# Patient Record
Sex: FEMALE | Race: WHITE | NOT HISPANIC OR LATINO | URBAN - METROPOLITAN AREA
[De-identification: names, ages, dates, MRNs, and addresses within clinical notes are randomized per-mention and may not be internally consistent; named-entity substitution may affect disease eponyms.]

---

## 2018-10-14 ENCOUNTER — INPATIENT (INPATIENT)
Facility: HOSPITAL | Age: 68
LOS: 2 days | Discharge: HOME | DRG: 198 | End: 2018-10-17
Attending: INTERNAL MEDICINE | Admitting: INTERNAL MEDICINE
Payer: COMMERCIAL

## 2018-10-14 VITALS
SYSTOLIC BLOOD PRESSURE: 120 MMHG | TEMPERATURE: 99 F | OXYGEN SATURATION: 100 % | HEART RATE: 62 BPM | DIASTOLIC BLOOD PRESSURE: 53 MMHG | RESPIRATION RATE: 17 BRPM

## 2018-10-14 DIAGNOSIS — R06.02 SHORTNESS OF BREATH: ICD-10-CM

## 2018-10-14 LAB
ALBUMIN SERPL ELPH-MCNC: 3.7 G/DL — SIGNIFICANT CHANGE UP (ref 3.5–5)
ALP SERPL-CCNC: 116 U/L — SIGNIFICANT CHANGE UP (ref 40–120)
ALT FLD-CCNC: 24 U/L DA — SIGNIFICANT CHANGE UP (ref 10–60)
ANION GAP SERPL CALC-SCNC: 5 MMOL/L — SIGNIFICANT CHANGE UP (ref 5–17)
AST SERPL-CCNC: 32 U/L — SIGNIFICANT CHANGE UP (ref 10–40)
BASE EXCESS BLDV CALC-SCNC: 0.9 MMOL/L — SIGNIFICANT CHANGE UP (ref -2–2)
BILIRUB SERPL-MCNC: 0.4 MG/DL — SIGNIFICANT CHANGE UP (ref 0.2–1.2)
BUN SERPL-MCNC: 14 MG/DL — SIGNIFICANT CHANGE UP (ref 7–18)
CALCIUM SERPL-MCNC: 9 MG/DL — SIGNIFICANT CHANGE UP (ref 8.4–10.5)
CHLORIDE SERPL-SCNC: 105 MMOL/L — SIGNIFICANT CHANGE UP (ref 96–108)
CO2 SERPL-SCNC: 28 MMOL/L — SIGNIFICANT CHANGE UP (ref 22–31)
CREAT SERPL-MCNC: 0.6 MG/DL — SIGNIFICANT CHANGE UP (ref 0.5–1.3)
GLUCOSE SERPL-MCNC: 96 MG/DL — SIGNIFICANT CHANGE UP (ref 70–99)
HCO3 BLDV-SCNC: 28 MMOL/L — SIGNIFICANT CHANGE UP (ref 21–29)
HCT VFR BLD CALC: 40.6 % — SIGNIFICANT CHANGE UP (ref 34.5–45)
HGB BLD-MCNC: 13.5 G/DL — SIGNIFICANT CHANGE UP (ref 11.5–15.5)
HOROWITZ INDEX BLDV+IHG-RTO: 21 — SIGNIFICANT CHANGE UP
MCHC RBC-ENTMCNC: 28.4 PG — SIGNIFICANT CHANGE UP (ref 27–34)
MCHC RBC-ENTMCNC: 33.2 GM/DL — SIGNIFICANT CHANGE UP (ref 32–36)
MCV RBC AUTO: 85.6 FL — SIGNIFICANT CHANGE UP (ref 80–100)
NT-PROBNP SERPL-SCNC: 152 PG/ML — HIGH (ref 0–125)
PCO2 BLDV: 54 MMHG — HIGH (ref 35–50)
PH BLDV: 7.32 — LOW (ref 7.35–7.45)
PLATELET # BLD AUTO: 311 K/UL — SIGNIFICANT CHANGE UP (ref 150–400)
PO2 BLDV: <44 MMHG — SIGNIFICANT CHANGE UP (ref 25–45)
POTASSIUM SERPL-MCNC: 4.4 MMOL/L — SIGNIFICANT CHANGE UP (ref 3.5–5.3)
POTASSIUM SERPL-SCNC: 4.4 MMOL/L — SIGNIFICANT CHANGE UP (ref 3.5–5.3)
PROT SERPL-MCNC: 9.1 G/DL — HIGH (ref 6–8.3)
RBC # BLD: 4.74 M/UL — SIGNIFICANT CHANGE UP (ref 3.8–5.2)
RBC # FLD: 12.4 % — SIGNIFICANT CHANGE UP (ref 10.3–14.5)
SAO2 % BLDV: 20 % — LOW (ref 67–88)
SODIUM SERPL-SCNC: 138 MMOL/L — SIGNIFICANT CHANGE UP (ref 135–145)
TROPONIN I SERPL-MCNC: <0.015 NG/ML — SIGNIFICANT CHANGE UP (ref 0–0.04)
WBC # BLD: 7.6 K/UL — SIGNIFICANT CHANGE UP (ref 3.8–10.5)
WBC # FLD AUTO: 7.6 K/UL — SIGNIFICANT CHANGE UP (ref 3.8–10.5)

## 2018-10-14 PROCEDURE — 99285 EMERGENCY DEPT VISIT HI MDM: CPT

## 2018-10-14 PROCEDURE — 71046 X-RAY EXAM CHEST 2 VIEWS: CPT | Mod: 26

## 2018-10-14 PROCEDURE — 99222 1ST HOSP IP/OBS MODERATE 55: CPT

## 2018-10-14 NOTE — ED PROVIDER NOTE - MEDICAL DECISION MAKING DETAILS
symptoms of heart failure, perhaps diastolic? ekg, xr chest, dimer. no s/s pe or acs right now. Symptomatic treatment.

## 2018-10-14 NOTE — ED ADULT NURSE NOTE - ED STAT RN HANDOFF DETAILS 2
received  pt.from jasvir russell  pt. is alert and  oriented x3.denies pain.  pending  disposition. not  in distress received  pt.from jasvir russell  pt. is alert and  oriented x3.denies pain. admitted to  tele for  SOB. on CM with NSR.not  in distress received  pt.from jasvir russell  pt. is alert and  oriented x3.denies pain. admitted to  tele for  SOB. on CM with NSR. endorsed  to jasvir florence. pt.  not  in distress

## 2018-10-14 NOTE — ED ADULT NURSE NOTE - OBJECTIVE STATEMENT
covering for primary RN MARCOS Monterroso - pt a&ox3, here with c/o SOB. pt states SOB x1 month with cough and yellow phlegm. states chest tightness. denies CP, fever, chills, n/v.

## 2018-10-14 NOTE — ED PROVIDER NOTE - PHYSICAL EXAMINATION
Gen: well appearing, of stated age, no acute distress; Head: NC, AT; ENT: MMM, no uvular deviation; Neck: supple with full ROM; Chest: basilar rales bl, no retractions, rate normal, appears to breath comfortable; Heart: RRR S1S2 No JVD no hepatojugular reflux, No peripheral edema b/l pulses 2+ in arms and legs; Abd: Soft non-tender, no rebound or guarding, no masses, no gonzalez sign, no mcburney tenderness, no CVAT; Back: No spinal deformity; Ext: Moving all 4 limbs without obvious impairment to ROM, no obvious weakness; Neuro: fluid speech, no focal deficits, oriented to person, place, situation; Psych: No anxiety, depression or pressured speech noted; Skin: no utricaria, no diffuse rash. -ncohen

## 2018-10-14 NOTE — ED PROVIDER NOTE - OBJECTIVE STATEMENT
68 female HLD presenting with wks to months of sob, can't walk up stairs, no cp, sob to lay flat now w 3 pillows, wakes up real short of breath and has to get up. no hx of sob prior. no n/v/d. no fever or chills. no uri symptoms.

## 2018-10-14 NOTE — ED ADULT NURSE NOTE - NSIMPLEMENTINTERV_GEN_ALL_ED
Implemented All Universal Safety Interventions:  Damon to call system. Call bell, personal items and telephone within reach. Instruct patient to call for assistance. Room bathroom lighting operational. Non-slip footwear when patient is off stretcher. Physically safe environment: no spills, clutter or unnecessary equipment. Stretcher in lowest position, wheels locked, appropriate side rails in place.

## 2018-10-15 DIAGNOSIS — E78.5 HYPERLIPIDEMIA, UNSPECIFIED: ICD-10-CM

## 2018-10-15 DIAGNOSIS — R06.02 SHORTNESS OF BREATH: ICD-10-CM

## 2018-10-15 DIAGNOSIS — K21.9 GASTRO-ESOPHAGEAL REFLUX DISEASE WITHOUT ESOPHAGITIS: ICD-10-CM

## 2018-10-15 DIAGNOSIS — Z29.9 ENCOUNTER FOR PROPHYLACTIC MEASURES, UNSPECIFIED: ICD-10-CM

## 2018-10-15 DIAGNOSIS — M32.9 SYSTEMIC LUPUS ERYTHEMATOSUS, UNSPECIFIED: ICD-10-CM

## 2018-10-15 DIAGNOSIS — Z90.710 ACQUIRED ABSENCE OF BOTH CERVIX AND UTERUS: Chronic | ICD-10-CM

## 2018-10-15 LAB
ANION GAP SERPL CALC-SCNC: 8 MMOL/L — SIGNIFICANT CHANGE UP (ref 5–17)
BUN SERPL-MCNC: 16 MG/DL — SIGNIFICANT CHANGE UP (ref 7–18)
CALCIUM SERPL-MCNC: 8.6 MG/DL — SIGNIFICANT CHANGE UP (ref 8.4–10.5)
CHLORIDE SERPL-SCNC: 106 MMOL/L — SIGNIFICANT CHANGE UP (ref 96–108)
CHOLEST SERPL-MCNC: 184 MG/DL — SIGNIFICANT CHANGE UP (ref 10–199)
CK MB BLD-MCNC: <1.8 % — SIGNIFICANT CHANGE UP (ref 0–3.5)
CK MB CFR SERPL CALC: <1 NG/ML — SIGNIFICANT CHANGE UP (ref 0–3.6)
CK SERPL-CCNC: 57 U/L — SIGNIFICANT CHANGE UP (ref 21–215)
CO2 SERPL-SCNC: 24 MMOL/L — SIGNIFICANT CHANGE UP (ref 22–31)
CREAT SERPL-MCNC: 0.64 MG/DL — SIGNIFICANT CHANGE UP (ref 0.5–1.3)
GLUCOSE SERPL-MCNC: 103 MG/DL — HIGH (ref 70–99)
HBA1C BLD-MCNC: 6.4 % — HIGH (ref 4–5.6)
HCT VFR BLD CALC: 39.6 % — SIGNIFICANT CHANGE UP (ref 34.5–45)
HDLC SERPL-MCNC: 38 MG/DL — LOW
HGB BLD-MCNC: 13 G/DL — SIGNIFICANT CHANGE UP (ref 11.5–15.5)
LIPID PNL WITH DIRECT LDL SERPL: 118 MG/DL — SIGNIFICANT CHANGE UP
MAGNESIUM SERPL-MCNC: 2.3 MG/DL — SIGNIFICANT CHANGE UP (ref 1.6–2.6)
MCHC RBC-ENTMCNC: 28 PG — SIGNIFICANT CHANGE UP (ref 27–34)
MCHC RBC-ENTMCNC: 33 GM/DL — SIGNIFICANT CHANGE UP (ref 32–36)
MCV RBC AUTO: 85 FL — SIGNIFICANT CHANGE UP (ref 80–100)
PHOSPHATE SERPL-MCNC: 3.3 MG/DL — SIGNIFICANT CHANGE UP (ref 2.5–4.5)
PLATELET # BLD AUTO: 279 K/UL — SIGNIFICANT CHANGE UP (ref 150–400)
POTASSIUM SERPL-MCNC: 3.7 MMOL/L — SIGNIFICANT CHANGE UP (ref 3.5–5.3)
POTASSIUM SERPL-SCNC: 3.7 MMOL/L — SIGNIFICANT CHANGE UP (ref 3.5–5.3)
RBC # BLD: 4.66 M/UL — SIGNIFICANT CHANGE UP (ref 3.8–5.2)
RBC # FLD: 12 % — SIGNIFICANT CHANGE UP (ref 10.3–14.5)
SODIUM SERPL-SCNC: 138 MMOL/L — SIGNIFICANT CHANGE UP (ref 135–145)
TOTAL CHOLESTEROL/HDL RATIO MEASUREMENT: 4.8 RATIO — SIGNIFICANT CHANGE UP (ref 3.3–7.1)
TRIGL SERPL-MCNC: 138 MG/DL — SIGNIFICANT CHANGE UP (ref 10–149)
TROPONIN I SERPL-MCNC: <0.015 NG/ML — SIGNIFICANT CHANGE UP (ref 0–0.04)
WBC # BLD: 7.2 K/UL — SIGNIFICANT CHANGE UP (ref 3.8–10.5)
WBC # FLD AUTO: 7.2 K/UL — SIGNIFICANT CHANGE UP (ref 3.8–10.5)

## 2018-10-15 PROCEDURE — 99223 1ST HOSP IP/OBS HIGH 75: CPT

## 2018-10-15 RX ORDER — GLUCAGON INJECTION, SOLUTION 0.5 MG/.1ML
1 INJECTION, SOLUTION SUBCUTANEOUS ONCE
Qty: 0 | Refills: 0 | Status: DISCONTINUED | OUTPATIENT
Start: 2018-10-15 | End: 2018-10-17

## 2018-10-15 RX ORDER — DEXTROSE 50 % IN WATER 50 %
12.5 SYRINGE (ML) INTRAVENOUS ONCE
Qty: 0 | Refills: 0 | Status: DISCONTINUED | OUTPATIENT
Start: 2018-10-15 | End: 2018-10-17

## 2018-10-15 RX ORDER — ENOXAPARIN SODIUM 100 MG/ML
40 INJECTION SUBCUTANEOUS DAILY
Qty: 0 | Refills: 0 | Status: DISCONTINUED | OUTPATIENT
Start: 2018-10-15 | End: 2018-10-17

## 2018-10-15 RX ORDER — DEXTROSE 50 % IN WATER 50 %
15 SYRINGE (ML) INTRAVENOUS ONCE
Qty: 0 | Refills: 0 | Status: DISCONTINUED | OUTPATIENT
Start: 2018-10-15 | End: 2018-10-17

## 2018-10-15 RX ORDER — PANTOPRAZOLE SODIUM 20 MG/1
40 TABLET, DELAYED RELEASE ORAL
Qty: 0 | Refills: 0 | Status: DISCONTINUED | OUTPATIENT
Start: 2018-10-15 | End: 2018-10-17

## 2018-10-15 RX ORDER — INSULIN LISPRO 100/ML
VIAL (ML) SUBCUTANEOUS
Qty: 0 | Refills: 0 | Status: DISCONTINUED | OUTPATIENT
Start: 2018-10-15 | End: 2018-10-17

## 2018-10-15 RX ORDER — DEXTROSE 50 % IN WATER 50 %
25 SYRINGE (ML) INTRAVENOUS ONCE
Qty: 0 | Refills: 0 | Status: DISCONTINUED | OUTPATIENT
Start: 2018-10-15 | End: 2018-10-17

## 2018-10-15 RX ORDER — SODIUM CHLORIDE 9 MG/ML
1000 INJECTION, SOLUTION INTRAVENOUS
Qty: 0 | Refills: 0 | Status: DISCONTINUED | OUTPATIENT
Start: 2018-10-15 | End: 2018-10-17

## 2018-10-15 RX ORDER — ATORVASTATIN CALCIUM 80 MG/1
10 TABLET, FILM COATED ORAL AT BEDTIME
Qty: 0 | Refills: 0 | Status: DISCONTINUED | OUTPATIENT
Start: 2018-10-15 | End: 2018-10-17

## 2018-10-15 RX ADMIN — PANTOPRAZOLE SODIUM 40 MILLIGRAM(S): 20 TABLET, DELAYED RELEASE ORAL at 06:37

## 2018-10-15 RX ADMIN — ATORVASTATIN CALCIUM 10 MILLIGRAM(S): 80 TABLET, FILM COATED ORAL at 21:29

## 2018-10-15 NOTE — H&P ADULT - PROBLEM SELECTOR PLAN 1
- p/w 2 months of worsening SOB on exertion, orthopnea, and PND  - patient reports that she has had SOB for 10-15 years but it recently got worse  - admitted to tele to r/o possible CHF exacerbation  - proBNP mildly elevated to 152  - mildly acidotic with slightly elevated pCO2 to 54  - EKG NSR  - trop x2 negative  - CXR wnl, unchanged from May 2016  - patient saturating 98% on room air  - primary team please obtain Echo records from [facility]  - primary team please obtain cardio consult - p/w 2 months of worsening SOB on exertion, orthopnea, and PND  - may be secondary to CHF or URI  - patient reports that she has had SOB for 10-15 years but it recently got worse  - admitted to tele to r/o possible CHF exacerbation  - proBNP mildly elevated to 152  - mildly acidotic with slightly elevated pCO2 to 54  - EKG NSR  - trop x2 negative  - CXR wnl, unchanged from May 2016  - patient saturating 98% on room air  - f/u RVP  - primary team please obtain Echo records from [facility]  - primary team please obtain cardio consult - p/w 2 months of worsening SOB on exertion, orthopnea, and PND  - likely secondary to new onset CHF   - patient reports that she has had SOB for 10-15 years but it recently got worse  - admitted to tele to r/o possible CHF exacerbation  - proBNP mildly elevated to 152  - mildly acidotic with slightly elevated pCO2 to 54  - EKG NSR  - trop x2 negative  - CXR wnl, unchanged from May 2016  - patient saturating 98% on room air  - no evidence of fluid overload - lungs cta b/l, no edema - no lasix needed for now  - f/u echo  - primary team please obtain Echo records from [facility], Dr. Goins, patient's previous PCP  - primary team please obtain cardio consult

## 2018-10-15 NOTE — H&P ADULT - NEGATIVE NEUROLOGICAL SYMPTOMS
no weakness/no headache/no loss of sensation/no vertigo/no loss of consciousness/no syncope/no tremors

## 2018-10-15 NOTE — H&P ADULT - PMH
Hyperlipidemia    Systemic lupus erythematosus GERD (gastroesophageal reflux disease)    Hyperlipidemia    Systemic lupus erythematosus

## 2018-10-15 NOTE — H&P ADULT - PROBLEM SELECTOR PLAN 3
- patient reports she was previously diagnosed with RA but then her doctor canceled that diagnosis and diagnosed her with SLE  - not taking any meds - patient reports she was previously diagnosed with RA but then her doctor canceled that diagnosis and diagnosed her with SLE  - refused medical therapy, not taking any meds

## 2018-10-15 NOTE — H&P ADULT - PROBLEM SELECTOR PLAN 5
IMPROVE VTE Individual Risk Assessment    RISK                                                          Points  [] Previous VTE                                           3  [] Thrombophilia                                        2  [] Lower limb paralysis                              2   [] Current Cancer                                       2   [x] Immobilization > 24 hrs                        1  [] ICU/CCU stay > 24 hours                       1  [x] Age > 60                                                   1    Lovenox 40mg qd

## 2018-10-15 NOTE — CONSULT NOTE ADULT - SUBJECTIVE AND OBJECTIVE BOX
CHIEF COMPLAINT: shortness of breath    HPI: 67 yo F with HLD and SLE who presented with progressively worsening dyspnea for the past 2 months.     PAST MEDICAL & SURGICAL HISTORY:  As above, also GERD (gastroesophageal reflux disease), H/O total hysterectomy    Allergies    No Known Allergies    MEDICATIONS  (STANDING):  atorvastatin 10 milliGRAM(s) Oral at bedtime  dextrose 5%. 1000 milliLiter(s) (50 mL/Hr) IV Continuous <Continuous>  dextrose 50% Injectable 12.5 Gram(s) IV Push once  dextrose 50% Injectable 25 Gram(s) IV Push once  dextrose 50% Injectable 25 Gram(s) IV Push once  enoxaparin Injectable 40 milliGRAM(s) SubCutaneous daily  insulin lispro (HumaLOG) corrective regimen sliding scale   SubCutaneous three times a day before meals  pantoprazole    Tablet 40 milliGRAM(s) Oral before breakfast    MEDICATIONS  (PRN):  dextrose 40% Gel 15 Gram(s) Oral once PRN Blood Glucose LESS THAN 70 milliGRAM(s)/deciliter  glucagon  Injectable 1 milliGRAM(s) IntraMuscular once PRN Glucose LESS THAN 70 milligrams/deciliter      FAMILY HISTORY:  Family history of diabetes mellitus (Mother)    No family history of premature coronary artery disease or sudden cardiac death    SOCIAL HISTORY:  Smoking-  Alcohol-  Illicit Drug use-    REVIEW OF SYSTEMS:  Constitutional: [ ] fever, [ ]weight loss,  [ ]fatigue  Eyes: [ ] visual changes  Respiratory: [ ]shortness of breath;  [ ] cough, [ ]wheezing, [ ]chills, [ ]hemoptysis  Cardiovascular: [ ] chest pain, [ ]palpitations, [ ]dizziness,  [ ]leg swelling [ ]syncope  Gastrointestinal: [ ] abdominal pain, [ ]nausea, [ ]vomiting,  [ ]diarrhea   Genitourinary: [ ] dysuria, [ ] hematuria  Neurologic: [ ] headaches [ ] tremors  [ ] weakness [ ] lightheadedness  Skin: [ ] itching, [ ]burning, [ ] rashes  Endocrine: [ ] heat or cold intolerance  Musculoskeletal: [ ] joint pain or swelling; [ ] muscle, back, or extremity pain  Psychiatric: [ ] depression, [ ]anxiety, [ ]mood swings, or [ ]difficulty sleeping  Hematologic: [ ] easy bruising, [ ] bleeding gums       [ x] All others negative	  [ ] Unable to obtain    Vital Signs Last 24 Hrs  T(C): 37.1 (15 Oct 2018 12:40), Max: 37.2 (14 Oct 2018 19:30)  T(F): 98.7 (15 Oct 2018 12:40), Max: 99 (14 Oct 2018 19:30)  HR: 70 (15 Oct 2018 12:40) (60 - 81)  BP: 101/54 (15 Oct 2018 12:40) (100/43 - 159/94)  BP(mean): --  RR: 18 (15 Oct 2018 12:40) (16 - 19)  SpO2: 99% (15 Oct 2018 12:40) (98% - 100%)  I&O's Summary      PHYSICAL EXAM:  General: No acute distress  HEENT: EOMI, PERRL  Neck: Supple, No JVD  Lungs: Clear to auscultation bilaterally; No rales or wheezing  Heart: Regular rate and rhythm; No murmurs, rubs, or gallops  Abdomen: Nontender, bowel sounds present  Extremities: No clubbing, cyanosis, or edema  Nervous system:  Alert & Oriented X3, no focal deficits  Psychiatric: Normal affect  Skin: No rashes or lesions      LABS:  10-15    138  |  106  |  16  ----------------------------<  103<H>  3.7   |  24  |  0.64    Ca    8.6      15 Oct 2018 10:45  Phos  3.3     10-15  Mg     2.3     10-15    TPro  9.1<H>  /  Alb  3.7  /  TBili  0.4  /  DBili  x   /  AST  32  /  ALT  24  /  AlkPhos  116  10-14    Creatinine Trend: 0.64<--, 0.60<--                        13.0   7.2   )-----------( 279      ( 15 Oct 2018 10:45 )             39.6     Lipid Panel: Cholesterol, Serum 184  Direct   HDL Cholesterol, Serum 38  Triglycerides, Serum 138    Cardiac Enzymes: CARDIAC MARKERS ( 15 Oct 2018 03:08 )  <0.015 ng/mL / x     / 57 U/L / x     / <1.0 ng/mL  CARDIAC MARKERS ( 14 Oct 2018 17:19 )  <0.015 ng/mL / x     / x     / x     / x        Serum Pro-Brain Natriuretic Peptide: 152 pg/mL (10-14-18 @ 17:19)    10-15 [id number] 6.4    RADIOLOGY: < from: Xray Chest 2 Views PA/Lat (10.14.18 @ 17:39) >  The lung fields and pleural surfaces are unremarkable.    < end of copied text >    ECG [my interpretation]:    TELEMETRY:    ECHO: Pending CHIEF COMPLAINT: shortness of breath    HPI: 69 yo F with HLD and SLE-arthritis who presented with progressively worsening dyspnea for the past 2 months. Patient states she has had dyspnea since she was a teenager, but had never received any work-up for this. Patient reports that the dyspnea is worsened with exertion, in particularly going up stairs (> 5 steps); she is able to walk on flat ground for several blocks. There is no associated chest pain, LH, or syncope; there are occasional palpitations associated with exertion. She also complains of orthopnea, though not PMD or LE edema. Currently feels fine when lying in bed. Reports she ahs never had a ny cardiac evaluation including a stress test. Patient is a non-smoker but reports a lot of exposure from 2nd-hand smoke.     PAST MEDICAL & SURGICAL HISTORY:  As above, also GERD (gastroesophageal reflux disease), H/O total hysterectomy    Allergies    No Known Allergies    MEDICATIONS  (STANDING):  atorvastatin 10 milliGRAM(s) Oral at bedtime  dextrose 5%. 1000 milliLiter(s) (50 mL/Hr) IV Continuous <Continuous>  dextrose 50% Injectable 12.5 Gram(s) IV Push once  dextrose 50% Injectable 25 Gram(s) IV Push once  dextrose 50% Injectable 25 Gram(s) IV Push once  enoxaparin Injectable 40 milliGRAM(s) SubCutaneous daily  insulin lispro (HumaLOG) corrective regimen sliding scale   SubCutaneous three times a day before meals  pantoprazole    Tablet 40 milliGRAM(s) Oral before breakfast    MEDICATIONS  (PRN):  dextrose 40% Gel 15 Gram(s) Oral once PRN Blood Glucose LESS THAN 70 milliGRAM(s)/deciliter  glucagon  Injectable 1 milliGRAM(s) IntraMuscular once PRN Glucose LESS THAN 70 milligrams/deciliter      FAMILY HISTORY:  Family history of diabetes mellitus (Mother)    Father: MI at age 67    SOCIAL HISTORY:  Smoking-never, but has exposure from 2nd-hand smoke  Alcohol-denies  Illicit Drug use-denies    REVIEW OF SYSTEMS:  Constitutional: [ ] fever, [ ]weight loss,  [ ]fatigue  Eyes: [ ] visual changes  Respiratory: [x ]shortness of breath;  [ ] cough, [ ]wheezing, [ ]chills, [ ]hemoptysis  Cardiovascular: [ ] chest pain, [ ]palpitations, [ ]dizziness,  [ ]leg swelling [ ]syncope  Gastrointestinal: [ ] abdominal pain, [ ]nausea, [ ]vomiting,  [ ]diarrhea   Genitourinary: [ ] dysuria, [ ] hematuria  Neurologic: [ ] headaches [ ] tremors  [ ] weakness [ ] lightheadedness  Skin: [ ] itching, [ ]burning, [ ] rashes  Endocrine: [ ] heat or cold intolerance  Musculoskeletal: [ ] joint pain or swelling; [ ] muscle, back, or extremity pain  Psychiatric: [ ] depression, [ ]anxiety, [ ]mood swings, or [ ]difficulty sleeping  Hematologic: [ ] easy bruising, [ ] bleeding gums       [ x] All others negative	  [ ] Unable to obtain    Vital Signs Last 24 Hrs  T(C): 37.1 (15 Oct 2018 12:40), Max: 37.2 (14 Oct 2018 19:30)  T(F): 98.7 (15 Oct 2018 12:40), Max: 99 (14 Oct 2018 19:30)  HR: 70 (15 Oct 2018 12:40) (60 - 81)  BP: 101/54 (15 Oct 2018 12:40) (100/43 - 159/94)  BP(mean): --  RR: 18 (15 Oct 2018 12:40) (16 - 19)  SpO2: 99% (15 Oct 2018 12:40) (98% - 100%)  I&O's Summary      PHYSICAL EXAM:  General: No acute distress  HEENT: EOMI, PERRL  Neck: Supple, No JVD  Lungs: Clear to auscultation bilaterally; No rales or wheezing  Heart: Regular rate and rhythm; No murmurs, rubs, or gallops  Abdomen: Nontender, bowel sounds present  Extremities: No clubbing, cyanosis, or edema  Nervous system:  Alert & Oriented X3, no focal deficits  Psychiatric: Normal affect  Skin: No rashes or lesions      LABS:  10-15    138  |  106  |  16  ----------------------------<  103<H>  3.7   |  24  |  0.64    Ca    8.6      15 Oct 2018 10:45  Phos  3.3     10-15  Mg     2.3     10-15    TPro  9.1<H>  /  Alb  3.7  /  TBili  0.4  /  DBili  x   /  AST  32  /  ALT  24  /  AlkPhos  116  10-14    Creatinine Trend: 0.64<--, 0.60<--                        13.0   7.2   )-----------( 279      ( 15 Oct 2018 10:45 )             39.6     Lipid Panel: Cholesterol, Serum 184  Direct   HDL Cholesterol, Serum 38  Triglycerides, Serum 138    Cardiac Enzymes: CARDIAC MARKERS ( 15 Oct 2018 03:08 )  <0.015 ng/mL / x     / 57 U/L / x     / <1.0 ng/mL  CARDIAC MARKERS ( 14 Oct 2018 17:19 )  <0.015 ng/mL / x     / x     / x     / x        Serum Pro-Brain Natriuretic Peptide: 152 pg/mL (10-14-18 @ 17:19)    10-15 [id number] 6.4    RADIOLOGY: < from: Xray Chest 2 Views PA/Lat (10.14.18 @ 17:39) >  The lung fields and pleural surfaces are unremarkable.    < end of copied text >    ECG [my interpretation]: 10/14/2018 @ 17:44: sinus bradycardia at 59 bpm, normal axis, normal intervals, normal EKG    TELEMETRY: Sinus rhythm, no events    ECHO: Pending

## 2018-10-15 NOTE — H&P ADULT - ATTENDING COMMENTS
Patient was seen with resident physician - agree with hx, assessment and plan with addition below  69 y/o lady with PMH of ?rheumatoid arthritis, recent diagnosis with lupus ( declined treatment) who presented with SOB, cough with whitish phlegm, progressive exercise intolerance, PND and orthopnea over the last 10 - 12 months but states it has gotten worse in the past few weeks. She was a patient of Dr Goins in the [facility] clinic but now visits with Dr Salmeron associated with [place]. She has never been told she has a heart condition. No prior evaluation per patient. She has a positive cardiac family history.  Vital Signs stable   Middle aged woman, sitting up in bed eating dinner, NAD, AAOX 3 speaking in full sentences  CTA B/L  RRR S1S2  Abdomen soft NTND BS+ ; no pitting edema  Both lower extremities - no edema    Labs/ radiology reviewed  Pro-BNP - 152  VBG noted  CXR clear  EKG - NSR with TWI in V1 and LAD    Impression  Consider new onset CHF   - Admit and initiate work up  - 2 D ECHO in AM  - Would require initial and follow up evaluation with cardiologist   - Further evaluation per finding on ECHO and cardiologist's recommendations.    DVT ppx Patient was seen with resident physician - agree with hx, assessment and plan with addition below  67 y/o lady with PMH of ?rheumatoid arthritis, recent diagnosis with lupus ( declined treatment) who presented with SOB, cough with whitish phlegm, progressive exercise intolerance, PND and orthopnea over the last 10 - 12 months but states it has gotten worse in the past few weeks. She was a patient of Dr Goins in the [facility] clinic but now visits with Dr Salmeron associated with [place]. She has never been told she has a heart condition. No prior evaluation per patient. She has a positive cardiac family history.  Vital Signs stable   Middle aged woman, sitting up in bed eating dinner, NAD, AAOX 3 speaking in full sentences  CTA B/L  RRR S1,S2,S3 gallop  Abdomen soft NTND BS+ ; no pitting edema  Both lower extremities - no edema    Labs/ radiology reviewed  Pro-BNP - 152  VBG noted  CXR clear  EKG - NSR with TWI in V1 and LAD    Impression  Consider new onset CHF   - Admit and initiate work up  - 2 D ECHO in AM  - Would require initial and follow up evaluation with cardiologist   - Further evaluation per finding on ECHO and cardiologist's recommendations.    DVT ppx

## 2018-10-15 NOTE — H&P ADULT - ASSESSMENT
This is a 69 y/o female, walks alone, lives with sons, with PMH of HLD and SLE(?) presents with SOB that has been worsening over the past 2 months. She reports that she has had SOB for about 10-15 years but in the past 2 months it has gotten significantly worse. Patient is being admitted to tele. This is a 69 y/o female, walks alone, lives with sons, with PMH of HLD and SLE(?) presents with SOB that has been worsening over the past 2 months. She reports that she has had SOB for about 10-15 years but in the past 2 months it has gotten significantly worse.    Patient is being admitted to tele for likely new onset CHF.

## 2018-10-15 NOTE — CONSULT NOTE ADULT - ASSESSMENT
69 yo F with SLE and HLD who presents with exertional dyspnea, as well as orthopnea.    1. Dyspnea: Less likely to be heart failure in the setting of pro-BNP of only 152, as well as completely clear CXR.   -Echocardiogram is pending  -Will discuss with patient regarding further evaluation including nuclear stress test to rule out ischemia, given patient's age as well as SLE as risk factors for CAD  -If above testing is non-contributory, consider pulmonary etiology    2. HLD: Patient's 10-year risk of atherosclerotic cardiovascular disease (ASCVD) is 4.8 % based on the pooled cohort equations.   -Patient is on pravastatin at home    ***Note that this is a preliminary note and any recommendations should NOT be carried out until this note is finalized. *** 67 yo F with SLE and HLD who presents with exertional dyspnea, as well as orthopnea.    1. Dyspnea: Less likely to be heart failure in the setting of pro-BNP of only 152, as well as completely clear CXR.   -Echocardiogram is pending   -Discussed with patient regarding further evaluation including nuclear stress test to rule out ischemia, given patient's age as well as SLE as risk factors for CAD; she is agreeable to proceed with pharmacologic nuclear stress test tomorrow morning. Please avoid all caffeine intake.   -If above testing is non-contributory, consider evaluation for pulmonary etiology (can see Dr. Powers at [phone])    2. HLD: Patient's 10-year risk of atherosclerotic cardiovascular disease (ASCVD) is 4.8 % based on the pooled cohort equations.   -Patient is on pravastatin at home

## 2018-10-16 LAB — HBA1C BLD-MCNC: 6.5 % — HIGH (ref 4–5.6)

## 2018-10-16 PROCEDURE — 78452 HT MUSCLE IMAGE SPECT MULT: CPT | Mod: 26

## 2018-10-16 PROCEDURE — 93018 CV STRESS TEST I&R ONLY: CPT

## 2018-10-16 PROCEDURE — 71250 CT THORAX DX C-: CPT | Mod: 26

## 2018-10-16 PROCEDURE — 99233 SBSQ HOSP IP/OBS HIGH 50: CPT | Mod: GC

## 2018-10-16 PROCEDURE — 99223 1ST HOSP IP/OBS HIGH 75: CPT

## 2018-10-16 PROCEDURE — 93016 CV STRESS TEST SUPVJ ONLY: CPT

## 2018-10-16 PROCEDURE — 99232 SBSQ HOSP IP/OBS MODERATE 35: CPT | Mod: 25

## 2018-10-16 RX ADMIN — ENOXAPARIN SODIUM 40 MILLIGRAM(S): 100 INJECTION SUBCUTANEOUS at 12:11

## 2018-10-16 RX ADMIN — ATORVASTATIN CALCIUM 10 MILLIGRAM(S): 80 TABLET, FILM COATED ORAL at 21:09

## 2018-10-16 RX ADMIN — PANTOPRAZOLE SODIUM 40 MILLIGRAM(S): 20 TABLET, DELAYED RELEASE ORAL at 05:43

## 2018-10-16 NOTE — CONSULT NOTE ADULT - SUBJECTIVE AND OBJECTIVE BOX
Source: Patient and Chart    Reason for Consultation: SOB    Chief Complaint: SOB x 2 months    HPI:  This is a 67 y/o female, walks alone, lives with sons, with PMH of HLD, GERD, and SLE presents with SOB that has been worsening over the past 2 months. She reports that she has had SOB for about 10-15 years but in the past 2 months it has gotten significantly worse. She is unable to walk more than 2 blocks or up the stairs without getting SOB. She sleeps with 3 pillows, but it still sometimes wakes her during the night and she has to get up and sleep in a chair. She says that she also feels like at times when she gets this SOB, her heart starts to beat too slowly. She says this SOB is associated with some generalized fatigue and malaise. She reports that when she was young, even in her teens, she would frequently pass out, and she saw doctors for this and they told her all her tests were normal. She says she hasn't passed out since she stopped menstruating. She says she believes she got an echo at [facility] several months ago and as far as she knows it was normal. She denies LOC, fevers, chills, chest pain or tightness, dizziness, numbness, tingling, weakness, abd pain, NVDC. She denies ever smoking cigarettes or drinking alcohol. She did not get the flu shot or her pneumococcus vaccine because she says she has never received these. (15 Oct 2018 00:26)    ? Raynauds. She was on prednisone previously but was stopped. She was originally dx with Rheumatoid arthritis and then diagnosed with lupus. She is not on any medications and follows up at the [facility]. Her breathing has been getting worse over the last year. She can only walk less than 1 block and less than 5 steps before she has to rest. +nonproductive cough. +GERD    MEDICATIONS  (STANDING):  atorvastatin 10 milliGRAM(s) Oral at bedtime  dextrose 5%. 1000 milliLiter(s) (50 mL/Hr) IV Continuous <Continuous>  dextrose 50% Injectable 12.5 Gram(s) IV Push once  dextrose 50% Injectable 25 Gram(s) IV Push once  dextrose 50% Injectable 25 Gram(s) IV Push once  enoxaparin Injectable 40 milliGRAM(s) SubCutaneous daily  insulin lispro (HumaLOG) corrective regimen sliding scale   SubCutaneous three times a day before meals  pantoprazole    Tablet 40 milliGRAM(s) Oral before breakfast    MEDICATIONS  (PRN):  dextrose 40% Gel 15 Gram(s) Oral once PRN Blood Glucose LESS THAN 70 milliGRAM(s)/deciliter  glucagon  Injectable 1 milliGRAM(s) IntraMuscular once PRN Glucose LESS THAN 70 milligrams/deciliter    Allergies    No Known Allergies    Intolerances        PAST MEDICAL & SURGICAL HISTORY:  GERD (gastroesophageal reflux disease)  Systemic lupus erythematosus  Hyperlipidemia  H/O total hysterectomy    FAMILY HISTORY:  Family history of diabetes mellitus (Mother)    SOCIAL HISTORY  Smoking History: Nonsmoker  Alcohol: No ETOH  Drugs: No Drugs  Occupation: No occupational exposure    T(C): 36.7 (10-16-18 @ 15:10), Max: 37.1 (10-15-18 @ 19:10)  HR: 59 (10-16-18 @ 15:10) (57 - 66)  BP: 102/51 (10-16-18 @ 15:10) (100/36 - 116/45)  RR: 18 (10-16-18 @ 15:10) (16 - 18)  SpO2: 100% (10-16-18 @ 15:10) (98% - 100%)    LABS:                        13.0   7.2   )-----------( 279      ( 15 Oct 2018 10:45 )             39.6     10-15    138  |  106  |  16  ----------------------------<  103<H>  3.7   |  24  |  0.64    Ca    8.6      15 Oct 2018 10:45  Phos  3.3     10-15  Mg     2.3     10-15    CARDIAC MARKERS ( 15 Oct 2018 03:08 )  <0.015 ng/mL / x     / 57 U/L / x     / <1.0 ng/mL    Serum Pro-Brain Natriuretic Peptide: 152 pg/mL (10-14-18 @ 17:19)    Microbiology    RADIOLOGY & ADDITIONAL STUDIES: (My Reading)  CXR- no infiltrates  CT chest- bibasilar atelectasis vs early ILD

## 2018-10-16 NOTE — PROGRESS NOTE ADULT - PROBLEM SELECTOR PLAN 1
Likely from rheumatological cause given history of sle  CT chest s/o interstitial lung disease  pulmonary Dr Powers consulted  -echo and stress test studies reported normal  Dr Sierra consulted, recommends pulmonary eval

## 2018-10-16 NOTE — PROGRESS NOTE ADULT - ASSESSMENT
69 yo F with SLE and HLD who presents with exertional dyspnea, as well as orthopnea.    1. Dyspnea: Less likely to be heart failure in the setting of pro-BNP of only 152, as well as completely clear CXR.   -Echocardiogram and stress test are both fairly unremarkable, with preserved EF, no significant valvular abnormalities and no ischemia noted.    -Consider evaluation for pulmonary etiology (can see Dr. Powers at [phone])    2. HLD: Patient's 10-year risk of atherosclerotic cardiovascular disease (ASCVD) is 4.8 % based on the pooled cohort equations.   -Patient is on pravastatin at home

## 2018-10-16 NOTE — PROGRESS NOTE ADULT - PROBLEM SELECTOR PLAN 3
- patient reports she was previously diagnosed with RA but then her doctor canceled that diagnosis and diagnosed her with SLE  - refused medical therapy, not taking any meds

## 2018-10-16 NOTE — PROGRESS NOTE ADULT - ASSESSMENT
This is a 69 y/o female, walks alone, lives with sons, with PMH of HLD and SLE(?) presents with SOB that has been worsening over the past 2 months. She reports that she has had SOB for about 10-15 years but in the past 2 months it has gotten significantly worse.    Patient is being admitted to tele for likely new onset CHF.

## 2018-10-16 NOTE — CONSULT NOTE ADULT - ASSESSMENT
1) Chronic Dyspnea  2) Abnormal CT chest, possible early ILD  3) ? Hx of Lupus  4) GERD    Clinically stable  No resting hypoxemia  Dyspnea possibly related to underlying ILD  Would ambulate to check for desaturation on Room air  Will order basic serology for connective tissue screen and complement levels for lupus flare  Stable for dc with close follow up with complete PFTs to evaluate for structural lung abnormalities with me after discharge  Would hold off on any empiric therapy until evaluation  She should have rheumatology follow up as an outpatient

## 2018-10-16 NOTE — PROGRESS NOTE ADULT - SUBJECTIVE AND OBJECTIVE BOX
PGY1 Note discussed with supervising resident and primary attending.    Patient is a 68y old  Female who presents with a chief complaint of sob x2 months (16 Oct 2018 11:59)      INTERVAL HPI/OVERNIGHT EVENTS: Patient had a stress test this AM tih normal study. Assessed bedside, has no acute complaints, reports improvement in dyspnea.     MEDICATIONS  (STANDING):  atorvastatin 10 milliGRAM(s) Oral at bedtime  dextrose 5%. 1000 milliLiter(s) (50 mL/Hr) IV Continuous <Continuous>  dextrose 50% Injectable 12.5 Gram(s) IV Push once  dextrose 50% Injectable 25 Gram(s) IV Push once  dextrose 50% Injectable 25 Gram(s) IV Push once  enoxaparin Injectable 40 milliGRAM(s) SubCutaneous daily  insulin lispro (HumaLOG) corrective regimen sliding scale   SubCutaneous three times a day before meals  pantoprazole    Tablet 40 milliGRAM(s) Oral before breakfast    MEDICATIONS  (PRN):  dextrose 40% Gel 15 Gram(s) Oral once PRN Blood Glucose LESS THAN 70 milliGRAM(s)/deciliter  glucagon  Injectable 1 milliGRAM(s) IntraMuscular once PRN Glucose LESS THAN 70 milligrams/deciliter      Allergies    No Known Allergies    Intolerances        REVIEW OF SYSTEMS:  CONSTITUTIONAL: No fever, weight loss, or fatigue  RESPIRATORY: Shortness of breath on walking; No cough, wheezing, chills or hemoptysis  CARDIOVASCULAR: No chest pain, palpitations, dizziness, or leg swelling  GASTROINTESTINAL: No abdominal or epigastric pain. No nausea, vomiting, or hematemesis; No diarrhea or constipation. No melena or hematochezia.  NEUROLOGICAL: No headaches, memory loss, loss of strength, numbness, or tremors  SKIN: No itching, burning, rashes, or lesions     Vital Signs Last 24 Hrs  T(C): 36.7 (16 Oct 2018 15:10), Max: 37.1 (15 Oct 2018 19:10)  T(F): 98 (16 Oct 2018 15:10), Max: 98.8 (15 Oct 2018 19:10)  HR: 59 (16 Oct 2018 15:10) (57 - 66)  BP: 102/51 (16 Oct 2018 15:10) (100/36 - 116/45)  BP(mean): --  RR: 18 (16 Oct 2018 15:10) (16 - 18)  SpO2: 100% (16 Oct 2018 15:10) (98% - 100%)    PHYSICAL EXAM:  GENERAL: NAD, well-groomed, well-developed  HEAD:  Atraumatic, Normocephalic  EYES: EOMI, PERRLA, conjunctiva and sclera clear  NECK: Supple, No JVD, Normal thyroid  CHEST/LUNG: Clear to percussion bilaterally; No rales, rhonchi, wheezing, or rubs  HEART: Regular rate and rhythm; No murmurs, rubs, or gallops  ABDOMEN: Soft, Nontender, Nondistended; Bowel sounds present  NERVOUS SYSTEM:  Alert & Oriented X3, Motor Strength 5/5 B/L   EXTREMITIES:   No clubbing, cyanosis, or edema      LABS:                        13.0   7.2   )-----------( 279      ( 15 Oct 2018 10:45 )             39.6     10-15    138  |  106  |  16  ----------------------------<  103<H>  3.7   |  24  |  0.64    Ca    8.6      15 Oct 2018 10:45  Phos  3.3     10-15  Mg     2.3     10-15    TPro  9.1<H>  /  Alb  3.7  /  TBili  0.4  /  DBili  x   /  AST  32  /  ALT  24  /  AlkPhos  116  10-14        CAPILLARY BLOOD GLUCOSE      POCT Blood Glucose.: 148 mg/dL (16 Oct 2018 12:00)  POCT Blood Glucose.: 116 mg/dL (16 Oct 2018 07:58)  POCT Blood Glucose.: 107 mg/dL (15 Oct 2018 20:38)  POCT Blood Glucose.: 105 mg/dL (15 Oct 2018 16:47)      RADIOLOGY & ADDITIONAL TESTS:    STRESS TEST IMPRESSIONS:    Symptom: Shortness of breath.  Heart Rhythm: Normal Sinus Rhythm with nonspecific T-wave  abnormality.  ECG Abnormalities: no changes.  Arrhythmia: None.    ------------------------------------------------------------------------    PROCEDURE:  9.8 mCi of Tc 99m Tetrofosmin were injected intravenously  at rest. Approximately 45 minutes later, tomographic  images were obtained in a 180 degree arc from right  anterior oblique to left anterior oblique with 64 stops.  At a separate time, 30.1 mCi of Tc 99m Tetrofosmin were  injected intravenously during stress protocol.  Approximately 45 minute(s) later, tomographic images were  obtained in a 180 degree arc from right anterior oblique  to left anterior oblique with 64 stops. The tomographic  slices were reconstructed in 3 orthogonal planes (short  axis, horizontal long axis and vertical long axis).  Interpretation was performed both by visual and  quantitative analysis.    ------------------------------------------------------------------------    NUCLEAR FINDINGS:  Review of raw data shows: The study is of good technical  quality.  There is a small, fixed, mild to moderate intensity defect  in the apical wall that thickens, consistent with  attenuation artifact.  Transient ischemic dilation (TID) ratio was normal at:  1.00  ------------------------------------------------------------------------      GATED ANALYSIS:  Post-stress resting myocardial perfusion gated SPECT  imaging was performed (LVEF > 70%;LVEDV = 52 ml.)  ------------------------------------------------------------------------      LV/RV OBSERVATION:  No segmental wall motion abnormality.  ------------------------------------------------------------------------    IMPRESSIONS:  * There is a small, fixed, mild to moderate intensity  defect in the apical wall that thickens, consistent with  attenuation artifact.  * Post-stress resting myocardial perfusion gated SPECT  imaging was performed (LVEF > 70%;LVEDV = 52 ml.)  * Negative study for reversible ischemia    ------------------------------------------------------------------------      ------------------------------------------------------------------------    Confirmed on  10/16/2018 - 11:31:48 at [place] by  Oliver Sierra MD      EXAM:  CT CHEST                            PROCEDURE DATE:  10/16/2018          INTERPRETATION:  CT CHEST WITHOUT CONTRAST    INDICATION: History of lupus with shortness of breath and dyspnea on   exertion for 5 days.    TECHNIQUE: Unenhanced helical images were obtained of the chest. Coronal   and sagittal images were reconstructed. Maximum intensity projection   images were generated.     COMPARISON: Radiograph 10/14/2018.    FINDINGS:     Tubes/Lines: None.    Lungs And Airways: There are bilateral, predominantly groundglass   juxtapleural opacities and reticular opacities. There are pulmonary   nodules. The largest nodules measure:   *  The 2 mm noncalcified nodule within the right upper lobe along the   right minor fissure and likely represents a pulmonary lymph node (series   2 image 55).  *  In the right lower lobe is a 4 mm noncalcified pulmonary nodule   (series 2 image 65).  *  In the right middle lobe is a noncalcified 4 mm nodule (series 2 image   68).  *  In the left upper lobe is a noncalcified 4 mm nodule (series 2 image   30).    No honeycombing. The remainder of the lungs are clear. The airways are   normal.    Pleura: No pneumothorax.  No pleural effusion.    Mediastinum: There are no enlarged chest lymph nodes. The visualized   portion of the thyroid gland is unremarkable. The esophagus is   unremarkable.       Heart and Vasculature: The heart is normal in size.  There is no   pericardial effusion. Left-sided aortic arch and left-sided descending   thoracic aorta. No aneurysm. Atheromatous disease of the aorta. The main   pulmonary artery is normal in caliber.    Upper Abdomen: Partially imaged hypodense left renal lesions likely   represent cysts. The remainder of the upper abdomen is unremarkable.    Bones And Soft Tissues: The bones are unremarkable.  The soft tissues are   unremarkable.      IMPRESSION:     1.  Bilateral pulmonary nodules, largest of which measures 4 mm are of   uncertain etiology.  2.  Bilateral juxtapleural groundglass opacities and reticular opacities.   Could suggest underlying interstitial lung disease. A 3 month follow-up   with prone imaging is recommended to evaluate for resolution/change.                PAVEL SCHMIDT M.D., ATTENDING RADIOLOGIST  This document has been electronically signed. Oct 16 2018  1:46PM

## 2018-10-16 NOTE — PROGRESS NOTE ADULT - SUBJECTIVE AND OBJECTIVE BOX
PRESENTING CC: shortness of breath    SUBJ:     In summary, this is a 67 yo F with HLD and SLE-arthritis who presented with progressively worsening dyspnea for the past 2 months.    Overnight, there were no acute events. Patient denies chest pain or palpitations.     ----------------------  Description of patient's presenting symptoms from my prior note: Patient reports that the dyspnea is worsened with exertion, in particularly going up stairs (> 5 steps); she is able to walk on flat ground for several blocks. There is no associated chest pain, LH, or syncope; there are occasional palpitations associated with exertion.    PMH: As above, also GERD (gastroesophageal reflux disease), H/O total hysterectomy    Allergies    No Known Allergies    MEDICATIONS  (STANDING):  atorvastatin 10 milliGRAM(s) Oral at bedtime  dextrose 5%. 1000 milliLiter(s) (50 mL/Hr) IV Continuous <Continuous>  dextrose 50% Injectable 12.5 Gram(s) IV Push once  dextrose 50% Injectable 25 Gram(s) IV Push once  dextrose 50% Injectable 25 Gram(s) IV Push once  enoxaparin Injectable 40 milliGRAM(s) SubCutaneous daily  insulin lispro (HumaLOG) corrective regimen sliding scale   SubCutaneous three times a day before meals  pantoprazole    Tablet 40 milliGRAM(s) Oral before breakfast    MEDICATIONS  (PRN):  dextrose 40% Gel 15 Gram(s) Oral once PRN Blood Glucose LESS THAN 70 milliGRAM(s)/deciliter  glucagon  Injectable 1 milliGRAM(s) IntraMuscular once PRN Glucose LESS THAN 70 milligrams/deciliter      FAMILY HISTORY:  Family history of diabetes mellitus (Mother)    Reviewed; no change from my prior note    SOCIAL HISTORY:  Reviewed, no change from my prior note    REVIEW OF SYSTEMS:  Constitutional: [ ] fever, [ ]weight loss,  [ ]fatigue  Eyes: [ ] visual changes  Respiratory: [ ]shortness of breath;  [ ] cough, [ ]wheezing, [ ]chills, [ ]hemoptysis  Cardiovascular: [ ] chest pain, [ ]palpitations, [ ]dizziness,  [ ]leg swelling [ ]syncope  Gastrointestinal: [ ] abdominal pain, [ ]nausea, [ ]vomiting,  [ ]diarrhea   Genitourinary: [ ] dysuria, [ ] hematuria  Neurologic: [ ] headaches [ ] tremors [ ] weakness [ ] lightheadedness  Skin: [ ] itching, [ ]burning, [ ] rashes  Endocrine: [ ] heat or cold intolerance  Musculoskeletal: [ ] joint pain or swelling; [ ] muscle, back, or extremity pain  Psychiatric: [ ] depression, [ ]anxiety, [ ]mood swings, or [ ]difficulty sleeping  Hematologic: [ ] easy bruising, [ ] bleeding gums    [x] All remaining systems negative except as per above.   [  ] Unable to obtain    Vital Signs Last 24 Hrs  T(C): 36.7 (16 Oct 2018 11:25), Max: 37.1 (15 Oct 2018 12:40)  T(F): 98 (16 Oct 2018 11:25), Max: 98.8 (15 Oct 2018 19:10)  HR: 60 (16 Oct 2018 11:25) (57 - 70)  BP: 112/50 (16 Oct 2018 11:25) (100/36 - 123/52)  BP(mean): --  RR: 18 (16 Oct 2018 11:25) (16 - 18)  SpO2: 99% (16 Oct 2018 11:25) (98% - 100%)  I&O's Summary      PHYSICAL EXAM:  General: No acute distress  HEENT: EOMI, PERRL  Neck: Supple, No JVD  Lungs: Clear to auscultation bilaterally; No rales or wheezing  Heart: Regular rate and rhythm; No murmurs, rubs, or gallops  Abdomen: Nontender, bowel sounds present  Extremities: No clubbing, cyanosis, or edema  Nervous system:  Alert & Oriented X3, no focal deficits  Psychiatric: Normal affect  Skin: No rashes or lesions    LABS:  10-15    138  |  106  |  16  ----------------------------<  103<H>  3.7   |  24  |  0.64    Ca    8.6      15 Oct 2018 10:45  Phos  3.3     10-15  Mg     2.3     10-15    TPro  9.1<H>  /  Alb  3.7  /  TBili  0.4  /  DBili  x   /  AST  32  /  ALT  24  /  AlkPhos  116  10-14    Creatinine Trend: 0.64<--, 0.60<--                        13.0   7.2   )-----------( 279      ( 15 Oct 2018 10:45 )             39.6       Lipid Panel:   Cardiac Enzymes: CARDIAC MARKERS ( 15 Oct 2018 03:08 )  <0.015 ng/mL / x     / 57 U/L / x     / <1.0 ng/mL  CARDIAC MARKERS ( 14 Oct 2018 17:19 )  <0.015 ng/mL / x     / x     / x     / x          Serum Pro-Brain Natriuretic Peptide: 152 pg/mL (10-14-18 @ 17:19)    ECHO: < from: Transthoracic Echocardiogram (10.15.18 @ 07:55) >  CONCLUSIONS:  1. Normal mitral valve. Mild mitral regurgitation.  2. Normal trileaflet aortic valve.  3. Aortic Root: 2.6 cm.  4. Mild left atrial enlargement.  5. Normal left ventricular internal dimensions and wall thicknesses.  6. Normal Left Ventricular Systolic Function,  (EF = 55 to 60%)  7. Grade I diastolic dysfunction (Impaired relaxation).  8. Normal right atrium.  9. Normal right ventricular size and function.  10. RA Pressure is 8 mm Hg.  11. RV systolic pressure is 33 mm Hg.  12. There is mild tricuspid regurgitation.  13. Normal pulmonic valve.  14. Normal pericardium with no pericardial effusion.    < end of copied text >      STRESS TEST:   < from: Nuclear Stress Test-Pharmacologic (10.16.18 @ 06:03) >  IMPRESSIONS:  * Thereis a small, fixed, mild to moderate intensity  defect in the apical wall that thickens, consistent with  attenuation artifact.  * Post-stress resting myocardial perfusion gated SPECT  imaging was performed (LVEF > 70%;LVEDV = 52 ml.)  * Negative study for reversible ischemia    < end of copied text >

## 2018-10-16 NOTE — PROGRESS NOTE ADULT - ATTENDING COMMENTS
Thank you for the courtesy of a consultation, please contact me for any additional questions
Patient seen/evaluated at bedside 10/16/18. I agree with the resident progress note/outlined plan of care. My independent findings and conclusions are documented.    Reports some improvement in MERZA. No cough/ fevers/chills. Has received conflicting information on her diagnosis: previously informed she has RA but another provider has told her she has SLE. No weight loss, sweats    AAOx3 NAD  S1S2 RRR  CTABl no accessory muscle use  soft, NT, ND, + BS  hand ulner deviation  no LE edema/cyanosis/clubbing  moves all extremities symmetrically    CT chest: 1.  Bilateral pulmonary nodules, largest of which measures 4 mm are of   uncertain etiology.  2.  Bilateral juxtapleural groundglass opacities and reticular opacities.   Could suggest underlying interstitial lung disease. A 3 month follow-up   with prone imaging is recommended to evaluate for resolution/change.    1.MERAZ  2. abnormal CT chest, possible ILD, pulm nodules  3. autoimmune disease, possible RA or SLE--> has been informed she has both  4. pre-diabetes mellitus  5. GERD    symptomatically improved. TTE and nuclear stress reviewed--> no acute process  appreciate pulmonary input  -CT chest ordered today with results--> pulmonary nodules/groundglass opacities  -check LISSETT/DsDNA/ C3/C4/ FELIZ ab (antiSM/ RNP ab) , scleroderma ab, RF, anti-ccp  -dietary and lifestyle counseling  -outpt pulm and rheumatology f/u  -dvt ppx  -likely discharge 10/17

## 2018-10-17 ENCOUNTER — TRANSCRIPTION ENCOUNTER (OUTPATIENT)
Age: 68
End: 2018-10-17

## 2018-10-17 VITALS — WEIGHT: 134.92 LBS

## 2018-10-17 LAB
ANTI-RIBONUCLEAR PROTEIN: 5.8 AI — HIGH
ANTI-RIBONUCLEAR PROTEIN: 6.2 AI — HIGH
C3 SERPL-MCNC: 130 MG/DL — SIGNIFICANT CHANGE UP (ref 81–157)
C3 SERPL-MCNC: 136 MG/DL — SIGNIFICANT CHANGE UP (ref 81–157)
C4 SERPL-MCNC: 19 MG/DL — SIGNIFICANT CHANGE UP (ref 13–39)
C4 SERPL-MCNC: 19 MG/DL — SIGNIFICANT CHANGE UP (ref 13–39)
DSDNA AB FLD-ACNC: <0.2 AI — SIGNIFICANT CHANGE UP
ENA SCL70 AB SER-ACNC: <0.2 AI — SIGNIFICANT CHANGE UP
ENA SM AB FLD QL: 0.7 AI — SIGNIFICANT CHANGE UP
ENA SS-A AB FLD IA-ACNC: <0.2 AI — SIGNIFICANT CHANGE UP
ERYTHROCYTE [SEDIMENTATION RATE] IN BLOOD: 72 MM/HR — HIGH (ref 0–20)
RHEUMATOID FACT SERPL-ACNC: <10 IU/ML — SIGNIFICANT CHANGE UP (ref 0–13)
RHEUMATOID FACT SERPL-ACNC: <10 IU/ML — SIGNIFICANT CHANGE UP (ref 0–13)

## 2018-10-17 PROCEDURE — 86160 COMPLEMENT ANTIGEN: CPT

## 2018-10-17 PROCEDURE — 83036 HEMOGLOBIN GLYCOSYLATED A1C: CPT

## 2018-10-17 PROCEDURE — 93005 ELECTROCARDIOGRAM TRACING: CPT

## 2018-10-17 PROCEDURE — 82553 CREATINE MB FRACTION: CPT

## 2018-10-17 PROCEDURE — 80048 BASIC METABOLIC PNL TOTAL CA: CPT

## 2018-10-17 PROCEDURE — 71046 X-RAY EXAM CHEST 2 VIEWS: CPT

## 2018-10-17 PROCEDURE — 82803 BLOOD GASES ANY COMBINATION: CPT

## 2018-10-17 PROCEDURE — 83516 IMMUNOASSAY NONANTIBODY: CPT

## 2018-10-17 PROCEDURE — 84100 ASSAY OF PHOSPHORUS: CPT

## 2018-10-17 PROCEDURE — 83735 ASSAY OF MAGNESIUM: CPT

## 2018-10-17 PROCEDURE — 86235 NUCLEAR ANTIGEN ANTIBODY: CPT

## 2018-10-17 PROCEDURE — 99232 SBSQ HOSP IP/OBS MODERATE 35: CPT

## 2018-10-17 PROCEDURE — 86200 CCP ANTIBODY: CPT

## 2018-10-17 PROCEDURE — 82962 GLUCOSE BLOOD TEST: CPT

## 2018-10-17 PROCEDURE — 86036 ANCA SCREEN EACH ANTIBODY: CPT

## 2018-10-17 PROCEDURE — 86225 DNA ANTIBODY NATIVE: CPT

## 2018-10-17 PROCEDURE — 93306 TTE W/DOPPLER COMPLETE: CPT

## 2018-10-17 PROCEDURE — 99239 HOSP IP/OBS DSCHRG MGMT >30: CPT

## 2018-10-17 PROCEDURE — 80061 LIPID PANEL: CPT

## 2018-10-17 PROCEDURE — 86431 RHEUMATOID FACTOR QUANT: CPT

## 2018-10-17 PROCEDURE — 84484 ASSAY OF TROPONIN QUANT: CPT

## 2018-10-17 PROCEDURE — 85027 COMPLETE CBC AUTOMATED: CPT

## 2018-10-17 PROCEDURE — 85652 RBC SED RATE AUTOMATED: CPT

## 2018-10-17 PROCEDURE — 71250 CT THORAX DX C-: CPT

## 2018-10-17 PROCEDURE — 82550 ASSAY OF CK (CPK): CPT

## 2018-10-17 PROCEDURE — 83520 IMMUNOASSAY QUANT NOS NONAB: CPT

## 2018-10-17 PROCEDURE — 78452 HT MUSCLE IMAGE SPECT MULT: CPT

## 2018-10-17 PROCEDURE — 93017 CV STRESS TEST TRACING ONLY: CPT

## 2018-10-17 PROCEDURE — A9502: CPT

## 2018-10-17 PROCEDURE — 80053 COMPREHEN METABOLIC PANEL: CPT

## 2018-10-17 PROCEDURE — 86038 ANTINUCLEAR ANTIBODIES: CPT

## 2018-10-17 PROCEDURE — 83880 ASSAY OF NATRIURETIC PEPTIDE: CPT

## 2018-10-17 PROCEDURE — 99285 EMERGENCY DEPT VISIT HI MDM: CPT | Mod: 25

## 2018-10-17 RX ORDER — PANTOPRAZOLE SODIUM 20 MG/1
1 TABLET, DELAYED RELEASE ORAL
Qty: 30 | Refills: 0
Start: 2018-10-17 | End: 2018-11-15

## 2018-10-17 RX ORDER — PYRIDOXINE HCL (VITAMIN B6) 100 MG
1 TABLET ORAL
Qty: 0 | Refills: 0 | COMMUNITY

## 2018-10-17 RX ADMIN — PANTOPRAZOLE SODIUM 40 MILLIGRAM(S): 20 TABLET, DELAYED RELEASE ORAL at 05:37

## 2018-10-17 RX ADMIN — ENOXAPARIN SODIUM 40 MILLIGRAM(S): 100 INJECTION SUBCUTANEOUS at 11:17

## 2018-10-17 NOTE — PROGRESS NOTE ADULT - ASSESSMENT
1) Chronic Dyspnea  2) Abnormal CT chest, possible early ILD  3) ? Hx of Lupus  4) GERD    Clinically stable for dc from a pulmonary standpoint  Connective tissue and vasculitis serology sent this am  She can follow up with me for results and PFTs for possible connective tissue related ILD  No acute indication for immediate treatment until evaluation can be done  Can d/c on PPI treatment given chronic GERD and possible ILD if not already on it at home

## 2018-10-17 NOTE — DISCHARGE NOTE ADULT - MEDICATION SUMMARY - MEDICATIONS TO TAKE
I will START or STAY ON the medications listed below when I get home from the hospital:    pravastatin 20 mg oral tablet  -- 1 tab(s) by mouth once a day  -- Indication: For Hyperlipidemia    Omega-3 350 mg oral capsule  -- 1 cap(s) by mouth once a day  -- Indication: For Hyperlipidemia    Vitamin B12 50 mcg oral tablet  -- 1 tab(s) by mouth once a day  -- Indication: For Supplement I will START or STAY ON the medications listed below when I get home from the hospital:    pravastatin 20 mg oral tablet  -- 1 tab(s) by mouth once a day  -- Indication: For Hyperlipidemia    Omega-3 350 mg oral capsule  -- 1 cap(s) by mouth once a day  -- Indication: For Hyperlipidemia    pantoprazole 40 mg oral delayed release tablet  -- 1 tab(s) by mouth once a day (before a meal)  -- Indication: For GERD (gastroesophageal reflux disease)    Vitamin B12 50 mcg oral tablet  -- 1 tab(s) by mouth once a day  -- Indication: For Supplement

## 2018-10-17 NOTE — DISCHARGE NOTE ADULT - PATIENT PORTAL LINK FT
You can access the [facility] Patient Portal, offered by [facility], by registering with the following website: [email or web address]

## 2018-10-17 NOTE — DISCHARGE NOTE ADULT - MEDICATION SUMMARY - MEDICATIONS TO STOP TAKING
I will STOP taking the medications listed below when I get home from the hospital:    Vitamin B6 25 mg oral tablet  -- 1 tab(s) by mouth once a day

## 2018-10-17 NOTE — PROGRESS NOTE ADULT - SUBJECTIVE AND OBJECTIVE BOX
Interval Events: No overnight events. Breathing is stable. Denies any chest pain or productive cough.    OBJECTIVE:    T(C): 36.6 (17 Oct 2018 08:00), Max: 37.1 (16 Oct 2018 19:16)  T(F): 97.9 (17 Oct 2018 08:00), Max: 98.8 (16 Oct 2018 19:16)  HR: 57 (17 Oct 2018 08:00) (57 - 69)  BP: 105/48 (17 Oct 2018 08:00) (102/51 - 112/65)  RR: 18 (17 Oct 2018 08:00) (16 - 18)  SpO2: 97% (17 Oct 2018 08:00) (96% - 100%)    CAPILLARY BLOOD GLUCOSE    POCT Blood Glucose.: 108 mg/dL (17 Oct 2018 08:35)    PHYSICAL EXAM:  General: Awake, alert, oriented X 3.   HEENT: Atraumatic, PERRL, Anicteric.   Lymph Nodes: No palpable lymphadenopathy  Respiratory: basilar fine crackles  Cardiovascular: S1 S2 normal. No murmurs, rubs or gallops.   Abdomen: Soft, non-tender, non-distended. No organomegaly.  Extremities: Warm to touch. Peripheral pulse palpable. No pedal edema. Bilateral hand joint deformities  Skin: No rashes or skin lesions  Neurological: No focal deficits.  Psychiatry: Appropriate mood and affect.    HOSPITAL MEDICATIONS:  MEDICATIONS  (STANDING):  atorvastatin 10 milliGRAM(s) Oral at bedtime  dextrose 5%. 1000 milliLiter(s) (50 mL/Hr) IV Continuous <Continuous>  dextrose 50% Injectable 12.5 Gram(s) IV Push once  dextrose 50% Injectable 25 Gram(s) IV Push once  dextrose 50% Injectable 25 Gram(s) IV Push once  enoxaparin Injectable 40 milliGRAM(s) SubCutaneous daily  insulin lispro (HumaLOG) corrective regimen sliding scale   SubCutaneous three times a day before meals  pantoprazole    Tablet 40 milliGRAM(s) Oral before breakfast    MEDICATIONS  (PRN):  dextrose 40% Gel 15 Gram(s) Oral once PRN Blood Glucose LESS THAN 70 milliGRAM(s)/deciliter  glucagon  Injectable 1 milliGRAM(s) IntraMuscular once PRN Glucose LESS THAN 70 milligrams/deciliter    LABS:                        13.0   7.2   )-----------( 279      ( 15 Oct 2018 10:45 )             39.6     10-15    138  |  106  |  16  ----------------------------<  103<H>  3.7   |  24  |  0.64    Ca    8.6      15 Oct 2018 10:45  Phos  3.3     10-15  Mg     2.3     10-15    MICROBIOLOGY:     RADIOLOGY:  [X] Reviewed and interpreted by me

## 2018-10-17 NOTE — DISCHARGE NOTE ADULT - CARE PLAN
Principal Discharge DX:	Interstitial lung disease  Secondary Diagnosis:	Systemic lupus erythematosus  Secondary Diagnosis:	Hyperlipidemia  Secondary Diagnosis:	GERD (gastroesophageal reflux disease)  Secondary Diagnosis:	Diabetes mellitus Principal Discharge DX:	Interstitial lung disease  Secondary Diagnosis:	Systemic lupus erythematosus  Secondary Diagnosis:	Hyperlipidemia  Secondary Diagnosis:	GERD (gastroesophageal reflux disease)  Goal:	Relief of heartburn  Assessment and plan of treatment:	You report chronic GERD. Continue with Protonix 40mg daily before breakfast. Follow up with your primary medical doctor to monitor your progress.  Secondary Diagnosis:	Prediabetes  Goal:	Hemoglobin A1C<6.4  Assessment and plan of treatment:	Your hemoglobin A1C, which reflects blood sugar levels over the past 3 months, was 6.5 on admission which Principal Discharge DX:	Interstitial lung disease  Goal:	Improved quality of life  Assessment and plan of treatment:	You were admitted for evaluation of shortness of breath. You were seen by cardiologist Dr Sierra, echocardiogram of your heart and stress test were done both of which were fairly normal and a cardiac cause was ruled out. A CT scan of chest was suggestive of interstitial lung disease for which Dr Powers, pulmonologist was consulted. He recommends follow up as outpatient for results of your connective tissue serology and lung function tests.  Secondary Diagnosis:	Hyperlipidemia  Goal:	Continue with current management  Assessment and plan of treatment:	Your cholesterol levels are stable. Continue with your statin.  Secondary Diagnosis:	GERD (gastroesophageal reflux disease)  Goal:	Relief of heartburn  Assessment and plan of treatment:	You report chronic GERD. Continue with Protonix 40mg daily before breakfast. Follow up with your primary medical doctor to monitor your progress.  Secondary Diagnosis:	Prediabetes  Goal:	Hemoglobin A1C<6.5  Assessment and plan of treatment:	Your hemoglobin A1C, which reflects blood sugar levels over the past 3 months, was 6.5 on admission. You are advised to start on a diet consistent in carbohydrates and exercise on a regular basis. Follow up with your primary care physician on a regular basis.  Secondary Diagnosis:	Connective tissue disease, undifferentiated  Goal:	Identify and manage the connective tissue disease  Assessment and plan of treatment:	You gave a history of rheumatoid arthritis which was later diagnosed as lupus. A set of tests for Connective Tissue serology were sent to identify the connective tissue disorder. Follow up with Dr Leigh, rheumatologist as outpatient.

## 2018-10-17 NOTE — DISCHARGE NOTE ADULT - CARE PROVIDERS DIRECT ADDRESSES
,DirectAddress_Unknown,[email or web address]

## 2018-10-17 NOTE — DISCHARGE NOTE ADULT - PLAN OF CARE
Relief of heartburn You report chronic GERD. Continue with Protonix 40mg daily before breakfast. Follow up with your primary medical doctor to monitor your progress. Hemoglobin A1C<6.4 Your hemoglobin A1C, which reflects blood sugar levels over the past 3 months, was 6.5 on admission which Improved quality of life You were admitted for evaluation of shortness of breath. You were seen by cardiologist Dr Sierra, echocardiogram of your heart and stress test were done both of which were fairly normal and a cardiac cause was ruled out. A CT scan of chest was suggestive of interstitial lung disease for which Dr Powers, pulmonologist was consulted. He recommends follow up as outpatient for results of your connective tissue serology and lung function tests. Continue with current management Your cholesterol levels are stable. Continue with your statin. Hemoglobin A1C<6.5 Your hemoglobin A1C, which reflects blood sugar levels over the past 3 months, was 6.5 on admission. You are advised to start on a diet consistent in carbohydrates and exercise on a regular basis. Follow up with your primary care physician on a regular basis. Identify and manage the connective tissue disease You gave a history of rheumatoid arthritis which was later diagnosed as lupus. A set of tests for Connective Tissue serology were sent to identify the connective tissue disorder. Follow up with Dr Leigh, rheumatologist as outpatient.

## 2018-10-17 NOTE — DIETITIAN INITIAL EVALUATION ADULT. - OTHER INFO
Nutrition consult requested for prediabetic education. Patient from home lives with son. visited pt. alert reports, appetite good, denies nausea/vomiting or diarrhea but complaints of SOB/weakness PTA, stated Nutrition consult requested for prediabetic education. Patient from home lives with son. visited pt. alert reports, appetite good, denies nausea/vomiting or diarrhea but complaints of SOB/weakness PTA, stated gained wt. but unsure of amounts & current wt. 168 Lbs, in house pt consuming >50% of meals & tolerating, pt. requesting pre-diabetic diet/nutrition education, give information & copies of My Plate Planner with list of carbohydrate food sources, receptive information given, answer all questions & concerns, skin intact. Discussed with MD/RN.

## 2018-10-17 NOTE — DISCHARGE NOTE ADULT - CARE PROVIDER_API CALL
Funmilayo Salmeron  Phone: [phone]  Fax: [phone]   )    -    Allen Powers), Critical Care Medicine; Internal Medicine; Pulmonary Disease  [address] [place] [address]  Phone: [phone]  Fax: [phone]    Cammie Leigh), Internal Medicine; Rheumatology  [address] [place] [address] [place] [address]  Phone: [phone]  Fax: [phone]

## 2018-10-17 NOTE — DISCHARGE NOTE ADULT - HOSPITAL COURSE
This is a 69 y/o female, walks alone, lives with sons, with PMH of HLD, GERD, and SLE presents with SOB that has been worsening over the past 2 months. She reports that she has had SOB for about 10-15 years but in the past 2 months it has gotten significantly worse. She is unable to walk more than 2 blocks or up the stairs without getting SOB. She sleeps with 3 pillows, but it still sometimes wakes her during the night and she has to get up and sleep in a chair. She says that she also feels like at times when she gets this SOB, her heart starts to beat too slowly. She says this SOB is associated with some generalized fatigue and malaise. She reports that when she was young, even in her teens, she would frequently pass out, and she saw doctors for this and they told her all her tests were normal. She says she hasn't passed out since she stopped menstruating. She says she believes she got an echo at [facility] several months ago and as far as she knows it was normal. She denies LOC, fevers, chills, chest pain or tightness, dizziness, numbness, tingling, weakness, abd pain, NVDC. She denies ever smoking cigarettes or drinking alcohol. She did not get the flu shot or her pneumococcus vaccine because she says she has never received these.     Inpatient: Pt was seen by cardio Dr Sierra. Echo showed  Normal Left Ventricular Systolic Function,  (EF = 55 to 60%) and grade I diastolic dysfunction (Impaired relaxation). Stress test was normal. CT chest s/o interstitial lung disease, Dr Powers consulted. CT serology sent. Patient saturating to 93% while ambulating. To f/u as outpatient with Dr Leigh and Dr Powers for reports and outpatient PFTs.ALso started on PPI for chronic GERD, will f/u with PCP.   Patient clinically stable for discharge as per attending.

## 2018-10-17 NOTE — DISCHARGE NOTE ADULT - PROVIDER TOKENS
FREE:[LAST:[Faviola],FIRST:[Funmilayo],PHONE:[phone]],FAX:[phone]   [phone]    -]],TOKEN:'[id number]:'[id number]'

## 2018-10-17 NOTE — DISCHARGE NOTE ADULT - SECONDARY DIAGNOSIS.
Systemic lupus erythematosus Hyperlipidemia GERD (gastroesophageal reflux disease) Diabetes mellitus Prediabetes Connective tissue disease, undifferentiated

## 2018-10-17 NOTE — DIETITIAN INITIAL EVALUATION ADULT. - NS AS NUTRI INTERV ED CONTENT
Priority modifications/Nutrition relationship to health/disease/Recommended modifications/Survival information

## 2018-10-17 NOTE — DIETITIAN INITIAL EVALUATION ADULT. - PROBLEM SELECTOR PLAN 1
- p/w 2 months of worsening SOB on exertion, orthopnea, and PND  - likely secondary to new onset CHF   - patient reports that she has had SOB for 10-15 years but it recently got worse  - admitted to tele to r/o possible CHF exacerbation  - proBNP mildly elevated to 152  - mildly acidotic with slightly elevated pCO2 to 54  - EKG NSR  - trop x2 negative  - CXR wnl, unchanged from May 2016  - patient saturating 98% on room air  - no evidence of fluid overload - lungs cta b/l, no edema - no lasix needed for now  - f/u echo  - primary team please obtain Echo records from [facility], Dr. Goins, patient's previous PCP  - primary team please obtain cardio consult

## 2018-10-17 NOTE — DIETITIAN INITIAL EVALUATION ADULT. - PERTINENT LABORATORY DATA
10-15 Na138 mmol/L Glu 103 mg/dL<H> K+ 3.7 mmol/L Cr  0.64 mg/dL BUN 16 mg/dL 10-15 Phos 3.3 mg/dL 10-14 Alb 3.7 g/dL 10-16 WdshvooxltT1S 6.5 %<H> 10-15 Chol 184 mg/dL  mg/dL HDL 38 mg/dL<L> Trig 138 mg/dL

## 2018-10-18 LAB — DSDNA AB SER-ACNC: 471 IU/ML — HIGH

## 2018-10-19 LAB
ANA PAT FLD IF-IMP: ABNORMAL
ANA PATTERN 2: ABNORMAL
ANA TITR SER: ABNORMAL
ANTI NUCLEAR FACTOR TITER 2: ABNORMAL
AUTO DIFF PNL BLD: NEGATIVE — SIGNIFICANT CHANGE UP
C-ANCA SER-ACNC: NEGATIVE — SIGNIFICANT CHANGE UP
P-ANCA SER-ACNC: ABNORMAL
P-ANCA SER-ACNC: POSITIVE

## 2018-10-22 LAB — RHEUMATOID FACTOR IDENTRA RESULT: SIGNIFICANT CHANGE UP

## 2019-02-11 NOTE — DISCHARGE NOTE ADULT - ABILITY TO HEAR (WITH HEARING AID OR HEARING APPLIANCE IF NORMALLY USED):
[FreeTextEntry1] : The patient is a 56-year-old female last seen in this office many years ago. She comes for evaluation of increased shortness of breath on exertion. She was initially seen 31 years ago when she was in her second trimester of pregnancy. She has congenital kyphoscoliosis with multiple surgeries as well as significant retrognathia. She was delivered by  with complications of a difficult airway but ultimately did well. She is severely restricted on her old pulmonary function studies but things were relatively stable. Recently she's been complaining of some increased shortness of breath. There's been no cough or wheeze. She thinks she's lost some height.\par \par Her  states that she is snoring and she has significantly interrupted sleep at night.
Adequate: hears normal conversation without difficulty

## 2020-01-01 ENCOUNTER — RESULT REVIEW (OUTPATIENT)
Age: 70
End: 2020-01-01

## 2020-01-01 ENCOUNTER — TRANSCRIPTION ENCOUNTER (OUTPATIENT)
Age: 70
End: 2020-01-01

## 2020-01-01 ENCOUNTER — LABORATORY RESULT (OUTPATIENT)
Age: 70
End: 2020-01-01

## 2020-01-01 ENCOUNTER — APPOINTMENT (OUTPATIENT)
Dept: HEMATOLOGY ONCOLOGY | Facility: CLINIC | Age: 70
End: 2020-01-01
Payer: MEDICARE

## 2020-01-01 ENCOUNTER — NON-APPOINTMENT (OUTPATIENT)
Age: 70
End: 2020-01-01

## 2020-01-01 ENCOUNTER — APPOINTMENT (OUTPATIENT)
Dept: INFUSION THERAPY | Facility: HOSPITAL | Age: 70
End: 2020-01-01

## 2020-01-01 ENCOUNTER — APPOINTMENT (OUTPATIENT)
Dept: HEMATOLOGY ONCOLOGY | Facility: CLINIC | Age: 70
End: 2020-01-01

## 2020-01-01 ENCOUNTER — OUTPATIENT (OUTPATIENT)
Dept: OUTPATIENT SERVICES | Facility: HOSPITAL | Age: 70
LOS: 1 days | Discharge: HOME | End: 2020-01-01

## 2020-01-01 ENCOUNTER — APPOINTMENT (OUTPATIENT)
Dept: CT IMAGING | Facility: IMAGING CENTER | Age: 70
End: 2020-01-01
Payer: MEDICARE

## 2020-01-01 ENCOUNTER — APPOINTMENT (OUTPATIENT)
Dept: INFUSION THERAPY | Facility: HOSPITAL | Age: 70
End: 2020-01-01
Payer: MEDICARE

## 2020-01-01 ENCOUNTER — INPATIENT (INPATIENT)
Facility: HOSPITAL | Age: 70
LOS: 5 days | Discharge: HOME HEALTH | End: 2020-10-14
Attending: HOSPITALIST | Admitting: HOSPITALIST
Payer: MEDICARE

## 2020-01-01 ENCOUNTER — OUTPATIENT (OUTPATIENT)
Dept: OUTPATIENT SERVICES | Facility: HOSPITAL | Age: 70
LOS: 1 days | End: 2020-01-01
Payer: COMMERCIAL

## 2020-01-01 ENCOUNTER — OUTPATIENT (OUTPATIENT)
Dept: OUTPATIENT SERVICES | Facility: HOSPITAL | Age: 70
LOS: 1 days | Discharge: HOME | End: 2020-01-01
Payer: MEDICARE

## 2020-01-01 ENCOUNTER — APPOINTMENT (OUTPATIENT)
Dept: CARDIOLOGY | Facility: CLINIC | Age: 70
End: 2020-01-01
Payer: MEDICARE

## 2020-01-01 ENCOUNTER — RX RENEWAL (OUTPATIENT)
Age: 70
End: 2020-01-01

## 2020-01-01 ENCOUNTER — INPATIENT (INPATIENT)
Facility: HOSPITAL | Age: 70
LOS: 1 days | Discharge: HOME | End: 2020-08-30
Attending: HOSPITALIST | Admitting: HOSPITALIST
Payer: MEDICARE

## 2020-01-01 ENCOUNTER — INPATIENT (INPATIENT)
Facility: HOSPITAL | Age: 70
LOS: 0 days | Discharge: HOME | End: 2020-12-02
Attending: INTERNAL MEDICINE | Admitting: INTERNAL MEDICINE
Payer: MEDICARE

## 2020-01-01 VITALS
WEIGHT: 130.93 LBS | RESPIRATION RATE: 17 BRPM | TEMPERATURE: 97.8 F | BODY MASS INDEX: 22.35 KG/M2 | HEIGHT: 64.17 IN | HEART RATE: 75 BPM | SYSTOLIC BLOOD PRESSURE: 98 MMHG | DIASTOLIC BLOOD PRESSURE: 62 MMHG | OXYGEN SATURATION: 99 %

## 2020-01-01 VITALS
RESPIRATION RATE: 17 BRPM | TEMPERATURE: 97 F | OXYGEN SATURATION: 98 % | HEART RATE: 64 BPM | SYSTOLIC BLOOD PRESSURE: 110 MMHG | DIASTOLIC BLOOD PRESSURE: 56 MMHG

## 2020-01-01 VITALS
OXYGEN SATURATION: 99 % | RESPIRATION RATE: 15 BRPM | TEMPERATURE: 97.3 F | DIASTOLIC BLOOD PRESSURE: 60 MMHG | WEIGHT: 131.17 LBS | HEART RATE: 75 BPM | HEIGHT: 64.17 IN | BODY MASS INDEX: 22.39 KG/M2 | SYSTOLIC BLOOD PRESSURE: 95 MMHG

## 2020-01-01 VITALS
OXYGEN SATURATION: 99 % | SYSTOLIC BLOOD PRESSURE: 114 MMHG | RESPIRATION RATE: 16 BRPM | WEIGHT: 128.53 LBS | HEIGHT: 64.17 IN | DIASTOLIC BLOOD PRESSURE: 65 MMHG | HEART RATE: 78 BPM | BODY MASS INDEX: 21.94 KG/M2 | TEMPERATURE: 97.2 F

## 2020-01-01 VITALS
OXYGEN SATURATION: 98 % | RESPIRATION RATE: 16 BRPM | TEMPERATURE: 98.1 F | HEART RATE: 83 BPM | BODY MASS INDEX: 22.39 KG/M2 | DIASTOLIC BLOOD PRESSURE: 68 MMHG | SYSTOLIC BLOOD PRESSURE: 119 MMHG | WEIGHT: 131.18 LBS

## 2020-01-01 VITALS
OXYGEN SATURATION: 99 % | WEIGHT: 130.07 LBS | BODY MASS INDEX: 22.33 KG/M2 | TEMPERATURE: 98 F | SYSTOLIC BLOOD PRESSURE: 112 MMHG | HEART RATE: 72 BPM | RESPIRATION RATE: 16 BRPM | DIASTOLIC BLOOD PRESSURE: 57 MMHG

## 2020-01-01 VITALS
TEMPERATURE: 97.3 F | SYSTOLIC BLOOD PRESSURE: 114 MMHG | RESPIRATION RATE: 16 BRPM | BODY MASS INDEX: 22.63 KG/M2 | WEIGHT: 130.95 LBS | OXYGEN SATURATION: 98 % | HEART RATE: 76 BPM | DIASTOLIC BLOOD PRESSURE: 76 MMHG | HEIGHT: 63.94 IN

## 2020-01-01 VITALS — WEIGHT: 159.84 LBS

## 2020-01-01 VITALS
SYSTOLIC BLOOD PRESSURE: 120 MMHG | DIASTOLIC BLOOD PRESSURE: 58 MMHG | TEMPERATURE: 98 F | HEART RATE: 65 BPM | RESPIRATION RATE: 18 BRPM | OXYGEN SATURATION: 100 %

## 2020-01-01 VITALS
SYSTOLIC BLOOD PRESSURE: 109 MMHG | BODY MASS INDEX: 22.55 KG/M2 | DIASTOLIC BLOOD PRESSURE: 71 MMHG | OXYGEN SATURATION: 98 % | HEIGHT: 63.94 IN | HEART RATE: 82 BPM | RESPIRATION RATE: 15 BRPM | TEMPERATURE: 98.4 F | WEIGHT: 130.49 LBS

## 2020-01-01 VITALS
DIASTOLIC BLOOD PRESSURE: 39 MMHG | TEMPERATURE: 100 F | HEIGHT: 64 IN | HEART RATE: 87 BPM | SYSTOLIC BLOOD PRESSURE: 82 MMHG | RESPIRATION RATE: 15 BRPM

## 2020-01-01 VITALS
HEART RATE: 54 BPM | TEMPERATURE: 96 F | RESPIRATION RATE: 17 BRPM | DIASTOLIC BLOOD PRESSURE: 56 MMHG | OXYGEN SATURATION: 97 % | SYSTOLIC BLOOD PRESSURE: 114 MMHG

## 2020-01-01 VITALS — HEIGHT: 63 IN

## 2020-01-01 VITALS
HEART RATE: 81 BPM | TEMPERATURE: 97.9 F | DIASTOLIC BLOOD PRESSURE: 63 MMHG | RESPIRATION RATE: 18 BRPM | SYSTOLIC BLOOD PRESSURE: 96 MMHG | OXYGEN SATURATION: 98 % | BODY MASS INDEX: 23.35 KG/M2 | WEIGHT: 136.02 LBS

## 2020-01-01 DIAGNOSIS — R11.2 NAUSEA WITH VOMITING, UNSPECIFIED: ICD-10-CM

## 2020-01-01 DIAGNOSIS — R63.4 ABNORMAL WEIGHT LOSS: ICD-10-CM

## 2020-01-01 DIAGNOSIS — Z90.710 ACQUIRED ABSENCE OF BOTH CERVIX AND UTERUS: Chronic | ICD-10-CM

## 2020-01-01 DIAGNOSIS — A41.9 SEPSIS, UNSPECIFIED ORGANISM: ICD-10-CM

## 2020-01-01 DIAGNOSIS — C25.9 MALIGNANT NEOPLASM OF PANCREAS, UNSPECIFIED: ICD-10-CM

## 2020-01-01 DIAGNOSIS — D69.6 THROMBOCYTOPENIA, UNSPECIFIED: ICD-10-CM

## 2020-01-01 DIAGNOSIS — Z51.89 ENCOUNTER FOR OTHER SPECIFIED AFTERCARE: ICD-10-CM

## 2020-01-01 DIAGNOSIS — Z02.9 ENCOUNTER FOR ADMINISTRATIVE EXAMINATIONS, UNSPECIFIED: ICD-10-CM

## 2020-01-01 DIAGNOSIS — Z51.11 ENCOUNTER FOR ANTINEOPLASTIC CHEMOTHERAPY: ICD-10-CM

## 2020-01-01 DIAGNOSIS — R74.01 ELEVATION OF LEVELS OF LIVER TRANSAMINASE LEVELS: ICD-10-CM

## 2020-01-01 DIAGNOSIS — Z00.8 ENCOUNTER FOR OTHER GENERAL EXAMINATION: ICD-10-CM

## 2020-01-01 DIAGNOSIS — R42 DIZZINESS AND GIDDINESS: ICD-10-CM

## 2020-01-01 DIAGNOSIS — T45.1X5A ADVERSE EFFECT OF ANTINEOPLASTIC AND IMMUNOSUPPRESSIVE DRUGS, INITIAL ENCOUNTER: ICD-10-CM

## 2020-01-01 DIAGNOSIS — I21.19 ST ELEVATION (STEMI) MYOCARDIAL INFARCTION INVOLVING OTHER CORONARY ARTERY OF INFERIOR WALL: ICD-10-CM

## 2020-01-01 DIAGNOSIS — I95.9 HYPOTENSION, UNSPECIFIED: ICD-10-CM

## 2020-01-01 DIAGNOSIS — I21.3 ST ELEVATION (STEMI) MYOCARDIAL INFARCTION OF UNSPECIFIED SITE: ICD-10-CM

## 2020-01-01 DIAGNOSIS — D72.829 ELEVATED WHITE BLOOD CELL COUNT, UNSPECIFIED: ICD-10-CM

## 2020-01-01 LAB
ALBUMIN SERPL ELPH-MCNC: 3.2 G/DL — LOW (ref 3.3–5)
ALBUMIN SERPL ELPH-MCNC: 3.4 G/DL — SIGNIFICANT CHANGE UP (ref 3.3–5)
ALBUMIN SERPL ELPH-MCNC: 3.5 G/DL — SIGNIFICANT CHANGE UP (ref 3.3–5)
ALBUMIN SERPL ELPH-MCNC: 3.6 G/DL — SIGNIFICANT CHANGE UP (ref 3.3–5)
ALBUMIN SERPL ELPH-MCNC: 3.6 G/DL — SIGNIFICANT CHANGE UP (ref 3.3–5)
ALBUMIN SERPL ELPH-MCNC: 3.7 G/DL — SIGNIFICANT CHANGE UP (ref 3.3–5)
ALBUMIN SERPL ELPH-MCNC: 3.8 G/DL — SIGNIFICANT CHANGE UP (ref 3.3–5)
ALBUMIN SERPL ELPH-MCNC: 4.4 G/DL
ALBUMIN SERPL ELPH-MCNC: 4.6 G/DL
ALP BLD-CCNC: 170 U/L
ALP BLD-CCNC: 171 U/L
ALP SERPL-CCNC: 107 U/L — SIGNIFICANT CHANGE UP (ref 40–120)
ALP SERPL-CCNC: 119 U/L — SIGNIFICANT CHANGE UP (ref 40–120)
ALP SERPL-CCNC: 127 U/L — HIGH (ref 40–120)
ALP SERPL-CCNC: 149 U/L — HIGH (ref 40–120)
ALP SERPL-CCNC: 149 U/L — HIGH (ref 40–120)
ALP SERPL-CCNC: 152 U/L — HIGH (ref 40–120)
ALP SERPL-CCNC: 159 U/L — HIGH (ref 40–120)
ALP SERPL-CCNC: 177 U/L — HIGH (ref 40–120)
ALP SERPL-CCNC: 202 U/L — HIGH (ref 40–120)
ALT FLD-CCNC: 100 U/L — HIGH (ref 4–33)
ALT FLD-CCNC: 27 U/L — SIGNIFICANT CHANGE UP (ref 4–33)
ALT FLD-CCNC: 28 U/L — SIGNIFICANT CHANGE UP (ref 4–33)
ALT FLD-CCNC: 30 U/L — SIGNIFICANT CHANGE UP (ref 4–33)
ALT FLD-CCNC: 36 U/L — HIGH (ref 4–33)
ALT FLD-CCNC: 59 U/L — HIGH (ref 4–33)
ALT FLD-CCNC: 66 U/L — HIGH (ref 4–33)
ALT FLD-CCNC: 80 U/L — HIGH (ref 4–33)
ALT FLD-CCNC: 80 U/L — HIGH (ref 4–33)
ALT SERPL-CCNC: 29 U/L
ALT SERPL-CCNC: 31 U/L
ANION GAP SERPL CALC-SCNC: 10 MMO/L — SIGNIFICANT CHANGE UP (ref 7–14)
ANION GAP SERPL CALC-SCNC: 10 MMO/L — SIGNIFICANT CHANGE UP (ref 7–14)
ANION GAP SERPL CALC-SCNC: 11 MMO/L — SIGNIFICANT CHANGE UP (ref 7–14)
ANION GAP SERPL CALC-SCNC: 11 MMO/L — SIGNIFICANT CHANGE UP (ref 7–14)
ANION GAP SERPL CALC-SCNC: 11 MMOL/L
ANION GAP SERPL CALC-SCNC: 12 MMO/L — SIGNIFICANT CHANGE UP (ref 7–14)
ANION GAP SERPL CALC-SCNC: 12 MMO/L — SIGNIFICANT CHANGE UP (ref 7–14)
ANION GAP SERPL CALC-SCNC: 12 MMOL/L
ANION GAP SERPL CALC-SCNC: 13 MMO/L — SIGNIFICANT CHANGE UP (ref 7–14)
ANION GAP SERPL CALC-SCNC: 13 MMO/L — SIGNIFICANT CHANGE UP (ref 7–14)
ANION GAP SERPL CALC-SCNC: 14 MMO/L — SIGNIFICANT CHANGE UP (ref 7–14)
ANION GAP SERPL CALC-SCNC: 7 MMO/L — SIGNIFICANT CHANGE UP (ref 7–14)
ANION GAP SERPL CALC-SCNC: 8 MMO/L — SIGNIFICANT CHANGE UP (ref 7–14)
ANION GAP SERPL CALC-SCNC: 9 MMO/L — SIGNIFICANT CHANGE UP (ref 7–14)
ANISOCYTOSIS BLD QL: SLIGHT — SIGNIFICANT CHANGE UP
ANISOCYTOSIS BLD QL: SLIGHT — SIGNIFICANT CHANGE UP
APPEARANCE UR: CLEAR — SIGNIFICANT CHANGE UP
APPEARANCE UR: SIGNIFICANT CHANGE UP
APTT BLD: 26.1 SEC — LOW (ref 27–36.3)
APTT BLD: 30 SEC — SIGNIFICANT CHANGE UP (ref 27–36.3)
AST SERPL-CCNC: 107 U/L — HIGH (ref 4–32)
AST SERPL-CCNC: 32 U/L
AST SERPL-CCNC: 32 U/L — SIGNIFICANT CHANGE UP (ref 4–32)
AST SERPL-CCNC: 36 U/L — HIGH (ref 4–32)
AST SERPL-CCNC: 38 U/L
AST SERPL-CCNC: 45 U/L — HIGH (ref 4–32)
AST SERPL-CCNC: 58 U/L — HIGH (ref 4–32)
AST SERPL-CCNC: 59 U/L — HIGH (ref 4–32)
AST SERPL-CCNC: 72 U/L — HIGH (ref 4–32)
AST SERPL-CCNC: 72 U/L — HIGH (ref 4–32)
AST SERPL-CCNC: 82 U/L — HIGH (ref 4–32)
B PERT DNA SPEC QL NAA+PROBE: SIGNIFICANT CHANGE UP
B PERT DNA SPEC QL NAA+PROBE: SIGNIFICANT CHANGE UP
BACTERIA # UR AUTO: NEGATIVE — SIGNIFICANT CHANGE UP
BASE EXCESS BLDV CALC-SCNC: -1.6 MMOL/L — SIGNIFICANT CHANGE UP
BASE EXCESS BLDV CALC-SCNC: 0.3 MMOL/L — SIGNIFICANT CHANGE UP
BASOPHILS # BLD AUTO: 0 K/UL — SIGNIFICANT CHANGE UP (ref 0–0.2)
BASOPHILS # BLD AUTO: 0.01 K/UL — SIGNIFICANT CHANGE UP (ref 0–0.2)
BASOPHILS # BLD AUTO: 0.02 K/UL — SIGNIFICANT CHANGE UP (ref 0–0.2)
BASOPHILS # BLD AUTO: 0.03 K/UL — SIGNIFICANT CHANGE UP (ref 0–0.2)
BASOPHILS # BLD AUTO: 0.04 K/UL — SIGNIFICANT CHANGE UP (ref 0–0.2)
BASOPHILS # BLD AUTO: 0.05 K/UL — SIGNIFICANT CHANGE UP (ref 0–0.2)
BASOPHILS # BLD AUTO: 0.08 K/UL — SIGNIFICANT CHANGE UP (ref 0–0.2)
BASOPHILS # BLD AUTO: 0.17 K/UL — SIGNIFICANT CHANGE UP (ref 0–0.2)
BASOPHILS NFR BLD AUTO: 0 % — SIGNIFICANT CHANGE UP (ref 0–2)
BASOPHILS NFR BLD AUTO: 0.1 % — SIGNIFICANT CHANGE UP (ref 0–2)
BASOPHILS NFR BLD AUTO: 0.2 % — SIGNIFICANT CHANGE UP (ref 0–2)
BASOPHILS NFR BLD AUTO: 0.3 % — SIGNIFICANT CHANGE UP (ref 0–2)
BASOPHILS NFR BLD AUTO: 0.4 % — SIGNIFICANT CHANGE UP (ref 0–2)
BASOPHILS NFR BLD AUTO: 0.4 % — SIGNIFICANT CHANGE UP (ref 0–2)
BASOPHILS NFR BLD AUTO: 0.5 % — SIGNIFICANT CHANGE UP (ref 0–2)
BASOPHILS NFR BLD AUTO: 0.9 % — SIGNIFICANT CHANGE UP (ref 0–2)
BASOPHILS NFR BLD AUTO: 1 % — SIGNIFICANT CHANGE UP (ref 0–2)
BASOPHILS NFR SPEC: 0 % — SIGNIFICANT CHANGE UP (ref 0–2)
BASOPHILS NFR SPEC: 0 % — SIGNIFICANT CHANGE UP (ref 0–2)
BILIRUB DIRECT SERPL-MCNC: < 0.2 MG/DL — SIGNIFICANT CHANGE UP (ref 0.1–0.2)
BILIRUB SERPL-MCNC: 0.2 MG/DL
BILIRUB SERPL-MCNC: 0.2 MG/DL
BILIRUB SERPL-MCNC: 0.2 MG/DL — SIGNIFICANT CHANGE UP (ref 0.2–1.2)
BILIRUB SERPL-MCNC: 0.3 MG/DL — SIGNIFICANT CHANGE UP (ref 0.2–1.2)
BILIRUB SERPL-MCNC: 0.3 MG/DL — SIGNIFICANT CHANGE UP (ref 0.2–1.2)
BILIRUB SERPL-MCNC: 0.4 MG/DL — SIGNIFICANT CHANGE UP (ref 0.2–1.2)
BILIRUB SERPL-MCNC: < 0.2 MG/DL — LOW (ref 0.2–1.2)
BILIRUB SERPL-MCNC: < 0.2 MG/DL — LOW (ref 0.2–1.2)
BILIRUB UR-MCNC: NEGATIVE — SIGNIFICANT CHANGE UP
BILIRUB UR-MCNC: NEGATIVE — SIGNIFICANT CHANGE UP
BLASTS # FLD: 0 % — SIGNIFICANT CHANGE UP (ref 0–0)
BLOOD GAS VENOUS - CREATININE: 0.43 MG/DL — LOW (ref 0.5–1.3)
BLOOD GAS VENOUS - CREATININE: 0.83 MG/DL — SIGNIFICANT CHANGE UP (ref 0.5–1.3)
BLOOD GAS VENOUS - FIO2: 21 — SIGNIFICANT CHANGE UP
BLOOD UR QL VISUAL: NEGATIVE — SIGNIFICANT CHANGE UP
BLOOD UR QL VISUAL: NEGATIVE — SIGNIFICANT CHANGE UP
BUN SERPL-MCNC: 10 MG/DL — SIGNIFICANT CHANGE UP (ref 7–23)
BUN SERPL-MCNC: 10 MG/DL — SIGNIFICANT CHANGE UP (ref 7–23)
BUN SERPL-MCNC: 11 MG/DL
BUN SERPL-MCNC: 11 MG/DL — SIGNIFICANT CHANGE UP (ref 7–23)
BUN SERPL-MCNC: 12 MG/DL — SIGNIFICANT CHANGE UP (ref 7–23)
BUN SERPL-MCNC: 16 MG/DL
BUN SERPL-MCNC: 16 MG/DL — SIGNIFICANT CHANGE UP (ref 7–23)
BUN SERPL-MCNC: 19 MG/DL — SIGNIFICANT CHANGE UP (ref 7–23)
BUN SERPL-MCNC: 25 MG/DL — HIGH (ref 7–23)
BUN SERPL-MCNC: 29 MG/DL — HIGH (ref 7–23)
BUN SERPL-MCNC: 6 MG/DL — LOW (ref 7–23)
BUN SERPL-MCNC: 8 MG/DL — SIGNIFICANT CHANGE UP (ref 7–23)
BUN SERPL-MCNC: 8 MG/DL — SIGNIFICANT CHANGE UP (ref 7–23)
BUN SERPL-MCNC: 9 MG/DL — SIGNIFICANT CHANGE UP (ref 7–23)
C PNEUM DNA SPEC QL NAA+PROBE: SIGNIFICANT CHANGE UP
C PNEUM DNA SPEC QL NAA+PROBE: SIGNIFICANT CHANGE UP
CALCIUM SERPL-MCNC: 7.9 MG/DL — LOW (ref 8.4–10.5)
CALCIUM SERPL-MCNC: 8.1 MG/DL — LOW (ref 8.4–10.5)
CALCIUM SERPL-MCNC: 8.6 MG/DL — SIGNIFICANT CHANGE UP (ref 8.4–10.5)
CALCIUM SERPL-MCNC: 8.7 MG/DL — SIGNIFICANT CHANGE UP (ref 8.4–10.5)
CALCIUM SERPL-MCNC: 8.8 MG/DL — SIGNIFICANT CHANGE UP (ref 8.4–10.5)
CALCIUM SERPL-MCNC: 8.8 MG/DL — SIGNIFICANT CHANGE UP (ref 8.4–10.5)
CALCIUM SERPL-MCNC: 8.9 MG/DL — SIGNIFICANT CHANGE UP (ref 8.4–10.5)
CALCIUM SERPL-MCNC: 9.1 MG/DL
CALCIUM SERPL-MCNC: 9.4 MG/DL — SIGNIFICANT CHANGE UP (ref 8.4–10.5)
CALCIUM SERPL-MCNC: 9.5 MG/DL — SIGNIFICANT CHANGE UP (ref 8.4–10.5)
CALCIUM SERPL-MCNC: 9.5 MG/DL — SIGNIFICANT CHANGE UP (ref 8.4–10.5)
CALCIUM SERPL-MCNC: 9.7 MG/DL
CANCER AG19-9 SERPL-ACNC: 53 U/ML
CHLORIDE BLDV-SCNC: 103 MMOL/L — SIGNIFICANT CHANGE UP (ref 96–108)
CHLORIDE BLDV-SCNC: 109 MMOL/L — HIGH (ref 96–108)
CHLORIDE SERPL-SCNC: 102 MMOL/L
CHLORIDE SERPL-SCNC: 102 MMOL/L — SIGNIFICANT CHANGE UP (ref 98–107)
CHLORIDE SERPL-SCNC: 103 MMOL/L — SIGNIFICANT CHANGE UP (ref 98–107)
CHLORIDE SERPL-SCNC: 105 MMOL/L — SIGNIFICANT CHANGE UP (ref 98–107)
CHLORIDE SERPL-SCNC: 105 MMOL/L — SIGNIFICANT CHANGE UP (ref 98–107)
CHLORIDE SERPL-SCNC: 106 MMOL/L — SIGNIFICANT CHANGE UP (ref 98–107)
CHLORIDE SERPL-SCNC: 95 MMOL/L — LOW (ref 98–107)
CHLORIDE SERPL-SCNC: 97 MMOL/L
CHOLEST SERPL-MCNC: 151 MG/DL — SIGNIFICANT CHANGE UP (ref 120–199)
CK MB BLD-MCNC: 10.4 — HIGH (ref 0–2.5)
CK MB BLD-MCNC: 11.6 — HIGH (ref 0–2.5)
CK MB BLD-MCNC: 20.47 NG/ML — HIGH (ref 1–4.7)
CK MB BLD-MCNC: 35.12 NG/ML — HIGH (ref 1–4.7)
CK MB BLD-MCNC: 50.1 NG/ML — HIGH (ref 1–4.7)
CK MB BLD-MCNC: SIGNIFICANT CHANGE UP (ref 0–2.5)
CK SERPL-CCNC: 196 U/L — HIGH (ref 25–170)
CK SERPL-CCNC: 302 U/L — HIGH (ref 25–170)
CK SERPL-CCNC: SIGNIFICANT CHANGE UP U/L (ref 25–170)
CO2 SERPL-SCNC: 20 MMOL/L — LOW (ref 22–31)
CO2 SERPL-SCNC: 21 MMOL/L — LOW (ref 22–31)
CO2 SERPL-SCNC: 22 MMOL/L — SIGNIFICANT CHANGE UP (ref 22–31)
CO2 SERPL-SCNC: 23 MMOL/L
CO2 SERPL-SCNC: 23 MMOL/L
CO2 SERPL-SCNC: 23 MMOL/L — SIGNIFICANT CHANGE UP (ref 22–31)
CO2 SERPL-SCNC: 24 MMOL/L — SIGNIFICANT CHANGE UP (ref 22–31)
CO2 SERPL-SCNC: 25 MMOL/L — SIGNIFICANT CHANGE UP (ref 22–31)
CO2 SERPL-SCNC: 25 MMOL/L — SIGNIFICANT CHANGE UP (ref 22–31)
COLOR SPEC: SIGNIFICANT CHANGE UP
COLOR SPEC: YELLOW — SIGNIFICANT CHANGE UP
CORTIS SERPL-MCNC: 8.5 UG/DL — SIGNIFICANT CHANGE UP (ref 2.7–18.4)
CREAT SERPL-MCNC: 0.32 MG/DL — LOW (ref 0.5–1.3)
CREAT SERPL-MCNC: 0.37 MG/DL — LOW (ref 0.5–1.3)
CREAT SERPL-MCNC: 0.38 MG/DL — LOW (ref 0.5–1.3)
CREAT SERPL-MCNC: 0.4 MG/DL — LOW (ref 0.5–1.3)
CREAT SERPL-MCNC: 0.41 MG/DL
CREAT SERPL-MCNC: 0.43 MG/DL — LOW (ref 0.5–1.3)
CREAT SERPL-MCNC: 0.44 MG/DL — LOW (ref 0.5–1.3)
CREAT SERPL-MCNC: 0.45 MG/DL — LOW (ref 0.5–1.3)
CREAT SERPL-MCNC: 0.46 MG/DL
CREAT SERPL-MCNC: 0.46 MG/DL — LOW (ref 0.5–1.3)
CREAT SERPL-MCNC: 0.51 MG/DL — SIGNIFICANT CHANGE UP (ref 0.5–1.3)
CREAT SERPL-MCNC: 0.85 MG/DL — SIGNIFICANT CHANGE UP (ref 0.5–1.3)
CULTURE RESULTS: SIGNIFICANT CHANGE UP
DIRECT LDL: 76 MG/DL — SIGNIFICANT CHANGE UP
EOSINOPHIL # BLD AUTO: 0 K/UL — SIGNIFICANT CHANGE UP (ref 0–0.5)
EOSINOPHIL # BLD AUTO: 0.01 K/UL — SIGNIFICANT CHANGE UP (ref 0–0.5)
EOSINOPHIL # BLD AUTO: 0.02 K/UL — SIGNIFICANT CHANGE UP (ref 0–0.5)
EOSINOPHIL # BLD AUTO: 0.03 K/UL — SIGNIFICANT CHANGE UP (ref 0–0.5)
EOSINOPHIL # BLD AUTO: 0.05 K/UL — SIGNIFICANT CHANGE UP (ref 0–0.5)
EOSINOPHIL # BLD AUTO: 0.05 K/UL — SIGNIFICANT CHANGE UP (ref 0–0.5)
EOSINOPHIL # BLD AUTO: 0.06 K/UL — SIGNIFICANT CHANGE UP (ref 0–0.5)
EOSINOPHIL # BLD AUTO: 0.1 K/UL — SIGNIFICANT CHANGE UP (ref 0–0.5)
EOSINOPHIL NFR BLD AUTO: 0 % — SIGNIFICANT CHANGE UP (ref 0–6)
EOSINOPHIL NFR BLD AUTO: 0.1 % — SIGNIFICANT CHANGE UP (ref 0–6)
EOSINOPHIL NFR BLD AUTO: 0.2 % — SIGNIFICANT CHANGE UP (ref 0–6)
EOSINOPHIL NFR BLD AUTO: 0.2 % — SIGNIFICANT CHANGE UP (ref 0–6)
EOSINOPHIL NFR BLD AUTO: 0.5 % — SIGNIFICANT CHANGE UP (ref 0–6)
EOSINOPHIL NFR BLD AUTO: 0.7 % — SIGNIFICANT CHANGE UP (ref 0–6)
EOSINOPHIL NFR BLD AUTO: 0.8 % — SIGNIFICANT CHANGE UP (ref 0–6)
EOSINOPHIL NFR BLD AUTO: 1 % — SIGNIFICANT CHANGE UP (ref 0–6)
EOSINOPHIL NFR BLD AUTO: 1.2 % — SIGNIFICANT CHANGE UP (ref 0–6)
EOSINOPHIL NFR BLD AUTO: 2 % — SIGNIFICANT CHANGE UP (ref 0–6)
EOSINOPHIL NFR FLD: 0 % — SIGNIFICANT CHANGE UP (ref 0–6)
EOSINOPHIL NFR FLD: 0.9 % — SIGNIFICANT CHANGE UP (ref 0–6)
EPI CELLS # UR: SIGNIFICANT CHANGE UP
FLUAV H1 2009 PAND RNA SPEC QL NAA+PROBE: SIGNIFICANT CHANGE UP
FLUAV H1 2009 PAND RNA SPEC QL NAA+PROBE: SIGNIFICANT CHANGE UP
FLUAV H1 RNA SPEC QL NAA+PROBE: SIGNIFICANT CHANGE UP
FLUAV H1 RNA SPEC QL NAA+PROBE: SIGNIFICANT CHANGE UP
FLUAV H3 RNA SPEC QL NAA+PROBE: SIGNIFICANT CHANGE UP
FLUAV H3 RNA SPEC QL NAA+PROBE: SIGNIFICANT CHANGE UP
FLUAV SUBTYP SPEC NAA+PROBE: SIGNIFICANT CHANGE UP
FLUAV SUBTYP SPEC NAA+PROBE: SIGNIFICANT CHANGE UP
FLUBV RNA SPEC QL NAA+PROBE: SIGNIFICANT CHANGE UP
FLUBV RNA SPEC QL NAA+PROBE: SIGNIFICANT CHANGE UP
GAS PNL BLDV: 126 MMOL/L — LOW (ref 136–146)
GAS PNL BLDV: 136 MMOL/L — SIGNIFICANT CHANGE UP (ref 136–146)
GIANT PLATELETS BLD QL SMEAR: PRESENT — SIGNIFICANT CHANGE UP
GIANT PLATELETS BLD QL SMEAR: PRESENT — SIGNIFICANT CHANGE UP
GLUCOSE BLDV-MCNC: 126 MG/DL — HIGH (ref 70–99)
GLUCOSE BLDV-MCNC: 136 MG/DL — HIGH (ref 70–99)
GLUCOSE SERPL-MCNC: 102 MG/DL — HIGH (ref 70–99)
GLUCOSE SERPL-MCNC: 103 MG/DL
GLUCOSE SERPL-MCNC: 114 MG/DL — HIGH (ref 70–99)
GLUCOSE SERPL-MCNC: 121 MG/DL — HIGH (ref 70–99)
GLUCOSE SERPL-MCNC: 138 MG/DL — HIGH (ref 70–99)
GLUCOSE SERPL-MCNC: 139 MG/DL — HIGH (ref 70–99)
GLUCOSE SERPL-MCNC: 142 MG/DL — HIGH (ref 70–99)
GLUCOSE SERPL-MCNC: 82 MG/DL — SIGNIFICANT CHANGE UP (ref 70–99)
GLUCOSE SERPL-MCNC: 83 MG/DL — SIGNIFICANT CHANGE UP (ref 70–99)
GLUCOSE SERPL-MCNC: 85 MG/DL
GLUCOSE SERPL-MCNC: 90 MG/DL — SIGNIFICANT CHANGE UP (ref 70–99)
GLUCOSE SERPL-MCNC: 98 MG/DL — SIGNIFICANT CHANGE UP (ref 70–99)
GLUCOSE UR-MCNC: NEGATIVE — SIGNIFICANT CHANGE UP
GLUCOSE UR-MCNC: NEGATIVE — SIGNIFICANT CHANGE UP
HADV DNA SPEC QL NAA+PROBE: SIGNIFICANT CHANGE UP
HADV DNA SPEC QL NAA+PROBE: SIGNIFICANT CHANGE UP
HAV IGM SER-ACNC: NONREACTIVE — SIGNIFICANT CHANGE UP
HBA1C BLD-MCNC: 6.1 % — HIGH (ref 4–5.6)
HBV CORE IGM SER-ACNC: NONREACTIVE — SIGNIFICANT CHANGE UP
HBV SURFACE AG SER-ACNC: NONREACTIVE — SIGNIFICANT CHANGE UP
HCO3 BLDV-SCNC: 23 MMOL/L — SIGNIFICANT CHANGE UP (ref 20–27)
HCO3 BLDV-SCNC: 23 MMOL/L — SIGNIFICANT CHANGE UP (ref 20–27)
HCOV PNL SPEC NAA+PROBE: SIGNIFICANT CHANGE UP
HCOV PNL SPEC NAA+PROBE: SIGNIFICANT CHANGE UP
HCT VFR BLD CALC: 30.5 % — LOW (ref 34.5–45)
HCT VFR BLD CALC: 30.8 % — LOW (ref 34.5–45)
HCT VFR BLD CALC: 31 % — LOW (ref 34.5–45)
HCT VFR BLD CALC: 31.1 % — LOW (ref 34.5–45)
HCT VFR BLD CALC: 31.7 % — LOW (ref 34.5–45)
HCT VFR BLD CALC: 32.4 % — LOW (ref 34.5–45)
HCT VFR BLD CALC: 32.6 % — LOW (ref 34.5–45)
HCT VFR BLD CALC: 32.6 % — LOW (ref 34.5–45)
HCT VFR BLD CALC: 32.8 % — LOW (ref 34.5–45)
HCT VFR BLD CALC: 33 % — LOW (ref 34.5–45)
HCT VFR BLD CALC: 33.5 % — LOW (ref 34.5–45)
HCT VFR BLD CALC: 33.6 % — LOW (ref 34.5–45)
HCT VFR BLD CALC: 33.7 % — LOW (ref 34.5–45)
HCT VFR BLD CALC: 33.9 % — LOW (ref 34.5–45)
HCT VFR BLD CALC: 34 % — LOW (ref 34.5–45)
HCT VFR BLD CALC: 34 % — LOW (ref 34.5–45)
HCT VFR BLD CALC: 34.2 % — LOW (ref 34.5–45)
HCT VFR BLD CALC: 34.3 % — LOW (ref 34.5–45)
HCT VFR BLD CALC: 34.4 % — LOW (ref 34.5–45)
HCT VFR BLD CALC: 34.4 % — LOW (ref 34.5–45)
HCT VFR BLD CALC: 34.6 % — SIGNIFICANT CHANGE UP (ref 34.5–45)
HCT VFR BLD CALC: 34.7 % — SIGNIFICANT CHANGE UP (ref 34.5–45)
HCT VFR BLD CALC: 35.8 % — SIGNIFICANT CHANGE UP (ref 34.5–45)
HCT VFR BLD CALC: 36 % — SIGNIFICANT CHANGE UP (ref 34.5–45)
HCT VFR BLDV CALC: 32.9 % — LOW (ref 34.5–45)
HCT VFR BLDV CALC: 34.6 % — SIGNIFICANT CHANGE UP (ref 34.5–45)
HCV AB S/CO SERPL IA: 0.12 S/CO — SIGNIFICANT CHANGE UP (ref 0–0.99)
HCV AB S/CO SERPL IA: 0.2 S/CO — SIGNIFICANT CHANGE UP (ref 0–0.99)
HCV AB SERPL-IMP: SIGNIFICANT CHANGE UP
HCV AB SERPL-IMP: SIGNIFICANT CHANGE UP
HDLC SERPL-MCNC: 28 MG/DL — LOW (ref 45–65)
HGB BLD-MCNC: 10 G/DL — LOW (ref 11.5–15.5)
HGB BLD-MCNC: 10 G/DL — LOW (ref 11.5–15.5)
HGB BLD-MCNC: 10.1 G/DL — LOW (ref 11.5–15.5)
HGB BLD-MCNC: 10.5 G/DL — LOW (ref 11.5–15.5)
HGB BLD-MCNC: 10.6 G/DL — LOW (ref 11.5–15.5)
HGB BLD-MCNC: 10.6 G/DL — LOW (ref 11.5–15.5)
HGB BLD-MCNC: 10.7 G/DL — LOW (ref 11.5–15.5)
HGB BLD-MCNC: 10.8 G/DL — LOW (ref 11.5–15.5)
HGB BLD-MCNC: 11 G/DL — LOW (ref 11.5–15.5)
HGB BLD-MCNC: 11.1 G/DL — LOW (ref 11.5–15.5)
HGB BLD-MCNC: 11.2 G/DL — LOW (ref 11.5–15.5)
HGB BLD-MCNC: 11.2 G/DL — LOW (ref 11.5–15.5)
HGB BLD-MCNC: 11.4 G/DL — LOW (ref 11.5–15.5)
HGB BLD-MCNC: 11.5 G/DL — SIGNIFICANT CHANGE UP (ref 11.5–15.5)
HGB BLD-MCNC: 11.6 G/DL — SIGNIFICANT CHANGE UP (ref 11.5–15.5)
HGB BLD-MCNC: 11.6 G/DL — SIGNIFICANT CHANGE UP (ref 11.5–15.5)
HGB BLD-MCNC: 11.7 G/DL — SIGNIFICANT CHANGE UP (ref 11.5–15.5)
HGB BLD-MCNC: 9.8 G/DL — LOW (ref 11.5–15.5)
HGB BLD-MCNC: 9.9 G/DL — LOW (ref 11.5–15.5)
HGB BLD-MCNC: 9.9 G/DL — LOW (ref 11.5–15.5)
HGB BLDV-MCNC: 10.7 G/DL — LOW (ref 11.5–15.5)
HGB BLDV-MCNC: 11.2 G/DL — LOW (ref 11.5–15.5)
HMPV RNA SPEC QL NAA+PROBE: SIGNIFICANT CHANGE UP
HMPV RNA SPEC QL NAA+PROBE: SIGNIFICANT CHANGE UP
HPIV1 RNA SPEC QL NAA+PROBE: SIGNIFICANT CHANGE UP
HPIV1 RNA SPEC QL NAA+PROBE: SIGNIFICANT CHANGE UP
HPIV2 RNA SPEC QL NAA+PROBE: SIGNIFICANT CHANGE UP
HPIV2 RNA SPEC QL NAA+PROBE: SIGNIFICANT CHANGE UP
HPIV3 RNA SPEC QL NAA+PROBE: SIGNIFICANT CHANGE UP
HPIV3 RNA SPEC QL NAA+PROBE: SIGNIFICANT CHANGE UP
HPIV4 RNA SPEC QL NAA+PROBE: SIGNIFICANT CHANGE UP
HPIV4 RNA SPEC QL NAA+PROBE: SIGNIFICANT CHANGE UP
HYALINE CASTS # UR AUTO: NEGATIVE — SIGNIFICANT CHANGE UP
IMM GRANULOCYTES NFR BLD AUTO: 0.3 % — SIGNIFICANT CHANGE UP (ref 0–1.5)
IMM GRANULOCYTES NFR BLD AUTO: 0.4 % — SIGNIFICANT CHANGE UP (ref 0–1.5)
IMM GRANULOCYTES NFR BLD AUTO: 0.5 % — SIGNIFICANT CHANGE UP (ref 0–1.5)
IMM GRANULOCYTES NFR BLD AUTO: 0.6 % — SIGNIFICANT CHANGE UP (ref 0–1.5)
IMM GRANULOCYTES NFR BLD AUTO: 0.7 % — SIGNIFICANT CHANGE UP (ref 0–1.5)
IMM GRANULOCYTES NFR BLD AUTO: 1.2 % — SIGNIFICANT CHANGE UP (ref 0–1.5)
IMM GRANULOCYTES NFR BLD AUTO: 1.3 % — SIGNIFICANT CHANGE UP (ref 0–1.5)
IMM GRANULOCYTES NFR BLD AUTO: 1.7 % — HIGH (ref 0–1.5)
IMM GRANULOCYTES NFR BLD AUTO: 2.1 % — HIGH (ref 0–1.5)
IMM GRANULOCYTES NFR BLD AUTO: 9.2 % — HIGH (ref 0–1.5)
INR BLD: 1.04 — SIGNIFICANT CHANGE UP (ref 0.88–1.16)
INR BLD: 1.08 — SIGNIFICANT CHANGE UP (ref 0.88–1.16)
INR PPP: 1.04 RATIO
KETONES UR-MCNC: NEGATIVE — SIGNIFICANT CHANGE UP
KETONES UR-MCNC: NEGATIVE — SIGNIFICANT CHANGE UP
LACTATE BLDV-MCNC: 1.1 MMOL/L — SIGNIFICANT CHANGE UP (ref 0.5–2)
LACTATE BLDV-MCNC: 1.6 MMOL/L — SIGNIFICANT CHANGE UP (ref 0.5–2)
LACTATE BLDV-MCNC: 1.9 MMOL/L — SIGNIFICANT CHANGE UP (ref 0.5–2)
LEUKOCYTE ESTERASE UR-ACNC: SIGNIFICANT CHANGE UP
LEUKOCYTE ESTERASE UR-ACNC: SIGNIFICANT CHANGE UP
LYMPHOCYTES # BLD AUTO: 0.36 K/UL — LOW (ref 1–3.3)
LYMPHOCYTES # BLD AUTO: 0.45 K/UL — LOW (ref 1–3.3)
LYMPHOCYTES # BLD AUTO: 0.67 K/UL — LOW (ref 1–3.3)
LYMPHOCYTES # BLD AUTO: 0.88 K/UL — LOW (ref 1–3.3)
LYMPHOCYTES # BLD AUTO: 1.12 K/UL — SIGNIFICANT CHANGE UP (ref 1–3.3)
LYMPHOCYTES # BLD AUTO: 1.44 K/UL — SIGNIFICANT CHANGE UP (ref 1–3.3)
LYMPHOCYTES # BLD AUTO: 1.56 K/UL — SIGNIFICANT CHANGE UP (ref 1–3.3)
LYMPHOCYTES # BLD AUTO: 1.74 K/UL — SIGNIFICANT CHANGE UP (ref 1–3.3)
LYMPHOCYTES # BLD AUTO: 1.76 K/UL — SIGNIFICANT CHANGE UP (ref 1–3.3)
LYMPHOCYTES # BLD AUTO: 1.88 K/UL — SIGNIFICANT CHANGE UP (ref 1–3.3)
LYMPHOCYTES # BLD AUTO: 1.97 K/UL — SIGNIFICANT CHANGE UP (ref 1–3.3)
LYMPHOCYTES # BLD AUTO: 1.99 K/UL — SIGNIFICANT CHANGE UP (ref 1–3.3)
LYMPHOCYTES # BLD AUTO: 12.7 % — LOW (ref 13–44)
LYMPHOCYTES # BLD AUTO: 14.7 % — SIGNIFICANT CHANGE UP (ref 13–44)
LYMPHOCYTES # BLD AUTO: 16.4 % — SIGNIFICANT CHANGE UP (ref 13–44)
LYMPHOCYTES # BLD AUTO: 18.6 % — SIGNIFICANT CHANGE UP (ref 13–44)
LYMPHOCYTES # BLD AUTO: 19.1 % — SIGNIFICANT CHANGE UP (ref 13–44)
LYMPHOCYTES # BLD AUTO: 2.09 K/UL — SIGNIFICANT CHANGE UP (ref 1–3.3)
LYMPHOCYTES # BLD AUTO: 2.14 K/UL — SIGNIFICANT CHANGE UP (ref 1–3.3)
LYMPHOCYTES # BLD AUTO: 2.16 K/UL — SIGNIFICANT CHANGE UP (ref 1–3.3)
LYMPHOCYTES # BLD AUTO: 2.22 K/UL — SIGNIFICANT CHANGE UP (ref 1–3.3)
LYMPHOCYTES # BLD AUTO: 2.24 K/UL — SIGNIFICANT CHANGE UP (ref 1–3.3)
LYMPHOCYTES # BLD AUTO: 2.34 K/UL — SIGNIFICANT CHANGE UP (ref 1–3.3)
LYMPHOCYTES # BLD AUTO: 2.38 K/UL — SIGNIFICANT CHANGE UP (ref 1–3.3)
LYMPHOCYTES # BLD AUTO: 23.1 % — SIGNIFICANT CHANGE UP (ref 13–44)
LYMPHOCYTES # BLD AUTO: 24.4 % — SIGNIFICANT CHANGE UP (ref 13–44)
LYMPHOCYTES # BLD AUTO: 25 % — SIGNIFICANT CHANGE UP (ref 13–44)
LYMPHOCYTES # BLD AUTO: 27.3 % — SIGNIFICANT CHANGE UP (ref 13–44)
LYMPHOCYTES # BLD AUTO: 27.8 % — SIGNIFICANT CHANGE UP (ref 13–44)
LYMPHOCYTES # BLD AUTO: 28.4 % — SIGNIFICANT CHANGE UP (ref 13–44)
LYMPHOCYTES # BLD AUTO: 3.18 K/UL — SIGNIFICANT CHANGE UP (ref 1–3.3)
LYMPHOCYTES # BLD AUTO: 3.33 K/UL — HIGH (ref 1–3.3)
LYMPHOCYTES # BLD AUTO: 3.41 K/UL — HIGH (ref 1–3.3)
LYMPHOCYTES # BLD AUTO: 30 % — SIGNIFICANT CHANGE UP (ref 13–44)
LYMPHOCYTES # BLD AUTO: 30 % — SIGNIFICANT CHANGE UP (ref 13–44)
LYMPHOCYTES # BLD AUTO: 40 % — SIGNIFICANT CHANGE UP (ref 13–44)
LYMPHOCYTES # BLD AUTO: 44 % — SIGNIFICANT CHANGE UP (ref 13–44)
LYMPHOCYTES # BLD AUTO: 46 % — HIGH (ref 13–44)
LYMPHOCYTES # BLD AUTO: 46 % — HIGH (ref 13–44)
LYMPHOCYTES # BLD AUTO: 49.1 % — HIGH (ref 13–44)
LYMPHOCYTES # BLD AUTO: 5.3 % — LOW (ref 13–44)
LYMPHOCYTES # BLD AUTO: 58.1 % — HIGH (ref 13–44)
LYMPHOCYTES # BLD AUTO: 6.4 % — LOW (ref 13–44)
LYMPHOCYTES # BLD AUTO: 6.6 % — LOW (ref 13–44)
LYMPHOCYTES NFR SPEC AUTO: 4.3 % — LOW (ref 13–44)
LYMPHOCYTES NFR SPEC AUTO: 7.3 % — LOW (ref 13–44)
MACROCYTES BLD QL: SLIGHT — SIGNIFICANT CHANGE UP
MACROCYTES BLD QL: SLIGHT — SIGNIFICANT CHANGE UP
MAGNESIUM SERPL-MCNC: 2 MG/DL — SIGNIFICANT CHANGE UP (ref 1.6–2.6)
MAGNESIUM SERPL-MCNC: 2.1 MG/DL — SIGNIFICANT CHANGE UP (ref 1.6–2.6)
MAGNESIUM SERPL-MCNC: 2.2 MG/DL — SIGNIFICANT CHANGE UP (ref 1.6–2.6)
MAGNESIUM SERPL-MCNC: 2.4 MG/DL — SIGNIFICANT CHANGE UP (ref 1.6–2.6)
MANUAL SMEAR VERIFICATION: SIGNIFICANT CHANGE UP
MANUAL SMEAR VERIFICATION: SIGNIFICANT CHANGE UP
MCHC RBC-ENTMCNC: 28.4 PG — SIGNIFICANT CHANGE UP (ref 27–34)
MCHC RBC-ENTMCNC: 28.4 PG — SIGNIFICANT CHANGE UP (ref 27–34)
MCHC RBC-ENTMCNC: 28.6 PG — SIGNIFICANT CHANGE UP (ref 27–34)
MCHC RBC-ENTMCNC: 28.7 PG — SIGNIFICANT CHANGE UP (ref 27–34)
MCHC RBC-ENTMCNC: 28.7 PG — SIGNIFICANT CHANGE UP (ref 27–34)
MCHC RBC-ENTMCNC: 28.8 PG — SIGNIFICANT CHANGE UP (ref 27–34)
MCHC RBC-ENTMCNC: 28.8 PG — SIGNIFICANT CHANGE UP (ref 27–34)
MCHC RBC-ENTMCNC: 28.9 PG — SIGNIFICANT CHANGE UP (ref 27–34)
MCHC RBC-ENTMCNC: 29 PG — SIGNIFICANT CHANGE UP (ref 27–34)
MCHC RBC-ENTMCNC: 29.1 PG — SIGNIFICANT CHANGE UP (ref 27–34)
MCHC RBC-ENTMCNC: 29.2 PG — SIGNIFICANT CHANGE UP (ref 27–34)
MCHC RBC-ENTMCNC: 29.3 PG — SIGNIFICANT CHANGE UP (ref 27–34)
MCHC RBC-ENTMCNC: 29.3 PG — SIGNIFICANT CHANGE UP (ref 27–34)
MCHC RBC-ENTMCNC: 29.4 PG — SIGNIFICANT CHANGE UP (ref 27–34)
MCHC RBC-ENTMCNC: 29.4 PG — SIGNIFICANT CHANGE UP (ref 27–34)
MCHC RBC-ENTMCNC: 29.6 PG — SIGNIFICANT CHANGE UP (ref 27–34)
MCHC RBC-ENTMCNC: 29.6 PG — SIGNIFICANT CHANGE UP (ref 27–34)
MCHC RBC-ENTMCNC: 29.7 PG — SIGNIFICANT CHANGE UP (ref 27–34)
MCHC RBC-ENTMCNC: 30.1 PG — SIGNIFICANT CHANGE UP (ref 27–34)
MCHC RBC-ENTMCNC: 30.3 PG — SIGNIFICANT CHANGE UP (ref 27–34)
MCHC RBC-ENTMCNC: 31 % — LOW (ref 32–36)
MCHC RBC-ENTMCNC: 31.2 % — LOW (ref 32–36)
MCHC RBC-ENTMCNC: 31.3 % — LOW (ref 32–36)
MCHC RBC-ENTMCNC: 31.6 G/DL — LOW (ref 32–36)
MCHC RBC-ENTMCNC: 31.8 % — LOW (ref 32–36)
MCHC RBC-ENTMCNC: 32.1 % — SIGNIFICANT CHANGE UP (ref 32–36)
MCHC RBC-ENTMCNC: 32.1 % — SIGNIFICANT CHANGE UP (ref 32–36)
MCHC RBC-ENTMCNC: 32.2 % — SIGNIFICANT CHANGE UP (ref 32–36)
MCHC RBC-ENTMCNC: 32.2 G/DL — SIGNIFICANT CHANGE UP (ref 32–36)
MCHC RBC-ENTMCNC: 32.3 % — SIGNIFICANT CHANGE UP (ref 32–36)
MCHC RBC-ENTMCNC: 32.4 G/DL — SIGNIFICANT CHANGE UP (ref 32–36)
MCHC RBC-ENTMCNC: 32.4 G/DL — SIGNIFICANT CHANGE UP (ref 32–36)
MCHC RBC-ENTMCNC: 32.5 GM/DL — SIGNIFICANT CHANGE UP (ref 32–36)
MCHC RBC-ENTMCNC: 32.6 G/DL — SIGNIFICANT CHANGE UP (ref 32–36)
MCHC RBC-ENTMCNC: 32.6 GM/DL — SIGNIFICANT CHANGE UP (ref 32–36)
MCHC RBC-ENTMCNC: 32.7 % — SIGNIFICANT CHANGE UP (ref 32–36)
MCHC RBC-ENTMCNC: 32.7 G/DL — SIGNIFICANT CHANGE UP (ref 32–36)
MCHC RBC-ENTMCNC: 32.9 % — SIGNIFICANT CHANGE UP (ref 32–36)
MCHC RBC-ENTMCNC: 33.1 G/DL — SIGNIFICANT CHANGE UP (ref 32–36)
MCHC RBC-ENTMCNC: 33.1 GM/DL — SIGNIFICANT CHANGE UP (ref 32–36)
MCHC RBC-ENTMCNC: 33.3 G/DL — SIGNIFICANT CHANGE UP (ref 32–36)
MCHC RBC-ENTMCNC: 33.8 G/DL — SIGNIFICANT CHANGE UP (ref 32–36)
MCV RBC AUTO: 87.4 FL — SIGNIFICANT CHANGE UP (ref 80–100)
MCV RBC AUTO: 87.8 FL — SIGNIFICANT CHANGE UP (ref 80–100)
MCV RBC AUTO: 88.1 FL — SIGNIFICANT CHANGE UP (ref 80–100)
MCV RBC AUTO: 88.3 FL — SIGNIFICANT CHANGE UP (ref 80–100)
MCV RBC AUTO: 89.1 FL — SIGNIFICANT CHANGE UP (ref 80–100)
MCV RBC AUTO: 89.1 FL — SIGNIFICANT CHANGE UP (ref 80–100)
MCV RBC AUTO: 89.3 FL — SIGNIFICANT CHANGE UP (ref 80–100)
MCV RBC AUTO: 89.4 FL — SIGNIFICANT CHANGE UP (ref 80–100)
MCV RBC AUTO: 89.5 FL — SIGNIFICANT CHANGE UP (ref 80–100)
MCV RBC AUTO: 89.6 FL — SIGNIFICANT CHANGE UP (ref 80–100)
MCV RBC AUTO: 89.7 FL — SIGNIFICANT CHANGE UP (ref 80–100)
MCV RBC AUTO: 89.8 FL — SIGNIFICANT CHANGE UP (ref 80–100)
MCV RBC AUTO: 89.9 FL — SIGNIFICANT CHANGE UP (ref 80–100)
MCV RBC AUTO: 90.1 FL — SIGNIFICANT CHANGE UP (ref 80–100)
MCV RBC AUTO: 90.3 FL — SIGNIFICANT CHANGE UP (ref 80–100)
MCV RBC AUTO: 90.9 FL — SIGNIFICANT CHANGE UP (ref 80–100)
MCV RBC AUTO: 91.4 FL — SIGNIFICANT CHANGE UP (ref 80–100)
MCV RBC AUTO: 91.5 FL — SIGNIFICANT CHANGE UP (ref 80–100)
MCV RBC AUTO: 91.6 FL — SIGNIFICANT CHANGE UP (ref 80–100)
MCV RBC AUTO: 92.4 FL — SIGNIFICANT CHANGE UP (ref 80–100)
MCV RBC AUTO: 92.6 FL — SIGNIFICANT CHANGE UP (ref 80–100)
MCV RBC AUTO: 93.9 FL — SIGNIFICANT CHANGE UP (ref 80–100)
METAMYELOCYTES # FLD: 0 % — SIGNIFICANT CHANGE UP (ref 0–1)
METAMYELOCYTES # FLD: 10.4 % — HIGH (ref 0–1)
MONOCYTES # BLD AUTO: 0.13 K/UL — SIGNIFICANT CHANGE UP (ref 0–0.9)
MONOCYTES # BLD AUTO: 0.14 K/UL — SIGNIFICANT CHANGE UP (ref 0–0.9)
MONOCYTES # BLD AUTO: 0.17 K/UL — SIGNIFICANT CHANGE UP (ref 0–0.9)
MONOCYTES # BLD AUTO: 0.2 K/UL — SIGNIFICANT CHANGE UP (ref 0–0.9)
MONOCYTES # BLD AUTO: 0.23 K/UL — SIGNIFICANT CHANGE UP (ref 0–0.9)
MONOCYTES # BLD AUTO: 0.25 K/UL — SIGNIFICANT CHANGE UP (ref 0–0.9)
MONOCYTES # BLD AUTO: 0.3 K/UL — SIGNIFICANT CHANGE UP (ref 0–0.9)
MONOCYTES # BLD AUTO: 0.33 K/UL — SIGNIFICANT CHANGE UP (ref 0–0.9)
MONOCYTES # BLD AUTO: 0.34 K/UL — SIGNIFICANT CHANGE UP (ref 0–0.9)
MONOCYTES # BLD AUTO: 0.41 K/UL — SIGNIFICANT CHANGE UP (ref 0–0.9)
MONOCYTES # BLD AUTO: 0.44 K/UL — SIGNIFICANT CHANGE UP (ref 0–0.9)
MONOCYTES # BLD AUTO: 0.56 K/UL — SIGNIFICANT CHANGE UP (ref 0–0.9)
MONOCYTES # BLD AUTO: 0.7 K/UL — SIGNIFICANT CHANGE UP (ref 0–0.9)
MONOCYTES # BLD AUTO: 0.81 K/UL — SIGNIFICANT CHANGE UP (ref 0–0.9)
MONOCYTES # BLD AUTO: 0.82 K/UL — SIGNIFICANT CHANGE UP (ref 0–0.9)
MONOCYTES # BLD AUTO: 0.86 K/UL — SIGNIFICANT CHANGE UP (ref 0–0.9)
MONOCYTES # BLD AUTO: 0.89 K/UL — SIGNIFICANT CHANGE UP (ref 0–0.9)
MONOCYTES # BLD AUTO: 0.91 K/UL — HIGH (ref 0–0.9)
MONOCYTES # BLD AUTO: 0.94 K/UL — HIGH (ref 0–0.9)
MONOCYTES # BLD AUTO: 1.1 K/UL — HIGH (ref 0–0.9)
MONOCYTES # BLD AUTO: 1.53 K/UL — HIGH (ref 0–0.9)
MONOCYTES # BLD AUTO: 2.68 K/UL — HIGH (ref 0–0.9)
MONOCYTES NFR BLD AUTO: 1.7 % — LOW (ref 2–14)
MONOCYTES NFR BLD AUTO: 10.9 % — SIGNIFICANT CHANGE UP (ref 2–14)
MONOCYTES NFR BLD AUTO: 12 % — SIGNIFICANT CHANGE UP (ref 2–14)
MONOCYTES NFR BLD AUTO: 15 % — HIGH (ref 2–14)
MONOCYTES NFR BLD AUTO: 18 % — HIGH (ref 2–14)
MONOCYTES NFR BLD AUTO: 2.1 % — SIGNIFICANT CHANGE UP (ref 2–14)
MONOCYTES NFR BLD AUTO: 3.4 % — SIGNIFICANT CHANGE UP (ref 2–14)
MONOCYTES NFR BLD AUTO: 4.2 % — SIGNIFICANT CHANGE UP (ref 2–14)
MONOCYTES NFR BLD AUTO: 4.3 % — SIGNIFICANT CHANGE UP (ref 2–14)
MONOCYTES NFR BLD AUTO: 4.4 % — SIGNIFICANT CHANGE UP (ref 2–14)
MONOCYTES NFR BLD AUTO: 4.9 % — SIGNIFICANT CHANGE UP (ref 2–14)
MONOCYTES NFR BLD AUTO: 5.8 % — SIGNIFICANT CHANGE UP (ref 2–14)
MONOCYTES NFR BLD AUTO: 5.9 % — SIGNIFICANT CHANGE UP (ref 2–14)
MONOCYTES NFR BLD AUTO: 5.9 % — SIGNIFICANT CHANGE UP (ref 2–14)
MONOCYTES NFR BLD AUTO: 7 % — SIGNIFICANT CHANGE UP (ref 2–14)
MONOCYTES NFR BLD AUTO: 7.6 % — SIGNIFICANT CHANGE UP (ref 2–14)
MONOCYTES NFR BLD AUTO: 7.9 % — SIGNIFICANT CHANGE UP (ref 2–14)
MONOCYTES NFR BLD AUTO: 9.4 % — SIGNIFICANT CHANGE UP (ref 2–14)
MONOCYTES NFR BLD: 3.6 % — SIGNIFICANT CHANGE UP (ref 2–9)
MONOCYTES NFR BLD: 7 % — SIGNIFICANT CHANGE UP (ref 2–9)
MYELOCYTES NFR BLD: 0.9 % — HIGH (ref 0–0)
MYELOCYTES NFR BLD: 0.9 % — HIGH (ref 0–0)
NEUTROPHIL AB SER-ACNC: 50.4 % — SIGNIFICANT CHANGE UP (ref 43–77)
NEUTROPHIL AB SER-ACNC: 73.7 % — SIGNIFICANT CHANGE UP (ref 43–77)
NEUTROPHILS # BLD AUTO: 1.29 K/UL — LOW (ref 1.8–7.4)
NEUTROPHILS # BLD AUTO: 1.72 K/UL — LOW (ref 1.8–7.4)
NEUTROPHILS # BLD AUTO: 1.95 K/UL — SIGNIFICANT CHANGE UP (ref 1.8–7.4)
NEUTROPHILS # BLD AUTO: 1.95 K/UL — SIGNIFICANT CHANGE UP (ref 1.8–7.4)
NEUTROPHILS # BLD AUTO: 12.19 K/UL — HIGH (ref 1.8–7.4)
NEUTROPHILS # BLD AUTO: 12.23 K/UL — HIGH (ref 1.8–7.4)
NEUTROPHILS # BLD AUTO: 2.04 K/UL — SIGNIFICANT CHANGE UP (ref 1.8–7.4)
NEUTROPHILS # BLD AUTO: 2.22 K/UL — SIGNIFICANT CHANGE UP (ref 1.8–7.4)
NEUTROPHILS # BLD AUTO: 2.4 K/UL — SIGNIFICANT CHANGE UP (ref 1.8–7.4)
NEUTROPHILS # BLD AUTO: 26.91 K/UL — HIGH (ref 1.8–7.4)
NEUTROPHILS # BLD AUTO: 3.14 K/UL — SIGNIFICANT CHANGE UP (ref 1.8–7.4)
NEUTROPHILS # BLD AUTO: 3.39 K/UL — SIGNIFICANT CHANGE UP (ref 1.8–7.4)
NEUTROPHILS # BLD AUTO: 3.6 K/UL — SIGNIFICANT CHANGE UP (ref 1.8–7.4)
NEUTROPHILS # BLD AUTO: 3.91 K/UL — SIGNIFICANT CHANGE UP (ref 1.8–7.4)
NEUTROPHILS # BLD AUTO: 4.48 K/UL — SIGNIFICANT CHANGE UP (ref 1.8–7.4)
NEUTROPHILS # BLD AUTO: 4.99 K/UL — SIGNIFICANT CHANGE UP (ref 1.8–7.4)
NEUTROPHILS # BLD AUTO: 5.16 K/UL — SIGNIFICANT CHANGE UP (ref 1.8–7.4)
NEUTROPHILS # BLD AUTO: 6.15 K/UL — SIGNIFICANT CHANGE UP (ref 1.8–7.4)
NEUTROPHILS # BLD AUTO: 6.19 K/UL — SIGNIFICANT CHANGE UP (ref 1.8–7.4)
NEUTROPHILS # BLD AUTO: 8.01 K/UL — HIGH (ref 1.8–7.4)
NEUTROPHILS # BLD AUTO: 8.28 K/UL — HIGH (ref 1.8–7.4)
NEUTROPHILS # BLD AUTO: 8.53 K/UL — HIGH (ref 1.8–7.4)
NEUTROPHILS NFR BLD AUTO: 34 % — LOW (ref 43–77)
NEUTROPHILS NFR BLD AUTO: 41 % — LOW (ref 43–77)
NEUTROPHILS NFR BLD AUTO: 42 % — LOW (ref 43–77)
NEUTROPHILS NFR BLD AUTO: 44.9 % — SIGNIFICANT CHANGE UP (ref 43–77)
NEUTROPHILS NFR BLD AUTO: 45.7 % — SIGNIFICANT CHANGE UP (ref 43–77)
NEUTROPHILS NFR BLD AUTO: 46 % — SIGNIFICANT CHANGE UP (ref 43–77)
NEUTROPHILS NFR BLD AUTO: 49 % — SIGNIFICANT CHANGE UP (ref 43–77)
NEUTROPHILS NFR BLD AUTO: 57 % — SIGNIFICANT CHANGE UP (ref 43–77)
NEUTROPHILS NFR BLD AUTO: 59.6 % — SIGNIFICANT CHANGE UP (ref 43–77)
NEUTROPHILS NFR BLD AUTO: 60.6 % — SIGNIFICANT CHANGE UP (ref 43–77)
NEUTROPHILS NFR BLD AUTO: 62 % — SIGNIFICANT CHANGE UP (ref 43–77)
NEUTROPHILS NFR BLD AUTO: 63.4 % — SIGNIFICANT CHANGE UP (ref 43–77)
NEUTROPHILS NFR BLD AUTO: 65.8 % — SIGNIFICANT CHANGE UP (ref 43–77)
NEUTROPHILS NFR BLD AUTO: 70.7 % — SIGNIFICANT CHANGE UP (ref 43–77)
NEUTROPHILS NFR BLD AUTO: 71.3 % — SIGNIFICANT CHANGE UP (ref 43–77)
NEUTROPHILS NFR BLD AUTO: 74.2 % — SIGNIFICANT CHANGE UP (ref 43–77)
NEUTROPHILS NFR BLD AUTO: 76.1 % — SIGNIFICANT CHANGE UP (ref 43–77)
NEUTROPHILS NFR BLD AUTO: 76.8 % — SIGNIFICANT CHANGE UP (ref 43–77)
NEUTROPHILS NFR BLD AUTO: 78.7 % — HIGH (ref 43–77)
NEUTROPHILS NFR BLD AUTO: 83 % — HIGH (ref 43–77)
NEUTROPHILS NFR BLD AUTO: 88.6 % — HIGH (ref 43–77)
NEUTROPHILS NFR BLD AUTO: 90.5 % — HIGH (ref 43–77)
NEUTS BAND # BLD: 1 % — SIGNIFICANT CHANGE UP (ref 0–8)
NEUTS BAND # BLD: 12.7 % — HIGH (ref 0–6)
NEUTS BAND # BLD: 27 % — HIGH (ref 0–6)
NEUTS BAND # BLD: 6 % — SIGNIFICANT CHANGE UP (ref 0–8)
NITRITE UR-MCNC: NEGATIVE — SIGNIFICANT CHANGE UP
NITRITE UR-MCNC: NEGATIVE — SIGNIFICANT CHANGE UP
NRBC # BLD: 0 /100 WBCS — SIGNIFICANT CHANGE UP (ref 0–0)
NRBC # BLD: 0 /100 — SIGNIFICANT CHANGE UP (ref 0–0)
NRBC # BLD: SIGNIFICANT CHANGE UP /100 WBCS (ref 0–0)
NRBC # FLD: 0 K/UL — SIGNIFICANT CHANGE UP (ref 0–0)
NRBC # FLD: 0.02 K/UL — SIGNIFICANT CHANGE UP (ref 0–0)
NRBC # FLD: 0.03 K/UL — SIGNIFICANT CHANGE UP (ref 0–0)
NRBC # FLD: 0.04 K/UL — SIGNIFICANT CHANGE UP (ref 0–0)
NT-PROBNP SERPL-SCNC: 124.3 PG/ML — SIGNIFICANT CHANGE UP
OTHER - HEMATOLOGY %: 0 — SIGNIFICANT CHANGE UP
OVALOCYTES BLD QL SMEAR: SLIGHT — SIGNIFICANT CHANGE UP
PCO2 BLDV: 40 MMHG — LOW (ref 41–51)
PCO2 BLDV: 48 MMHG — SIGNIFICANT CHANGE UP (ref 41–51)
PH BLDV: 7.34 PH — SIGNIFICANT CHANGE UP (ref 7.32–7.43)
PH BLDV: 7.37 PH — SIGNIFICANT CHANGE UP (ref 7.32–7.43)
PH UR: 6.5 — SIGNIFICANT CHANGE UP (ref 5–8)
PH UR: 6.5 — SIGNIFICANT CHANGE UP (ref 5–8)
PHOSPHATE SERPL-MCNC: 2.2 MG/DL — LOW (ref 2.5–4.5)
PHOSPHATE SERPL-MCNC: 2.9 MG/DL — SIGNIFICANT CHANGE UP (ref 2.5–4.5)
PHOSPHATE SERPL-MCNC: 3 MG/DL — SIGNIFICANT CHANGE UP (ref 2.5–4.5)
PHOSPHATE SERPL-MCNC: 3.1 MG/DL — SIGNIFICANT CHANGE UP (ref 2.5–4.5)
PHOSPHATE SERPL-MCNC: 3.2 MG/DL — SIGNIFICANT CHANGE UP (ref 2.5–4.5)
PHOSPHATE SERPL-MCNC: 3.7 MG/DL — SIGNIFICANT CHANGE UP (ref 2.5–4.5)
PHOSPHATE SERPL-MCNC: 3.8 MG/DL — SIGNIFICANT CHANGE UP (ref 2.5–4.5)
PHOSPHATE SERPL-MCNC: 3.9 MG/DL — SIGNIFICANT CHANGE UP (ref 2.5–4.5)
PHOSPHATE SERPL-MCNC: SIGNIFICANT CHANGE UP MG/DL (ref 2.5–4.5)
PLAT MORPH BLD: NORMAL — SIGNIFICANT CHANGE UP
PLATELET # BLD AUTO: 111 K/UL — LOW (ref 150–400)
PLATELET # BLD AUTO: 114 K/UL — LOW (ref 150–400)
PLATELET # BLD AUTO: 121 K/UL — LOW (ref 150–400)
PLATELET # BLD AUTO: 130 K/UL — LOW (ref 150–400)
PLATELET # BLD AUTO: 142 K/UL — LOW (ref 150–400)
PLATELET # BLD AUTO: 147 K/UL — LOW (ref 150–400)
PLATELET # BLD AUTO: 160 K/UL — SIGNIFICANT CHANGE UP (ref 150–400)
PLATELET # BLD AUTO: 162 K/UL — SIGNIFICANT CHANGE UP (ref 150–400)
PLATELET # BLD AUTO: 168 K/UL — SIGNIFICANT CHANGE UP (ref 150–400)
PLATELET # BLD AUTO: 169 K/UL — SIGNIFICANT CHANGE UP (ref 150–400)
PLATELET # BLD AUTO: 170 K/UL — SIGNIFICANT CHANGE UP (ref 150–400)
PLATELET # BLD AUTO: 173 K/UL — SIGNIFICANT CHANGE UP (ref 150–400)
PLATELET # BLD AUTO: 182 K/UL — SIGNIFICANT CHANGE UP (ref 150–400)
PLATELET # BLD AUTO: 185 K/UL — SIGNIFICANT CHANGE UP (ref 150–400)
PLATELET # BLD AUTO: 190 K/UL — SIGNIFICANT CHANGE UP (ref 150–400)
PLATELET # BLD AUTO: 191 K/UL — SIGNIFICANT CHANGE UP (ref 150–400)
PLATELET # BLD AUTO: 193 K/UL — SIGNIFICANT CHANGE UP (ref 150–400)
PLATELET # BLD AUTO: 194 K/UL — SIGNIFICANT CHANGE UP (ref 150–400)
PLATELET # BLD AUTO: 198 K/UL — SIGNIFICANT CHANGE UP (ref 150–400)
PLATELET # BLD AUTO: 199 K/UL — SIGNIFICANT CHANGE UP (ref 150–400)
PLATELET # BLD AUTO: 201 K/UL — SIGNIFICANT CHANGE UP (ref 150–400)
PLATELET # BLD AUTO: 240 K/UL — SIGNIFICANT CHANGE UP (ref 150–400)
PLATELET # BLD AUTO: 266 K/UL — SIGNIFICANT CHANGE UP (ref 150–400)
PLATELET # BLD AUTO: 354 K/UL — SIGNIFICANT CHANGE UP (ref 150–400)
PLATELET COUNT - ESTIMATE: NORMAL — SIGNIFICANT CHANGE UP
PLATELET COUNT - ESTIMATE: SIGNIFICANT CHANGE UP
PMV BLD: 10 FL — SIGNIFICANT CHANGE UP (ref 7–13)
PMV BLD: 10.2 FL — SIGNIFICANT CHANGE UP (ref 7–13)
PMV BLD: 10.4 FL — SIGNIFICANT CHANGE UP (ref 7–13)
PMV BLD: 10.4 FL — SIGNIFICANT CHANGE UP (ref 7–13)
PMV BLD: 10.5 FL — SIGNIFICANT CHANGE UP (ref 7–13)
PMV BLD: 10.6 FL — SIGNIFICANT CHANGE UP (ref 7–13)
PMV BLD: 10.6 FL — SIGNIFICANT CHANGE UP (ref 7–13)
PMV BLD: 10.7 FL — SIGNIFICANT CHANGE UP (ref 7–13)
PMV BLD: 11 FL — SIGNIFICANT CHANGE UP (ref 7–13)
PMV BLD: 11.1 FL — SIGNIFICANT CHANGE UP (ref 7–13)
PMV BLD: 11.6 FL — SIGNIFICANT CHANGE UP (ref 7–13)
PMV BLD: 12.7 FL — SIGNIFICANT CHANGE UP (ref 7–13)
PO2 BLDV: 21 MMHG — LOW (ref 35–40)
PO2 BLDV: 37 MMHG — SIGNIFICANT CHANGE UP (ref 35–40)
POIKILOCYTOSIS BLD QL AUTO: SLIGHT — SIGNIFICANT CHANGE UP
POLYCHROMASIA BLD QL SMEAR: SLIGHT — SIGNIFICANT CHANGE UP
POTASSIUM BLDV-SCNC: 3.2 MMOL/L — LOW (ref 3.4–4.5)
POTASSIUM BLDV-SCNC: 3.4 MMOL/L — SIGNIFICANT CHANGE UP (ref 3.4–4.5)
POTASSIUM SERPL-MCNC: 3.2 MMOL/L — LOW (ref 3.5–5.3)
POTASSIUM SERPL-MCNC: 3.3 MMOL/L — LOW (ref 3.5–5.3)
POTASSIUM SERPL-MCNC: 3.6 MMOL/L — SIGNIFICANT CHANGE UP (ref 3.5–5.3)
POTASSIUM SERPL-MCNC: 3.7 MMOL/L — SIGNIFICANT CHANGE UP (ref 3.5–5.3)
POTASSIUM SERPL-MCNC: 3.9 MMOL/L — SIGNIFICANT CHANGE UP (ref 3.5–5.3)
POTASSIUM SERPL-MCNC: 4 MMOL/L — SIGNIFICANT CHANGE UP (ref 3.5–5.3)
POTASSIUM SERPL-MCNC: 4.1 MMOL/L — SIGNIFICANT CHANGE UP (ref 3.5–5.3)
POTASSIUM SERPL-MCNC: 4.1 MMOL/L — SIGNIFICANT CHANGE UP (ref 3.5–5.3)
POTASSIUM SERPL-MCNC: 4.2 MMOL/L — SIGNIFICANT CHANGE UP (ref 3.5–5.3)
POTASSIUM SERPL-MCNC: SIGNIFICANT CHANGE UP MMOL/L (ref 3.5–5.3)
POTASSIUM SERPL-SCNC: 3.2 MMOL/L — LOW (ref 3.5–5.3)
POTASSIUM SERPL-SCNC: 3.3 MMOL/L — LOW (ref 3.5–5.3)
POTASSIUM SERPL-SCNC: 3.6 MMOL/L — SIGNIFICANT CHANGE UP (ref 3.5–5.3)
POTASSIUM SERPL-SCNC: 3.7 MMOL/L — SIGNIFICANT CHANGE UP (ref 3.5–5.3)
POTASSIUM SERPL-SCNC: 3.9 MMOL/L — SIGNIFICANT CHANGE UP (ref 3.5–5.3)
POTASSIUM SERPL-SCNC: 4 MMOL/L — SIGNIFICANT CHANGE UP (ref 3.5–5.3)
POTASSIUM SERPL-SCNC: 4.1 MMOL/L — SIGNIFICANT CHANGE UP (ref 3.5–5.3)
POTASSIUM SERPL-SCNC: 4.1 MMOL/L — SIGNIFICANT CHANGE UP (ref 3.5–5.3)
POTASSIUM SERPL-SCNC: 4.2 MMOL/L — SIGNIFICANT CHANGE UP (ref 3.5–5.3)
POTASSIUM SERPL-SCNC: 4.3 MMOL/L
POTASSIUM SERPL-SCNC: 5.1 MMOL/L
POTASSIUM SERPL-SCNC: SIGNIFICANT CHANGE UP MMOL/L (ref 3.5–5.3)
PROMYELOCYTES # FLD: 0 % — SIGNIFICANT CHANGE UP (ref 0–0)
PROT SERPL-MCNC: 7.1 G/DL — SIGNIFICANT CHANGE UP (ref 6–8.3)
PROT SERPL-MCNC: 7.2 G/DL — SIGNIFICANT CHANGE UP (ref 6–8.3)
PROT SERPL-MCNC: 7.3 G/DL — SIGNIFICANT CHANGE UP (ref 6–8.3)
PROT SERPL-MCNC: 7.7 G/DL — SIGNIFICANT CHANGE UP (ref 6–8.3)
PROT SERPL-MCNC: 7.8 G/DL — SIGNIFICANT CHANGE UP (ref 6–8.3)
PROT SERPL-MCNC: 7.9 G/DL — SIGNIFICANT CHANGE UP (ref 6–8.3)
PROT SERPL-MCNC: 7.9 G/DL — SIGNIFICANT CHANGE UP (ref 6–8.3)
PROT SERPL-MCNC: 8 G/DL — SIGNIFICANT CHANGE UP (ref 6–8.3)
PROT SERPL-MCNC: 8 G/DL — SIGNIFICANT CHANGE UP (ref 6–8.3)
PROT SERPL-MCNC: 8.6 G/DL
PROT SERPL-MCNC: 9 G/DL
PROT UR-MCNC: 20 — SIGNIFICANT CHANGE UP
PROT UR-MCNC: NEGATIVE — SIGNIFICANT CHANGE UP
PROTHROM AB SERPL-ACNC: 11.8 SEC — SIGNIFICANT CHANGE UP (ref 10.6–13.6)
PROTHROM AB SERPL-ACNC: 12.4 SEC — SIGNIFICANT CHANGE UP (ref 10.6–13.6)
PT BLD: 12.3 SEC
RAPID RVP RESULT: SIGNIFICANT CHANGE UP
RAPID RVP RESULT: SIGNIFICANT CHANGE UP
RBC # BLD: 3.4 M/UL — LOW (ref 3.8–5.2)
RBC # BLD: 3.43 M/UL — LOW (ref 3.8–5.2)
RBC # BLD: 3.43 M/UL — LOW (ref 3.8–5.2)
RBC # BLD: 3.47 M/UL — LOW (ref 3.8–5.2)
RBC # BLD: 3.49 M/UL — LOW (ref 3.8–5.2)
RBC # BLD: 3.52 M/UL — LOW (ref 3.8–5.2)
RBC # BLD: 3.61 M/UL — LOW (ref 3.8–5.2)
RBC # BLD: 3.65 M/UL — LOW (ref 3.8–5.2)
RBC # BLD: 3.66 M/UL — LOW (ref 3.8–5.2)
RBC # BLD: 3.66 M/UL — LOW (ref 3.8–5.2)
RBC # BLD: 3.67 M/UL — LOW (ref 3.8–5.2)
RBC # BLD: 3.69 M/UL — LOW (ref 3.8–5.2)
RBC # BLD: 3.71 M/UL — LOW (ref 3.8–5.2)
RBC # BLD: 3.72 M/UL — LOW (ref 3.8–5.2)
RBC # BLD: 3.74 M/UL — LOW (ref 3.8–5.2)
RBC # BLD: 3.76 M/UL — LOW (ref 3.8–5.2)
RBC # BLD: 3.8 M/UL — SIGNIFICANT CHANGE UP (ref 3.8–5.2)
RBC # BLD: 3.83 M/UL — SIGNIFICANT CHANGE UP (ref 3.8–5.2)
RBC # BLD: 3.84 M/UL — SIGNIFICANT CHANGE UP (ref 3.8–5.2)
RBC # BLD: 3.86 M/UL — SIGNIFICANT CHANGE UP (ref 3.8–5.2)
RBC # BLD: 3.92 M/UL — SIGNIFICANT CHANGE UP (ref 3.8–5.2)
RBC # BLD: 3.97 M/UL — SIGNIFICANT CHANGE UP (ref 3.8–5.2)
RBC # BLD: 3.98 M/UL — SIGNIFICANT CHANGE UP (ref 3.8–5.2)
RBC # BLD: 4.02 M/UL — SIGNIFICANT CHANGE UP (ref 3.8–5.2)
RBC # FLD: 15.3 % — HIGH (ref 10.3–14.5)
RBC # FLD: 15.4 % — HIGH (ref 10.3–14.5)
RBC # FLD: 15.8 % — HIGH (ref 10.3–14.5)
RBC # FLD: 15.8 % — HIGH (ref 10.3–14.5)
RBC # FLD: 15.9 % — HIGH (ref 10.3–14.5)
RBC # FLD: 16.1 % — HIGH (ref 10.3–14.5)
RBC # FLD: 16.1 % — HIGH (ref 10.3–14.5)
RBC # FLD: 16.2 % — HIGH (ref 10.3–14.5)
RBC # FLD: 16.3 % — HIGH (ref 10.3–14.5)
RBC # FLD: 16.4 % — HIGH (ref 10.3–14.5)
RBC # FLD: 16.5 % — HIGH (ref 10.3–14.5)
RBC # FLD: 16.6 % — HIGH (ref 10.3–14.5)
RBC # FLD: 16.8 % — HIGH (ref 10.3–14.5)
RBC # FLD: 16.8 % — HIGH (ref 10.3–14.5)
RBC # FLD: 16.9 % — HIGH (ref 10.3–14.5)
RBC # FLD: 17.1 % — HIGH (ref 10.3–14.5)
RBC # FLD: 17.2 % — HIGH (ref 10.3–14.5)
RBC # FLD: 17.3 % — HIGH (ref 10.3–14.5)
RBC # FLD: 19.8 % — HIGH (ref 10.3–14.5)
RBC BLD AUTO: NORMAL — SIGNIFICANT CHANGE UP
RBC BLD AUTO: SIGNIFICANT CHANGE UP
RBC CASTS # UR COMP ASSIST: SIGNIFICANT CHANGE UP (ref 0–?)
RBC CASTS # UR COMP ASSIST: SIGNIFICANT CHANGE UP (ref 0–?)
RSV RNA SPEC QL NAA+PROBE: SIGNIFICANT CHANGE UP
RSV RNA SPEC QL NAA+PROBE: SIGNIFICANT CHANGE UP
RV+EV RNA SPEC QL NAA+PROBE: SIGNIFICANT CHANGE UP
RV+EV RNA SPEC QL NAA+PROBE: SIGNIFICANT CHANGE UP
SAO2 % BLDV: 24.3 % — LOW (ref 60–85)
SAO2 % BLDV: 61.8 % — SIGNIFICANT CHANGE UP (ref 60–85)
SARS-COV-2 N GENE NPH QL NAA+PROBE: NOT DETECTED
SARS-COV-2 RNA SPEC QL NAA+PROBE: SIGNIFICANT CHANGE UP
SMUDGE CELLS # BLD: PRESENT — SIGNIFICANT CHANGE UP
SODIUM SERPL-SCNC: 129 MMOL/L — LOW (ref 135–145)
SODIUM SERPL-SCNC: 131 MMOL/L
SODIUM SERPL-SCNC: 134 MMOL/L — LOW (ref 135–145)
SODIUM SERPL-SCNC: 134 MMOL/L — LOW (ref 135–145)
SODIUM SERPL-SCNC: 135 MMOL/L — SIGNIFICANT CHANGE UP (ref 135–145)
SODIUM SERPL-SCNC: 136 MMOL/L — SIGNIFICANT CHANGE UP (ref 135–145)
SODIUM SERPL-SCNC: 138 MMOL/L
SODIUM SERPL-SCNC: 138 MMOL/L — SIGNIFICANT CHANGE UP (ref 135–145)
SODIUM SERPL-SCNC: 138 MMOL/L — SIGNIFICANT CHANGE UP (ref 135–145)
SODIUM SERPL-SCNC: 139 MMOL/L — SIGNIFICANT CHANGE UP (ref 135–145)
SP GR SPEC: 1.01 — SIGNIFICANT CHANGE UP (ref 1–1.04)
SP GR SPEC: 1.01 — SIGNIFICANT CHANGE UP (ref 1–1.04)
SPECIMEN SOURCE: SIGNIFICANT CHANGE UP
SQUAMOUS # UR AUTO: SIGNIFICANT CHANGE UP
T3 SERPL-MCNC: 88.9 NG/DL — SIGNIFICANT CHANGE UP (ref 80–200)
T4 AB SER-ACNC: 7.08 UG/DL — SIGNIFICANT CHANGE UP (ref 5.1–13)
TRANS CELLS #/AREA URNS HPF: SIGNIFICANT CHANGE UP
TRIGL SERPL-MCNC: 176 MG/DL — HIGH (ref 10–149)
TROPONIN T, HIGH SENSITIVITY: 32 NG/L — SIGNIFICANT CHANGE UP (ref ?–14)
TROPONIN T, HIGH SENSITIVITY: 404 NG/L — CRITICAL HIGH (ref ?–14)
TROPONIN T, HIGH SENSITIVITY: 573 NG/L — CRITICAL HIGH (ref ?–14)
TROPONIN T, HIGH SENSITIVITY: 660 NG/L — CRITICAL HIGH (ref ?–14)
TROPONIN T, HIGH SENSITIVITY: 7 NG/L — SIGNIFICANT CHANGE UP (ref ?–14)
TROPONIN T, HIGH SENSITIVITY: < 6 NG/L — SIGNIFICANT CHANGE UP (ref ?–14)
TROPONIN T, HIGH SENSITIVITY: < 6 NG/L — SIGNIFICANT CHANGE UP (ref ?–14)
TSH SERPL-MCNC: 1.76 UIU/ML — SIGNIFICANT CHANGE UP (ref 0.27–4.2)
UROBILINOGEN FLD QL: NORMAL — SIGNIFICANT CHANGE UP
UROBILINOGEN FLD QL: NORMAL — SIGNIFICANT CHANGE UP
VANCOMYCIN TROUGH SERPL-MCNC: 10.6 UG/ML — SIGNIFICANT CHANGE UP (ref 10–20)
VARIANT LYMPHS # BLD: 0.9 % — SIGNIFICANT CHANGE UP
WBC # BLD: 11.71 K/UL — HIGH (ref 3.8–10.5)
WBC # BLD: 11.94 K/UL — HIGH (ref 3.8–10.5)
WBC # BLD: 12.72 K/UL — HIGH (ref 3.8–10.5)
WBC # BLD: 14.7 K/UL — HIGH (ref 3.8–10.5)
WBC # BLD: 15.52 K/UL — HIGH (ref 3.8–10.5)
WBC # BLD: 2.8 K/UL — LOW (ref 3.8–10.5)
WBC # BLD: 28.64 K/UL — HIGH (ref 3.8–10.5)
WBC # BLD: 3.1 K/UL — LOW (ref 3.8–10.5)
WBC # BLD: 3.83 K/UL — SIGNIFICANT CHANGE UP (ref 3.8–10.5)
WBC # BLD: 35.37 K/UL — HIGH (ref 3.8–10.5)
WBC # BLD: 35.79 K/UL — HIGH (ref 3.8–10.5)
WBC # BLD: 4.09 K/UL — SIGNIFICANT CHANGE UP (ref 3.8–10.5)
WBC # BLD: 4.65 K/UL — SIGNIFICANT CHANGE UP (ref 3.8–10.5)
WBC # BLD: 4.79 K/UL — SIGNIFICANT CHANGE UP (ref 3.8–10.5)
WBC # BLD: 5.41 K/UL — SIGNIFICANT CHANGE UP (ref 3.8–10.5)
WBC # BLD: 5.73 K/UL — SIGNIFICANT CHANGE UP (ref 3.8–10.5)
WBC # BLD: 5.81 K/UL — SIGNIFICANT CHANGE UP (ref 3.8–10.5)
WBC # BLD: 6.44 K/UL — SIGNIFICANT CHANGE UP (ref 3.8–10.5)
WBC # BLD: 6.74 K/UL — SIGNIFICANT CHANGE UP (ref 3.8–10.5)
WBC # BLD: 6.8 K/UL — SIGNIFICANT CHANGE UP (ref 3.8–10.5)
WBC # BLD: 6.99 K/UL — SIGNIFICANT CHANGE UP (ref 3.8–10.5)
WBC # BLD: 7.42 K/UL — SIGNIFICANT CHANGE UP (ref 3.8–10.5)
WBC # BLD: 7.52 K/UL — SIGNIFICANT CHANGE UP (ref 3.8–10.5)
WBC # BLD: 8.15 K/UL — SIGNIFICANT CHANGE UP (ref 3.8–10.5)
WBC # FLD AUTO: 11.71 K/UL — HIGH (ref 3.8–10.5)
WBC # FLD AUTO: 11.94 K/UL — HIGH (ref 3.8–10.5)
WBC # FLD AUTO: 12.72 K/UL — HIGH (ref 3.8–10.5)
WBC # FLD AUTO: 14.7 K/UL — HIGH (ref 3.8–10.5)
WBC # FLD AUTO: 15.52 K/UL — HIGH (ref 3.8–10.5)
WBC # FLD AUTO: 2.8 K/UL — LOW (ref 3.8–10.5)
WBC # FLD AUTO: 28.64 K/UL — HIGH (ref 3.8–10.5)
WBC # FLD AUTO: 3.1 K/UL — LOW (ref 3.8–10.5)
WBC # FLD AUTO: 3.83 K/UL — SIGNIFICANT CHANGE UP (ref 3.8–10.5)
WBC # FLD AUTO: 35.37 K/UL — HIGH (ref 3.8–10.5)
WBC # FLD AUTO: 35.79 K/UL — HIGH (ref 3.8–10.5)
WBC # FLD AUTO: 4.09 K/UL — SIGNIFICANT CHANGE UP (ref 3.8–10.5)
WBC # FLD AUTO: 4.65 K/UL — SIGNIFICANT CHANGE UP (ref 3.8–10.5)
WBC # FLD AUTO: 4.79 K/UL — SIGNIFICANT CHANGE UP (ref 3.8–10.5)
WBC # FLD AUTO: 5.41 K/UL — SIGNIFICANT CHANGE UP (ref 3.8–10.5)
WBC # FLD AUTO: 5.73 K/UL — SIGNIFICANT CHANGE UP (ref 3.8–10.5)
WBC # FLD AUTO: 5.81 K/UL — SIGNIFICANT CHANGE UP (ref 3.8–10.5)
WBC # FLD AUTO: 6.44 K/UL — SIGNIFICANT CHANGE UP (ref 3.8–10.5)
WBC # FLD AUTO: 6.74 K/UL — SIGNIFICANT CHANGE UP (ref 3.8–10.5)
WBC # FLD AUTO: 6.8 K/UL — SIGNIFICANT CHANGE UP (ref 3.8–10.5)
WBC # FLD AUTO: 6.99 K/UL — SIGNIFICANT CHANGE UP (ref 3.8–10.5)
WBC # FLD AUTO: 7.42 K/UL — SIGNIFICANT CHANGE UP (ref 3.8–10.5)
WBC # FLD AUTO: 7.52 K/UL — SIGNIFICANT CHANGE UP (ref 3.8–10.5)
WBC # FLD AUTO: 8.15 K/UL — SIGNIFICANT CHANGE UP (ref 3.8–10.5)
WBC UR QL: HIGH (ref 0–?)
WBC UR QL: SIGNIFICANT CHANGE UP (ref 0–?)

## 2020-01-01 PROCEDURE — 99442: CPT

## 2020-01-01 PROCEDURE — 99239 HOSP IP/OBS DSCHRG MGMT >30: CPT

## 2020-01-01 PROCEDURE — 99072 ADDL SUPL MATRL&STAF TM PHE: CPT

## 2020-01-01 PROCEDURE — 99214 OFFICE O/P EST MOD 30 MIN: CPT

## 2020-01-01 PROCEDURE — 99223 1ST HOSP IP/OBS HIGH 75: CPT | Mod: GC

## 2020-01-01 PROCEDURE — 71260 CT THORAX DX C+: CPT | Mod: 26

## 2020-01-01 PROCEDURE — 99222 1ST HOSP IP/OBS MODERATE 55: CPT

## 2020-01-01 PROCEDURE — 99285 EMERGENCY DEPT VISIT HI MDM: CPT

## 2020-01-01 PROCEDURE — 74177 CT ABD & PELVIS W/CONTRAST: CPT

## 2020-01-01 PROCEDURE — 99232 SBSQ HOSP IP/OBS MODERATE 35: CPT

## 2020-01-01 PROCEDURE — 71045 X-RAY EXAM CHEST 1 VIEW: CPT | Mod: 26

## 2020-01-01 PROCEDURE — 99233 SBSQ HOSP IP/OBS HIGH 50: CPT

## 2020-01-01 PROCEDURE — 99223 1ST HOSP IP/OBS HIGH 75: CPT

## 2020-01-01 PROCEDURE — 71260 CT THORAX DX C+: CPT

## 2020-01-01 PROCEDURE — 99215 OFFICE O/P EST HI 40 MIN: CPT

## 2020-01-01 PROCEDURE — 93306 TTE W/DOPPLER COMPLETE: CPT | Mod: 26

## 2020-01-01 PROCEDURE — 74177 CT ABD & PELVIS W/CONTRAST: CPT | Mod: 26

## 2020-01-01 PROCEDURE — 93458 L HRT ARTERY/VENTRICLE ANGIO: CPT | Mod: 26,59

## 2020-01-01 PROCEDURE — 71250 CT THORAX DX C-: CPT | Mod: 26

## 2020-01-01 PROCEDURE — 99214 OFFICE O/P EST MOD 30 MIN: CPT | Mod: 95

## 2020-01-01 PROCEDURE — 99291 CRITICAL CARE FIRST HOUR: CPT

## 2020-01-01 PROCEDURE — G0008: CPT

## 2020-01-01 PROCEDURE — 93000 ELECTROCARDIOGRAM COMPLETE: CPT

## 2020-01-01 PROCEDURE — 93010 ELECTROCARDIOGRAM REPORT: CPT

## 2020-01-01 PROCEDURE — 92941 PRQ TRLML REVSC TOT OCCL AMI: CPT | Mod: RC

## 2020-01-01 PROCEDURE — 76705 ECHO EXAM OF ABDOMEN: CPT | Mod: 26

## 2020-01-01 RX ORDER — ONDANSETRON 8 MG/1
4 TABLET, FILM COATED ORAL EVERY 8 HOURS
Refills: 0 | Status: DISCONTINUED | OUTPATIENT
Start: 2020-01-01 | End: 2020-01-01

## 2020-01-01 RX ORDER — POLYETHYLENE GLYCOL 3350 17 G/17G
17 POWDER, FOR SOLUTION ORAL DAILY
Refills: 0 | Status: DISCONTINUED | OUTPATIENT
Start: 2020-01-01 | End: 2020-01-01

## 2020-01-01 RX ORDER — TICAGRELOR 90 MG/1
90 TABLET ORAL EVERY 12 HOURS
Refills: 0 | Status: DISCONTINUED | OUTPATIENT
Start: 2020-01-01 | End: 2020-01-01

## 2020-01-01 RX ORDER — CHLORHEXIDINE GLUCONATE 213 G/1000ML
1 SOLUTION TOPICAL DAILY
Refills: 0 | Status: DISCONTINUED | OUTPATIENT
Start: 2020-01-01 | End: 2020-01-01

## 2020-01-01 RX ORDER — SODIUM CHLORIDE 9 MG/ML
1000 INJECTION INTRAMUSCULAR; INTRAVENOUS; SUBCUTANEOUS ONCE
Refills: 0 | Status: COMPLETED | OUTPATIENT
Start: 2020-01-01 | End: 2020-01-01

## 2020-01-01 RX ORDER — METOPROLOL TARTRATE 50 MG
12.5 TABLET ORAL
Refills: 0 | Status: DISCONTINUED | OUTPATIENT
Start: 2020-01-01 | End: 2020-01-01

## 2020-01-01 RX ORDER — ASPIRIN/CALCIUM CARB/MAGNESIUM 324 MG
162 TABLET ORAL DAILY
Refills: 0 | Status: DISCONTINUED | OUTPATIENT
Start: 2020-01-01 | End: 2020-01-01

## 2020-01-01 RX ORDER — CEFEPIME 1 G/1
2000 INJECTION, POWDER, FOR SOLUTION INTRAMUSCULAR; INTRAVENOUS ONCE
Refills: 0 | Status: COMPLETED | OUTPATIENT
Start: 2020-01-01 | End: 2020-01-01

## 2020-01-01 RX ORDER — SODIUM CHLORIDE 9 MG/ML
500 INJECTION INTRAMUSCULAR; INTRAVENOUS; SUBCUTANEOUS ONCE
Refills: 0 | Status: COMPLETED | OUTPATIENT
Start: 2020-01-01 | End: 2020-01-01

## 2020-01-01 RX ORDER — VANCOMYCIN HCL 1 G
1000 VIAL (EA) INTRAVENOUS ONCE
Refills: 0 | Status: COMPLETED | OUTPATIENT
Start: 2020-01-01 | End: 2020-01-01

## 2020-01-01 RX ORDER — LIPASE/PROTEASE/AMYLASE 16-48-48K
1 CAPSULE,DELAYED RELEASE (ENTERIC COATED) ORAL
Refills: 0 | Status: DISCONTINUED | OUTPATIENT
Start: 2020-01-01 | End: 2020-01-01

## 2020-01-01 RX ORDER — ASPIRIN/CALCIUM CARB/MAGNESIUM 324 MG
1 TABLET ORAL
Qty: 0 | Refills: 0 | DISCHARGE
Start: 2020-01-01

## 2020-01-01 RX ORDER — METOPROLOL TARTRATE 50 MG
0.5 TABLET ORAL
Qty: 15 | Refills: 0
Start: 2020-01-01 | End: 2020-01-01

## 2020-01-01 RX ORDER — ACETAMINOPHEN 500 MG
2 TABLET ORAL
Qty: 0 | Refills: 0 | DISCHARGE
Start: 2020-01-01

## 2020-01-01 RX ORDER — VANCOMYCIN HCL 1 G
1000 VIAL (EA) INTRAVENOUS EVERY 12 HOURS
Refills: 0 | Status: DISCONTINUED | OUTPATIENT
Start: 2020-01-01 | End: 2020-01-01

## 2020-01-01 RX ORDER — CEFEPIME 1 G/1
2000 INJECTION, POWDER, FOR SOLUTION INTRAMUSCULAR; INTRAVENOUS EVERY 8 HOURS
Refills: 0 | Status: DISCONTINUED | OUTPATIENT
Start: 2020-01-01 | End: 2020-01-01

## 2020-01-01 RX ORDER — MIRTAZAPINE 15 MG/1
15 TABLET, FILM COATED ORAL
Qty: 30 | Refills: 0 | Status: DISCONTINUED | COMMUNITY
Start: 2020-01-01 | End: 2020-01-01

## 2020-01-01 RX ORDER — NOREPINEPHRINE BITARTRATE/D5W 8 MG/250ML
0.05 PLASTIC BAG, INJECTION (ML) INTRAVENOUS
Qty: 8 | Refills: 0 | Status: DISCONTINUED | OUTPATIENT
Start: 2020-01-01 | End: 2020-01-01

## 2020-01-01 RX ORDER — SENNA PLUS 8.6 MG/1
2 TABLET ORAL AT BEDTIME
Refills: 0 | Status: DISCONTINUED | OUTPATIENT
Start: 2020-01-01 | End: 2020-01-01

## 2020-01-01 RX ORDER — LOPERAMIDE HCL 2 MG
2 TABLET ORAL ONCE
Refills: 0 | Status: COMPLETED | OUTPATIENT
Start: 2020-01-01 | End: 2020-01-01

## 2020-01-01 RX ORDER — ALTEPLASE 100 MG
2 KIT INTRAVENOUS ONCE
Refills: 0 | Status: COMPLETED | OUTPATIENT
Start: 2020-01-01 | End: 2020-01-01

## 2020-01-01 RX ORDER — TICAGRELOR 90 MG/1
90 TABLET ORAL TWICE DAILY
Qty: 60 | Refills: 6 | Status: DISCONTINUED | COMMUNITY
Start: 2020-01-01 | End: 2020-01-01

## 2020-01-01 RX ORDER — POTASSIUM CHLORIDE 20 MEQ
10 PACKET (EA) ORAL
Refills: 0 | Status: COMPLETED | OUTPATIENT
Start: 2020-01-01 | End: 2020-01-01

## 2020-01-01 RX ORDER — CHLORHEXIDINE GLUCONATE 213 G/1000ML
1 SOLUTION TOPICAL
Refills: 0 | Status: DISCONTINUED | OUTPATIENT
Start: 2020-01-01 | End: 2020-01-01

## 2020-01-01 RX ORDER — MIDODRINE HYDROCHLORIDE 2.5 MG/1
20 TABLET ORAL EVERY 8 HOURS
Refills: 0 | Status: DISCONTINUED | OUTPATIENT
Start: 2020-01-01 | End: 2020-01-01

## 2020-01-01 RX ORDER — LIDOCAINE 4 G/100G
1 CREAM TOPICAL ONCE
Refills: 0 | Status: COMPLETED | OUTPATIENT
Start: 2020-01-01 | End: 2020-01-01

## 2020-01-01 RX ORDER — TICAGRELOR 90 MG/1
1 TABLET ORAL
Qty: 60 | Refills: 0
Start: 2020-01-01 | End: 2020-01-01

## 2020-01-01 RX ORDER — SODIUM CHLORIDE 9 MG/ML
1000 INJECTION, SOLUTION INTRAVENOUS
Refills: 0 | Status: DISCONTINUED | OUTPATIENT
Start: 2020-01-01 | End: 2020-01-01

## 2020-01-01 RX ORDER — VANCOMYCIN HCL 1 G
1250 VIAL (EA) INTRAVENOUS EVERY 12 HOURS
Refills: 0 | Status: DISCONTINUED | OUTPATIENT
Start: 2020-01-01 | End: 2020-01-01

## 2020-01-01 RX ORDER — ONDANSETRON 8 MG/1
4 TABLET, FILM COATED ORAL EVERY 6 HOURS
Refills: 0 | Status: DISCONTINUED | OUTPATIENT
Start: 2020-01-01 | End: 2020-01-01

## 2020-01-01 RX ORDER — ONDANSETRON 8 MG/1
8 TABLET, FILM COATED ORAL THREE TIMES A DAY
Refills: 0 | Status: DISCONTINUED | OUTPATIENT
Start: 2020-01-01 | End: 2020-01-01

## 2020-01-01 RX ORDER — ONDANSETRON 8 MG/1
4 TABLET, FILM COATED ORAL ONCE
Refills: 0 | Status: COMPLETED | OUTPATIENT
Start: 2020-01-01 | End: 2020-01-01

## 2020-01-01 RX ORDER — ASPIRIN/CALCIUM CARB/MAGNESIUM 324 MG
81 TABLET ORAL DAILY
Refills: 0 | Status: DISCONTINUED | OUTPATIENT
Start: 2020-01-01 | End: 2020-01-01

## 2020-01-01 RX ORDER — POTASSIUM CHLORIDE 20 MEQ
40 PACKET (EA) ORAL EVERY 4 HOURS
Refills: 0 | Status: COMPLETED | OUTPATIENT
Start: 2020-01-01 | End: 2020-01-01

## 2020-01-01 RX ORDER — ENOXAPARIN SODIUM 100 MG/ML
40 INJECTION SUBCUTANEOUS DAILY
Refills: 0 | Status: DISCONTINUED | OUTPATIENT
Start: 2020-01-01 | End: 2020-01-01

## 2020-01-01 RX ORDER — METOPROLOL SUCCINATE 25 MG/1
25 TABLET, EXTENDED RELEASE ORAL
Qty: 15 | Refills: 0 | Status: DISCONTINUED | COMMUNITY
Start: 2020-01-01 | End: 2020-01-01

## 2020-01-01 RX ORDER — SODIUM CHLORIDE 9 MG/ML
2000 INJECTION INTRAMUSCULAR; INTRAVENOUS; SUBCUTANEOUS ONCE
Refills: 0 | Status: COMPLETED | OUTPATIENT
Start: 2020-01-01 | End: 2020-01-01

## 2020-01-01 RX ORDER — ACETAMINOPHEN 500 MG
650 TABLET ORAL EVERY 6 HOURS
Refills: 0 | Status: DISCONTINUED | OUTPATIENT
Start: 2020-01-01 | End: 2020-01-01

## 2020-01-01 RX ORDER — HEPARIN SODIUM 5000 [USP'U]/ML
3500 INJECTION INTRAVENOUS; SUBCUTANEOUS ONCE
Refills: 0 | Status: DISCONTINUED | OUTPATIENT
Start: 2020-01-01 | End: 2020-01-01

## 2020-01-01 RX ORDER — MIDODRINE HYDROCHLORIDE 2.5 MG/1
10 TABLET ORAL
Refills: 0 | Status: DISCONTINUED | OUTPATIENT
Start: 2020-01-01 | End: 2020-01-01

## 2020-01-01 RX ORDER — SODIUM,POTASSIUM PHOSPHATES 278-250MG
1 POWDER IN PACKET (EA) ORAL ONCE
Refills: 0 | Status: COMPLETED | OUTPATIENT
Start: 2020-01-01 | End: 2020-01-01

## 2020-01-01 RX ORDER — ATORVASTATIN CALCIUM 40 MG/1
40 TABLET, FILM COATED ORAL DAILY
Qty: 30 | Refills: 11 | Status: DISCONTINUED | COMMUNITY
Start: 2020-01-01 | End: 2020-01-01

## 2020-01-01 RX ORDER — CLOPIDOGREL BISULFATE 75 MG/1
300 TABLET, FILM COATED ORAL ONCE
Refills: 0 | Status: COMPLETED | OUTPATIENT
Start: 2020-01-01 | End: 2020-01-01

## 2020-01-01 RX ORDER — HEPARIN SODIUM 5000 [USP'U]/ML
INJECTION INTRAVENOUS; SUBCUTANEOUS
Qty: 25000 | Refills: 0 | Status: DISCONTINUED | OUTPATIENT
Start: 2020-01-01 | End: 2020-01-01

## 2020-01-01 RX ORDER — SODIUM CHLORIDE 9 MG/ML
1500 INJECTION, SOLUTION INTRAVENOUS ONCE
Refills: 0 | Status: COMPLETED | OUTPATIENT
Start: 2020-01-01 | End: 2020-01-01

## 2020-01-01 RX ORDER — MIDODRINE HYDROCHLORIDE 2.5 MG/1
10 TABLET ORAL EVERY 8 HOURS
Refills: 0 | Status: DISCONTINUED | OUTPATIENT
Start: 2020-01-01 | End: 2020-01-01

## 2020-01-01 RX ORDER — HEPARIN SODIUM 5000 [USP'U]/ML
5000 INJECTION INTRAVENOUS; SUBCUTANEOUS EVERY 8 HOURS
Refills: 0 | Status: DISCONTINUED | OUTPATIENT
Start: 2020-01-01 | End: 2020-01-01

## 2020-01-01 RX ORDER — ACETAMINOPHEN 500 MG
1000 TABLET ORAL ONCE
Refills: 0 | Status: COMPLETED | OUTPATIENT
Start: 2020-01-01 | End: 2020-01-01

## 2020-01-01 RX ORDER — MIDODRINE HYDROCHLORIDE 2.5 MG/1
20 TABLET ORAL
Refills: 0 | Status: DISCONTINUED | OUTPATIENT
Start: 2020-01-01 | End: 2020-01-01

## 2020-01-01 RX ORDER — ATORVASTATIN CALCIUM 80 MG/1
1 TABLET, FILM COATED ORAL
Qty: 30 | Refills: 0
Start: 2020-01-01 | End: 2020-01-01

## 2020-01-01 RX ORDER — ATORVASTATIN CALCIUM 80 MG/1
40 TABLET, FILM COATED ORAL AT BEDTIME
Refills: 0 | Status: DISCONTINUED | OUTPATIENT
Start: 2020-01-01 | End: 2020-01-01

## 2020-01-01 RX ORDER — OMEGA-3 ACID ETHYL ESTERS 1 G
1 CAPSULE ORAL
Qty: 0 | Refills: 0 | DISCHARGE

## 2020-01-01 RX ORDER — HEPARIN SODIUM 5000 [USP'U]/ML
3500 INJECTION INTRAVENOUS; SUBCUTANEOUS EVERY 6 HOURS
Refills: 0 | Status: DISCONTINUED | OUTPATIENT
Start: 2020-01-01 | End: 2020-01-01

## 2020-01-01 RX ORDER — POTASSIUM CHLORIDE 20 MEQ
20 PACKET (EA) ORAL ONCE
Refills: 0 | Status: DISCONTINUED | OUTPATIENT
Start: 2020-01-01 | End: 2020-01-01

## 2020-01-01 RX ADMIN — MIDODRINE HYDROCHLORIDE 20 MILLIGRAM(S): 2.5 TABLET ORAL at 17:40

## 2020-01-01 RX ADMIN — CHLORHEXIDINE GLUCONATE 1 APPLICATION(S): 213 SOLUTION TOPICAL at 11:43

## 2020-01-01 RX ADMIN — Medication 12.5 MILLIGRAM(S): at 05:43

## 2020-01-01 RX ADMIN — SODIUM CHLORIDE 60 MILLILITER(S): 9 INJECTION, SOLUTION INTRAVENOUS at 17:29

## 2020-01-01 RX ADMIN — ENOXAPARIN SODIUM 40 MILLIGRAM(S): 100 INJECTION SUBCUTANEOUS at 11:06

## 2020-01-01 RX ADMIN — ONDANSETRON 4 MILLIGRAM(S): 8 TABLET, FILM COATED ORAL at 00:42

## 2020-01-01 RX ADMIN — Medication 1000 MILLIGRAM(S): at 17:00

## 2020-01-01 RX ADMIN — SODIUM CHLORIDE 500 MILLILITER(S): 9 INJECTION INTRAMUSCULAR; INTRAVENOUS; SUBCUTANEOUS at 23:48

## 2020-01-01 RX ADMIN — CEFEPIME 100 MILLIGRAM(S): 1 INJECTION, POWDER, FOR SOLUTION INTRAMUSCULAR; INTRAVENOUS at 18:50

## 2020-01-01 RX ADMIN — CEFEPIME 100 MILLIGRAM(S): 1 INJECTION, POWDER, FOR SOLUTION INTRAMUSCULAR; INTRAVENOUS at 02:06

## 2020-01-01 RX ADMIN — Medication 40 MILLIEQUIVALENT(S): at 10:47

## 2020-01-01 RX ADMIN — Medication 300 UNIT(S): at 14:25

## 2020-01-01 RX ADMIN — Medication 1 CAPSULE(S): at 08:24

## 2020-01-01 RX ADMIN — ONDANSETRON 4 MILLIGRAM(S): 8 TABLET, FILM COATED ORAL at 23:32

## 2020-01-01 RX ADMIN — ENOXAPARIN SODIUM 40 MILLIGRAM(S): 100 INJECTION SUBCUTANEOUS at 12:15

## 2020-01-01 RX ADMIN — ALTEPLASE 2 MILLIGRAM(S): KIT at 16:26

## 2020-01-01 RX ADMIN — ENOXAPARIN SODIUM 40 MILLIGRAM(S): 100 INJECTION SUBCUTANEOUS at 11:49

## 2020-01-01 RX ADMIN — Medication 1 CAPSULE(S): at 08:50

## 2020-01-01 RX ADMIN — CEFEPIME 100 MILLIGRAM(S): 1 INJECTION, POWDER, FOR SOLUTION INTRAMUSCULAR; INTRAVENOUS at 18:00

## 2020-01-01 RX ADMIN — MIDODRINE HYDROCHLORIDE 20 MILLIGRAM(S): 2.5 TABLET ORAL at 05:07

## 2020-01-01 RX ADMIN — Medication 1 CAPSULE(S): at 09:37

## 2020-01-01 RX ADMIN — MIDODRINE HYDROCHLORIDE 10 MILLIGRAM(S): 2.5 TABLET ORAL at 19:35

## 2020-01-01 RX ADMIN — Medication 1 CAPSULE(S): at 12:36

## 2020-01-01 RX ADMIN — Medication 1 PACKET(S): at 06:04

## 2020-01-01 RX ADMIN — Medication 12000 CAPSULE(S): at 10:54

## 2020-01-01 RX ADMIN — ONDANSETRON 4 MILLIGRAM(S): 8 TABLET, FILM COATED ORAL at 05:07

## 2020-01-01 RX ADMIN — Medication 1 CAPSULE(S): at 08:54

## 2020-01-01 RX ADMIN — ONDANSETRON 4 MILLIGRAM(S): 8 TABLET, FILM COATED ORAL at 23:12

## 2020-01-01 RX ADMIN — Medication 2 MILLIGRAM(S): at 13:34

## 2020-01-01 RX ADMIN — CLOPIDOGREL BISULFATE 300 MILLIGRAM(S): 75 TABLET, FILM COATED ORAL at 05:30

## 2020-01-01 RX ADMIN — Medication 1 CAPSULE(S): at 17:40

## 2020-01-01 RX ADMIN — ONDANSETRON 4 MILLIGRAM(S): 8 TABLET, FILM COATED ORAL at 17:02

## 2020-01-01 RX ADMIN — CEFEPIME 100 MILLIGRAM(S): 1 INJECTION, POWDER, FOR SOLUTION INTRAMUSCULAR; INTRAVENOUS at 03:29

## 2020-01-01 RX ADMIN — MIDODRINE HYDROCHLORIDE 20 MILLIGRAM(S): 2.5 TABLET ORAL at 21:45

## 2020-01-01 RX ADMIN — CHLORHEXIDINE GLUCONATE 1 APPLICATION(S): 213 SOLUTION TOPICAL at 06:20

## 2020-01-01 RX ADMIN — TICAGRELOR 90 MILLIGRAM(S): 90 TABLET ORAL at 05:43

## 2020-01-01 RX ADMIN — Medication 1 CAPSULE(S): at 17:02

## 2020-01-01 RX ADMIN — Medication 1 CAPSULE(S): at 12:14

## 2020-01-01 RX ADMIN — ATORVASTATIN CALCIUM 40 MILLIGRAM(S): 80 TABLET, FILM COATED ORAL at 22:01

## 2020-01-01 RX ADMIN — CHLORHEXIDINE GLUCONATE 1 APPLICATION(S): 213 SOLUTION TOPICAL at 12:14

## 2020-01-01 RX ADMIN — Medication 1 CAPSULE(S): at 12:03

## 2020-01-01 RX ADMIN — Medication 12.5 MILLIGRAM(S): at 17:01

## 2020-01-01 RX ADMIN — CEFEPIME 100 MILLIGRAM(S): 1 INJECTION, POWDER, FOR SOLUTION INTRAMUSCULAR; INTRAVENOUS at 17:41

## 2020-01-01 RX ADMIN — Medication 1 CAPSULE(S): at 11:43

## 2020-01-01 RX ADMIN — CEFEPIME 100 MILLIGRAM(S): 1 INJECTION, POWDER, FOR SOLUTION INTRAMUSCULAR; INTRAVENOUS at 09:46

## 2020-01-01 RX ADMIN — ONDANSETRON 4 MILLIGRAM(S): 8 TABLET, FILM COATED ORAL at 23:20

## 2020-01-01 RX ADMIN — ENOXAPARIN SODIUM 40 MILLIGRAM(S): 100 INJECTION SUBCUTANEOUS at 11:42

## 2020-01-01 RX ADMIN — CEFEPIME 100 MILLIGRAM(S): 1 INJECTION, POWDER, FOR SOLUTION INTRAMUSCULAR; INTRAVENOUS at 17:45

## 2020-01-01 RX ADMIN — Medication 100 MILLIEQUIVALENT(S): at 12:19

## 2020-01-01 RX ADMIN — MIDODRINE HYDROCHLORIDE 20 MILLIGRAM(S): 2.5 TABLET ORAL at 05:46

## 2020-01-01 RX ADMIN — SENNA PLUS 2 TABLET(S): 8.6 TABLET ORAL at 22:01

## 2020-01-01 RX ADMIN — Medication 1 CAPSULE(S): at 17:45

## 2020-01-01 RX ADMIN — CEFEPIME 100 MILLIGRAM(S): 1 INJECTION, POWDER, FOR SOLUTION INTRAMUSCULAR; INTRAVENOUS at 10:04

## 2020-01-01 RX ADMIN — ONDANSETRON 4 MILLIGRAM(S): 8 TABLET, FILM COATED ORAL at 17:41

## 2020-01-01 RX ADMIN — MIDODRINE HYDROCHLORIDE 20 MILLIGRAM(S): 2.5 TABLET ORAL at 05:42

## 2020-01-01 RX ADMIN — CHLORHEXIDINE GLUCONATE 1 APPLICATION(S): 213 SOLUTION TOPICAL at 13:57

## 2020-01-01 RX ADMIN — CHLORHEXIDINE GLUCONATE 1 APPLICATION(S): 213 SOLUTION TOPICAL at 09:00

## 2020-01-01 RX ADMIN — Medication 5.53 MICROGRAM(S)/KG/MIN: at 18:40

## 2020-01-01 RX ADMIN — Medication 166.67 MILLIGRAM(S): at 18:33

## 2020-01-01 RX ADMIN — CHLORHEXIDINE GLUCONATE 1 APPLICATION(S): 213 SOLUTION TOPICAL at 13:03

## 2020-01-01 RX ADMIN — Medication 100 MILLIEQUIVALENT(S): at 11:43

## 2020-01-01 RX ADMIN — SODIUM CHLORIDE 1500 MILLILITER(S): 9 INJECTION, SOLUTION INTRAVENOUS at 18:59

## 2020-01-01 RX ADMIN — CEFEPIME 2000 MILLIGRAM(S): 1 INJECTION, POWDER, FOR SOLUTION INTRAMUSCULAR; INTRAVENOUS at 19:24

## 2020-01-01 RX ADMIN — ONDANSETRON 4 MILLIGRAM(S): 8 TABLET, FILM COATED ORAL at 06:31

## 2020-01-01 RX ADMIN — MIDODRINE HYDROCHLORIDE 20 MILLIGRAM(S): 2.5 TABLET ORAL at 20:15

## 2020-01-01 RX ADMIN — MIDODRINE HYDROCHLORIDE 20 MILLIGRAM(S): 2.5 TABLET ORAL at 15:25

## 2020-01-01 RX ADMIN — Medication 400 MILLIGRAM(S): at 16:40

## 2020-01-01 RX ADMIN — MIDODRINE HYDROCHLORIDE 20 MILLIGRAM(S): 2.5 TABLET ORAL at 17:02

## 2020-01-01 RX ADMIN — Medication 63.75 MILLIMOLE(S): at 10:05

## 2020-01-01 RX ADMIN — Medication 166.67 MILLIGRAM(S): at 05:07

## 2020-01-01 RX ADMIN — ENOXAPARIN SODIUM 40 MILLIGRAM(S): 100 INJECTION SUBCUTANEOUS at 11:57

## 2020-01-01 RX ADMIN — Medication 1 CAPSULE(S): at 17:31

## 2020-01-01 RX ADMIN — ENOXAPARIN SODIUM 40 MILLIGRAM(S): 100 INJECTION SUBCUTANEOUS at 12:36

## 2020-01-01 RX ADMIN — CLOPIDOGREL BISULFATE 300 MILLIGRAM(S): 75 TABLET, FILM COATED ORAL at 04:23

## 2020-01-01 RX ADMIN — Medication 81 MILLIGRAM(S): at 11:56

## 2020-01-01 RX ADMIN — CHLORHEXIDINE GLUCONATE 1 APPLICATION(S): 213 SOLUTION TOPICAL at 11:07

## 2020-01-01 RX ADMIN — SODIUM CHLORIDE 2000 MILLILITER(S): 9 INJECTION INTRAMUSCULAR; INTRAVENOUS; SUBCUTANEOUS at 18:07

## 2020-01-01 RX ADMIN — LIDOCAINE 1 PATCH: 4 CREAM TOPICAL at 11:54

## 2020-01-01 RX ADMIN — CEFEPIME 100 MILLIGRAM(S): 1 INJECTION, POWDER, FOR SOLUTION INTRAMUSCULAR; INTRAVENOUS at 10:05

## 2020-01-01 RX ADMIN — HEPARIN SODIUM 700 UNIT(S)/HR: 5000 INJECTION INTRAVENOUS; SUBCUTANEOUS at 05:22

## 2020-01-01 RX ADMIN — SODIUM CHLORIDE 1000 MILLILITER(S): 9 INJECTION INTRAMUSCULAR; INTRAVENOUS; SUBCUTANEOUS at 04:15

## 2020-01-01 RX ADMIN — Medication 100 MILLIEQUIVALENT(S): at 11:07

## 2020-01-01 RX ADMIN — ONDANSETRON 4 MILLIGRAM(S): 8 TABLET, FILM COATED ORAL at 16:40

## 2020-01-01 RX ADMIN — Medication 250 MILLIGRAM(S): at 19:25

## 2020-01-01 RX ADMIN — Medication 166.67 MILLIGRAM(S): at 07:06

## 2020-01-01 RX ADMIN — Medication 40 MILLIEQUIVALENT(S): at 05:53

## 2020-01-01 RX ADMIN — HEPARIN SODIUM 5000 UNIT(S): 5000 INJECTION INTRAVENOUS; SUBCUTANEOUS at 13:57

## 2020-01-01 RX ADMIN — CEFEPIME 100 MILLIGRAM(S): 1 INJECTION, POWDER, FOR SOLUTION INTRAMUSCULAR; INTRAVENOUS at 17:50

## 2020-01-01 RX ADMIN — Medication 1 CAPSULE(S): at 09:08

## 2020-01-01 RX ADMIN — CHLORHEXIDINE GLUCONATE 1 APPLICATION(S): 213 SOLUTION TOPICAL at 11:49

## 2020-01-01 RX ADMIN — Medication 5.78 MICROGRAM(S)/KG/MIN: at 00:50

## 2020-01-01 RX ADMIN — HEPARIN SODIUM 5000 UNIT(S): 5000 INJECTION INTRAVENOUS; SUBCUTANEOUS at 22:01

## 2020-01-01 RX ADMIN — Medication 650 MILLIGRAM(S): at 00:42

## 2020-01-01 RX ADMIN — CEFEPIME 100 MILLIGRAM(S): 1 INJECTION, POWDER, FOR SOLUTION INTRAMUSCULAR; INTRAVENOUS at 21:55

## 2020-01-01 RX ADMIN — CEFEPIME 100 MILLIGRAM(S): 1 INJECTION, POWDER, FOR SOLUTION INTRAMUSCULAR; INTRAVENOUS at 04:29

## 2020-01-01 RX ADMIN — ONDANSETRON 4 MILLIGRAM(S): 8 TABLET, FILM COATED ORAL at 09:37

## 2020-01-01 RX ADMIN — Medication 1 CAPSULE(S): at 09:12

## 2020-01-01 RX ADMIN — Medication 1 CAPSULE(S): at 15:25

## 2020-01-01 RX ADMIN — MIDODRINE HYDROCHLORIDE 20 MILLIGRAM(S): 2.5 TABLET ORAL at 12:36

## 2020-01-01 RX ADMIN — CEFEPIME 100 MILLIGRAM(S): 1 INJECTION, POWDER, FOR SOLUTION INTRAMUSCULAR; INTRAVENOUS at 02:01

## 2020-01-01 RX ADMIN — Medication 250 MILLIGRAM(S): at 05:42

## 2020-01-01 RX ADMIN — Medication 162 MILLIGRAM(S): at 04:09

## 2020-01-01 RX ADMIN — Medication 250 MILLIGRAM(S): at 22:08

## 2020-01-01 RX ADMIN — ONDANSETRON 4 MILLIGRAM(S): 8 TABLET, FILM COATED ORAL at 11:06

## 2020-01-01 RX ADMIN — HEPARIN SODIUM 5000 UNIT(S): 5000 INJECTION INTRAVENOUS; SUBCUTANEOUS at 05:42

## 2020-01-01 RX ADMIN — Medication 650 MILLIGRAM(S): at 01:15

## 2020-01-01 RX ADMIN — CHLORHEXIDINE GLUCONATE 1 APPLICATION(S): 213 SOLUTION TOPICAL at 18:33

## 2020-01-01 RX ADMIN — MIDODRINE HYDROCHLORIDE 20 MILLIGRAM(S): 2.5 TABLET ORAL at 06:31

## 2020-01-01 RX ADMIN — Medication 1 CAPSULE(S): at 09:07

## 2020-01-01 RX ADMIN — Medication 250 MILLIGRAM(S): at 18:26

## 2020-01-01 RX ADMIN — TICAGRELOR 90 MILLIGRAM(S): 90 TABLET ORAL at 17:02

## 2020-01-01 RX ADMIN — MIDODRINE HYDROCHLORIDE 20 MILLIGRAM(S): 2.5 TABLET ORAL at 23:32

## 2020-01-01 RX ADMIN — CEFEPIME 100 MILLIGRAM(S): 1 INJECTION, POWDER, FOR SOLUTION INTRAMUSCULAR; INTRAVENOUS at 10:47

## 2020-01-01 RX ADMIN — ONDANSETRON 4 MILLIGRAM(S): 8 TABLET, FILM COATED ORAL at 08:54

## 2020-01-01 RX ADMIN — CHLORHEXIDINE GLUCONATE 1 APPLICATION(S): 213 SOLUTION TOPICAL at 12:03

## 2020-01-01 RX ADMIN — ENOXAPARIN SODIUM 40 MILLIGRAM(S): 100 INJECTION SUBCUTANEOUS at 13:03

## 2020-01-01 RX ADMIN — SODIUM CHLORIDE 1000 MILLILITER(S): 9 INJECTION INTRAMUSCULAR; INTRAVENOUS; SUBCUTANEOUS at 01:50

## 2020-01-01 RX ADMIN — ONDANSETRON 4 MILLIGRAM(S): 8 TABLET, FILM COATED ORAL at 11:43

## 2020-02-25 ENCOUNTER — EMERGENCY (EMERGENCY)
Facility: HOSPITAL | Age: 70
LOS: 1 days | Discharge: HOME | End: 2020-02-25
Attending: EMERGENCY MEDICINE
Payer: COMMERCIAL

## 2020-02-25 VITALS
OXYGEN SATURATION: 99 % | SYSTOLIC BLOOD PRESSURE: 118 MMHG | RESPIRATION RATE: 16 BRPM | HEART RATE: 73 BPM | HEIGHT: 64 IN | DIASTOLIC BLOOD PRESSURE: 66 MMHG | TEMPERATURE: 98 F | WEIGHT: 149.91 LBS

## 2020-02-25 DIAGNOSIS — Z90.710 ACQUIRED ABSENCE OF BOTH CERVIX AND UTERUS: Chronic | ICD-10-CM

## 2020-02-25 PROBLEM — E78.5 HYPERLIPIDEMIA, UNSPECIFIED: Chronic | Status: ACTIVE | Noted: 2018-10-15

## 2020-02-25 PROBLEM — K21.9 GASTRO-ESOPHAGEAL REFLUX DISEASE WITHOUT ESOPHAGITIS: Chronic | Status: ACTIVE | Noted: 2018-10-15

## 2020-02-25 LAB
ALBUMIN SERPL ELPH-MCNC: 3.3 G/DL — LOW (ref 3.5–5)
ALP SERPL-CCNC: 106 U/L — SIGNIFICANT CHANGE UP (ref 40–120)
ALT FLD-CCNC: 23 U/L DA — SIGNIFICANT CHANGE UP (ref 10–60)
ANION GAP SERPL CALC-SCNC: 6 MMOL/L — SIGNIFICANT CHANGE UP (ref 5–17)
AST SERPL-CCNC: 20 U/L — SIGNIFICANT CHANGE UP (ref 10–40)
BASOPHILS # BLD AUTO: 0.03 K/UL — SIGNIFICANT CHANGE UP (ref 0–0.2)
BASOPHILS NFR BLD AUTO: 0.5 % — SIGNIFICANT CHANGE UP (ref 0–2)
BILIRUB SERPL-MCNC: 0.4 MG/DL — SIGNIFICANT CHANGE UP (ref 0.2–1.2)
BUN SERPL-MCNC: 14 MG/DL — SIGNIFICANT CHANGE UP (ref 7–18)
CALCIUM SERPL-MCNC: 8.6 MG/DL — SIGNIFICANT CHANGE UP (ref 8.4–10.5)
CHLORIDE SERPL-SCNC: 103 MMOL/L — SIGNIFICANT CHANGE UP (ref 96–108)
CO2 SERPL-SCNC: 27 MMOL/L — SIGNIFICANT CHANGE UP (ref 22–31)
CREAT SERPL-MCNC: 0.59 MG/DL — SIGNIFICANT CHANGE UP (ref 0.5–1.3)
EOSINOPHIL # BLD AUTO: 0.02 K/UL — SIGNIFICANT CHANGE UP (ref 0–0.5)
EOSINOPHIL NFR BLD AUTO: 0.3 % — SIGNIFICANT CHANGE UP (ref 0–6)
GLUCOSE SERPL-MCNC: 91 MG/DL — SIGNIFICANT CHANGE UP (ref 70–99)
HCT VFR BLD CALC: 37.1 % — SIGNIFICANT CHANGE UP (ref 34.5–45)
HGB BLD-MCNC: 12.3 G/DL — SIGNIFICANT CHANGE UP (ref 11.5–15.5)
IMM GRANULOCYTES NFR BLD AUTO: 0.3 % — SIGNIFICANT CHANGE UP (ref 0–1.5)
LIDOCAIN IGE QN: 94 U/L — SIGNIFICANT CHANGE UP (ref 73–393)
LYMPHOCYTES # BLD AUTO: 1.78 K/UL — SIGNIFICANT CHANGE UP (ref 1–3.3)
LYMPHOCYTES # BLD AUTO: 28.8 % — SIGNIFICANT CHANGE UP (ref 13–44)
MCHC RBC-ENTMCNC: 28 PG — SIGNIFICANT CHANGE UP (ref 27–34)
MCHC RBC-ENTMCNC: 33.2 GM/DL — SIGNIFICANT CHANGE UP (ref 32–36)
MCV RBC AUTO: 84.5 FL — SIGNIFICANT CHANGE UP (ref 80–100)
MONOCYTES # BLD AUTO: 0.51 K/UL — SIGNIFICANT CHANGE UP (ref 0–0.9)
MONOCYTES NFR BLD AUTO: 8.3 % — SIGNIFICANT CHANGE UP (ref 2–14)
NEUTROPHILS # BLD AUTO: 3.82 K/UL — SIGNIFICANT CHANGE UP (ref 1.8–7.4)
NEUTROPHILS NFR BLD AUTO: 61.8 % — SIGNIFICANT CHANGE UP (ref 43–77)
NRBC # BLD: 0 /100 WBCS — SIGNIFICANT CHANGE UP (ref 0–0)
PLATELET # BLD AUTO: 261 K/UL — SIGNIFICANT CHANGE UP (ref 150–400)
POTASSIUM SERPL-MCNC: 3.8 MMOL/L — SIGNIFICANT CHANGE UP (ref 3.5–5.3)
POTASSIUM SERPL-SCNC: 3.8 MMOL/L — SIGNIFICANT CHANGE UP (ref 3.5–5.3)
PROT SERPL-MCNC: 8.5 G/DL — HIGH (ref 6–8.3)
RBC # BLD: 4.39 M/UL — SIGNIFICANT CHANGE UP (ref 3.8–5.2)
RBC # FLD: 13.2 % — SIGNIFICANT CHANGE UP (ref 10.3–14.5)
SODIUM SERPL-SCNC: 136 MMOL/L — SIGNIFICANT CHANGE UP (ref 135–145)
WBC # BLD: 6.18 K/UL — SIGNIFICANT CHANGE UP (ref 3.8–10.5)
WBC # FLD AUTO: 6.18 K/UL — SIGNIFICANT CHANGE UP (ref 3.8–10.5)

## 2020-02-25 PROCEDURE — 99283 EMERGENCY DEPT VISIT LOW MDM: CPT

## 2020-02-25 PROCEDURE — 96374 THER/PROPH/DIAG INJ IV PUSH: CPT

## 2020-02-25 PROCEDURE — 36415 COLL VENOUS BLD VENIPUNCTURE: CPT

## 2020-02-25 PROCEDURE — 85027 COMPLETE CBC AUTOMATED: CPT

## 2020-02-25 PROCEDURE — 83690 ASSAY OF LIPASE: CPT

## 2020-02-25 PROCEDURE — 80053 COMPREHEN METABOLIC PANEL: CPT

## 2020-02-25 PROCEDURE — 96375 TX/PRO/DX INJ NEW DRUG ADDON: CPT

## 2020-02-25 PROCEDURE — 99284 EMERGENCY DEPT VISIT MOD MDM: CPT | Mod: 25

## 2020-02-25 RX ORDER — SODIUM CHLORIDE 9 MG/ML
1000 INJECTION INTRAMUSCULAR; INTRAVENOUS; SUBCUTANEOUS ONCE
Refills: 0 | Status: COMPLETED | OUTPATIENT
Start: 2020-02-25 | End: 2020-02-25

## 2020-02-25 RX ORDER — ONDANSETRON 8 MG/1
4 TABLET, FILM COATED ORAL ONCE
Refills: 0 | Status: COMPLETED | OUTPATIENT
Start: 2020-02-25 | End: 2020-02-25

## 2020-02-25 RX ORDER — FAMOTIDINE 10 MG/ML
1 INJECTION INTRAVENOUS
Qty: 30 | Refills: 0
Start: 2020-02-25 | End: 2020-03-25

## 2020-02-25 RX ORDER — LIDOCAINE 4 G/100G
10 CREAM TOPICAL ONCE
Refills: 0 | Status: COMPLETED | OUTPATIENT
Start: 2020-02-25 | End: 2020-02-25

## 2020-02-25 RX ORDER — FAMOTIDINE 10 MG/ML
20 INJECTION INTRAVENOUS ONCE
Refills: 0 | Status: COMPLETED | OUTPATIENT
Start: 2020-02-25 | End: 2020-02-25

## 2020-02-25 RX ADMIN — Medication 30 MILLILITER(S): at 16:50

## 2020-02-25 RX ADMIN — SODIUM CHLORIDE 1000 MILLILITER(S): 9 INJECTION INTRAMUSCULAR; INTRAVENOUS; SUBCUTANEOUS at 16:43

## 2020-02-25 RX ADMIN — LIDOCAINE 10 MILLILITER(S): 4 CREAM TOPICAL at 16:49

## 2020-02-25 RX ADMIN — FAMOTIDINE 20 MILLIGRAM(S): 10 INJECTION INTRAVENOUS at 16:43

## 2020-02-25 RX ADMIN — ONDANSETRON 4 MILLIGRAM(S): 8 TABLET, FILM COATED ORAL at 16:43

## 2020-02-25 NOTE — ED PROVIDER NOTE - OBJECTIVE STATEMENT
71 yo female with PMHx of pre-diabetes, HLD, presents with 2 months of post prandial epigastric discomfort, nausea, and diarrhea. Patient states that for the past 2 months after every meal she has discomfort in her epigastric region. Pt also has multiple episodes of diarrhea daily with occasional episodes of vomiting. Pt saw her PCP for these complaints several weeks ago and she is scheduled for endoscopy and colonoscopy. Colonoscopy performed with no acute findings per patient. Endoscopy is pending as out patient. Patient otherwise denies any headaches, dizziness, chest pain, shortness of breath. Patient endorses recent weight loss which she attributes to dietary changes.

## 2020-02-25 NOTE — ED PROVIDER NOTE - CLINICAL SUMMARY MEDICAL DECISION MAKING FREE TEXT BOX
69 yo female with 2 months of post prandial epigastric pain, diarrhea that are likely secondary to GERD vs IBS. Outpatient workup in progress. Given regular diarrhea, will send lab work to look for electrolyte disturbances. Will give Zofran and Pepcid for symptomatic relief.

## 2020-02-25 NOTE — ED ADULT NURSE NOTE - OBJECTIVE STATEMENT
Pt. c/o abdominal cramps with nausea that started about 2 months ago but got worse in the past month.

## 2020-02-25 NOTE — ED PROVIDER NOTE - PATIENT PORTAL LINK FT
You can access the FollowMyHealth Patient Portal offered by [facility] by registering at the following website: [email or web address]. By joining [employer]’s FollowMyHealth portal, you will also be able to view your health information using other applications (apps) compatible with our system.

## 2020-02-25 NOTE — ED PROVIDER NOTE - NS ED SCRIBE STATEMENT
"Requested Prescriptions   Pending Prescriptions Disp Refills     metoprolol succinate ER (TOPROL-XL) 50 MG 24 hr tablet [Pharmacy Med Name: METOPROLOL SUCCINATE ER 50MG TB24] 30 tablet 6     Sig: TAKE ONE TABLET BY MOUTH EVERY DAY  Last Written Prescription Date:  1/16/19  Last Fill Quantity: 30,  # refills: 6   Last office visit: 7/26/2018 with prescribing provider:  melissa   Future Office Visit:           Beta-Blockers Protocol Failed - 9/25/2019  7:14 PM        Failed - Blood pressure under 140/90 in past 12 months     BP Readings from Last 3 Encounters:   08/16/18 110/72   08/10/18 108/74   07/26/18 134/80                 Failed - Recent (12 mo) or future (30 days) visit within the authorizing provider's specialty     Patient had office visit in the last 12 months or has a visit in the next 30 days with authorizing provider or within the authorizing provider's specialty.  See \"Patient Info\" tab in inbasket, or \"Choose Columns\" in Meds & Orders section of the refill encounter.              Passed - Patient is age 6 or older        Passed - Medication is active on med list          " Attending

## 2020-05-28 PROBLEM — Z00.00 ENCOUNTER FOR PREVENTIVE HEALTH EXAMINATION: Status: ACTIVE | Noted: 2020-05-28

## 2020-05-30 ENCOUNTER — APPOINTMENT (OUTPATIENT)
Dept: DISASTER EMERGENCY | Facility: CLINIC | Age: 70
End: 2020-05-30

## 2020-05-30 LAB — SARS-COV-2 N GENE NPH QL NAA+PROBE: NOT DETECTED

## 2020-06-01 ENCOUNTER — OUTPATIENT (OUTPATIENT)
Dept: OUTPATIENT SERVICES | Facility: HOSPITAL | Age: 70
LOS: 1 days | End: 2020-06-01
Payer: COMMERCIAL

## 2020-06-01 ENCOUNTER — RESULT REVIEW (OUTPATIENT)
Age: 70
End: 2020-06-01

## 2020-06-01 ENCOUNTER — APPOINTMENT (OUTPATIENT)
Dept: GASTROENTEROLOGY | Facility: HOSPITAL | Age: 70
End: 2020-06-01

## 2020-06-01 DIAGNOSIS — Z90.710 ACQUIRED ABSENCE OF BOTH CERVIX AND UTERUS: Chronic | ICD-10-CM

## 2020-06-01 DIAGNOSIS — K86.89 OTHER SPECIFIED DISEASES OF PANCREAS: ICD-10-CM

## 2020-06-01 PROCEDURE — 88312 SPECIAL STAINS GROUP 1: CPT

## 2020-06-01 PROCEDURE — 88312 SPECIAL STAINS GROUP 1: CPT | Mod: 26

## 2020-06-01 PROCEDURE — 43242 EGD US FINE NEEDLE BX/ASPIR: CPT

## 2020-06-01 PROCEDURE — 88305 TISSUE EXAM BY PATHOLOGIST: CPT

## 2020-06-01 PROCEDURE — 43239 EGD BIOPSY SINGLE/MULTIPLE: CPT | Mod: GC,59

## 2020-06-01 PROCEDURE — 43239 EGD BIOPSY SINGLE/MULTIPLE: CPT | Mod: XS

## 2020-06-01 PROCEDURE — 43242 EGD US FINE NEEDLE BX/ASPIR: CPT | Mod: GC,59

## 2020-06-01 PROCEDURE — 88305 TISSUE EXAM BY PATHOLOGIST: CPT | Mod: 26

## 2020-06-01 PROCEDURE — 88307 TISSUE EXAM BY PATHOLOGIST: CPT

## 2020-06-01 PROCEDURE — 88307 TISSUE EXAM BY PATHOLOGIST: CPT | Mod: 26

## 2020-06-02 LAB — SURGICAL PATHOLOGY STUDY: SIGNIFICANT CHANGE UP

## 2020-06-03 ENCOUNTER — RX RENEWAL (OUTPATIENT)
Age: 70
End: 2020-06-03

## 2020-06-06 ENCOUNTER — LABORATORY RESULT (OUTPATIENT)
Age: 70
End: 2020-06-06

## 2020-06-08 LAB
AMYLASE/CREAT SERPL: 20 U/L
CANCER AG125 SERPL-ACNC: 9 U/ML

## 2020-06-09 ENCOUNTER — OUTPATIENT (OUTPATIENT)
Dept: OUTPATIENT SERVICES | Facility: HOSPITAL | Age: 70
LOS: 1 days | End: 2020-06-09
Payer: COMMERCIAL

## 2020-06-09 ENCOUNTER — RESULT REVIEW (OUTPATIENT)
Age: 70
End: 2020-06-09

## 2020-06-09 ENCOUNTER — TRANSCRIPTION ENCOUNTER (OUTPATIENT)
Age: 70
End: 2020-06-09

## 2020-06-09 ENCOUNTER — APPOINTMENT (OUTPATIENT)
Dept: CT IMAGING | Facility: IMAGING CENTER | Age: 70
End: 2020-06-09

## 2020-06-09 ENCOUNTER — OUTPATIENT (OUTPATIENT)
Dept: OUTPATIENT SERVICES | Facility: HOSPITAL | Age: 70
LOS: 1 days | Discharge: HOME | End: 2020-06-09
Payer: MEDICARE

## 2020-06-09 ENCOUNTER — APPOINTMENT (OUTPATIENT)
Dept: MULTI SPECIALTY CLINIC | Facility: CLINIC | Age: 70
End: 2020-06-09
Payer: MEDICARE

## 2020-06-09 ENCOUNTER — APPOINTMENT (OUTPATIENT)
Dept: MULTI SPECIALTY CLINIC | Facility: CLINIC | Age: 70
End: 2020-06-09

## 2020-06-09 ENCOUNTER — APPOINTMENT (OUTPATIENT)
Dept: CT IMAGING | Facility: HOSPITAL | Age: 70
End: 2020-06-09
Payer: MEDICARE

## 2020-06-09 VITALS
SYSTOLIC BLOOD PRESSURE: 111 MMHG | BODY MASS INDEX: 23.8 KG/M2 | HEIGHT: 64.57 IN | WEIGHT: 141.1 LBS | RESPIRATION RATE: 14 BRPM | HEART RATE: 62 BPM | OXYGEN SATURATION: 99 % | TEMPERATURE: 98 F | DIASTOLIC BLOOD PRESSURE: 71 MMHG

## 2020-06-09 DIAGNOSIS — Z83.3 FAMILY HISTORY OF DIABETES MELLITUS: ICD-10-CM

## 2020-06-09 DIAGNOSIS — Z90.710 ACQUIRED ABSENCE OF BOTH CERVIX AND UTERUS: Chronic | ICD-10-CM

## 2020-06-09 DIAGNOSIS — Z86.018 PERSONAL HISTORY OF OTHER BENIGN NEOPLASM: ICD-10-CM

## 2020-06-09 DIAGNOSIS — C25.9 MALIGNANT NEOPLASM OF PANCREAS, UNSPECIFIED: ICD-10-CM

## 2020-06-09 DIAGNOSIS — Z82.49 FAMILY HISTORY OF ISCHEMIC HEART DISEASE AND OTHER DISEASES OF THE CIRCULATORY SYSTEM: ICD-10-CM

## 2020-06-09 LAB
BASOPHILS # BLD AUTO: 0.02 K/UL — SIGNIFICANT CHANGE UP (ref 0–0.2)
BASOPHILS NFR BLD AUTO: 0.3 % — SIGNIFICANT CHANGE UP (ref 0–2)
EOSINOPHIL # BLD AUTO: 0.01 K/UL — SIGNIFICANT CHANGE UP (ref 0–0.5)
EOSINOPHIL NFR BLD AUTO: 0.2 % — SIGNIFICANT CHANGE UP (ref 0–6)
HCT VFR BLD CALC: 37.8 % — SIGNIFICANT CHANGE UP (ref 34.5–45)
HGB BLD-MCNC: 12.8 G/DL — SIGNIFICANT CHANGE UP (ref 11.5–15.5)
IMM GRANULOCYTES NFR BLD AUTO: 0.3 % — SIGNIFICANT CHANGE UP (ref 0–1.5)
LYMPHOCYTES # BLD AUTO: 1.54 K/UL — SIGNIFICANT CHANGE UP (ref 1–3.3)
LYMPHOCYTES # BLD AUTO: 25.5 % — SIGNIFICANT CHANGE UP (ref 13–44)
MCHC RBC-ENTMCNC: 28.3 PG — SIGNIFICANT CHANGE UP (ref 27–34)
MCHC RBC-ENTMCNC: 33.9 GM/DL — SIGNIFICANT CHANGE UP (ref 32–36)
MCV RBC AUTO: 83.4 FL — SIGNIFICANT CHANGE UP (ref 80–100)
MONOCYTES # BLD AUTO: 0.4 K/UL — SIGNIFICANT CHANGE UP (ref 0–0.9)
MONOCYTES NFR BLD AUTO: 6.6 % — SIGNIFICANT CHANGE UP (ref 2–14)
NEUTROPHILS # BLD AUTO: 4.05 K/UL — SIGNIFICANT CHANGE UP (ref 1.8–7.4)
NEUTROPHILS NFR BLD AUTO: 67.1 % — SIGNIFICANT CHANGE UP (ref 43–77)
NRBC # BLD: 0 /100 WBCS — SIGNIFICANT CHANGE UP (ref 0–0)
PLATELET # BLD AUTO: 297 K/UL — SIGNIFICANT CHANGE UP (ref 150–400)
RBC # BLD: 4.53 M/UL — SIGNIFICANT CHANGE UP (ref 3.8–5.2)
RBC # FLD: 13.8 % — SIGNIFICANT CHANGE UP (ref 10.3–14.5)
WBC # BLD: 6.04 K/UL — SIGNIFICANT CHANGE UP (ref 3.8–10.5)
WBC # FLD AUTO: 6.04 K/UL — SIGNIFICANT CHANGE UP (ref 3.8–10.5)

## 2020-06-09 PROCEDURE — 74174 CTA ABD&PLVS W/CONTRAST: CPT | Mod: 26

## 2020-06-09 PROCEDURE — 71260 CT THORAX DX C+: CPT

## 2020-06-09 PROCEDURE — 99205 OFFICE O/P NEW HI 60 MIN: CPT

## 2020-06-09 PROCEDURE — 71260 CT THORAX DX C+: CPT | Mod: 26

## 2020-06-09 PROCEDURE — 74174 CTA ABD&PLVS W/CONTRAST: CPT

## 2020-06-09 RX ORDER — PROCHLORPERAZINE MALEATE 10 MG/1
10 TABLET ORAL
Qty: 180 | Refills: 0 | Status: ACTIVE | COMMUNITY
Start: 2020-06-09 | End: 1900-01-01

## 2020-06-09 NOTE — REVIEW OF SYSTEMS
[Negative] : Heme/Lymph [FreeTextEntry2] : no fatigue or fevers. +weight loss 20 lbs in 6 months  [FreeTextEntry3] : denies scleral icterus  [FreeTextEntry8] : as per HPI  [de-identified] : has some anxiety since diagnosis of pancreatic cancer.  denies depression or psych history  [de-identified] : hx of pre-diabetes, manages with diet

## 2020-06-09 NOTE — HISTORY OF PRESENT ILLNESS
[Was the patient reviewed at [facility]?] : The patient reviewed at [facility] [Before surgical resection] : before surgical resection [1] : 1 [Pancreatic ductal adenocarcinoma] : Pancreatic ductal adenocarcinoma [de-identified] : Ms. Griselda Corona is a 70 year old female who presents for initial consultation in our Pancreas Multidisciplinary Clinic for a new diagnosis of pancreas adenocarcinoma. \par Patient states she had gastrointestinal symptoms dating back to Nov 2019, including discomfort, bloating, diarrhea followed by small amounts of blood per rectum. She eventually went to GI doctor Dr Florian Pace.  She had a Colonoscopy in Feb 20 that showed small ulcer. She went the ER at [facility] 3/10/20 for abdominal pain.  CT of her abdomen was performed and showed 3.2 x 3.5 cm hypoenhancing mass in the pancreatic head/ body concerning for malignancy. Mass results in severe compression of SMV. Retroperitoneal lymphadenopathy concerning for metastatic disease with multiple nodules in lung base concerning for metastatic disease.\par She was referred  to Dr Bernabe for EUS, which was performed June 1st 2020, significant for an ill-defined, heterogeneous, hypoechoic lesion seen in the head of pancreas. The lesion abutted but did not invade the SMV. The main pancreatic duct was dilated and the parenchyma was heterogeneous within the pancreatic body/tail. Fine needle biopsy was performed.\par Pathology:\par Gastric mucosa positive for H pylori \par Pancreas mass: Invasive adenocarcinoma.\par \par Patient is taking medication regimen for the H.Pylori infection, otherwise not taking medications except for multivitamin supplement. She reports sharp, intermittent abdominal pain that sometimes radiates to her back. She rates the pain 8/10 on pain scale. It is worse after eating. She takes tylenol PRN which helps. She states her appetite is "okay" but she is careful with food choices due to the pain. She has unintentional weight lost 20 lbs over the past several months.  [TextBox_5] : 6/9/20 [TextBox_38] : 155 [TextBox_40] : 6/9/20

## 2020-06-09 NOTE — ASSESSMENT
[FreeTextEntry1] : 70 year old female who presents for new diagnosis of pancreas adenocarcinoma. Evaluated in our pancreas multidisciplinary clinic.   CT chest, abdomen, pelvis performed this morning.

## 2020-06-09 NOTE — REASON FOR VISIT
[Initial MDC Visit] : an initial MDC visit for [Pancreatic Cancer] : pancreatic cancer [Referred By: ___] : Referred By: [unfilled]

## 2020-06-10 NOTE — PHYSICAL EXAM
[Restricted in physically strenuous activity but ambulatory and able to carry out work of a light or sedentary nature] : Status 1- Restricted in physically strenuous activity but ambulatory and able to carry out work of a light or sedentary nature, e.g., light house work, office work [Ulcers] : no ulcers [Mucositis] : no mucositis [Thrush] : no thrush [Vesicles] : no vesicles [de-identified] : Dentures present  [Normal] : grossly intact

## 2020-06-10 NOTE — REASON FOR VISIT
[Pancreatic Cancer] : pancreatic cancer [Referred By: ___] : Referred By: [unfilled] [Initial MDC Visit] : an initial MDC visit for [FreeTextEntry2] : Pancreatic adenocarcinoma

## 2020-06-10 NOTE — REVIEW OF SYSTEMS
[Fever] : no fever [Chills] : no chills [Night Sweats] : no night sweats [Fatigue] : fatigue [Recent Change In Weight] : ~T recent weight change [Abdominal Pain] : abdominal pain [Vomiting] : no vomiting [Constipation] : no constipation [Negative] : Allergic/Immunologic [FreeTextEntry7] : Abdominal discomfort and pain and bloating  [FreeTextEntry2] : no fatigue or fevers. +weight loss 20 lbs in 6 months  [FreeTextEntry3] : denies scleral icterus  [FreeTextEntry8] : as per HPI  [de-identified] : has some anxiety since diagnosis of pancreatic cancer.  denies depression or psych history  [de-identified] : hx of pre-diabetes, manages with diet

## 2020-06-10 NOTE — ASSESSMENT
[FreeTextEntry1] : 1-Therapeutic: \par -----------------\par Pancreatic cancer with mets to ? lungs as shown in CT from outside facility\par Needs both somatic and germ line mutations tested\par FOLFIRINOX vs [place]- Abraxane based on PS and confirmation of stage\par \par 2-Diagnostic: \par ---------------\par Labs: Baseline Labs including coags, CEA, , lipase, amylase\par Scans: CT scans reviewed \par Will  get PET scan \par \par 3- Pharmacogenetics testing:\par ----------------------------------\par - Plan to check for  UGT1A1, DPYD and pharmacogenetics in anticipation for treatment with 5-FU and \par   Onivyde \par - Plan to order MSI for future PDL-1 therapy \par - Somatic and germline mutations per NCCN guidelines\par \par 4- Procedures:\par -----------------\par - Plan for Port-A-Cath placement \par - Dorothy Cath: Will need dorothy cath double lumen power port for chemo\par \par 5-Supportive: \par -----------------\par Pain: \par H Pylori: being treated with Abx for H pylori .\par Pancreatic insufficiency: Will start CREON - [address] units (2 pills with each snack and 4 pills with each meal) for pancreatic replacement\par High risk of diabetes : Close management of glycemic index, endocrine consult if needed\par DVT (PE): High risk, close monitoring\par Antiemetic: Zofran PRN and Compazine PRN\par GI prophylaxis: May consider pepcid or PPI as long as no other drug interactions\par Psychological: Remeron 15mg PO at bed time if needed , \par Anorexia: Remeron, nutrition consult\par Emla cream: for port numbing before use\par Prevention of mouth sores: Biotene mouthwash\par Skin reactions: Udderly smooth cream and Vitamin B 6 100 mg BID for prevention of HFS\par Diarrhea: The usual dose of Imodium is 4 milligrams (mg) (2 capsules) after the first loose bowel movement, and 2 mg (1 capsule) after each loose bowel movement after the first dose has been taken. No more than 16 mg (8 capsules) should be taken in any twenty-four-hour period. \par \par 6- RTC:  \par --------\par In a week \par After port placement and after PET scan done.

## 2020-06-10 NOTE — HISTORY OF PRESENT ILLNESS
[Was the patient reviewed at [facility]?] : The patient reviewed at [facility] [Before surgical resection] : before surgical resection [1] : 1 [Pancreatic ductal adenocarcinoma] : Pancreatic ductal adenocarcinoma [Disease: _____________________] : Disease: [unfilled] [M: ___] : M[unfilled] [AJCC Stage: ____] : AJCC Stage: [unfilled] [de-identified] : adenocarcinoma [de-identified] : 06/10/20- Ms. Desouza is seen in [facility]. She states he has abdominal pain 7-8/10 and gets relief with Tylenol. She has had 2 lbs weight loss- and appetite was decreased . [de-identified] : Ms. Griselda Corona is a 70 year old female who presents for initial consultation in our Pancreas Multidisciplinary Clinic for a new diagnosis of pancreas adenocarcinoma. \par \par Patient states she had gastrointestinal symptoms dating back to Nov 2019, including discomfort, bloating, diarrhea followed by small amounts of blood per rectum. She eventually went to GI doctor Dr Florian Pace.  She had a Colonoscopy in Feb 20 that showed small ulcer. She went the ER at [facility] 3/10/20 for abdominal pain.  CT of her abdomen was performed and showed 3.2 x 3.5 cm hypoenhancing mass in the pancreatic head/ body concerning for malignancy. Mass results in severe compression of SMV. Retroperitoneal lymphadenopathy concerning for metastatic disease with multiple nodules in lung base concerning for metastatic disease.\par \par She was referred  to Dr Bernabe for EUS, which was performed June 1st 2020, significant for an ill-defined, heterogeneous, hypoechoic lesion seen in the head of pancreas. The lesion abutted but did not invade the SMV. The main pancreatic duct was dilated and the parenchyma was heterogeneous within the pancreatic body/tail. Fine needle biopsy was performed.\par \par Pathology: Gastric mucosa positive for H pylori. Pancreas mass: Invasive adenocarcinoma.\par \par Patient is taking medication regimen for the H.Pylori infection, otherwise not taking medications except for multivitamin supplement. \par \par 1- She reports sharp, intermittent abdominal pain that sometimes radiates to her back. She rates the pain 8/10 on pain scale. It is worse after eating. She takes tylenol PRN which helps. \par 2- She states her appetite is "okay" but she is careful with food choices due to the pain. \par 3- She has unintentional weight lost 20 lbs over the past several months.  [TextBox_38] : 155 [TextBox_5] : 6/9/20 [TextBox_40] : 6/9/20

## 2020-06-10 NOTE — RESULTS/DATA
[FreeTextEntry1] : CT scans reviewed\par LAPC\par Distant LNodes\par Lung nodules\par c/w stage IV pan ca

## 2020-06-12 ENCOUNTER — APPOINTMENT (OUTPATIENT)
Dept: DISASTER EMERGENCY | Facility: CLINIC | Age: 70
End: 2020-06-12

## 2020-06-12 DIAGNOSIS — Z01.818 ENCOUNTER FOR OTHER PREPROCEDURAL EXAMINATION: ICD-10-CM

## 2020-06-13 LAB — SARS-COV-2 N GENE NPH QL NAA+PROBE: NOT DETECTED

## 2020-06-15 ENCOUNTER — RESULT REVIEW (OUTPATIENT)
Age: 70
End: 2020-06-15

## 2020-06-15 ENCOUNTER — OUTPATIENT (OUTPATIENT)
Dept: OUTPATIENT SERVICES | Facility: HOSPITAL | Age: 70
LOS: 1 days | End: 2020-06-15
Payer: COMMERCIAL

## 2020-06-15 DIAGNOSIS — Z90.710 ACQUIRED ABSENCE OF BOTH CERVIX AND UTERUS: Chronic | ICD-10-CM

## 2020-06-15 DIAGNOSIS — C25.9 MALIGNANT NEOPLASM OF PANCREAS, UNSPECIFIED: ICD-10-CM

## 2020-06-15 PROCEDURE — 77001 FLUOROGUIDE FOR VEIN DEVICE: CPT

## 2020-06-15 PROCEDURE — 77001 FLUOROGUIDE FOR VEIN DEVICE: CPT | Mod: 26

## 2020-06-15 PROCEDURE — C1894: CPT

## 2020-06-15 PROCEDURE — 76937 US GUIDE VASCULAR ACCESS: CPT

## 2020-06-15 PROCEDURE — 36561 INSERT TUNNELED CV CATH: CPT

## 2020-06-15 PROCEDURE — C1788: CPT

## 2020-06-15 PROCEDURE — 76937 US GUIDE VASCULAR ACCESS: CPT | Mod: 26

## 2020-06-15 PROCEDURE — C1769: CPT

## 2020-06-16 ENCOUNTER — OUTPATIENT (OUTPATIENT)
Dept: OUTPATIENT SERVICES | Facility: HOSPITAL | Age: 70
LOS: 1 days | End: 2020-06-16
Payer: COMMERCIAL

## 2020-06-16 ENCOUNTER — APPOINTMENT (OUTPATIENT)
Dept: NUCLEAR MEDICINE | Facility: IMAGING CENTER | Age: 70
End: 2020-06-16
Payer: MEDICARE

## 2020-06-16 DIAGNOSIS — Z90.710 ACQUIRED ABSENCE OF BOTH CERVIX AND UTERUS: Chronic | ICD-10-CM

## 2020-06-16 DIAGNOSIS — C25.9 MALIGNANT NEOPLASM OF PANCREAS, UNSPECIFIED: ICD-10-CM

## 2020-06-16 LAB
ALBUMIN SERPL ELPH-MCNC: 4.5 G/DL
ALP BLD-CCNC: 101 U/L
ALT SERPL-CCNC: 13 U/L
ANION GAP SERPL CALC-SCNC: 17 MMOL/L
AST SERPL-CCNC: 18 U/L
BILIRUB SERPL-MCNC: 0.4 MG/DL
BUN SERPL-MCNC: 10 MG/DL
CALCIUM SERPL-MCNC: 9.9 MG/DL
CANCER AG19-9 SERPL-ACNC: 150 U/ML
CHLORIDE SERPL-SCNC: 98 MMOL/L
CO2 SERPL-SCNC: 22 MMOL/L
CREAT SERPL-MCNC: 0.48 MG/DL
GLUCOSE SERPL-MCNC: 132 MG/DL
HAV IGM SER QL: NONREACTIVE
HBV CORE IGM SER QL: NONREACTIVE
HBV SURFACE AG SER QL: NONREACTIVE
HCV AB SER QL: NONREACTIVE
HCV S/CO RATIO: 0.14 S/CO
INR PPP: 1.04 RATIO
POTASSIUM SERPL-SCNC: 5 MMOL/L
PROT SERPL-MCNC: 8.1 G/DL
PT BLD: 12 SEC
SODIUM SERPL-SCNC: 137 MMOL/L

## 2020-06-16 PROCEDURE — 78815 PET IMAGE W/CT SKULL-THIGH: CPT

## 2020-06-16 PROCEDURE — A9552: CPT

## 2020-06-16 PROCEDURE — 78815 PET IMAGE W/CT SKULL-THIGH: CPT | Mod: 26,PI

## 2020-06-17 ENCOUNTER — LABORATORY RESULT (OUTPATIENT)
Age: 70
End: 2020-06-17

## 2020-06-17 ENCOUNTER — RESULT REVIEW (OUTPATIENT)
Age: 70
End: 2020-06-17

## 2020-06-17 ENCOUNTER — APPOINTMENT (OUTPATIENT)
Dept: INFUSION THERAPY | Facility: HOSPITAL | Age: 70
End: 2020-06-17

## 2020-06-17 ENCOUNTER — APPOINTMENT (OUTPATIENT)
Dept: HEMATOLOGY ONCOLOGY | Facility: CLINIC | Age: 70
End: 2020-06-17

## 2020-06-17 DIAGNOSIS — C25.9 MALIGNANT NEOPLASM OF PANCREAS, UNSPECIFIED: ICD-10-CM

## 2020-06-17 DIAGNOSIS — Z45.2 ENCOUNTER FOR ADJUSTMENT AND MANAGEMENT OF VASCULAR ACCESS DEVICE: ICD-10-CM

## 2020-06-17 LAB
BASOPHILS # BLD AUTO: 0.03 K/UL — SIGNIFICANT CHANGE UP (ref 0–0.2)
BASOPHILS NFR BLD AUTO: 0.4 % — SIGNIFICANT CHANGE UP (ref 0–2)
EOSINOPHIL # BLD AUTO: 0.01 K/UL — SIGNIFICANT CHANGE UP (ref 0–0.5)
EOSINOPHIL NFR BLD AUTO: 0.1 % — SIGNIFICANT CHANGE UP (ref 0–6)
HCT VFR BLD CALC: 36.2 % — SIGNIFICANT CHANGE UP (ref 34.5–45)
HGB BLD-MCNC: 12.6 G/DL — SIGNIFICANT CHANGE UP (ref 11.5–15.5)
IMM GRANULOCYTES NFR BLD AUTO: 0.6 % — SIGNIFICANT CHANGE UP (ref 0–1.5)
LYMPHOCYTES # BLD AUTO: 1.86 K/UL — SIGNIFICANT CHANGE UP (ref 1–3.3)
LYMPHOCYTES # BLD AUTO: 23 % — SIGNIFICANT CHANGE UP (ref 13–44)
MCHC RBC-ENTMCNC: 28.4 PG — SIGNIFICANT CHANGE UP (ref 27–34)
MCHC RBC-ENTMCNC: 34.8 GM/DL — SIGNIFICANT CHANGE UP (ref 32–36)
MCV RBC AUTO: 81.5 FL — SIGNIFICANT CHANGE UP (ref 80–100)
MONOCYTES # BLD AUTO: 0.68 K/UL — SIGNIFICANT CHANGE UP (ref 0–0.9)
MONOCYTES NFR BLD AUTO: 8.4 % — SIGNIFICANT CHANGE UP (ref 2–14)
NEUTROPHILS # BLD AUTO: 5.45 K/UL — SIGNIFICANT CHANGE UP (ref 1.8–7.4)
NEUTROPHILS NFR BLD AUTO: 67.5 % — SIGNIFICANT CHANGE UP (ref 43–77)
NRBC # BLD: 0 /100 WBCS — SIGNIFICANT CHANGE UP (ref 0–0)
PLATELET # BLD AUTO: 235 K/UL — SIGNIFICANT CHANGE UP (ref 150–400)
RBC # BLD: 4.44 M/UL — SIGNIFICANT CHANGE UP (ref 3.8–5.2)
RBC # FLD: 13.6 % — SIGNIFICANT CHANGE UP (ref 10.3–14.5)
WBC # BLD: 8.08 K/UL — SIGNIFICANT CHANGE UP (ref 3.8–10.5)
WBC # FLD AUTO: 8.08 K/UL — SIGNIFICANT CHANGE UP (ref 3.8–10.5)

## 2020-06-17 PROCEDURE — 93010 ELECTROCARDIOGRAM REPORT: CPT

## 2020-06-17 RX ORDER — PREGABALIN 225 MG/1
1 CAPSULE ORAL
Qty: 0 | Refills: 0 | DISCHARGE

## 2020-06-17 NOTE — REVIEW OF SYSTEMS
[Fatigue] : fatigue [Recent Change In Weight] : ~T recent weight change [Abdominal Pain] : abdominal pain [Negative] : Integumentary [Fever] : no fever [Chills] : no chills [Night Sweats] : no night sweats [Vomiting] : no vomiting [Constipation] : no constipation [FreeTextEntry7] : Abdominal discomfort and pain and bloating  [FreeTextEntry2] : no fatigue or fevers. +weight loss 20 lbs in 6 months  [FreeTextEntry3] : denies scleral icterus  [FreeTextEntry8] : as per HPI  [de-identified] : has some anxiety since diagnosis of pancreatic cancer.  denies depression or psych history  [de-identified] : hx of pre-diabetes, manages with diet

## 2020-06-17 NOTE — PHYSICAL EXAM
[Restricted in physically strenuous activity but ambulatory and able to carry out work of a light or sedentary nature] : Status 1- Restricted in physically strenuous activity but ambulatory and able to carry out work of a light or sedentary nature, e.g., light house work, office work [Normal] : full range of motion and no deformities appreciated [Ulcers] : no ulcers [Mucositis] : no mucositis [Thrush] : no thrush [Vesicles] : no vesicles [de-identified] : Dentures present

## 2020-06-17 NOTE — ASSESSMENT
[FreeTextEntry1] : 1-Therapeutic: \par -----------------\par Pancreatic cancer with mets to ? lungs as shown in CT from outside facility\par Needs both somatic and germ line mutations tested\par FOLFIRINOX vs [place]- Abraxane based on PS and confirmation of stage\par \par 2-Diagnostic: \par ---------------\par Labs: Baseline Labs including coags, CEA, , lipase, amylase\par Scans: CT scans reviewed \par Reviwed PET results\par \par 3- Pharmacogenetics testing:\par ----------------------------------\par - Plan to check for  UGT1A1, DPYD and pharmacogenetics in anticipation for treatment with 5-FU and \par   Onivyde \par - Plan to order MSI for future PDL-1 therapy \par - Somatic and germline mutations per NCCN guidelines\par \par 4- Procedures:\par -----------------\par - S/P  Port-A-Cath placement on 06/15/20\par \par \par 5-Supportive: \par -----------------\par Pain: \par H Pylori: being treated with Abx for H pylori .\par Pancreatic insufficiency: Will start CREON - [address] units (2 pills with each snack and 4 pills with each meal) for pancreatic replacement\par High risk of diabetes : Close management of glycemic index, endocrine consult if needed\par DVT (PE): High risk, close monitoring\par Antiemetic: Zofran PRN and Compazine PRN\par GI prophylaxis: May consider pepcid or PPI as long as no other drug interactions\par Psychological: Remeron 15mg PO at bed time if needed , \par Anorexia: Remeron, nutrition consult\par Emla cream: for port numbing before use\par Prevention of mouth sores: Biotene mouthwash\par Skin reactions: Udderly smooth cream and Vitamin B 6 100 mg BID for prevention of HFS\par Diarrhea: The usual dose of Imodium is 4 milligrams (mg) (2 capsules) after the first loose bowel movement, and 2 mg (1 capsule) after each loose bowel movement after the first dose has been taken. No more than 16 mg (8 capsules) should be taken in any twenty-four-hour period. \par \par 6- RTC:  \par --------\par In 2 weeks \par

## 2020-06-17 NOTE — REASON FOR VISIT
[Follow-Up Visit] : a follow-up [Initial MDC Visit] : an initial MDC visit for [Pancreatic Cancer] : pancreatic cancer [Referred By: ___] : Referred By: [unfilled] [FreeTextEntry2] : Pancreatic adenocarcinoma

## 2020-06-17 NOTE — HISTORY OF PRESENT ILLNESS
[M: ___] : M[unfilled] [Disease: _____________________] : Disease: [unfilled] [AJCC Stage: ____] : AJCC Stage: [unfilled] [Was the patient [place] at [facility]] : The patient reviewed at [facility] [1] : 1 [Before surgical resection] : before surgical resection [Pancreatic ductal adenocarcinoma] : Pancreatic ductal adenocarcinoma [de-identified] : adenocarcinoma [de-identified] : 06/17/20- Ms. Desouza is seen for follow up. She had the port placed earlier last week and is scheduled for her chemotherapy today . We discussed the chemo side effects and what to expect from chemotherapy- SE- Drug sheet was provided Risks/ benefits and alternatives of chemotherapy were discussed and included but not limited to fatigue,low blood counts, nausea/vomiting, skin reactions, hair thinning, possible blood transfusion requirement,increased risk of infection,neuropathy. Patient agreed to above and informed consent was signed.\par PET Scan- done- on 06/16/20-\par FDG-avid mass in pancreatic body and neck corresponds to known malignancy. Hypermetabolic left periaortic lymphadenopathy is compatible with \par metastatic disease.  Multiple bilateral pulmonary nodules, not significantly changed as compared to CT dated 6/9/2020, compatible with metastatic disease. \par  [de-identified] : Ms. Griselda Corona is a 70 year old female who presents for initial consultation in our [facility] for a new diagnosis of pancreas adenocarcinoma. \par \par Patient states she had gastrointestinal symptoms dating back to Nov 2019, including discomfort, bloating, diarrhea followed by small amounts of blood per rectum. She eventually went to GI doctor Dr Florian Pace.  She had a Colonoscopy in Feb 20 that showed small ulcer. She went the ER at [facility] 3/10/20 for abdominal pain.  CT of her abdomen was performed and showed 3.2 x 3.5 cm hypoenhancing mass in the pancreatic head/ body concerning for malignancy. Mass results in severe compression of SMV. Retroperitoneal lymphadenopathy concerning for metastatic disease with multiple nodules in lung base concerning for metastatic disease.\par \par She was referred  to Dr Bernabe for EUS, which was performed June 1st 2020, significant for an ill-defined, heterogeneous, hypoechoic lesion seen in the head of pancreas. The lesion abutted but did not invade the SMV. The main pancreatic duct was dilated and the parenchyma was heterogeneous within the pancreatic body/tail. Fine needle biopsy was performed.\par \par Pathology: Gastric mucosa positive for H pylori. Pancreas mass: Invasive adenocarcinoma.\par \par Patient is taking medication regimen for the H.Pylori infection, otherwise not taking medications except for multivitamin supplement. \par \par 1- She reports sharp, intermittent abdominal pain that sometimes radiates to her back. She rates the pain 8/10 on pain scale. It is worse after eating. She takes tylenol PRN which helps. \par 2- She states her appetite is "okay" but she is careful with food choices due to the pain. \par 3- She has unintentional weight lost 20 lbs over the past several months.  [TextBox_5] : 6/9/20 [TextBox_38] : 155 [TextBox_40] : 6/9/20

## 2020-06-18 DIAGNOSIS — Z51.11 ENCOUNTER FOR ANTINEOPLASTIC CHEMOTHERAPY: ICD-10-CM

## 2020-06-18 DIAGNOSIS — R11.2 NAUSEA WITH VOMITING, UNSPECIFIED: ICD-10-CM

## 2020-06-19 ENCOUNTER — APPOINTMENT (OUTPATIENT)
Dept: HEMATOLOGY ONCOLOGY | Facility: CLINIC | Age: 70
End: 2020-06-19

## 2020-06-19 ENCOUNTER — LABORATORY RESULT (OUTPATIENT)
Age: 70
End: 2020-06-19

## 2020-06-19 ENCOUNTER — APPOINTMENT (OUTPATIENT)
Dept: INFUSION THERAPY | Facility: HOSPITAL | Age: 70
End: 2020-06-19

## 2020-06-20 RX ORDER — OXYCODONE 5 MG/1
5 TABLET ORAL
Qty: 28 | Refills: 0 | Status: ACTIVE | COMMUNITY
Start: 2020-06-20 | End: 1900-01-01

## 2020-06-22 DIAGNOSIS — Z51.89 ENCOUNTER FOR OTHER SPECIFIED AFTERCARE: ICD-10-CM

## 2020-06-25 ENCOUNTER — APPOINTMENT (OUTPATIENT)
Dept: HEMATOLOGY ONCOLOGY | Facility: CLINIC | Age: 70
End: 2020-06-25
Payer: MEDICARE

## 2020-06-25 VITALS
RESPIRATION RATE: 15 BRPM | WEIGHT: 134.48 LBS | HEART RATE: 91 BPM | BODY MASS INDEX: 22.68 KG/M2 | SYSTOLIC BLOOD PRESSURE: 117 MMHG | OXYGEN SATURATION: 100 % | DIASTOLIC BLOOD PRESSURE: 66 MMHG | HEIGHT: 64.57 IN | TEMPERATURE: 99.8 F

## 2020-06-25 DIAGNOSIS — Z51.5 ENCOUNTER FOR PALLIATIVE CARE: ICD-10-CM

## 2020-06-25 PROCEDURE — 99214 OFFICE O/P EST MOD 30 MIN: CPT

## 2020-06-25 RX ORDER — LANSOPRAZOLE, AMOXICILLIN, CLARITHROMYCIN 30-500-500
KIT ORAL
Qty: 1 | Refills: 0 | Status: DISCONTINUED | COMMUNITY
Start: 2020-06-03 | End: 2020-06-25

## 2020-06-25 RX ORDER — CLARITHROMYCIN 500 MG/1
500 TABLET, FILM COATED ORAL TWICE DAILY
Qty: 28 | Refills: 0 | Status: DISCONTINUED | COMMUNITY
Start: 2020-06-03 | End: 2020-06-25

## 2020-06-25 RX ORDER — AMOXICILLIN 500 MG/1
500 TABLET, FILM COATED ORAL TWICE DAILY
Qty: 56 | Refills: 0 | Status: DISCONTINUED | COMMUNITY
Start: 2020-06-03 | End: 2020-06-25

## 2020-06-30 ENCOUNTER — LABORATORY RESULT (OUTPATIENT)
Age: 70
End: 2020-06-30

## 2020-06-30 ENCOUNTER — APPOINTMENT (OUTPATIENT)
Dept: INFUSION THERAPY | Facility: HOSPITAL | Age: 70
End: 2020-06-30
Payer: MEDICARE

## 2020-06-30 ENCOUNTER — RESULT REVIEW (OUTPATIENT)
Age: 70
End: 2020-06-30

## 2020-06-30 ENCOUNTER — APPOINTMENT (OUTPATIENT)
Dept: HEMATOLOGY ONCOLOGY | Facility: CLINIC | Age: 70
End: 2020-06-30
Payer: MEDICARE

## 2020-06-30 DIAGNOSIS — B96.81 GASTRITIS, UNSPECIFIED, W/OUT BLEEDING: ICD-10-CM

## 2020-06-30 DIAGNOSIS — K29.70 GASTRITIS, UNSPECIFIED, W/OUT BLEEDING: ICD-10-CM

## 2020-06-30 LAB
BASOPHILS # BLD AUTO: 0 K/UL — SIGNIFICANT CHANGE UP (ref 0–0.2)
BASOPHILS NFR BLD AUTO: 0 % — SIGNIFICANT CHANGE UP (ref 0–2)
EOSINOPHIL # BLD AUTO: 0.14 K/UL — SIGNIFICANT CHANGE UP (ref 0–0.5)
EOSINOPHIL NFR BLD AUTO: 1 % — SIGNIFICANT CHANGE UP (ref 0–6)
HCT VFR BLD CALC: 35.9 % — SIGNIFICANT CHANGE UP (ref 34.5–45)
HGB BLD-MCNC: 12.3 G/DL — SIGNIFICANT CHANGE UP (ref 11.5–15.5)
LYMPHOCYTES # BLD AUTO: 1.73 K/UL — SIGNIFICANT CHANGE UP (ref 1–3.3)
LYMPHOCYTES # BLD AUTO: 12 % — LOW (ref 13–44)
MCHC RBC-ENTMCNC: 28.4 PG — SIGNIFICANT CHANGE UP (ref 27–34)
MCHC RBC-ENTMCNC: 34.3 GM/DL — SIGNIFICANT CHANGE UP (ref 32–36)
MCV RBC AUTO: 82.9 FL — SIGNIFICANT CHANGE UP (ref 80–100)
MONOCYTES # BLD AUTO: 0.86 K/UL — SIGNIFICANT CHANGE UP (ref 0–0.9)
MONOCYTES NFR BLD AUTO: 6 % — SIGNIFICANT CHANGE UP (ref 2–14)
NEUTROPHILS # BLD AUTO: 11.66 K/UL — HIGH (ref 1.8–7.4)
NEUTROPHILS NFR BLD AUTO: 74 % — SIGNIFICANT CHANGE UP (ref 43–77)
NEUTS BAND # BLD: 7 % — SIGNIFICANT CHANGE UP (ref 0–8)
NRBC # BLD: 0 /100 — SIGNIFICANT CHANGE UP (ref 0–0)
NRBC # BLD: SIGNIFICANT CHANGE UP /100 WBCS (ref 0–0)
PLAT MORPH BLD: NORMAL — SIGNIFICANT CHANGE UP
PLATELET # BLD AUTO: 147 K/UL — LOW (ref 150–400)
RBC # BLD: 4.33 M/UL — SIGNIFICANT CHANGE UP (ref 3.8–5.2)
RBC # FLD: 13.6 % — SIGNIFICANT CHANGE UP (ref 10.3–14.5)
RBC BLD AUTO: SIGNIFICANT CHANGE UP
WBC # BLD: 14.4 K/UL — HIGH (ref 3.8–10.5)
WBC # FLD AUTO: 14.4 K/UL — HIGH (ref 3.8–10.5)

## 2020-06-30 PROCEDURE — 99214 OFFICE O/P EST MOD 30 MIN: CPT

## 2020-06-30 PROCEDURE — 99215 OFFICE O/P EST HI 40 MIN: CPT

## 2020-06-30 NOTE — HISTORY OF PRESENT ILLNESS
[de-identified] : adenocarcinoma [Therapy: ___] : Therapy: [unfilled] [Cycle: ___] : Cycle: [unfilled] [Day: ___] : Day: [unfilled] [de-identified] : 06/30/20- Ms Desouza is seen in the treatment room\par \par  Here for Cycle 2\par 1- Developed G3 fatigue following C1 , and had to remain in bed for at least 4 days; son was cooking, she was able to go to bathroom and shower by herself\par 2- Also had decreased appetite and G1 nausea but no vomiting\par 3- Declines neuropathy\par 4- Denies diarrhea\par 5- No fever\par 6- No sxs suggestive of COVID-19\par 7- lost weight upto 10 lbs\par 8- also I am aware of her reaction to IRIN during infusion [3] : 3, Severe [FreeTextEntry3] : Fatigue [FreeTextEntry4] : 6/17/20 [FreeTextEntry5] : IRIN [FreeTextEntry2] : x 4 daYS [Date: ____________] : Patient's last distress assessment performed on [unfilled]. [4 - Distress Level] : Distress Level: 4 [TextBox_5] : 6/9/20 [de-identified] : Ms. Griselda Corona is a 70 year old female who presents for initial consultation in our [facility] for a new diagnosis of pancreas adenocarcinoma. \par \par Patient states she had gastrointestinal symptoms dating back to Nov 2019, including discomfort, bloating, diarrhea followed by small amounts of blood per rectum. She eventually went to GI doctor Dr Florian Pace.  She had a Colonoscopy in Feb 20 that showed small ulcer. She went the ER at [facility] 3/10/20 for abdominal pain.  CT of her abdomen was performed and showed 3.2 x 3.5 cm hypoenhancing mass in the pancreatic head/ body concerning for malignancy. Mass results in severe compression of SMV. Retroperitoneal lymphadenopathy concerning for metastatic disease with multiple nodules in lung base concerning for metastatic disease.\par \par She was referred  to Dr Bernabe for EUS, which was performed June 1st 2020, significant for an ill-defined, heterogeneous, hypoechoic lesion seen in the head of pancreas. The lesion abutted but did not invade the SMV. The main pancreatic duct was dilated and the parenchyma was heterogeneous within the pancreatic body/tail. Fine needle biopsy was performed.\par \par Pathology: Gastric mucosa positive for H pylori. Pancreas mass: Invasive adenocarcinoma.\par \par Patient is taking medication regimen for the H.Pylori infection, otherwise not taking medications except for multivitamin supplement. \par \par 1- She reports sharp, intermittent abdominal pain that sometimes radiates to her back. She rates the pain 8/10 on pain scale. It is worse after eating. She takes tylenol PRN which helps. \par 2- She states her appetite is "okay" but she is careful with food choices due to the pain. \par 3- She has unintentional weight lost 20 lbs over the past several months.  [TextBox_38] : 155 [TextBox_40] : 6/9/20

## 2020-06-30 NOTE — REVIEW OF SYSTEMS
[Fever] : no fever [Chills] : no chills [Night Sweats] : no night sweats [Vomiting] : no vomiting [Constipation] : no constipation [FreeTextEntry7] : Abdominal discomfort and pain and bloating  [FreeTextEntry2] : no fatigue or fevers. +weight loss 20 lbs in 6 months  [FreeTextEntry3] : denies scleral icterus  [de-identified] : has some anxiety since diagnosis of pancreatic cancer.  denies depression or psych history  [FreeTextEntry8] : as per HPI  [de-identified] : hx of pre-diabetes, manages with diet

## 2020-06-30 NOTE — ASSESSMENT
[FreeTextEntry1] : 1-Therapeutic: \par -----------------\par Pancreatic cancer with mets to ? lungs as shown in CT from outside facility\par Needs both somatic and germ line mutations tested\par Started patient on FOLFORINOX regimen.\par HERE FOR C2 AND DUE TO HER AES, WE ARE REEDUCING irin TO 140MG/M2 AND 5-FU TO 2000MG/M2\par PRE-MEDS BEFORE IRIN\par IF SHE REACTS AGAIN TO IRIN, THEN I MAY ALTERNATE FOLFOX WITH FOLFIRI IN NEXT CYCLES TO ASCERTAIN THE REACTION TO THE ATTRIBUTABLE AGENT\par \par \par 2-Diagnostic: \par ---------------\par Labs: Baseline Labs including coags, CEA, , lipase, amylase AND THEN LABS Q 2 WEEKS AHD MARKER Q 4 Q WEEKS\par Scans: CT scans/ PET  reviewed , LATER Q 8 WEEKS UNLESS INDICATED EARLIER DUE TO RISING MARKER OR CHANGE IN CLINICAL STATUS\par \par \par 3- Pharmacogenetics testing:\par ----------------------------------\par - fOLLOW UGT1A1, DPYD \par - Plan to order MSI for future PDL-1 therapy \par - Somatic and germline mutations per NCCN guidelines REQUESTED\par \par 4- Procedures:\par -----------------\par - S/P  Port-A-Cath placement on 06/15/20\par - PORT CARE\par - EMLA CREAM PRESCRIBED\par \par 5-Supportive: \par -----------------\par H Pylori: being treated with Abx for H pylori .\par Pancreatic insufficiency: CREON - [address] units (2 pills with each snack and 4 pills with each meal) for pancreatic replacement\par High risk of diabetes : Close management of glycemic index, endocrine consult if needed\par DVT (PE): High risk, close monitoring\par Antiemetic: Zofran PRN and Compazine PRN\par GI prophylaxis: May consider pepcid or PPI as long as no other drug interactions\par Psychological: Remeron 15mg PO at bed time if needed , \par Anorexia: Remeron, nutrition consult\par Emla cream: for port numbing before use\par Prevention of mouth sores: Biotene mouthwash\par Skin reactions: Udderly smooth cream and Vitamin B 6 100 mg BID for prevention of HFS\par Diarrhea: The usual dose of Imodium is 4 milligrams (mg) (2 capsules) after the first loose bowel movement, and 2 mg (1 capsule) after each loose bowel movement after the first dose has been taken. No more than 16 mg (8 capsules) should be taken in any twenty-four-hour period. \par \par 6- RTC:  \par --------\par In 2 weeks \par

## 2020-06-30 NOTE — PHYSICAL EXAM
[Ulcers] : no ulcers [Mucositis] : no mucositis [Thrush] : no thrush [Vesicles] : no vesicles [de-identified] : Dentures present

## 2020-07-02 ENCOUNTER — APPOINTMENT (OUTPATIENT)
Dept: INFUSION THERAPY | Facility: HOSPITAL | Age: 70
End: 2020-07-02

## 2020-07-06 LAB
DPYD GENOTYPE: NORMAL
DPYD PHENOTYPE: NORMAL
DPYD SPECIMEN: NORMAL
FULL GENE SEQUENCE RESULT: NORMAL
INTERPRETATION PGDFRB: NORMAL
Lab: NEGATIVE
REF LAB TEST METHOD: NORMAL
REVIEWED BY: NORMAL
TA REPEAT RESULT: NORMAL
TEST PERFORMANCE INFO SPEC: NORMAL

## 2020-07-08 ENCOUNTER — APPOINTMENT (OUTPATIENT)
Dept: HEMATOLOGY ONCOLOGY | Facility: CLINIC | Age: 70
End: 2020-07-08
Payer: MEDICARE

## 2020-07-08 PROBLEM — Z51.5 ENCOUNTER FOR PALLIATIVE CARE: Status: ACTIVE | Noted: 2020-07-08

## 2020-07-08 PROCEDURE — 99443: CPT

## 2020-07-08 NOTE — DATA REVIEWED
[FreeTextEntry1] : CT C/A/P (6/2020): \par HEAD/NECK: Physiologic FDG activity in visualized brain, head, and neck. \par CHEST: A Mediport is present in the right anterior chest wall with tip of catheter in superior vena cava. There is minimally FDG-avid subcutaneous stranding in the right chest wall adjacent to the Mediport, compatible with inflammation related to recent procedure. No enlarged or FDG-avid lymph node. \par \par LUNGS: Multiple subcentimeter bilateral pulmonary nodules, not significantly changed as compared to CT dated 6/9/2020, the largest nodules demonstrate mild FDG activity, measuring up to 1 cm in the lingula with SUV 2.6 (image 96). \par \par PLEURA/PERICARDIUM: No abnormal FDG activity. No effusion. \par \par HEPATOBILIARY/PANCREAS: Physiologic FDG activity in liver (Liver SUV mean is 2.5). \par \par The 4.2 x 3.1 cm mass seen in the pancreatic body and neck on contrast-enhanced CT, demonstrates heterogeneous FDG activity and is difficult to delineate in the absence of intravenous contrast (SUV 3.3; \par image 127). \par \par SPLEEN: Physiologic FDG activity. Normal in size. \par \par ADRENAL GLANDS: No abnormal FDG activity. No nodule. \par \par KIDNEYS/URINARY BLADDER: Physiologic excreted FDG activity. Left renal cysts. \par \par REPRODUCTIVE ORGANS: No abnormal FDG activity. \par \par ABDOMINOPELVIC NODES: A difficult to delineate hypermetabolic focus in the left periaortic region at the level of the renal artery corresponds to the 2.8 x 1.7 cm lymph node seen on contrast-enhanced CT (SUV 4.8; image 132). Small periaortic and peripancreatic lymph nodes, best seen on recent contrast-enhanced CT, are below the limit of resolution of PET. \par \par BOWEL/PERITONEUM/MESENTERY: No abnormal FDG activity. \par \par BONES/SOFT TISSUES: No abnormal FDG activity. \par \par IMPRESSION: Abnormal FDG-PET/CT scan. \par 1. FDG-avid mass in pancreatic body and neck corresponds to known malignancy. \par 2. Hypermetabolic left periaortic lymphadenopathy is compatible with metastatic disease. \par 3. Multiple bilateral pulmonary nodules, not significantly changed as compared to CT dated 6/9/2020, compatible with metastatic disease.

## 2020-07-08 NOTE — PHYSICAL EXAM
[General Appearance - Alert] : alert [General Appearance - In No Acute Distress] : in no acute distress [Sclera] : the sclera and conjunctiva were normal [Normal Oral Mucosa] : normal oral mucosa [Neck Appearance] : the appearance of the neck was normal [No Focal Deficits] : no focal deficits [Oriented To Time, Place, And Person] : oriented to person, place, and time [Affect] : the affect was normal [Auscultation Breath Sounds / Voice Sounds] : lungs were clear to auscultation bilaterally [Heart Rate And Rhythm] : heart rate was normal and rhythm regular [] : no respiratory distress [Heart Sounds] : normal S1 and S2 [Edema] : there was no peripheral edema [Bowel Sounds] : normal bowel sounds [Abdomen Tenderness] : non-tender [Abdomen Soft] : soft [Skin Color & Pigmentation] : normal skin color and pigmentation [FreeTextEntry1] : thin

## 2020-07-08 NOTE — HISTORY OF PRESENT ILLNESS
[FreeTextEntry1] : 70yoF with newly diagnosed stage IV pancreatic adenocarcinoma presents for initial palliative care visit, referred by Oncology. \par She has no prior PMH. \par \par Patient states she had gastrointestinal symptoms dating back to 11/2019, including discomfort, bloating, diarrhea followed by small amounts of blood per rectum. She eventually went to GI doctor Dr Florian Pace, had a colonoscopy 2/20/20 that showed small ulcer. She went the ER at [facility] 3/10/20 for abdominal pain. CT of her abdomen was performed and showed 3.2 x 3.5 cm hypoenhancing mass in the pancreatic head/ body concerning for malignancy. Mass results in severe compression of SMV. Retroperitoneal lymphadenopathy concerning for metastatic disease with multiple nodules in lung base concerning for metastatic disease.  Of note she was treated for H pylori during the time of this workup. \par She was referred to Dr Bernabe for EUS, which was performed June 1st 2020, significant for an ill-defined, heterogeneous, hypoechoic lesion seen in the head of pancreas. \par \par She received her first cycle of FOLFIRINOX last week, 6/17.  She has been feeling weak. \par \par Patient states that today she developed pain in her flanks, and mid-back.  \par She has been having lower abdominal pain, crampy, sharp, that comes and goes. She recently received Rx for Oxycodone 5mg which she has not yet taken.  \par \par ROS:\par +has been doing well with eating nutritious diet, is forcing herself to maintain good caloric intake despite a low appetite. Does not tolerate any spices or seasonings lately. \par +30lb unintentional weight loss over the last 6 months \par +occasional nausea - has ondansetron and prochlorperazine for PRN use, has not had to use either. \par Denies trouble sleeping, SOB, \par \par Patient is , she has four sons, three of whom live with her. \par She is retired, she used to work for [employer].  She is not currently driving due to her weakness. \par She is Yarsanism, goes to [facility] regularly. \par \par PMD: Dr. Dennis\par \par [employer] Ref# [id number]

## 2020-07-08 NOTE — ASSESSMENT
[______] : HCP: [unfilled] [FreeTextEntry1] : 70yoF with:\par \par 1.Pancreatic adenocarcinoma - on FOLFIRINOX\par \par 2. Neoplasm-related pain - Recommend she begin by using PRN Tylenol 500mg. Should reserve use of Oxycodone 5mg for more severe pain. \par \par 3. Weight loss 2/2 malignancy - Patient eating well at present, focusing on maintaining steady caloric intake. \par \par 4. Encounter for Palliative Care - Emotional support provided.\par Health care proxy form completed today. \par \par Follow up in 1 month, call sooner with questions or issues.

## 2020-07-09 NOTE — REVIEW OF SYSTEMS
[Recent Change In Weight] : ~T recent weight change [Abdominal Pain] : abdominal pain [Fatigue] : fatigue [Vomiting] : vomiting [Diarrhea] : diarrhea [Negative] : Neurological [Fever] : no fever [Night Sweats] : no night sweats [Chills] : no chills [Constipation] : no constipation [FreeTextEntry7] : Abdominal discomfort and pain and bloating  [FreeTextEntry2] : no fatigue or fevers. +weight loss 20 lbs in 6 months  [FreeTextEntry3] : denies scleral icterus  [de-identified] : has some anxiety since diagnosis of pancreatic cancer.  denies depression or psych history  [FreeTextEntry8] : as per HPI  [de-identified] : hx of pre-diabetes, manages with diet

## 2020-07-09 NOTE — HISTORY OF PRESENT ILLNESS
[Medical Office: ([facility])___] : at the medical office located in  [Verbal consent obtained from patient] : the patient, [unfilled] [Home] : at home, [unfilled] , at the time of the visit. [Disease: _____________________] : Disease: [unfilled] [M: ___] : M[unfilled] [AJCC Stage: ____] : AJCC Stage: [unfilled] [Therapy: ___] : Therapy: [unfilled] [Cycle: ___] : Cycle: [unfilled] [Day: ___] : Day: [unfilled] [3] : 3, Severe [Date: ____________] : Patient's last distress assessment performed on [unfilled]. [4 - Distress Level] : Distress Level: 4 [de-identified] : adenocarcinoma [de-identified] : 7/8/20- Recieved a call from patient stating she had episodes of vomiting yesterday. She went out yesterday in the summer sun  and had some food from outside and she has been throwing up. She states she lost weight >30 lbs- She states the Creon has been causing a lot of abdominal pain - She has been experiencin some abdominal pain but is afraid to use the Oxycodone or the medical marijuana. \par She lives with her 2 sons and her sister in law has been helping her intermittently - However she needs a lot of reinstatement of instructions- Explained to her that she needs to take the Pain meds when needed and the pain is not from Creon. \par She is requesting for a Home COVID swab - will arrange for the same. \par  [FreeTextEntry5] : IRIN [FreeTextEntry3] : Fatigue [FreeTextEntry4] : 6/17/20 [FreeTextEntry2] : x 4 daYS [de-identified] : Ms. Griselda Corona is a 70 year old female who presents for initial consultation in our [facility] for a new diagnosis of pancreas adenocarcinoma. \par \par Patient states she had gastrointestinal symptoms dating back to Nov 2019, including discomfort, bloating, diarrhea followed by small amounts of blood per rectum. She eventually went to GI doctor Dr Florian Pace.  She had a Colonoscopy in Feb 20 that showed small ulcer. She went the ER at [facility] 3/10/20 for abdominal pain.  CT of her abdomen was performed and showed 3.2 x 3.5 cm hypoenhancing mass in the pancreatic head/ body concerning for malignancy. Mass results in severe compression of SMV. Retroperitoneal lymphadenopathy concerning for metastatic disease with multiple nodules in lung base concerning for metastatic disease.\par \par She was referred  to Dr Bernabe for EUS, which was performed June 1st 2020, significant for an ill-defined, heterogeneous, hypoechoic lesion seen in the head of pancreas. The lesion abutted but did not invade the SMV. The main pancreatic duct was dilated and the parenchyma was heterogeneous within the pancreatic body/tail. Fine needle biopsy was performed.\par \par Pathology: Gastric mucosa positive for H pylori. Pancreas mass: Invasive adenocarcinoma.\par \par Patient is taking medication regimen for the H.Pylori infection, otherwise not taking medications except for multivitamin supplement. \par \par 1- She reports sharp, intermittent abdominal pain that sometimes radiates to her back. She rates the pain 8/10 on pain scale. It is worse after eating. She takes tylenol PRN which helps. \par 2- She states her appetite is "okay" but she is careful with food choices due to the pain. \par 3- She has unintentional weight lost 20 lbs over the past several months.

## 2020-07-09 NOTE — ASSESSMENT
[FreeTextEntry1] : 1-Therapeutic: \par -----------------\par Pancreatic cancer with mets to ? lungs as shown in CT from outside facility\par Started patient on FOLFORINOX regimen.\par Since she developed AE we will reduce the dose of IRINOTECAN to  140MG/M2 AND 5-FU TO 2000MG/M2\par PRE-MEDS BEFORE IRIN\par IF SHE REACTS AGAIN TO IRIN, THEN I MAY ALTERNATE FOLFOX WITH FOLFIRI IN NEXT CYCLES TO ASCERTAIN THE REACTION TO THE ATTRIBUTABLE AGENT\par \par \par 2-Diagnostic: \par ---------------\par Labs: Baseline Labs including coags, CEA, , lipase, amylase AND THEN LABS Q 2 WEEKS AHD MARKER Q 4 Q WEEKS\par Scans: CT scans/ PET  reviewed , LATER Q 8 WEEKS UNLESS INDICATED EARLIER DUE TO RISING MARKER OR CHANGE IN CLINICAL STATUS\par \par \par 3- Pharmacogenetics testing:\par ----------------------------------\par - fOLLOW UGT1A1, DPYD \par - Plan to order MSI for future PDL-1 therapy \par - Somatic and germline mutations per NCCN guidelines REQUESTED\par \par 4- Procedures:\par -----------------\par - S/P  Port-A-Cath placement on 06/15/20\par - PORT CARE\par - EMLA CREAM PRESCRIBED\par \par 5-Supportive: \par -----------------\par Nausea / Vomiting related to outside food- Likely Food Indigestion. \par Pancreatic insufficiency: CREON - [id number] units (2 pills with each snack and 4 pills with each meal) for pancreatic replacement\par High risk of diabetes : Close management of glycemic index, endocrine consult if needed\par DVT (PE): High risk, close monitoring\par Antiemetic: Zofran PRN and Compazine PRN\par GI prophylaxis: May consider pepcid or PPI as long as no other drug interactions\par Psychological: Remeron 15mg PO at bed time if needed , \par Emla cream: for port numbing before use\par Prevention of mouth sores: Biotene mouthwash\par Skin reactions: Udderly smooth cream and Vitamin B 6 100 mg BID for prevention of HFS\par Diarrhea: The usual dose of Imodium is 4 milligrams (mg) (2 capsules) after the first loose bowel movement, and 2 mg (1 capsule) after each loose bowel movement after the first dose has been taken. No more than 16 mg (8 capsules) should be taken in any twenty-four-hour period. \par \par 6- RTC:  \par --------\par for chmeo on 7/15/20.\par Will have COVID swab at home draw.\par

## 2020-07-13 ENCOUNTER — OUTPATIENT (OUTPATIENT)
Dept: OUTPATIENT SERVICES | Facility: HOSPITAL | Age: 70
LOS: 1 days | Discharge: HOME | End: 2020-07-13

## 2020-07-13 DIAGNOSIS — C25.9 MALIGNANT NEOPLASM OF PANCREAS, UNSPECIFIED: ICD-10-CM

## 2020-07-13 DIAGNOSIS — Z90.710 ACQUIRED ABSENCE OF BOTH CERVIX AND UTERUS: Chronic | ICD-10-CM

## 2020-07-14 ENCOUNTER — APPOINTMENT (OUTPATIENT)
Dept: INFUSION THERAPY | Facility: HOSPITAL | Age: 70
End: 2020-07-14

## 2020-07-15 ENCOUNTER — LABORATORY RESULT (OUTPATIENT)
Age: 70
End: 2020-07-15

## 2020-07-15 ENCOUNTER — RESULT REVIEW (OUTPATIENT)
Age: 70
End: 2020-07-15

## 2020-07-15 ENCOUNTER — APPOINTMENT (OUTPATIENT)
Dept: INFUSION THERAPY | Facility: HOSPITAL | Age: 70
End: 2020-07-15

## 2020-07-15 LAB
BASOPHILS # BLD AUTO: 0 K/UL — SIGNIFICANT CHANGE UP (ref 0–0.2)
BASOPHILS NFR BLD AUTO: 0 % — SIGNIFICANT CHANGE UP (ref 0–2)
EOSINOPHIL # BLD AUTO: 0 K/UL — SIGNIFICANT CHANGE UP (ref 0–0.5)
EOSINOPHIL NFR BLD AUTO: 0 % — SIGNIFICANT CHANGE UP (ref 0–6)
HCT VFR BLD CALC: 34.8 % — SIGNIFICANT CHANGE UP (ref 34.5–45)
HGB BLD-MCNC: 11.8 G/DL — SIGNIFICANT CHANGE UP (ref 11.5–15.5)
LYMPHOCYTES # BLD AUTO: 1.41 K/UL — SIGNIFICANT CHANGE UP (ref 1–3.3)
LYMPHOCYTES # BLD AUTO: 44 % — SIGNIFICANT CHANGE UP (ref 13–44)
MCHC RBC-ENTMCNC: 28.6 PG — SIGNIFICANT CHANGE UP (ref 27–34)
MCHC RBC-ENTMCNC: 33.9 GM/DL — SIGNIFICANT CHANGE UP (ref 32–36)
MCV RBC AUTO: 84.5 FL — SIGNIFICANT CHANGE UP (ref 80–100)
MONOCYTES # BLD AUTO: 0.35 K/UL — SIGNIFICANT CHANGE UP (ref 0–0.9)
MONOCYTES NFR BLD AUTO: 11 % — SIGNIFICANT CHANGE UP (ref 2–14)
MYELOCYTES NFR BLD: 1 % — HIGH (ref 0–0)
NEUTROPHILS # BLD AUTO: 1.41 K/UL — LOW (ref 1.8–7.4)
NEUTROPHILS NFR BLD AUTO: 44 % — SIGNIFICANT CHANGE UP (ref 43–77)
NRBC # BLD: 0 /100 — SIGNIFICANT CHANGE UP (ref 0–0)
NRBC # BLD: SIGNIFICANT CHANGE UP /100 WBCS (ref 0–0)
PLAT MORPH BLD: NORMAL — SIGNIFICANT CHANGE UP
PLATELET # BLD AUTO: 192 K/UL — SIGNIFICANT CHANGE UP (ref 150–400)
RBC # BLD: 4.12 M/UL — SIGNIFICANT CHANGE UP (ref 3.8–5.2)
RBC # FLD: 14.6 % — HIGH (ref 10.3–14.5)
RBC BLD AUTO: SIGNIFICANT CHANGE UP
SARS-COV-2 N GENE NPH QL NAA+PROBE: NOT DETECTED
WBC # BLD: 3.21 K/UL — LOW (ref 3.8–10.5)
WBC # FLD AUTO: 3.21 K/UL — LOW (ref 3.8–10.5)

## 2020-07-16 DIAGNOSIS — R11.2 NAUSEA WITH VOMITING, UNSPECIFIED: ICD-10-CM

## 2020-07-16 DIAGNOSIS — Z51.11 ENCOUNTER FOR ANTINEOPLASTIC CHEMOTHERAPY: ICD-10-CM

## 2020-07-17 ENCOUNTER — APPOINTMENT (OUTPATIENT)
Dept: INFUSION THERAPY | Facility: HOSPITAL | Age: 70
End: 2020-07-17

## 2020-07-17 ENCOUNTER — APPOINTMENT (OUTPATIENT)
Dept: HEMATOLOGY ONCOLOGY | Facility: CLINIC | Age: 70
End: 2020-07-17

## 2020-07-17 VITALS
HEART RATE: 61 BPM | TEMPERATURE: 97.7 F | OXYGEN SATURATION: 98 % | HEIGHT: 64 IN | DIASTOLIC BLOOD PRESSURE: 71 MMHG | WEIGHT: 136.66 LBS | BODY MASS INDEX: 23.33 KG/M2 | SYSTOLIC BLOOD PRESSURE: 110 MMHG | RESPIRATION RATE: 16 BRPM

## 2020-07-17 RX ORDER — PRAVASTATIN SODIUM 20 MG/1
20 TABLET ORAL
Refills: 0 | Status: DISCONTINUED | COMMUNITY
Start: 2020-06-09 | End: 2020-07-17

## 2020-07-17 RX ORDER — LANSOPRAZOLE 15 MG/1
15 CAPSULE, DELAYED RELEASE ORAL
Qty: 90 | Refills: 0 | Status: DISCONTINUED | COMMUNITY
Start: 2020-06-03 | End: 2020-07-17

## 2020-07-17 NOTE — HISTORY OF PRESENT ILLNESS
[de-identified] : adenocarcinoma [FreeTextEntry3] : Fatigue [de-identified] : 7/17/20- Pt was scheduled for C#3 with FOLFOX instead of FOLFORINOX to identify the culprit for the reaction. But her ANC was 1.4 so held Oxaliplatin and gave only 5 FU and LCV. She tolerated this chemo better did not have any reaction like previously. She denies N/V/D. She has been experiencing spasms in her fingers after the previous chemotherapy with FOLFORINOX- Her tumor markers CEA was rising and is concerning from 0.7- 3.6- 7.9.\par \par Patient states she was diagnosed with LUPUS by her PCP in March Unclear if she is on any treatment for the same. Will get the medical records from her PCP. [FreeTextEntry4] : 6/17/20 [FreeTextEntry5] : IRIN [FreeTextEntry2] : x 4 daYS [de-identified] : Ms. Griselda Corona is a 70 year old female who presents for initial consultation in our Pancreas [facility] for a new diagnosis of pancreas adenocarcinoma. \par \par Patient states she had gastrointestinal symptoms dating back to Nov 2019, including discomfort, bloating, diarrhea followed by small amounts of blood per rectum. She eventually went to GI doctor Dr Florian Pace.  She had a Colonoscopy in Feb 20 that showed small ulcer. She went the ER at [facility] 3/10/20 for abdominal pain.  CT of her abdomen was performed and showed 3.2 x 3.5 cm hypoenhancing mass in the pancreatic head/ body concerning for malignancy. Mass results in severe compression of SMV. Retroperitoneal lymphadenopathy concerning for metastatic disease with multiple nodules in lung base concerning for metastatic disease.\par \par She was referred  to Dr Bernabe for EUS, which was performed June 1st 2020, significant for an ill-defined, heterogeneous, hypoechoic lesion seen in the head of pancreas. The lesion abutted but did not invade the SMV. The main pancreatic duct was dilated and the parenchyma was heterogeneous within the pancreatic body/tail. Fine needle biopsy was performed.\par \par Pathology: Gastric mucosa positive for H pylori. Pancreas mass: Invasive adenocarcinoma.\par \par Patient is taking medication regimen for the H.Pylori infection, otherwise not taking medications except for multivitamin supplement. \par \par 1- She reports sharp, intermittent abdominal pain that sometimes radiates to her back. She rates the pain 8/10 on pain scale. It is worse after eating. She takes tylenol PRN which helps. \par 2- She states her appetite is "okay" but she is careful with food choices due to the pain. \par 3- She has unintentional weight lost 20 lbs over the past several months.

## 2020-07-17 NOTE — REVIEW OF SYSTEMS
[Fever] : no fever [Chills] : no chills [Night Sweats] : no night sweats [Constipation] : no constipation [FreeTextEntry2] : no fatigue or fevers. +weight loss 20 lbs in 6 months  [FreeTextEntry7] : Abdominal discomfort and pain and bloating  [FreeTextEntry3] : denies scleral icterus  [de-identified] : has some anxiety since diagnosis of pancreatic cancer.  denies depression or psych history  [FreeTextEntry8] : as per HPI  [de-identified] : hx of pre-diabetes, manages with diet

## 2020-07-17 NOTE — PHYSICAL EXAM
[Restricted in physically strenuous activity but ambulatory and able to carry out work of a light or sedentary nature] : Status 1- Restricted in physically strenuous activity but ambulatory and able to carry out work of a light or sedentary nature, e.g., light house work, office work [Thin] : thin [de-identified] : ocassional abdominal pain - does not use Oxycodone. [Normal] : affect appropriate

## 2020-07-20 DIAGNOSIS — Z51.89 ENCOUNTER FOR OTHER SPECIFIED AFTERCARE: ICD-10-CM

## 2020-07-29 NOTE — PHYSICAL EXAM
[Restricted in physically strenuous activity but ambulatory and able to carry out work of a light or sedentary nature] : Status 1- Restricted in physically strenuous activity but ambulatory and able to carry out work of a light or sedentary nature, e.g., light house work, office work [Thin] : thin [Normal] : affect appropriate [de-identified] : ocassional abdominal pain - does not use Oxycodone.

## 2020-07-29 NOTE — ASSESSMENT
[FreeTextEntry1] : 1-Therapeutic: \par -----------------\par Pancreatic cancer with mets to ? lungs as shown in CT from outside facility\par Started patient on FOLFORINOX regimen.\par Since she developed AE we will reduce the dose of IRINOTECAN to  140MG/M2 AND 5-FU TO 2000MG/M2\par we plan to give her FOLFOX alternating with FOLFIRI to identify the culprit . \par Today she tolerated C 4 with FOLFOX well- no reaction.\par \par \par 2-Diagnostic: \par ---------------\par Labs: Baseline Labs including coags, CEA, , lipase, amylase AND THEN LABS Q 2 WEEKS AHD MARKER Q 4 Q WEEKS\par Scans: CT scans/ PET  reviewed \par Will order restaging scans after this cycle of chemo \par \par 3- Pharmacogenetics testing:\par ----------------------------------\par - fOLLOW UGT1A1, DPYD \par - Plan to order MSI for future PDL-1 therapy \par - Somatic and germline mutations per NCCN guidelines REQUESTED\par \par 4- Procedures:\par -----------------\par - S/P  Port-A-Cath placement on 06/15/20\par - PORT CARE\par - EMLA CREAM PRESCRIBED\par \par 5-Supportive: \par -----------------\par Pancreatic insufficiency: CREON - [address] units (2 pills with each snack and 4 pills with each meal) for pancreatic replacement\par High risk of diabetes : Close management of glycemic index, endocrine consult if needed\par DVT (PE): High risk, close monitoring\par Antiemetic: Zofran PRN and Compazine PRN\par GI prophylaxis: May consider pepcid or PPI as long as no other drug interactions\par Psychological: Remeron 15mg PO at bed time if needed , \par Emla cream: for port numbing before use\par Prevention of mouth sores: Biotene mouthwash\par Skin reactions: Udderly smooth cream and Vitamin B 6 100 mg BID for prevention of HFS\par Diarrhea: The usual dose of Imodium is 4 milligrams (mg) (2 capsules) after the first loose bowel movement, and 2 mg (1 capsule) after each loose bowel movement after the first dose has been taken. No more than 16 mg (8 capsules) should be taken in any twenty-four-hour period. \par \par 6- RTC:  \par --------\par In 2 weeks. \par May consider changing chemo given the rise in CEA levels.\par \par

## 2020-07-29 NOTE — HISTORY OF PRESENT ILLNESS
[Disease: _____________________] : Disease: [unfilled] [M: ___] : M[unfilled] [AJCC Stage: ____] : AJCC Stage: [unfilled] [Cycle: ___] : Cycle: [unfilled] [Therapy: ___] : Therapy: [unfilled] [Day: ___] : Day: [unfilled] [3] : 3, Severe [Date: ____________] : Patient's last distress assessment performed on [unfilled]. [4 - Distress Level] : Distress Level: 4 [de-identified] : adenocarcinoma [de-identified] : 7/29/20- Pt is scheduled for C#4 with FOLFOX instead of FOLFORINOX to identify the culprit for the reaction. She tolerated the last chemotherapy well. She has been eating better and denies N/V. Today she tolerated the chemo with FOLFOX well - No reaction. next cycle we plan to give FOLFIRI [FreeTextEntry3] : Fatigue [FreeTextEntry4] : 6/17/20 [FreeTextEntry5] : IRIN [FreeTextEntry2] : x 4 daYS [de-identified] : Ms. Griselda Corona is a 70 year old female who presents for initial consultation in our [facility] for a new diagnosis of pancreas adenocarcinoma. \par \par Patient states she had gastrointestinal symptoms dating back to Nov 2019, including discomfort, bloating, diarrhea followed by small amounts of blood per rectum. She eventually went to GI doctor Dr Florian Pace.  She had a Colonoscopy in Feb 20 that showed small ulcer. She went the ER at [facility] 3/10/20 for abdominal pain.  CT of her abdomen was performed and showed 3.2 x 3.5 cm hypoenhancing mass in the pancreatic head/ body concerning for malignancy. Mass results in severe compression of SMV. Retroperitoneal lymphadenopathy concerning for metastatic disease with multiple nodules in lung base concerning for metastatic disease.\par \par She was referred  to Dr Bernabe for EUS, which was performed June 1st 2020, significant for an ill-defined, heterogeneous, hypoechoic lesion seen in the head of pancreas. The lesion abutted but did not invade the SMV. The main pancreatic duct was dilated and the parenchyma was heterogeneous within the pancreatic body/tail. Fine needle biopsy was performed.\par \par Pathology: Gastric mucosa positive for H pylori. Pancreas mass: Invasive adenocarcinoma.\par \par Patient is taking medication regimen for the H.Pylori infection, otherwise not taking medications except for multivitamin supplement. \par \par 1- She reports sharp, intermittent abdominal pain that sometimes radiates to her back. She rates the pain 8/10 on pain scale. It is worse after eating. She takes tylenol PRN which helps. \par 2- She states her appetite is "okay" but she is careful with food choices due to the pain. \par 3- She has unintentional weight lost 20 lbs over the past several months.

## 2020-08-11 PROBLEM — R63.4 UNINTENTIONAL WEIGHT LOSS: Status: ACTIVE | Noted: 2020-07-08

## 2020-08-12 NOTE — RESULTS/DATA
[FreeTextEntry1] : CT scans reviewed\par Distant LNodes\par Lung nodules\par c/w stage IV pan ca\par \par Repeat CT 8/9/20 showed stable disease\par

## 2020-08-12 NOTE — REVIEW OF SYSTEMS
[Abdominal Pain] : abdominal pain [Negative] : Genitourinary [FreeTextEntry2] : no fatigue or fevers. +weight loss 20 lbs in 6 months  [FreeTextEntry3] : denies scleral icterus  [de-identified] : has some anxiety since diagnosis of pancreatic cancer.  denies depression or psych history  [FreeTextEntry8] : as per HPI  [de-identified] : hx of pre-diabetes, manages with diet

## 2020-08-12 NOTE — END OF VISIT
[FreeTextEntry3] : I personally discussed this patient with ACP at the time of the visit. I agree with the assessment and plan as written, unless noted below. \par I was present with the ACP during the key portions of the history and exam. I agree with the findings and plan as documented in the ACP's note, unless noted below. \par \par CT scan showed stable disease\par Tumor markers more or less stable\par \par Fabian Paul MD\par

## 2020-08-12 NOTE — ASSESSMENT
[FreeTextEntry1] : 1-Therapeutic: \par -----------------\par Pancreatic cancer with mets to ? lungs as shown in CT from outside facility\par Started patient on FOLFORINOX regimen.\par Since she developed AE we will reduce the dose of IRINOTECAN to  140MG/M2 AND 5-FU TO 2000MG/M2\par we plan to give her FOLFOX alternating with FOLFIRI to identify the culprit . \par \par \par 2-Diagnostic: \par ---------------\par Labs: Baseline Labs including coags, CEA, , lipase, amylase AND THEN LABS Q 2 WEEKS AHD MARKER Q 4 Q WEEKS\par Scans: CT scans/ PET  reviewed \par Will order restaging scans after this cycle of chemo \par \par 3- Pharmacogenetics testing:\par ----------------------------------\par - fOLLOW UGT1A1, DPYD \par - Plan to order MSI for future PDL-1 therapy \par - Somatic and germline mutations per NCCN guidelines REQUESTED\par \par 4- Procedures:\par -----------------\par - S/P  Port-A-Cath placement on 06/15/20\par - PORT CARE\par - EMLA CREAM PRESCRIBED\par \par 5-Supportive: \par -----------------\par Pancreatic insufficiency: CREON - [address] units (2 pills with each snack and 4 pills with each meal) for pancreatic replacement\par High risk of diabetes : Close management of glycemic index, endocrine consult if needed\par DVT (PE): High risk, close monitoring\par Antiemetic: Zofran PRN and Compazine PRN\par GI prophylaxis: May consider pepcid or PPI as long as no other drug interactions\par Psychological: Remeron 15mg PO at bed time if needed , \par Emla cream: for port numbing before use\par Prevention of mouth sores: Biotene mouthwash\par Skin reactions: Udderly smooth cream and Vitamin B 6 100 mg BID for prevention of HFS\par Diarrhea: The usual dose of Imodium is 4 milligrams (mg) (2 capsules) after the first loose bowel movement, and 2 mg (1 capsule) after each loose bowel movement after the first dose has been taken. No more than 16 mg (8 capsules) should be taken in any twenty-four-hour period. \par \par 6- RTC:  \par --------\par In 2 weeks. \par \par

## 2020-08-12 NOTE — HISTORY OF PRESENT ILLNESS
[Disease: _____________________] : Disease: [unfilled] [M: ___] : M[unfilled] [AJCC Stage: ____] : AJCC Stage: [unfilled] [Therapy: ___] : Therapy: [unfilled] [Cycle: ___] : Cycle: [unfilled] [Day: ___] : Day: [unfilled] [3] : 3, Severe [4 - Distress Level] : Distress Level: 4 [Date: ____________] : Patient's last distress assessment performed on [unfilled]. [Home] : at home, [unfilled] , at the time of the visit. [Medical Office: ([facility])___] : at the medical office located in  [Verbal consent obtained from patient] : the patient, [unfilled] [de-identified] : Diagnosis: Pancreatic adenocarcinoma \par \par Genetic Testing: Will send liquid foundation testing \par \par Other Current Medical Problems: Arthritis/ HLD \par \par Treatment History:\par \par Cycle # 1 06/17/20- FOLFORINOX- 5 FU 2400 mg/ kg /  mg/ m 2/ Irinotecan 150 mg/ m 2 / Oxaliplatin 85 mg/ m2\par Cycle # 2 06/30/20- FOLFORINOX- 5 FU 2400 mg/ kg /  mg/ m 2/ Irinotecan 150 mg/ m 2/ Oxaliplatin 85 mg/ m2\par Cycle # 3- 07/15/20- Skipped Irinotecan / Oxaliplatin-  (ANC 1.4) - 5 FU 2400 mg/ kg /  mg/ m 2\par Cycle #4 - 07/29/20- FOLFOX-  5 FU 2000 mg/ kg /  mg/ m 2/ Oxaliplatin 85 mg/ m2-----------------Tolerated well without Irinotecan \par \par Restaging Scans---------------Stable disease \par Cycle # 5 - 08/12/20- FOLFOX- 5 FU 2000 mg/ kg /  mg/ m 2/ Oxaliplatin 85 mg/ m2--------\par \par Oncological History :\par Ms. Griselda Corona is a 70 year old female who presents for initial consultation in our [facility] for a new diagnosis of pancreas adenocarcinoma. \par Patient states she had gastrointestinal symptoms dating back to Nov 2019, including discomfort, bloating, diarrhea followed by small amounts of blood per rectum. She eventually went to GI doctor Dr Florian Pace.  She had a Colonoscopy in Feb 20 that showed small ulcer. She went the ER at [facility] 3/10/20 for abdominal pain.  CT of her abdomen was performed and showed 3.2 x 3.5 cm hypoenhancing mass in the pancreatic head/ body concerning for malignancy. Mass results in severe compression of SMV. Retroperitoneal lymphadenopathy concerning for metastatic disease with multiple nodules in lung base concerning for metastatic disease.\par \par She was referred  to Dr Bernabe for EUS, which was performed June 1st 2020, significant for an ill-defined, heterogeneous, hypoechoic lesion seen in the head of pancreas. The lesion abutted but did not invade the SMV. The main pancreatic duct was dilated and the parenchyma was heterogeneous within the pancreatic body/tail. Fine needle biopsy was performed.\par \par Pathology: Gastric mucosa positive for H pylori. Pancreas mass: Invasive adenocarcinoma.\par \par Patient is taking medication regimen for the H.Pylori infection, otherwise not taking medications except for multivitamin supplement. \par \par PMD:\par \par FHX: No hx of Cancer \par \par Socail HX: Lives with her children, , Non smoker, no alcohol use\par \par PSX: No surgeries in past \par \par \par Disease:\par Pathology:\par \par AJCC Stage : Stage 4 \par \par Tumor Markers: CA 19.9 was 155 \par \par 6/10/20- MsClaudia Desouza is seen in [facility] clinic. She states he has abdominal pain 7-8/10 and gets relief with Tylenol. She has had 2 lbs weight loss- and appetite was decreased .\par Ct abd/ pelvis/ chest- Large pancreatic mass suspicious for adenocarcinoma, with marked arterial \par and venous involvement. Numerous bilateral pulmonary nodules new since 2018 and suspicious for metastatic disease. Retroperitoneal and peripancreatic lymphadenopathy. \par Omental nodule raising the possibility of peritoneal carcinomatosis. \par \par 06/17/20- Ms. Deo is seen for follow up. She had the port placed earlier last week and is scheduled for her chemotherapy today . We discussed the chemo side effects and what to expect from chemotherapy- SE- Drug sheet was provided Risks/ benefits and alternatives of chemotherapy were discussed and included but not limited to fatigue,low blood counts, nausea/vomiting, skin reactions, hair thinning, possible blood transfusion requirement,increased risk of infection,neuropathy. Patient agreed to above and informed consent was signed.PET Scan- done- on 06/16/20-FDG-avid mass in pancreatic body and neck corresponds to known malignancy. Hypermetabolic left periaortic lymphadenopathy is compatible with metastatic disease.  Multiple bilateral pulmonary nodules, not significantly changed as compared to CT dated 6/9/2020, compatible with metastatic disease. \par \par 6/30/20 - Here for Cycle 2 \par 1- Developed G3 fatigue following C1 , and had to remain in bed for at least 4 days; son was cooking, she was able to go to bathroom and shower by herself\par 2- Also had decreased appetite and G1 nausea but no vomiting\par 3- Declines neuropathy\par 4- Denies diarrhea\par 5- No fever\par 6- No sxs suggestive of COVID-19\par 7- lost weight upto 10 lbs\par 8- also I am aware of her reaction to IRIN during infusion\par \par \par 7/8/20- Recieved a call from patient stating she had episodes of vomiting yesterday. She went out yesterday in the summer sun  and had some food from outside and she has been throwing up. She states she lost weight >30 lbs- She states the Creon has been causing a lot of abdominal pain - She has been experiencing some abdominal pain but is afraid to use the Oxycodone or the medical marijuana. \par She lives with her 2 sons and her sister in law has been helping her intermittently - However she needs a lot of reinstatement of instructions- Explained to her that she needs to take the Pain meds when needed and the pain is not from Creon. \par She is requesting for a Home COVID swab - will arrange for the same. \par \par 7/17/20- Pt was scheduled for C#3 with FOLFOX instead of FOLFORINOX to identify the culprit for the reaction. But her ANC was 1.4 so held Oxaliplatin and gave only 5 FU and LCV. She tolerated this chemo better did not have any reaction like previously. She denies N/V/D. She has been experiencing spasms in her fingers after the previous chemotherapy with FOLFORINOX- Her tumor markers CEA was rising and is concerning from 0.7- 3.6- 7.9.Patient states she was diagnosed with LUPUS by her PCP in March Unclear if she is on any treatment for the same. Will get the medical records from her PCP.\par \par 7/29/20- Pt is scheduled for C#4 with FOLFOX instead of FOLFORINOX to identify the culprit for the reaction. She tolerated the last chemotherapy well. She has been eating better and denies N/V. Today she tolerated the chemo with FOLFOX well - No reaction. next cycle we plan to give FOLFIRI\par \par  [de-identified] : adenocarcinoma [de-identified] : 8/10//20- Pt is seen for TEB visit after she had the restaging scans done- She had CT scan done that showed- Metastatic pancreatic adenocarcinoma. Stable disease. She tolerated the chemo well without the irinotecan. we answered all her questions and concerns. Her son was not in the telephone conference. Dr Paul left a message with the son on his cell phone. Plan is to continue with the chemo \par \par \par \par  [FreeTextEntry3] : Fatigue [FreeTextEntry4] : 6/17/20 [FreeTextEntry5] : IRIN [FreeTextEntry2] : x 4 daYS

## 2020-08-26 NOTE — HISTORY OF PRESENT ILLNESS
[Disease: _____________________] : Disease: [unfilled] [M: ___] : M[unfilled] [AJCC Stage: ____] : AJCC Stage: [unfilled] [Therapy: ___] : Therapy: [unfilled] [Cycle: ___] : Cycle: [unfilled] [Day: ___] : Day: [unfilled] [3] : 3, Severe [Date: ____________] : Patient's last distress assessment performed on [unfilled]. [4 - Distress Level] : Distress Level: 4 [de-identified] : adenocarcinoma [de-identified] : Diagnosis: Pancreatic adenocarcinoma \par \par Genetic Testing: Will send liquid foundation testing \par \par Other Current Medical Problems: Arthritis/ HLD \par \par Treatment History:\par \par Cycle # 1 06/17/20- FOLFORINOX- 5 FU 2400 mg/ kg /  mg/ m 2/ Irinotecan 150 mg/ m 2 / Oxaliplatin 85 mg/ m2\par Cycle # 2 06/30/20- FOLFORINOX- 5 FU 2400 mg/ kg /  mg/ m 2/ Irinotecan 140 mg/ m 2/ Oxaliplatin 85 mg/ m2\par Cycle # 3- 07/15/20- Skipped Irinotecan / Oxaliplatin-  (ANC 1.4) - 5 FU 2400 mg/ kg /  mg/ m 2\par Cycle #4 - 07/29/20- FOLFOX-  5 FU 2000 mg/ kg /  mg/ m 2/ Oxaliplatin 85 mg/ m2-----------------Tolerated well without Irinotecan \par \par Restaging Scans---------------Stable disease \par Cycle # 5 - 08/12/20- FOLFOX- 5 FU 2000 mg/ kg /  mg/ m 2/ Oxaliplatin 85 mg/ m2--------\par Cycle # 6- 08/26/20-FOLFIRI-  5 FU 2000 mg/ kg /  mg/ m 2/ Irinotecan 150 mg- Held oxaliplatin \par \par Oncological History :\par Ms. Griselda Corona is a 70 year old female who presents for initial consultation in our [facility] for a new diagnosis of pancreas adenocarcinoma. \par Patient states she had gastrointestinal symptoms dating back to Nov 2019, including discomfort, bloating, diarrhea followed by small amounts of blood per rectum. She eventually went to GI doctor Dr Florian Pace.  She had a Colonoscopy in Feb 20 that showed small ulcer. She went the ER at [facility] 3/10/20 for abdominal pain.  CT of her abdomen was performed and showed 3.2 x 3.5 cm hypoenhancing mass in the pancreatic head/ body concerning for malignancy. Mass results in severe compression of SMV. Retroperitoneal lymphadenopathy concerning for metastatic disease with multiple nodules in lung base concerning for metastatic disease.\par \par She was referred  to Dr Bernabe for EUS, which was performed June 1st 2020, significant for an ill-defined, heterogeneous, hypoechoic lesion seen in the head of pancreas. The lesion abutted but did not invade the SMV. The main pancreatic duct was dilated and the parenchyma was heterogeneous within the pancreatic body/tail. Fine needle biopsy was performed.\par \par Pathology: Gastric mucosa positive for H pylori. Pancreas mass: Invasive adenocarcinoma.\par \par Patient is taking medication regimen for the H.Pylori infection, otherwise not taking medications except for multivitamin supplement. \par \par PMD:\par \par FHX: No hx of Cancer \par \par Socail HX: Lives with her children, , Non smoker, no alcohol use\par \par PSX: No surgeries in past \par \par \par Disease:\par Pathology:\par \par AJCC Stage : Stage 4 \par \par Tumor Markers: CA 19.9 was 155 \par \par 6/10/20- Ms. Deo is seen in [facility] clinic. She states he has abdominal pain 7-8/10 and gets relief with Tylenol. She has had 2 lbs weight loss- and appetite was decreased .\par Ct abd/ pelvis/ chest- Large pancreatic mass suspicious for adenocarcinoma, with marked arterial \par and venous involvement. Numerous bilateral pulmonary nodules new since 2018 and suspicious for metastatic disease. Retroperitoneal and peripancreatic lymphadenopathy. \par Omental nodule raising the possibility of peritoneal carcinomatosis. \par \par 06/17/20- Ms. Deo is seen for follow up. She had the port placed earlier last week and is scheduled for her chemotherapy today . We discussed the chemo side effects and what to expect from chemotherapy- SE- Drug sheet was provided Risks/ benefits and alternatives of chemotherapy were discussed and included but not limited to fatigue,low blood counts, nausea/vomiting, skin reactions, hair thinning, possible blood transfusion requirement,increased risk of infection,neuropathy. Patient agreed to above and informed consent was signed.PET Scan- done- on 06/16/20-FDG-avid mass in pancreatic body and neck corresponds to known malignancy. Hypermetabolic left periaortic lymphadenopathy is compatible with metastatic disease.  Multiple bilateral pulmonary nodules, not significantly changed as compared to CT dated 6/9/2020, compatible with metastatic disease. \par \par 6/30/20 - Here for Cycle 2 \par 1- Developed G3 fatigue following C1 , and had to remain in bed for at least 4 days; son was cooking, she was able to go to bathroom and shower by herself\par 2- Also had decreased appetite and G1 nausea but no vomiting\par 3- Declines neuropathy\par 4- Denies diarrhea\par 5- No fever\par 6- No sxs suggestive of COVID-19\par 7- lost weight upto 10 lbs\par 8- also I am aware of her reaction to IRIN during infusion\par \par \par 7/8/20- Recieved a call from patient stating she had episodes of vomiting yesterday. She went out yesterday in the summer sun  and had some food from outside and she has been throwing up. She states she lost weight >30 lbs- She states the Creon has been causing a lot of abdominal pain - She has been experiencing some abdominal pain but is afraid to use the Oxycodone or the medical marijuana. \par She lives with her 2 sons and her sister in law has been helping her intermittently - However she needs a lot of reinstatement of instructions- Explained to her that she needs to take the Pain meds when needed and the pain is not from Creon. \par She is requesting for a Home COVID swab - will arrange for the same. \par \par 7/17/20- Pt was scheduled for C#3 with FOLFOX instead of FOLFORINOX to identify the culprit for the reaction. But her ANC was 1.4 so held Oxaliplatin and gave only 5 FU and LCV. She tolerated this chemo better did not have any reaction like previously. She denies N/V/D. She has been experiencing spasms in her fingers after the previous chemotherapy with FOLFORINOX- Her tumor markers CEA was rising and is concerning from 0.7- 3.6- 7.9.Patient states she was diagnosed with LUPUS by her PCP in March Unclear if she is on any treatment for the same. Will get the medical records from her PCP.\par \par 7/29/20- Pt is scheduled for C#4 with FOLFOX instead of FOLFORINOX to identify the culprit for the reaction. She tolerated the last chemotherapy well. She has been eating better and denies N/V. Today she tolerated the chemo with FOLFOX well - No reaction. next cycle we plan to give FOLFIRI\par \par 8/10//20- Pt is seen for TEB visit after she had the restaging scans done- She had CT scan done that showed- Metastatic pancreatic adenocarcinoma. Stable disease. She tolerated the chemo well without the irinotecan. we answered all her questions and concerns. Her son was not in the telephone conference. Dr Paul left a message with the son on his cell phone. Plan is to continue with the chemo \par \par  [de-identified] : 8/26/20- Ms. Desouza is seen for follow up- She tolerated the last chemo well- denies N/V/D. She denies any neuropathy - Denies pain - does not use mediacl marijuana- \par Explained to her that she will receive FOLFIRI this cycle and to inform if she develops any reaction to the same. \par \par \par  [FreeTextEntry4] : 6/17/20 [FreeTextEntry3] : Fatigue [FreeTextEntry5] : IRIN [FreeTextEntry2] : x 4 daYS

## 2020-08-26 NOTE — PHYSICAL EXAM
[Restricted in physically strenuous activity but ambulatory and able to carry out work of a light or sedentary nature] : Status 1- Restricted in physically strenuous activity but ambulatory and able to carry out work of a light or sedentary nature, e.g., light house work, office work [Normal] : grossly intact [de-identified] : hair thinning

## 2020-08-26 NOTE — REVIEW OF SYSTEMS
[Abdominal Pain] : abdominal pain [Negative] : Heme/Lymph [FreeTextEntry2] : no fatigue or fevers. +weight loss 20 lbs in 6 months  [FreeTextEntry3] : denies scleral icterus  [FreeTextEntry8] : as per HPI  [de-identified] : hx of pre-diabetes, manages with diet  [de-identified] : has some anxiety since diagnosis of pancreatic cancer.  denies depression or psych history

## 2020-08-26 NOTE — ASSESSMENT
[FreeTextEntry1] : 1-Therapeutic: \par -----------------\par Pancreatic cancer with mets to ? lungs as shown in CT from outside facility\par Started patient on FOLFORINOX regimen.\par Since she developed AE we will reduce the dose of IRINOTECAN to  140MG/M2 AND 5-FU TO 2000MG/M2\par we plan to give her FOLFOX alternating with FOLFIRI to identify the culprit . \par Irinotecan will be 150 mg/m2 since she is only on FOLFOX- \par \par \par 2-Diagnostic: \par ---------------\par Labs: Baseline Labs including coags, CEA, , lipase, amylase AND THEN LABS Q 2 WEEKS AHD MARKER Q 4 Q WEEKS\par Scans: CT scans/ PET  reviewed \par Restaging scans after 4 cycles of chemotherapy \par \par 3- Pharmacogenetics testing:\par ----------------------------------\par - fOLLOW UGT1A1, DPYD \par - Plan to order MSI for future PDL-1 therapy \par - Somatic and germline mutations per NCCN guidelines REQUESTED\par \par 4- Procedures:\par -----------------\par - S/P  Port-A-Cath placement on 06/15/20\par - PORT CARE\par - EMLA CREAM PRESCRIBED\par \par 5-Supportive: \par -----------------\par Pancreatic insufficiency: CREON - [address] units (2 pills with each snack and 4 pills with each meal) for pancreatic replacement\par High risk of diabetes : Close management of glycemic index, endocrine consult if needed\par DVT (PE): High risk, close monitoring\par Antiemetic: Zofran PRN and Compazine PRN\par GI prophylaxis: May consider pepcid or PPI as long as no other drug interactions\par Psychological: Remeron 15mg PO at bed time if needed , \par Emla cream: for port numbing before use\par Prevention of mouth sores: Biotene mouthwash\par Skin reactions: Udderly smooth cream and Vitamin B 6 100 mg BID for prevention of HFS\par Diarrhea: The usual dose of Imodium is 4 milligrams (mg) (2 capsules) after the first loose bowel movement, and 2 mg (1 capsule) after each loose bowel movement after the first dose has been taken. No more than 16 mg (8 capsules) should be taken in any twenty-four-hour period. \par \par 6- RTC:  \par --------\par In 2 weeks. \par

## 2020-08-28 NOTE — ED ADULT TRIAGE NOTE - TEMPERATURE IN CELSIUS (DEGREES C)
Postpartum Note- PPD#1    Allergies    penicillin (Unknown)    Intolerances        Blood Type  Type + Screen (09.15.18 @ 07:02)    ABO Interpretation: O    Rh Interpretation: Positive    Antibody Screen: Negative      Rubella immune  RPR Negative      S:Patient is a  40y   G [id number]   P      PPD#1         S/P       Patient w/o complaints, pain is controlled.    Pt is OOB, tolerating PO, passing flatus. Lochia WNL.     O:  Vital Signs Last 24 Hrs  T(C): 36.7 (16 Sep 2018 05:00), Max: 36.9 (15 Sep 2018 09:30)  T(F): 98.1 (16 Sep 2018 05:00), Max: 98.4 (15 Sep 2018 09:30)  HR: 51 (16 Sep 2018 05:00) (42 - 71)  BP: 126/84 (16 Sep 2018 05:00) (104/71 - 153/69)  BP(mean): --  RR: 18 (16 Sep 2018 05:00) (16 - 18)  SpO2: 99% (15 Sep 2018 17:02) (98% - 99%)     Gen: NAD  Abdomen: Soft, nontender, non-distended, fundus firm.  Lochia WNL  Ext: Neg edema, Neg calf tenderness    LABS:    Hemoglobin: 12.7 g/dL (09-15-18 @ 06:57)      Hematocrit: 35.5 % (09-15-18 @ 06:57) 35.7

## 2020-08-28 NOTE — ED PROVIDER NOTE - PROGRESS NOTE DETAILS
Dr. Fiore: I have personally seen and examined this patient at the bedside. I have fully participated in the care of this patient. I have reviewed all pertinent clinical information, including history, physical exam, plan and the Resident's note and agree except as noted. HPI above as by me. PE above as by me. DDX PLAN

## 2020-08-28 NOTE — H&P ADULT - PROBLEM SELECTOR PLAN 3
Transitions of Care Status:  1.  Name of PCP: Dr Goodwin (oncology)  2.  PCP Contacted on Admission: [ ] Y    [x] N    3.  PCP contacted at Discharge: [ ] Y    [ ] N    [ ] N/A  4.  Post-Discharge Appointment Date and Location:  5.  Summary of Handoff given to PCP:

## 2020-08-28 NOTE — ED ADULT NURSE NOTE - OBJECTIVE STATEMENT
pt in rm 19. alert,oriented x3, denies c/o pain. reports since receiving chemo on wed. c/o  feeling balance off,lips swelling,tingling to hands,nausea,weakness. denies fever. arrives  with shyann gold accessed from [facility] for low bp. skin warm,dry. labs sent. awaiting furthur orders

## 2020-08-28 NOTE — ED PROVIDER NOTE - CLINICAL SUMMARY MEDICAL DECISION MAKING FREE TEXT BOX
71 y/o F with PMH  Pancreatic adenocarcinoma, HLD, arthritis  p/w hypotension and bradycardic s/p home chemo infusion(Leucovorin/dosi/oxaliplatin). given 1L NS and 10mg Reglan PTA. pt c/o dizziness and room spinning accompanied by nausea. pt states she feels weak. Denies prior similar symptoms from her chemo. pt blood pressure 89/45 in ED , eval for sepsis vs possible side effects from chemo, cbc, cmp, trop, vbg, lactate, IVF, ua, urien culture, blood culture.

## 2020-08-28 NOTE — H&P ADULT - NSHPREVIEWOFSYSTEMS_GEN_ALL_CORE
Review of Systems:   CONSTITUTIONAL: No fever or chills  EYES: No eye pain, visual disturbances, or discharge  ENMT:  tongue/lip numbness  NECK: No pain or stiffness  RESPIRATORY: No cough, No shortness of breath  CARDIOVASCULAR: No chest pain, palpitations, dizziness, or leg swelling  GASTROINTESTINAL: No abdominal pain, + nausea, no vomiting  GENITOURINARY: No dysuria, or hematuria  NEUROLOGICAL: lightheadedness, unsteadiness  SKIN: No rashes, or lesions   LYMPH NODES: No enlarged glands  ENDOCRINE: No heat or cold intolerance  MUSCULOSKELETAL: No joint pain or swelling  PSYCHIATRIC: No depression or anxiety  HEME/LYMPH: No easy bruising, or bleeding  ALLERGY AND IMMUNOLOGIC: No hives or eczema

## 2020-08-28 NOTE — H&P ADULT - HISTORY OF PRESENT ILLNESS
71 y/o F with pancreatic cancer on chemotherapy p/w lightheadedness and unsteadiness.  Pt reports she had dosi-fuser pump placed 2 days ago, and states she received a slightly higher dose of chemotherapy than she normally receives.  Since then, she has felt unsteady, lightheaded, and nauseated.  She reports her tongue and lips felt numb.  She report constipation.  No loss of appetite.  No fevers or chills.  No cough.  She returned to [facility] for removal of chemo pump, and was noted to have low BP.  She was sent to the ED for further management.      In the ED, pt was given 1.5L LR, vancomycin and cefepime.

## 2020-08-28 NOTE — ED PROVIDER NOTE - PHYSICAL EXAMINATION
GENERAL: Awake, alert, NAD  HEENT: NC/AT, moist mucous membranes, PERRL, EOMI. No nystagmus.   LUNGS: CTAB, no wheezes or crackles   CARDIAC: RRR, no m/r/g  ABDOMEN: Soft, normal BS, non tender, non distended, no rebound, no guarding  BACK: No midline spinal tenderness, no CVA tenderness  EXT: No edema, no calf tenderness, 2+ DP pulses bilaterally, no deformities.  NEURO: A&Ox3. Moving all extremities. 5/5 strength UE and LE bilaterally, sensation intact. CN II-XII grossly intact.   SKIN: Warm and dry. No rash.  PSYCH: Normal affect. GENERAL: Awake, alert, NAD  HEENT: NC/AT, moist mucous membranes, PERRL, EOMI. No nystagmus.   LUNGS: CTAB, no wheezes or crackles   CARDIAC: RRR, no m/r/g  CHEST: Chemoport R chest, no signs of infection.   ABDOMEN: Soft, normal BS, non tender, non distended, no rebound, no guarding  BACK: No midline spinal tenderness, no CVA tenderness  EXT: No edema, no calf tenderness, 2+ DP pulses bilaterally, no deformities.  NEURO: A&Ox3. Moving all extremities. 5/5 strength UE and LE bilaterally, sensation intact. CN II-XII grossly intact.   SKIN: Warm and dry. No rash.  PSYCH: Normal affect.

## 2020-08-28 NOTE — ED ADULT NURSE NOTE - CHIEF COMPLAINT QUOTE
pt sent from [facility] for hypotension and bradycardic s/p home chemo infusion. given 1L NS and 10mg Reglan PTA. pt c/o dizziness. R CW port accessed.

## 2020-08-28 NOTE — ED ADULT NURSE NOTE - NSIMPLEMENTINTERV_GEN_ALL_ED
Implemented All Universal Safety Interventions:  [place] to call system. Call bell, personal items and telephone within reach. Instruct patient to call for assistance. Room bathroom lighting operational. Non-slip footwear when patient is off stretcher. Physically safe environment: no spills, clutter or unnecessary equipment. Stretcher in lowest position, wheels locked, appropriate side rails in place. Specific interventions were implemented:

## 2020-08-28 NOTE — H&P ADULT - ASSESSMENT
69 y/o F with pancreatic cancer on chemotherapy p/w multiple complaints including lightheadedness and unsteadiness likely as adverse effect of chemotherapy.

## 2020-08-28 NOTE — ED PROVIDER NOTE - OBJECTIVE STATEMENT
70 year old female PMH pancreatic cancer following at [facility] presenting with dizziness x2 days. States her last chemotherapy session was two days ago and that she may have received a higher dose than she normally does. Since then she has had a persistent sensation of the room spinning, as well as some weakness and nausea. Denies syncope, vomiting, CP, SOB, LE edema.

## 2020-08-28 NOTE — ED PROVIDER NOTE - ATTENDING CONTRIBUTION TO CARE
69 y/o F with PMH  Pancreatic adenocarcinoma, HLD, arthritis  p/w hypotension and bradycardic s/p home chemo infusion(Leucovorin/dosi/oxaliplatin). given 1L NS and 10mg Reglan PTA. pt c/o dizziness and room spinning accompanied by nausea. pt states she feels weak. Denies prior similar symptoms from her chemo. She states she feels lightheaded worse when she stands up . She denies chest pain, syncope. she was given aspirin prior to arrival . She denies fever, chill. She reports chronic constipation .  denies fever, chills, chest pain, SOB, + chronic abdominal pain, diarrhea, dysuria, syncope, bleeding, new rash, weakness, numbness, blurred vision    ROS  otherwise negative as per HPI  Gen: Awake, Alert, WD, WN, NAD  Head:  NC/AT  Eyes:  PERRL, EOMI, Conjunctiva pink, lids normal, no scleral icterus  ENT: OP clear, yellowish tongue, , moist mucus membranes  Neck: supple, nontender, no meningismus, no JVD, trachea midline  Cardiac/CV:  S1 S2,bradycardic no M/G/R  Respiratory/Pulm:  CTAB, good air movement, normal resp effort, no wheezes/stridor/retractions/rales/rhonchi  Gastrointestinal/Abdomen:  Soft, nontender, nondistended, decreased BS, no rebound/guarding  Back:  no CVAT  Ext:  warm, well perfused, moving all extremities spontaneously, no peripheral edema, distal pulses intact  Skin: intact, no rash  Neuro:  AAOx3, sensation intact, motor 5/5 x 4 extremities,  speech clear  MDM as above

## 2020-08-28 NOTE — H&P ADULT - NSICDXPASTMEDICALHX_GEN_ALL_CORE_FT
PAST MEDICAL HISTORY:  GERD (gastroesophageal reflux disease)     Hyperlipidemia     Systemic lupus erythematosus

## 2020-08-28 NOTE — ED ADULT TRIAGE NOTE - CHIEF COMPLAINT QUOTE
pt sent from [facility] for hypotension and bradycardic s/p home chemo infusion. given 1L NS and 10mg Reglan PTA. pt c/o dizziness. R CW port accessed. Graft Donor Site Bandage (Optional-Leave Blank If You Don't Want In Note): Steri-strips and a pressure bandage were applied to the donor site.

## 2020-08-28 NOTE — ED PROVIDER NOTE - NS ED ROS FT
CONST: no fevers, no chills, + weakness  EYES: no pain, no vision changes  ENT: no sore throat, no ear pain, no change in hearing  CV: no chest pain, no leg swelling  RESP: no shortness of breath, no cough  ABD: no abdominal pain, + nausea, no vomiting, no diarrhea  : no dysuria, no flank pain, no hematuria  MSK: no back pain, no extremity pain  NEURO: no headache, + vertigo  HEME: no easy bleeding  SKIN:  no rash

## 2020-08-28 NOTE — H&P ADULT - NSHPLABSRESULTS_GEN_ALL_CORE
08-28    138  |  103  |  25<H>  ----------------------------<  83  4.2   |  25  |  0.37<L>    Ca    8.6      28 Aug 2020 19:00    TPro  7.3  /  Alb  3.7  /  TBili  < 0.2<L>  /  DBili  x   /  AST  59<H>  /  ALT  59<H>  /  AlkPhos  127<H>  08-28                            10.8   14.70 )-----------( 114      ( 28 Aug 2020 19:00 )             34.0             PT/INR - ( 28 Aug 2020 19:00 )   PT: 11.8 SEC;   INR: 1.04          PTT - ( 28 Aug 2020 19:00 )  PTT:26.1 SEC

## 2020-08-29 NOTE — PROGRESS NOTE ADULT - PROBLEM SELECTOR PLAN 1
Symptoms now resolved, likely 2/2 receiving higher dose of chemotherapy as patient states she had similar symptoms in the past  - F/u blood cultures- if negative at 24h can be discharged home tomorrow  - UA negative  - Appreciate oncology recs

## 2020-08-29 NOTE — PROGRESS NOTE ADULT - SUBJECTIVE AND OBJECTIVE BOX
Hillary Segundo  [facility]  Pager #[phone]    Patient is a 70y old  Female who presents with a chief complaint of dizziness (29 Aug 2020 06:52)      SUBJECTIVE / OVERNIGHT EVENTS: Patient seen and examined at bedside. Patient feeling well, no longer dizzy. States she had a similar reaction in the past when she got a higher dose of her chemo. Denies urinary symptoms, fevers, chills, cough, abdominal pain.    ADDITIONAL REVIEW OF SYSTEMS:    MEDICATIONS  (STANDING):  chlorhexidine 4% Liquid 1 Application(s) Topical daily  enoxaparin Injectable 40 milliGRAM(s) SubCutaneous daily  pancrelipase  (CREON 12,000 Lipase Units) 1 Capsule(s) Oral with breakfast    MEDICATIONS  (PRN):  ondansetron   Disintegrating Tablet 8 milliGRAM(s) Oral three times a day PRN Nausea and/or Vomiting      CAPILLARY BLOOD GLUCOSE        I&O's Summary      PHYSICAL EXAM:    Vital Signs Last 24 Hrs  T(C): 36.8 (29 Aug 2020 11:52), Max: 36.8 (29 Aug 2020 11:52)  T(F): 98.2 (29 Aug 2020 11:52), Max: 98.2 (29 Aug 2020 11:52)  HR: 69 (29 Aug 2020 11:52) (48 - 69)  BP: 98/48 (29 Aug 2020 11:52) (78/51 - 130/44)  BP(mean): --  RR: 17 (29 Aug 2020 11:52) (13 - 18)  SpO2: 100% (29 Aug 2020 11:52) (97% - 100%)    CONSTITUTIONAL: NAD, well-developed, well-groomed  EYES: Conjunctiva and sclera clear  ENMT: Moist oral mucosa  RESPIRATORY: Normal respiratory effort; lungs are clear to auscultation bilaterally  CARDIOVASCULAR: Regular rate and rhythm, normal S1 and S2, no murmur/rub/gallop; No lower extremity edema  ABDOMEN: Nontender to palpation, normoactive bowel sounds  MUSCULOSKELETAL: No clubbing or cyanosis of digits  PSYCH: A+O to person, place, and time; affect appropriate  NEUROLOGY: CN 2-12 are intact and symmetric; no gross sensory deficits   SKIN: No rashes; no palpable lesions    LABS:                        11.4   6.74  )-----------( 121      ( 29 Aug 2020 11:03 )             34.6     08-29    138  |  106  |  16  ----------------------------<  114<H>  3.9   |  23  |  0.43<L>    Ca    8.7      29 Aug 2020 11:03  Phos  3.9       Mg     2.1         TPro  7.3  /  Alb  3.7  /  TBili  < 0.2<L>  /  DBili  x   /  AST  59<H>  /  ALT  59<H>  /  AlkPhos  127<H>      PT/INR - ( 28 Aug 2020 19:00 )   PT: 11.8 SEC;   INR: 1.04          PTT - ( 28 Aug 2020 19:00 )  PTT:26.1 SEC      Urinalysis Basic - ( 29 Aug 2020 10:30 )    Color: YELLOW / Appearance: HAZY / S.011 / pH: 6.5  Gluc: NEGATIVE / Ketone: NEGATIVE  / Bili: NEGATIVE / Urobili: NORMAL   Blood: NEGATIVE / Protein: NEGATIVE / Nitrite: NEGATIVE   Leuk Esterase: SMALL / RBC: NONE / WBC 30-40   Sq Epi: x / Non Sq Epi: FEW / Bacteria: x      RADIOLOGY & ADDITIONAL TESTS: No new imaging  Results Reviewed: Yes  Imaging Personally Reviewed:  Electrocardiogram Personally Reviewed:    COORDINATION OF CARE:  Care Discussed with Consultants/Other Providers [Y/N]:  Prior or Outpatient Records Reviewed [Y/N]:

## 2020-08-29 NOTE — DISCHARGE NOTE PROVIDER - NSFOLLOWUPCLINICS_GEN_ALL_ED_FT
[facility]  Hematology/Oncology  [address] [id number]  Phone: [phone]  Fax:   Follow Up Time:

## 2020-08-29 NOTE — DISCHARGE NOTE PROVIDER - HOSPITAL COURSE
69 y/o F with pancreatic cancer on chemotherapy p/w lightheadedness and unsteadiness.  Pt reports she had dosi-fuser pump placed 2 days ago, and states she received a slightly higher dose of chemotherapy than she normally receives.  Since then, she has felt unsteady, lightheaded, and nauseated.  She reports her tongue and lips felt numb.  She report constipation.  No loss of appetite.  No fevers or chills.  No cough.  She returned to [facility] for removal of chemo pump, and was noted to have low BP.  She was sent to the ED for further management. In the ED, pt was given 1.5L LR, vancomycin and cefepime.         Patient was admitted for adverse reaction during chemotherapy. Symptoms had resolved. Adverse reaction most likely secondary to receiving higher dose of chemotherapy as patient states she has had similar symptoms in the past. Blood cultures were drawn 8/29 which showed ______. UA was negative. Oncology was consulted and stated most likely reaction caused by dehydration and not infectious process. Patient does not need chemo inpt. Patient is stable and ready for discharge.         On ___ this case was reviewed with  ____, the patient is medically stable and optimized for discharge. All medications were reviewed and prescriptions were sent to mutually agreed upon pharmacy. 71 y/o F with pancreatic cancer on chemotherapy p/w lightheadedness and unsteadiness.  Pt reports she had dosi-fuser pump placed 2 days ago, and states she received a slightly higher dose of chemotherapy than she normally receives.  Since then, she has felt unsteady, lightheaded, and nauseated.  She reports her tongue and lips felt numb.  She report constipation.  No loss of appetite.  No fevers or chills.  No cough.  She returned to [facility] for removal of chemo pump, and was noted to have low BP.  She was sent to the ED for further management. In the ED, pt was given 1.5L LR, vancomycin and cefepime.         Patient was admitted for adverse reaction during chemotherapy. Symptoms had resolved. Adverse reaction most likely secondary to receiving higher dose of chemotherapy as patient states she has had similar symptoms in the past. Blood cultures were drawn 8/29 which has been NGTD. UA was negative. Oncology was consulted and stated most likely reaction caused by dehydration and not infectious process. Patient does not need chemo inpt. Patient is stable and ready for discharge.         On ___ this case was reviewed with  ____, the patient is medically stable and optimized for discharge. All medications were reviewed and prescriptions were sent to mutually agreed upon pharmacy. 71 y/o F with pancreatic cancer on chemotherapy p/w lightheadedness and unsteadiness.  Pt reports she had dosi-fuser pump placed 2 days ago, and states she received a slightly higher dose of chemotherapy than she normally receives.  Since then, she has felt unsteady, lightheaded, and nauseated.  She reports her tongue and lips felt numb.  She report constipation.  No loss of appetite.  No fevers or chills.  No cough.  She returned to [facility] for removal of chemo pump, and was noted to have low BP.  She was sent to the ED for further management. In the ED, pt was given 1.5L LR, vancomycin and cefepime.         Patient was admitted for adverse reaction during chemotherapy. Symptoms had resolved. Adverse reaction most likely secondary to receiving higher dose of chemotherapy as patient states she has had similar symptoms in the past. Blood cultures were drawn 8/29 which has been NGTD. UA was negative. Ucx NGTD. Oncology was consulted and stated most likely reaction caused by dehydration and not infectious process. Patient does not need chemo inpt. Patient is stable and ready for discharge.         On 8/30/2020 this case was reviewed with Dr. Segundo, the patient is medically stable and optimized for discharge. All medications were reviewed and prescriptions were sent to mutually agreed upon pharmacy.

## 2020-08-29 NOTE — DISCHARGE NOTE PROVIDER - CARE PROVIDER_API CALL
TAMY BUCKNER  [id number]  [address]ELOS, Phys,    Phone: [phone]  Fax: [phone]  Follow Up Time:     Antonieta Scruggs  NP [place] [facility]  [address] [place] [address]  Phone: [phone]  Fax: [phone]  Follow Up Time:

## 2020-08-29 NOTE — DISCHARGE NOTE PROVIDER - NSDCMRMEDTOKEN_GEN_ALL_CORE_FT
Compazine 10 mg oral tablet: 1 tab(s) orally 3 times a day, As Needed  Creon 12,000 units oral delayed release capsule: 1 cap(s) orally 3 times a day  Zofran ODT 8 mg oral tablet, disintegratin tab(s) orally 3 times a day, As Needed

## 2020-08-29 NOTE — CONSULT NOTE ADULT - SUBJECTIVE AND OBJECTIVE BOX
REASON FOR CONSULTATION: Metastatic pancreatic ca     HPI: 70F with metastatic pancreatic cancer currently on FOLFIRINOX (received cycle 6 on 8/26, held oxaliplatin) who presented from [facility] with bradycardia and hypotension. She was at [facility] yesterday for dosi-fuser disconnect when pt complained of dizziness and room spinning sensation. In ER, BP was 89/45. Pt was given 1 L NS and 10mg Reglan PTA. She was also given empiric antibiotics. Pt reports her tongue and lips felt numb. She reports constipation.  No loss of appetite.  No fevers or chills.  No cough.    REVIEW OF SYSTEMS:    CONSTITUTIONAL: +dizziness, improving, no fevers or chills   EYES/ENT: No visual changes;  No vertigo or throat pain   NECK: No pain or stiffness  RESPIRATORY: No cough, wheezing, hemoptysis; No shortness of breath  CARDIOVASCULAR: No chest pain or palpitations  GASTROINTESTINAL: +Constipation. No abdominal or epigastric pain. No nausea, vomiting, or hematemesis; No diarrhea. No melena or hematochezia.  GENITOURINARY: No dysuria, frequency or hematuria  NEUROLOGICAL: No numbness or weakness  SKIN: No itching, burning, rashes, or lesions   All other review of systems is negative unless indicated above.    Allergies    Allergy Status Unknown    Intolerances        MEDICATIONS  (STANDING):  enoxaparin Injectable 40 milliGRAM(s) SubCutaneous daily  pancrelipase  (CREON 12,000 Lipase Units) 1 Capsule(s) Oral with breakfast    MEDICATIONS  (PRN):  ondansetron   Disintegrating Tablet 8 milliGRAM(s) Oral three times a day PRN Nausea and/or Vomiting      Vital Signs Last 24 Hrs  T(C): 36.6 (29 Aug 2020 04:27), Max: 36.6 (28 Aug 2020 19:01)  T(F): 97.8 (29 Aug 2020 04:27), Max: 97.9 (28 Aug 2020 23:41)  HR: 55 (29 Aug 2020 04:27) (48 - 60)  BP: 101/55 (29 Aug 2020 04:27) (78/51 - 130/44)  BP(mean): --  RR: 16 (29 Aug 2020 04:27) (13 - 18)  SpO2: 100% (29 Aug 2020 04:27) (97% - 100%)    PHYSICAL EXAM:        LABS:                        10.8   14.70 )-----------( 114      ( 28 Aug 2020 19:00 )             34.0     08-28    138  |  103  |  25<H>  ----------------------------<  83  4.2   |  25  |  0.37<L>    Ca    8.6      28 Aug 2020 19:00    TPro  7.3  /  Alb  3.7  /  TBili  < 0.2<L>  /  DBili  x   /  AST  59<H>  /  ALT  59<H>  /  AlkPhos  127<H>  08-28    PT/INR - ( 28 Aug 2020 19:00 )   PT: 11.8 SEC;   INR: 1.04          PTT - ( 28 Aug 2020 19:00 )  PTT:26.1 SEC          RADIOLOGY & ADDITIONAL STUDIES:    PATHOLOGY: REASON FOR CONSULTATION: Metastatic pancreatic ca     HPI: 70F with metastatic pancreatic cancer currently on FOLFIRINOX (received cycle 6 on 8/26, held oxaliplatin) who presented from [facility] with bradycardia and hypotension. She was at [facility] yesterday for dosi-fuser disconnect when pt complained of dizziness and room spinning sensation. In ER, BP was 89/45. Pt was given 1 L NS and 10mg Reglan PTA. She was also given empiric antibiotics. Pt reports her tongue and lips felt numb. She reports constipation.  No loss of appetite.  No fevers or chills.  No cough.    REVIEW OF SYSTEMS:    CONSTITUTIONAL: +dizziness, improving, no fevers or chills   EYES/ENT: No visual changes;  No vertigo or throat pain   NECK: No pain or stiffness  RESPIRATORY: No cough, wheezing, hemoptysis; No shortness of breath  CARDIOVASCULAR: No chest pain or palpitations  GASTROINTESTINAL: +Constipation. No abdominal or epigastric pain. No nausea, vomiting, or hematemesis; No diarrhea. No melena or hematochezia.  GENITOURINARY: No dysuria, frequency or hematuria  NEUROLOGICAL: No numbness or weakness  SKIN: No itching, burning, rashes, or lesions   All other review of systems is negative unless indicated above.    PAST MEDICAL HISTORY:  GERD (gastroesophageal reflux disease)     Hyperlipidemia     Systemic lupus erythematosus.     PAST SURGICAL HISTORY:  H/O total hysterectomy.     FAMILY HISTORY:  Family history of diabetes mellitus.     Social History:  Social History (marital status, living situation, occupation, tobacco use, alcohol and drug use, and sexual history): nonsmoker	      Allergies    Allergy Status Unknown    Intolerances        MEDICATIONS  (STANDING):  enoxaparin Injectable 40 milliGRAM(s) SubCutaneous daily  pancrelipase  (CREON 12,000 Lipase Units) 1 Capsule(s) Oral with breakfast    MEDICATIONS  (PRN):  ondansetron   Disintegrating Tablet 8 milliGRAM(s) Oral three times a day PRN Nausea and/or Vomiting      Vital Signs Last 24 Hrs  T(C): 36.6 (29 Aug 2020 04:27), Max: 36.6 (28 Aug 2020 19:01)  T(F): 97.8 (29 Aug 2020 04:27), Max: 97.9 (28 Aug 2020 23:41)  HR: 55 (29 Aug 2020 04:27) (48 - 60)  BP: 101/55 (29 Aug 2020 04:27) (78/51 - 130/44)  BP(mean): --  RR: 16 (29 Aug 2020 04:27) (13 - 18)  SpO2: 100% (29 Aug 2020 04:27) (97% - 100%)    PHYSICAL EXAM:        LABS:                        10.8   14.70 )-----------( 114      ( 28 Aug 2020 19:00 )             34.0     08-28    138  |  103  |  25<H>  ----------------------------<  83  4.2   |  25  |  0.37<L>    Ca    8.6      28 Aug 2020 19:00    TPro  7.3  /  Alb  3.7  /  TBili  < 0.2<L>  /  DBili  x   /  AST  59<H>  /  ALT  59<H>  /  AlkPhos  127<H>  08-28    PT/INR - ( 28 Aug 2020 19:00 )   PT: 11.8 SEC;   INR: 1.04          PTT - ( 28 Aug 2020 19:00 )  PTT:26.1 SEC          RADIOLOGY & ADDITIONAL STUDIES:    PATHOLOGY: REASON FOR CONSULTATION: Metastatic pancreatic ca     HPI: 70F with metastatic pancreatic cancer currently on FOLFIRINOX (received cycle 6 on 8/26, held oxaliplatin) who presented from [facility] with bradycardia and hypotension. She was at [facility] yesterday for dosi-fuser disconnect when pt complained of dizziness and room spinning sensation. In ER, BP was 89/45. Pt was given 1 L NS and 10mg Reglan PTA. She was also given empiric antibiotics. Pt reports her tongue and lips felt numb. She reports constipation.  No loss of appetite.  No fevers or chills.  No cough.    REVIEW OF SYSTEMS:    CONSTITUTIONAL: +dizziness, improving, no fevers or chills   EYES/ENT: No visual changes;  No vertigo or throat pain   NECK: No pain or stiffness  RESPIRATORY: No cough, wheezing, hemoptysis; No shortness of breath  CARDIOVASCULAR: No chest pain or palpitations  GASTROINTESTINAL: +Constipation. No abdominal or epigastric pain. No nausea, vomiting, or hematemesis; No diarrhea. No melena or hematochezia.  GENITOURINARY: No dysuria, frequency or hematuria  NEUROLOGICAL: No numbness or weakness  SKIN: No itching, burning, rashes, or lesions   All other review of systems is negative unless indicated above.    PAST MEDICAL HISTORY:  GERD (gastroesophageal reflux disease)     Hyperlipidemia     Systemic lupus erythematosus.     PAST SURGICAL HISTORY:  H/O total hysterectomy.     FAMILY HISTORY:  Family history of diabetes mellitus.     Social History:  Social History (marital status, living situation, occupation, tobacco use, alcohol and drug use, and sexual history): nonsmoker	      Allergies    Allergy Status Unknown    Intolerances        MEDICATIONS  (STANDING):  enoxaparin Injectable 40 milliGRAM(s) SubCutaneous daily  pancrelipase  (CREON 12,000 Lipase Units) 1 Capsule(s) Oral with breakfast    MEDICATIONS  (PRN):  ondansetron   Disintegrating Tablet 8 milliGRAM(s) Oral three times a day PRN Nausea and/or Vomiting      Vital Signs Last 24 Hrs  T(C): 36.6 (29 Aug 2020 04:27), Max: 36.6 (28 Aug 2020 19:01)  T(F): 97.8 (29 Aug 2020 04:27), Max: 97.9 (28 Aug 2020 23:41)  HR: 55 (29 Aug 2020 04:27) (48 - 60)  BP: 101/55 (29 Aug 2020 04:27) (78/51 - 130/44)  BP(mean): --  RR: 16 (29 Aug 2020 04:27) (13 - 18)  SpO2: 100% (29 Aug 2020 04:27) (97% - 100%)    PHYSICAL EXAM:    GENERAL: NAD, well-groomed, well-developed  HEAD:  Atraumatic, Normocephalic  EYES: EOMI, PERRLA, conjunctiva and sclera clear  ENMT: No tonsillar erythema, exudates, or enlargement; Moist mucous membranes, Good dentition, No lesions  NECK: Supple, No JVD, Normal thyroid  CHEST/LUNG: +R mediport in place. Clear to percussion bilaterally; No rales, rhonchi, wheezing, or rubs  HEART: Regular rate and rhythm; No murmurs, rubs, or gallops  ABDOMEN: Soft, Nontender, Nondistended; Bowel sounds present  LYMPH: No lymphadenopathy noted  SKIN: No rashes or lesions  NERVOUS SYSTEM:  Alert & Oriented X3      LABS:                        10.8   14.70 )-----------( 114      ( 28 Aug 2020 19:00 )             34.0     08-28    138  |  103  |  25<H>  ----------------------------<  83  4.2   |  25  |  0.37<L>    Ca    8.6      28 Aug 2020 19:00    TPro  7.3  /  Alb  3.7  /  TBili  < 0.2<L>  /  DBili  x   /  AST  59<H>  /  ALT  59<H>  /  AlkPhos  127<H>  08-28    PT/INR - ( 28 Aug 2020 19:00 )   PT: 11.8 SEC;   INR: 1.04          PTT - ( 28 Aug 2020 19:00 )  PTT:26.1 SEC          RADIOLOGY & ADDITIONAL STUDIES:    PATHOLOGY:

## 2020-08-29 NOTE — CONSULT NOTE ADULT - ATTENDING COMMENTS
70F with metastatic pancreatic cancer currently on FOLFIRINOX (received cycle 6 on 8/26, held oxaliplatin) who presented from [facility] with bradycardia and hypotension. Given IVF and empiric antibiotics while sepsis ruled out.  Afebrile and feeling better so likely due to dehydration. Low suspicion for infectious etiology.  Agree with plan for discharge. Will follow up at [facility] with primary oncologist, Dr. Paul.

## 2020-08-29 NOTE — PROGRESS NOTE ADULT - ASSESSMENT
71 y/o F with pancreatic cancer on chemotherapy p/w multiple complaints including lightheadedness and unsteadiness likely as adverse effect of chemotherapy.

## 2020-08-29 NOTE — DISCHARGE NOTE PROVIDER - NSDCFUSCHEDAPPT_GEN_ALL_CORE_FT
LIZ, GRISELDA ; 08/31/2020 ; [facility]  LIZ, GRISELDA ; 08/31/2020 ; [facility]  LIZ, GRISELDA ; 09/09/2020 ; [facility]  LIZ, GRISELDA ; 09/09/2020 ; [facility]  LIZ, GRISELDA ; 09/11/2020 ; [facility]  LIZ, GRISELDA ; 09/23/2020 ; [facility]  LIZ, GRISELDA ; 09/25/2020 ; [facility]

## 2020-08-29 NOTE — DISCHARGE NOTE PROVIDER - NSDCFUADDAPPT_GEN_ALL_CORE_FT
Please follow up with your primary care physician after discharge for continued monitoring and management.

## 2020-08-29 NOTE — ED ADULT NURSE REASSESSMENT NOTE - NS ED NURSE REASSESS COMMENT FT1
Received report from RAYSHAWN Tena. Pt is A&ox4, resting comfortably. Breathing even and unlabored. 500mL bolus given. MD at bedside. Will revital

## 2020-08-29 NOTE — CONSULT NOTE ADULT - ASSESSMENT
70F with metastatic pancreatic cancer currently on FOLFIRINOX (received cycle 6 on 8/26, held oxaliplatin) who presented from [facility] with bradycardia and hypotension.     #Bradycardia, hypotension- possible chemo side effect   Pt denies any prior similar symptoms from chemotherapy  R/o sepsis - given empiric antibiotics in the ER, pancultured  Hypotension now improved, sinus shane in 50s  Symptoms are improving     #Metastatic pancreatic cancer  Pt was diagnosed June 2020 and started treatment with Folfirinox June 17, 2020. At the time of diagnosis she had RP lymphadenopathy and multiple lung nodules concerning for metastatic disease.  Currently on cycle 6 of FOLFIRINOX regimen (given 8/26 without Oxaliplatin)  Staging scans in August showed stable disease  No plans for any inpatient chemotherapy   Continue outpt followup with Dr. Humberto Paz MD  Hematology Oncology Fellow, PGY-5  [facility] Pager: [phone]/ [facility] Pager: [phone] 70F with metastatic pancreatic cancer currently on FOLFIRINOX (received cycle 6 on 8/26, held oxaliplatin) who presented from [facility] with bradycardia and hypotension.     #Bradycardia, hypotension- possible chemo side effect   Pt denies any prior similar symptoms from chemotherapy  R/o sepsis - given empiric antibiotics in the ER, pancultured  Hypotension now improved, sinus shane in 50s  Symptoms are improving     #Metastatic pancreatic cancer  Pt was diagnosed June 2020 and started treatment with Folfirinox June 17, 2020. At the time of diagnosis she had RP lymphadenopathy and multiple lung nodules concerning for metastatic disease.  Currently on cycle 6 of FOLFIRINOX regimen (given 8/26 without Oxaliplatin)  Most recent scans in August showed stable disease  No plans for any inpatient chemotherapy   Continue outpt followup with Dr. Humberto Paz MD  Hematology Oncology Fellow, PGY-5  [facility] Pager: [phone]/ [facility] Pager: [phone] 70F with metastatic pancreatic cancer currently on FOLFIRINOX (received cycle 6 on 8/26, held oxaliplatin) who presented from [facility] with bradycardia and hypotension.     #Bradycardia, hypotension- possible chemo side effect from increased dose of irinotecan  R/o sepsis - given empiric antibiotics in the ER, pancultured  Hypotension now improved, sinus shane in 50s  Symptoms are improving- no objection from onc for discharge when medically ready     #Metastatic pancreatic cancer  Pt was diagnosed June 2020 and started treatment with Folfirinox June 17, 2020. At the time of diagnosis she had RP lymphadenopathy and multiple lung nodules concerning for metastatic disease.  Currently on cycle 6 of FOLFIRINOX regimen (given 8/26 without Oxaliplatin)  Most recent scans in August showed stable disease  No plans for any inpatient chemotherapy   Continue outpt followup with Dr. Humberto Paz MD  Hematology Oncology Fellow, PGY-5  [facility] Pager: [phone]/ [facility] Pager: [phone]

## 2020-08-30 NOTE — DISCHARGE NOTE NURSING/CASE MANAGEMENT/SOCIAL WORK - PATIENT PORTAL LINK FT
You can access the FollowMyHealth Patient Portal offered by [facility] by registering at the following website: [email or web address]. By joining [employer]’s FollowMyHealth portal, you will also be able to view your health information using other applications (apps) compatible with our system.

## 2020-08-30 NOTE — PROGRESS NOTE ADULT - SUBJECTIVE AND OBJECTIVE BOX
Hillary Lee  [facility] Medicine  Pager #[phone]    Patient is a 70y old  Female who presents with a chief complaint of dizziness (29 Aug 2020 16:13)      SUBJECTIVE / OVERNIGHT EVENTS: Patient seen and examined at bedside. Patient feeling well, wants to go home.     ADDITIONAL REVIEW OF SYSTEMS:    MEDICATIONS  (STANDING):  chlorhexidine 4% Liquid 1 Application(s) Topical daily  chlorhexidine 4% Liquid 1 Application(s) Topical daily  enoxaparin Injectable 40 milliGRAM(s) SubCutaneous daily  pancrelipase  (CREON 12,000 Lipase Units) 1 Capsule(s) Oral with breakfast    MEDICATIONS  (PRN):  ondansetron   Disintegrating Tablet 8 milliGRAM(s) Oral three times a day PRN Nausea and/or Vomiting      CAPILLARY BLOOD GLUCOSE        I&O's Summary    30 Aug 2020 07:01  -  30 Aug 2020 13:12  --------------------------------------------------------  IN: 200 mL / OUT: 0 mL / NET: 200 mL        PHYSICAL EXAM:    Vital Signs Last 24 Hrs  T(C): 36.3 (30 Aug 2020 12:06), Max: 37.2 (29 Aug 2020 21:56)  T(F): 97.4 (30 Aug 2020 12:06), Max: 98.9 (29 Aug 2020 21:56)  HR: 64 (30 Aug 2020 12:06) (57 - 66)  BP: 110/56 (30 Aug 2020 12:06) (91/52 - 110/56)  BP(mean): --  RR: 17 (30 Aug 2020 12:06) (16 - 18)  SpO2: 98% (30 Aug 2020 12:06) (98% - 100%)    CONSTITUTIONAL: NAD, well-developed, well-groomed  EYES: Conjunctiva and sclera clear  ENMT: Moist oral mucosa  RESPIRATORY: Normal respiratory effort; lungs are clear to auscultation bilaterally  CARDIOVASCULAR: Regular rate and rhythm, normal S1 and S2, no murmur/rub/gallop; No lower extremity edema  ABDOMEN: Nontender to palpation, normoactive bowel sounds  MUSCULOSKELETAL: No clubbing or cyanosis of digits  PSYCH: A+O to person, place, and time; affect appropriate  NEUROLOGY: CN 2-12 are intact and symmetric; no gross sensory deficits   SKIN: No rashes; no palpable lesions    LABS:                        11.4   6.74  )-----------( 121      ( 29 Aug 2020 11:03 )             34.6     08-    138  |  106  |  16  ----------------------------<  114<H>  3.9   |  23  |  0.43<L>    Ca    8.7      29 Aug 2020 11:03  Phos  3.9     08  Mg     2.1         TPro  7.3  /  Alb  3.7  /  TBili  < 0.2<L>  /  DBili  x   /  AST  59<H>  /  ALT  59<H>  /  AlkPhos  127<H>  0828    PT/INR - ( 28 Aug 2020 19:00 )   PT: 11.8 SEC;   INR: 1.04          PTT - ( 28 Aug 2020 19:00 )  PTT:26.1 SEC      Urinalysis Basic - ( 29 Aug 2020 10:30 )    Color: YELLOW / Appearance: HAZY / S.011 / pH: 6.5  Gluc: NEGATIVE / Ketone: NEGATIVE  / Bili: NEGATIVE / Urobili: NORMAL   Blood: NEGATIVE / Protein: NEGATIVE / Nitrite: NEGATIVE   Leuk Esterase: SMALL / RBC: NONE / WBC 30-40   Sq Epi: x / Non Sq Epi: FEW / Bacteria: x        Culture - Urine (collected 29 Aug 2020 10:30)  Source: .Urine Clean Catch (Midstream)  Final Report (30 Aug 2020 11:37):    <10,000 CFU/mL Normal Urogenital Berta    Culture - Blood (collected 28 Aug 2020 23:15)  Source: .Blood Blood-Peripheral  Preliminary Report (30 Aug 2020 01:02):    No growth to date.    Culture - Blood (collected 28 Aug 2020 23:15)  Source: .Blood Blood-Peripheral  Preliminary Report (30 Aug 2020 01:02):    No growth to date.        RADIOLOGY & ADDITIONAL TESTS: No new imaging  Results Reviewed: Yes  Imaging Personally Reviewed:  Electrocardiogram Personally Reviewed:    COORDINATION OF CARE:  Care Discussed with Consultants/Other Providers [Y/N]:  Prior or Outpatient Records Reviewed [Y/N]:

## 2020-08-30 NOTE — PROGRESS NOTE ADULT - PROBLEM SELECTOR PLAN 1
Symptoms now resolved, likely 2/2 receiving higher dose of chemotherapy as patient states she had similar symptoms in the past  - Blood cultures negative at 24h  - UA negative  - Appreciate oncology recs

## 2020-09-03 NOTE — HISTORY OF PRESENT ILLNESS
[Home] : at home, [unfilled] , at the time of the visit. [Medical Office: ([facility])___] : at the medical office located in  [Verbal consent obtained from patient] : the patient, [unfilled] [Disease: _____________________] : Disease: [unfilled] [M: ___] : M[unfilled] [AJCC Stage: ____] : AJCC Stage: [unfilled] [Therapy: ___] : Therapy: [unfilled] [Day: ___] : Day: [unfilled] [Cycle: ___] : Cycle: [unfilled] [3] : 3, Severe [Date: ____________] : Patient's last distress assessment performed on [unfilled]. [4 - Distress Level] : Distress Level: 4 [de-identified] : Diagnosis: Pancreatic adenocarcinoma \par \par Genetic Testing: Will send liquid foundation testing \par \par Other Current Medical Problems: Arthritis/ HLD \par \par Treatment History:\par \par Cycle # 1 06/17/20- FOLFORINOX- 5 FU 2400 mg/ kg /  mg/ m 2/ Irinotecan 150 mg/ m 2 / Oxaliplatin 85 mg/ m2\par Cycle # 2 06/30/20- FOLFORINOX- 5 FU 2400 mg/ kg /  mg/ m 2/ Irinotecan 140 mg/ m 2/ Oxaliplatin 85 mg/ m2\par Cycle # 3- 07/15/20- Skipped Irinotecan / Oxaliplatin-  (ANC 1.4) - 5 FU 2400 mg/ kg /  mg/ m 2\par Cycle #4 - 07/29/20- FOLFOX-  5 FU 2000 mg/ kg /  mg/ m 2/ Oxaliplatin 85 mg/ m2-----------------Tolerated well without Irinotecan \par \par Restaging Scans---------------Stable disease \par Cycle # 5 - 08/12/20- FOLFOX- 5 FU 2000 mg/ kg /  mg/ m 2/ Oxaliplatin 85 mg/ m2--------\par Cycle # 6- 08/26/20-FOLFIRI-  5 FU 2000 mg/ kg /  mg/ m 2/ Irinotecan 150 mg- Held oxaliplatin \par \par Hospitalized at [facility] for adverse reaction from Irinotecan- 8/28- 8/30-------------Will hold IRINOTECAN in future- \par \par Oncological History :\par Ms. Griselda Corona is a 70 year old female who presents for initial consultation in our [facility] for a new diagnosis of pancreas adenocarcinoma. \par Patient states she had gastrointestinal symptoms dating back to Nov 2019, including discomfort, bloating, diarrhea followed by small amounts of blood per rectum. She eventually went to GI doctor Dr Florian Pace.  She had a Colonoscopy in Feb 20 that showed small ulcer. She went the ER at [facility] 3/10/20 for abdominal pain.  CT of her abdomen was performed and showed 3.2 x 3.5 cm hypoenhancing mass in the pancreatic head/ body concerning for malignancy. Mass results in severe compression of SMV. Retroperitoneal lymphadenopathy concerning for metastatic disease with multiple nodules in lung base concerning for metastatic disease.\par \par She was referred  to Dr Bernabe for EUS, which was performed June 1st 2020, significant for an ill-defined, heterogeneous, hypoechoic lesion seen in the head of pancreas. The lesion abutted but did not invade the SMV. The main pancreatic duct was dilated and the parenchyma was heterogeneous within the pancreatic body/tail. Fine needle biopsy was performed.\par \par Pathology: Gastric mucosa positive for H pylori. Pancreas mass: Invasive adenocarcinoma.\par \par Patient is taking medication regimen for the H.Pylori infection, otherwise not taking medications except for multivitamin supplement. \par \par PMD:\par \par FHX: No hx of Cancer \par \par Socail HX: Lives with her children, , Non smoker, no alcohol use\par \par PSX: No surgeries in past \par \par \par Disease:\par Pathology:\par \par AJCC Stage : Stage 4 \par \par Tumor Markers: CA 19.9 was 155 \par \par 6/10/20- MsClaudia Desouza is seen in [facility] clinic. She states he has abdominal pain 7-8/10 and gets relief with Tylenol. She has had 2 lbs weight loss- and appetite was decreased .\par Ct abd/ pelvis/ chest- Large pancreatic mass suspicious for adenocarcinoma, with marked arterial \par and venous involvement. Numerous bilateral pulmonary nodules new since 2018 and suspicious for metastatic disease. Retroperitoneal and peripancreatic lymphadenopathy. \par Omental nodule raising the possibility of peritoneal carcinomatosis. \par \par 06/17/20- Ms. Deo is seen for follow up. She had the port placed earlier last week and is scheduled for her chemotherapy today . We discussed the chemo side effects and what to expect from chemotherapy- SE- Drug sheet was provided Risks/ benefits and alternatives of chemotherapy were discussed and included but not limited to fatigue,low blood counts, nausea/vomiting, skin reactions, hair thinning, possible blood transfusion requirement,increased risk of infection,neuropathy. Patient agreed to above and informed consent was signed.PET Scan- done- on 06/16/20-FDG-avid mass in pancreatic body and neck corresponds to known malignancy. Hypermetabolic left periaortic lymphadenopathy is compatible with metastatic disease.  Multiple bilateral pulmonary nodules, not significantly changed as compared to CT dated 6/9/2020, compatible with metastatic disease. \par \par 6/30/20 - Here for Cycle 2 \par 1- Developed G3 fatigue following C1 , and had to remain in bed for at least 4 days; son was cooking, she was able to go to bathroom and shower by herself\par 2- Also had decreased appetite and G1 nausea but no vomiting\par 3- Declines neuropathy\par 4- Denies diarrhea\par 5- No fever\par 6- No sxs suggestive of COVID-19\par 7- lost weight upto 10 lbs\par 8- also I am aware of her reaction to IRIN during infusion\par \par \par 7/8/20- Recieved a call from patient stating she had episodes of vomiting yesterday. She went out yesterday in the summer sun  and had some food from outside and she has been throwing up. She states she lost weight >30 lbs- She states the Creon has been causing a lot of abdominal pain - She has been experiencing some abdominal pain but is afraid to use the Oxycodone or the medical marijuana. \par She lives with her 2 sons and her sister in law has been helping her intermittently - However she needs a lot of reinstatement of instructions- Explained to her that she needs to take the Pain meds when needed and the pain is not from Creon. \par She is requesting for a Home COVID swab - will arrange for the same. \par \par 7/17/20- Pt was scheduled for C#3 with FOLFOX instead of FOLFORINOX to identify the culprit for the reaction. But her ANC was 1.4 so held Oxaliplatin and gave only 5 FU and LCV. She tolerated this chemo better did not have any reaction like previously. She denies N/V/D. She has been experiencing spasms in her fingers after the previous chemotherapy with FOLFORINOX- Her tumor markers CEA was rising and is concerning from 0.7- 3.6- 7.9.Patient states she was diagnosed with LUPUS by her PCP in March Unclear if she is on any treatment for the same. Will get the medical records from her PCP.\par \par 7/29/20- Pt is scheduled for C#4 with FOLFOX instead of FOLFORINOX to identify the culprit for the reaction. She tolerated the last chemotherapy well. She has been eating better and denies N/V. Today she tolerated the chemo with FOLFOX well - No reaction. next cycle we plan to give FOLFIRI\par \par 8/10//20- Pt is seen for TEB visit after she had the restaging scans done- She had CT scan done that showed- Metastatic pancreatic adenocarcinoma. Stable disease. She tolerated the chemo well without the irinotecan. we answered all her questions and concerns. Her son was not in the telephone conference. Dr Paul left a message with the son on his cell phone. Plan is to continue with the chemo \par \par 8/26/20- Ms. Desouza is seen for follow up- She tolerated the last chemo well- denies N/V/D. She denies any neuropathy - Denies pain - does not use mediacl marijuana- \par Explained to her that she will receive FOLFIRI this cycle and to inform if she develops any reaction to the same. \par \par \par  [de-identified] : adenocarcinoma [FreeTextEntry3] : Hypotension, bradycardia  [de-identified] : 09/3/20- Ms Desouza has a telephone visit with me. She was hospitalized at [facility] after her last chemo- when she came for the disconnect- She was hospitalized at [facility] from 8/28/20 to 8/30/20- When she came for the disconnect her BP was low and she was bradycardiac and after a fluid challenge she still did not recover- She was sent to hospital for further work up where Blood cx/ Ua/ Urine cx was negative and it was deemed that she was dehydrated and no infectious process.\par \par Spoke with the patient who states she had a rough time with this chemotherapy and she is feeling better now- however she has been having episodes of diarrhea and nausea. She has been taking the anti nausea meds and that has helped her. She feels that she dis better with FOLFOX - Discussed with Dr Paul and plan is to hold off on nay future doses of Irinotecan.\par \par  [FreeTextEntry5] : IRIN [FreeTextEntry4] : 6/17/20 [FreeTextEntry2] : x 4 daYS

## 2020-09-03 NOTE — ASSESSMENT
[FreeTextEntry1] : 1-Therapeutic: \par -----------------\par Pancreatic cancer with mets to ? lungs as shown in CT from outside facility\par Started patient on FOLFORINOX regimen.\par Since she developed AE  with the Irinotecan and was hospitalized post Irinotecan- We will hold off on any more doses of Irinotecan - Plan to treat her with FOLFOX alone and omit Irinotecan.\par \par \par 2-Diagnostic: \par ---------------\par Labs: Baseline Labs including coags, CEA, , lipase, amylase AND THEN LABS Q 2 WEEKS AHD MARKER Q 4 Q WEEKS\par Scans: CT scans/ PET  reviewed \par Restaging scans after 4 cycles of chemotherapy \par \par 3- Pharmacogenetics testing:\par ----------------------------------\par - fOLLOW UGT1A1, DPYD \par - Plan to order MSI for future PDL-1 therapy \par - Somatic and germline mutations per NCCN guidelines REQUESTED\par \par 4- Procedures:\par -----------------\par - S/P  Port-A-Cath placement on 06/15/20\par - PORT CARE\par - EMLA CREAM PRESCRIBED\par \par 5-Supportive: \par -----------------\par Pancreatic insufficiency: CREON - [address] units (2 pills with each snack and 4 pills with each meal) for pancreatic replacement\par High risk of diabetes : Close management of glycemic index, endocrine consult if needed\par DVT (PE): High risk, close monitoring\par Antiemetic: Zofran PRN and Compazine PRN\par GI prophylaxis: May consider pepcid or PPI as long as no other drug interactions\par Psychological: Remeron 15mg PO at bed time if needed , \par Emla cream: for port numbing before use\par Prevention of mouth sores: Biotene mouthwash\par Skin reactions: Udderly smooth cream and Vitamin B 6 100 mg BID for prevention of HFS\par Diarrhea: The usual dose of Imodium is 4 milligrams (mg) (2 capsules) after the first loose bowel movement, and 2 mg (1 capsule) after each loose bowel movement after the first dose has been taken. No more than 16 mg (8 capsules) should be taken in any twenty-four-hour period. \par \par 6- RTC:  \par --------\par In  1 weeks. \par

## 2020-09-09 NOTE — HISTORY OF PRESENT ILLNESS
[Disease: _____________________] : Disease: [unfilled] [M: ___] : M[unfilled] [AJCC Stage: ____] : AJCC Stage: [unfilled] [Therapy: ___] : Therapy: [unfilled] [Cycle: ___] : Cycle: [unfilled] [Day: ___] : Day: [unfilled] [3] : 3, Severe [Date: ____________] : Patient's last distress assessment performed on [unfilled]. [4 - Distress Level] : Distress Level: 4 [de-identified] : Diagnosis: Pancreatic adenocarcinoma \par \par Genetic Testing: Will send liquid foundation testing \par \par Other Current Medical Problems: Arthritis/ HLD \par \par Treatment History:\par \par Cycle # 1 06/17/20- FOLFORINOX- 5 FU 2400 mg/ kg /  mg/ m 2/ Irinotecan 150 mg/ m 2 / Oxaliplatin 85 mg/ m2\par Cycle # 2 06/30/20- FOLFORINOX- 5 FU 2400 mg/ kg /  mg/ m 2/ Irinotecan 140 mg/ m 2/ Oxaliplatin 85 mg/ m2\par Cycle # 3- 07/15/20- Skipped Irinotecan / Oxaliplatin-  (ANC 1.4) - 5 FU 2400 mg/ kg /  mg/ m 2\par Cycle #4 - 07/29/20- FOLFOX-  5 FU 2000 mg/ kg /  mg/ m 2/ Oxaliplatin 85 mg/ m2-----------------Tolerated well without Irinotecan \par \par Restaging Scans---------------Stable disease \par Cycle # 5 - 08/12/20- FOLFOX- 5 FU 2000 mg/ kg /  mg/ m 2/ Oxaliplatin 85 mg/ m2--------\par Cycle # 6- 08/26/20-FOLFIRI-  5 FU 2000 mg/ kg /  mg/ m 2/ Irinotecan 150 mg- Held oxaliplatin \par \par Hospitalized at [facility] for adverse reaction from Irinotecan- 8/28- 8/30-------------Will hold IRINOTECAN in future- \par \par Cycle # 7 09/09/20 - 5 FU 2000 mg/  mg/ m2 -----------Oxali was held since ANC was 1.29 \par \par Oncological History :\par Ms. Griselda Corona is a 70 year old female who presents for initial consultation in our Pancreas [facility] for a new diagnosis of pancreas adenocarcinoma. \par Patient states she had gastrointestinal symptoms dating back to Nov 2019, including discomfort, bloating, diarrhea followed by small amounts of blood per rectum. She eventually went to GI doctor Dr Florian Pace.  She had a Colonoscopy in Feb 20 that showed small ulcer. She went the ER at [facility] 3/10/20 for abdominal pain.  CT of her abdomen was performed and showed 3.2 x 3.5 cm hypoenhancing mass in the pancreatic head/ body concerning for malignancy. Mass results in severe compression of SMV. Retroperitoneal lymphadenopathy concerning for metastatic disease with multiple nodules in lung base concerning for metastatic disease.\par \par She was referred  to Dr Bernabe for EUS, which was performed June 1st 2020, significant for an ill-defined, heterogeneous, hypoechoic lesion seen in the head of pancreas. The lesion abutted but did not invade the SMV. The main pancreatic duct was dilated and the parenchyma was heterogeneous within the pancreatic body/tail. Fine needle biopsy was performed.\par \par Pathology: Gastric mucosa positive for H pylori. Pancreas mass: Invasive adenocarcinoma.\par \par Patient is taking medication regimen for the H.Pylori infection, otherwise not taking medications except for multivitamin supplement. \par \par PMD:\par \par FHX: No hx of Cancer \par \par Socail HX: Lives with her children, , Non smoker, no alcohol use\par \par PSX: No surgeries in past \par \par \par Disease:\par Pathology:\par \par AJCC Stage : Stage 4 \par \par Tumor Markers: CA 19.9 was 155 \par \par 6/10/20- MsClaudia Desouza is seen in [facility] clinic. She states he has abdominal pain 7-8/10 and gets relief with Tylenol. She has had 2 lbs weight loss- and appetite was decreased .\par Ct abd/ pelvis/ chest- Large pancreatic mass suspicious for adenocarcinoma, with marked arterial \par and venous involvement. Numerous bilateral pulmonary nodules new since 2018 and suspicious for metastatic disease. Retroperitoneal and peripancreatic lymphadenopathy. \par Omental nodule raising the possibility of peritoneal carcinomatosis. \par \par 06/17/20- Ms. Desouza is seen for follow up. She had the port placed earlier last week and is scheduled for her chemotherapy today . We discussed the chemo side effects and what to expect from chemotherapy- SE- Drug sheet was provided Risks/ benefits and alternatives of chemotherapy were discussed and included but not limited to fatigue,low blood counts, nausea/vomiting, skin reactions, hair thinning, possible blood transfusion requirement,increased risk of infection,neuropathy. Patient agreed to above and informed consent was signed.PET Scan- done- on 06/16/20-FDG-avid mass in pancreatic body and neck corresponds to known malignancy. Hypermetabolic left periaortic lymphadenopathy is compatible with metastatic disease.  Multiple bilateral pulmonary nodules, not significantly changed as compared to CT dated 6/9/2020, compatible with metastatic disease. \par \par 6/30/20 - Here for Cycle 2 \par 1- Developed G3 fatigue following C1 , and had to remain in bed for at least 4 days; son was cooking, she was able to go to bathroom and shower by herself\par 2- Also had decreased appetite and G1 nausea but no vomiting\par 3- Declines neuropathy\par 4- Denies diarrhea\par 5- No fever\par 6- No sxs suggestive of COVID-19\par 7- lost weight upto 10 lbs\par 8- also I am aware of her reaction to IRIN during infusion\par \par \par 7/8/20- Recieved a call from patient stating she had episodes of vomiting yesterday. She went out yesterday in the summer sun  and had some food from outside and she has been throwing up. She states she lost weight >30 lbs- She states the Creon has been causing a lot of abdominal pain - She has been experiencing some abdominal pain but is afraid to use the Oxycodone or the medical marijuana. \par She lives with her 2 sons and her sister in law has been helping her intermittently - However she needs a lot of reinstatement of instructions- Explained to her that she needs to take the Pain meds when needed and the pain is not from Creon. \par She is requesting for a Home COVID swab - will arrange for the same. \par \par 7/17/20- Pt was scheduled for C#3 with FOLFOX instead of FOLFORINOX to identify the culprit for the reaction. But her ANC was 1.4 so held Oxaliplatin and gave only 5 FU and LCV. She tolerated this chemo better did not have any reaction like previously. She denies N/V/D. She has been experiencing spasms in her fingers after the previous chemotherapy with FOLFORINOX- Her tumor markers CEA was rising and is concerning from 0.7- 3.6- 7.9.Patient states she was diagnosed with LUPUS by her PCP in March Unclear if she is on any treatment for the same. Will get the medical records from her PCP.\par \par 7/29/20- Pt is scheduled for C#4 with FOLFOX instead of FOLFORINOX to identify the culprit for the reaction. She tolerated the last chemotherapy well. She has been eating better and denies N/V. Today she tolerated the chemo with FOLFOX well - No reaction. next cycle we plan to give FOLFIRI\par \par 8/10//20- Pt is seen for TEB visit after she had the restaging scans done- She had CT scan done that showed- Metastatic pancreatic adenocarcinoma. Stable disease. She tolerated the chemo well without the irinotecan. we answered all her questions and concerns. Her son was not in the telephone conference. Dr Paul left a message with the son on his cell phone. Plan is to continue with the chemo \par \par 8/26/20- Ms. Deo is seen for follow up- She tolerated the last chemo well- denies N/V/D. She denies any neuropathy - Denies pain - does not use mediacl marijuana- \par Explained to her that she will receive FOLFIRI this cycle and to inform if she develops any reaction to the same. \par \par 09/3/20- Ms Deo has a telephone visit with me. She was hospitalized at [facility] after her last chemo- when she came for the disconnect- She was hospitalized at [facility] from 8/28/20 to 8/30/20- When she came for the disconnect her BP was low and she was bradycardiac and after a fluid challenge she still did not recover- She was sent to hospital for further work up where Blood cx/ Ua/ Urine cx was negative and it was deemed that she was dehydrated and no infectious process.\par \par Spoke with the patient who states she had a rough time with this chemotherapy and she is feeling better now- however she has been having episodes of diarrhea and nausea. She has been taking the anti nausea meds and that has helped her. She feels that she dis better with FOLFOX - Discussed with Dr Paul and plan is to hold off on nay future doses of Irinotecan.\par  [de-identified] : adenocarcinoma [FreeTextEntry4] : 6/17/20 [FreeTextEntry3] : Hypotension, bradycardia  [de-identified] : 09/9/20- Ms Desouza is seen for C #7 - Her ANC was 1.23- Discussed with Dr Paul and plan to hold off on Oxaliplatin - Pt will get 5 FU and LCV - She will receive onpro on disconnect day. I explained to her hat she will not get any mores doses of Irinotecan and to keep a close eye on any symptoms she develop post chemotherapy. She denies pain/ N/V/D. \par  [FreeTextEntry5] : IRIN [FreeTextEntry2] : x 4 daYS

## 2020-09-09 NOTE — ASSESSMENT
[FreeTextEntry1] : 1-Therapeutic: \par -----------------\par Pancreatic cancer with mets to ? lungs as shown in CT from outside facility\par Started patient on FOLFORINOX regimen.\par Since she developed AE  with the Irinotecan and was hospitalized post Irinotecan- We will hold off on any more doses of Irinotecan - Plan to treat her with FOLFOX alone and omit Irinotecan.\par Today her ANC was 1.23 so will hold the Oxaliplatin for her chemo schedule today- \par \par 2-Diagnostic: \par ---------------\par Labs:Monthly CEA, , \par Scans: CT scans/ PET  reviewed \par Restaging scans after 4 cycles of chemotherapy \par \par 3- Pharmacogenetics testing:\par ----------------------------------\par - fOLLOW UGT1A1, DPYD \par - Plan to order MSI for future PDL-1 therapy \par - Somatic and germline mutations per NCCN guidelines REQUESTED\par \par 4- Procedures:\par -----------------\par - S/P  Port-A-Cath placement on 06/15/20\par - PORT CARE\par - EMLA CREAM PRESCRIBED\par \par 5-Supportive: \par -----------------\par Pancreatic insufficiency: CREON - [address] units (2 pills with each snack and 4 pills with each meal) for pancreatic replacement\par High risk of diabetes : Close management of glycemic index, endocrine consult if needed\par DVT (PE): High risk, close monitoring\par Antiemetic: Zofran PRN and Compazine PRN\par GI prophylaxis: May consider pepcid or PPI as long as no other drug interactions\par Psychological: Remeron 15mg PO at bed time if needed , \par Emla cream: for port numbing before use\par Prevention of mouth sores: Biotene mouthwash\par Skin reactions: Udderly smooth cream and Vitamin B 6 100 mg BID for prevention of HFS\par Diarrhea: The usual dose of Imodium is 4 milligrams (mg) (2 capsules) after the first loose bowel movement, and 2 mg (1 capsule) after each loose bowel movement after the first dose has been taken. No more than 16 mg (8 capsules) should be taken in any twenty-four-hour period. \par \par 6- RTC:  \par --------\par In  2 weeks. \par

## 2020-09-23 NOTE — ASSESSMENT
[FreeTextEntry1] : 1-Therapeutic: \par -----------------\par Pancreatic cancer with mets to ? lungs as shown in CT from outside facility\par Started patient on FOLFORINOX regimen.\par Since she developed AE  with the Irinotecan and was hospitalized post Irinotecan- We will hold off on any more doses of Irinotecan - Plan to treat her with FOLFOX alone and omit Irinotecan.\par \par \par 2-Diagnostic: \par ---------------\par Labs:Monthly CEA, , \par Restaging scans ordered - to be done prioe to 10/6/20 \par \par 3- Pharmacogenetics testing:\par ----------------------------------\par - fOLLOW UGT1A1, DPYD \par - Plan to order MSI for future PDL-1 therapy \par - Somatic and germline mutations per NCCN guidelines REQUESTED\par \par 4- Procedures:\par -----------------\par - S/P  Port-A-Cath placement on 06/15/20\par - PORT CARE\par - EMLA CREAM PRESCRIBED\par \par 5-Supportive: \par -----------------\par Pancreatic insufficiency: CREON - [address] units (2 pills with each snack and 4 pills with each meal) for pancreatic replacement\par DVT (PE): High risk, close monitoring\par Antiemetic: Zofran PRN and Compazine PRN\par Psychological: Remeron 15mg PO at bed time if needed , \par Emla cream: for port numbing before use\par Prevention of mouth sores: Biotene mouthwash\par Skin reactions: Udderly smooth cream and Vitamin B 6 100 mg BID for prevention of HFS\par Diarrhea: The usual dose of Imodium is 4 milligrams (mg) (2 capsules) after the first loose bowel movement, and 2 mg (1 capsule) after each loose bowel movement after the first dose has been taken. No more than 16 mg (8 capsules) should be taken in any twenty-four-hour period. \par \par 6- RTC:  \par --------\par In  2 weeks. \par

## 2020-09-23 NOTE — HISTORY OF PRESENT ILLNESS
[Disease: _____________________] : Disease: [unfilled] [M: ___] : M[unfilled] [AJCC Stage: ____] : AJCC Stage: [unfilled] [Therapy: ___] : Therapy: [unfilled] [Cycle: ___] : Cycle: [unfilled] [Day: ___] : Day: [unfilled] [3] : 3, Severe [Date: ____________] : Patient's last distress assessment performed on [unfilled]. [4 - Distress Level] : Distress Level: 4 [de-identified] : Diagnosis: Pancreatic adenocarcinoma \par \par Genetic Testing: Will send liquid foundation testing \par \par Other Current Medical Problems: Arthritis/ HLD \par \par Treatment History:\par \par Cycle # 1 06/17/20- FOLFORINOX- 5 FU 2400 mg/ kg /  mg/ m 2/ Irinotecan 150 mg/ m 2 / Oxaliplatin 85 mg/ m2\par Cycle # 2 06/30/20- FOLFORINOX- 5 FU 2400 mg/ kg /  mg/ m 2/ Irinotecan 140 mg/ m 2/ Oxaliplatin 85 mg/ m2\par Cycle # 3- 07/15/20- Skipped Irinotecan / Oxaliplatin-  (ANC 1.4) - 5 FU 2400 mg/ kg /  mg/ m 2\par Cycle #4 - 07/29/20- FOLFOX-  5 FU 2000 mg/ kg /  mg/ m 2/ Oxaliplatin 85 mg/ m2-----------------Tolerated well without Irinotecan \par \par Restaging Scans---------------Stable disease \par Cycle # 5 - 08/12/20- FOLFOX- 5 FU 2000 mg/ kg /  mg/ m 2/ Oxaliplatin 85 mg/ m2--------\par Cycle # 6- 08/26/20-FOLFIRI-  5 FU 2000 mg/ kg /  mg/ m 2/ Irinotecan 150 mg- Held oxaliplatin \par \par Hospitalized at [facility] for adverse reaction from Irinotecan- 8/28- 8/30-------------Will hold IRINOTECAN in future- \par \par Cycle # 7 09/09/20 - 5 FU 2000 mg/  mg/ m2 -----------Oxali was held since ANC was 1.29 \par Cycle # 8 -09/23/20- 5 FU 2000 mg/  mg/ m2 / Oxaliplatin 85 mg/ m2 \par \par Oncological History :\par Ms. Griselda Corona is a 70 year old female who presents for initial consultation in our [facility] for a new diagnosis of pancreas adenocarcinoma. \par Patient states she had gastrointestinal symptoms dating back to Nov 2019, including discomfort, bloating, diarrhea followed by small amounts of blood per rectum. She eventually went to GI doctor Dr Florian Pace.  She had a Colonoscopy in Feb 20 that showed small ulcer. She went the ER at [facility] 3/10/20 for abdominal pain.  CT of her abdomen was performed and showed 3.2 x 3.5 cm hypoenhancing mass in the pancreatic head/ body concerning for malignancy. Mass results in severe compression of SMV. Retroperitoneal lymphadenopathy concerning for metastatic disease with multiple nodules in lung base concerning for metastatic disease.\par \par She was referred  to Dr Bernabe for EUS, which was performed June 1st 2020, significant for an ill-defined, heterogeneous, hypoechoic lesion seen in the head of pancreas. The lesion abutted but did not invade the SMV. The main pancreatic duct was dilated and the parenchyma was heterogeneous within the pancreatic body/tail. Fine needle biopsy was performed.\par \par Pathology: Gastric mucosa positive for H pylori. Pancreas mass: Invasive adenocarcinoma.\par \par Patient is taking medication regimen for the H.Pylori infection, otherwise not taking medications except for multivitamin supplement. \par \par PMD:\par \par FHX: No hx of Cancer \par \par Socail HX: Lives with her children, , Non smoker, no alcohol use\par \par PSX: No surgeries in past \par \par \par Disease:\par Pathology:\par \par AJCC Stage : Stage 4 \par \par Tumor Markers: CA 19.9 was 155 \par \par 6/10/20- MsClaudia Desouza is seen in [facility] clinic. She states he has abdominal pain 7-8/10 and gets relief with Tylenol. She has had 2 lbs weight loss- and appetite was decreased .\par Ct abd/ pelvis/ chest- Large pancreatic mass suspicious for adenocarcinoma, with marked arterial \par and venous involvement. Numerous bilateral pulmonary nodules new since 2018 and suspicious for metastatic disease. Retroperitoneal and peripancreatic lymphadenopathy. \par Omental nodule raising the possibility of peritoneal carcinomatosis. \par \par 06/17/20- MsClaudia Desouza is seen for follow up. She had the port placed earlier last week and is scheduled for her chemotherapy today . We discussed the chemo side effects and what to expect from chemotherapy- SE- Drug sheet was provided Risks/ benefits and alternatives of chemotherapy were discussed and included but not limited to fatigue,low blood counts, nausea/vomiting, skin reactions, hair thinning, possible blood transfusion requirement,increased risk of infection,neuropathy. Patient agreed to above and informed consent was signed.PET Scan- done- on 06/16/20-FDG-avid mass in pancreatic body and neck corresponds to known malignancy. Hypermetabolic left periaortic lymphadenopathy is compatible with metastatic disease.  Multiple bilateral pulmonary nodules, not significantly changed as compared to CT dated 6/9/2020, compatible with metastatic disease. \par \par 6/30/20 - Here for Cycle 2 \par 1- Developed G3 fatigue following C1 , and had to remain in bed for at least 4 days; son was cooking, she was able to go to bathroom and shower by herself\par 2- Also had decreased appetite and G1 nausea but no vomiting\par 3- Declines neuropathy\par 4- Denies diarrhea\par 5- No fever\par 6- No sxs suggestive of COVID-19\par 7- lost weight upto 10 lbs\par 8- also I am aware of her reaction to IRIN during infusion\par \par \par 7/8/20- Recieved a call from patient stating she had episodes of vomiting yesterday. She went out yesterday in the summer sun  and had some food from outside and she has been throwing up. She states she lost weight >30 lbs- She states the Creon has been causing a lot of abdominal pain - She has been experiencing some abdominal pain but is afraid to use the Oxycodone or the medical marijuana. \par She lives with her 2 sons and her sister in law has been helping her intermittently - However she needs a lot of reinstatement of instructions- Explained to her that she needs to take the Pain meds when needed and the pain is not from Creon. \par She is requesting for a Home COVID swab - will arrange for the same. \par \par 7/17/20- Pt was scheduled for C#3 with FOLFOX instead of FOLFORINOX to identify the culprit for the reaction. But her ANC was 1.4 so held Oxaliplatin and gave only 5 FU and LCV. She tolerated this chemo better did not have any reaction like previously. She denies N/V/D. She has been experiencing spasms in her fingers after the previous chemotherapy with FOLFORINOX- Her tumor markers CEA was rising and is concerning from 0.7- 3.6- 7.9.Patient states she was diagnosed with LUPUS by her PCP in March Unclear if she is on any treatment for the same. Will get the medical records from her PCP.\par \par 7/29/20- Pt is scheduled for C#4 with FOLFOX instead of FOLFORINOX to identify the culprit for the reaction. She tolerated the last chemotherapy well. She has been eating better and denies N/V. Today she tolerated the chemo with FOLFOX well - No reaction. next cycle we plan to give FOLFIRI\par \par 8/10//20- Pt is seen for TEB visit after she had the restaging scans done- She had CT scan done that showed- Metastatic pancreatic adenocarcinoma. Stable disease. She tolerated the chemo well without the irinotecan. we answered all her questions and concerns. Her son was not in the telephone conference. Dr Paul left a message with the son on his cell phone. Plan is to continue with the chemo \par \par 8/26/20- Ms. Deo is seen for follow up- She tolerated the last chemo well- denies N/V/D. She denies any neuropathy - Denies pain - does not use mediacl marijuana- \par Explained to her that she will receive FOLFIRI this cycle and to inform if she develops any reaction to the same. \par \par 09/3/20- Ms Deo has a telephone visit with me. She was hospitalized at [facility] after her last chemo- when she came for the disconnect- She was hospitalized at [facility] from 8/28/20 to 8/30/20- When she came for the disconnect her BP was low and she was bradycardiac and after a fluid challenge she still did not recover- She was sent to hospital for further work up where Blood cx/ Ua/ Urine cx was negative and it was deemed that she was dehydrated and no infectious process.\par \par Spoke with the patient who states she had a rough time with this chemotherapy and she is feeling better now- however she has been having episodes of diarrhea and nausea. She has been taking the anti nausea meds and that has helped her. She feels that she dis better with FOLFOX - Discussed with Dr Paul and plan is to hold off on nay future doses of Irinotecan.\par \par 09/9/20- Ms Desouza is seen for C #7 - Her ANC was 1.23- Discussed with Dr Paul and plan to hold off on Oxaliplatin - Pt will get 5 FU and LCV - She will receive onpro on disconnect day. I explained to her hat she will not get any mores doses of Irinotecan and to keep a close eye on any symptoms she develop post chemotherapy. She denies pain/ N/V/D. \par  [de-identified] : adenocarcinoma [de-identified] : 09/23/20- Ms. Desouza is seen in office for follow up on C# 8. She denies n/v/dizziness/ neuropathy . Her appetite is  good, she has good energy level and is tolerating chemo well. She will get her restaging scans done after this round of chemo. Advised she can bring her son in for the next office appointment with Dr Paul to discuss the results of the scan. \par  [FreeTextEntry3] : Hypotension, bradycardia  [FreeTextEntry4] : 6/17/20 [FreeTextEntry5] : IRIN [FreeTextEntry2] : x 4 daYS

## 2020-10-07 NOTE — END OF VISIT
[FreeTextEntry3] : I personally discussed this patient with ACP at the time of the visit. I agree with the assessment and plan as written, unless noted below. \par I was present with the ACP during the key portions of the history and exam. I agree with the findings and plan as documented in the ACP's note, unless noted below. \par \par Fabian Paul MD\par

## 2020-10-07 NOTE — ASSESSMENT
[FreeTextEntry1] : 1-Therapeutic: \par -----------------\par Pancreatic cancer with mets to ? lungs as shown in CT from outside facility\par Started patient on FOLFORINOX regimen.\par Since she developed AE  with the Irinotecan and was hospitalized post Irinotecan- We will hold off on any more doses of Irinotecan - Plan to treat her with FOLFOX alone and omit Irinotecan.\par Pt is s/p 8 cycles of FOLFOX------------Since she has stable disease plan to continue with this chemo regimen for now. Can entertain RT if needed after completion of 12 cycles of Chemo.\par \par \par 2-Diagnostic: \par ---------------\par Labs:Monthly CEA, , \par Restaging scans ordered - to be done prioe to 10/6/20 \par Ca 19.9- 142- 94- \par \par 3- Pharmacogenetics testing:\par ----------------------------------\par - fOLLOW UGT1A1, DPYD \par - Plan to order MSI for future PDL-1 therapy \par - Somatic and germline mutations per NCCN guidelines REQUESTED\par \par 4- Procedures:\par -----------------\par - S/P  Port-A-Cath placement on 06/15/20\par - PORT CARE\par - EMLA CREAM PRESCRIBED\par \par 5-Supportive: \par -----------------\par Pancreatic insufficiency: CREON - [address] units (2 pills with each snack and 4 pills with each meal) for pancreatic replacement\par DVT (PE): High risk, close monitoring\par Antiemetic: Zofran PRN and Compazine PRN\par Psychological: Remeron 15mg PO at bed time if needed , \par Emla cream: for port numbing before use\par Prevention of mouth sores: Biotene mouthwash\par Skin reactions: Udderly smooth cream and Vitamin B 6 100 mg BID for prevention of HFS\par Diarrhea: The usual dose of Imodium is 4 milligrams (mg) (2 capsules) after the first loose bowel movement, and 2 mg (1 capsule) after each loose bowel movement after the first dose has been taken. No more than 16 mg (8 capsules) should be taken in any twenty-four-hour period. \par \par 6- RTC:  \par --------\par In  2 weeks. \par

## 2020-10-07 NOTE — PHYSICAL EXAM
[Restricted in physically strenuous activity but ambulatory and able to carry out work of a light or sedentary nature] : Status 1- Restricted in physically strenuous activity but ambulatory and able to carry out work of a light or sedentary nature, e.g., light house work, office work [Normal] : grossly intact [de-identified] : Ocaasional cramps, constipation relieved.

## 2020-10-07 NOTE — HISTORY OF PRESENT ILLNESS
[Disease: _____________________] : Disease: [unfilled] [M: ___] : M[unfilled] [AJCC Stage: ____] : AJCC Stage: [unfilled] [Therapy: ___] : Therapy: [unfilled] [Cycle: ___] : Cycle: [unfilled] [Day: ___] : Day: [unfilled] [3] : 3, Severe [Date: ____________] : Patient's last distress assessment performed on [unfilled]. [4 - Distress Level] : Distress Level: 4 [de-identified] : Diagnosis: Pancreatic adenocarcinoma \par \par Genetic Testing: Will send liquid foundation testing \par \par Other Current Medical Problems: Arthritis/ HLD \par \par Treatment History:\par \par Cycle # 1 06/17/20- FOLFORINOX- 5 FU 2400 mg/ kg /  mg/ m 2/ Irinotecan 150 mg/ m 2 / Oxaliplatin 85 mg/ m2\par Cycle # 2 06/30/20- FOLFORINOX- 5 FU 2400 mg/ kg /  mg/ m 2/ Irinotecan 140 mg/ m 2/ Oxaliplatin 85 mg/ m2\par Cycle # 3- 07/15/20- Skipped Irinotecan / Oxaliplatin-  (ANC 1.4) - 5 FU 2400 mg/ kg /  mg/ m 2\par Cycle #4 - 07/29/20- FOLFOX-  5 FU 2000 mg/ kg /  mg/ m 2/ Oxaliplatin 85 mg/ m2-----------------Tolerated well without Irinotecan \par \par Restaging Scans---------------Stable disease \par Cycle # 5 - 08/12/20- FOLFOX- 5 FU 2000 mg/ kg /  mg/ m 2/ Oxaliplatin 85 mg/ m2--------\par Cycle # 6- 08/26/20-FOLFIRI-  5 FU 2000 mg/ kg /  mg/ m 2/ Irinotecan 150 mg- Held oxaliplatin \par \par Hospitalized at [facility] for adverse reaction from Irinotecan- 8/28- 8/30-------------Will hold IRINOTECAN in future- \par \par Cycle # 7 09/09/20 - 5 FU 2000 mg/  mg/ m2 -----------Oxali was held since ANC was 1.29 \par Cycle # 8 -09/23/20- 5 FU 2000 mg/  mg/ m2 / Oxaliplatin 85 mg/ m2 \par \par Restaging Scans- Stable disease \par \par Cycle #9 -10/6/20-  5 FU 2000 mg/  mg/ m2 / Oxaliplatin 85 mg/ m2 \par \par \par Oncological History :\par Ms. Griselda Corona is a 70 year old female who presents for initial consultation in our [facility] for a new diagnosis of pancreas adenocarcinoma. \par Patient states she had gastrointestinal symptoms dating back to Nov 2019, including discomfort, bloating, diarrhea followed by small amounts of blood per rectum. She eventually went to GI doctor Dr Florian Pace.  She had a Colonoscopy in Feb 20 that showed small ulcer. She went the ER at [facility] 3/10/20 for abdominal pain.  CT of her abdomen was performed and showed 3.2 x 3.5 cm hypoenhancing mass in the pancreatic head/ body concerning for malignancy. Mass results in severe compression of SMV. Retroperitoneal lymphadenopathy concerning for metastatic disease with multiple nodules in lung base concerning for metastatic disease.\par \par She was referred  to Dr Bernabe for EUS, which was performed June 1st 2020, significant for an ill-defined, heterogeneous, hypoechoic lesion seen in the head of pancreas. The lesion abutted but did not invade the SMV. The main pancreatic duct was dilated and the parenchyma was heterogeneous within the pancreatic body/tail. Fine needle biopsy was performed.\par \par Pathology: Gastric mucosa positive for H pylori. Pancreas mass: Invasive adenocarcinoma.\par \par Patient is taking medication regimen for the H.Pylori infection, otherwise not taking medications except for multivitamin supplement. \par \par PMD:\par \par FHX: No hx of Cancer \par \par Socail HX: Lives with her children, , Non smoker, no alcohol use\par \par PSX: No surgeries in past \par \par \par Disease:\par Pathology:\par \par AJCC Stage : Stage 4 \par \par Tumor Markers: CA 19.9 was 155 \par \par 6/10/20- Ms. Desouza is seen in [facility] clinic. She states he has abdominal pain 7-8/10 and gets relief with Tylenol. She has had 2 lbs weight loss- and appetite was decreased .\par Ct abd/ pelvis/ chest- Large pancreatic mass suspicious for adenocarcinoma, with marked arterial \par and venous involvement. Numerous bilateral pulmonary nodules new since 2018 and suspicious for metastatic disease. Retroperitoneal and peripancreatic lymphadenopathy. \par Omental nodule raising the possibility of peritoneal carcinomatosis. \par \par 06/17/20- Ms. Desouza is seen for follow up. She had the port placed earlier last week and is scheduled for her chemotherapy today . We discussed the chemo side effects and what to expect from chemotherapy- SE- Drug sheet was provided Risks/ benefits and alternatives of chemotherapy were discussed and included but not limited to fatigue,low blood counts, nausea/vomiting, skin reactions, hair thinning, possible blood transfusion requirement,increased risk of infection,neuropathy. Patient agreed to above and informed consent was signed.PET Scan- done- on 06/16/20-FDG-avid mass in pancreatic body and neck corresponds to known malignancy. Hypermetabolic left periaortic lymphadenopathy is compatible with metastatic disease.  Multiple bilateral pulmonary nodules, not significantly changed as compared to CT dated 6/9/2020, compatible with metastatic disease. \par \par 6/30/20 - Here for Cycle 2 \par 1- Developed G3 fatigue following C1 , and had to remain in bed for at least 4 days; son was cooking, she was able to go to bathroom and shower by herself\par 2- Also had decreased appetite and G1 nausea but no vomiting\par 3- Declines neuropathy\par 4- Denies diarrhea\par 5- No fever\par 6- No sxs suggestive of COVID-19\par 7- lost weight upto 10 lbs\par 8- also I am aware of her reaction to IRIN during infusion\par \par \par 7/8/20- Recieved a call from patient stating she had episodes of vomiting yesterday. She went out yesterday in the summer sun  and had some food from outside and she has been throwing up. She states she lost weight >30 lbs- She states the Creon has been causing a lot of abdominal pain - She has been experiencing some abdominal pain but is afraid to use the Oxycodone or the medical marijuana. \par She lives with her 2 sons and her sister in law has been helping her intermittently - However she needs a lot of reinstatement of instructions- Explained to her that she needs to take the Pain meds when needed and the pain is not from Creon. \par She is requesting for a Home COVID swab - will arrange for the same. \par \par 7/17/20- Pt was scheduled for C#3 with FOLFOX instead of FOLFORINOX to identify the culprit for the reaction. But her ANC was 1.4 so held Oxaliplatin and gave only 5 FU and LCV. She tolerated this chemo better did not have any reaction like previously. She denies N/V/D. She has been experiencing spasms in her fingers after the previous chemotherapy with FOLFORINOX- Her tumor markers CEA was rising and is concerning from 0.7- 3.6- 7.9.Patient states she was diagnosed with LUPUS by her PCP in March Unclear if she is on any treatment for the same. Will get the medical records from her PCP.\par \par 7/29/20- Pt is scheduled for C#4 with FOLFOX instead of FOLFORINOX to identify the culprit for the reaction. She tolerated the last chemotherapy well. She has been eating better and denies N/V. Today she tolerated the chemo with FOLFOX well - No reaction. next cycle we plan to give FOLFIRI\par \par 8/10//20- Pt is seen for TEB visit after she had the restaging scans done- She had CT scan done that showed- Metastatic pancreatic adenocarcinoma. Stable disease. She tolerated the chemo well without the irinotecan. we answered all her questions and concerns. Her son was not in the telephone conference. Dr Paul left a message with the son on his cell phone. Plan is to continue with the chemo \par \par 8/26/20- Ms. Deo is seen for follow up- She tolerated the last chemo well- denies N/V/D. She denies any neuropathy - Denies pain - does not use mediacl marijuana- \par Explained to her that she will receive FOLFIRI this cycle and to inform if she develops any reaction to the same. \par \par 09/3/20- Ms Deo has a telephone visit with me. She was hospitalized at [facility] after her last chemo- when she came for the disconnect- She was hospitalized at [facility] from 8/28/20 to 8/30/20- When she came for the disconnect her BP was low and she was bradycardiac and after a fluid challenge she still did not recover- She was sent to hospital for further work up where Blood cx/ Ua/ Urine cx was negative and it was deemed that she was dehydrated and no infectious process.\par \par Spoke with the patient who states she had a rough time with this chemotherapy and she is feeling better now- however she has been having episodes of diarrhea and nausea. She has been taking the anti nausea meds and that has helped her. She feels that she dis better with FOLFOX - Discussed with Dr Paul and plan is to hold off on nay future doses of Irinotecan.\par \par 09/9/20- Ms Desouza is seen for C #7 - Her ANC was 1.23- Discussed with Dr Paul and plan to hold off on Oxaliplatin - Pt will get 5 FU and LCV - She will receive onpro on disconnect day. I explained to her hat she will not get any mores doses of Irinotecan and to keep a close eye on any symptoms she develop post chemotherapy. She denies pain/ N/V/D. \par \par 09/23/20- Ms. Desouza is seen in office for follow up on C# 8. She denies n/v/dizziness/ neuropathy . Her appetite is  good, she has good energy level and is tolerating chemo well. She will get her restaging scans done after this round of chemo. Advised she can bring her son in for the next office appointment with Dr Paul to discuss the results of the scan. \par  [de-identified] : adenocarcinoma [de-identified] : 10/6/20-Ms. Desouza is seen in office for follow up after her restaging scans- SHe had restaging scans on 10/1/20 that showed metastatic pancreatic cancer with no interval change since 08/9/20 in pancreatic mass, lung nodules, and adenopathy / peritoneal nodules.  She has been tolerating this chemo regimen  well. today she presents to office with her son - Davina . We discussed that we can entertain the idea of RT aftre she completes 12 cycles of Chemo.She complainst that she has been having episodes where she feels her head is usually hot and she feels sweaty. 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  [FreeTextEntry3] : Hypotension, bradycardia  [FreeTextEntry4] : 6/17/20 [FreeTextEntry5] : IRIN [FreeTextEntry2] : x 4 daYS

## 2020-10-08 NOTE — H&P ADULT - NSHPLABSRESULTS_GEN_ALL_CORE
10-08    129<L>  |  95<L>  |  29<H>  ----------------------------<  138<H>  3.7   |  21<L>  |  0.85    Ca    8.1<L>      08 Oct 2020 17:30    TPro  7.8  /  Alb  3.7  /  TBili  0.4  /  DBili  x   /  AST  45<H>  /  ALT  28  /  AlkPhos  119  10-08                              10.6   6.80  )-----------( 162      ( 08 Oct 2020 17:30 )             32.8    UA Pending    CXR:  Diffuse hazy opacities.Official read pending

## 2020-10-08 NOTE — H&P ADULT - HISTORY OF PRESENT ILLNESS
71 yo Fwith pmhx of Pancreatic CA currently receiving her 9th chemo treatment that was started yesterday through chest port (receiving it constantly infusing at home over last 2 days). States since yesterday she has been feeling weak and had a temperature of 104F last night. Took Tylenol 1 hr before arriving to ED today. Denies nausea, vomiting, diarrhea, chest pain, shortness of breath, palpitations, cough, sick contacts or COVID contacts, rash. Endorses some urinary frequency but denies dysuria.      In ED triage VS 82/39, HR 87, T 100.1, satting 98% on RA. REceived 2L NS, vancomycin, cefepime, and started on levophed for persistent HoTN. She initially came in w/ chemo infusing to the port but was stopped around 1900   71 yo Fwith pmhx of Pancreatic CA currently receiving her 9th chemo treatment that was started yesterday through chest port (receiving it constantly infusing at home over last 2 days). States since yesterday she has been feeling weak and had a temperature of 104F last night. Took Tylenol 1 hr before arriving to ED today. Denies nausea, vomiting, diarrhea, chest pain, shortness of breath, palpitations, cough, sick contacts or COVID contacts, rash. Endorses some urinary frequency but denies dysuria.      In ED triage VS 82/39, HR 87, T 100.1, satting 98% on RA. REceived 2L NS, vancomycin, cefepime, and started on levophed for persistent HoTN. She initially came in w/ chemo infusing to the port but was stopped around 1900. Takes pancrelipase and zofran as her only home meds.  .

## 2020-10-08 NOTE — ED PROVIDER NOTE - ATTENDING CONTRIBUTION TO CARE
agree with resident note    "69 yo Fwith pmhx of Pancreatic CA currently receiving her 9th chemo treatment that was started yesterday. States since yesterday she has been feeling weak and had a temperature of 104F yesterday. Took Tylenol 1 hr before arriving to ED today. Denies nausea, vomiting, diarrhea, chest pain, shortness of breath, palpitations, cough, leg swelling, dysuria."    PE:  hypotensive; fatigued; no resp distress; A+O X3; port in chest receiving chemo; CTAB/L; abd soft/NT/ND ext: no edema    Imp: septic shock; possible adverse reaction to chemo; so chemo stopped; IVF, IV abx, cultures, MICU consult

## 2020-10-08 NOTE — ED ADULT NURSE NOTE - CHIEF COMPLAINT QUOTE
Pt presents to ED for fever, weakness, dizziness, lightheadedness since yesterday. Pt states she received chemo yesterday at the [facility]. Pt has her right sided chest port accessed at this time and as per son " the port continues to be connected to chemo medication which Dr. Quevedo will remove tomorrow." Pt appears pale at this time. Pt hypotensive in triage. Pmhx of pancreatic CA.  Ronnie (Son) contact information [phone]

## 2020-10-08 NOTE — ED ADULT TRIAGE NOTE - CHIEF COMPLAINT QUOTE
Pt presents to ED for fever, weakness, dizziness, lightheadedness since yesterday. Pt states she received chemo yesterday at the [facility]. Pt has her right sided chest port accessed at this time and as per son " the port continues to be connected to chemo medication which Dr. Quevedo will remove tomorrow." Pt appears pale at this time. Pt hypotensive in triage. Pmhx of pancreatic CA. Pt presents to ED for fever, weakness, dizziness, lightheadedness since yesterday. Pt states she received chemo yesterday at the [facility]. Pt has her right sided chest port accessed at this time and as per son " the port continues to be connected to chemo medication which Dr. Quevedo will remove tomorrow." Pt appears pale at this time. Pt hypotensive in triage. Pmhx of pancreatic CA.  Ronnie (Son) contact information [phone]

## 2020-10-08 NOTE — ED ADULT NURSE NOTE - NSFALLRSKHARMRISK_ED_ALL_ED
No strenuous activity (bending/twisting), heavy lifting, driving or returning to work until cleared by MD.  Change dressing daily with gauze/tape till post-op day #5, then leave incision open to air.  You may shower post-op day#5, keep incision clean and dry.   Try to have regular bowel movements, take stool softener or laxative if necessary.  May take pepcid or prilosec for upset stomach.  May apply ice to affected areas to decrease swelling.  Call to schedule an appt with Dr. Benton for follow up, if you have staples or sutures they will be removed in office.  Contact your doctor if you experience: fever greater than 101.5, chills, chest pain, difficulty breathing, redness or excessive drainage around the incision, other concerns.  Follow up with your primary care provider.
yes

## 2020-10-08 NOTE — H&P ADULT - NSHPPHYSICALEXAM_GEN_ALL_CORE
Const:  Alert and interactive, no acute distress  HEENT: Normocephalic, atraumatic; Whitish membranes over tongue  CV: Heart regular, normal S1/2, no murmurs; Extremities WWPx4. Chest port in place  Pulm: Diffuse crackles  GI: Abdomen non-distended; soft, nontender  Neuro: Alert; Normal tone; coordination appropriate for age

## 2020-10-08 NOTE — ED PROVIDER NOTE - CLINICAL SUMMARY MEDICAL DECISION MAKING FREE TEXT BOX
severely reduced LV function
Hx of pancreatic cancer on chemo presents with weakness and subjective fever. Pt is hypotensive on arrival. Afebrile. IVF, septic workup, antibiotics, EKG, CXR. Will continue to monitor.

## 2020-10-08 NOTE — ED ADULT NURSE NOTE - OBJECTIVE STATEMENT
pt brought to rm 13, A&Ox3, skin w/d/i, presents w/ RCW port accessed by Elin 2 days ago, actively receiving chemo through port on presentation for pancreatic CA, c/o weakness, dizziness and fever x 2 days, denies sick contacts, no resp distress noted, denies CP, SOB, states "Im just tired and weak," found to be hypotensive in triage, b/l #18g IVs placed, fluid infusing, septics complete, MD Regalado @ bedside for eval, chemo stopped by MD Regalado, pt placed on CM for monitoring, VS as noted, awaiting lab results and further orders, will continue to monitor.  pt brought to rm 13, A&Ox3, skin w/d/i, presents w/ RCW port accessed by Elin 2 days ago, actively receiving chemo through port on presentation for pancreatic CA, c/o weakness, dizziness and fever (tmax 104 @ home, took 500mg Tylenol PTA) x 2 days, has received same chemo in past, states became hypotensive during previous infusions however does not know how low, denies sick contacts, no resp distress noted, denies CP, SOB, states "Im just tired and weak," found to be hypotensive in triage, b/l #18g IVs placed, fluid infusing, septics complete, MD Regalado @ bedside for eval, chemo stopped by MD Regalado, pt placed on CM for monitoring, VS as noted, awaiting lab results and further orders, will continue to monitor.

## 2020-10-08 NOTE — ED PROVIDER NOTE - CRITICAL CARE INDICATION, MLM
Pt to ER c/o continued urinary symptoms after being seen twice at  since 9/17. Was given cefdinir, then had repeat urine and was started on doxycycline yesterday.  Pt reports he continues to have burning with urination.  Also reports that on Sunday he began to have left testicle pain and swelling.  Pain 6/10.     patient was critically ill... Patient was critically ill with a high probability of imminent or life threatening deterioration.

## 2020-10-08 NOTE — ED ADULT NURSE NOTE - NSIMPLEMENTINTERV_GEN_ALL_ED
Implemented All Fall with Harm Risk Interventions:  [place] to call system. Call bell, personal items and telephone within reach. Instruct patient to call for assistance. Room bathroom lighting operational. Non-slip footwear when patient is off stretcher. Physically safe environment: no spills, clutter or unnecessary equipment. Stretcher in lowest position, wheels locked, appropriate side rails in place. Provide visual cue, wrist band, yellow gown, etc. Monitor gait and stability. Monitor for mental status changes and reorient to person, place, and time. Review medications for side effects contributing to fall risk. Reinforce activity limits and safety measures with patient and family. Provide visual clues: red socks.

## 2020-10-08 NOTE — H&P ADULT - ASSESSMENT
Neuro:  -No acute issues, mentating appropriately despite hypotension    Pulm:  -Diffuse opacities      CV:   -Hyoptension 2/2 septic shock unclear source  -Start Midodrine 20 TID  -Wean Levophed as tolerated    GI:  -Regular diet    Endo:  -No acute issues    DVT ppx:  - 70F hx pancreatic CA on chemo presenting due to 1 day of fever, dizziness, found to be hypotensive in the ER with Temp to 100.1 in setting of recent tylenol use. With diffuse crackles on exam. Suspect septic shock given pt is on chemo, low grade temp, and diffuse opacities on the CXR; less likely CHF given fever and fatigue although chemo-induced cardiotoxicity is a consideration.    Neuro:  -No acute issues, mentating appropriately despite hypotension    Pulm:  -Diffuse opacities; likely infectious atypical PNA vs viral PNA such as COVID  -Supplemental O2/hi flow as needed      CV:   -Hypotension 2/2 septic shock unclear source though likely PNA based on CXR.  -Start Midodrine 20 TID  -Wean Levophed as tolerated    GI:  -Regular diet    ID:  -F/u Bcx  -F/u UA and UCx  -Continue Vancomycin  -Continue cefepime  -F/u COVID PCR    Endo:  -No acute issues    DVT ppx:  -Cr wnl, lovenox daily 70F hx pancreatic CA on chemo presenting due to 1 day of fever, dizziness, found to be hypotensive in the ER with Temp to 100.1 in setting of recent tylenol use. With diffuse crackles on exam. Suspect septic shock given pt is on chemo, low grade temp, and diffuse opacities on the CXR; less likely CHF given fever and fatigue although chemo-induced cardiotoxicity is a consideration.    Neuro:  -No acute issues, mentating appropriately despite hypotension    Pulm:  -Diffuse opacities; likely infectious atypical PNA vs viral PNA such as COVID  -Supplemental O2/hi flow as needed      CV:   -Hypotension 2/2 septic shock unclear source though likely PNA based on CXR.  -Start Midodrine 20 TID  -Wean Levophed as tolerated    GI:  -Regular diet  -Continue pancrelipase    ID:  -F/u Bcx  -F/u UA and UCx  -Continue Vancomycin  -Continue cefepime  -F/u COVID PCR    Heme:  -F/u heme/onc recs in the AM    Endo:  -No acute issues    DVT ppx:  -Cr wnl, lovenox daily 70F hx pancreatic CA on chemo presenting due to 1 day of fever, dizziness, found to be hypotensive in the ER with Temp to 100.1 in setting of recent tylenol use. With diffuse crackles on exam. Suspect septic shock given pt is on chemo, low grade temp, and diffuse opacities on the CXR; less likely CHF given fever and fatigue although chemo-induced cardiotoxicity is a consideration.    Neuro:  -No acute issues, mentating appropriately despite hypotension    Pulm:  -Diffuse opacities; likely infectious atypical PNA vs viral PNA such as COVID  -Supplemental O2/hi flow as needed      CV:   -Hypotension 2/2 septic shock unclear source though likely PNA based on CXR.  -Start Midodrine 20 TID  -Wean Levophed as tolerated    GI:  -Regular diet  -Continue pancrelipase    ID:  -F/u Bcx  -F/u UA and UCx  -Continue Vancomycin  -Continue cefepime  -F/u COVID PCR    Renal:  -Hyponatremia; likely from atypical PNA given CXR findings  -Follow up Serum osm and Urine Na and Cr  -Monitor electrolytes replete as needed      Heme:  -F/u heme/onc recs in the AM    Endo:  -No acute issues    DVT ppx:  -Cr wnl, lovenox daily

## 2020-10-08 NOTE — H&P ADULT - ATTENDING COMMENTS
Fever and hypotension in the setting of metastatic panc CA on chemo  pancx, Abx, r/o covid  ct chest possible pNA  IVF, levophed, midodrine  heme/onc in am

## 2020-10-08 NOTE — ED PROVIDER NOTE - PHYSICAL EXAMINATION
Gen: appears chronically fatigued, NAD  CV: RRR, +S1/S2  Resp: CTAB, symmetric breath sounds  GI: abdomen soft non-distended, NTTP  Back: no CVA tenderness  Extremities - symmetric pulses, no edema, 5/5 strength, sensation intact  Chest: has port in place in RT anterior chest - receving infusion.   Neuro: A&Ox3, following commands, speech clear, moving all four extremities spontaneously

## 2020-10-08 NOTE — ED PROVIDER NOTE - OBJECTIVE STATEMENT
71 yo Fwith pmhx of Pancreatic CA currently receiving her 9th chemo treatment that was started yesterday. States since yesterday she has been feeling weak and had a temperature of 104F yesterday. Took Tylenol 1 hr before arriving to ED today. Denies nausea, vomiting, diarrhea, chest pain, shortness of breath, palpitations, cough, leg swelling, dysuria.

## 2020-10-08 NOTE — ED PROVIDER NOTE - PROGRESS NOTE DETAILS
Fabricio: spoke with heme onc fellow; given recent admission for likely adverse reaction to chemo and her BP not improving after 2L IVF will stop chemo (port clamped).  Will be started on levophed for presumed septic shock (fever, severe hypotension with MAPs 40-50); broad spectrum abx started Resident Daniel: discussed case with MICU team - agrees to come see the patient. Margarito Church MD:  Sign out received by covering team, patient reassessed at bedside, MAP improved to 59.

## 2020-10-09 NOTE — PROGRESS NOTE ADULT - SUBJECTIVE AND OBJECTIVE BOX
Contact Information:  Pedrito John II, MD, MPH  PGY-2, Internal Medicine  Pager: [phone] ([facility]) /// [phone] ([facility])    CORONA, GRISELDA, MRN-[id number]    Patient is a 70y old  Female who presents with a chief complaint of     INTERVAL/OVERNIGHT EVENTS:    SUBJECTIVE:    CONSTITUTIONAL: No weakness. No fatigue. No fever.  HEAD: No head trauma.   EYES: No vision changes.  ENT: No hearing changes or tinnitus. No ear pain. No changes in smell. No nasal congestion or discharge. No sore throat. No voice hoarseness.   NECK: No neck pain or stiffness. No lumps.  RESPIRATORY: No cough. No SOB. No wheezing. No hemoptysis.   CARDIOVASCULAR: No chest pain. No palpitations.   GASTROINTESTINAL: No dysphagia. No ABD pain. No distension. No constipation. No diarrhea. No pain with defecation. No hematemesis. No hematochezia or melena.  BACK: No back pain.  GENITOURINARY: No dysuria. No frequency or urgency. No hesitancy. No incontinence. No urinary retention. No suprapubic pain. No hematuria.  EXTREMITY: No swelling.  MUSCULOSKELETAL: No joint pain or swelling. No fractures. No stiffness.    SKIN: No rashes. No itching. No skin, hair, or nail changes.  NEUROLOGICAL: No weakness or paralysis. No lightheadedness or dizziness. No HA. No numbness or tingling.   PSYCHIATRIC: No depression.       OBJECTIVE:  ICU Vital Signs Last 24 Hrs  T(C): 37.2 (09 Oct 2020 04:00), Max: 38.9 (09 Oct 2020 00:08)  T(F): 98.9 (09 Oct 2020 04:00), Max: 102 (09 Oct 2020 00:08)  HR: 53 (09 Oct 2020 07:00) (53 - 89)  BP: 116/48 (09 Oct 2020 07:00) (82/29 - 141/58)  BP(mean): 65 (09 Oct 2020 07:00) (53 - 83)  ABP: --  ABP(mean): --  RR: 18 (09 Oct 2020 07:00) (15 - 27)  SpO2: 100% (09 Oct 2020 07:00) (95% - 100%)    Daily Height in cm: 160.02 (09 Oct 2020 00:08)    Daily Weight in k.6 (09 Oct 2020 00:15)  I&O's Summary    08 Oct 2020 07:01  -  09 Oct 2020 07:00  --------------------------------------------------------  IN: 683.1 mL / OUT: 800 mL / NET: -116.9 mL      Adult Advanced Hemodynamics Last 24 Hrs  CVP(mm Hg): --  CVP(cm H2O): --  CO: --  CI: --  PA: --  PA(mean): --  PCWP: --  SVR: --  SVRI: --  PVR: --  PVRI: --      MEDICATIONS  (STANDING):  cefepime   IVPB 2000 milliGRAM(s) IV Intermittent every 8 hours  chlorhexidine 4% Liquid 1 Application(s) Topical <User Schedule>  enoxaparin Injectable 40 milliGRAM(s) SubCutaneous daily  midodrine 20 milliGRAM(s) Oral every 8 hours  norepinephrine Infusion 0.05 MICROgram(s)/kG/Min (5.78 mL/Hr) IV Continuous <Continuous>  pancrelipase  (CREON 12,000 Lipase Units) 79390 Capsule(s) Oral three times a day with meals  potassium chloride    Tablet ER 40 milliEquivalent(s) Oral every 4 hours  vancomycin  IVPB 1000 milliGRAM(s) IV Intermittent every 12 hours    MEDICATIONS  (PRN):  acetaminophen   Tablet .. 650 milliGRAM(s) Oral every 6 hours PRN Temp greater or equal to 38C (100.4F)    Allergies    No Known Allergies    Intolerances        CONSTITUTIONAL: No acute distress. Awake and alert.  HEAD: No evidence of trauma. Structures WNL.  EYES: +PERRL. +EOMI. No scleral icterus. No conjunctival injection.  ENT: Moist oral mucosa. No erythema. No pharyngeal exudates.   NECK: Supple. Appropriate ROM. No stiffness. No masses or lymphadenopathy.  RESPIRATORY: CTAB. No wheezes, rales, or rhonchi. No accessory muscle use. No apparent respiratory distress.  CARDIOVASCULAR: +S1/S2. No audible S3/S4. Regular rate and rhythm. No murmurs, rubs, or gallops. 2+ radial pulses x b/l UE; 2+ DP pulses x b/l LE.   GASTROINTESTINAL: Soft, nontender, nondistended. +BS. No rebound or guarding.   BACK: No spinal or paraspinal tenderness. No CVA tenderness.  EXTREMITY: No LE swelling or edema. EXTs warm to touch.  MUSCULOSKELETAL: Spontaneous movement in all extremities.  DERMATOLOGICAL: No abnormal rashes or lesions.  NEUROLOGICAL: CN 2-12 grossly intact. No focal deficits. Sensation intact x 4EXT. A&Ox3 (oriented to person, place, and time).  PSYCHIATRIC: Appropriate affect.                            10.0   6.99  )-----------( 170      ( 09 Oct 2020 03:00 )             31.1       10-09    134<L>  |  103  |  19  ----------------------------<  139<H>  3.2<L>   |  20<L>  |  0.51    Ca    7.9<L>      09 Oct 2020 03:00  Phos  3.0     10-09  Mg     2.2     10-09    TPro  7.8  /  Alb  3.7  /  TBili  0.4  /  DBili  x   /  AST  45<H>  /  ALT  28  /  AlkPhos  119  10-08    CAPILLARY BLOOD GLUCOSE      POCT Blood Glucose.: 139 mg/dL (08 Oct 2020 16:54)    LIVER FUNCTIONS - ( 08 Oct 2020 17:30 )  Alb: 3.7 g/dL / Pro: 7.8 g/dL / ALK PHOS: 119 u/L / ALT: 28 u/L / AST: 45 u/L / GGT: x               Urinalysis Basic - ( 08 Oct 2020 20:26 )    Color: LIGHT YELLOW / Appearance: CLEAR / S.012 / pH: 6.5  Gluc: NEGATIVE / Ketone: NEGATIVE  / Bili: NEGATIVE / Urobili: NORMAL   Blood: NEGATIVE / Protein: 20 / Nitrite: NEGATIVE   Leuk Esterase: MODERATE / RBC: 0-2 / WBC 11-25   Sq Epi: FEW / Non Sq Epi: x / Bacteria: NEGATIVE            RADIOLOGY AND ADDITIONAL TESTS:    CONSULTANT NOTES REVIEWED:    CARE DISCUSSED WITH THE FOLLOWING CONSULTANTS/PROVIDERS: Contact Information:  Pedrito John II, MD, MPH  PGY-2, Internal Medicine  Pager: [phone] ([facility]) /// [phone] ([facility])    CORONA, GRISELDA, MRN-[id number]    Patient is a 70y old  Female who presents with a chief complaint of     INTERVAL/OVERNIGHT EVENTS: Patient admitted overnight for hypotension.     SUBJECTIVE: Patient evaluated at bedside, complaining of abdominal pain 7/10 in severity and concomitant nausea. She denies fever, lightheadedness, dizziness, SOB, CP, vomiting.    CONSTITUTIONAL: No weakness. No fatigue. No fever.  HEAD: No head trauma.   EYES: No vision changes.  ENT: No hearing changes or tinnitus. No ear pain. No changes in smell. No nasal congestion or discharge. No sore throat. No voice hoarseness.   NECK: No neck pain or stiffness. No lumps.  RESPIRATORY: No cough. No SOB. No wheezing. No hemoptysis.   CARDIOVASCULAR: No chest pain. No palpitations.   GASTROINTESTINAL: +ABD pain. +nausea.  BACK: No back pain.  GENITOURINARY: No dysuria. No frequency or urgency. No hesitancy. No incontinence. No urinary retention. No suprapubic pain. No hematuria.  EXTREMITY: No swelling.  MUSCULOSKELETAL: No joint pain or swelling. No fractures. No stiffness.    SKIN: No rashes. No itching. No skin, hair, or nail changes.  NEUROLOGICAL: No weakness or paralysis. No lightheadedness or dizziness. No HA. No numbness or tingling.   PSYCHIATRIC: No depression.       OBJECTIVE:  ICU Vital Signs Last 24 Hrs  T(C): 37.2 (09 Oct 2020 04:00), Max: 38.9 (09 Oct 2020 00:08)  T(F): 98.9 (09 Oct 2020 04:00), Max: 102 (09 Oct 2020 00:08)  HR: 53 (09 Oct 2020 07:00) (53 - 89)  BP: 116/48 (09 Oct 2020 07:00) (82/29 - 141/58)  BP(mean): 65 (09 Oct 2020 07:00) (53 - 83)  ABP: --  ABP(mean): --  RR: 18 (09 Oct 2020 07:00) (15 - 27)  SpO2: 100% (09 Oct 2020 07:00) (95% - 100%)    Daily Height in cm: 160.02 (09 Oct 2020 00:08)    Daily Weight in k.6 (09 Oct 2020 00:15)  I&O's Summary    08 Oct 2020 07:01  -  09 Oct 2020 07:00  --------------------------------------------------------  IN: 683.1 mL / OUT: 800 mL / NET: -116.9 mL      Adult Advanced Hemodynamics Last 24 Hrs  CVP(mm Hg): --  CVP(cm H2O): --  CO: --  CI: --  PA: --  PA(mean): --  PCWP: --  SVR: --  SVRI: --  PVR: --  PVRI: --      MEDICATIONS  (STANDING):  cefepime   IVPB 2000 milliGRAM(s) IV Intermittent every 8 hours  chlorhexidine 4% Liquid 1 Application(s) Topical <User Schedule>  enoxaparin Injectable 40 milliGRAM(s) SubCutaneous daily  midodrine 20 milliGRAM(s) Oral every 8 hours  norepinephrine Infusion 0.05 MICROgram(s)/kG/Min (5.78 mL/Hr) IV Continuous <Continuous>  pancrelipase  (CREON 12,000 Lipase Units) 49694 Capsule(s) Oral three times a day with meals  potassium chloride    Tablet ER 40 milliEquivalent(s) Oral every 4 hours  vancomycin  IVPB 1000 milliGRAM(s) IV Intermittent every 12 hours    MEDICATIONS  (PRN):  acetaminophen   Tablet .. 650 milliGRAM(s) Oral every 6 hours PRN Temp greater or equal to 38C (100.4F)    Allergies    No Known Allergies    Intolerances        CONSTITUTIONAL: No acute distress. Awake and alert.  EYES: No scleral icterus. No conjunctival injection.  ENT: No erythema. No pharyngeal exudates.   RESPIRATORY: CTAB. No wheezes, rales, or rhonchi. No accessory muscle use. No apparent respiratory distress.  CARDIOVASCULAR: +S1/S2. No audible S3/S4. Regular rate and rhythm. No murmurs, rubs, or gallops.    GASTROINTESTINAL: Soft, nondistended, tender to palpation in the LUQ. +BS. No rebound or guarding.   BACK: No spinal or paraspinal tenderness. No CVA tenderness.  EXTREMITY: No LE swelling or edema. EXTs warm to touch.  MUSCULOSKELETAL: Spontaneous movement in all extremities.  NEUROLOGICAL: No focal deficits. A&Ox3 (oriented to person, place, and time).                            10.0   6.99  )-----------( 170      ( 09 Oct 2020 03:00 )             31.1       10-    134<L>  |  103  |  19  ----------------------------<  139<H>  3.2<L>   |  20<L>  |  0.51    Ca    7.9<L>      09 Oct 2020 03:00  Phos  3.0     10  Mg     2.2     10-09    TPro  7.8  /  Alb  3.7  /  TBili  0.4  /  DBili  x   /  AST  45<H>  /  ALT  28  /  AlkPhos  119  10-08    CAPILLARY BLOOD GLUCOSE      POCT Blood Glucose.: 139 mg/dL (08 Oct 2020 16:54)    LIVER FUNCTIONS - ( 08 Oct 2020 17:30 )  Alb: 3.7 g/dL / Pro: 7.8 g/dL / ALK PHOS: 119 u/L / ALT: 28 u/L / AST: 45 u/L / GGT: x               Urinalysis Basic - ( 08 Oct 2020 20:26 )    Color: LIGHT YELLOW / Appearance: CLEAR / S.012 / pH: 6.5  Gluc: NEGATIVE / Ketone: NEGATIVE  / Bili: NEGATIVE / Urobili: NORMAL   Blood: NEGATIVE / Protein: 20 / Nitrite: NEGATIVE   Leuk Esterase: MODERATE / RBC: 0-2 / WBC 11-25   Sq Epi: FEW / Non Sq Epi: x / Bacteria: NEGATIVE            RADIOLOGY AND ADDITIONAL TESTS:    CONSULTANT NOTES REVIEWED:    CARE DISCUSSED WITH THE FOLLOWING CONSULTANTS/PROVIDERS:

## 2020-10-09 NOTE — CONSULT NOTE ADULT - SUBJECTIVE AND OBJECTIVE BOX
Patient is a 70y old  Female who presents with a chief complaint of     HPI:  70f with metastatic pancreatic CA, s/p FOLFIRINOX x2 complicated by reaction to irinotecan, switched to FOLFOX, Cycle 9 was started on 10/8/2020, p/w fever and weakness. Since yesterday she has been feeling weak and had a temperature of 104F last night. Took Tylenol 1 hr before arriving to ED today. Denies nausea, vomiting, diarrhea, chest pain, shortness of breath, palpitations, cough, sick contacts or COVID contacts, rash. Endorses some urinary frequency but denies dysuria.    In ED triage VS 82/39, HR 87, T 100.1, satting 98% on RA. REceived 2L NS, vancomycin, cefepime, and started on levophed for persistent HoTN. She initially came in w/ chemo infusing to the port but was stopped around 1900. Takes pancrelipase and zofran as her only home meds.  (08 Oct 2020 19:50)         ROS: as above     PAST MEDICAL & SURGICAL HISTORY:  GERD (gastroesophageal reflux disease)    Systemic lupus erythematosus    Hyperlipidemia    H/O total hysterectomy        SOCIAL HISTORY:    FAMILY HISTORY:  Family history of diabetes mellitus        MEDICATIONS  (STANDING):  cefepime   IVPB 2000 milliGRAM(s) IV Intermittent every 8 hours  chlorhexidine 4% Liquid 1 Application(s) Topical <User Schedule>  enoxaparin Injectable 40 milliGRAM(s) SubCutaneous daily  midodrine 20 milliGRAM(s) Oral every 8 hours  norepinephrine Infusion 0.05 MICROgram(s)/kG/Min (5.78 mL/Hr) IV Continuous <Continuous>  pancrelipase  (CREON 12,000 Lipase Units) 1 Capsule(s) Oral three times a day with meals  vancomycin  IVPB 1000 milliGRAM(s) IV Intermittent every 12 hours    MEDICATIONS  (PRN):  acetaminophen   Tablet .. 650 milliGRAM(s) Oral every 6 hours PRN Temp greater or equal to 38C (100.4F)      Allergies    No Known Allergies    Intolerances        Vital Signs Last 24 Hrs  T(C): 36.1 (09 Oct 2020 12:00), Max: 38.9 (09 Oct 2020 00:08)  T(F): 97 (09 Oct 2020 12:00), Max: 102 (09 Oct 2020 00:08)  HR: 68 (09 Oct 2020 13:00) (52 - 89)  BP: 95/47 (09 Oct 2020 13:00) (70/52 - 141/58)  BP(mean): 54 (09 Oct 2020 13:00) (49 - 83)  RR: 15 (09 Oct 2020 13:00) (14 - 27)  SpO2: 99% (09 Oct 2020 13:00) (95% - 100%)    LABS:                          10.0   6.99  )-----------( 170      ( 09 Oct 2020 03:00 )             31.1         Mean Cell Volume : 89.1 fL  Mean Cell Hemoglobin : 28.7 pg  Mean Cell Hemoglobin Concentration : 32.2 %  Auto Neutrophil # : 6.19 K/uL  Auto Lymphocyte # : 0.45 K/uL  Auto Monocyte # : 0.30 K/uL  Auto Eosinophil # : 0.00 K/uL  Auto Basophil # : 0.01 K/uL  Auto Neutrophil % : 88.6 %  Auto Lymphocyte % : 6.4 %  Auto Monocyte % : 4.3 %  Auto Eosinophil % : 0.0 %  Auto Basophil % : 0.1 %      Serial CBC's  10-09 @ 03:00  Hct-31.1 / Hgb-10.0 / Plat-170 / RBC-3.49 / WBC-6.99  Serial CBC's  10-08 @ 17:30  Hct-32.8 / Hgb-10.6 / Plat-162 / RBC-3.61 / WBC-6.80  Serial CBC's  10-06 @ 11:30  Hct-33.6 / Hgb-11.2 / Plat-173 / RBC-3.72 / WBC-5.81      10-09    134<L>  |  103  |  19  ----------------------------<  139<H>  3.2<L>   |  20<L>  |  0.51    Ca    7.9<L>      09 Oct 2020 03:00  Phos  3.0     10-09  Mg     2.2     10-09    TPro  7.8  /  Alb  3.7  /  TBili  0.4  /  DBili  x   /  AST  45<H>  /  ALT  28  /  AlkPhos  119  10-08                      RADIOLOGY & ADDITIONAL STUDIES:

## 2020-10-09 NOTE — PROGRESS NOTE ADULT - ATTENDING COMMENTS
Agree with above. Seen and examined with team on rounds. Pancreatic CA with hypotension. Likely sepsis, on abx. supportive care.

## 2020-10-09 NOTE — PROGRESS NOTE ADULT - ASSESSMENT
70F hx pancreatic CA on chemo presenting due to 1 day of fever, dizziness, found to be hypotensive in the ER with Temp to 100.1 in setting of recent tylenol use. With diffuse crackles on exam. Suspect septic shock given pt is on chemo, low grade temp, and diffuse opacities on the CXR; less likely CHF given fever and fatigue although chemo-induced cardiotoxicity is a consideration.    Neuro:  -No acute issues, mentating appropriately despite hypotension    Pulm:  -Diffuse opacities; likely infectious atypical PNA vs viral PNA such as COVID  -Supplemental O2/hi flow as needed      CV:   -Hypotension 2/2 septic shock unclear source though likely PNA based on CXR.  -Start Midodrine 20 TID  -Wean Levophed as tolerated    GI:  -Regular diet  -Continue pancrelipase    ID:  -F/u Bcx  -F/u UA and UCx  -Continue Vancomycin  -Continue cefepime  -F/u COVID PCR    Renal:  -Hyponatremia; likely from atypical PNA given CXR findings  -Follow up Serum osm and Urine Na and Cr  -Monitor electrolytes replete as needed      Heme:  -F/u heme/onc recs in the AM    Endo:  -No acute issues    DVT ppx:  -Cr wnl, lovenox daily 70F hx pancreatic CA on chemo presenting due to 1 day of fever, dizziness, found to be hypotensive in the ER with Temp to 100.1 in setting of recent tylenol use. With diffuse crackles on exam. Suspect septic shock given pt is on chemo, low grade temp, and diffuse opacities on the CXR; less likely CHF given fever and fatigue although chemo-induced cardiotoxicity is a consideration.    Neuro:  -No acute issues, mentating appropriately despite hypotension    Pulm:  - CXR 10/8 demonstrating bilateral reticulonodular opacities in mid and lower lung fields likely correlating with known pulmonary metastases; CT 10/8 demonstrating known metastases with septal thickening suggestive of underlying edema, no overt evidence of consolidation  - Currently saturating well on RA, will implement supplemental oxygen as needed    CV:   - Hypotension 2/2 septic shock unclear source; possibly pulmonary source based on CXR and CT findings  - Start Midodrine 20 TID; currently off of midodrine  - Cortisol levels 8.5  - Continue to monitor blood pressure    GI:  - Regular diet  - Continue pancrelipase    ID:  **?Septic shock  - Chest port possible source vs pulmonary source  - RVP negative  - UA with moderate leukocyte esterase and 11-25 WBCs  - F/u Bcx and Ucx  - Continue Vancomycin (10/8) and cefepime (10/8)    Renal:  - Na 134  - Encourage oral intake   - Monitor electrolytes replete as needed      Oncology:  **Pancreatic cancer  - Metastatic to lungs  - s/p FOLFIRINOX x2 complicated by reaction to irinotecan, switched to FOLFOX, Cycle 9 was started on 10/8/2020  - Per Oncology, no inpatient therapy; to follow up outpatient    Endo:  -No acute issues    DVT ppx:  - Lovenox 70F hx pancreatic CA on chemo presenting due to 1 day of fever, dizziness, found to be hypotensive with low fever after acetaminophen use, concerning for septic shock given pt is on chemo. Source unclear, possibly pulmonary vs cardiogenic.    Neuro:  - No acute issues, mentating appropriately despite hypotension    Pulm:  - CXR 10/8 demonstrating bilateral reticulonodular opacities in mid and lower lung fields likely correlating with known pulmonary metastases; CT 10/8 demonstrating known metastases with septal thickening suggestive of underlying edema, no overt evidence of consolidation  - Currently saturating well on RA, will implement supplemental oxygen as needed    CV:   - Hypotension 2/2 infectious (sepsis, though unclear source, possibly chest port vs pulmonary source based on CXR and CT findings) vs cardiogenic (cardiotoxicity due to chemotherapy)   - C/w midodrine 20 TID; currently off of levothyroxine. Titrate to SBP > 90  - Cortisol level 8.5  - Continue to monitor blood pressure    GI:  - Regular diet  - Continue pancrelipase    ID:  **?Septic shock  - Chest port possible source vs pulmonary source  - RVP negative (COVID negative)  - UA with moderate leukocyte esterase and 11-25 WBCs  - F/u Bcx and Ucx  - Continue Vancomycin (10/8) and cefepime (10/8)    Renal:  - On admission, Na 129 (possible hypovolemic hyponatremia)  - Na 134  - Encourage oral intake   - Monitor electrolytes replete as needed    Heme:  **Anemia  - Baseline 10-12  - Hgb 10  - Continue to monitor CBC    Oncology:  **Pancreatic cancer  - Metastatic to lungs  - s/p FOLFIRINOX x2 complicated by reaction to irinotecan, switched to FOLFOX, Cycle 9 was started on 10/8/2020  - Per Oncology, no inpatient therapy; to follow up outpatient  - Zofran PRN for nausea  - IV Tylenol PRN for pain    Endo:  - No acute issues    DVT ppx:  - Lovenox 70F hx pancreatic CA on chemo presenting due to 1 day of fever, dizziness, found to be hypotensive with low fever after acetaminophen use, concerning for septic shock given pt is on chemo. Source unclear, possibly pulmonary vs cardiogenic.    Neuro:  - No acute issues, mentating appropriately despite hypotension    Pulm:  - CXR 10/8 demonstrating bilateral reticulonodular opacities in mid and lower lung fields likely correlating with known pulmonary metastases; CT 10/8 demonstrating known metastases with septal thickening suggestive of underlying edema, no overt evidence of consolidation  - Currently saturating well on RA, will implement supplemental oxygen as needed    CV:   - Hypotension 2/2 infectious (sepsis, though unclear source, possibly chest port vs pulmonary source based on CXR and CT findings) vs cardiogenic (cardiotoxicity due to chemotherapy)   - C/w midodrine 20 TID; currently off of levothyroxine. Titrate to SBP > 90  - Cortisol level 8.5  - Continue to monitor blood pressure    GI:  - Regular diet  - Continue pancrelipase    ID:  **?Septic shock  - Chest port possible source vs pulmonary source  - RVP negative (COVID negative)  - UA with moderate leukocyte esterase and 11-25 WBCs  - F/u Bcx and Ucx  - Continue Vancomycin (10/8) and cefepime (10/8) for empiric coverage    Renal:  - On admission, Na 129 (possible hypovolemic hyponatremia)  - Na 134  - Encourage oral intake   - Monitor electrolytes replete as needed    Heme:  **Anemia  - Baseline 10-12  - Hgb 10  - Continue to monitor CBC    Oncology:  **Pancreatic cancer  - Metastatic to lungs  - s/p FOLFIRINOX x2 complicated by reaction to irinotecan, switched to FOLFOX, Cycle 9 was started on 10/8/2020  - Per Oncology, no inpatient therapy; to follow up outpatient  - Zofran PRN for nausea  - IV Tylenol PRN for pain    Endo:  - No acute issues    DVT ppx:  - Lovenox

## 2020-10-09 NOTE — CHART NOTE - NSCHARTNOTEFT_GEN_A_CORE
Patient is enrolled in a study: "Early identification of sepsis-associated acute kidney injury using ultrasonography measurements and renin and angiotensin levels in children and adults"    IRB:   PI: Nicolás Pantoja MD    Consent obtained from patient     Please contact Missy Haddad MD with any questions    [email or web address]

## 2020-10-09 NOTE — CONSULT NOTE ADULT - ASSESSMENT
70f with metastatic pancreatic CA, s/p FOLFIRINOX x2 complicated by reaction to irinotecan, switched to FOLFOX, Cycle 9 was started on 10/8/2020, p/w fever and weakness, found to be hypotensive and febrile, concerning for septic shock.   Has responded well to broad spectrum antibiotics. Required pressors, now off. Feeling much better.     -Appreciate MICU care  -Follow infectious work up  -Chemotherapy on hold pending resolution of infection, no inpatient oncologic interventions. Care as per MICU team.  -Supportive care, pain control, Nutrition, PT, DVT ppx  -Outpatient oncology f/u    Will follow. Please do not hesitate to call back with questions.     Lilia Goodwin MD  Medical Oncology Attending  C: [phone] 70f with metastatic pancreatic CA, s/p FOLFIRINOX x2 complicated by reaction to irinotecan, switched to FOLFOX, Cycle 9 was started on 10/8/2020, p/w fever and weakness, found to be hypotensive and febrile, concerning for septic shock.   Imaging reviewed, staging work up 10/1/2020 with stable disease.  Pt has responded well to broad spectrum antibiotics. Required pressors, now off. Feeling much better.     -Appreciate MICU care  -Follow infectious work up  -Chemotherapy on hold pending resolution of infection, no inpatient oncologic interventions. Care as per MICU team.  -Supportive care, pain control, Nutrition, PT, DVT ppx  -Outpatient oncology f/u    Will follow. Please do not hesitate to call back with questions.     Lilia Goodwin MD  Medical Oncology Attending  C: [phone]

## 2020-10-10 NOTE — PROGRESS NOTE ADULT - SUBJECTIVE AND OBJECTIVE BOX
Patient is a 70y old  Female who presents with a chief complaint of     SUBJECTIVE / OVERNIGHT EVENTS:  Patient c/o poor appetite. Patient has no new complaints. Denies cp, SOB, abdominal pain, N/V/D     MEDICATIONS  (STANDING):  cefepime   IVPB 2000 milliGRAM(s) IV Intermittent every 8 hours  enoxaparin Injectable 40 milliGRAM(s) SubCutaneous daily  midodrine 20 milliGRAM(s) Oral every 8 hours  ondansetron Injectable 4 milliGRAM(s) IV Push every 6 hours  pancrelipase  (CREON 12,000 Lipase Units) 1 Capsule(s) Oral three times a day with meals  vancomycin  IVPB 1250 milliGRAM(s) IV Intermittent every 12 hours    MEDICATIONS  (PRN):  acetaminophen   Tablet .. 650 milliGRAM(s) Oral every 6 hours PRN Temp greater or equal to 38C (100.4F)      Vital Signs Last 24 Hrs  T(C): 36.7 (10 Oct 2020 12:33), Max: 37.6 (09 Oct 2020 16:00)  T(F): 98.1 (10 Oct 2020 12:33), Max: 99.7 (09 Oct 2020 16:00)  HR: 69 (10 Oct 2020 12:33) (44 - 72)  BP: 100/58 (10 Oct 2020 12:33) (94/72 - 130/55)  BP(mean): 65 (10 Oct 2020 05:00) (51 - 91)  RR: 18 (10 Oct 2020 12:33) (14 - 25)  SpO2: 100% (10 Oct 2020 12:33) (98% - 100%)  CAPILLARY BLOOD GLUCOSE        I&O's Summary    09 Oct 2020 07:01  -  10 Oct 2020 07:00  --------------------------------------------------------  IN: 923.5 mL / OUT: 1300 mL / NET: -376.5 mL        PHYSICAL EXAM:  GENERAL: NAD, well-developed  HEAD:  Atraumatic, Normocephalic  EYES: EOMI, PERRLA, conjunctiva and sclera clear  NECK: Supple, No JVD  CHEST/LUNG: Clear to auscultation bilaterally; No wheeze  HEART: Regular rate and rhythm; No murmurs, rubs, or gallops  ABDOMEN: Soft, Nontender, Nondistended; Bowel sounds present  EXTREMITIES:  2+ Peripheral Pulses, No clubbing, cyanosis, or edema  PSYCH: AAOx3  NEUROLOGY: non-focal  SKIN: No rashes or lesions    LABS:                        9.9    4.09  )-----------( 147      ( 10 Oct 2020 03:30 )             31.7     10-10    135  |  105  |  10  ----------------------------<  98  4.1   |  20<L>  |  0.44<L>    Ca    8.8      10 Oct 2020 03:30  Phos  2.2     10-10  Mg     2.1     10-10    TPro  7.8  /  Alb  3.7  /  TBili  0.4  /  DBili  x   /  AST  45<H>  /  ALT  28  /  AlkPhos  119  10-08          Urinalysis Basic - ( 08 Oct 2020 20:26 )    Color: LIGHT YELLOW / Appearance: CLEAR / S.012 / pH: 6.5  Gluc: NEGATIVE / Ketone: NEGATIVE  / Bili: NEGATIVE / Urobili: NORMAL   Blood: NEGATIVE / Protein: 20 / Nitrite: NEGATIVE   Leuk Esterase: MODERATE / RBC: 0-2 / WBC 11-25   Sq Epi: FEW / Non Sq Epi: x / Bacteria: NEGATIVE        RADIOLOGY & ADDITIONAL TESTS:    Imaging Personally Reviewed:    Consultant(s) Notes Reviewed:      Care Discussed with Consultants/Other Providers:

## 2020-10-10 NOTE — CONSULT NOTE ADULT - ASSESSMENT
70 year old female with pancreatic cancer on immunosuppressive chemoetherapy presents with fever and hypotension.    1) Hypotension with fever  UA without significant pyuria  Urine culture without signficant growth  Blood culture without growth  RVP (included COVID) was negative  Imaging without nidus of infection    2) Pancreatic cancer  on immunosupprive chemotherapy     70 year old female with pancreatic cancer on immunosuppressive chemoetherapy presents with fever and hypotension.    1) Hypotension with fever  UA without significant pyuria  Urine culture without signficant growth  Blood culture without growth  RVP (included COVID) was negative  Imaging without nidus of infection    Difficult to rule out superimposed bacterial pna -     Can stop abx on 10/12    2) Pancreatic cancer  on immunosupprive chemotherapy     70 year old female with pancreatic cancer on immunosuppressive chemoetherapy presents with fever and hypotension.    1) Hypotension with fever  UA without significant pyuria  Urine culture without signficant growth  Blood culture without growth  RVP (included COVID) was negative  Imaging without nidus of infection    Difficult to rule out superimposed bacterial pna -     Can stop abx on 10/12 if no focus on CT a/p (ordered)    2) Pancreatic cancer  on immunosupprive chemotherapy

## 2020-10-10 NOTE — CHART NOTE - NSCHARTNOTEFT_GEN_A_CORE
MICU Transfer Note    Transfer from: MICU  Transfer to:  ( x ) Medicine [id number] [id number]   (  ) Telemetry    (  ) RCU    (  ) Palliative    (  ) Stroke Unit    (  ) _______________  Accepting physician:      HPI:  69 yo F with pmhx of Pancreatic CA currently receiving her 9th chemo treatment that was started yesterday through chest port (receiving it constantly infusing at home over last 2 days). States since yesterday she has been feeling weak and had a temperature of 104F last night. Took Tylenol 1 hr before arriving to ED today. Denies nausea, vomiting, diarrhea, chest pain, shortness of breath, palpitations, cough, sick contacts or COVID contacts, rash. Endorses some urinary frequency but denies dysuria.  In ED triage VS 82/39, HR 87, T 100.1, satting 98% on RA. REceived 2L NS, vancomycin, cefepime, and started on levophed for persistent HoTN. She initially came in w/ chemo infusing to the port but was stopped around 1900. Takes pancrelipase and zofran as her only home meds.    MICU COURSE:  Patient was admitted to the MICU for hypotension likely 2/2 septic shock requiring pressors, she was started on levophed, vancomycin, and cefepime. CXR show b/l opacities in mid-lower lung fields consistent with known pulmonary metastases. CT chest showed septal thickening suggestive of underlying edema as well as known metastases. Unclear source of infection, possibly chest port vs pulmonary source. Shock may also be cardiogenic due to chemo cardiotoxicity. Patient was started on midodrine 20 TID. Levophed turned off at 10AM on 10/9. BCx and UCx NGTD. BCx from chest port pending. Hemeonc following.          ASSESSMENT & PLAN:   70F hx pancreatic CA on chemo presenting due to 1 day of fever, dizziness, found to be hypotensive with low fever after acetaminophen use, concerning for septic shock given pt is on chemo. Source unclear, possibly pulmonary vs cardiogenic.    Neuro:  - No acute issues, mentating appropriately despite hypotension    Pulm:  - CXR 10/8 demonstrating bilateral reticulonodular opacities in mid and lower lung fields likely correlating with known pulmonary metastases; CT 10/8 demonstrating known metastases with septal thickening suggestive of underlying edema, no overt evidence of consolidation  - Currently saturating well on RA, will implement supplemental oxygen as needed    CV:   - Hypotension 2/2 infectious (sepsis, though unclear source, possibly chest port vs pulmonary source based on CXR and CT findings) vs cardiogenic (cardiotoxicity due to chemotherapy)   - C/w midodrine 20 TID; currently off of levothyroxine. Titrate to SBP > 90  - Cortisol level 8.5  - Continue to monitor blood pressure    GI:  - Regular diet  - Continue pancrelipase    ID:  **?Septic shock  - Chest port possible source vs pulmonary source  - RVP negative (COVID negative)  - UA with moderate leukocyte esterase and 11-25 WBCs  - F/u Bcx and Ucx  - Continue Vancomycin (10/8) and cefepime (10/8) for empiric coverage    Renal:  - On admission, Na 129 (possible hypovolemic hyponatremia)  - Na 134  - Encourage oral intake   - Monitor electrolytes replete as needed    Heme:  **Anemia  - Baseline 10-12  - Hgb 10  - Continue to monitor CBC    Oncology:  **Pancreatic cancer  - Metastatic to lungs  - s/p FOLFIRINOX x2 complicated by reaction to irinotecan, switched to FOLFOX, Cycle 9 was started on 10/8/2020  - Per Oncology, no inpatient therapy; to follow up outpatient  - Zofran PRN for nausea  - IV Tylenol PRN for pain    Endo:  - No acute issues    DVT ppx:  - Lovenox          For Follow-Up:  [ ] f/u chest port BCx  [ ] f/u hemeonc  [ ] monitor BP        Vital Signs Last 24 Hrs  T(C): 36.2 (10 Oct 2020 04:00), Max: 37.6 (09 Oct 2020 16:00)  T(F): 97.2 (10 Oct 2020 04:00), Max: 99.7 (09 Oct 2020 16:00)  HR: 60 (10 Oct 2020 04:00) (44 - 73)  BP: 94/72 (10 Oct 2020 04:00) (70/52 - 130/55)  BP(mean): 77 (10 Oct 2020 04:00) (49 - 91)  RR: 18 (10 Oct 2020 04:00) (14 - 25)  SpO2: 100% (10 Oct 2020 04:00) (98% - 100%)  I&O's Summary    08 Oct 2020 07:01  -  09 Oct 2020 07:00  --------------------------------------------------------  IN: 683.1 mL / OUT: 800 mL / NET: -116.9 mL    09 Oct 2020 07:01  -  10 Oct 2020 04:51  --------------------------------------------------------  IN: 923.5 mL / OUT: 1300 mL / NET: -376.5 mL          MEDICATIONS  (STANDING):  cefepime   IVPB 2000 milliGRAM(s) IV Intermittent every 8 hours  chlorhexidine 4% Liquid 1 Application(s) Topical <User Schedule>  enoxaparin Injectable 40 milliGRAM(s) SubCutaneous daily  midodrine 20 milliGRAM(s) Oral every 8 hours  ondansetron Injectable 4 milliGRAM(s) IV Push every 6 hours  pancrelipase  (CREON 12,000 Lipase Units) 1 Capsule(s) Oral three times a day with meals  potassium phosphate / sodium phosphate Powder (PHOS-NaK) 1 Packet(s) Oral once  vancomycin  IVPB 1250 milliGRAM(s) IV Intermittent every 12 hours    MEDICATIONS  (PRN):  acetaminophen   Tablet .. 650 milliGRAM(s) Oral every 6 hours PRN Temp greater or equal to 38C (100.4F)        LABS                                            9.9                   Neurophils% (auto):   76.8   (10-10 @ 03:30):    4.09 )-----------(147          Lymphocytes% (auto):  16.4                                          31.7                   Eosinphils% (auto):   1.2      Manual%: Neutrophils x    ; Lymphocytes x    ; Eosinophils x    ; Bands%: x    ; Blasts x                                    135    |  105    |  10                  Calcium: 8.8   / iCa: x      (10-10 @ 03:30)    ----------------------------<  98        Magnesium: 2.1                              4.1     |  20     |  0.44             Phosphorous: 2.2

## 2020-10-10 NOTE — CONSULT NOTE ADULT - SUBJECTIVE AND OBJECTIVE BOX
HPI:  69 yo F with pmhx of Pancreatic CA currently receiving chemotherapy treatment that via chest port who presented tot he ED on 10/8 with fever and one day of weakness.    States that for one day prior to presentation,  she has been feeling weak. Her T max at home was 104. Denies nausea, vomiting, diarrhea, chest pain, shortness of breath, palpitations, cough, sick contacts or COVID contacts, rash.     Endorses some urinary frequency but denies dysuria.    In Ed, she was hypotensive and febrile.     She was treated empirically with vancomycin/ cefepime/         PAST MEDICAL & SURGICAL HISTORY:  GERD (gastroesophageal reflux disease)    Systemic lupus erythematosus    Hyperlipidemia    H/O total hysterectomy    Pancreatic cancer        Allergies    No Known Allergies    Intolerances        ANTIMICROBIALS:  cefepime   IVPB 2000 every 8 hours  vancomycin  IVPB 1250 every 12 hours      OTHER MEDS:  acetaminophen   Tablet .. 650 milliGRAM(s) Oral every 6 hours PRN  chlorhexidine 4% Liquid 1 Application(s) Topical daily  enoxaparin Injectable 40 milliGRAM(s) SubCutaneous daily  midodrine 20 milliGRAM(s) Oral every 8 hours  ondansetron Injectable 4 milliGRAM(s) IV Push every 6 hours  pancrelipase  (CREON 12,000 Lipase Units) 1 Capsule(s) Oral three times a day with meals      SOCIAL HISTORY:  no travel  no tobacco  No pets at home      FAMILY HISTORY:  Family history of diabetes mellitus        REVIEW OF SYSTEMS  [  ] ROS unobtainable because:    [x ] All other systems negative except as noted below:	    Constitutional:  [ x] fever [ ] chills  [ ] weight loss  [x ] weakness  Skin:  [ ] rash [ ] phlebitis	  Eyes: [ ] icterus [ ] pain  [ ] discharge	  ENMT: [ ] sore throat  [ ] thrush [ ] ulcers [ ] exudates  Respiratory: [ ] dyspnea [ ] hemoptysis [ ] cough [ ] sputum	  Cardiovascular:  [ ] chest pain [ ] palpitations [ ] edema	  Gastrointestinal:  [ ] nausea [ ] vomiting [ ] diarrhea [ ] constipation [ ] pain	  Genitourinary:  [ ] dysuria [ ] frequency [ ] hematuria [ ] discharge [ ] flank pain  [ ] incontinence  Musculoskeletal:  [ ] myalgias [ ] arthralgias [ ] arthritis  [ ] back pain  Neurological:  [ ] headache [ ] seizures  [ ] confusion/altered mental status  Psychiatric:  [ ] anxiety [ ] depression	  Hematology/Lymphatics:  [ ] lymphadenopathy  Endocrine:  [ ] adrenal [ ] thyroid  Allergic/Immunologic:	 [ ] transplant [ ] seasonal    PHYSICAL EXAM:  General: [ ] non-toxic  HEAD/EYES: [ ] PERRL [ ] white sclera [ ] icterus  ENT:  [ ] normal [ ] supple [ ] thrush [ ] pharyngeal exudate  Cardiovascular:   [ ] murmur [ ] normal [ ] PPM/AICD  Respiratory:  [ ] clear to ausculation bilaterally  GI:  [ ] soft, non-tender, normal bowel sounds  :  [ ] willams [ ] no CVA tenderness   Musculoskeletal:  [ ] no synovitis  Neurologic:  [ ] non-focal exam   Skin:  [ ] no rash  Lymph: [ ] no lymphadenopathy  Psychiatric:  [ ] appropriate affect [ ] alert & oriented  Lines:  [ ] no phlebitis [ ] central line          Drug Dosing Weight  Height (cm): 160 (09 Oct 2020 00:08)  Weight (kg): 61.6 (09 Oct 2020 00:08)  BMI (kg/m2): 24.1 (09 Oct 2020 00:08)  BSA (m2): 1.64 (09 Oct 2020 00:08)    Vital Signs Last 24 Hrs  T(F): 98.3 (10-10-20 @ 13:28), Max: 102 (10-09-20 @ 00:08)    Vital Signs Last 24 Hrs  HR: 60 (10-10-20 @ 13:28) (44 - 69)  BP: 106/59 (10-10-20 @ 13:28) (94/72 - 130/55)  RR: 18 (10-10-20 @ 12:33)  SpO2: 100% (10-10-20 @ 13:28) (98% - 100%)  Wt(kg): --                          9.9    4.09  )-----------( 147      ( 10 Oct 2020 03:30 )             31.7       10-10    135  |  105  |  10  ----------------------------<  98  4.1   |  20<L>  |  0.44<L>    Ca    8.8      10 Oct 2020 03:30  Phos  2.2     10-10  Mg     2.1     10-10    TPro  7.8  /  Alb  3.7  /  TBili  0.4  /  DBili  x   /  AST  45<H>  /  ALT  28  /  AlkPhos  119  10-08      Urinalysis Basic - ( 08 Oct 2020 20:26 )    Color: LIGHT YELLOW / Appearance: CLEAR / S.012 / pH: 6.5  Gluc: NEGATIVE / Ketone: NEGATIVE  / Bili: NEGATIVE / Urobili: NORMAL   Blood: NEGATIVE / Protein: 20 / Nitrite: NEGATIVE   Leuk Esterase: MODERATE / RBC: 0-2 / WBC 11-25   Sq Epi: FEW / Non Sq Epi: x / Bacteria: NEGATIVE        MICROBIOLOGY:  Culture - Blood (10.09.20 @ 12:15)    Specimen Source: .Blood Port Device    Culture Results:   No growth to date.        RADIOLOGY:  < from: CT Chest No Cont (10.08.20 @ 22:19) >  EXAM:  CT CHEST        PROCEDURE DATE:  Oct  8 2020         INTERPRETATION:  CLINICAL INFORMATION: History of pancreatic cancer with recent chemotherapy, with fever and weakness, rule out pneumonia.    COMPARISON: CT chest 10/1/2020    PROCEDURE:  CT of the Chest was performed without intravenous contrast.  Sagittal and coronal reformats were performed.    FINDINGS:    LUNGS AND AIRWAYS: Patent central airways.  Redemonstration of multiple bilateral pulmonary nodules, grossly unchanged. Bilateral dependent linear atelectasis. The interlobular septa are thickened, a new finding since 10/1/2020.    PLEURA: No pleural effusion. No pneumothorax.  MEDIASTINUM AND LARRY: The chest lymph nodes involving the prevascular, paratracheal, right hilar and lower paratracheal lymph nodes stations are unchanged.  VESSELS: Within normal limits.  HEART: Heart size is normal. No pericardial effusion.  CHEST WALL AND LOWER NECK: Within normal limits.  VISUALIZED UPPER ABDOMEN: Multiple left-sided renal cysts, largest measuring up to 2.5 cm. The perihepatic gastrohepatic lymph nodes are unchanged. The ductal dilatation of the pancreas is better shown on 10/1/2020. The pancreatic mass is better shown on 10/1/2020.  BONES: Within normal limits.    IMPRESSION:  1.  Redemonstration of diffuse metastatic disease in the lungs.  2.  Septal thickening can suggest underlying edema.    < end of copied text >       HPI:  69 yo F with pmhx of Pancreatic CA currently receiving chemotherapy treatment that via chest port who presented tot he ED on 10/8 with fever and one day of weakness.    States that for one day prior to presentation,  she has been feeling weak. Her T max at home was 104. Denies nausea, vomiting, diarrhea, chest pain, shortness of breath, palpitations, cough, sick contacts or COVID contacts, rash.     Endorses some urinary frequency but denies dysuria.    In Ed, she was hypotensive and febrile.     She was treated empirically with vancomycin/ cefepime/     After eating, has soft BM.   Deneis apin  No cough  No dysuria at this time.      PAST MEDICAL & SURGICAL HISTORY:  GERD (gastroesophageal reflux disease)    Systemic lupus erythematosus    Hyperlipidemia    H/O total hysterectomy    Pancreatic cancer        Allergies    No Known Allergies    Intolerances        ANTIMICROBIALS:  cefepime   IVPB 2000 every 8 hours  vancomycin  IVPB 1250 every 12 hours      OTHER MEDS:  acetaminophen   Tablet .. 650 milliGRAM(s) Oral every 6 hours PRN  chlorhexidine 4% Liquid 1 Application(s) Topical daily  enoxaparin Injectable 40 milliGRAM(s) SubCutaneous daily  midodrine 20 milliGRAM(s) Oral every 8 hours  ondansetron Injectable 4 milliGRAM(s) IV Push every 6 hours  pancrelipase  (CREON 12,000 Lipase Units) 1 Capsule(s) Oral three times a day with meals      SOCIAL HISTORY:  no travel  no tobacco  No pets at home  from DR  Lives with sons      FAMILY HISTORY:  Family history of diabetes mellitus        REVIEW OF SYSTEMS  [  ] ROS unobtainable because:    [x ] All other systems negative except as noted below:	    Constitutional:  [ x] fever [ ] chills  [ ] weight loss  [x ] weakness  Skin:  [ ] rash [ ] phlebitis	  Eyes: [ ] icterus [ ] pain  [ ] discharge	  ENMT: [ ] sore throat  [ ] thrush [ ] ulcers [ ] exudates  Respiratory: [ ] dyspnea [ ] hemoptysis [ ] cough [ ] sputum	  Cardiovascular:  [ ] chest pain [ ] palpitations [ ] edema	  Gastrointestinal:  [ ] nausea [ ] vomiting [ ] diarrhea [ ] constipation [ ] pain	  Genitourinary:  [ ] dysuria [ ] frequency [ ] hematuria [ ] discharge [ ] flank pain  [ ] incontinence  Musculoskeletal:  [ ] myalgias [ ] arthralgias [ ] arthritis  [ ] back pain  Neurological:  [ ] headache [ ] seizures  [ ] confusion/altered mental status  Psychiatric:  [ ] anxiety [ ] depression	  Hematology/Lymphatics:  [ ] lymphadenopathy  Endocrine:  [ ] adrenal [ ] thyroid  Allergic/Immunologic:	 [ ] transplant [ ] seasonal    PHYSICAL EXAM:  General: [ ] non-toxic  HEAD/EYES: [ ] PERRL [ ] white sclera [ ] icterus  ENT:  [ ] normal [ ] supple [ ] thrush [ ] pharyngeal exudate  Cardiovascular:   [ ] murmur [ ] normal [ ] PPM/AICD  Respiratory:  [ ] clear to ausculation bilaterally  GI:  [ ] soft, non-tender, normal bowel sounds  :  [ ] willams [ ] no CVA tenderness   Musculoskeletal:  [ ] no synovitis  Neurologic:  [ ] non-focal exam   Skin:  [ ] no rash  Lymph: [ ] no lymphadenopathy  Psychiatric:  [ ] appropriate affect [ ] alert & oriented  Lines:  [ ] no phlebitis [ ] central line          Drug Dosing Weight  Height (cm): 160 (09 Oct 2020 00:08)  Weight (kg): 61.6 (09 Oct 2020 00:08)  BMI (kg/m2): 24.1 (09 Oct 2020 00:08)  BSA (m2): 1.64 (09 Oct 2020 00:08)    Vital Signs Last 24 Hrs  T(F): 98.3 (10-10-20 @ 13:28), Max: 102 (10-09-20 @ 00:08)    Vital Signs Last 24 Hrs  HR: 60 (10-10-20 @ 13:28) (44 - 69)  BP: 106/59 (10-10-20 @ 13:28) (94/72 - 130/55)  RR: 18 (10-10-20 @ 12:33)  SpO2: 100% (10-10-20 @ 13:28) (98% - 100%)  Wt(kg): --                          9.9    4.09  )-----------( 147      ( 10 Oct 2020 03:30 )             31.7       10-10    135  |  105  |  10  ----------------------------<  98  4.1   |  20<L>  |  0.44<L>    Ca    8.8      10 Oct 2020 03:30  Phos  2.2     10-10  Mg     2.1     10-10    TPro  7.8  /  Alb  3.7  /  TBili  0.4  /  DBili  x   /  AST  45<H>  /  ALT  28  /  AlkPhos  119  10-08      Urinalysis Basic - ( 08 Oct 2020 20:26 )    Color: LIGHT YELLOW / Appearance: CLEAR / S.012 / pH: 6.5  Gluc: NEGATIVE / Ketone: NEGATIVE  / Bili: NEGATIVE / Urobili: NORMAL   Blood: NEGATIVE / Protein: 20 / Nitrite: NEGATIVE   Leuk Esterase: MODERATE / RBC: 0-2 / WBC 11-25   Sq Epi: FEW / Non Sq Epi: x / Bacteria: NEGATIVE        MICROBIOLOGY:  Culture - Blood (10.09.20 @ 12:15)    Specimen Source: .Blood Port Device    Culture Results:   No growth to date.        RADIOLOGY:  < from: CT Chest No Cont (10.08.20 @ 22:19) >  EXAM:  CT CHEST        PROCEDURE DATE:  Oct  8 2020         INTERPRETATION:  CLINICAL INFORMATION: History of pancreatic cancer with recent chemotherapy, with fever and weakness, rule out pneumonia.    COMPARISON: CT chest 10/1/2020    PROCEDURE:  CT of the Chest was performed without intravenous contrast.  Sagittal and coronal reformats were performed.    FINDINGS:    LUNGS AND AIRWAYS: Patent central airways.  Redemonstration of multiple bilateral pulmonary nodules, grossly unchanged. Bilateral dependent linear atelectasis. The interlobular septa are thickened, a new finding since 10/1/2020.    PLEURA: No pleural effusion. No pneumothorax.  MEDIASTINUM AND LARRY: The chest lymph nodes involving the prevascular, paratracheal, right hilar and lower paratracheal lymph nodes stations are unchanged.  VESSELS: Within normal limits.  HEART: Heart size is normal. No pericardial effusion.  CHEST WALL AND LOWER NECK: Within normal limits.  VISUALIZED UPPER ABDOMEN: Multiple left-sided renal cysts, largest measuring up to 2.5 cm. The perihepatic gastrohepatic lymph nodes are unchanged. The ductal dilatation of the pancreas is better shown on 10/1/2020. The pancreatic mass is better shown on 10/1/2020.  BONES: Within normal limits.    IMPRESSION:  1.  Redemonstration of diffuse metastatic disease in the lungs.  2.  Septal thickening can suggest underlying edema.    < end of copied text >

## 2020-10-10 NOTE — PROGRESS NOTE ADULT - ASSESSMENT
70 y.o. Female w/ hx pancreatic CA on chemo p/w  1 day of fever, dizziness, found to be hypotensive with fever and admitted to MICU for septic shock of unclear source requiring pressors.

## 2020-10-10 NOTE — PROGRESS NOTE ADULT - PROBLEM SELECTOR PLAN 1
Blood Cx/Urine cx NTD  c/w cefepime /Vanco  will wean Midodrine 20mg q8hr tomorrow  No obvious source of infection; Ct chest showed redemonstration of metastatic disease to lungs. Will check CT A/P to r/o occult abscess  RVP negative; COVID negative

## 2020-10-11 NOTE — PROGRESS NOTE ADULT - SUBJECTIVE AND OBJECTIVE BOX
Follow Up:      Inverval History/ROS:Patient is a 70y old  Female who presents with a chief complaint of septic shock (11 Oct 2020 10:47)    Feels well.  No cough  Abd pain controlled  Allergies    No Known Allergies    Intolerances        ANTIMICROBIALS:  cefepime   IVPB 2000 every 8 hours  vancomycin  IVPB 1250 every 12 hours      OTHER MEDS:  acetaminophen   Tablet .. 650 milliGRAM(s) Oral every 6 hours PRN  chlorhexidine 4% Liquid 1 Application(s) Topical daily  enoxaparin Injectable 40 milliGRAM(s) SubCutaneous daily  midodrine 20 milliGRAM(s) Oral <User Schedule>  ondansetron Injectable 4 milliGRAM(s) IV Push every 6 hours  pancrelipase  (CREON 12,000 Lipase Units) 1 Capsule(s) Oral three times a day with meals      Vital Signs Last 24 Hrs  T(C): 36.7 (11 Oct 2020 12:35), Max: 36.9 (10 Oct 2020 21:44)  T(F): 98 (11 Oct 2020 12:35), Max: 98.5 (10 Oct 2020 21:44)  HR: 63 (11 Oct 2020 12:35) (57 - 75)  BP: 125/68 (11 Oct 2020 12:35) (97/48 - 125/68)  BP(mean): --  RR: 18 (11 Oct 2020 12:35) (18 - 18)  SpO2: 100% (11 Oct 2020 12:35) (100% - 100%)    PHYSICAL EXAM:  General: [x ] non-toxic  HEAD/EYES: [ ] PERRL [ x] white sclera [ ] icterus  ENT:  [ ] normal [x ] supple [ ] thrush [ ] pharyngeal exudate  Cardiovascular:   [ ] murmur [x ] normal [ ] PPM/AICD  Respiratory:  x[ ] clear to ausculation bilaterally  GI:  [x ] soft, non-tender, normal bowel sounds  :  [ ] willams [x ] no CVA tenderness   Musculoskeletal:  [ ] no synovitis  Neurologic:  [ ] non-focal exam   Skin:  x[ ] no rash  Lymph: [x ] no lymphadenopathy  Psychiatric:  [ ] appropriate affect [ ] alert & oriented  Lines:  [x ] no phlebitis [ ] central line                                9.9    3.10  )-----------( 142      ( 11 Oct 2020 06:07 )             30.8       10-11    136  |  103  |  6<L>  ----------------------------<  121<H>  3.3<L>   |  21<L>  |  0.38<L>    Ca    8.8      11 Oct 2020 05:45  Phos  2.9     10-11  Mg     2.0     10-11    TPro  7.1  /  Alb  3.2<L>  /  TBili  0.2  /  DBili  x   /  AST  32  /  ALT  30  /  AlkPhos  107  10-11          MICROBIOLOGY:Culture Results:   No growth to date. (10-09-20 @ 12:15)  Culture Results:   No growth to date. (10-09-20 @ 12:15)  Culture Results:   No growth to date. (10-08-20 @ 20:35)  Culture Results:   No growth to date. (10-08-20 @ 20:35)  Culture Results:   <10,000 CFU/mL Normal Urogenital Berta (10-08-20 @ 20:26)      RADIOLOGY:    < from: CT Abdomen and Pelvis w/ IV Cont (10.10.20 @ 17:31) >  IMPRESSION:    Metastatic pancreatic cancer involving the retroperitoneal lymph nodes and visualized bilateral lungs. Stable disease compared to 10/1/2020.    Small volume ascites, new since 10/1/2020.    < end of copied text >

## 2020-10-11 NOTE — PROGRESS NOTE ADULT - PROBLEM SELECTOR PLAN 1
Blood Cx/Urine cx NTD  c/w cefepime /Vanco  will wean Midodrine 20mg to q12hr today.  No obvious source of infection; Ct chest showed redemonstration of metastatic disease to lungs.   CT A/P negative for occult abscess; only showed known pancreas ca.  RVP negative; COVID negative

## 2020-10-11 NOTE — PROGRESS NOTE ADULT - ASSESSMENT
70 year old female with pancreatic cancer on immunosuppressive chemoetherapy presents with fever and hypotension.    1) Hypotension with fever  UA without significant pyuria  Urine culture without signficant growth  Blood culture without growth  RVP (included COVID) was negative  Imaging without nidus of infection    Difficult to rule out superimposed bacterial pna -   Continue cefepime. Stop vancomycin    Can stop Cefepime on 10/12 if no focus on CT a/p (ordered)    2) Pancreatic cancer  on immunosupprive chemotherapy

## 2020-10-11 NOTE — PROGRESS NOTE ADULT - SUBJECTIVE AND OBJECTIVE BOX
Patient is a 70y old  Female who presents with a chief complaint of fever (10 Oct 2020 16:11)      SUBJECTIVE / OVERNIGHT EVENTS:  Patient has no new complaints. Denies cp, SOB, abdominal pain, N/V/D     MEDICATIONS  (STANDING):  cefepime   IVPB 2000 milliGRAM(s) IV Intermittent every 8 hours  chlorhexidine 4% Liquid 1 Application(s) Topical daily  enoxaparin Injectable 40 milliGRAM(s) SubCutaneous daily  midodrine 20 milliGRAM(s) Oral every 8 hours  ondansetron Injectable 4 milliGRAM(s) IV Push every 6 hours  pancrelipase  (CREON 12,000 Lipase Units) 1 Capsule(s) Oral three times a day with meals  potassium chloride  10 mEq/100 mL IVPB 10 milliEquivalent(s) IV Intermittent every 1 hour  vancomycin  IVPB 1250 milliGRAM(s) IV Intermittent every 12 hours    MEDICATIONS  (PRN):  acetaminophen   Tablet .. 650 milliGRAM(s) Oral every 6 hours PRN Temp greater or equal to 38C (100.4F)      Vital Signs Last 24 Hrs  T(C): 36.6 (11 Oct 2020 09:06), Max: 36.9 (10 Oct 2020 21:44)  T(F): 97.9 (11 Oct 2020 09:06), Max: 98.5 (10 Oct 2020 21:44)  HR: 75 (11 Oct 2020 09:06) (57 - 75)  BP: 118/49 (11 Oct 2020 09:06) (97/48 - 120/54)  BP(mean): --  RR: 18 (11 Oct 2020 09:06) (18 - 18)  SpO2: 100% (11 Oct 2020 09:06) (100% - 100%)  CAPILLARY BLOOD GLUCOSE        I&O's Summary      PHYSICAL EXAM:   GENERAL: NAD, well-developed  HEAD:  Atraumatic, Normocephalic  EYES: EOMI, PERRLA, conjunctiva and sclera clear  NECK: Supple, No JVD  CHEST/LUNG: Clear to auscultation bilaterally; No wheeze  HEART: Regular rate and rhythm; No murmurs, rubs, or gallops  ABDOMEN: Soft, Nontender, Nondistended; Bowel sounds present  EXTREMITIES:  2+ Peripheral Pulses, No clubbing, cyanosis, or edema  PSYCH: AAOx3  NEUROLOGY: non-focal  SKIN: No rashes or lesions    LABS:                        9.9    3.10  )-----------( 142      ( 11 Oct 2020 06:07 )             30.8     10-11    136  |  103  |  6<L>  ----------------------------<  121<H>  3.3<L>   |  21<L>  |  0.38<L>    Ca    8.8      11 Oct 2020 05:45  Phos  2.9     10-11  Mg     2.0     10-11    TPro  7.1  /  Alb  3.2<L>  /  TBili  0.2  /  DBili  x   /  AST  32  /  ALT  30  /  AlkPhos  107  10-11              RADIOLOGY & ADDITIONAL TESTS:    Imaging Personally Reviewed: < from: CT Abdomen and Pelvis w/ IV Cont (10.10.20 @ 17:31) >  IMPRESSION:    Metastatic pancreatic cancer involving the retroperitoneal lymph nodes and visualized bilateral lungs. Stable disease compared to 10/1/2020.    Small volume ascites, new since 10/1/2020.      < end of copied text >      Consultant(s) Notes Reviewed:      Care Discussed with Consultants/Other Providers:   Patient is a 70y old  Female who presents with a chief complaint of fever (10 Oct 2020 16:11)      SUBJECTIVE / OVERNIGHT EVENTS:  Patient c/o dizziness when she stands. Denies cp, SOB, abdominal pain, N/V/D     MEDICATIONS  (STANDING):  cefepime   IVPB 2000 milliGRAM(s) IV Intermittent every 8 hours  chlorhexidine 4% Liquid 1 Application(s) Topical daily  enoxaparin Injectable 40 milliGRAM(s) SubCutaneous daily  midodrine 20 milliGRAM(s) Oral every 8 hours  ondansetron Injectable 4 milliGRAM(s) IV Push every 6 hours  pancrelipase  (CREON 12,000 Lipase Units) 1 Capsule(s) Oral three times a day with meals  potassium chloride  10 mEq/100 mL IVPB 10 milliEquivalent(s) IV Intermittent every 1 hour  vancomycin  IVPB 1250 milliGRAM(s) IV Intermittent every 12 hours    MEDICATIONS  (PRN):  acetaminophen   Tablet .. 650 milliGRAM(s) Oral every 6 hours PRN Temp greater or equal to 38C (100.4F)      Vital Signs Last 24 Hrs  T(C): 36.6 (11 Oct 2020 09:06), Max: 36.9 (10 Oct 2020 21:44)  T(F): 97.9 (11 Oct 2020 09:06), Max: 98.5 (10 Oct 2020 21:44)  HR: 75 (11 Oct 2020 09:06) (57 - 75)  BP: 118/49 (11 Oct 2020 09:06) (97/48 - 120/54)  BP(mean): --  RR: 18 (11 Oct 2020 09:06) (18 - 18)  SpO2: 100% (11 Oct 2020 09:06) (100% - 100%)  CAPILLARY BLOOD GLUCOSE        I&O's Summary      PHYSICAL EXAM:   GENERAL: NAD, well-developed  HEAD:  Atraumatic, Normocephalic  EYES: EOMI, PERRLA, conjunctiva and sclera clear  NECK: Supple, No JVD  CHEST/LUNG: Clear to auscultation bilaterally; No wheeze  HEART: Regular rate and rhythm; No murmurs, rubs, or gallops  ABDOMEN: Soft, Nontender, Nondistended; Bowel sounds present  EXTREMITIES:  2+ Peripheral Pulses, No clubbing, cyanosis, or edema  PSYCH: AAOx3  NEUROLOGY: non-focal  SKIN: No rashes or lesions    LABS:                        9.9    3.10  )-----------( 142      ( 11 Oct 2020 06:07 )             30.8     10-11    136  |  103  |  6<L>  ----------------------------<  121<H>  3.3<L>   |  21<L>  |  0.38<L>    Ca    8.8      11 Oct 2020 05:45  Phos  2.9     10-11  Mg     2.0     10-11    TPro  7.1  /  Alb  3.2<L>  /  TBili  0.2  /  DBili  x   /  AST  32  /  ALT  30  /  AlkPhos  107  10-11              RADIOLOGY & ADDITIONAL TESTS:    Imaging Personally Reviewed: < from: CT Abdomen and Pelvis w/ IV Cont (10.10.20 @ 17:31) >  IMPRESSION:    Metastatic pancreatic cancer involving the retroperitoneal lymph nodes and visualized bilateral lungs. Stable disease compared to 10/1/2020.    Small volume ascites, new since 10/1/2020.      < end of copied text >      Consultant(s) Notes Reviewed:      Care Discussed with Consultants/Other Providers:

## 2020-10-12 NOTE — PROGRESS NOTE ADULT - ASSESSMENT
70f with metastatic pancreatic CA, s/p FOLFIRINOX x2 complicated by reaction to irinotecan, switched to FOLFOX, Cycle 9 was started on 10/8/2020, p/w fever and weakness, found to be hypotensive and febrile, concerning for septic shock.   Imaging reviewed, staging work up 10/1/2020 with stable disease.  Pt has responded well to broad spectrum antibiotics. Required pressors, now off. Feeling much better.     -S/p MICU admission  for hypotension likely 2/2 septic shock requiring pressors, she was started on levophed, vancomycin, and cefepime.  Shock may also be cardiogenic due to chemo cardiotoxicity. Cultures with NGTD. Patient was eventually titrated down to  midodrine (being tapered down as well)-  now transferred to general Medicine floor  -No obvious source of infection; Ct chest showed redemonstration of metastatic disease to lungs.   CT A/P negative for occult abscess; only showed known pancreas ca.  RVP negative; COVID negative.  -Recommend antiemetics PRN for nausea  -Chemotherapy on hold pending resolution of acute status, no inpatient oncologic interventions.  -rest of care as per primary team  -Patient to followup with Dr. Paul ( [facility]) upon discharge  -C/w Supportive care, pain control, Nutrition, PT, DVT ppx  -Oncology will continue to follow with you      Case d/w Dr. Christa RAMIRES  Oncology Physician Assistant  [facility]  Pager [phone]    If after 5pm or weekends please page On-call Oncology Fellow     70f with metastatic pancreatic CA, s/p FOLFIRINOX x2 complicated by reaction to irinotecan, switched to FOLFOX, Cycle 9 was started on 10/8/2020, p/w fever and weakness, found to be hypotensive and febrile, concerning for septic shock.   Imaging reviewed, staging work up 10/1/2020 with stable disease.  Pt has responded well to broad spectrum antibiotics. Required pressors, now off. Feeling much better.     -S/p MICU admission  for hypotension likely 2/2 septic shock requiring pressors, she was started on levophed, vancomycin, and cefepime.  Shock may also be cardiogenic due to chemo cardiotoxicity. Cultures with NGTD. Patient was eventually titrated down to  midodrine (being tapered down as well)-  now transferred to general Medicine floor  -No obvious source of infection; Ct chest showed redemonstration of metastatic disease to lungs.   CT A/P negative for occult abscess; only showed known pancreas ca.  RVP negative; COVID negative.  -On empiric abx as per ID, end dtae today 10/12  -Recommend antiemetics PRN for nausea  -Chemotherapy on hold pending resolution of acute status, no inpatient oncologic interventions.  -rest of care as per primary team  -Patient to followup with Dr. Paul ( [facility]) upon discharge  -C/w Supportive care, pain control, Nutrition, PT, DVT ppx  -Oncology will continue to follow with you      Case d/w Dr. Christa RAMIRES  Oncology Physician Assistant  [facility]  Pager [phone]    If after 5pm or weekends please page On-call Oncology Fellow

## 2020-10-12 NOTE — DISCHARGE NOTE PROVIDER - CARE PROVIDER_API CALL
Fabian Paul)  Medical Oncology  [address] [place] [address]  Phone: [phone]  Fax: [phone]  Follow Up Time:

## 2020-10-12 NOTE — PROGRESS NOTE ADULT - PROBLEM SELECTOR PLAN 1
Blood Cx/Urine cx NTD  Completed cefepime /Vanco  Id consult greatly appreciated  No obvious source of infection; Ct chest showed redemonstration of metastatic disease to lungs.   CT A/P negative for occult abscess; only showed known pancreas ca.  RVP negative; COVID negative Blood Cx/Urine cx NTD  Completed cefepime /Vanco  No source found for sepsis as cultures- negative   No obvious source of infection; Ct chest showed redemonstration of metastatic disease to lungs.   CT A/P negative for occult abscess; only showed known pancreas ca.  RVP negative; COVID negative

## 2020-10-12 NOTE — PROGRESS NOTE ADULT - ASSESSMENT
70 y.o. Female w/ hx pancreatic CA on chemo p/w  1 day of fever, dizziness, found to be hypotensive with fever and admitted to MICU for septic shock of unclear source requiring pressors. 70 y.o. Female w/ hx pancreatic CA on chemo p/w  1 day of fever, dizziness, found to be hypotensive with fever and admitted to MICU for septic shock of unclear source requiring pressors.    completed 5 days of antibiotics  patient still on midodrine  lft elevated today

## 2020-10-12 NOTE — DISCHARGE NOTE PROVIDER - NSDCFUADDAPPT_GEN_ALL_CORE_FT
If you are in need of a general medicine physician and post-discharge medical follow-up for further care/recommendations you may contact the [facility] for an appointment [phone]

## 2020-10-12 NOTE — PROGRESS NOTE ADULT - ASSESSMENT
70 year old female with pancreatic cancer on immunosuppressive chemoetherapy presents with fever and hypotension.    1) Hypotension with fever  UA without significant pyuria  Urine culture without significant growth  Blood culture without growth  RVP (included COVID) was negative  Imaging without nidus of infection    Difficult to rule out superimposed bacterial pna -   CT a/p without focus  Can stop Cefepime on 10/12     2) Pancreatic cancer  on immunosuppressive chemotherapy    Call ID service with additional questions

## 2020-10-12 NOTE — DISCHARGE NOTE PROVIDER - NSDCFUSCHEDAPPT_GEN_ALL_CORE_FT
LIZ, GRISELDA ; 10/21/2020 ; [facility]  LIZ, GRISELDA ; 10/23/2020 ; [facility]  LIZ, GRISELDA ; 10/23/2020 ; [facility]  LIZ, GRISELDA ; 11/04/2020 ; [facility]  LIZ, GRISELDA ; 11/06/2020 ; [facility]  LIZ, GRISELDA ; 11/18/2020 ; [facility]  LIZ, GRISELDA ; 11/20/2020 ; [facility]

## 2020-10-12 NOTE — PROGRESS NOTE ADULT - SUBJECTIVE AND OBJECTIVE BOX
Patient is a 70y old  Female who presents with a chief complaint of fever (12 Oct 2020 13:49)      SUBJECTIVE / OVERNIGHT EVENTS:  resting in bed- seen with NP  noted on/off diarrhea x 1 year.  no more fevers- will complete antibiotucs today    MEDICATIONS  (STANDING):  cefepime   IVPB 2000 milliGRAM(s) IV Intermittent every 8 hours  chlorhexidine 4% Liquid 1 Application(s) Topical daily  enoxaparin Injectable 40 milliGRAM(s) SubCutaneous daily  midodrine 20 milliGRAM(s) Oral <User Schedule>  ondansetron Injectable 4 milliGRAM(s) IV Push every 6 hours  pancrelipase  (CREON 12,000 Lipase Units) 1 Capsule(s) Oral three times a day with meals    MEDICATIONS  (PRN):  acetaminophen   Tablet .. 650 milliGRAM(s) Oral every 6 hours PRN Temp greater or equal to 38C (100.4F)      Vital Signs Last 24 Hrs  T(C): 36.8 (12 Oct 2020 10:04), Max: 36.8 (11 Oct 2020 22:45)  T(F): 98.2 (12 Oct 2020 10:04), Max: 98.2 (11 Oct 2020 22:45)  HR: 55 (12 Oct 2020 10:04) (51 - 61)  BP: 102/45 (12 Oct 2020 10:04) (102/45 - 111/50)  BP(mean): --  RR: 16 (12 Oct 2020 10:04) (16 - 18)  SpO2: 97% (12 Oct 2020 10:04) (97% - 100%)  PHYSICAL EXAM:  GENERAL: NAD, well-developed  HEAD:  Atraumatic, Normocephalic  EYES: EOMI, PERRLA, conjunctiva and sclera clear  NECK: Supple, No JVD  CHEST/LUNG: Clear to auscultation bilaterally; No wheeze  HEART: Regular rate and rhythm; No murmurs, rubs, or gallops  ABDOMEN: Soft, Nontender, Nondistended; Bowel sounds present  EXTREMITIES:  2+ Peripheral Pulses, No clubbing, cyanosis, or edema  PSYCH: AAOx3  NEUROLOGY: non-focal      LABS:                        10.5   3.83  )-----------( 169      ( 12 Oct 2020 06:26 )             33.5     10-12    136  |  103  |  8   ----------------------------<  90  4.0   |  21<L>  |  0.46<L>    Ca    9.4      12 Oct 2020 06:26  Phos  3.2     10-12  Mg     2.1     10-12    TPro  7.7  /  Alb  3.4  /  TBili  0.3  /  DBili  x   /  AST  82<H>  /  ALT  66<H>  /  AlkPhos  152<H>  10-12

## 2020-10-12 NOTE — PROGRESS NOTE ADULT - SUBJECTIVE AND OBJECTIVE BOX
Follow Up:      Inverval History/ROS:Patient is a 70y old  Female who presents with a chief complaint of fever (12 Oct 2020 12:59)      Allergies    No Known Allergies    Intolerances        ANTIMICROBIALS:  cefepime   IVPB 2000 every 8 hours      OTHER MEDS:  acetaminophen   Tablet .. 650 milliGRAM(s) Oral every 6 hours PRN  chlorhexidine 4% Liquid 1 Application(s) Topical daily  enoxaparin Injectable 40 milliGRAM(s) SubCutaneous daily  midodrine 20 milliGRAM(s) Oral <User Schedule>  ondansetron Injectable 4 milliGRAM(s) IV Push every 6 hours  pancrelipase  (CREON 12,000 Lipase Units) 1 Capsule(s) Oral three times a day with meals      Vital Signs Last 24 Hrs  T(C): 36.8 (12 Oct 2020 10:04), Max: 36.8 (11 Oct 2020 22:45)  T(F): 98.2 (12 Oct 2020 10:04), Max: 98.2 (11 Oct 2020 22:45)  HR: 55 (12 Oct 2020 10:04) (51 - 61)  BP: 102/45 (12 Oct 2020 10:04) (102/45 - 111/50)  BP(mean): --  RR: 16 (12 Oct 2020 10:04) (16 - 18)  SpO2: 97% (12 Oct 2020 10:04) (97% - 100%)    PHYSICAL EXAM:  General: [x ] non-toxic  HEAD/EYES: [ ] PERRL x[ ] white sclera [ ] icterus  ENT:  [ ] normal [ x] supple [ ] thrush [ ] pharyngeal exudate  Cardiovascular:   [ ] murmur [ x] normal [ ] PPM/AICD  Respiratory:  [ x] clear to ausculation bilaterally  GI:  [x ] soft, non-tender, normal bowel sounds  :  [ ] willams [ ] no CVA tenderness   Musculoskeletal:  [ ] no synovitis  Neurologic:  [ ] non-focal exam   Skin:  [ x] no rash  Lymph: [ ] no lymphadenopathy  Psychiatric:  [ ] appropriate affect [ x] alert & oriented  Lines:  [ x] no phlebitis [ ] central line                                10.5   3.83  )-----------( 169      ( 12 Oct 2020 06:26 )             33.5       10-12    136  |  103  |  8   ----------------------------<  90  4.0   |  21<L>  |  0.46<L>    Ca    9.4      12 Oct 2020 06:26  Phos  3.2     10-12  Mg     2.1     10-12    TPro  7.7  /  Alb  3.4  /  TBili  0.3  /  DBili  x   /  AST  82<H>  /  ALT  66<H>  /  AlkPhos  152<H>  10-12          MICROBIOLOGY:Culture Results:   No growth to date. (10-09-20 @ 12:15)  Culture Results:   No growth to date. (10-09-20 @ 12:15)  Culture Results:   No growth to date. (10-08-20 @ 20:35)  Culture Results:   No growth to date. (10-08-20 @ 20:35)  Culture Results:   <10,000 CFU/mL Normal Urogenital Berta (10-08-20 @ 20:26)      RADIOLOGY:

## 2020-10-12 NOTE — PROGRESS NOTE ADULT - SUBJECTIVE AND OBJECTIVE BOX
INTERVAL HPI/OVERNIGHT EVENTS:  Patient seen at bedside.  Patient with improved symptoms  Afebrile overnight and denies chills  Does however complain of nausea and indigestions  Denies vomiting or abdominal pain      VITAL SIGNS:  T(F): 98.2 (10-12-20 @ 10:04)  HR: 55 (10-12-20 @ 10:04)  BP: 102/45 (10-12-20 @ 10:04)  RR: 16 (10-12-20 @ 10:04)  SpO2: 97% (10-12-20 @ 10:04)  Wt(kg): --    PHYSICAL EXAM:    In accordance with current standard limiting patient contact, deferred physical exam  2/2 COVID pandemic  Please refer to physical exam of primary team.    MEDICATIONS  (STANDING):  cefepime   IVPB 2000 milliGRAM(s) IV Intermittent every 8 hours  chlorhexidine 4% Liquid 1 Application(s) Topical daily  enoxaparin Injectable 40 milliGRAM(s) SubCutaneous daily  midodrine 20 milliGRAM(s) Oral <User Schedule>  ondansetron Injectable 4 milliGRAM(s) IV Push every 6 hours  pancrelipase  (CREON 12,000 Lipase Units) 1 Capsule(s) Oral three times a day with meals    MEDICATIONS  (PRN):  acetaminophen   Tablet .. 650 milliGRAM(s) Oral every 6 hours PRN Temp greater or equal to 38C (100.4F)      Allergies    No Known Allergies    Intolerances        LABS:                        10.5   3.83  )-----------( 169      ( 12 Oct 2020 06:26 )             33.5     10-12    136  |  103  |  8   ----------------------------<  90  4.0   |  21<L>  |  0.46<L>    Ca    9.4      12 Oct 2020 06:26  Phos  3.2     10-12  Mg     2.1     10-12    TPro  7.7  /  Alb  3.4  /  TBili  0.3  /  DBili  x   /  AST  82<H>  /  ALT  66<H>  /  AlkPhos  152<H>  10-12          RADIOLOGY & ADDITIONAL TESTS:  Studies reviewed.

## 2020-10-12 NOTE — DISCHARGE NOTE PROVIDER - NSDCCPCAREPLAN_GEN_ALL_CORE_FT
PRINCIPAL DISCHARGE DIAGNOSIS  Diagnosis: Sepsis  Assessment and Plan of Treatment: Resolved . treated with Iv antibiotics. Was treated in MICU and transitioned to medical floof and completed antibiotics.       PRINCIPAL DISCHARGE DIAGNOSIS  Diagnosis: Sepsis  Assessment and Plan of Treatment: Now Resolved. Treated with IV antibiotics and pressor support during MICU stay and transitioned to medical floor and completed antibiotics.  Monitor for any further signs and symptoms of further infection including but not limited to fevers, rigors, chills and or difficulty breathing. If you develop any of these symptoms report to the E,ergency department.         SECONDARY DISCHARGE DIAGNOSES  Diagnosis: Hypotension  Assessment and Plan of Treatment: Low blood pressure now resolved. You are stable off of midodrine.    Diagnosis: Malignant neoplasm of pancreas, unspecified location of malignancy  Assessment and Plan of Treatment: Continue your medications as directed and please follow-up as an outpatient with Dr. Paul for further care and recommendations in 1 to 2 weeks.    Diagnosis: Transaminitis  Assessment and Plan of Treatment: Your Oncologist Dr Paul will follow up your liver enzymes w/ blood work in 1 week.

## 2020-10-12 NOTE — DISCHARGE NOTE PROVIDER - NSDCMRMEDTOKEN_GEN_ALL_CORE_FT
Creon 12,000 units oral delayed release capsule: 1 cap(s) orally 3 times a day   acetaminophen 325 mg oral tablet: 2 tab(s) orally every 6 hours, As needed, Temp greater or equal to 38C (100.4F)  Creon 12,000 units oral delayed release capsule: 1 cap(s) orally 3 times a day

## 2020-10-12 NOTE — DISCHARGE NOTE PROVIDER - HOSPITAL COURSE
69 yo Fwith pmhx of Pancreatic CA currently receiving her 9th chemo treatment that was started yesterday through chest port (receiving it constantly infusing at home over last 2 days). States since yesterday she has been feeling weak and had a temperature of 104F last night. Took Tylenol 1 hr before arriving to ED today. Denies nausea, vomiting, diarrhea, chest pain, shortness of breath, palpitations, cough, sick contacts or COVID contacts, rash. Endorses some urinary frequency but denies dysuria.      In ED triage VS 82/39, HR 87, T 100.1, satting 98% on RA. REceived 2L NS, vancomycin, cefepime, and started on levophed for persistent HoTN. She initially came in w/ chemo infusing to the port but was stopped around 1900. Takes pancrelipase and zofran as her only home meds.  .  Hospital Course:  Patient was admitted to MICU and treated with antibiotics of Cefepime and Vancomycin  She was consulted on by Infectious dz for  Hypotension with fever with UA without significant pyuria,  Urine culture without signficant growth, Blood culture without growth, RVP (included COVID) was negative  Imaging without nidus of infection.    Difficult to rule out superimposed bacterial pna - cefepime was stopped on 10/12/20.  She had mild transaminitis and Liver Sonogram was ordered-               71 yo Fwith pmhx of Pancreatic CA currently receiving her 9th chemo treatment that was started yesterday through chest port (receiving it constantly infusing at home over last 2 days). States since yesterday she has been feeling weak and had a temperature of 104F last night. Took Tylenol 1 hr before arriving to ED today. Denies nausea, vomiting, diarrhea, chest pain, shortness of breath, palpitations, cough, sick contacts or COVID contacts, rash. Endorses some urinary frequency but denies dysuria.      In ED triage VS 82/39, HR 87, T 100.1, satting 98% on RA. REceived 2L NS, vancomycin, cefepime, and started on levophed for persistent HoTN. She initially came in w/ chemo infusing to the port but was stopped around 1900. Takes pancrelipase and zofran as her only home meds.  .  Hospital Course:  Patient was admitted to MICU and treated with antibiotics of Cefepime and Vancomycin  She was consulted on by Infectious dz for  Hypotension with fever with UA without significant pyuria,  Urine culture without signficant growth, Blood culture without growth, RVP (included COVID) was negative  Imaging without nidus of infection.    Difficult to rule out superimposed bacterial pna - cefepime was stopped on 10/12/20.  She had mild transaminitis and Liver Sonogram was ordered-       Problem/Plan - 2:  ·  Problem: Malignant neoplasm of pancreas, unspecified location of malignancy.  Plan: oncology following  s/p FOLFIRINOX x2 complicated by reaction to irinotecan, switched to FOLFOX, Cycle 9 was started on 10/8/2020  sepsis may be chemo related if infection W/U negative.  c/w pancrelipase.      Problem/Plan - 3:  ·  Problem: Transaminitis.  Plan: elevated lfts- still elevated- monitor again in AM  IVF   She had mild transaminitis and Liver Sonogram was ordered- no liver abn  Transaminitis resolving- out patient f/u with Dr Paul.      Problem/Plan - 4:  ·  Problem: Hypotension.  Plan: Resolved   patient now off midrodine  / 58  Standing Bp 115/55, hr 75  Laying 122/58, p74  non orthostatic - patient now off midodrine - ready for home.             69 yo Fwith pmhx of Pancreatic CA currently receiving her 9th chemo treatment that was started yesterday through chest port (receiving it constantly infusing at home over last 2 days). Pt presented after feeling weak and had a temperature of 104F. Took Tylenol 1 hr before arriving to ED. Denied nausea, vomiting, diarrhea, chest pain, shortness of breath, palpitations, cough, sick contacts or COVID contacts, rash. Endorsed some urinary frequency but denied dysuria.    In ED triage VS 82/39, HR 87, T 100.1, satting 98% on RA. Received 2L NS, vancomycin, cefepime, and started on levophed for persistent HoTN. She initially came in w/ chemo infusing to the port. Takes pancrelipase and zofran as her only home meds.    Hospital Course:  Septic shock  Patient was admitted to MICU and treated with antibiotics of Cefepime and Vancomycin and started on pressors  She was consulted on by Infectious dz for Hypotension with fever with UA without significant pyuria,  Urine culture without signficant growth, Blood culture without growth, RVP (included COVID) was negative  Imaging without etiology of infection.  CT A/P to r/o occult abscess ---stable metastatic pancreatic CA No abscess   BP now stable off midodrine     Malignant neoplasm of pancreas, unspecified location of malignancy    oncology followed  s/p FOLFIRINOX x2 complicated by reaction to irinotecan, switched to FOLFOX, Cycle 9 was started on 10/8/2020  sepsis may be chemo related   c/w pancrelipase    Transaminitis  Mild  Liver Sonogram was ordered which was unremarkable   s/p IVF  Transaminitis resolving- plan for out patient f/u with Dr Paul.   Acute hepatitis panel: Neg    Hypotension  Resolved   patient now off midrodine  / 58 on day of discharge  Standing /55, hr 75  Laying 122/58, hr 74  non orthostatic - patient now off midodrine - ready for discharge home.    Dispo- On 10/14 this case was reviewed with Dr. Martinez, the patient is medically stable and optimized for discharge. All medications were reviewed and prescriptions were sent to mutually agreed upon pharmacy.

## 2020-10-13 NOTE — PROGRESS NOTE ADULT - ATTENDING COMMENTS
AST/ALT rise 3x upper limit of normal. Alk phos slightly elevated.  LFT rise likely hepatotoxic medication induced, could be 2/2 chemo (oxaliplatin, received FOLFOX 10/8) vs abx vs other. Remains on midodrine, no episodes of hypotension.   US liver normal  Hep serology negative  Avoid hepatotoxic medications and monitor LFTs.

## 2020-10-13 NOTE — PROGRESS NOTE ADULT - ASSESSMENT
70f with metastatic pancreatic CA, s/p FOLFIRINOX x2 complicated by reaction to irinotecan, switched to FOLFOX, Cycle 9 was started on 10/8/2020, p/w fever and weakness, found to be hypotensive and febrile, concerning for septic shock.   Imaging reviewed, staging work up 10/1/2020 with stable disease.  Pt has responded well to broad spectrum antibiotics. Required pressors, now off. Feeling much better.     -S/p MICU admission  for hypotension likely 2/2 septic shock requiring pressors, she was started on levophed, vancomycin, and cefepime.  Shock may also be cardiogenic due to chemo cardiotoxicity. Cultures with NGTD. Patient was eventually titrated down to  midodrine (being tapered down as well)-  now transferred to general Medicine floor  -No obvious source of infection; Ct chest showed redemonstration of metastatic disease to lungs.   CT A/P negative for occult abscess; only showed known pancreas ca.  RVP negative; COVID negative.  -On s/p empiric abx, completed 10/12  -Recommend antiemetics PRN for nausea, now on zofran  + transaminitis, most rcent CTAP with normal liver and RUQ US, liver WNL and   No cholelithiasis or sonographic evidence of acute cholecystitis.  -Will followup hepatic serology  -Transaminitis possibly likely drug induced (abx vs recent chemo)  -Chemotherapy on hold pending resolution of acute status, no inpatient oncologic interventions.  -rest of care as per primary team  -Patient to followup with Dr. Paul ( [facility]) upon discharge  -C/w Supportive care, pain control, Nutrition, PT, DVT ppx  -Oncology will continue to follow with you      Case d/w Dr. Christa RAMIRES  Oncology Physician Assistant  [facility]  Pager [phone]    If after 5pm or weekends please page On-call Oncology Fellow     70f with metastatic pancreatic CA, s/p FOLFIRINOX x2 complicated by reaction to irinotecan, switched to FOLFOX, Cycle 9 was started on 10/8/2020, p/w fever and weakness, found to be hypotensive and febrile, concerning for septic shock.   Imaging reviewed, staging work up 10/1/2020 with stable disease.  Pt has responded well to broad spectrum antibiotics. Required pressors, now off. Feeling much better.     -S/p MICU admission  for hypotension likely 2/2 septic shock requiring pressors, she was started on levophed, vancomycin, and cefepime.  Shock may also be cardiogenic due to chemo cardiotoxicity. Cultures with NGTD. Patient was eventually titrated down to  midodrine (being tapered down as well)-  now transferred to general Medicine floor. Please taper off midodrine before discharge  -No obvious source of infection; Ct chest showed redemonstration of metastatic disease to lungs.   CT A/P negative for occult abscess; only showed known pancreas ca.  RVP negative; COVID negative.  -On s/p empiric abx, completed 10/12  -Recommend antiemetics PRN for nausea, now on zofran  + transaminitis, most rcent CTAP with normal liver and RUQ US, liver WNL and   No cholelithiasis or sonographic evidence of acute cholecystitis.  -Will followup hepatic serology  -Transaminitis possibly likely drug induced (abx vs recent chemo)  -Chemotherapy on hold pending resolution of acute status, no inpatient oncologic interventions.  -rest of care as per primary team  -Patient to followup with Dr. Paul ( [facility]) upon discharge  -C/w Supportive care, pain control, Nutrition, PT, DVT ppx  -Oncology will continue to follow with you      Case d/w Dr. Christa RAMIRES  Oncology Physician Assistant  [facility]  Pager [phone]    If after 5pm or weekends please page On-call Oncology Fellow

## 2020-10-13 NOTE — PROGRESS NOTE ADULT - PROBLEM SELECTOR PLAN 4
decrease midodrine 10 mg q8  wean off slowly  Need to wean off midodrine before d/c to home   decrease midodrine 10 mg q12  wean off slowly  Need to wean off midodrine before d/c to home

## 2020-10-13 NOTE — PROGRESS NOTE ADULT - ASSESSMENT
70 y.o. Female w/ hx pancreatic CA on chemo p/w  1 day of fever, dizziness, found to be hypotensive with fever and admitted to MICU for septic shock of unclear source requiring pressors.    completed 5 days of antibiotics  patient still on midodrine  lft elevated - increased  Weaning off Midodrine 70 y.o. Female w/ hx pancreatic CA on chemo p/w  1 day of fever, dizziness, found to be hypotensive with fever and admitted to MICU for septic shock of unclear source requiring pressors.    completed 5 days of antibiotics  patient still on midodrine  lft elevated - increased, but acute viral hepatitis profile A/B/C - negative  Weaning off Midodrine

## 2020-10-13 NOTE — PROGRESS NOTE ADULT - PROBLEM SELECTOR PLAN 1
Blood Cx/Urine cx NTD  Completed cefepime /Vanco  No source found for sepsis as cultures- negative   No obvious source of infection; Ct chest showed redemonstration of metastatic disease to lungs.   CT A/P negative for occult abscess; only showed known pancreas ca.  RVP negative; COVID negative  Decrease midodrine from 20 mg q12 to 10 mg q8- will observe and continue to wean.

## 2020-10-13 NOTE — PROGRESS NOTE ADULT - SUBJECTIVE AND OBJECTIVE BOX
Patient is a 70y old  Female who presents with a chief complaint of fever and weakness (12 Oct 2020 15:17)      SUBJECTIVE / OVERNIGHT EVENTS:  Resting in bed  now off antibiotics    MEDICATIONS  (STANDING):  chlorhexidine 4% Liquid 1 Application(s) Topical daily  enoxaparin Injectable 40 milliGRAM(s) SubCutaneous daily  midodrine. 10 milliGRAM(s) Oral every 8 hours  ondansetron Injectable 4 milliGRAM(s) IV Push every 8 hours  pancrelipase  (CREON 12,000 Lipase Units) 1 Capsule(s) Oral three times a day with meals    MEDICATIONS  (PRN):  acetaminophen   Tablet .. 650 milliGRAM(s) Oral every 6 hours PRN Temp greater or equal to 38C (100.4F)    Vital Signs Last 24 Hrs  T(C): 37.2 (13 Oct 2020 10:00), Max: 37.2 (13 Oct 2020 10:00)  T(F): 98.9 (13 Oct 2020 10:00), Max: 98.9 (13 Oct 2020 10:00)  HR: 68 (13 Oct 2020 10:00) (53 - 68)  BP: 100/52 (13 Oct 2020 10:00) (100/52 - 129/51)  BP(mean): --  RR: 18 (13 Oct 2020 10:00) (17 - 19)  SpO2: 100% (13 Oct 2020 10:00) (98% - 100%)    PHYSICAL EXAM:  GENERAL: NAD, well-developed  HEAD:  Atraumatic, Normocephalic  EYES: EOMI, PERRLA, conjunctiva and sclera clear  NECK: Supple, No JVD  CHEST/LUNG: Clear to auscultation bilaterally; No wheeze  HEART: Regular rate and rhythm; No murmurs, rubs, or gallops  ABDOMEN: Soft, Nontender, Nondistended; Bowel sounds present  EXTREMITIES:  2+ Peripheral Pulses, No clubbing, cyanosis, or edema  PSYCH: AAOx3  NEUROLOGY: non-focal  SKIN: No rashes or lesions    LABS:                        10.6   5.73  )-----------( 168      ( 13 Oct 2020 06:00 )             32.4     10-13    136  |  103  |  8   ----------------------------<  82  3.6   |  22  |  0.40<L>    Ca    9.5      13 Oct 2020 06:00  Phos  3.1     10-13  Mg     2.1     10-13    TPro  7.9  /  Alb  3.7  /  TBili  0.3  /  DBili  x   /  AST  107<H>  /  ALT  100<H>  /  AlkPhos  159<H>  10-13          < from: US Abdomen Limited (10.13.20 @ 09:50) >  IMPRESSION:  No cholelithiasis or sonographicevidence of acute cholecystitis.        Care Discussed with Consultants/Other Providers:  Oncology- planning for out patient f/u at [facility] Dr Paul  Need to wean off midodrine first

## 2020-10-14 NOTE — PROGRESS NOTE ADULT - ATTENDING COMMENTS
stop midodrine and observe BP off midodrine  lft stable stop midodrine and observe BP off midodrine  Standing Bp 115/55, hr 75  Laying 122/58, p74  non orthostatic - patient now off midodrine - ready for home    40 min to coordinate d/c

## 2020-10-14 NOTE — PROGRESS NOTE ADULT - PROBLEM SELECTOR PLAN 1
Blood Cx/Urine cx NTD  Completed cefepime /Vanco  No source found for sepsis as cultures- negative   No obvious source of infection; Ct chest showed readministration of metastatic disease to lungs.   CT A/P negative for occult abscess; only showed known pancreas ca.  RVP negative; COVID negative  Patient now off Midodrine

## 2020-10-14 NOTE — PROGRESS NOTE ADULT - PROBLEM SELECTOR PROBLEM 2
Malignant neoplasm of pancreas, unspecified location of malignancy

## 2020-10-14 NOTE — PROGRESS NOTE ADULT - PROBLEM SELECTOR PLAN 4
Resolved   patient now off midrodine  / 58 Resolved   patient now off midrodine  / 58  Standing Bp 115/55, hr 75  Laying 122/58, p74  non orthostatic - patient now off midodrine - ready for home

## 2020-10-14 NOTE — PROGRESS NOTE ADULT - PROBLEM SELECTOR PLAN 3
elevated lfts  will get RUQ sono  will tend of Lft
elevated lfts- still elevated- monitor again in AM  IVF    RUQ sono- no liver abn  will trend of Lft
elevated lfts- still elevated- monitor again in AM  IVF    RUQ sono- no liver abn  Transaminitis resolving- out patient f/u with Dr Paul

## 2020-10-14 NOTE — PROGRESS NOTE ADULT - PROBLEM SELECTOR PLAN 2
oncology following  s/p FOLFIRINOX x2 complicated by reaction to irinotecan, switched to FOLFOX, Cycle 9 was started on 10/8/2020  sepsis may be chemo related if infection W/U negative.  c/w pancrelipase

## 2020-10-14 NOTE — PROGRESS NOTE ADULT - SUBJECTIVE AND OBJECTIVE BOX
Writer was asked to contact Patient to inquire about Patients Primary Care Provider , Writer was told by Patient That Primary Care Provider is      Jaspal Thompson M.D  [address] [place] [id number] [phone]   Patient is a 70y old  Female who presents with a chief complaint of fever and weakness (13 Oct 2020 13:14)      SUBJECTIVE / OVERNIGHT EVENTS:  sitting at the side of bed.  off all antibiotics.      MEDICATIONS  (STANDING):  chlorhexidine 4% Liquid 1 Application(s) Topical daily  enoxaparin Injectable 40 milliGRAM(s) SubCutaneous daily  ondansetron Injectable 4 milliGRAM(s) IV Push every 8 hours  pancrelipase  (CREON 12,000 Lipase Units) 1 Capsule(s) Oral three times a day with meals  sodium chloride 0.45%. 1000 milliLiter(s) (60 mL/Hr) IV Continuous <Continuous>    MEDICATIONS  (PRN):  acetaminophen   Tablet .. 650 milliGRAM(s) Oral every 6 hours PRN Temp greater or equal to 38C (100.4F)    Vital Signs Last 24 Hrs  T(C): 36.2 (14 Oct 2020 06:10), Max: 36.8 (13 Oct 2020 17:58)  T(F): 97.2 (14 Oct 2020 06:10), Max: 98.3 (13 Oct 2020 17:58)  HR: 65 (14 Oct 2020 06:10) (55 - 67)  BP: 122/58 (14 Oct 2020 06:10) (109/41 - 122/70)  BP(mean): --  RR: 17 (14 Oct 2020 06:10) (17 - 18)  SpO2: 100% (14 Oct 2020 06:10) (99% - 100%)    PHYSICAL EXAM:  GENERAL: NAD, well-developed  HEAD:  Atraumatic, Normocephalic  EYES: EOMI, PERRLA, conjunctiva and sclera clear  NECK: Supple, No JVD  r chest port  CHEST/LUNG: Clear to auscultation bilaterally; No wheeze  HEART: Regular rate and rhythm; No murmurs, rubs, or gallops  ABDOMEN: Soft, Nontender, Nondistended; Bowel sounds present  EXTREMITIES:  2+ Peripheral Pulses, No clubbing, cyanosis, or edema  PSYCH: AAOx3  NEUROLOGY: non-focal  SKIN: No rashes or lesions    LABS:                        10.0   7.42  )-----------( 194      ( 14 Oct 2020 06:21 )             31.0     10-14    139  |  105  |  11  ----------------------------<  90  3.7   |  21<L>  |  0.38<L>    Ca    9.5      14 Oct 2020 06:21  Phos  3.7     10-14  Mg     2.2     10-14    TPro  8.0  /  Alb  3.6  /  TBili  0.2  /  DBili  < 0.2  /  AST  72<H>  /  ALT  80<H>  /  AlkPhos  149<H>  10-14        RADIOLOGY & ADDITIONAL TESTS:  < from: US Abdomen Limited (10.13.20 @ 09:50) >  Liver: Within normal limits.  Bile ducts: Normal caliber. Common bile duct measures 6 mm.  Gallbladder: Within normal limits.  Pancreas: Poorly visualized.  Ascites: None.  IVC: Visualized portions are within normal limits.  IMPRESSION:  No cholelithiasis or sonographicevidence of acute cholecystitis.        Care Discussed with Consultants/Other Providers:

## 2020-10-14 NOTE — PROGRESS NOTE ADULT - ASSESSMENT
70 y.o. Female w/ hx pancreatic CA on chemo p/w  1 day of fever, dizziness, found to be hypotensive with fever and admitted to MICU for septic shock of unclear source requiring pressors.    completed 5 days of antibiotics  patient still on midodrine  lft elevated - increased, but acute viral hepatitis profile A/B/C - negative, downtrending LFT  Weaned off Midodrine  Patient ready for home with out patient f/u with Dr Paul

## 2020-10-14 NOTE — DISCHARGE NOTE NURSING/CASE MANAGEMENT/SOCIAL WORK - PATIENT PORTAL LINK FT
You can access the FollowMyHealth Patient Portal offered by [facility] by registering at the following website: [email or web address]. By joining [facility]’s FollowMyHealth portal, you will also be able to view your health information using other applications (apps) compatible with our system.

## 2020-10-27 NOTE — PHYSICAL EXAM
[Restricted in physically strenuous activity but ambulatory and able to carry out work of a light or sedentary nature] : Status 1- Restricted in physically strenuous activity but ambulatory and able to carry out work of a light or sedentary nature, e.g., light house work, office work [Normal] : grossly intact [de-identified] : Ocaasional cramps, constipation relieved.

## 2020-10-27 NOTE — ASSESSMENT
[FreeTextEntry1] : 1-Therapeutic: \par -----------------\par Pancreatic cancer with mets to ? lungs.\par Started patient on FOLFORINOX regimen.She received alternate cycles of FOLFOX with FOLFIRI- \par Since she developed AE  with the Irinotecan and was hospitalized post Irinotecan- \par She was treated with 4 more cycles of FOLFOX- \par She was hospitalized after her last chemo for sepsis-She has been having a hard time tolerating the FOLFOX regimen. We plan to switch chemo to Galax/ Abraxane with Onpro, if her LFTs have stabilized. \par \par 2-Diagnostic: \par ---------------\par Labs:Monthly CEA, , \par Will repeat labs today- IF LFT normalize- plan to strat with Galax/ Abraxane. \par Ca 19.9- 142- 94- \par \par 3- Pharmacogenetics testing:\par ----------------------------------\par - Plan to order MSI for future PDL-1 therapy \par - Somatic and germline mutations per NCCN guidelines REQUESTED\par \par 4- Procedures:\par -----------------\par - S/P  Port-A-Cath placement on 06/15/20\par - PORT CARE\par - EMLA CREAM PRESCRIBED\par \par 5-Supportive: \par -----------------\par Pancreatic insufficiency: CREON - [address] units (2 pills with each snack and 4 pills with each meal) for pancreatic replacement\par DVT (PE): High risk, close monitoring\par Antiemetic: Zofran PRN and Compazine PRN\par Emla cream: for port numbing before use\par Prevention of mouth sores: Biotene mouthwash\par Skin reactions: Udderly smooth cream and Vitamin B 6 100 mg BID for prevention of HFS\par Diarrhea: The usual dose of Imodium is 4 milligrams (mg) (2 capsules) after the first loose bowel movement, and 2 mg (1 capsule) after each loose bowel movement after the first dose has been taken. No more than 16 mg (8 capsules) should be taken in any twenty-four-hour period. \par \par 6- RTC:  \par --------\par In  a week to start chemo with gem/ Abraxane. \par

## 2020-10-27 NOTE — END OF VISIT
[FreeTextEntry3] : \par I personally discussed this patient with ACP at the time of the visit. I agree with the assessment and plan as written, unless noted below. \par I was present with the ACP during the key portions of the history and exam. I agree with the findings and plan as documented in the ACP's note, unless noted below. \par \par Fabian Paul MD\par

## 2020-10-27 NOTE — HISTORY OF PRESENT ILLNESS
[Disease: _____________________] : Disease: [unfilled] [M: ___] : M[unfilled] [AJCC Stage: ____] : AJCC Stage: [unfilled] [Therapy: ___] : Therapy: [unfilled] [Cycle: ___] : Cycle: [unfilled] [Day: ___] : Day: [unfilled] [3] : 3, Severe [Date: ____________] : Patient's last distress assessment performed on [unfilled]. [4 - Distress Level] : Distress Level: 4 [de-identified] : Diagnosis: Pancreatic adenocarcinoma \par \par Genetic Testing: Will send liquid foundation testing \par \par Other Current Medical Problems: Arthritis/ HLD \par \par Treatment History:\par \par Cycle # 1 06/17/20- FOLFORINOX- 5 FU 2400 mg/ kg /  mg/ m 2/ Irinotecan 150 mg/ m 2 / Oxaliplatin 85 mg/ m2\par Cycle # 2 06/30/20- FOLFORINOX- 5 FU 2400 mg/ kg /  mg/ m 2/ Irinotecan 140 mg/ m 2/ Oxaliplatin 85 mg/ m2\par Cycle # 3- 07/15/20- Skipped Irinotecan / Oxaliplatin-  (ANC 1.4) - 5 FU 2400 mg/ kg /  mg/ m 2\par Cycle #4 - 07/29/20- FOLFOX-  5 FU 2000 mg/ kg /  mg/ m 2/ Oxaliplatin 85 mg/ m2-----------------Tolerated well without Irinotecan \par \par Restaging Scans---------------Stable disease \par Cycle # 5 - 08/12/20- FOLFOX- 5 FU 2000 mg/ kg /  mg/ m 2/ Oxaliplatin 85 mg/ m2--------\par Cycle # 6- 08/26/20-FOLFIRI-  5 FU 2000 mg/ kg /  mg/ m 2/ Irinotecan 150 mg- Held oxaliplatin \par \par Hospitalized at [facility] for adverse reaction from Irinotecan- 8/28- 8/30-------------Will hold IRINOTECAN in future- \par \par Cycle # 7 09/09/20 - 5 FU 2000 mg/  mg/ m2 -----------Oxali was held since ANC was 1.29 \par Cycle # 8 -09/23/20- 5 FU 2000 mg/  mg/ m2 / Oxaliplatin 85 mg/ m2 \par \par Restaging Scans- Stable disease \par \par Cycle #9 -10/6/20-  5 FU 2000 mg/  mg/ m2 / Oxaliplatin 85 mg/ m2 \par \par Hospitalized for sepsis after chemotherapy \par \par \par Oncological History :\par Ms. Griselda Desouza is a 70 year old female who presents for initial consultation in our [facility] for a new diagnosis of pancreas adenocarcinoma. \par Patient states she had gastrointestinal symptoms dating back to Nov 2019, including discomfort, bloating, diarrhea followed by small amounts of blood per rectum. She eventually went to GI doctor Dr Florian Pace.  She had a Colonoscopy in Feb 20 that showed small ulcer. She went the ER at [facility] 3/10/20 for abdominal pain.  CT of her abdomen was performed and showed 3.2 x 3.5 cm hypoenhancing mass in the pancreatic head/ body concerning for malignancy. Mass results in severe compression of SMV. Retroperitoneal lymphadenopathy concerning for metastatic disease with multiple nodules in lung base concerning for metastatic disease.\par \par She was referred  to Dr Bernabe for EUS, which was performed June 1st 2020, significant for an ill-defined, heterogeneous, hypoechoic lesion seen in the head of pancreas. The lesion abutted but did not invade the SMV. The main pancreatic duct was dilated and the parenchyma was heterogeneous within the pancreatic body/tail. Fine needle biopsy was performed.\par \par Pathology: Gastric mucosa positive for H pylori. Pancreas mass: Invasive adenocarcinoma.\par \par Patient is taking medication regimen for the H.Pylori infection, otherwise not taking medications except for multivitamin supplement. \par \par PMD:\par \par FHX: No hx of Cancer \par \par Socail HX: Lives with her children, , Non smoker, no alcohol use\par \par PSX: No surgeries in past \par \par \par Disease:\par Pathology:\par \par AJCC Stage : Stage 4 \par \par Tumor Markers: CA 19.9 was 155 \par \par 6/10/20- Ms. Desouza is seen in [facility] clinic. She states he has abdominal pain 7-8/10 and gets relief with Tylenol. She has had 2 lbs weight loss- and appetite was decreased .\par Ct abd/ pelvis/ chest- Large pancreatic mass suspicious for adenocarcinoma, with marked arterial \par and venous involvement. Numerous bilateral pulmonary nodules new since 2018 and suspicious for metastatic disease. Retroperitoneal and peripancreatic lymphadenopathy. \par Omental nodule raising the possibility of peritoneal carcinomatosis. \par \par 06/17/20- Ms. Desouza is seen for follow up. She had the port placed earlier last week and is scheduled for her chemotherapy today . We discussed the chemo side effects and what to expect from chemotherapy- SE- Drug sheet was provided Risks/ benefits and alternatives of chemotherapy were discussed and included but not limited to fatigue,low blood counts, nausea/vomiting, skin reactions, hair thinning, possible blood transfusion requirement,increased risk of infection,neuropathy. Patient agreed to above and informed consent was signed.PET Scan- done- on 06/16/20-FDG-avid mass in pancreatic body and neck corresponds to known malignancy. Hypermetabolic left periaortic lymphadenopathy is compatible with metastatic disease.  Multiple bilateral pulmonary nodules, not significantly changed as compared to CT dated 6/9/2020, compatible with metastatic disease. \par \par 6/30/20 - Here for Cycle 2 \par 1- Developed G3 fatigue following C1 , and had to remain in bed for at least 4 days; son was cooking, she was able to go to bathroom and shower by herself\par 2- Also had decreased appetite and G1 nausea but no vomiting\par 3- Declines neuropathy\par 4- Denies diarrhea\par 5- No fever\par 6- No sxs suggestive of COVID-19\par 7- lost weight upto 10 lbs\par 8- also I am aware of her reaction to IRIN during infusion\par \par \par 7/8/20- Recieved a call from patient stating she had episodes of vomiting yesterday. She went out yesterday in the summer sun  and had some food from outside and she has been throwing up. She states she lost weight >30 lbs- She states the Creon has been causing a lot of abdominal pain - She has been experiencing some abdominal pain but is afraid to use the Oxycodone or the medical marijuana. \par She lives with her 2 sons and her sister in law has been helping her intermittently - However she needs a lot of reinstatement of instructions- Explained to her that she needs to take the Pain meds when needed and the pain is not from Creon. \par She is requesting for a Home COVID swab - will arrange for the same. \par \par 7/17/20- Pt was scheduled for C#3 with FOLFOX instead of FOLFORINOX to identify the culprit for the reaction. But her ANC was 1.4 so held Oxaliplatin and gave only 5 FU and LCV. She tolerated this chemo better did not have any reaction like previously. She denies N/V/D. She has been experiencing spasms in her fingers after the previous chemotherapy with FOLFORINOX- Her tumor markers CEA was rising and is concerning from 0.7- 3.6- 7.9.Patient states she was diagnosed with LUPUS by her PCP in March Unclear if she is on any treatment for the same. Will get the medical records from her PCP.\par \par 7/29/20- Pt is scheduled for C#4 with FOLFOX instead of FOLFORINOX to identify the culprit for the reaction. She tolerated the last chemotherapy well. She has been eating better and denies N/V. Today she tolerated the chemo with FOLFOX well - No reaction. next cycle we plan to give FOLFIRI\par \par 8/10//20- Pt is seen for TEB visit after she had the restaging scans done- She had CT scan done that showed- Metastatic pancreatic adenocarcinoma. Stable disease. She tolerated the chemo well without the irinotecan. we answered all her questions and concerns. Her son was not in the telephone conference. Dr Paul left a message with the son on his cell phone. Plan is to continue with the chemo \par \par 8/26/20- MsClaudia Desouza is seen for follow up- She tolerated the last chemo well- denies N/V/D. She denies any neuropathy - Denies pain - does not use mediacl marijuana- \par Explained to her that she will receive FOLFIRI this cycle and to inform if she develops any reaction to the same. \par \par 09/3/20- Ms Deo has a telephone visit with me. She was hospitalized at [facility] after her last chemo- when she came for the disconnect- She was hospitalized at [facility] from 8/28/20 to 8/30/20- When she came for the disconnect her BP was low and she was bradycardiac and after a fluid challenge she still did not recover- She was sent to hospital for further work up where Blood cx/ Ua/ Urine cx was negative and it was deemed that she was dehydrated and no infectious process.\par \par Spoke with the patient who states she had a rough time with this chemotherapy and she is feeling better now- however she has been having episodes of diarrhea and nausea. She has been taking the anti nausea meds and that has helped her. She feels that she dis better with FOLFOX - Discussed with Dr Paul and plan is to hold off on nay future doses of Irinotecan.\par \par 09/9/20- Ms Deo is seen for C #7 - Her ANC was 1.23- Discussed with Dr Paul and plan to hold off on Oxaliplatin - Pt will get 5 FU and LCV - She will receive onpro on disconnect day. I explained to her hat she will not get any mores doses of Irinotecan and to keep a close eye on any symptoms she develop post chemotherapy. She denies pain/ N/V/D. \par \par 09/23/20- Ms. Deo is seen in office for follow up on C# 8. She denies n/v/dizziness/ neuropathy . Her appetite is  good, she has good energy level and is tolerating chemo well. She will get her restaging scans done after this round of chemo. Advised she can bring her son in for the next office appointment with Dr Paul to discuss the results of the scan. \par \par 10/6/20-Ms. Deo is seen in office for follow up after her restaging scans- SHe had restaging scans on 10/1/20 that showed metastatic pancreatic cancer with no interval change since 08/9/20 in pancreatic mass, lung nodules, and adenopathy / peritoneal nodules.  She has been tolerating this chemo regimen  well. today she presents to office with her son - Davina . We discussed that we can entertain the idea of RT aftre she completes 12 cycles of Chemo.She complainst that she has been having episodes where she feels her head is usually hot and she feels sweaty. 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  [de-identified] : adenocarcinoma [de-identified] : 10/22/20-Patient was admitted at [facility] for sepsis and needed vasopressors for pressure support. She was in the ICU for a short time - She received IV abx for Likely bacterial pneumonia. While she was in the hospital her LFTS were still elevated- Unclear etiology for elevated LFTs- Likely chemo related vs Antibiotics- Pt is feeling better after she is discharged home. plan  is to omit FOLFOX in future and plan for [place] Abraxane with Onpro. Will recheck labs to confirm her LFTs are downtrending. Plan to start her on gem- Abraxane if LFTS normalize. \par Her scans in the hospital showed- Metastatic pancreatic cancer involving the retroperitoneal lymph nodes and visualized bilateral lungs. Stable disease compared to 10/1/2020. \par Small volume ascites, new since 10/1/2020. \par \par Drug sheet was provided Risks/ benefits and alternatives of chemotherapy were discussed and included but not limited to fatigue,low blood counts, nausea/vomiting, skin reactions, hair thinning, possible blood transfusion requirement,increased risk of infection,neuropathy. Patient agreed to above and informed consent was signed.\par  [FreeTextEntry3] : Hypotension, bradycardia  [FreeTextEntry4] : 6/17/20 [FreeTextEntry5] : IRIN [FreeTextEntry2] : x 4 daYS

## 2020-10-28 NOTE — PHYSICAL EXAM
[Restricted in physically strenuous activity but ambulatory and able to carry out work of a light or sedentary nature] : Status 1- Restricted in physically strenuous activity but ambulatory and able to carry out work of a light or sedentary nature, e.g., light house work, office work [Normal] : grossly intact [de-identified] : Ocaasional cramps, constipation relieved.

## 2020-10-28 NOTE — HISTORY OF PRESENT ILLNESS
[Disease: _____________________] : Disease: [unfilled] [M: ___] : M[unfilled] [AJCC Stage: ____] : AJCC Stage: [unfilled] [Therapy: ___] : Therapy: [unfilled] [Cycle: ___] : Cycle: [unfilled] [Day: ___] : Day: [unfilled] [3] : 3, Severe [Date: ____________] : Patient's last distress assessment performed on [unfilled]. [4 - Distress Level] : Distress Level: 4 [de-identified] : Diagnosis: Pancreatic adenocarcinoma \par \par Genetic Testing: Foundation Liquid- LIBAN , TMB 4 Mut/Mb \par \par Other Current Medical Problems: Arthritis/ HLD \par \par Treatment History:\par \par Cycle # 1 06/17/20- FOLFORINOX- 5 FU 2400 mg/ kg /  mg/ m 2/ Irinotecan 150 mg/ m 2 / Oxaliplatin 85 mg/ m2\par Cycle # 2 06/30/20- FOLFORINOX- 5 FU 2400 mg/ kg /  mg/ m 2/ Irinotecan 140 mg/ m 2/ Oxaliplatin 85 mg/ m2\par Cycle # 3- 07/15/20- Skipped Irinotecan / Oxaliplatin-  (ANC 1.4) - 5 FU 2400 mg/ kg /  mg/ m 2\par Cycle #4 - 07/29/20- FOLFOX-  5 FU 2000 mg/ kg /  mg/ m 2/ Oxaliplatin 85 mg/ m2-----------------Tolerated well without Irinotecan \par \par Restaging Scans---------------Stable disease \par Cycle # 5 - 08/12/20- FOLFOX- 5 FU 2000 mg/ kg /  mg/ m 2/ Oxaliplatin 85 mg/ m2--------\par Cycle # 6- 08/26/20-FOLFIRI-  5 FU 2000 mg/ kg /  mg/ m 2/ Irinotecan 150 mg- Held oxaliplatin \par \par Hospitalized at [facility] for adverse reaction from Irinotecan- 8/28- 8/30-------------Will hold IRINOTECAN in future- \par \par Cycle # 7 09/09/20 - 5 FU 2000 mg/  mg/ m2 -----------Oxali was held since ANC was 1.29 \par Cycle # 8 -09/23/20- 5 FU 2000 mg/  mg/ m2 / Oxaliplatin 85 mg/ m2 \par \par Restaging Scans- Stable disease \par \par Cycle #9 -10/6/20-  5 FU 2000 mg/  mg/ m2 / Oxaliplatin 85 mg/ m2 \par \par Hospitalized for sepsis after chemotherapy \par \par Cycle #1 10/27/20 Gemcitabine 1000 mg/m2 + Abraxane 100mg/m2\par \par \par Oncological History :\par Ms. Griselda Corona is a 70 year old female who presents for initial consultation in our [facility] for a new diagnosis of pancreas adenocarcinoma. \par Patient states she had gastrointestinal symptoms dating back to Nov 2019, including discomfort, bloating, diarrhea followed by small amounts of blood per rectum. She eventually went to GI doctor Dr Florian Pace.  She had a Colonoscopy in Feb 20 that showed small ulcer. She went the ER at [facility] 3/10/20 for abdominal pain.  CT of her abdomen was performed and showed 3.2 x 3.5 cm hypoenhancing mass in the pancreatic head/ body concerning for malignancy. Mass results in severe compression of SMV. Retroperitoneal lymphadenopathy concerning for metastatic disease with multiple nodules in lung base concerning for metastatic disease.\par \par She was referred  to Dr Bernabe for EUS, which was performed June 1st 2020, significant for an ill-defined, heterogeneous, hypoechoic lesion seen in the head of pancreas. The lesion abutted but did not invade the SMV. The main pancreatic duct was dilated and the parenchyma was heterogeneous within the pancreatic body/tail. Fine needle biopsy was performed.\par \par Pathology: Gastric mucosa positive for H pylori. Pancreas mass: Invasive adenocarcinoma.\par \par Patient is taking medication regimen for the H.Pylori infection, otherwise not taking medications except for multivitamin supplement. \par \par PMD:\par \par FHX: No hx of Cancer \par \par Socail HX: Lives with her children, , Non smoker, no alcohol use\par \par PSX: No surgeries in past \par \par \par Disease:\par Pathology:\par \par AJCC Stage : Stage 4 \par \par Tumor Markers: CA 19.9 was 155 \par \par 6/10/20- Ms. Desouza is seen in [facility] clinic. She states he has abdominal pain 7-8/10 and gets relief with Tylenol. She has had 2 lbs weight loss- and appetite was decreased .\par Ct abd/ pelvis/ chest- Large pancreatic mass suspicious for adenocarcinoma, with marked arterial \par and venous involvement. Numerous bilateral pulmonary nodules new since 2018 and suspicious for metastatic disease. Retroperitoneal and peripancreatic lymphadenopathy. \par Omental nodule raising the possibility of peritoneal carcinomatosis. \par \par 06/17/20- Ms. Desouza is seen for follow up. She had the port placed earlier last week and is scheduled for her chemotherapy today . We discussed the chemo side effects and what to expect from chemotherapy- SE- Drug sheet was provided Risks/ benefits and alternatives of chemotherapy were discussed and included but not limited to fatigue,low blood counts, nausea/vomiting, skin reactions, hair thinning, possible blood transfusion requirement,increased risk of infection,neuropathy. Patient agreed to above and informed consent was signed.PET Scan- done- on 06/16/20-FDG-avid mass in pancreatic body and neck corresponds to known malignancy. Hypermetabolic left periaortic lymphadenopathy is compatible with metastatic disease.  Multiple bilateral pulmonary nodules, not significantly changed as compared to CT dated 6/9/2020, compatible with metastatic disease. \par \par 6/30/20 - Here for Cycle 2 \par 1- Developed G3 fatigue following C1 , and had to remain in bed for at least 4 days; son was cooking, she was able to go to bathroom and shower by herself\par 2- Also had decreased appetite and G1 nausea but no vomiting\par 3- Declines neuropathy\par 4- Denies diarrhea\par 5- No fever\par 6- No sxs suggestive of COVID-19\par 7- lost weight upto 10 lbs\par 8- also I am aware of her reaction to IRIN during infusion\par \par \par 7/8/20- Recieved a call from patient stating she had episodes of vomiting yesterday. She went out yesterday in the summer sun  and had some food from outside and she has been throwing up. She states she lost weight >30 lbs- She states the Creon has been causing a lot of abdominal pain - She has been experiencing some abdominal pain but is afraid to use the Oxycodone or the medical marijuana. \par She lives with her 2 sons and her sister in law has been helping her intermittently - However she needs a lot of reinstatement of instructions- Explained to her that she needs to take the Pain meds when needed and the pain is not from Creon. \par She is requesting for a Home COVID swab - will arrange for the same. \par \par 7/17/20- Pt was scheduled for C#3 with FOLFOX instead of FOLFORINOX to identify the culprit for the reaction. But her ANC was 1.4 so held Oxaliplatin and gave only 5 FU and LCV. She tolerated this chemo better did not have any reaction like previously. She denies N/V/D. She has been experiencing spasms in her fingers after the previous chemotherapy with FOLFORINOX- Her tumor markers CEA was rising and is concerning from 0.7- 3.6- 7.9.Patient states she was diagnosed with LUPUS by her PCP in March Unclear if she is on any treatment for the same. Will get the medical records from her PCP.\par \par 7/29/20- Pt is scheduled for C#4 with FOLFOX instead of FOLFORINOX to identify the culprit for the reaction. She tolerated the last chemotherapy well. She has been eating better and denies N/V. Today she tolerated the chemo with FOLFOX well - No reaction. next cycle we plan to give FOLFIRI\par \par 8/10//20- Pt is seen for TEB visit after she had the restaging scans done- She had CT scan done that showed- Metastatic pancreatic adenocarcinoma. Stable disease. She tolerated the chemo well without the irinotecan. we answered all her questions and concerns. Her son was not in the telephone conference. Dr Paul left a message with the son on his cell phone. Plan is to continue with the chemo \par \par 8/26/20- Ms. Deo is seen for follow up- She tolerated the last chemo well- denies N/V/D. She denies any neuropathy - Denies pain - does not use mediacl marijuana- \par Explained to her that she will receive FOLFIRI this cycle and to inform if she develops any reaction to the same. \par \par 09/3/20- Ms Deo has a telephone visit with me. She was hospitalized at [facility] after her last chemo- when she came for the disconnect- She was hospitalized at [facility] from 8/28/20 to 8/30/20- When she came for the disconnect her BP was low and she was bradycardiac and after a fluid challenge she still did not recover- She was sent to hospital for further work up where Blood cx/ Ua/ Urine cx was negative and it was deemed that she was dehydrated and no infectious process.\par \par Spoke with the patient who states she had a rough time with this chemotherapy and she is feeling better now- however she has been having episodes of diarrhea and nausea. She has been taking the anti nausea meds and that has helped her. She feels that she dis better with FOLFOX - Discussed with Dr Paul and plan is to hold off on nay future doses of Irinotecan.\par \par 09/9/20- Ms Desouza is seen for C #7 - Her ANC was 1.23- Discussed with Dr Paul and plan to hold off on Oxaliplatin - Pt will get 5 FU and LCV - She will receive onpro on disconnect day. I explained to her hat she will not get any mores doses of Irinotecan and to keep a close eye on any symptoms she develop post chemotherapy. She denies pain/ N/V/D. \par \par 09/23/20- Ms. Desouza is seen in office for follow up on C# 8. She denies n/v/dizziness/ neuropathy . Her appetite is  good, she has good energy level and is tolerating chemo well. She will get her restaging scans done after this round of chemo. Advised she can bring her son in for the next office appointment with Dr Paul to discuss the results of the scan. \par \par 10/6/20-Ms. Desouza is seen in office for follow up after her restaging scans- SHe had restaging scans on 10/1/20 that showed metastatic pancreatic cancer with no interval change since 08/9/20 in pancreatic mass, lung nodules, and adenopathy / peritoneal nodules.  She has been tolerating this chemo regimen  well. today she presents to office with her son - Davina . We discussed that we can entertain the idea of RT aftre she completes 12 cycles of Chemo.She complainst that she has been having episodes where she feels her head is usually hot and she feels sweaty. 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 \par \par 10/22/20-Patient was admitted at [facility] for sepsis and needed vasopressors for pressure support. She was in the ICU for a short time - She received IV abx for Likely bacterial pneumonia. While she was in the hospital her LFTS were still elevated- Unclear etiology for elevated LFTs- Likely chemo related vs Antibiotics- Pt is feeling better after she is discharged home. plan  is to omit FOLFOX in future and plan for [place] Abraxane with Onpro. Will recheck labs to confirm her LFTs are downtrending. Plan to start her on gem- Abraxane if LFTS normalize. \par Her scans in the hospital showed- Metastatic pancreatic cancer involving the retroperitoneal lymph nodes and visualized bilateral lungs. Stable disease compared to 10/1/2020. \par Small volume ascites, new since 10/1/2020. \par \par Drug sheet was provided Risks/ benefits and alternatives of chemotherapy were discussed and included but not limited to fatigue,low blood counts, nausea/vomiting, skin reactions, hair thinning, possible blood transfusion requirement,increased risk of infection,neuropathy. Patient agreed to above and informed consent was signed. [de-identified] : adenocarcinoma [de-identified] : 10/27/2020: Pt is here to start a new chemo regimen with [place]/Abraxane with onpro. Irinotecan was discontinued in August due to severe fatigue, hypotension resistant to fluid challenge and bradycardia. Later patient developed septic shock post FOLFOX chemo and is hence discontinued. LFTs are normal with AST 38, ALT 31, ALP elevated 170, normal T. Rah 0.2mg/dl. Pt c/o mild fatigue and does not have any other complains. Denies any F/C, N/V,D/P, change in appetite or weight loss. \par \par Discussed the chemo regimen with [place]/ Abraxane- answered all questions and concerns.\par  [FreeTextEntry3] : Hypotension, bradycardia  [FreeTextEntry4] : 6/17/20 [FreeTextEntry5] : IRIN [FreeTextEntry2] : x 4 daYS

## 2020-10-28 NOTE — ASSESSMENT
[FreeTextEntry1] : 70 year old F with H/O metastatic disease diagnosed in June 2020 and is s/p 9 cycles of chemotherapy which was initiated with FOLFIRINOX and ended with FOLFOX with discontinuation of irinotecan due to severe side effects requiring hospitalization. Now FOLFOX is discontinued due to the development of septic shock and transaminitis. The current plan is to initiate [facility]/Abraxane+onpro. \par \par 1-Therapeutic: \par -----------------\par Pancreatic cancer with mets to lungs.\par Started patient on FOLFORINOX regimen.She received alternate cycles of FOLFOX with FOLFIRI- \par Since she developed AE  with the Irinotecan and was hospitalized post Irinotecan- \par She was treated with 4 cycles of FOLFOX- \par She was hospitalized after her last chemo for sepsis-She has been having a hard time tolerating the FOLFOX regimen. We plan to switch chemo to [facility]/ Abraxane with Onpro.\par Today is Cycle #1.\par \par \par 2-Diagnostic: \par ---------------\par Labs:Monthly CEA, , \par Will repeat labs today- IF LFT normalize- plan to strat with [facility]/ Abraxane. \par Ca 19.9- 142- 93---->75\par CEA: 1.9--->2-->1.7-->1.9\par Restaging scan in 8 weeks (after 4 cycles)\par \par 3- Pharmacogenetics testing:\par ----------------------------------\par - Plan to order MSI for future PDL-1 therapy \par - Somatic and germline mutations per NCCN guidelines REQUESTED\par \par 4- Procedures:\par -----------------\par - S/P  Port-A-Cath placement on 06/15/20\par - PORT CARE\par - EMLA CREAM PRESCRIBED\par \par 5-Supportive: \par -----------------\par Pancreatic insufficiency: CREON - [address] units (2 pills with each snack and 4 pills with each meal) for pancreatic replacement\par DVT (PE): High risk, close monitoring\par Antiemetic: Zofran PRN and Compazine PRN\par Emla cream: for port numbing before use\par Prevention of mouth sores: Biotene mouthwash\par Skin reactions: Udderly smooth cream and Vitamin B 6 100 mg BID for prevention of HFS\par Diarrhea: The usual dose of Imodium is 4 milligrams (mg) (2 capsules) after the first loose bowel movement, and 2 mg (1 capsule) after each loose bowel movement after the first dose has been taken. No more than 16 mg (8 capsules) should be taken in any twenty-four-hour period. \par \par 6- RTC:  \par --------\par  2 weeks on Monday for chemo cycle #2. \par

## 2020-10-28 NOTE — END OF VISIT
[] : Fellow [FreeTextEntry3] : C1D1 AG\par Abraxane 100mg/m2\par Gemcitabine 1000mg/m2 with onpro kit q 2 weeks\par adpated from \par Attenuated regimen of biweekly gemcitabine/nab-paclitaxel in patients aged > 65 years with advanced pancreatic cancer (APC).\par Cat Guerra, Steve Goodwin, Sangita Pandey, Taylor Forde, Antonieta Mena, Neda Pagan, and Fabian Paul\par Journal of Clinical Oncology 2020 38:4_suppl, 721-729\par \par Background: Treatment with gemcitabine/nab-paclitaxel confers a survival benefit over gemcitabine monotherapy in APC. However, such treatment can be associated with significant toxicities especially in older patients and carries practical disadvantages related to a weekly schedule along with financial cost. We retrospectively analyzed patients > 65 years of age with APC who received a modified biweekly regimen of gemcitabine/nab-paclitaxel to evaluate efficacy and toxicity. Methods: Patients aged > 65 years with chemo-naive APC and ECOG PS < 2 were studied. Patients were treated with a modified regimen of gemcitabine 1000 mg/m2 and nab-paclitaxel 125 mg/m2 every 2 weeks on days 1 and 15 of a 28-day cycle. Patients were evaluated for progression-free survival (PFS) and overall survival (OS) with analyses performed using Bah-Yancy method. Adverse events were recorded on the day of chemotherapy.  was measured every cycle and restaging scans were performed every two cycles. Results: Seventy-three patients (median age: 73; range: 66 - 93) were treated with biweekly gemcitabine/nab-paclitaxel as first-line treatment. The median OS and PFS were 9.1 months and 4.8 months respectively. 66% of patients received growth factor support based on ASCO guidelines and no patients developed neutropenic fever. The incidence of grade > 3 toxicity for neutropenia, anemia, thrombocytopenia, and neurotoxicity were 2%, 7%, 3%, and 5% respectively. Dose reductions of gemcitabine/nab-paclitaxel were required in 10% and 4% patients respectively. Conclusions: In patients > 65 years of age with APC, a modified regimen of biweekly gemcitabine/nab-paclitaxel was found to be effective when compared with historical control from the MPACT study. This regimen allowed for less dose reductions, reduced healthcare costs from additional appointments, travel-related cost, as well as favorable side effect profile while maintaining efficacy. Though retrospective in nature, this study underlines the need for further investigation, particularly in elderly patients with APC and poor performance status. Better tolerability may allow for combination with a third agent, such as a targeted or immunotherapy.\par \par

## 2020-11-09 NOTE — HISTORY OF PRESENT ILLNESS
[Disease: _____________________] : Disease: [unfilled] [M: ___] : M[unfilled] [AJCC Stage: ____] : AJCC Stage: [unfilled] [Therapy: ___] : Therapy: [unfilled] [Cycle: ___] : Cycle: [unfilled] [Day: ___] : Day: [unfilled] [3] : 3, Severe [Date: ____________] : Patient's last distress assessment performed on [unfilled]. [4 - Distress Level] : Distress Level: 4 [de-identified] : Diagnosis: Pancreatic adenocarcinoma \par \par Genetic Testing: Foundation Liquid- LIBAN , TMB 4 Mut/Mb \par \par Other Current Medical Problems: Arthritis/ HLD \par \par Treatment History:\par \par Cycle # 1 06/17/20- FOLFORINOX- 5 FU 2400 mg/ kg /  mg/ m 2/ Irinotecan 150 mg/ m 2 / Oxaliplatin 85 mg/ m2\par Cycle # 2 06/30/20- FOLFORINOX- 5 FU 2400 mg/ kg /  mg/ m 2/ Irinotecan 140 mg/ m 2/ Oxaliplatin 85 mg/ m2\par Cycle # 3- 07/15/20- Skipped Irinotecan / Oxaliplatin-  (ANC 1.4) - 5 FU 2400 mg/ kg /  mg/ m 2\par Cycle #4 - 07/29/20- FOLFOX-  5 FU 2000 mg/ kg /  mg/ m 2/ Oxaliplatin 85 mg/ m2-----------------Tolerated well without Irinotecan \par \par Restaging Scans---------------Stable disease \par Cycle # 5 - 08/12/20- FOLFOX- 5 FU 2000 mg/ kg /  mg/ m 2/ Oxaliplatin 85 mg/ m2--------\par Cycle # 6- 08/26/20-FOLFIRI-  5 FU 2000 mg/ kg /  mg/ m 2/ Irinotecan 150 mg- Held oxaliplatin \par \par Hospitalized at [facility] for adverse reaction from Irinotecan- 8/28- 8/30-------------Will hold IRINOTECAN in future- \par \par Cycle # 7 09/09/20 - 5 FU 2000 mg/  mg/ m2 -----------Oxali was held since ANC was 1.29 \par Cycle # 8 -09/23/20- 5 FU 2000 mg/  mg/ m2 / Oxaliplatin 85 mg/ m2 \par \par Restaging Scans- Stable disease \par \par Cycle #9 -10/6/20-  5 FU 2000 mg/  mg/ m2 / Oxaliplatin 85 mg/ m2 \par \par Hospitalized for sepsis after chemotherapy \par \par Cycle #1 10/27/20 Gemcitabine 1000 mg/m2 + Abraxane 100 mg/m2\par Cycle # 2 11/9/20- Gemcitabine 1000 mg/m2 + Abraxane 100 mg/m2\par \par \par Oncological History :\par Ms. Griselda Corona is a 70 year old female who presents for initial consultation in our [facility] for a new diagnosis of pancreas adenocarcinoma. \par Patient states she had gastrointestinal symptoms dating back to Nov 2019, including discomfort, bloating, diarrhea followed by small amounts of blood per rectum. She eventually went to GI doctor Dr Florian Pace.  She had a Colonoscopy in Feb 20 that showed small ulcer. She went the ER at [facility] 3/10/20 for abdominal pain.  CT of her abdomen was performed and showed 3.2 x 3.5 cm hypoenhancing mass in the pancreatic head/ body concerning for malignancy. Mass results in severe compression of SMV. Retroperitoneal lymphadenopathy concerning for metastatic disease with multiple nodules in lung base concerning for metastatic disease.\par \par She was referred  to Dr Bernabe for EUS, which was performed June 1st 2020, significant for an ill-defined, heterogeneous, hypoechoic lesion seen in the head of pancreas. The lesion abutted but did not invade the SMV. The main pancreatic duct was dilated and the parenchyma was heterogeneous within the pancreatic body/tail. Fine needle biopsy was performed.\par \par Pathology: Gastric mucosa positive for H pylori. Pancreas mass: Invasive adenocarcinoma.\par \par Patient is taking medication regimen for the H.Pylori infection, otherwise not taking medications except for multivitamin supplement. \par \par PMD:\par \par FHX: No hx of Cancer \par \par Socail HX: Lives with her children, , Non smoker, no alcohol use\par \par PSX: No surgeries in past \par \par \par Disease:\par Pathology:\par \par AJCC Stage : Stage 4 \par \par Tumor Markers: CA 19.9 was 155 \par \par 6/10/20- Ms. Desouza is seen in [facility] clinic. She states he has abdominal pain 7-8/10 and gets relief with Tylenol. She has had 2 lbs weight loss- and appetite was decreased .\par Ct abd/ pelvis/ chest- Large pancreatic mass suspicious for adenocarcinoma, with marked arterial \par and venous involvement. Numerous bilateral pulmonary nodules new since 2018 and suspicious for metastatic disease. Retroperitoneal and peripancreatic lymphadenopathy. \par Omental nodule raising the possibility of peritoneal carcinomatosis. \par \par 06/17/20- Ms. Desouza is seen for follow up. She had the port placed earlier last week and is scheduled for her chemotherapy today . We discussed the chemo side effects and what to expect from chemotherapy- SE- Drug sheet was provided Risks/ benefits and alternatives of chemotherapy were discussed and included but not limited to fatigue,low blood counts, nausea/vomiting, skin reactions, hair thinning, possible blood transfusion requirement,increased risk of infection,neuropathy. Patient agreed to above and informed consent was signed.PET Scan- done- on 06/16/20-FDG-avid mass in pancreatic body and neck corresponds to known malignancy. Hypermetabolic left periaortic lymphadenopathy is compatible with metastatic disease.  Multiple bilateral pulmonary nodules, not significantly changed as compared to CT dated 6/9/2020, compatible with metastatic disease. \par \par 6/30/20 - Here for Cycle 2 \par 1- Developed G3 fatigue following C1 , and had to remain in bed for at least 4 days; son was cooking, she was able to go to bathroom and shower by herself\par 2- Also had decreased appetite and G1 nausea but no vomiting\par 3- Declines neuropathy\par 4- Denies diarrhea\par 5- No fever\par 6- No sxs suggestive of COVID-19\par 7- lost weight upto 10 lbs\par 8- also I am aware of her reaction to IRIN during infusion\par \par \par 7/8/20- Recieved a call from patient stating she had episodes of vomiting yesterday. She went out yesterday in the summer sun  and had some food from outside and she has been throwing up. She states she lost weight >30 lbs- She states the Creon has been causing a lot of abdominal pain - She has been experiencing some abdominal pain but is afraid to use the Oxycodone or the medical marijuana. \par She lives with her 2 sons and her sister in law has been helping her intermittently - However she needs a lot of reinstatement of instructions- Explained to her that she needs to take the Pain meds when needed and the pain is not from Creon. \par She is requesting for a Home COVID swab - will arrange for the same. \par \par 7/17/20- Pt was scheduled for C#3 with FOLFOX instead of FOLFORINOX to identify the culprit for the reaction. But her ANC was 1.4 so held Oxaliplatin and gave only 5 FU and LCV. She tolerated this chemo better did not have any reaction like previously. She denies N/V/D. She has been experiencing spasms in her fingers after the previous chemotherapy with FOLFORINOX- Her tumor markers CEA was rising and is concerning from 0.7- 3.6- 7.9.Patient states she was diagnosed with LUPUS by her PCP in March Unclear if she is on any treatment for the same. Will get the medical records from her PCP.\par \par 7/29/20- Pt is scheduled for C#4 with FOLFOX instead of FOLFORINOX to identify the culprit for the reaction. She tolerated the last chemotherapy well. She has been eating better and denies N/V. Today she tolerated the chemo with FOLFOX well - No reaction. next cycle we plan to give FOLFIRI\par \par 8/10//20- Pt is seen for TEB visit after she had the restaging scans done- She had CT scan done that showed- Metastatic pancreatic adenocarcinoma. Stable disease. She tolerated the chemo well without the irinotecan. we answered all her questions and concerns. Her son was not in the telephone conference. Dr Paul left a message with the son on his cell phone. Plan is to continue with the chemo \par \par 8/26/20- Ms. Deo is seen for follow up- She tolerated the last chemo well- denies N/V/D. She denies any neuropathy - Denies pain - does not use mediacl marijuana- \par Explained to her that she will receive FOLFIRI this cycle and to inform if she develops any reaction to the same. \par \par 09/3/20- Ms Deo has a telephone visit with me. She was hospitalized at [facility] after her last chemo- when she came for the disconnect- She was hospitalized at [facility] from 8/28/20 to 8/30/20- When she came for the disconnect her BP was low and she was bradycardiac and after a fluid challenge she still did not recover- She was sent to hospital for further work up where Blood cx/ Ua/ Urine cx was negative and it was deemed that she was dehydrated and no infectious process.\par \par Spoke with the patient who states she had a rough time with this chemotherapy and she is feeling better now- however she has been having episodes of diarrhea and nausea. She has been taking the anti nausea meds and that has helped her. She feels that she dis better with FOLFOX - Discussed with Dr Paul and plan is to hold off on nay future doses of Irinotecan.\par \par 09/9/20- Ms Desouza is seen for C #7 - Her ANC was 1.23- Discussed with Dr Paul and plan to hold off on Oxaliplatin - Pt will get 5 FU and LCV - She will receive onpro on disconnect day. I explained to her hat she will not get any mores doses of Irinotecan and to keep a close eye on any symptoms she develop post chemotherapy. She denies pain/ N/V/D. \par \par 09/23/20- Ms. Desouza is seen in office for follow up on C# 8. She denies n/v/dizziness/ neuropathy . Her appetite is  good, she has good energy level and is tolerating chemo well. She will get her restaging scans done after this round of chemo. Advised she can bring her son in for the next office appointment with Dr Paul to discuss the results of the scan. \par \par 10/6/20-Ms. Desouza is seen in office for follow up after her restaging scans- SHe had restaging scans on 10/1/20 that showed metastatic pancreatic cancer with no interval change since 08/9/20 in pancreatic mass, lung nodules, and adenopathy / peritoneal nodules.  She has been tolerating this chemo regimen  well. today she presents to office with her son - Davina . We discussed that we can entertain the idea of RT aftre she completes 12 cycles of Chemo.She complainst that she has been having episodes where she feels her head is usually hot and she feels sweaty. 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 \par \par 10/22/20-Patient was admitted at [facility] for sepsis and needed vasopressors for pressure support. She was in the ICU for a short time - She received IV abx for Likely bacterial pneumonia. While she was in the hospital her LFTS were still elevated- Unclear etiology for elevated LFTs- Likely chemo related vs Antibiotics- Pt is feeling better after she is discharged home. plan  is to omit FOLFOX in future and plan for [place] Abraxane with Onpro. Will recheck labs to confirm her LFTs are downtrending. Plan to start her on gem- Abraxane if LFTS normalize. \par Her scans in the hospital showed- Metastatic pancreatic cancer involving the retroperitoneal lymph nodes and visualized bilateral lungs. Stable disease compared to 10/1/2020. \par Small volume ascites, new since 10/1/2020. \par \par Drug sheet was provided Risks/ benefits and alternatives of chemotherapy were discussed and included but not limited to fatigue,low blood counts, nausea/vomiting, skin reactions, hair thinning, possible blood transfusion requirement,increased risk of infection,neuropathy. Patient agreed to above and informed consent was signed.\par \par 10/27/2020: Pt is here to start a new chemo regimen with [place]/Abraxane with onpro. Irinotecan was discontinued in August due to severe fatigue, hypotension resistant to fluid challenge and bradycardia. Later patient developed septic shock post FOLFOX chemo and is hence discontinued. LFTs are normal with AST 38, ALT 31, ALP elevated 170, normal T. Rah 0.2mg/dl. Pt c/o mild fatigue and does not have any other complains. Denies any F/C, N/V,D/P, change in appetite or weight loss. \par Discussed the chemo regimen with [place]/ Abraxane- answered all questions and concerns.\par  [de-identified] : adenocarcinoma [de-identified] : 10/27/20; Ms Desouza is seen as follow up for C# 2. She complains of fatigue which is more prominent this cycle- She denies fevers. She does complain of nausea after chemo . She had some Constipation that was resolved with Miralax. Her appetite is diminished  and sleep is disturbed- Unable to sleep through the night. Will order Remeron 15 mg Q HS for appetite and sleep.\par  [FreeTextEntry3] : Hypotension, bradycardia  [FreeTextEntry4] : 6/17/20 [FreeTextEntry5] : IRIN [FreeTextEntry2] : x 4 daYS

## 2020-11-09 NOTE — ASSESSMENT
[FreeTextEntry1] : 70 year old F with H/O metastatic disease diagnosed in June 2020 and is s/p 9 cycles of chemotherapy which was initiated with FOLFIRINOX and ended with FOLFOX with discontinuation of irinotecan due to severe side effects requiring hospitalization. Now FOLFOX is discontinued due to the development of septic shock and transaminitis. The current plan is to initiate [place]/Abraxane+onpro. \par \par 1-Therapeutic: \par -----------------\par Pancreatic cancer with mets to lungs.\par Started patient on FOLFORINOX regimen.She received alternate cycles of FOLFOX with FOLFIRI- \par Since she developed AE  with the Irinotecan and was hospitalized post Irinotecan- \par She was treated with 4 cycles of FOLFOX- \par She was hospitalized after her last chemo for sepsis-She has been having a hard time tolerating the FOLFOX regimen. Switched  chemo to [place]/ Abraxane with Onpro.\par Today is Cycle # 2 .\par \par \par 2-Diagnostic: \par ---------------\par Labs:Monthly CEA, , \par Will repeat labs today- IF LFT normalize- plan to strat with [place]/ Abraxane. \par Ca 19.9- 142- 02---->75->47->42\par CEA: 1.9--->2-->1.7-->1.9->1.4->1.1\par Restaging scan in 8 weeks (after 4 cycles)\par \par 3- Pharmacogenetics testing:\par ----------------------------------\par - Plan to order MSI for future PDL-1 therapy \par - Somatic and germline mutations per NCCN guidelines REQUESTED\par \par 4- Procedures:\par -----------------\par - S/P  Port-A-Cath placement on 06/15/20\par - PORT CARE\par - EMLA CREAM PRESCRIBED\par \par 5-Supportive: \par -----------------\par Pancreatic insufficiency: CREON - [address] units (2 pills with each snack and 4 pills with each meal) for pancreatic replacement\par Insomnia/ Loss of appetite- Started pt on Remeron 15 mg PO Q HS.\par DVT (PE): High risk, close monitoring\par Antiemetic: Zofran PRN and Compazine PRN\par Emla cream: for port numbing before use\par Prevention of mouth sores: Biotene mouthwash\par Skin reactions: Udderly smooth cream and Vitamin B 6 100 mg BID for prevention of HFS\par Diarrhea: The usual dose of Imodium is 4 milligrams (mg) (2 capsules) after the first loose bowel movement, and 2 mg (1 capsule) after each loose bowel movement after the first dose has been taken. No more than 16 mg (8 capsules) should be taken in any twenty-four-hour period. \par \par 6- RTC:  \par --------\par  2 weeks on Monday for chemo cycle #3\par

## 2020-11-09 NOTE — END OF VISIT
[] : Fellow [FreeTextEntry3] : \par Attenuated regimen of biweekly gemcitabine/nab-paclitaxel in patients aged > 65 years with advanced pancreatic cancer (APC).\par Cat Guerra, Steve Goodwin, Sangita Pandey, Taylor Forde, Antonieta Mena Linda Moriarty, and Fabian Paul\par Journal of Clinical Oncology 2020 38:4_suppl, 659-706\par \par Background: Treatment with gemcitabine/nab-paclitaxel confers a survival benefit over gemcitabine monotherapy in APC. However, such treatment can be associated with significant toxicities especially in older patients and carries practical disadvantages related to a weekly schedule along with financial cost. We retrospectively analyzed patients > 65 years of age with APC who received a modified biweekly regimen of gemcitabine/nab-paclitaxel to evaluate efficacy and toxicity. Methods: Patients aged > 65 years with chemo-naive APC and ECOG PS < 2 were studied. Patients were treated with a modified regimen of gemcitabine 1000 mg/m2 and nab-paclitaxel 125 mg/m2 every 2 weeks on days 1 and 15 of a 28-day cycle. Patients were evaluated for progression-free survival (PFS) and overall survival (OS) with analyses performed using Bah-Yancy method. Adverse events were recorded on the day of chemotherapy.  was measured every cycle and restaging scans were performed every two cycles. Results: Seventy-three patients (median age: 73; range: 66 - 93) were treated with biweekly gemcitabine/nab-paclitaxel as first-line treatment. The median OS and PFS were 9.1 months and 4.8 months respectively. 66% of patients received growth factor support based on ASCO guidelines and no patients developed neutropenic fever. The incidence of grade > 3 toxicity for neutropenia, anemia, thrombocytopenia, and neurotoxicity were 2%, 7%, 3%, and 5% respectively. Dose reductions of gemcitabine/nab-paclitaxel were required in 10% and 4% patients respectively. Conclusions: In patients > 65 years of age with APC, a modified regimen of biweekly gemcitabine/nab-paclitaxel was found to be effective when compared with historical control from the MPACT study. This regimen allowed for less dose reductions, reduced healthcare costs from additional appointments, travel-related cost, as well as favorable side effect profile while maintaining efficacy. Though retrospective in nature, this study underlines the need for further investigation, particularly in elderly patients with APC and poor performance status. Better tolerability may allow for combination with a third agent, such as a targeted or immunotherapy.\par \par

## 2020-11-09 NOTE — PHYSICAL EXAM
[Restricted in physically strenuous activity but ambulatory and able to carry out work of a light or sedentary nature] : Status 1- Restricted in physically strenuous activity but ambulatory and able to carry out work of a light or sedentary nature, e.g., light house work, office work [Normal] : grossly intact [de-identified] : Ocaasional cramps, constipation relieved.

## 2020-11-23 NOTE — HISTORY OF PRESENT ILLNESS
[Disease: _____________________] : Disease: [unfilled] [M: ___] : M[unfilled] [AJCC Stage: ____] : AJCC Stage: [unfilled] [3] : 3, Severe [Date: ____________] : Patient's last distress assessment performed on [unfilled]. [4 - Distress Level] : Distress Level: 4 [4] : 4, Life-threatening [de-identified] : Diagnosis: Pancreatic adenocarcinoma \par \par Genetic Testing: Foundation Liquid- LIBAN , TMB 4 Mut/Mb \par \par Other Current Medical Problems: Arthritis/ HLD \par \par Treatment History:\par \par Cycle # 1 06/17/20- FOLFORINOX- 5 FU 2400 mg/ kg /  mg/ m 2/ Irinotecan 150 mg/ m 2 / Oxaliplatin 85 mg/ m2\par Cycle # 2 06/30/20- FOLFORINOX- 5 FU 2400 mg/ kg /  mg/ m 2/ Irinotecan 140 mg/ m 2/ Oxaliplatin 85 mg/ m2\par Cycle # 3- 07/15/20- Skipped Irinotecan / Oxaliplatin-  (ANC 1.4) - 5 FU 2400 mg/ kg /  mg/ m 2\par Cycle #4 - 07/29/20- FOLFOX-  5 FU 2000 mg/ kg /  mg/ m 2/ Oxaliplatin 85 mg/ m2-----------------Tolerated well without Irinotecan \par \par Restaging Scans---------------Stable disease \par Cycle # 5 - 08/12/20- FOLFOX- 5 FU 2000 mg/ kg /  mg/ m 2/ Oxaliplatin 85 mg/ m2--------\par Cycle # 6- 08/26/20-FOLFIRI-  5 FU 2000 mg/ kg /  mg/ m 2/ Irinotecan 150 mg- Held oxaliplatin \par \par Hospitalized at [facility] for adverse reaction from Irinotecan- 8/28- 8/30-------------Will hold IRINOTECAN in future- \par \par Cycle # 7 09/09/20 - 5 FU 2000 mg/  mg/ m2 -----------Oxali was held since ANC was 1.29 \par Cycle # 8 -09/23/20- 5 FU 2000 mg/  mg/ m2 / Oxaliplatin 85 mg/ m2 \par \par Restaging Scans- Stable disease \par \par Cycle #9 -10/6/20-  5 FU 2000 mg/  mg/ m2 / Oxaliplatin 85 mg/ m2 \par \par Hospitalized for sepsis after chemotherapy \par \par Cycle #1 10/27/20 Gemcitabine 1000 mg/m2 + Abraxane 100 mg/m2\par Cycle # 2 11/9/20- Gemcitabine 1000 mg/m2 + Abraxane 100 mg/m2\par Cycle # 3 11/23/20-  Gemcitabine 1000 mg/m2 + Abraxane 100 mg/m2\par \par Oncological History :\par Ms. Griselda Corona is a 70 year old female who presents for initial consultation in our [facility] for a new diagnosis of pancreas adenocarcinoma. \par Patient states she had gastrointestinal symptoms dating back to Nov 2019, including discomfort, bloating, diarrhea followed by small amounts of blood per rectum. She eventually went to GI doctor Dr Florian Pace.  She had a Colonoscopy in Feb 20 that showed small ulcer. [facility] for abdominal pain.  CT of her abdomen was performed and showed 3.2 x 3.5 cm hypoenhancing mass in the pancreatic head/ body concerning for malignancy. Mass results in severe compression of SMV. Retroperitoneal lymphadenopathy concerning for metastatic disease with multiple nodules in lung base concerning for metastatic disease.\par \par She was referred  to Dr Bernabe for EUS, which was performed June 1st 2020, significant for an ill-defined, heterogeneous, hypoechoic lesion seen in the head of pancreas. The lesion abutted but did not invade the SMV. The main pancreatic duct was dilated and the parenchyma was heterogeneous within the pancreatic body/tail. Fine needle biopsy was performed.\par \par Pathology: Gastric mucosa positive for H pylori. Pancreas mass: Invasive adenocarcinoma.\par \par Patient is taking medication regimen for the H.Pylori infection, otherwise not taking medications except for multivitamin supplement. \par \par \par FHX: No hx of Cancer \par \par Socail HX: Lives with her children, , Non smoker, no alcohol use\par \par PSX: No surgeries in past \par \par \par Disease: Pancreatic adenocarcinoma \par Pathology:\par \par AJCC Stage : Stage 4 \par \par Tumor Markers: CA 19.9 was 155 \par \par 6/10/20- Ms. Desouza is seen in [facility] clinic. She states he has abdominal pain 7-8/10 and gets relief with Tylenol. She has had 2 lbs weight loss- and appetite was decreased .\par Ct abd/ pelvis/ chest- Large pancreatic mass suspicious for adenocarcinoma, with marked arterial \par and venous involvement. Numerous bilateral pulmonary nodules new since 2018 and suspicious for metastatic disease. Retroperitoneal and peripancreatic lymphadenopathy. \par Omental nodule raising the possibility of peritoneal carcinomatosis. \par \par 06/17/20- Ms. Desouza is seen for follow up. She had the port placed earlier last week and is scheduled for her chemotherapy today . We discussed the chemo side effects and what to expect from chemotherapy- SE- Drug sheet was provided Risks/ benefits and alternatives of chemotherapy were discussed and included but not limited to fatigue,low blood counts, nausea/vomiting, skin reactions, hair thinning, possible blood transfusion requirement,increased risk of infection,neuropathy. Patient agreed to above and informed consent was signed.PET Scan- done- on 06/16/20-FDG-avid mass in pancreatic body and neck corresponds to known malignancy. Hypermetabolic left periaortic lymphadenopathy is compatible with metastatic disease.  Multiple bilateral pulmonary nodules, not significantly changed as compared to CT dated 6/9/2020, compatible with metastatic disease. \par \par 6/30/20 - Here for Cycle 2 \par 1- Developed G3 fatigue following C1 , and had to remain in bed for at least 4 days; son was cooking, she was able to go to bathroom and shower by herself\par 2- Also had decreased appetite and G1 nausea but no vomiting\par 3- Declines neuropathy\par 4- Denies diarrhea\par 5- No fever\par 6- No sxs suggestive of COVID-19\par 7- lost weight upto 10 lbs\par 8- also I am aware of her reaction to IRIN during infusion\par \par \par 7/8/20- Recieved a call from patient stating she had episodes of vomiting yesterday. She went out yesterday in the summer sun  and had some food from outside and she has been throwing up. She states she lost weight >30 lbs- She states the Creon has been causing a lot of abdominal pain - She has been experiencing some abdominal pain but is afraid to use the Oxycodone or the medical marijuana. \par She lives with her 2 sons and her sister in law has been helping her intermittently - However she needs a lot of reinstatement of instructions- Explained to her that she needs to take the Pain meds when needed and the pain is not from Creon. \par She is requesting for a Home COVID swab - will arrange for the same. \par \par 7/17/20- Pt was scheduled for C#3 with FOLFOX instead of FOLFORINOX to identify the culprit for the reaction. But her ANC was 1.4 so held Oxaliplatin and gave only 5 FU and LCV. She tolerated this chemo better did not have any reaction like previously. She denies N/V/D. She has been experiencing spasms in her fingers after the previous chemotherapy with FOLFORINOX- Her tumor markers CEA was rising and is concerning from 0.7- 3.6- 7.9.Patient states she was diagnosed with LUPUS by her PCP in March Unclear if she is on any treatment for the same. Will get the medical records from her PCP.\par \par 7/29/20- Pt is scheduled for C#4 with FOLFOX instead of FOLFORINOX to identify the culprit for the reaction. She tolerated the last chemotherapy well. She has been eating better and denies N/V. Today she tolerated the chemo with FOLFOX well - No reaction. next cycle we plan to give FOLFIRI\par \par 8/10//20- Pt is seen for TEB visit after she had the restaging scans done- She had CT scan done that showed- Metastatic pancreatic adenocarcinoma. Stable disease. She tolerated the chemo well without the irinotecan. we answered all her questions and concerns. Her son was not in the telephone conference. Dr Paul left a message with the son on his cell phone. Plan is to continue with the chemo \par \par 8/26/20- Ms. Desouza is seen for follow up- She tolerated the last chemo well- denies N/V/D. She denies any neuropathy - Denies pain - does not use mediacl marijuana- \par Explained to her that she will receive FOLFIRI this cycle and to inform if she develops any reaction to the same. \par \par 09/3/20- Ms Desouza has a telephone visit with me. She was hospitalized at [facility] after her last chemo- when she came for the disconnect- She was hospitalized at [facility] from 8/28/20 to 8/30/20- When she came for the disconnect her BP was low and she was bradycardiac and after a fluid challenge she still did not recover- She was sent to hospital for further work up where Blood cx/ Ua/ Urine cx was negative and it was deemed that she was dehydrated and no infectious process.\par \par Spoke with the patient who states she had a rough time with this chemotherapy and she is feeling better now- however she has been having episodes of diarrhea and nausea. She has been taking the anti nausea meds and that has helped her. She feels that she dis better with FOLFOX - Discussed with Dr Paul and plan is to hold off on nay future doses of Irinotecan.\par \par 09/9/20- Ms Deo is seen for C #7 - Her ANC was 1.23- Discussed with Dr Paul and plan to hold off on Oxaliplatin - Pt will get 5 FU and LCV - She will receive onpro on disconnect day. I explained to her hat she will not get any mores doses of Irinotecan and to keep a close eye on any symptoms she develop post chemotherapy. She denies pain/ N/V/D. \par \par 09/23/20- Ms. Desouza is seen in office for follow up on C# 8. She denies n/v/dizziness/ neuropathy . Her appetite is  good, she has good energy level and is tolerating chemo well. She will get her restaging scans done after this round of chemo. Advised she can bring her son in for the next office appointment with Dr Paul to discuss the results of the scan. \par \par 10/6/20-Ms. Desouza is seen in office for follow up after her restaging scans- SHe had restaging scans on 10/1/20 that showed metastatic pancreatic cancer with no interval change since 08/9/20 in pancreatic mass, lung nodules, and adenopathy / peritoneal nodules.  She has been tolerating this chemo regimen  well. today she presents to office with her son - Davina . We discussed that we can entertain the idea of RT aftre she completes 12 cycles of Chemo.She complainst that she has been having episodes where she feels her head is usually hot and she feels sweaty. 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 \par \par 10/22/20-Patient was admitted at [facility] for sepsis and needed vasopressors for pressure support. She was in the ICU for a short time - She received IV abx for Likely bacterial pneumonia. While she was in the hospital her LFTS were still elevated- Unclear etiology for elevated LFTs- Likely chemo related vs Antibiotics- Pt is feeling better after she is discharged home. plan  is to omit FOLFOX in future and plan for [place] Abraxane with Onpro. Will recheck labs to confirm her LFTs are downtrending. Plan to start her on gem- Abraxane if LFTS normalize. \par Her scans in the hospital showed- Metastatic pancreatic cancer involving the retroperitoneal lymph nodes and visualized bilateral lungs. Stable disease compared to 10/1/2020. \par Small volume ascites, new since 10/1/2020. \par \par Drug sheet was provided Risks/ benefits and alternatives of chemotherapy were discussed and included but not limited to fatigue,low blood counts, nausea/vomiting, skin reactions, hair thinning, possible blood transfusion requirement,increased risk of infection,neuropathy. Patient agreed to above and informed consent was signed.\par \par 10/27/2020: Pt is here to start a new chemo regimen with [place]/Abraxane with onpro. Irinotecan was discontinued in August due to severe fatigue, hypotension resistant to fluid challenge and bradycardia. Later patient developed septic shock post FOLFOX chemo and is hence discontinued. LFTs are normal with AST 38, ALT 31, ALP elevated 170, normal T. Rah 0.2mg/dl. Pt c/o mild fatigue and does not have any other complains. Denies any F/C, N/V,D/P, change in appetite or weight loss. \par Discussed the chemo regimen with [place]/ Abraxane- answered all questions and concerns.\par \par 11/9//20; Ms Desouza is seen as follow up for C# 2. She complains of fatigue which is more prominent this cycle- She denies fevers. She does complain of nausea after chemo . She had some Constipation that was resolved with Miralax. Her appetite is diminished  and sleep is disturbed- Unable to sleep through the night. Will order Remeron 15 mg Q HS for appetite and sleep.\par  [de-identified] : adenocarcinoma [de-identified] : 11/23/20; Ms Desouza is seen as follow up for C#3. She feels well. She feels this chemo is more tolerable. She has fatigue that lasts for 1-2 days after chemo and the week she is off chemo - she feels well. She does experience some nausea  and constipation occasionally that is relived by anti nausea meds and MiraLAX. She denies  neuropathy.\par Denies pain or discomfort. Filled out the paperwork for FMLA- for son Davina. she notices darkening of her toes -on the right foot - The first second and fourth digit noted.\par \par  [FreeTextEntry3] : Hypotension, bradycardia  [FreeTextEntry4] : 6/17/20 [FreeTextEntry5] : IRIN [FreeTextEntry2] : x 4 daYS [FreeTextEntry8] : Sepsis, transaminitis  [de-identified] : OXaliplatin

## 2020-11-23 NOTE — ASSESSMENT
[FreeTextEntry1] : 70 year old F with H/O metastatic disease diagnosed in June 2020 and is s/p 9 cycles of chemotherapy which was initiated with FOLFIRINOX and ended with FOLFOX with discontinuation of irinotecan due to severe side effects requiring hospitalization. Now FOLFOX is discontinued due to the development of septic shock and transaminitis. The current plan is to initiate [place]/Abraxane+onpro. \par \par 1-Therapeutic: \par -----------------\par Pancreatic cancer with mets to lungs.\par Started patient on FOLFORINOX regimen.She received alternate cycles of FOLFOX with FOLFIRI- \par Since she developed AE  with the Irinotecan and was hospitalized post Irinotecan- \par She was treated with 4 cycles of FOLFOX- She was hospitalized after her last chemo for sepsis-\par Switched  chemo to [place]/ Abraxane with Onpro.\par Today is Cycle # 3.\par \par \par 2-Diagnostic: \par ---------------\par Labs:Monthly CEA, , \par Will repeat labs today- IF LFT normalize- plan to strat with [place]/ Abraxane. \par Ca 19.9- 142- 94---->75->47->42\par CEA: 1.9--->2-->1.7-->1.9->1.4->1.1\par Restaging scan in 8 weeks (after 4 cycles)\par \par 3- Pharmacogenetics testing:\par ----------------------------------\par - Plan to order MSI for future PDL-1 therapy \par - Somatic and germline mutations per NCCN guidelines REQUESTED\par \par 4- Procedures:\par -----------------\par - S/P  Port-A-Cath placement on 06/15/20\par - PORT CARE\par - EMLA CREAM PRESCRIBED\par \par 5-Supportive: \par -----------------\par Pancreatic insufficiency: CREON - [address] units (2 pills with each snack and 4 pills with each meal) for pancreatic replacement\par Insomnia/ Loss of appetite- Started pt on Remeron 15 mg PO Q HS.\par DVT (PE): High risk, close monitoring\par Antiemetic: Zofran PRN and Compazine PRN\par Emla cream: for port numbing before use\par Prevention of mouth sores: Biotene mouthwash\par Skin reactions: Udderly smooth cream and Vitamin B 6 100 mg BID for prevention of HFS\par Diarrhea: The usual dose of Imodium is 4 milligrams (mg) (2 capsules) after the first loose bowel movement, and 2 mg (1 capsule) after each loose bowel movement after the first dose has been taken. No more than 16 mg (8 capsules) should be taken in any twenty-four-hour period. \par \par 6- RTC:  \par --------\par  2 weeks for chemo cycle #4\par

## 2020-11-23 NOTE — PHYSICAL EXAM
[Restricted in physically strenuous activity but ambulatory and able to carry out work of a light or sedentary nature] : Status 1- Restricted in physically strenuous activity but ambulatory and able to carry out work of a light or sedentary nature, e.g., light house work, office work [Normal] : grossly intact [de-identified] : Ocaasional cramps, constipation relieved.  [de-identified] : darkening of nail beds on the right foot- !st / 2nd and 4th toe noted.

## 2020-12-01 NOTE — DISCHARGE NOTE PROVIDER - NSDCFUADDAPPT_GEN_ALL_CORE_FT
Please make sure to follow up within 2 weeks with Dr. Mariscal, cardiologist, to ensure you are doing well post-stent placement and for continued management of your heart health.     Please make sure to follow up with your oncologist. They were made aware of your hospitalization.

## 2020-12-01 NOTE — DISCHARGE NOTE PROVIDER - CARE PROVIDER_API CALL
Quynh Mariscal  CARDIOVASCULAR DISEASE  [facility], [address] [address] [place] [address] [id number] [place] [id number]  Phone: [phone]  Fax: [phone]  Follow Up Time: 2 weeks   Quynh Mariscal  CARDIOVASCULAR DISEASE  [facility], [id number] [place] [address] [id number] [place] [id number]  Phone: [phone]  Fax: [phone]  Follow Up Time: 2 weeks    Fabian Paul)  Medical Oncology  [address] [place] [address]  Phone: [phone]  Fax: [phone]  Established Patient  Follow Up Time: Routine

## 2020-12-01 NOTE — DISCHARGE NOTE PROVIDER - HOSPITAL COURSE
70F with stage IV pancreatic CA on chemo (gemcitabine and abraxine, last session 11/23) and SLE presents with chest pain, SOB, N/V with EKG changes concerning for IWSTEMI. taken for urgent LHC and now s/p BMS to 99% RCA. Cath lab course c/b N/V and reloaded with Brilinta also with brief hypotension requiring IVF and nathalie synephrine.   CCU course stable, no further chest pain, ECHO done, trending cardiac enzymes. + leukocytosis but no fever per oncologist this is expected on her current chemo meds. 70F with stage IV pancreatic CA on chemo (gemcitabine and abraxine, last session 11/23) and SLE presents with chest pain, SOB, N/V with EKG changes concerning for IWSTEMI. Taken for urgent LHC and now s/p BMS to 99% RCA. Cath lab course c/b N/V and reloaded with Brilinta also with brief hypotension requiring IVF and nathalie synephrine.   CCU course stable, no further chest pain, ECHO done, trending cardiac enzymes. + leukocytosis but no fever per oncologist this is expected on her current chemo meds. 70F with stage IV pancreatic CA on chemo (gemcitabine and abraxine, last session 11/23) and SLE presents with chest pain, SOB, N/V with EKG changes concerning for IWSTEMI. Taken for urgent LHC and now s/p BMS to 99% RCA. Cath lab course c/b N/V and reloaded with Brilinta also with brief hypotension requiring IVF and nathalie synephrine. Resolved with stable course in CCU, no further chest pain with cardiac enzymes trending down. ECHO done, showing normal LV and RV size and systolic function. Of note, pt + leukocytosis but no fever per oncologist this is expected on her current chemo meds.     PT EVALUATION 70F with stage IV pancreatic CA on chemo (gemcitabine and abraxine, last session 11/23) and SLE presents with chest pain, SOB, N/V with EKG changes concerning for IWSTEMI. Taken for urgent LHC and now s/p BMS to 99% RCA. Cath lab course c/b N/V and reloaded with Brilinta also with brief hypotension requiring IVF and nathalie synephrine. Resolved with stable course in CCU, no further chest pain with cardiac enzymes trending down. ECHO done, showing normal LV and RV size and systolic function. Of note, pt + leukocytosis but no fever per oncologist this is expected on her current chemo meds. Pt evaluated by PT, recommendations are 2-3 days/week outpatient physical therapy.

## 2020-12-01 NOTE — PROGRESS NOTE ADULT - PROBLEM SELECTOR PLAN 2
Continues on chemo with last session on 11/23 and next per patient scheduled for 12/7. Will contact oncology for followup.

## 2020-12-01 NOTE — ED ADULT TRIAGE NOTE - CHIEF COMPLAINT QUOTE
pt arrives EMS as a notification for a STEMI pt having chest pain with diaphoresis since this afternoon. hx pancreatic cancer. pt brought directly to [facility].

## 2020-12-01 NOTE — ED ADULT NURSE NOTE - OBJECTIVE STATEMENT
received pt to trauma C aox3 as notification for STEMI, MD JACOBY Jimenez and [place] RN bedside. Pt called EMS because was feeling L side chest weakness initial EKG by EMS showed STEMI. Pt was hypotensive at scene received approx 200 ml normal saline, currently normotensive. Pt states weakness to L side slightly better, denies chest pain, SOB. Arrives with 20 G PIV L hand, 18 G PIV R wrist placed by float labs collected. NSR VSS cardiac monitor, rpt EKG shows STEMI pt medicated as per orders [place] RN remains bedside awaiting further orders received pt to trauma C aox3 as notification for STEMI, MD JACOBY Jimenez and [place] RN bedside. Pt called EMS because was feeling L side chest weakness and discomfort since yesterday initial EKG by EMS showed STEMI. Pt hx pacemaker, stage 4 pancreatic CA, has been having heavy nose bleeds lately. Pt was hypotensive at scene received approx 200 ml normal saline, currently normotensive. Pt states weakness to L side slightly better, denies chest pain, SOB. Arrives with 20 G PIV L hand, 18 G PIV R wrist placed by float labs collected. NSR VSS cardiac monitor, rpt EKG shows STEMI pt medicated as per orders [place] RN remains bedside awaiting further orders received pt to trauma C aox3 as notification for STEMI, MD JACOBY Jimenez and [place] RN bedside. Pt called EMS because was feeling L side chest weakness and discomfort since yesterday initial EKG by EMS showed STEMI. Pt hx pacemaker, stage 4 pancreatic CA last chemo treatment 1 week ago R chest wall port. pt states has been having heavy nose bleeds lately. Pt was hypotensive at scene received approx 200 ml normal saline, currently normotensive. Pt states weakness to L side slightly better, denies chest pain, SOB. Arrives with 20 G PIV L hand, 18 G PIV R wrist placed by float labs collected. NSR VSS cardiac monitor, rpt EKG shows STEMI pt medicated as per orders [place] RN remains bedside awaiting further orders. Pt

## 2020-12-01 NOTE — PROGRESS NOTE ADULT - SUBJECTIVE AND OBJECTIVE BOX
Subjective/Objective: Patient received from cath lab-- + IWSTEMI now s/p BMS to 99% RCA. Denies chest pain or SOB. + nausea in cath lab after plavix, reloaded with Brilinta. Also with brief hypotension treated with IVF and Sharath-Synephrine in lab.    MEDICATIONS  (STANDING):  atorvastatin 40 milliGRAM(s) Oral at bedtime  chlorhexidine 4% Liquid 1 Application(s) Topical <User Schedule>  ticagrelor 90 milliGRAM(s) Oral every 12 hours    MEDICATIONS  (PRN):      Vital Signs Last 24 Hrs  T(C): 36.6 (01 Dec 2020 06:45), Max: 36.6 (01 Dec 2020 06:45)  T(F): 97.9 (01 Dec 2020 06:45), Max: 97.9 (01 Dec 2020 06:45)  HR: 73 (01 Dec 2020 08:00) (73 - 87)  BP: 101/55 (01 Dec 2020 08:00) (100/60 - 111/61)  BP(mean): 70 (01 Dec 2020 08:00) (70 - 79)  RR: 20 (01 Dec 2020 08:00) (18 - 30)  SpO2: 93% (01 Dec 2020 08:00) (92% - 99%)  I&O's Detail        PHYSICAL EXAM  GEN: NAD, skin W & D  RESP: CTA ant  CV: nl S1S2  GI: soft, NT/ND  EXT:no C/C/E  NEURO: A & O X 3      EKG/ TELEM: NSR    LABS:                          11.1   35.37 )-----------( 160      ( 01 Dec 2020 04:10 )             34.4     PT/INR - ( 01 Dec 2020 04:10 )   PT: 12.4 SEC;   INR: 1.08          PTT - ( 01 Dec 2020 04:10 )  PTT:30.0 SEC  01 Dec 2020 04:10    136    |  102    |  10     ----------------------------<  142<H>  4.1     |  20<L>  |  0.38<L>    Ca    8.9        01 Dec 2020 04:10    TPro  7.9    /  Alb  3.8    /  TBili  0.2    /  DBili  x      /  AST  36<H>  /  ALT  27     /  AlkPhos  202<H>  01 Dec 2020 04:10        Troponin T, High Sensitivity: 32 ng/L (12-01-20 @ 04:10)       Subjective/Objective: Patient received from cath lab-- + IWSTEMI now s/p BMS to 99% RCA. Denies chest pain or SOB. + nausea in cath lab after plavix, reloaded with Brilinta. Also with brief hypotension treated with IVF and Sharath-Synephrine in lab.    MEDICATIONS  (STANDING):  atorvastatin 40 milliGRAM(s) Oral at bedtime  chlorhexidine 4% Liquid 1 Application(s) Topical <User Schedule>  ticagrelor 90 milliGRAM(s) Oral every 12 hours    MEDICATIONS  (PRN):      Vital Signs Last 24 Hrs  T(C): 36.6 (01 Dec 2020 06:45), Max: 36.6 (01 Dec 2020 06:45)  T(F): 97.9 (01 Dec 2020 06:45), Max: 97.9 (01 Dec 2020 06:45)  HR: 73 (01 Dec 2020 08:00) (73 - 87)  BP: 101/55 (01 Dec 2020 08:00) (100/60 - 111/61)  BP(mean): 70 (01 Dec 2020 08:00) (70 - 79)  RR: 20 (01 Dec 2020 08:00) (18 - 30)  SpO2: 93% (01 Dec 2020 08:00) (92% - 99%)  I&O's Detail        PHYSICAL EXAM  GEN: NAD, skin W & D  RESP: CTA ant  CV: nl S1S2  GI: soft, NT/ND  EXT:no C/C/E. RFA closure site clean and dry, no evidence of hematoma or bruit.  NEURO: A & O X 3      EKG/ TELEM: NSR    LABS:                          11.1   35.37 )-----------( 160      ( 01 Dec 2020 04:10 )             34.4     PT/INR - ( 01 Dec 2020 04:10 )   PT: 12.4 SEC;   INR: 1.08          PTT - ( 01 Dec 2020 04:10 )  PTT:30.0 SEC  01 Dec 2020 04:10    136    |  102    |  10     ----------------------------<  142<H>  4.1     |  20<L>  |  0.38<L>    Ca    8.9        01 Dec 2020 04:10    TPro  7.9    /  Alb  3.8    /  TBili  0.2    /  DBili  x      /  AST  36<H>  /  ALT  27     /  AlkPhos  202<H>  01 Dec 2020 04:10        Troponin T, High Sensitivity: 32 ng/L (12-01-20 @ 04:10)

## 2020-12-01 NOTE — DISCHARGE NOTE PROVIDER - NSRESEARCHGRANT_OVERRIDEREC_GEN_A_CORE
Active cancer (i.e. undergoing acute, in-hospital cancer treatment) IMPROVE-DD Application Not Available/Active cancer (i.e. undergoing acute, in-hospital cancer treatment)

## 2020-12-01 NOTE — ED PROVIDER NOTE - ATTENDING CONTRIBUTION TO CARE
71 yo with stage IV pancreatic CA on chemo presenting with a constant non radiating L sided chest weakness and pain that started yesterday afternoon.  She wasn't able to sleep so called EMS.  There was some diaphoresis and an episode of NBNB emesis.  En route got IVF only.      Gen: Well appearing in NAD  Head: NC/AT  Neck: trachea midline  Resp:  No distress CTAB  CV: RRR  Ext: no deformities  Neuro:  A&O appears non focal  Skin:  Warm and dry as visualized  Psych:  Normal affect and mood     71 yo with CP and EKG findings concerning for a STEMI.  Appears to be inferior wall.  R sided EKG obtained and no ST elevation in V3-V5.  22 activated for cath attending at 0407.  Cath attending Dr Gutierrez would like cards NP to see patient first to get more idea about pancreatic cancer Dx prior to activating lab.  Pt was loaded with ASA, heparin and plavix.  CxR with normal mediastinum.  After NP eval cath lab activated.  She remained HD stable in the ED.

## 2020-12-01 NOTE — ED PROVIDER NOTE - CLINICAL SUMMARY MEDICAL DECISION MAKING FREE TEXT BOX
70yF h/o Pancreatic CA (on chemo) presents with concern for STEMI. EMS ekg, inconclusive. Will repeat EKG, give aspirin, check labs, trop, coags, blood gas, CXR, cards consult and reassess

## 2020-12-01 NOTE — DISCHARGE NOTE PROVIDER - CARE PROVIDERS DIRECT ADDRESSES
[email or web address]

## 2020-12-01 NOTE — ED PROVIDER NOTE - NS ED ROS FT
GENERAL: No fever or chills, EYES: no change in vision, HEENT: no trouble swallowing or speaking, CARDIAC: +chest pain, PULMONARY: no cough or SOB, GI: no abdominal pain, +nausea, +vomiting, no diarrhea or constipation, : No changes in urination, SKIN: no rashes, NEURO: no headache,  MSK: No joint pain ~Tony North M.D. Resident

## 2020-12-01 NOTE — DISCHARGE NOTE PROVIDER - PROVIDER TOKENS
PROVIDER:[TOKEN:[[id number]],FOLLOWUP:[2 weeks]] PROVIDER:[TOKEN:[[id number]],FOLLOWUP:[2 weeks]],PROVIDER:[TOKEN:[[id number]],FOLLOWUP:[Routine],ESTABLISHEDPATIENT:[T]]

## 2020-12-01 NOTE — CONSULT NOTE ADULT - SUBJECTIVE AND OBJECTIVE BOX
Patient is a 70y old  Female who presents with a chief complaint of chest pain (01 Dec 2020 12:19)      HPI:  71 yo female with stage IV pancreatic cancer (on chemotherapy last infusion 11/23/20) and PPM presents with chest pain, pt states the CP started yesterday, becoming severe around 2 am associated with diaphoresis and NBNB vomiting x1 prompting her to come to the ED. Pt describes the pain as a tightness, currently states she is having CP 6/10. +CP/+SOB/ denies fever n/v/d/recent illness. Of note patient had a hospitalization in October for sepsis 2/2 chemotherapy (was on Folfax Cycle #9 at the time of that admission) now on Gemcitabine 1000 mg/m2 + Abraxane 100 mg/m2 and last infusion 11/23/20. As per heme-onc notes patient has stable disease. Pt also confirms that she has stable disease and GOC discussed, wishes to have everything done.   ED Course: ECG with PAULA in inferior leads with reciprocal changes, cath lab activated. S/p Plavix 600mg ASA 162mg (01 Dec 2020 04:47)       Oncologic History:      ROS: as above     PAST MEDICAL & SURGICAL HISTORY:  Pancreatic adenocarcinoma    GERD (gastroesophageal reflux disease)    Systemic lupus erythematosus    Hyperlipidemia    H/O total hysterectomy        SOCIAL HISTORY:    FAMILY HISTORY:  Family history of diabetes mellitus        MEDICATIONS  (STANDING):  atorvastatin 40 milliGRAM(s) Oral at bedtime  chlorhexidine 4% Liquid 1 Application(s) Topical <User Schedule>  heparin   Injectable 5000 Unit(s) SubCutaneous every 8 hours  metoprolol tartrate 12.5 milliGRAM(s) Oral two times a day  senna 2 Tablet(s) Oral at bedtime  ticagrelor 90 milliGRAM(s) Oral every 12 hours    MEDICATIONS  (PRN):  polyethylene glycol 3350 17 Gram(s) Oral daily PRN Constipation      Allergies    No Known Allergies    Intolerances        Vital Signs Last 24 Hrs  T(C): 36.8 (01 Dec 2020 15:34), Max: 36.9 (01 Dec 2020 09:31)  T(F): 98.3 (01 Dec 2020 15:34), Max: 98.4 (01 Dec 2020 09:31)  HR: 69 (01 Dec 2020 15:00) (62 - 87)  BP: 110/60 (01 Dec 2020 15:00) (85/63 - 115/60)  BP(mean): 69 (01 Dec 2020 14:00) (69 - 79)  RR: 20 (01 Dec 2020 15:00) (16 - 31)  SpO2: 100% (01 Dec 2020 14:00) (92% - 100%)    PHYSICAL EXAM  General: adult in NAD  HEENT: clear oropharynx, anicteric sclera, pink conjunctiva  Neck: supple  CV: normal S1/S2 with no murmur rubs or gallops  Lungs: positive air movement b/l ant lungs, clear to auscultation, no wheezes, no rales  Abdomen: soft non-tender non-distended, no hepatosplenomegaly  Ext: no clubbing cyanosis or edema  Skin: no rashes and no petechiae  Neuro: alert and oriented X 3, none focal    LABS:                          10.1   35.79 )-----------( 201      ( 01 Dec 2020 11:44 )             32.6         Mean Cell Volume : 93.9 fL  Mean Cell Hemoglobin : 29.1 pg  Mean Cell Hemoglobin Concentration : 31.0 %  Auto Neutrophil # : x  Auto Lymphocyte # : x  Auto Monocyte # : x  Auto Eosinophil # : x  Auto Basophil # : x  Auto Neutrophil % : x  Auto Lymphocyte % : x  Auto Monocyte % : x  Auto Eosinophil % : x  Auto Basophil % : x      Serial CBC's  12-01 @ 11:44  Hct-32.6 / Hgb-10.1 / Plat-201 / RBC-3.47 / WBC-35.79  Serial CBC's  12-01 @ 04:10  Hct-34.4 / Hgb-11.1 / Plat-160 / RBC-3.86 / WBC-35.37      12-01    134<L>  |  103  |  9   ----------------------------<  90  Test not performed SPECIMEN GROSSLY HEMOLYZED   |  24  |  0.32<L>    Ca    8.7      01 Dec 2020 11:44  Phos  Test not performed SPECIMEN GROSSLY HEMOLYZED     12-01  Mg     2.4     12-01    TPro  7.9  /  Alb  3.8  /  TBili  0.2  /  DBili  x   /  AST  36<H>  /  ALT  27  /  AlkPhos  202<H>  12-01      PT/INR - ( 01 Dec 2020 04:10 )   PT: 12.4 SEC;   INR: 1.08          PTT - ( 01 Dec 2020 04:10 )  PTT:30.0 SEC                RADIOLOGY & ADDITIONAL STUDIES:     Patient is a 70y old  Female who presents with a chief complaint of chest pain (01 Dec 2020 12:19)      HPI:  71 yo female with stage IV pancreatic cancer (on chemotherapy last infusion 11/23/20) and PPM presents with chest pain, pt states the CP started yesterday, becoming severe around 2 am associated with diaphoresis and NBNB vomiting x1 prompting her to come to the ED. Pt describes the pain as a tightness, currently states she is having CP 6/10. +CP/+SOB/ denies fever n/v/d/recent illness. Of note patient had a hospitalization in October for sepsis 2/2 chemotherapy (was on Folfax Cycle #9 at the time of that admission) now on Gemcitabine 1000 mg/m2 + Abraxane 100 mg/m2 and last infusion 11/23/20. As per heme-onc notes patient has stable disease. Pt also confirms that she has stable disease and GOC discussed, wishes to have everything done.   ED Course: ECG with PAULA in inferior leads with reciprocal changes, cath lab activated. S/p Plavix 600mg ASA 162mg (01 Dec 2020 04:47)     ROS: as above     PAST MEDICAL & SURGICAL HISTORY:  Pancreatic adenocarcinoma    GERD (gastroesophageal reflux disease)    Systemic lupus erythematosus    Hyperlipidemia    H/O total hysterectomy        SOCIAL HISTORY:    FAMILY HISTORY:  Family history of diabetes mellitus        MEDICATIONS  (STANDING):  atorvastatin 40 milliGRAM(s) Oral at bedtime  chlorhexidine 4% Liquid 1 Application(s) Topical <User Schedule>  heparin   Injectable 5000 Unit(s) SubCutaneous every 8 hours  metoprolol tartrate 12.5 milliGRAM(s) Oral two times a day  senna 2 Tablet(s) Oral at bedtime  ticagrelor 90 milliGRAM(s) Oral every 12 hours    MEDICATIONS  (PRN):  polyethylene glycol 3350 17 Gram(s) Oral daily PRN Constipation      Allergies    No Known Allergies    Intolerances        Vital Signs Last 24 Hrs  T(C): 36.8 (01 Dec 2020 15:34), Max: 36.9 (01 Dec 2020 09:31)  T(F): 98.3 (01 Dec 2020 15:34), Max: 98.4 (01 Dec 2020 09:31)  HR: 69 (01 Dec 2020 15:00) (62 - 87)  BP: 110/60 (01 Dec 2020 15:00) (85/63 - 115/60)  BP(mean): 69 (01 Dec 2020 14:00) (69 - 79)  RR: 20 (01 Dec 2020 15:00) (16 - 31)  SpO2: 100% (01 Dec 2020 14:00) (92% - 100%)      LABS:                          10.1   35.79 )-----------( 201      ( 01 Dec 2020 11:44 )             32.6         Mean Cell Volume : 93.9 fL  Mean Cell Hemoglobin : 29.1 pg  Mean Cell Hemoglobin Concentration : 31.0 %  Auto Neutrophil # : x  Auto Lymphocyte # : x  Auto Monocyte # : x  Auto Eosinophil # : x  Auto Basophil # : x  Auto Neutrophil % : x  Auto Lymphocyte % : x  Auto Monocyte % : x  Auto Eosinophil % : x  Auto Basophil % : x      Serial CBC's  12-01 @ 11:44  Hct-32.6 / Hgb-10.1 / Plat-201 / RBC-3.47 / WBC-35.79  Serial CBC's  12-01 @ 04:10  Hct-34.4 / Hgb-11.1 / Plat-160 / RBC-3.86 / WBC-35.37      12-01    134<L>  |  103  |  9   ----------------------------<  90  Test not performed SPECIMEN GROSSLY HEMOLYZED   |  24  |  0.32<L>    Ca    8.7      01 Dec 2020 11:44  Phos  Test not performed SPECIMEN GROSSLY HEMOLYZED     12-01  Mg     2.4     12-01    TPro  7.9  /  Alb  3.8  /  TBili  0.2  /  DBili  x   /  AST  36<H>  /  ALT  27  /  AlkPhos  202<H>  12-01      PT/INR - ( 01 Dec 2020 04:10 )   PT: 12.4 SEC;   INR: 1.08          PTT - ( 01 Dec 2020 04:10 )  PTT:30.0 SEC                RADIOLOGY & ADDITIONAL STUDIES:

## 2020-12-01 NOTE — DISCHARGE NOTE PROVIDER - NSDCMRMEDTOKEN_GEN_ALL_CORE_FT
Creon 12,000 units oral delayed release capsule: 1 cap(s) orally 3 times a day  MiraLax oral powder for reconstitution:    aspirin 81 mg oral delayed release tablet: 1 tab(s) orally once a day  atorvastatin 40 mg oral tablet: 1 tab(s) orally once a day (at bedtime)  Creon 12,000 units oral delayed release capsule: 1 cap(s) orally 3 times a day  Metoprolol Succinate ER 25 mg oral tablet, extended release: 0.5 tab(s) orally once a day   MiraLax oral powder for reconstitution:   ticagrelor 90 mg oral tablet: 1 tab(s) orally every 12 hours   aspirin 81 mg oral delayed release tablet: 1 tab(s) orally once a day  atorvastatin 40 mg oral tablet: 1 tab(s) orally once a day (at bedtime)  Creon 12,000 units oral delayed release capsule: 1 cap(s) orally 3 times a day  Metoprolol Succinate ER 25 mg oral tablet, extended release: 0.5 tab(s) orally once a day   MiraLax oral powder for reconstitution:   physical therapy: OUTPATIENT PHYSICAL THERAPY   3-5x/week   for gait, balance training; strength optimization  in setting of chemotherapy, rheumatic joints  ticagrelor 90 mg oral tablet: 1 tab(s) orally every 12 hours

## 2020-12-01 NOTE — PROGRESS NOTE ADULT - PROBLEM SELECTOR PLAN 3
WBC 35 but afebrile. RVP/COVID pending. Will monitor CBC. WBC 35 but afebrile. RVP/COVID pending. Will monitor CBC. Addendum: discussed with oncologist, elevated WBC expected post chemo. WBC 35 but afebrile. RVP/COVID pending. Will monitor CBC. Addendum: discussed with oncologist, elevated WBC expected post chemo (on gemcitabine and Abraxine). Last treatment 11/23.

## 2020-12-01 NOTE — ED PROVIDER NOTE - PHYSICAL EXAMINATION
Gen: AAOx3, non-toxic  Head: NCAT  HEENT: EOMI, oral mucosa moist, normal conjunctiva  Lung: CTAB, no respiratory distress, speaking in full sentences  CV: RRR, no murmurs, rubs or gallops  Abd: soft, NTND, no guarding, no CVA tenderness  MSK: no visible deformities  Neuro: No focal sensory or motor deficits  Skin: Warm, well perfused, no rash  Psych: normal affect.   ~Tony North M.D. Resident

## 2020-12-01 NOTE — DISCHARGE NOTE PROVIDER - NSDCFUADDINST_GEN_ALL_CORE_FT
Physical Therapy outpatient, 2-3x/week  - a script was given to you at the hospital, you can choose a physical therapist that is approved by your insurance

## 2020-12-01 NOTE — H&P ADULT - NSICDXPASTMEDICALHX_GEN_ALL_CORE_FT
PAST MEDICAL HISTORY:  GERD (gastroesophageal reflux disease)     Hyperlipidemia     Systemic lupus erythematosus      PAST MEDICAL HISTORY:  GERD (gastroesophageal reflux disease)     Hyperlipidemia     Pancreatic adenocarcinoma     Systemic lupus erythematosus

## 2020-12-01 NOTE — DISCHARGE NOTE PROVIDER - NSDCACTIVITY_GEN_ALL_CORE
Walking - Indoors allowed/Bathing allowed/Walking - Outdoors allowed/Stairs allowed/No heavy lifting/straining/Sex allowed/Showering allowed

## 2020-12-01 NOTE — H&P ADULT - NSHPPHYSICALEXAM_GEN_ALL_CORE
PHYSICAL EXAM:  GENERAL: NAD  HEAD:  Atraumatic, Normocephalic.  EYES: EOMI, PERRLA, conjunctiva and sclera clear  ENT: Moist mucous membranes  NECK: Supple, No JVD  CHEST/LUNG: Clear to auscultation bilaterally; No rales, rhonchi, wheezing, or rubs. Unlabored respirations.  HEART: Regular rate and rhythm; No murmurs, rubs, or gallops.  ABDOMEN: Bowel sounds present; Soft, Nontender, Nondistended.   EXTREMITIES:  2+ Peripheral Pulses, brisk capillary refill. No clubbing, cyanosis, or edema.  NERVOUS SYSTEM:  Alert & Oriented X3, speech clear. No deficits.   MSK: FROM all 4 extremities, full and equal strength.  SKIN: No rashes or lesions.

## 2020-12-01 NOTE — PROGRESS NOTE ADULT - PROBLEM SELECTOR PLAN 1
s/p BMS to 99% RCA. Continue DAPT with ASA and Brilinta (reloaded in cath lab due. Followup to N/V from Plavix), lipitor. Followup ECHO, continue to trend cardiac enzymes until trending down. Eventually add betablocker and ACE/ARB as BP tolerates. s/p BMS to 99% RCA. Continue DAPT with ASA and Brilinta (reloaded in cath lab due to N/V from Plavix), lipitor (40 mg QHS due to mildly elevated LFTS). Followup ECHO, continue to trend cardiac enzymes until trending down. Eventually add betablocker and ACE/ARB as BP tolerates.

## 2020-12-01 NOTE — PROGRESS NOTE ADULT - ASSESSMENT
70F with stage IV pancreatic CA on chemo and PPM for bradycardia presents with chest pain and EKG indicative of IWSTEMI, now s/p BMS to 99% RCA lesion. 70F with stage IV pancreatic CA on chemo (gemcitabine and abraxine, last 11/23) and PPM for bradycardia presents with chest pain and EKG indicative of IWSTEMI, now s/p BMS to 99% RCA lesion. 70F with stage IV pancreatic CA on chemo (gemcitabine and abraxine, last 11/23) and SLE presents with chest pain and EKG indicative of IWSTEMI, now s/p BMS to 99% RCA lesion. 70F with stage IV pancreatic CA on chemo (gemcitabine and abraxine, last 11/23) and SLE presents with chest pain and EKG indicative of IWSTEMI, now s/p BMS to 99% RCA lesion.       FELLOW NOTE:    IWSTEMI - 99% RCA, now s/p BMS today. On ASA, brilinta, lipitor - initially was on plavix, but transitioned to brilinta. Did receive brilinta load in the cath lab, will continue 90 BID. Patient also was hypotensive in the cath lab and given neosyn. Echo pending. Will add ACEi/BB once patient can tolerate, maybe not today as patient's BP borderline.   Pancreatic cancer - on gemcitabine and abraxine as outpatient. Leukocytosis could be from chemo after discussing with oncology. Will continue to monitor    Meredith Mahan MD  Cardiology Fellow, PGY-4  [phone]  For all other Cardiology service contact information, go to [email or web address] and use "[employer]" to login.

## 2020-12-01 NOTE — DISCHARGE NOTE PROVIDER - NSDCHC_MEDRECSTATUS_GEN_ALL_CORE
Admission Reconciliation is Not Complete  Discharge Reconciliation is Not Complete Admission Reconciliation is Completed  Discharge Reconciliation is Completed normal performance

## 2020-12-01 NOTE — DISCHARGE NOTE PROVIDER - NSDCCPCAREPLAN_GEN_ALL_CORE_FT
PRINCIPAL DISCHARGE DIAGNOSIS  Diagnosis: ST elevation myocardial infarction (STEMI), unspecified artery  Assessment and Plan of Treatment: You came to the hospital with chest pressure with radiating pain, nausea, vomiting. You were found to have a heart attack, more specifically a STEMI (ST elevation myocardial infarction), meaning your heart attack produces ST elevations, an abnormality of electrical heart activity seen on ECG (electrocardiogram). You were taken to the cath lab emergently and were found to have 99% occlusion of your proximal RCA, a vessel supplying blood to your heart. A bare metal stent (BMS) was placed in the vessel and will remain there to keep the vessel open. You were given medications at the hospital and will continue those medications at home to prevent blood clotting and decrease your risk of future events. If you have any further chest pain, shortness of breath, or similiar symptoms that you experienced prior to coming to the hospital, please make your cardiologist aware or come to the ED, pending severity of your symptoms.

## 2020-12-01 NOTE — DISCHARGE NOTE PROVIDER - NSDCFUSCHEDAPPT_GEN_ALL_CORE_FT
CORONA, GRISELDA ; 12/07/2020 ; [facility]  CORONA, GRISELDA ; 12/07/2020 ; [facility]  CORONA, GRISELDA ; 12/15/2020 ; N[facility]  CORONA, GRISELDA ; 12/21/2020 ; [facility]  CORONA, GRISELDA ; 12/21/2020 ; [facility]

## 2020-12-01 NOTE — ED PROVIDER NOTE - NS ED MD DISPO DIVISION
I will STOP taking the medications listed below when I get home from the hospital:    Prenatal Multivitamins oral tablet
BRANDON

## 2020-12-01 NOTE — ED PROVIDER NOTE - OBJECTIVE STATEMENT
70yF h/o Pancreatic CA (on chemo) presents with concern for STEMI. Per EMS: Patient report constant non radiating left sided chest weakness/pain starting yesterday afternoon with nausea, vomiting and diaphoresis. Patient did not receive anything en route. No fever, sob, back pain, urinary or bowel irregularities. 70yF h/o Pancreatic CA (on chemo), Pacemaker presents with concern for STEMI. Per EMS: Patient report constant non radiating left sided chest weakness/pain starting yesterday afternoon with nausea, vomiting and diaphoresis. Patient did not receive anything en route. No fever, sob, back pain, urinary or bowel irregularities.

## 2020-12-01 NOTE — CONSULT NOTE ADULT - ASSESSMENT
69 yo female with stage IV pancreatic cancer (on gemcitabine and nab paclitaxel, last receivied 11/23/20) and PPM presents with chest pain, found to have an STEMI, s/p BMS to RCA.    -Hold chemo for now  -Will need careful monitoring of plt count if she was to resume chemo, reports severe nosebleeds s/p last cycle of chemo  -Will communicate this with outpatient oncology team  -Supportive care, pain control, Nutrition, PT, DVT ppx  -Outpatient oncology f/u    Will follow. Please do not hesitate to call back with questions.     Lilia Goodwin MD  Medical Oncology Attending  C: [phone]

## 2020-12-01 NOTE — H&P ADULT - ASSESSMENT
69 yo female with stage IV pancreatic cancer (on chemotherapy last infusion 11/23/20) and PPM presents with chest pain and PAULA in inferior leads with reciprocal changes concerning for STEMI, taken to cath lab emergently from ED.    #STEMI  Patient with elevated CE and PAULA inferior leads with active chest pain 6/10  Loaded with  mg now, Plavix 600 mg PO in ED and taken to cath lab emergently  Admission labs include serial cardiac enzymes, risk factor profile to include TSH, HGBA1c, Lipid profile, CMP, Mag, Phos, CBC, pBNP. Trend cardiac enzymes  Aspirin 81 PO daily, Clopidogrel 75 PO daily, Lipitor 80 mg PO daily  Low fat DASH diet  ECHO: 2D ECHO to evaluate LV function and wall motion abnormalities    #Stage IV pancreatic CA     71 yo female with stage IV pancreatic cancer (on chemotherapy last infusion 11/23/20) and PPM presents with chest pain and PAULA in inferior leads with reciprocal changes concerning for STEMI, taken to cath lab emergently from ED.    #STEMI  Patient with elevated CE and PAULA inferior leads with active chest pain 6/10  Loaded with  mg now, Plavix 600 mg PO in ED and taken to cath lab emergently  Admission labs include serial cardiac enzymes, risk factor profile to include TSH, HGBA1c, Lipid profile, CMP, Mag, Phos, CBC, pBNP. Trend cardiac enzymes  Aspirin 81 PO daily, Clopidogrel 75 PO daily, Lipitor 80 mg PO daily  Low fat DASH diet  ECHO: 2D ECHO to evaluate LV function and wall motion abnormalities    #Stage IV pancreatic adenocarcinoma  -hospitalization in October 2020 for sepsis 2/2 chemotherapy (was on Folfax Cycle #9 at the time of that admission) now switched to Gemcitabine 1000 mg/m2 + Abraxane 100 mg/m2 and last infusion 11/23/20.  -f/u hem-onc recs in the morning

## 2020-12-01 NOTE — ED PROVIDER NOTE - CRITICAL CARE PROVIDED
direct patient care (not related to procedure)/interpretation of diagnostic studies/additional history taking/consultation with other physicians/conducted a detailed discussion of DNR status/documentation

## 2020-12-01 NOTE — H&P ADULT - NSHPLABSRESULTS_GEN_ALL_CORE
LABORATORY VALUES	 	    CBC Full  -  ( 01 Dec 2020 04:10 )  WBC Count : 35.37 K/uL  Hemoglobin : 11.1 g/dL  Hematocrit : 34.4 %  Platelet Count - Automated : 160 K/uL  Mean Cell Volume : 89.1 fL  Mean Cell Hemoglobin : 28.8 pg  Mean Cell Hemoglobin Concentration : 32.3 %  Auto Neutrophil # : 26.91 K/uL  Auto Lymphocyte # : 2.34 K/uL  Auto Monocyte # : 2.68 K/uL  Auto Eosinophil # : 0.01 K/uL  Auto Basophil # : 0.17 K/uL  Auto Neutrophil % : 76.1 %  Auto Lymphocyte % : 6.6 %  Auto Monocyte % : 7.6 %  Auto Eosinophil % : 0.0 %  Auto Basophil % : 0.5 %      12-01    136  |  102  |  10  ----------------------------<  142<H>  4.1   |  20<L>  |  0.38<L>    Ca    8.9      01 Dec 2020 04:10    TPro  7.9  /  Alb  3.8  /  TBili  0.2  /  DBili  x   /  AST  36<H>  /  ALT  27  /  AlkPhos  202<H>  12-01    LIVER FUNCTIONS - ( 01 Dec 2020 04:10 )  Alb: 3.8 g/dL / Pro: 7.9 g/dL / ALK PHOS: 202 u/L / ALT: 27 u/L / AST: 36 u/L / GGT: x             Prothrombin Time, Plasma: 12.4 SEC (12-01-20 @ 04:10)  INR: 1.08 (12-01-20 @ 04:10)  Activated Partial Thromboplastin Time: 30.0 SEC (12-01-20 @ 04:10)    proBNP:   Blood Gas Venous - Lactate: 1.6 mmol/L (12-01 @ 04:10)

## 2020-12-02 PROBLEM — C25.9 MALIGNANT NEOPLASM OF PANCREAS, UNSPECIFIED: Chronic | Status: ACTIVE | Noted: 2020-01-01

## 2020-12-02 NOTE — PROGRESS NOTE ADULT - ATTENDING COMMENTS
70 year old woman with pacreatic CA admitted with IWMI and SP PCI to RCA  She is doing well without complaints  Chest pain, resolved  #Neuro- Awake and alert, continue to monitor  #PULM- RVP/SARS2 negative, continue to monitor  #CV- IWMI with PCI-->RCA  TTE with preseved LV function  Continue DAPT and statin  #Renal- no active issues  #Heme onc- onc team aware of admission, will followup if any workup needed as inpatient
70 year old woman with pacreatic CA admitted with IWMI and SP PCI to RCA  She is doing well without complaints  Chest pain, resolved  #Neuro- Awake and alert, continue to monitor  #PULM- RVP/SARS2 negative, continue to monitor  #CV- IWMI with PCI-->RCA  Await TTE  Continue DAPT and statin  Eventual beta blocker  #Renal- no active issues  #Heme onc- onc team aware of admission, will followup if any workup needed as inpatient

## 2020-12-02 NOTE — DIETITIAN INITIAL EVALUATION ADULT. - EDUCATION DIETARY MODIFICATIONS
02/12/18 1600   Occupational Therapy   Type of Intervention structured groups   Response Participates   Hours 3       Active, attentive in all groups today (physical activity, OT clinic / relaxation groups);  Pt and peer had a verbal encounter at beginning of session due to peer with poor boundaries getting into Karolyn's space and taking a coloring sheet that Karolyn wanted to work on.  Karolyn advocated for herself sufficiently, but had some difficulty letting the matter go after it had been resolved and peer was sitting on the other side of the room.    Karolyn kept telling the peer to quit calling her fat and to quit talking about her.   Writer did not hear peer say those things about patient and another female peer (sitting across from the person Karolyn claimed was saying talking badly about her) called writer to the side to talk privately. This female peer said that Karolyn must have been hearing things because no such comments were said.     verbalization/(1) partially meets; needs review/practice

## 2020-12-02 NOTE — PHYSICAL THERAPY INITIAL EVALUATION ADULT - PATIENT PROFILE REVIEW, REHAB EVAL
ACTIVITY: Increase as Tolerated; RAYSHAWN Landa prior to PT evaluation-->Pt OK for PT consult/OOB activity./yes ACTIVITY: Increase as Tolerated; spoke with RAYSHAWN Landa prior to PT evaluation-->Pt OK for PT consult/OOB activity./yes

## 2020-12-02 NOTE — PHYSICAL THERAPY INITIAL EVALUATION ADULT - MANUAL MUSCLE TESTING RESULTS, REHAB EVAL
grossly assessed due to/cardiac precautions; bilateral shoulders, elbows/wrist at least 3+/5, right hand 3/5, left hand 3-/5, Bilateral LE 3/5

## 2020-12-02 NOTE — DIETITIAN INITIAL EVALUATION ADULT. - OTHER INFO
Patient reports good PO intake this admission; states that she ate >75% of her breakfast tray. Patient denies GI distress - last BM 12/1 per chart. Patient states no food allergies and no difficulty chewing/swallowing. Patient reports that she take Creon with meals at home and has not been receiving enzymes with meals this admission.     Patient reports a usual body weight of 160 lbs around March, prior to diagnosis of cancer. Patient states that her usual body weight now is ~130lbs. Per chart, weight Hx as follows: 129lbs (12/2), 131lbs (11/9), 136lbs (10/9), 136lbs (7/29), 141lbs (6/11), 149lbs (2/25).     Provided patient with verbal and written heart healthy and consistent carb diet education. Discussed using lower sugar fruits such as berries and low fat greek yogurt in smoothies for better glycemic control, and choosing lean meats, low fat dairy, whole grains and low sodium foods for heart health. Encouraged patient to maintain good PO intake of energy and protein as well.

## 2020-12-02 NOTE — DIETITIAN INITIAL EVALUATION ADULT. - SIGNS/SYMPTOMS
as evidence by patient stated lack of awareness of fruit as a carbohydrate source as evidence by loss of 18% body weight x 11 months.

## 2020-12-02 NOTE — PHYSICAL THERAPY INITIAL EVALUATION ADULT - PERTINENT HX OF CURRENT PROBLEM, REHAB EVAL
69 yo female with stage IV pancreatic cancer (on chemotherapy last infusion 11/23/20) and PPM presents with chest pain and PAULA in inferior leads with reciprocal changes concerning for STEMI, taken to cath lab emergently from ED

## 2020-12-02 NOTE — DIETITIAN INITIAL EVALUATION ADULT. - ORAL INTAKE PTA/DIET HISTORY
Patient reports good PO intake PTA; states that she eats about 2-3 meals per day. Patient reports making smoothies with papaya/banana/strawberries, oatmeal, "fiber" and yogurt. Patient states that she has been told to maintain good PO intake and eat adequate protein.

## 2020-12-02 NOTE — DISCHARGE NOTE NURSING/CASE MANAGEMENT/SOCIAL WORK - PATIENT PORTAL LINK FT
You can access the FollowMyHealth Patient Portal offered by [facility] by registering at the following website: [email or web address]. By joining [facility]’s FollowMyHealth portal, you will also be able to view your health information using other applications (apps) compatible with our system.

## 2020-12-02 NOTE — DIETITIAN INITIAL EVALUATION ADULT. - ADD RECOMMEND
1) Consider ordering Creon enzymes as medically appropriate 2) Monitor PO intake, labs, weights, hydration, skin integrity BM 3) Encourage PO intake as needed 4) Reinforce diet education as necessary

## 2020-12-02 NOTE — PROGRESS NOTE ADULT - ASSESSMENT
69 yo female with stage IV pancreatic cancer (on chemotherapy last infusion 11/23/20) and PPM presents with chest pain and PAULA in inferior leads with reciprocal changes concerning for STEMI, taken to cath lab emergently from ED.    #STEMI  inferior myocardial infarction s/p BMS to 99% RCA  - p/w elevated CE, PAULA inferior leads with active chest pain, heart block  - s/p  mg load, Plavix 600 mg PO in ED  - emergent cath, s/p BMS to 99% RCA w Brilinta load   - post-cath c/b hypotension requiring neosyn, now resolved  - Echo: normal LV and RV size, systolic function   - Chol 151 LDL 76 HDL 28<L> Trig 176<H>   Plan:  - Aspirin 81 PO daily, Brilinta 90 BID, Atorvastatin 40 PO daily  - started on metoprolol tartrate 12.5 BID, tolerating    #LEUKOCYTOSIS  in setting of Stage IV pancreatic adenocarcinoma and STEMI s/p cath  - on chemotherapy for pancreatic cancer, stable disease and pt remains active with full code  - hospitalization in October 2020 for sepsis 2/2 chemotherapy (was on Folfax Cycle #9 at the time of that admission)   - now on Gemcitabine 1000 mg/m2 + Abraxane 100 mg/m2; last infusion 11/23/20  - heme/onc consulted and aware of patient admission   Plan:   - leukocytosis felt 2/2 chemo (normal reaction, timeframe) +/- component reactive in s/o STEMI  - f/u outpatient with heme/onc providers     #DISCHARGE PREPARATION   - clear from CCU perspective for discharge   - [place] [id number] case management made aware  [ ] f/u PT recommendations   [ ] confirm Brilinta insurance coverage, medication reconciliation for discharge

## 2020-12-02 NOTE — PHYSICAL THERAPY INITIAL EVALUATION ADULT - ADDITIONAL COMMENTS
Pt reports that she lives in a private house with her 2 sons with ~3-4 steps to enter; (+)bilateral handrails; bedroom/bathroom on the first floor. Prior to hospital admission pt was ambulating independently using no assistive device. Pt denies any recent falls.    Pt left comfortable in chair, NAD, all lines intact, all precautions maintained, with call bell in reach, and RN aware of PT evaluation.

## 2020-12-02 NOTE — PHYSICAL THERAPY INITIAL EVALUATION ADULT - ACTIVE RANGE OF MOTION EXAMINATION, REHAB EVAL
bilateral upper extremity Active ROM was WFL (within functional limits)/except decreased DIP/PIP flexion of left hand 2/2 RA/bilateral  lower extremity Active ROM was WFL (within functional limits)

## 2020-12-02 NOTE — PROGRESS NOTE ADULT - SUBJECTIVE AND OBJECTIVE BOX
PROGRESS NOTE: CCU RESIDENT  Contact Information: Grace Moura DO PGY1, CCU [id number]    Patient Name: GRISELDA CORONA  LOS: 12-01-20 (1d)    Patient is a 70y old  Female who presents with a chief complaint of chest pain (01 Dec 2020 17:16)    SUBJECTIVE: Pt seen at bedside. No active complaints post-cath. Denies cp, sob, lightheadedness. Resolution of     MEDICATIONS  (STANDING):  aspirin enteric coated 81 milliGRAM(s) Oral daily  atorvastatin 40 milliGRAM(s) Oral at bedtime  chlorhexidine 4% Liquid 1 Application(s) Topical <User Schedule>  heparin   Injectable 5000 Unit(s) SubCutaneous every 8 hours  lidocaine   Patch 1 Patch Transdermal once  metoprolol tartrate 12.5 milliGRAM(s) Oral two times a day  senna 2 Tablet(s) Oral at bedtime  ticagrelor 90 milliGRAM(s) Oral every 12 hours    MEDICATIONS  (PRN):  polyethylene glycol 3350 17 Gram(s) Oral daily PRN Constipation    Allergies  No Known Allergies      PHYSICAL EXAM:    Vital Signs Last 24 Hrs  T(C): 36.6 (02 Dec 2020 08:00), Max: 37.2 (01 Dec 2020 19:45)  T(F): 97.9 (02 Dec 2020 08:00), Max: 99 (01 Dec 2020 19:45)  HR: 66 (02 Dec 2020 08:00) (58 - 80)  BP: 126/57 (02 Dec 2020 08:00) (85/63 - 126/57)  BP(mean): 75 (02 Dec 2020 08:00) (59 - 92)  RR: 20 (02 Dec 2020 08:00) (11 - 31)  SpO2: 100% (02 Dec 2020 08:00) (94% - 100%)    GENERAL: No acute distress, well-developed               urine cath:                oxygen:                interventions at bedside:  CHEST/LUNG: CTAB; No wheezes, rales, or rhonchi  HEART: Regular rate and rhythm; No murmurs, rubs, or gallops  ABDOMEN: Soft, non-tender, non-distended; normal bowel sounds, no organomegaly  EXTREMITIES:  2+ peripheral pulses b/l, No clubbing, cyanosis, or edema  NEUROLOGY: A&O x 3, no focal deficits  PSYCHIATRIC: appropriate demenor, responses, interaction during exam  SKIN: No rashes or lesions      12-01-20 @ 07:01  -  12-02-20 @ 07:00  --------------------------------------------------------  IN: 490 mL / OUT: 350 mL / NET: 140 mL      PATIENT LABORATORY DATA:                        9.8    28.64 )-----------( 130      ( 02 Dec 2020 05:36 )             30.5     12-02    136  |  103  |  12  ----------------------------<  102<H>  3.7   |  25  |  0.45<L>    Ca    8.7      02 Dec 2020 05:36  Phos  3.8     12-02  Mg     2.2     12-02    TPro  7.2  /  Alb  3.5  /  TBili  < 0.2<L>  /  DBili  x   /  AST  58<H>  /  ALT  36<H>  /  AlkPhos  177<H>  12-02    PT/INR - ( 01 Dec 2020 04:10 )   PT: 12.4 SEC;   INR: 1.08     PTT - ( 01 Dec 2020 04:10 )  PTT:30.0 SEC    CARDIAC MARKERS ( 02 Dec 2020 05:36 )  x     / x     / 196 u/L / 20.47 ng/mL / x      CARDIAC MARKERS ( 01 Dec 2020 20:55 )  x     / x     / 302 u/L / 35.12 ng/mL / x      CARDIAC MARKERS ( 01 Dec 2020 11:44 )  x     / x     / Test not performed SPECIMEN GROSSLY HEMOLYZED  CK = 560 U/L u/L / 50.10 ng/mL / x          12-01 Chol 151 LDL 76 HDL 28<L> Trig 176<H>      Microbiology:         RADIOLOGY & ADDITIONAL TESTS:  EKG:     PENDING DIAGNOSTIC WORK UP:  Diet, DASH/TLC:   Sodium & Cholesterol Restricted  Consistent Carbohydrate No Snacks (CSTCHO) (12-01-20 @ 13:22)           PROGRESS NOTE: CCU RESIDENT  Contact Information: Grace Moura DO PGY1, CCU [id number]    Patient Name: GRISELDA CORONA  LOS: 12-01-20 (1d)    Patient is a 70y old  Female who presents with a chief complaint of chest pain (01 Dec 2020 17:16)    SUBJECTIVE: Pt seen at bedside. No active complaints post-cath. Denies cp, sob, lightheadedness. Resolution of MI symptoms.     MEDICATIONS  (STANDING):  aspirin enteric coated 81 milliGRAM(s) Oral daily  atorvastatin 40 milliGRAM(s) Oral at bedtime  chlorhexidine 4% Liquid 1 Application(s) Topical <User Schedule>  heparin   Injectable 5000 Unit(s) SubCutaneous every 8 hours  lidocaine   Patch 1 Patch Transdermal once  metoprolol tartrate 12.5 milliGRAM(s) Oral two times a day  senna 2 Tablet(s) Oral at bedtime  ticagrelor 90 milliGRAM(s) Oral every 12 hours    MEDICATIONS  (PRN):  polyethylene glycol 3350 17 Gram(s) Oral daily PRN Constipation    Allergies  No Known Allergies      PHYSICAL EXAM:    Vital Signs Last 24 Hrs  T(C): 36.6 (02 Dec 2020 08:00), Max: 37.2 (01 Dec 2020 19:45)  T(F): 97.9 (02 Dec 2020 08:00), Max: 99 (01 Dec 2020 19:45)  HR: 66 (02 Dec 2020 08:00) (58 - 80)  BP: 126/57 (02 Dec 2020 08:00) (85/63 - 126/57)  BP(mean): 75 (02 Dec 2020 08:00) (59 - 92)  RR: 20 (02 Dec 2020 08:00) (11 - 31)  SpO2: 100% (02 Dec 2020 08:00) (94% - 100%)    GENERAL: No acute distress, well-developed               urine cath: none               oxygen: room air  CHEST/LUNG: CTAB; No wheezes, rales, or rhonchi  HEART: Regular rate and rhythm; No murmurs, rubs, or gallops  ABDOMEN: Soft, non-tender, non-distended; normal bowel sounds, no organomegaly  EXTREMITIES:  2+ peripheral pulses b/l, No clubbing, cyanosis, or edema  NEUROLOGY: A&O x 3, no focal deficits  PSYCHIATRIC: appropriate demenor, responses, interaction during exam  SKIN: No rashes or lesions      12-01-20 @ 07:01  -  12-02-20 @ 07:00  --------------------------------------------------------  IN: 490 mL / OUT: 350 mL / NET: 140 mL      PATIENT LABORATORY DATA:                        9.8    28.64 )-----------( 130      ( 02 Dec 2020 05:36 )             30.5     12-02    136  |  103  |  12  ----------------------------<  102<H>  3.7   |  25  |  0.45<L>    Ca    8.7      02 Dec 2020 05:36  Phos  3.8     12-02  Mg     2.2     12-02    TPro  7.2  /  Alb  3.5  /  TBili  < 0.2<L>  /  DBili  x   /  AST  58<H>  /  ALT  36<H>  /  AlkPhos  177<H>  12-02    PT/INR - ( 01 Dec 2020 04:10 )   PT: 12.4 SEC;   INR: 1.08     PTT - ( 01 Dec 2020 04:10 )  PTT:30.0 SEC    CARDIAC MARKERS ( 02 Dec 2020 05:36 )  x     / x     / 196 u/L / 20.47 ng/mL / x      CARDIAC MARKERS ( 01 Dec 2020 20:55 )  x     / x     / 302 u/L / 35.12 ng/mL / x      CARDIAC MARKERS ( 01 Dec 2020 11:44 )  x     / x     / Test not performed SPECIMEN GROSSLY HEMOLYZED  CK = 560 U/L u/L / 50.10 ng/mL / x          12-01 Chol 151 LDL 76 HDL 28<L> Trig 176<H>      Microbiology:         RADIOLOGY & ADDITIONAL TESTS:  EKG:     PENDING DIAGNOSTIC WORK UP:  Diet, DASH/TLC:   Sodium & Cholesterol Restricted  Consistent Carbohydrate No Snacks (CSTCHO) (12-01-20 @ 13:22)           PROGRESS NOTE: CCU RESIDENT  Contact Information: Grace Moura DO PGY1, CCU [id number]    Patient Name: GRISELDA CORONA  LOS: 12-01-20 (1d)    Patient is a 70y old  Female who presents with a chief complaint of chest pain (01 Dec 2020 17:16)    SUBJECTIVE: Pt seen at bedside. No active complaints post-cath. Denies cp, sob, lightheadedness. Resolution of MI symptoms.     MEDICATIONS  (STANDING):  aspirin enteric coated 81 milliGRAM(s) Oral daily  atorvastatin 40 milliGRAM(s) Oral at bedtime  heparin   Injectable 5000 Unit(s) SubCutaneous every 8 hours  lidocaine   Patch 1 Patch Transdermal once  metoprolol tartrate 12.5 milliGRAM(s) Oral two times a day  senna 2 Tablet(s) Oral at bedtime  ticagrelor 90 milliGRAM(s) Oral every 12 hours    MEDICATIONS  (PRN):  polyethylene glycol 3350 17 Gram(s) Oral daily PRN Constipation    Allergies  No Known Allergies      PHYSICAL EXAM:    Vital Signs Last 24 Hrs  T(C): 36.6 (02 Dec 2020 08:00), Max: 37.2 (01 Dec 2020 19:45)  T(F): 97.9 (02 Dec 2020 08:00), Max: 99 (01 Dec 2020 19:45)  HR: 66 (02 Dec 2020 08:00) (58 - 80)  BP: 126/57 (02 Dec 2020 08:00) (85/63 - 126/57)  BP(mean): 75 (02 Dec 2020 08:00) (59 - 92)  RR: 20 (02 Dec 2020 08:00) (11 - 31)  SpO2: 100% (02 Dec 2020 08:00) (94% - 100%)    GENERAL: No acute distress, well-developed               urine cath: none               oxygen: room air  HEENT: stye on R eye   CHEST/LUNG: CTAB; No wheezes, rales, or rhonchi  HEART: Regular rate and rhythm; No murmurs, rubs, or gallops  ABDOMEN: Soft, non-tender, non-distended; normal bowel sounds  EXTREMITIES:  2+ peripheral pulses b/l, No clubbing, cyanosis, or edema  NEUROLOGY: A&O x 3, no focal deficits  PSYCHIATRIC: appropriate demenor, responses, interaction during exam  SKIN: No rashes or lesions      12-01-20 @ 07:01  -  12-02-20 @ 07:00  --------------------------------------------------------  IN: 490 mL / OUT: 350 mL / NET: 140 mL      PATIENT LABORATORY DATA:                        9.8    28.64 )-----------( 130      ( 02 Dec 2020 05:36 )             30.5     12-02    136  |  103  |  12  ----------------------------<  102<H>  3.7   |  25  |  0.45<L>    Ca    8.7      02 Dec 2020 05:36  Phos  3.8     12-02  Mg     2.2     12-02    TPro  7.2  /  Alb  3.5  /  TBili  < 0.2<L>  /  DBili  x   /  AST  58<H>  /  ALT  36<H>  /  AlkPhos  177<H>  12-02    PT/INR - ( 01 Dec 2020 04:10 )   PT: 12.4 SEC;   INR: 1.08     PTT - ( 01 Dec 2020 04:10 )  PTT:30.0 SEC    CARDIAC MARKERS ( 02 Dec 2020 05:36 )  x     / x     / 196 u/L / 20.47 ng/mL / x      CARDIAC MARKERS ( 01 Dec 2020 20:55 )  x     / x     / 302 u/L / 35.12 ng/mL / x      CARDIAC MARKERS ( 01 Dec 2020 11:44 )  x     / x     / Test not performed SPECIMEN GROSSLY HEMOLYZED  CK = 560 U/L u/L / 50.10 ng/mL / x          12-01 Chol 151 LDL 76 HDL 28<L> Trig 176<H>      < from: Transthoracic Echocardiogram (12.01.20 @ 08:06) >  DIMENSIONS:  Dimensions:     Normal Values:  LA:     3.7 cm    2.0 - 4.0 cm  Ao:     2.5 cm    2.0 - 3.8 cm  SEPTUM: 0.8 cm   0.6 - 1.2 cm  PWT:    1.0 cm    0.6 - 1.1 cm  LVIDd:  3.9 cm    3.0 - 5.6 cm  LVIDs:  2.8 cm    1.8 - 4.0 cm  Derived Variables:  LVMI: 65 g/m2  RWT: 0.51  Fractional short: 28 %  Ejection Fraction (Teicholtz): 55 %  ------------------------------------------------------------------------  OBSERVATIONS:  Mitral Valve: Mitral annular calcification, otherwise  normal mitral valve.  Aortic Root: Normal aortic root.  Aortic Valve: Normal trileaflet aortic valve.  Left Atrium: Normal left atrium.  LA volume index = 26  cc/m2.  Left Ventricle: Normal left ventricular systolic function.  No segmental wall motion abnormalities. Normal left  ventricular internal dimensions and wall thicknesses.  Right Heart: Normal right atrium. Normal right ventricular  size and function. Normal tricuspid valve. Mild tricuspid  regurgitation. Normal pulmonic valve.  Pericardium/PleuraNormal pericardium with no pericardial  effusion.  ------------------------------------------------------------------------  CONCLUSIONS:  1. Mitral annular calcification, otherwise normal mitral  valve.  2. Normal trileaflet aortic valve.  3. Normal left ventricular internal dimensions and wall  thicknesses.  4. Normal left ventricular systolic function. No segmental  wall motion abnormalities.  5. Normal right ventricular size and function.

## 2020-12-08 NOTE — ASSESSMENT
[FreeTextEntry1] : 70 year old F with H/O metastatic disease diagnosed in June 2020 and is s/p 9 cycles of chemotherapy which was initiated with FOLFIRINOX and ended with FOLFOX with discontinuation of irinotecan due to severe side effects requiring hospitalization. Now FOLFOX is discontinued due to the development of septic shock and transaminitis. The current plan is to initiate Cerritos/Abraxane+onpro. \par \par 1-Therapeutic: \par -----------------\par Pancreatic cancer with mets to lungs.\par Started patient on FOLFORINOX regimen.She received alternate cycles of FOLFOX with FOLFIRI- \par Since she developed AE  with the Irinotecan and was hospitalized post Irinotecan- \par She was treated with 4 cycles of FOLFOX- She was hospitalized after her last chemo for sepsis-\par S/P 3 cycles of  Cerritos/ Abraxane with Onpro.\par Sustained MI on 11/24 - Had a stent placed RCA.\par \par \par \par 2-Diagnostic: \par ---------------\par Labs:Monthly CEA, , \par Ca 19.9- 142- 64---->75->47->42\par CEA: 1.9--->2-->1.7-->1.9->1.4->1.1\par Restaging scan ordered \par \par 3- Pharmacogenetics testing:\par ----------------------------------\par - Plan to order MSI for future PDL-1 therapy \par - Somatic and germline mutations per NCCN guidelines REQUESTED\par \par 4- Procedures:\par -----------------\par - S/P  Port-A-Cath placement on 06/15/20\par - PORT CARE\par - EMLA CREAM PRESCRIBED\par \par 5-Supportive: \par -----------------\par MI - S/P Stent- Will need cardiology eval to ascertain cause of MI - She is currently on Brillanta/ Metoprolol/ Atorvastatin. \par Abdominal distention- Concern for Ascites- Will do Scans earlier \par Pancreatic insufficiency: CREON - [id number] units (2 pills with each snack and 4 pills with each meal) for pancreatic replacement\par Insomnia/ Loss of appetite- Started pt on Remeron 15 mg PO Q HS.\par DVT (PE): High risk, close monitoring\par Antiemetic: Zofran PRN and Compazine PRN\par Emla cream: for port numbing before use\par Prevention of mouth sores: Biotene mouthwash\par Skin reactions: Udderly smooth cream and Vitamin B 6 100 mg BID for prevention of HFS\par Diarrhea: The usual dose of Imodium is 4 milligrams (mg) (2 capsules) after the first loose bowel movement, and 2 mg (1 capsule) after each loose bowel movement after the first dose has been taken. No more than 16 mg (8 capsules) should be taken in any twenty-four-hour period. \par \par \par 6- RTC:  \par --------\par Next week for TEB with Dr Paul\par F/U with Cardiology \par F/u after completion of scans. \par

## 2020-12-08 NOTE — HISTORY OF PRESENT ILLNESS
[Disease: _____________________] : Disease: [unfilled] [M: ___] : M[unfilled] [AJCC Stage: ____] : AJCC Stage: [unfilled] [3] : 3, Severe [4] : 4, Life-threatening [Date: ____________] : Patient's last distress assessment performed on [unfilled]. [4 - Distress Level] : Distress Level: 4 [de-identified] : Diagnosis: Pancreatic adenocarcinoma \par \par Genetic Testing: Foundation Liquid- LIBAN , TMB 4 Mut/Mb \par \par Other Current Medical Problems: Arthritis/ HLD \par \par Treatment History:\par \par Cycle # 1 06/17/20- FOLFORINOX- 5 FU 2400 mg/ kg /  mg/ m 2/ Irinotecan 150 mg/ m 2 / Oxaliplatin 85 mg/ m2\par Cycle # 2 06/30/20- FOLFORINOX- 5 FU 2400 mg/ kg /  mg/ m 2/ Irinotecan 140 mg/ m 2/ Oxaliplatin 85 mg/ m2\par Cycle # 3- 07/15/20- Skipped Irinotecan / Oxaliplatin-  (ANC 1.4) - 5 FU 2400 mg/ kg /  mg/ m 2\par Cycle #4 - 07/29/20- FOLFOX-  5 FU 2000 mg/ kg /  mg/ m 2/ Oxaliplatin 85 mg/ m2-----------------Tolerated well without Irinotecan \par \par Restaging Scans---------------Stable disease \par Cycle # 5 - 08/12/20- FOLFOX- 5 FU 2000 mg/ kg /  mg/ m 2/ Oxaliplatin 85 mg/ m2--------\par Cycle # 6- 08/26/20-FOLFIRI-  5 FU 2000 mg/ kg /  mg/ m 2/ Irinotecan 150 mg- Held oxaliplatin \par \par Hospitalized at [facility] for adverse reaction from Irinotecan- 8/28- 8/30-------------Will hold IRINOTECAN in future- \par \par Cycle # 7 09/09/20 - 5 FU 2000 mg/  mg/ m2 -----------Oxali was held since ANC was 1.29 \par Cycle # 8 -09/23/20- 5 FU 2000 mg/  mg/ m2 / Oxaliplatin 85 mg/ m2 \par \par Restaging Scans- Stable disease \par \par Cycle #9 -10/6/20-  5 FU 2000 mg/  mg/ m2 / Oxaliplatin 85 mg/ m2 \par \par Hospitalized for sepsis after chemotherapy \par \par Cycle #1 10/27/20 Gemcitabine 1000 mg/m2 + Abraxane 100 mg/m2\par Cycle # 2 11/9/20- Gemcitabine 1000 mg/m2 + Abraxane 100 mg/m2\par Cycle # 3 11/23/20-  Gemcitabine 1000 mg/m2 + Abraxane 100 mg/m2\par \par Hospitalized for Acute MI - Had a cardiac stent - 11/23/20\par \par \par Oncological History :\par Ms. Griselda Corona is a 70 year old female who presents for initial consultation in our [facility] for a new diagnosis of pancreas adenocarcinoma. \par Patient states she had gastrointestinal symptoms dating back to Nov 2019, including discomfort, bloating, diarrhea followed by small amounts of blood per rectum. She eventually went to GI doctor Dr Florian Pace.  She had a Colonoscopy in Feb 20 that showed small ulcer. She went the ER at [facility] 3/10/20 for abdominal pain.  CT of her abdomen was performed and showed 3.2 x 3.5 cm hypoenhancing mass in the pancreatic head/ body concerning for malignancy. Mass results in severe compression of SMV. Retroperitoneal lymphadenopathy concerning for metastatic disease with multiple nodules in lung base concerning for metastatic disease.\par \par She was referred  to Dr Bernabe for EUS, which was performed June 1st 2020, significant for an ill-defined, heterogeneous, hypoechoic lesion seen in the head of pancreas. The lesion abutted but did not invade the SMV. The main pancreatic duct was dilated and the parenchyma was heterogeneous within the pancreatic body/tail. Fine needle biopsy was performed.\par \par Pathology: Gastric mucosa positive for H pylori. Pancreas mass: Invasive adenocarcinoma.\par \par Patient is taking medication regimen for the H.Pylori infection, otherwise not taking medications except for multivitamin supplement. \par \par \par FHX: No hx of Cancer \par \par Socail HX: Lives with her children, , Non smoker, no alcohol use\par \par PSX: No surgeries in past \par \par \par Disease: Pancreatic adenocarcinoma \par Pathology:\par \par AJCC Stage : Stage 4 \par \par Tumor Markers: CA 19.9 was 155 \par \par 6/10/20- Ms. Desouza is seen in [facility] clinic. She states he has abdominal pain 7-8/10 and gets relief with Tylenol. She has had 2 lbs weight loss- and appetite was decreased .\par Ct abd/ pelvis/ chest- Large pancreatic mass suspicious for adenocarcinoma, with marked arterial \par and venous involvement. Numerous bilateral pulmonary nodules new since 2018 and suspicious for metastatic disease. Retroperitoneal and peripancreatic lymphadenopathy. \par Omental nodule raising the possibility of peritoneal carcinomatosis. \par \par 06/17/20- Ms. Desouza is seen for follow up. She had the port placed earlier last week and is scheduled for her chemotherapy today . We discussed the chemo side effects and what to expect from chemotherapy- SE- Drug sheet was provided Risks/ benefits and alternatives of chemotherapy were discussed and included but not limited to fatigue,low blood counts, nausea/vomiting, skin reactions, hair thinning, possible blood transfusion requirement,increased risk of infection,neuropathy. Patient agreed to above and informed consent was signed.PET Scan- done- on 06/16/20-FDG-avid mass in pancreatic body and neck corresponds to known malignancy. Hypermetabolic left periaortic lymphadenopathy is compatible with metastatic disease.  Multiple bilateral pulmonary nodules, not significantly changed as compared to CT dated 6/9/2020, compatible with metastatic disease. \par \par 6/30/20 - Here for Cycle 2 \par 1- Developed G3 fatigue following C1 , and had to remain in bed for at least 4 days; son was cooking, she was able to go to bathroom and shower by herself\par 2- Also had decreased appetite and G1 nausea but no vomiting\par 3- Declines neuropathy\par 4- Denies diarrhea\par 5- No fever\par 6- No sxs suggestive of COVID-19\par 7- lost weight upto 10 lbs\par 8- also I am aware of her reaction to IRIN during infusion\par \par \par 7/8/20- Recieved a call from patient stating she had episodes of vomiting yesterday. She went out yesterday in the summer sun  and had some food from outside and she has been throwing up. She states she lost weight >30 lbs- She states the Creon has been causing a lot of abdominal pain - She has been experiencing some abdominal pain but is afraid to use the Oxycodone or the medical marijuana. \par She lives with her 2 sons and her sister in law has been helping her intermittently - However she needs a lot of reinstatement of instructions- Explained to her that she needs to take the Pain meds when needed and the pain is not from Creon. \par She is requesting for a Home COVID swab - will arrange for the same. \par \par 7/17/20- Pt was scheduled for C#3 with FOLFOX instead of FOLFORINOX to identify the culprit for the reaction. But her ANC was 1.4 so held Oxaliplatin and gave only 5 FU and LCV. She tolerated this chemo better did not have any reaction like previously. She denies N/V/D. She has been experiencing spasms in her fingers after the previous chemotherapy with FOLFORINOX- Her tumor markers CEA was rising and is concerning from 0.7- 3.6- 7.9.Patient states she was diagnosed with LUPUS by her PCP in March Unclear if she is on any treatment for the same. Will get the medical records from her PCP.\par \par 7/29/20- Pt is scheduled for C#4 with FOLFOX instead of FOLFORINOX to identify the culprit for the reaction. She tolerated the last chemotherapy well. She has been eating better and denies N/V. Today she tolerated the chemo with FOLFOX well - No reaction. next cycle we plan to give FOLFIRI\par \par 8/10//20- Pt is seen for TEB visit after she had the restaging scans done- She had CT scan done that showed- Metastatic pancreatic adenocarcinoma. Stable disease. She tolerated the chemo well without the irinotecan. we answered all her questions and concerns. Her son was not in the telephone conference. Dr Paul left a message with the son on his cell phone. Plan is to continue with the chemo \par \par 8/26/20- Ms. Deo is seen for follow up- She tolerated the last chemo well- denies N/V/D. She denies any neuropathy - Denies pain - does not use mediacl marijuana- \par Explained to her that she will receive FOLFIRI this cycle and to inform if she develops any reaction to the same. \par \par 09/3/20- Ms Deo has a telephone visit with me. She was hospitalized at [facility] after her last chemo- when she came for the disconnect- She was hospitalized at [facility] from 8/28/20 to 8/30/20- When she came for the disconnect her BP was low and she was bradycardiac and after a fluid challenge she still did not recover- She was sent to hospital for further work up where Blood cx/ Ua/ Urine cx was negative and it was deemed that she was dehydrated and no infectious process.\par \par Spoke with the patient who states she had a rough time with this chemotherapy and she is feeling better now- however she has been having episodes of diarrhea and nausea. She has been taking the anti nausea meds and that has helped her. She feels that she dis better with FOLFOX - Discussed with Dr Paul and plan is to hold off on nay future doses of Irinotecan.\par \par 09/9/20- Ms Desouza is seen for C #7 - Her ANC was 1.23- Discussed with Dr Paul and plan to hold off on Oxaliplatin - Pt will get 5 FU and LCV - She will receive onpro on disconnect day. I explained to her hat she will not get any mores doses of Irinotecan and to keep a close eye on any symptoms she develop post chemotherapy. She denies pain/ N/V/D. \par \par 09/23/20- Ms. Desouza is seen in office for follow up on C# 8. She denies n/v/dizziness/ neuropathy . Her appetite is  good, she has good energy level and is tolerating chemo well. She will get her restaging scans done after this round of chemo. Advised she can bring her son in for the next office appointment with Dr Paul to discuss the results of the scan. \par \par 10/6/20-Ms. Desouza is seen in office for follow up after her restaging scans- SHe had restaging scans on 10/1/20 that showed metastatic pancreatic cancer with no interval change since 08/9/20 in pancreatic mass, lung nodules, and adenopathy / peritoneal nodules.  She has been tolerating this chemo regimen  well. today she presents to office with her son - Davina . We discussed that we can entertain the idea of RT aftre she completes 12 cycles of Chemo.She complainst that she has been having episodes where she feels her head is usually hot and she feels sweaty. 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 \par \par 10/22/20-Patient was admitted at [facility] for sepsis and needed vasopressors for pressure support. She was in the ICU for a short time - She received IV abx for Likely bacterial pneumonia. While she was in the hospital her LFTS were still elevated- Unclear etiology for elevated LFTs- Likely chemo related vs Antibiotics- Pt is feeling better after she is discharged home. plan  is to omit FOLFOX in future and plan for Culebra Abraxane with Onpro. Will recheck labs to confirm her LFTs are downtrending. Plan to start her on gem- Abraxane if LFTS normalize. \par Her scans in the hospital showed- Metastatic pancreatic cancer involving the retroperitoneal lymph nodes and visualized bilateral lungs. Stable disease compared to 10/1/2020. \par Small volume ascites, new since 10/1/2020. \par \par Drug sheet was provided Risks/ benefits and alternatives of chemotherapy were discussed and included but not limited to fatigue,low blood counts, nausea/vomiting, skin reactions, hair thinning, possible blood transfusion requirement,increased risk of infection,neuropathy. Patient agreed to above and informed consent was signed.\par \par 10/27/2020: Pt is here to start a new chemo regimen with Culebra/Abraxane with onpro. Irinotecan was discontinued in August due to severe fatigue, hypotension resistant to fluid challenge and bradycardia. Later patient developed septic shock post FOLFOX chemo and is hence discontinued. LFTs are normal with AST 38, ALT 31, ALP elevated 170, normal T. Rah 0.2mg/dl. Pt c/o mild fatigue and does not have any other complains. Denies any F/C, N/V,D/P, change in appetite or weight loss. \par Discussed the chemo regimen with Culebra/ Abraxane- answered all questions and concerns.\par \par 11/9//20; Ms Desouza is seen as follow up for C# 2. She complains of fatigue which is more prominent this cycle- She denies fevers. She does complain of nausea after chemo . She had some Constipation that was resolved with Miralax. Her appetite is diminished  and sleep is disturbed- Unable to sleep through the night. Will order Remeron 15 mg Q HS for appetite and sleep.\par \par 11/23/20; Ms Desouza is seen as follow up for C#3. She feels well. She feels this chemo is more tolerable. She has fatigue that lasts for 1-2 days after chemo and the week she is off chemo - she feels well. She does experience some nausea  and constipation occasionally that is relived by anti nausea meds and MiraLAX. She denies  neuropathy.\par Denies pain or discomfort. Filled out the paperwork for FMLA- for son Davina. she notices darkening of her toes -on the right foot - The first second and fourth digit noted.\par \par  [de-identified] : adenocarcinoma [de-identified] : 12/8//20; Ms Desouza is seen as follow up - In the interim she was hospitalized at [facility] on 11/23/20 - for Acute MI and had a cardiac stent placed. She is scheduled to see Dr Toussaint for follow up next week. She is now on Brilanta/ Metoprolol/ Atorvastatin.\par She feels very tired and feels wiped out - She denies N/V/D/ Constipation. Her abdomen is distended- She has some lower back pain  which is new - Will plan to schedule a CT scan A/P/C prior to the next chemotherapy- Will arrange for a TEB visit with Dr Paul after completion of scans- Her f/u appt with Dr Toussaint is on 12/18/20.\par She complains of feeling dizzy at times- Does not have a BP machine at home- \par \par  [FreeTextEntry3] : Hypotension, bradycardia  [FreeTextEntry4] : 6/17/20 [FreeTextEntry5] : IRIN [FreeTextEntry2] : x 4 daYS [FreeTextEntry8] : Sepsis, transaminitis  [de-identified] : OXaliplatin

## 2020-12-08 NOTE — PHYSICAL EXAM
[Restricted in physically strenuous activity but ambulatory and able to carry out work of a light or sedentary nature] : Status 1- Restricted in physically strenuous activity but ambulatory and able to carry out work of a light or sedentary nature, e.g., light house work, office work [Normal] : grossly intact [de-identified] : Ocaasional cramps, constipation relieved.  [de-identified] : darkening of nail beds on the right foot- !st / 2nd and 4th toe noted.

## 2020-12-17 NOTE — END OF VISIT
[FreeTextEntry3] : I personally discussed this patient with ACP at the time of the visit. I agree with the assessment and plan as written, unless noted below. \par I was present with the ACP during the key portions of the history and exam. I agree with the findings and plan as documented in the ACP's note, unless noted below. \par \par Recovering from acute MI\par Under care by cardio-oncology\par \par Restaging scan showed: 39.3% shrinkage on current chemo\par \par Plan to resume chemo\par \par Fabian Paul MD\par  [FreeTextEntry2] : Gemcitabine is not considered a cardiotoxic agent generally; so far only very few case reports have been reported in the literature on different aspects of cardiac side effects. \par \par Anecdotal reports of cardiotoxicity including congestive cardiac failure, cardiomyopathy and MI when treated with gemcitabine in patients with pancreatic cancers have been reported. \par \par  As per available literature, patients with diabetes and having received a total dose greater than 15,000 mg/m2 are generally at a higher risk and require close surveillance.\par \par [email or web address]/Article/Fulltext/155140\par \par Ophelia FM, Gerardo SP, Stan PF, Veto MM. Gemcitabine and acute myocardial infarction--a case report. Angiology. 2006 May-Jun;57(3):367-71. doi: 10.1177/742314207614433607. PMID: 38057966.\par \par Gemcitabine is a highly liposoluble molecule, and its clearance yields a primary deamination metabolite, difluorodeoxyuridine (dFdU). Gemcitabine’s pharmacokinetics and distribution volumes depend on infusion modality and are influenced by patient’s factors that include age, gender, infusion length and body surface area. There is a paucity of data regarding gemcitabine cardiotoxicity and a review of phase II - III clinical trials showed a low cardiotoxic profile and extremely rare cardiac events after gemcitabine infusion: ventricular tachyarrhythmias in 0-1.4%, reduction of left ventricular ejection fraction in 0.2-0.9% and exudative pericarditis in 0.2%.\par \par Though the mechanisms of gemcitabine-related myocardial ischemia are not well understood, some authors have speculated direct endothelial injury resulting in acute coronary thrombosis as a possible mechanism. The evidence for prothrombotic and procoagulant effects of gemcitabine comes from other macrovascular events such as strokes, visceral infractions and vasculitides that were reported in association with gemcitabine infusion. Guilhermeura et al described higher incidence of thrombosis and vascular events in patients treated with gemcitabine. There is also some conjecture about coronary vasospasm as being another possible pathogenetic mechanism. It was proposed by Shereen et al as a cause of chest pain and a new-onset left bundle branch block during gemcitabine infusion. In that particular case, vasospasm, chest pain and EKG changes had resolved within 10 min of antianginal therapy.\par \par \par \par Adan ET, Breezy AT, Laquita DJ, Marco SW, Vargas J, Pedrito C, Prudence HAYLEY, et al. Cardiovascular complications of cancer therapy: diagnosis, pathogenesis, and management. Circulation. 2004;109(25):4871-3118.\par \par Maricarmen C, Pema F, Scooby P, Kobi F, Jarrett E, Rolando SOSA, Adam X, et al. Gemcitabine as a single agent in the treatment of relapsed or refractory low-grade non-Hodgkin's lymphoma. Br J Haematol. 2001;113(3):772-778.\par \par Howard QUEZADA, Xiomara OVALLE, Florian SOSA. Safety profile of gemcitabine. Anticancer Drugs. 1995;6(Suppl 6):27-32.\par \par Shereen B, Smith G, Kyle U, Shaw E, Glenn G, Noe S, Lavelle R, et al. Gemcitabine-induced acute coronary syndrome: a case report. Med Princ Pract. 2009;18(1):76-80.\par \par Ohpelia FM, Gerardo SP, Pierce PF, Veto MM. Gemcitabine and acute myocardial infarction - a case report. Angiology. 2006;57(3):367-371.\par \par Addie ALCOCER, Dequan GOODMAN. Gemcitabine-induced coronary vasospasm: A case report. [facility]. 2017;45(2):172-175.\par Kathleen T, Saad M, Vikram MONIQUE. Acute myocardial infarction following gemcitabine therapy - a case report. Angiology. 1999;50(12):9978-5534.\par \par [email or web address]/doi/full/10.1002/[email or web address]\par \par [email or web address]\par \par \par ABRAXANE:\par \par per PI:\par Cardiovascular: Hypotension, during the 30-minute infusion, occurred in 5% of patients in the\par randomized metastatic breast cancer trial. Bradycardia, during the 30-minute infusion, occurred\par in <1% of patients. These vital sign changes most often caused no symptoms and required\par neither specific therapy nor treatment discontinuation.\par Severe cardiovascular events possibly related to single-agent ABRAXANE occurred in\par approximately 3% of patients in the randomized trial. These events included chest pain, cardiac\par arrest, supraventricular tachycardia, edema, thrombosis, pulmonary thromboembolism,\par pulmonary emboli, and hypertension. Cases of cerebrovascular attacks (strokes) and transient\par ischemic attacks have been reported rarely.\par Electrocardiogram (ECG) abnormalities were common among patients at baseline. ECG\par abnormalities on study did not usually result in symptoms, were not dose-limiting, and required\par no intervention. ECG abnormalities were noted in 60% of patients in the metastatic breast\par cancer randomized trial. Among patients with a normal ECG prior to study entry, 35% of all\par patients developed an abnormal tracing while on study. The most frequently reported ECG\par modifications were non-specific repolarization abnormalities, sinus bradycardia, and sinus tachycardia.\par \par [email or web address].[email or web address]/[email or web address]_[email or web address]\par

## 2020-12-17 NOTE — HISTORY OF PRESENT ILLNESS
[Home] : at home, [unfilled] , at the time of the visit. [Medical Office: ([facility])___] : at the medical office located in  [Verbal consent obtained from patient] : the patient, [unfilled] [de-identified] : Diagnosis: Pancreatic adenocarcinoma \par \par Genetic Testing: [employer] Liquid- LIBAN , TMB 4 Mut/Mb \par \par Other Current Medical Problems: Arthritis/ HLD \par \par Treatment History:\par \par Cycle # 1 06/17/20- FOLFORINOX- 5 FU 2400 mg/ kg /  mg/ m 2/ Irinotecan 150 mg/ m 2 / Oxaliplatin 85 mg/ m2\par Cycle # 2 06/30/20- FOLFORINOX- 5 FU 2400 mg/ kg /  mg/ m 2/ Irinotecan 140 mg/ m 2/ Oxaliplatin 85 mg/ m2\par Cycle # 3- 07/15/20- Skipped Irinotecan / Oxaliplatin-  (ANC 1.4) - 5 FU 2400 mg/ kg /  mg/ m 2\par Cycle #4 - 07/29/20- FOLFOX-  5 FU 2000 mg/ kg /  mg/ m 2/ Oxaliplatin 85 mg/ m2-----------------Tolerated well without Irinotecan \par \par Restaging Scans---------------Stable disease \par Cycle # 5 - 08/12/20- FOLFOX- 5 FU 2000 mg/ kg /  mg/ m 2/ Oxaliplatin 85 mg/ m2--------\par Cycle # 6- 08/26/20-FOLFIRI-  5 FU 2000 mg/ kg /  mg/ m 2/ Irinotecan 150 mg- Held oxaliplatin \par \par Hospitalized at [facility] for adverse reaction from Irinotecan- 8/28- 8/30-------------Will hold IRINOTECAN in future- \par \par Cycle # 7 09/09/20 - 5 FU 2000 mg/  mg/ m2 -----------Oxali was held since ANC was 1.29 \par Cycle # 8 -09/23/20- 5 FU 2000 mg/  mg/ m2 / Oxaliplatin 85 mg/ m2 \par \par Restaging Scans- Stable disease \par \par Cycle #9 -10/6/20-  5 FU 2000 mg/  mg/ m2 / Oxaliplatin 85 mg/ m2 \par \par Hospitalized for sepsis after chemotherapy \par \par Cycle #1 10/27/20 Gemcitabine 1000 mg/m2 + Abraxane 100 mg/m2\par Cycle # 2 11/9/20- Gemcitabine 1000 mg/m2 + Abraxane 100 mg/m2\par Cycle # 3 11/23/20-  Gemcitabine 1000 mg/m2 + Abraxane 100 mg/m2\par \par Hospitalized for Acute MI - Had a cardiac stent - 11/23/20\par \par Restaging scans- Interval regression in hepatic mass/ mediastinal and retroperitoneal lymph nodes\par \par Oncological History :\par Ms. Griselda Corona is a 70 year old female who presents for initial consultation in our [facility] for a new diagnosis of pancreas adenocarcinoma. \par Patient states she had gastrointestinal symptoms dating back to Nov 2019, including discomfort, bloating, diarrhea followed by small amounts of blood per rectum. She eventually went to GI doctor Dr Florian Pace.  She had a Colonoscopy in Feb 20 that showed small ulcer. She went the ER at [facility] 3/10/20 for abdominal pain.  CT of her abdomen was performed and showed 3.2 x 3.5 cm hypoenhancing mass in the pancreatic head/ body concerning for malignancy. Mass results in severe compression of SMV. Retroperitoneal lymphadenopathy concerning for metastatic disease with multiple nodules in lung base concerning for metastatic disease.\par \par She was referred  to Dr Bernabe for EUS, which was performed June 1st 2020, significant for an ill-defined, heterogeneous, hypoechoic lesion seen in the head of pancreas. The lesion abutted but did not invade the SMV. The main pancreatic duct was dilated and the parenchyma was heterogeneous within the pancreatic body/tail. Fine needle biopsy was performed.\par \par Pathology: Gastric mucosa positive for H pylori. Pancreas mass: Invasive adenocarcinoma.\par \par Patient is taking medication regimen for the H.Pylori infection, otherwise not taking medications except for multivitamin supplement. \par \par \par FHX: No hx of Cancer \par \par Socail HX: Lives with her children, , Non smoker, no alcohol use\par \par PSX: No surgeries in past \par \par \par Disease: Pancreatic adenocarcinoma \par Pathology:\par \par AJCC Stage : Stage 4 \par \par Tumor Markers: CA 19.9 was 155 \par \par 6/10/20- Ms. Desouza is seen in [facility] clinic. She states he has abdominal pain 7-8/10 and gets relief with Tylenol. She has had 2 lbs weight loss- and appetite was decreased .\par Ct abd/ pelvis/ chest- Large pancreatic mass suspicious for adenocarcinoma, with marked arterial \par and venous involvement. Numerous bilateral pulmonary nodules new since 2018 and suspicious for metastatic disease. Retroperitoneal and peripancreatic lymphadenopathy. \par Omental nodule raising the possibility of peritoneal carcinomatosis. \par \par 06/17/20- Ms. Desouza is seen for follow up. She had the port placed earlier last week and is scheduled for her chemotherapy today . We discussed the chemo side effects and what to expect from chemotherapy- SE- Drug sheet was provided Risks/ benefits and alternatives of chemotherapy were discussed and included but not limited to fatigue,low blood counts, nausea/vomiting, skin reactions, hair thinning, possible blood transfusion requirement,increased risk of infection,neuropathy. Patient agreed to above and informed consent was signed.PET Scan- done- on 06/16/20-FDG-avid mass in pancreatic body and neck corresponds to known malignancy. Hypermetabolic left periaortic lymphadenopathy is compatible with metastatic disease.  Multiple bilateral pulmonary nodules, not significantly changed as compared to CT dated 6/9/2020, compatible with metastatic disease. \par \par 6/30/20 - Here for Cycle 2 \par 1- Developed G3 fatigue following C1 , and had to remain in bed for at least 4 days; son was cooking, she was able to go to bathroom and shower by herself\par 2- Also had decreased appetite and G1 nausea but no vomiting\par 3- Declines neuropathy\par 4- Denies diarrhea\par 5- No fever\par 6- No sxs suggestive of COVID-19\par 7- lost weight upto 10 lbs\par 8- also I am aware of her reaction to IRIN during infusion\par \par \par 7/8/20- Recieved a call from patient stating she had episodes of vomiting yesterday. She went out yesterday in the summer sun  and had some food from outside and she has been throwing up. She states she lost weight >30 lbs- She states the Creon has been causing a lot of abdominal pain - She has been experiencing some abdominal pain but is afraid to use the Oxycodone or the medical marijuana. \par She lives with her 2 sons and her sister in law has been helping her intermittently - However she needs a lot of reinstatement of instructions- Explained to her that she needs to take the Pain meds when needed and the pain is not from Creon. \par She is requesting for a Home COVID swab - will arrange for the same. \par \par 7/17/20- Pt was scheduled for C#3 with FOLFOX instead of FOLFORINOX to identify the culprit for the reaction. But her ANC was 1.4 so held Oxaliplatin and gave only 5 FU and LCV. She tolerated this chemo better did not have any reaction like previously. She denies N/V/D. She has been experiencing spasms in her fingers after the previous chemotherapy with FOLFORINOX- Her tumor markers CEA was rising and is concerning from 0.7- 3.6- 7.9.Patient states she was diagnosed with LUPUS by her PCP in March Unclear if she is on any treatment for the same. Will get the medical records from her PCP.\par \par 7/29/20- Pt is scheduled for C#4 with FOLFOX instead of FOLFORINOX to identify the culprit for the reaction. She tolerated the last chemotherapy well. She has been eating better and denies N/V. Today she tolerated the chemo with FOLFOX well - No reaction. next cycle we plan to give FOLFIRI\par \par 8/10//20- Pt is seen for TEB visit after she had the restaging scans done- She had CT scan done that showed- Metastatic pancreatic adenocarcinoma. Stable disease. She tolerated the chemo well without the irinotecan. we answered all her questions and concerns. Her son was not in the telephone conference. Dr Paul left a message with the son on his cell phone. Plan is to continue with the chemo \par \par 8/26/20- Ms. Deo is seen for follow up- She tolerated the last chemo well- denies N/V/D. She denies any neuropathy - Denies pain - does not use mediacl marijuana- \par Explained to her that she will receive FOLFIRI this cycle and to inform if she develops any reaction to the same. \par \par 09/3/20- Ms Deo has a telephone visit with me. She was hospitalized at [facility] after her last chemo- when she came for the disconnect- She was hospitalized at [facility] from 8/28/20 to 8/30/20- When she came for the disconnect her BP was low and she was bradycardiac and after a fluid challenge she still did not recover- She was sent to hospital for further work up where Blood cx/ Ua/ Urine cx was negative and it was deemed that she was dehydrated and no infectious process.\par \par Spoke with the patient who states she had a rough time with this chemotherapy and she is feeling better now- however she has been having episodes of diarrhea and nausea. She has been taking the anti nausea meds and that has helped her. She feels that she dis better with FOLFOX - Discussed with Dr Paul and plan is to hold off on nay future doses of Irinotecan.\par \par 09/9/20- Ms Desouza is seen for C #7 - Her ANC was 1.23- Discussed with Dr Paul and plan to hold off on Oxaliplatin - Pt will get 5 FU and LCV - She will receive onpro on disconnect day. I explained to her hat she will not get any mores doses of Irinotecan and to keep a close eye on any symptoms she develop post chemotherapy. She denies pain/ N/V/D. \par \par 09/23/20- Ms. Desouza is seen in office for follow up on C# 8. She denies n/v/dizziness/ neuropathy . Her appetite is  good, she has good energy level and is tolerating chemo well. She will get her restaging scans done after this round of chemo. Advised she can bring her son in for the next office appointment with Dr Paul to discuss the results of the scan. \par \par 10/6/20-Ms. Desouza is seen in office for follow up after her restaging scans- SHe had restaging scans on 10/1/20 that showed metastatic pancreatic cancer with no interval change since 08/9/20 in pancreatic mass, lung nodules, and adenopathy / peritoneal nodules.  She has been tolerating this chemo regimen  well. today she presents to office with her son - Davina . We discussed that we can entertain the idea of RT aftre she completes 12 cycles of Chemo.She complainst that she has been having episodes where she feels her head is usually hot and she feels sweaty. 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 \par \par 10/22/20-Patient was admitted at [facility] for sepsis and needed vasopressors for pressure support. She was in the ICU for a short time - She received IV abx for Likely bacterial pneumonia. While she was in the hospital her LFTS were still elevated- Unclear etiology for elevated LFTs- Likely chemo related vs Antibiotics- Pt is feeling better after she is discharged home. plan  is to omit FOLFOX in future and plan for Holmes Abraxane with Onpro. Will recheck labs to confirm her LFTs are downtrending. Plan to start her on gem- Abraxane if LFTS normalize. \par Her scans in the hospital showed- Metastatic pancreatic cancer involving the retroperitoneal lymph nodes and visualized bilateral lungs. Stable disease compared to 10/1/2020. \par Small volume ascites, new since 10/1/2020. \par \par Drug sheet was provided Risks/ benefits and alternatives of chemotherapy were discussed and included but not limited to fatigue,low blood counts, nausea/vomiting, skin reactions, hair thinning, possible blood transfusion requirement,increased risk of infection,neuropathy. Patient agreed to above and informed consent was signed.\par \par 10/27/2020: Pt is here to start a new chemo regimen with Holmes/Abraxane with onpro. Irinotecan was discontinued in August due to severe fatigue, hypotension resistant to fluid challenge and bradycardia. Later patient developed septic shock post FOLFOX chemo and is hence discontinued. LFTs are normal with AST 38, ALT 31, ALP elevated 170, normal T. Rah 0.2mg/dl. Pt c/o mild fatigue and does not have any other complains. Denies any F/C, N/V,D/P, change in appetite or weight loss. \par Discussed the chemo regimen with Holmes/ Abraxane- answered all questions and concerns.\par \par 11/9//20; Ms Desouza is seen as follow up for C# 2. She complains of fatigue which is more prominent this cycle- She denies fevers. She does complain of nausea after chemo . She had some Constipation that was resolved with Miralax. Her appetite is diminished  and sleep is disturbed- Unable to sleep through the night. Will order Remeron 15 mg Q HS for appetite and sleep.\par \par 11/23/20; Ms Desouza is seen as follow up for C#3. She feels well. She feels this chemo is more tolerable. She has fatigue that lasts for 1-2 days after chemo and the week she is off chemo - she feels well. She does experience some nausea  and constipation occasionally that is relived by anti nausea meds and MiraLAX. She denies  neuropathy.\par Denies pain or discomfort. Filled out the paperwork for FMLA- for son Davina. she notices darkening of her toes -on the right foot - The first second and fourth digit noted.\par \par 12/8//20; Ms Desouza is seen as follow up - In the interim she was hospitalized at [facility] on 11/23/20 - for Acute MI and had a cardiac stent placed. She is scheduled to see Dr Toussaint for follow up next week. She is now on Brilanta/ Metoprolol/ Atorvastatin.\par She feels very tired and feels wiped out - She denies N/V/D/ Constipation. Her abdomen is distended- She has some lower back pain  which is new - Will plan to schedule a CT scan A/P/C prior to the next chemotherapy- Will arrange for a TEB visit with Dr Paul after completion of scans- Her f/u appt with Dr Toussaint is on 12/18/20.\par She complains of feeling dizzy at times- Does not have a BP machine at home- \par \par \par  [de-identified] : adenocarcinoma [de-identified] : 12/15/20:  Ms Desouza is seen as TEB viist to discuss the scans results. She had the restaging scans on 12/14 that showed interval regression in hepatic mass , mediastinal and retroperitoneal lymph nodes. Interval stability in size and number of pulm metastases. Overall 39% regression of tumor based on RECIST . She has a follow up with cardiologist following week. \par  [FreeTextEntry3] : Hypotension, bradycardia  [FreeTextEntry4] : 6/17/20 [FreeTextEntry5] : IRIN [FreeTextEntry2] : x 4 daYS [FreeTextEntry8] : Sepsis, transaminitis  [de-identified] : OXaliplatin

## 2020-12-17 NOTE — ASSESSMENT
[FreeTextEntry1] : 70 year old F with H/O metastatic disease diagnosed in June 2020 and is s/p 9 cycles of chemotherapy which was initiated with FOLFIRINOX and ended with FOLFOX with discontinuation of irinotecan due to severe side effects requiring hospitalization. Now FOLFOX is discontinued due to the development of septic shock and transaminitis. The current plan is to initiate Milton/Abraxane+onpro. \par \par 1-Therapeutic: \par -----------------\par Pancreatic cancer with mets to lungs.\par Started patient on FOLFORINOX regimen.She received alternate cycles of FOLFOX with FOLFIRI- \par Since she developed AE  with the Irinotecan and was hospitalized post Irinotecan- \par She was treated with 4 cycles of FOLFOX- She was hospitalized after her last chemo for sepsis-\par S/P 3 cycles of  Milton/ Abraxane with Onpro.\par Sustained MI on 11/24 - Had a stent placed RCA.\par \par \par \par 2-Diagnostic: \par ---------------\par Labs:Monthly CEA, , \par Ca 19.9- 142- 08---->75->47->42\par CEA: 1.9--->2-->1.7-->1.9->1.4->1.1\par Restaging scan stable disease with regression of tumor \par \par 3- Pharmacogenetics testing:\par ----------------------------------\par - Plan to order MSI for future PDL-1 therapy \par - Somatic and germline mutations per NCCN guidelines REQUESTED\par \par 4- Procedures:\par -----------------\par - S/P  Port-A-Cath placement on 06/15/20\par - PORT CARE\par - EMLA CREAM PRESCRIBED\par \par 5-Supportive: \par -----------------\par MI - S/P Stent- Will need cardiology eval to ascertain cause of MI - She is currently on Brillanta/ Metoprolol/ Atorvastatin. \par Abdominal distention- Concern for Ascites- Will do Scans earlier \par Pancreatic insufficiency: CREON - [address] units (2 pills with each snack and 4 pills with each meal) for pancreatic replacement\par Insomnia/ Loss of appetite- Started pt on Remeron 15 mg PO Q HS.\par DVT (PE): High risk, close monitoring\par Antiemetic: Zofran PRN and Compazine PRN\par Emla cream: for port numbing before use\par Prevention of mouth sores: Biotene mouthwash\par Skin reactions: Udderly smooth cream and Vitamin B 6 100 mg BID for prevention of HFS\par Diarrhea: The usual dose of Imodium is 4 milligrams (mg) (2 capsules) after the first loose bowel movement, and 2 mg (1 capsule) after each loose bowel movement after the first dose has been taken. No more than 16 mg (8 capsules) should be taken in any twenty-four-hour period. \par \par \par 6- RTC:  \par --------\par \par F/U with Cardiology \par F/u for next chemo\par

## 2020-12-17 NOTE — PHYSICAL EXAM
[de-identified] : Ocaasional cramps, constipation relieved.  [de-identified] : darkening of nail beds on the right foot- !st / 2nd and 4th toe noted.

## 2020-12-18 NOTE — DISCUSSION/SUMMARY
[Acute Myocardial Infarction] : acute myocardial infarction [Responding to Treatment] : responding to treatment [None] : none [Patient] : the patient [___ Week(s)] : [unfilled] week(s) [With Me] : with me

## 2020-12-18 NOTE — HISTORY OF PRESENT ILLNESS
[FreeTextEntry1] : 70 year old woman with a history stage IV pancreatic adenocarcinoma treated with 5FU/oxaliplatin based regimen from 6/2020-10/2020, switched to gemcitabine/abraxane 10/27 following admission for sepsis who presented with acute RCA STEMI on 12/1. Underwent cardiac cath with deployment of a BMS (meena-coated device) with ARIA 3 flow. LVEF by echo was 55 % on 12/12. She was discharged on aspirin and ticagrelor. She denies any history of cardiovascular disease. She had dyslipidemia with low HDL on lipid panel during hospitalization, but no prior history of hypercholesterolemia, diabetes, hypertension, or tobacco smoking.\par \par She reports adherence to her new medications including DAPT and atorvastatin. She denies any bleeding problems with these. She is also on low dose metoprolol 12.5 mg daily, recently notes some dizziness and  lower blood pressure. No syncope/near syncope or postural symptoms. She did have epistaxis in the past - prior to starting antiplatelet therapy, but not since. She does report periods of fatigue following chemotherapy.\par \par

## 2020-12-18 NOTE — ASSESSMENT
[FreeTextEntry1] : 70 year old woman with recent RCA STEMI in setting of gemcitabine/abraxane chemotherapy for pancreatic cancer. I reviewed her angiogram and ECG. She had a very focal thrombotic RCA lesion, without other significant CAD, normal EF by echo. Her ECG today shows abnormalities consistent with recent inferior infarction. She has a BMS, so prolonged DAPT is not required. She seems to be tolerating aspirin and ticagrelor currently, so reasonable to continue P2Y12 inhibition for now. Particularly w/i first 30 days following event. If she develops thrombocytopenia or bleeding can stop if needed. BP is borderline and she does report episodes of dizziness, but doubt these are related to the very low dose of metoprolol she is taking. Would continue as tolerated for now.\par \par - Continue asa/ticagrelor, atorvastatin as tolerated\par - repeat echo in one month\par - close follow up during treatment for medication adjustment as needed

## 2020-12-18 NOTE — REASON FOR VISIT
[Follow-Up - From Hospitalization] : follow-up of a recent hospitalization for [FreeTextEntry2] : RCA STEMI

## 2020-12-18 NOTE — REVIEW OF SYSTEMS
[Feeling Fatigued] : feeling fatigued [see HPI] : see HPI [Dizziness] : dizziness [Negative] : Heme/Lymph [Dyspnea on exertion] : not dyspnea during exertion [Chest  Pressure] : no chest pressure [Chest Pain] : no chest pain [Lower Ext Edema] : no extremity edema [Leg Claudication] : no intermittent leg claudication [Palpitations] : no palpitations [Easy Bleeding] : no tendency for easy bleeding [Easy Bruising] : no tendency for easy bruising

## 2020-12-18 NOTE — PHYSICAL EXAM
[Normal Appearance] : normal appearance [No Deformities] : no deformities [General Appearance - In No Acute Distress] : no acute distress [Normal Conjunctiva] : the conjunctiva exhibited no abnormalities [Eyelids - No Xanthelasma] : the eyelids demonstrated no xanthelasmas [Heart Rate And Rhythm] : heart rate and rhythm were normal [Heart Sounds] : normal S1 and S2 [Murmurs] : no murmurs present [Edema] : no peripheral edema present [Veins - Varicosity Changes] : no varicosital changes were noted in the lower extremities [Respiration, Rhythm And Depth] : normal respiratory rhythm and effort [Exaggerated Use Of Accessory Muscles For Inspiration] : no accessory muscle use [Auscultation Breath Sounds / Voice Sounds] : lungs were clear to auscultation bilaterally [Abdomen Soft] : soft [Abdomen Tenderness] : non-tender [Abnormal Walk] : normal gait [Nail Clubbing] : no clubbing of the fingernails [Cyanosis, Localized] : no localized cyanosis [Skin Turgor] : normal skin turgor [] : no rash [Oriented To Time, Place, And Person] : oriented to person, place, and time [Impaired Insight] : insight and judgment were intact [Affect] : the affect was normal [Mood] : the mood was normal [No Anxiety] : not feeling anxious [FreeTextEntry1] : no jugular venous distension. no carotid bruit

## 2020-12-21 NOTE — ASSESSMENT
[FreeTextEntry1] : 70 year old F with H/O metastatic disease diagnosed in June 2020 and is s/p 9 cycles of chemotherapy which was initiated with FOLFIRINOX and ended with FOLFOX with discontinuation of irinotecan due to severe side effects requiring hospitalization. Now FOLFOX is discontinued due to the development of septic shock and transaminitis. The current plan is to initiate Hal[place]/Abraxane+onpro. \par \par 1-Therapeutic: \par -----------------\par Pancreatic cancer with mets to lungs.\par Started patient on FOLFORINOX regimen.She received alternate cycles of FOLFOX with FOLFIRI- since she developed AE  .\par She was treated with 4 cycles of FOLFOX- She was hospitalized after her last chemo for sepsis-\par S/P 3 cycles of  Hal[place]/ Abraxane with Onpro.\par Sustained MI on 11/24 - Had a stent placed RCA.\par Plan to resume chemo with dose reduced Halifax/ Abraxane- 700/ 70 - \par \par \par \par 2-Diagnostic: \par ---------------\par Labs:Monthly CEA, , \par Ca 19.9- 142- 53---->75->47->42\par CEA: 1.9--->2-->1.7-->1.9->1.4->1.1\par Restaging scan stable disease with regression of tumor - 39.4% regression \par \par 3- Pharmacogenetics testing:\par ----------------------------------\par - Plan to order MSI for future PDL-1 therapy \par - Somatic and germline mutations per NCCN guidelines REQUESTED\par \par 4- Procedures:\par -----------------\par - S/P  Port-A-Cath placement on 06/15/20\par - PORT CARE\par - EMLA CREAM PRESCRIBED\par \par 5-Supportive: \par -----------------\par MI - S/P Stent- Will need cardiology eval to ascertain cause of MI - She is currently on Brillanta/ Metoprolol/ Atorvastatin. -Experiencing SOB- Discussed wih Cardiology- May switch Brillanta to Plavix.\par Pancreatic insufficiency: CREON - [address] units (2 pills with each snack and 4 pills with each meal) for pancreatic replacement\par DVT (PE): High risk, close monitoring\par Antiemetic: Zofran PRN and Compazine PRN\par Emla cream: for port numbing before use\par Prevention of mouth sores: Biotene mouthwash\par Skin reactions: Udderly smooth cream and Vitamin B 6 100 mg BID for prevention of HFS\par Diarrhea: The usual dose of Imodium is 4 milligrams (mg) (2 capsules) after the first loose bowel movement, and 2 mg (1 capsule) after each loose bowel movement after the first dose has been taken. No more than 16 mg (8 capsules) should be taken in any twenty-four-hour period. \par \par \par 6- RTC:  \par --------\par \par F/U with Cardiology \par \par F/u for next chemo\par

## 2020-12-21 NOTE — END OF VISIT
[FreeTextEntry3] : I personally discussed this patient with ACP at the time of the visit. I agree with the assessment and plan as written, unless noted below. \par I was present with the ACP during the key portions of the history and exam. I agree with the findings and plan as documented in the ACP's note, unless noted below. \par \par Recovering from acute MI\par Under care by cardio-oncology\par \par Restaging scan showed: 39.3% shrinkage on current chemo\par \par Plan to resume chemo\par \par Fabian Paul MD\par  [FreeTextEntry2] : Gemcitabine is not considered a cardiotoxic agent generally; so far only very few case reports have been reported in the literature on different aspects of cardiac side effects. \par \par Anecdotal reports of cardiotoxicity including congestive cardiac failure, cardiomyopathy and MI when treated with gemcitabine in patients with pancreatic cancers have been reported. \par \par  As per available literature, patients with diabetes and having received a total dose greater than 15,000 mg/m2 are generally at a higher risk and require close surveillance.\par \par https://www.PaymentWorks.com/Article/Fulltext/000518\par \par Ophelia FM, Gerardo SP, Stan PF, Veto MM. Gemcitabine and acute myocardial infarction--a case report. Angiology. 2006 May-Jun;57(3):367-71. doi: 10.1177/[id number]. PMID: 06385387.\par \par Gemcitabine is a highly liposoluble molecule, and its clearance yields a primary deamination metabolite, difluorodeoxyuridine (dFdU). Gemcitabine’s pharmacokinetics and distribution volumes depend on infusion modality and are influenced by patient’s factors that include age, gender, infusion length and body surface area. There is a paucity of data regarding gemcitabine cardiotoxicity and a review of phase II - III clinical trials showed a low cardiotoxic profile and extremely rare cardiac events after gemcitabine infusion: ventricular tachyarrhythmias in 0-1.4%, reduction of left ventricular ejection fraction in 0.2-0.9% and exudative pericarditis in 0.2%.\par \par Though the mechanisms of gemcitabine-related myocardial ischemia are not well understood, some authors have speculated direct endothelial injury resulting in acute coronary thrombosis as a possible mechanism. The evidence for prothrombotic and procoagulant effects of gemcitabine comes from other macrovascular events such as strokes, visceral infractions and vasculitides that were reported in association with gemcitabine infusion. Guilhermeura et al described higher incidence of thrombosis and vascular events in patients treated with gemcitabine. There is also some conjecture about coronary vasospasm as being another possible pathogenetic mechanism. It was proposed by Shereen et al as a cause of chest pain and a new-onset left bundle branch block during gemcitabine infusion. In that particular case, vasospasm, chest pain and EKG changes had resolved within 10 min of antianginal therapy.\par \par \par \par Adan ET, Breezy AT, Laquita DJ, Marco SW, Vargas J, Pedrito C, Prudence HAYLEY, et al. Cardiovascular complications of cancer therapy: diagnosis, pathogenesis, and management. Circulation. 2004;109(25):6638-6802.\par \par Maricarmen C, Pema F, Scooby P, Kobi F, Jarrett E, Rolando SOSA, Adam X, et al. Gemcitabine as a single agent in the treatment of relapsed or refractory low-grade non-Hodgkin's lymphoma. Br J Haematol. 2001;113(3):772-778.\par \par Howard QUEZADA, Xiomara OVALLE, Florian SOSA. Safety profile of gemcitabine. Anticancer Drugs. 1995;6(Suppl 6):27-32.\par \par Shereen B, Smith G, Kyle U, Shaw E, Glenn G, Noe S, Lavelle R, et al. Gemcitabine-induced acute coronary syndrome: a case report. Med Princ Pract. 2009;18(1):76-80.\par \par Ophelia FM, Gerardo SP, Pierce PF, Veto MM. Gemcitabine and acute myocardial infarction - a case report. Angiology. 2006;57(3):367-371.\par \par Addie ALCOCER, Dequan GOODMAN. Gemcitabine-induced coronary vasospasm: A case report. [facility]. 2017;45(2):172-175.\par Kathleen T, Saad M, Vikram MONIQUE. Acute myocardial infarction following gemcitabine therapy - a case report. Angiology. 1999;50(12):1961-9373.\par \par [email or web address].bond.com/doi/full/10.1002/[email or web address]\par \par [email or web address]\par \par \par ABRAXANE:\par \par per PI:\par Cardiovascular: Hypotension, during the 30-minute infusion, occurred in 5% of patients in the\par randomized metastatic breast cancer trial. Bradycardia, during the 30-minute infusion, occurred\par in <1% of patients. These vital sign changes most often caused no symptoms and required\par neither specific therapy nor treatment discontinuation.\par Severe cardiovascular events possibly related to single-agent ABRAXANE occurred in\par approximately 3% of patients in the randomized trial. These events included chest pain, cardiac\par arrest, supraventricular tachycardia, edema, thrombosis, pulmonary thromboembolism,\par pulmonary emboli, and hypertension. Cases of cerebrovascular attacks (strokes) and transient\par ischemic attacks have been reported rarely.\par Electrocardiogram (ECG) abnormalities were common among patients at baseline. ECG\par abnormalities on study did not usually result in symptoms, were not dose-limiting, and required\par no intervention. ECG abnormalities were noted in 60% of patients in the metastatic breast\par cancer randomized trial. Among patients with a normal ECG prior to study entry, 35% of all\par patients developed an abnormal tracing while on study. The most frequently reported ECG\par modifications were non-specific repolarization abnormalities, sinus bradycardia, and sinus tachycardia.\par \par https://www.accessdata.fda.gov/drugsatfda_docs/label/[email or web address]/[email or web address].pdf\par

## 2020-12-21 NOTE — HISTORY OF PRESENT ILLNESS
[de-identified] : Diagnosis: Pancreatic adenocarcinoma \par \par Genetic Testing: [employer]- LIBAN , TMB 4 Mut/Mb \par \par Other Current Medical Problems: Arthritis/ HLD \par \par Treatment History:\par \par Cycle # 1 06/17/20- FOLFORINOX- 5 FU 2400 mg/ kg /  mg/ m 2/ Irinotecan 150 mg/ m 2 / Oxaliplatin 85 mg/ m2\par Cycle # 2 06/30/20- FOLFORINOX- 5 FU 2400 mg/ kg /  mg/ m 2/ Irinotecan 140 mg/ m 2/ Oxaliplatin 85 mg/ m2\par Cycle # 3- 07/15/20- Skipped Irinotecan / Oxaliplatin-  (ANC 1.4) - 5 FU 2400 mg/ kg /  mg/ m 2\par Cycle #4 - 07/29/20- FOLFOX-  5 FU 2000 mg/ kg /  mg/ m 2/ Oxaliplatin 85 mg/ m2-----------------Tolerated well without Irinotecan \par \par Restaging Scans---------------Stable disease \par Cycle # 5 - 08/12/20- FOLFOX- 5 FU 2000 mg/ kg /  mg/ m 2/ Oxaliplatin 85 mg/ m2--------\par Cycle # 6- 08/26/20-FOLFIRI-  5 FU 2000 mg/ kg /  mg/ m 2/ Irinotecan 150 mg- Held oxaliplatin \par \par Hospitalized at [facility] for adverse reaction from Irinotecan- 8/28- 8/30-------------Will hold IRINOTECAN in future- \par \par Cycle # 7 09/09/20 - 5 FU 2000 mg/  mg/ m2 -----------Oxali was held since ANC was 1.29 \par Cycle # 8 -09/23/20- 5 FU 2000 mg/  mg/ m2 / Oxaliplatin 85 mg/ m2 \par \par Restaging Scans- Stable disease \par \par Cycle #9 -10/6/20-  5 FU 2000 mg/  mg/ m2 / Oxaliplatin 85 mg/ m2 \par \par Hospitalized for sepsis after chemotherapy \par \par Cycle #1 10/27/20 Gemcitabine 1000 mg/m2 + Abraxane 100 mg/m2\par Cycle # 2 11/9/20- Gemcitabine 1000 mg/m2 + Abraxane 100 mg/m2\par Cycle # 3 11/23/20-  Gemcitabine 1000 mg/m2 + Abraxane 100 mg/m2\par \par Hospitalized for Acute MI - Had a cardiac stent - 11/23/20\par \par Restaging scans- Interval regression in hepatic mass/ mediastinal and retroperitoneal lymph nodes\par \par Cycle # 4 12/21/20- Gemcitabine 700 mg/ m2 and Abraxane 70 mg/ m2\par \par Oncological History :\par Ms. Griselda Corona is a 70 year old female who presents for initial consultation in our [facility] for a new diagnosis of pancreas adenocarcinoma. \par Patient states she had gastrointestinal symptoms dating back to Nov 2019, including discomfort, bloating, diarrhea followed by small amounts of blood per rectum. She eventually went to GI doctor Dr Florian Pace.  She had a Colonoscopy in Feb 20 that showed small ulcer. She went the ER at [facility] 3/10/20 for abdominal pain.  CT of her abdomen was performed and showed 3.2 x 3.5 cm hypoenhancing mass in the pancreatic head/ body concerning for malignancy. Mass results in severe compression of SMV. Retroperitoneal lymphadenopathy concerning for metastatic disease with multiple nodules in lung base concerning for metastatic disease.\par \par She was referred  to Dr Bernabe for EUS, which was performed June 1st 2020, significant for an ill-defined, heterogeneous, hypoechoic lesion seen in the head of pancreas. The lesion abutted but did not invade the SMV. The main pancreatic duct was dilated and the parenchyma was heterogeneous within the pancreatic body/tail. Fine needle biopsy was performed.\par \par Pathology: Gastric mucosa positive for H pylori. Pancreas mass: Invasive adenocarcinoma.\par \par Patient is taking medication regimen for the H.Pylori infection, otherwise not taking medications except for multivitamin supplement. \par \par \par FHX: No hx of Cancer \par \par Socail HX: Lives with her children, , Non smoker, no alcohol use\par \par PSX: No surgeries in past \par \par \par Disease: Pancreatic adenocarcinoma \par Pathology:\par \par AJCC Stage : Stage 4 \par \par Tumor Markers: CA 19.9 was 155 \par \par 6/10/20- MsClaudia Desouza is seen in [facility] clinic. She states he has abdominal pain 7-8/10 and gets relief with Tylenol. She has had 2 lbs weight loss- and appetite was decreased .\par Ct abd/ pelvis/ chest- Large pancreatic mass suspicious for adenocarcinoma, with marked arterial \par and venous involvement. Numerous bilateral pulmonary nodules new since 2018 and suspicious for metastatic disease. Retroperitoneal and peripancreatic lymphadenopathy. \par Omental nodule raising the possibility of peritoneal carcinomatosis. \par \par 06/17/20- MsClaudia Desouza is seen for follow up. She had the port placed earlier last week and is scheduled for her chemotherapy today . We discussed the chemo side effects and what to expect from chemotherapy- SE- Drug sheet was provided Risks/ benefits and alternatives of chemotherapy were discussed and included but not limited to fatigue,low blood counts, nausea/vomiting, skin reactions, hair thinning, possible blood transfusion requirement,increased risk of infection,neuropathy. Patient agreed to above and informed consent was signed.PET Scan- done- on 06/16/20-FDG-avid mass in pancreatic body and neck corresponds to known malignancy. Hypermetabolic left periaortic lymphadenopathy is compatible with metastatic disease.  Multiple bilateral pulmonary nodules, not significantly changed as compared to CT dated 6/9/2020, compatible with metastatic disease. \par \par 6/30/20 - Here for Cycle 2 \par 1- Developed G3 fatigue following C1 , and had to remain in bed for at least 4 days; son was cooking, she was able to go to bathroom and shower by herself\par 2- Also had decreased appetite and G1 nausea but no vomiting\par 3- Declines neuropathy\par 4- Denies diarrhea\par 5- No fever\par 6- No sxs suggestive of COVID-19\par 7- lost weight upto 10 lbs\par 8- also I am aware of her reaction to IRIN during infusion\par \par \par 7/8/20- Recieved a call from patient stating she had episodes of vomiting yesterday. She went out yesterday in the summer sun  and had some food from outside and she has been throwing up. She states she lost weight >30 lbs- She states the Creon has been causing a lot of abdominal pain - She has been experiencing some abdominal pain but is afraid to use the Oxycodone or the medical marijuana. \par She lives with her 2 sons and her sister in law has been helping her intermittently - However she needs a lot of reinstatement of instructions- Explained to her that she needs to take the Pain meds when needed and the pain is not from Creon. \par She is requesting for a Home COVID swab - will arrange for the same. \par \par 7/17/20- Pt was scheduled for C#3 with FOLFOX instead of FOLFORINOX to identify the culprit for the reaction. But her ANC was 1.4 so held Oxaliplatin and gave only 5 FU and LCV. She tolerated this chemo better did not have any reaction like previously. She denies N/V/D. She has been experiencing spasms in her fingers after the previous chemotherapy with FOLFORINOX- Her tumor markers CEA was rising and is concerning from 0.7- 3.6- 7.9.Patient states she was diagnosed with LUPUS by her PCP in March Unclear if she is on any treatment for the same. Will get the medical records from her PCP.\par \par 7/29/20- Pt is scheduled for C#4 with FOLFOX instead of FOLFORINOX to identify the culprit for the reaction. She tolerated the last chemotherapy well. She has been eating better and denies N/V. Today she tolerated the chemo with FOLFOX well - No reaction. next cycle we plan to give FOLFIRI\par \par 8/10//20- Pt is seen for TEB visit after she had the restaging scans done- She had CT scan done that showed- Metastatic pancreatic adenocarcinoma. Stable disease. She tolerated the chemo well without the irinotecan. we answered all her questions and concerns. Her son was not in the telephone conference. Dr Paul left a message with the son on his cell phone. Plan is to continue with the chemo \par \par 8/26/20- Ms. Desouza is seen for follow up- She tolerated the last chemo well- denies N/V/D. She denies any neuropathy - Denies pain - does not use mediacl marijuana- \par Explained to her that she will receive FOLFIRI this cycle and to inform if she develops any reaction to the same. \par \par 09/3/20- Ms Deo has a telephone visit with me. She was hospitalized at [facility] after her last chemo- when she came for the disconnect- She was hospitalized at [facility] from 8/28/20 to 8/30/20- When she came for the disconnect her BP was low and she was bradycardiac and after a fluid challenge she still did not recover- She was sent to hospital for further work up where Blood cx/ Ua/ Urine cx was negative and it was deemed that she was dehydrated and no infectious process.\par \par Spoke with the patient who states she had a rough time with this chemotherapy and she is feeling better now- however she has been having episodes of diarrhea and nausea. She has been taking the anti nausea meds and that has helped her. She feels that she dis better with FOLFOX - Discussed with Dr Paul and plan is to hold off on nay future doses of Irinotecan.\par \par 09/9/20- Ms Deo is seen for C #7 - Her ANC was 1.23- Discussed with Dr Paul and plan to hold off on Oxaliplatin - Pt will get 5 FU and LCV - She will receive onpro on disconnect day. I explained to her hat she will not get any mores doses of Irinotecan and to keep a close eye on any symptoms she develop post chemotherapy. She denies pain/ N/V/D. \par \par 09/23/20- Ms. Desouza is seen in office for follow up on C# 8. She denies n/v/dizziness/ neuropathy . Her appetite is  good, she has good energy level and is tolerating chemo well. She will get her restaging scans done after this round of chemo. Advised she can bring her son in for the next office appointment with Dr Paul to discuss the results of the scan. \par \par 10/6/20-Ms. Desouza is seen in office for follow up after her restaging scans- SHe had restaging scans on 10/1/20 that showed metastatic pancreatic cancer with no interval change since 08/9/20 in pancreatic mass, lung nodules, and adenopathy / peritoneal nodules.  She has been tolerating this chemo regimen  well. today she presents to office with her son - Davina . We discussed that we can entertain the idea of RT aftre she completes 12 cycles of Chemo.She complainst that she has been having episodes where she feels her head is usually hot and she feels sweaty. 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 \par \par 10/22/20-Patient was admitted at [facility] for sepsis and needed vasopressors for pressure support. She was in the ICU for a short time - She received IV abx for Likely bacterial pneumonia. While she was in the hospital her LFTS were still elevated- Unclear etiology for elevated LFTs- Likely chemo related vs Antibiotics- Pt is feeling better after she is discharged home. plan  is to omit FOLFOX in future and plan for Moca Abraxane with Onpro. Will recheck labs to confirm her LFTs are downtrending. Plan to start her on gem- Abraxane if LFTS normalize. \par Her scans in the hospital showed- Metastatic pancreatic cancer involving the retroperitoneal lymph nodes and visualized bilateral lungs. Stable disease compared to 10/1/2020. \par Small volume ascites, new since 10/1/2020. \par \par Drug sheet was provided Risks/ benefits and alternatives of chemotherapy were discussed and included but not limited to fatigue,low blood counts, nausea/vomiting, skin reactions, hair thinning, possible blood transfusion requirement,increased risk of infection,neuropathy. Patient agreed to above and informed consent was signed.\par \par 10/27/2020: Pt is here to start a new chemo regimen with Moca/Abraxane with onpro. Irinotecan was discontinued in August due to severe fatigue, hypotension resistant to fluid challenge and bradycardia. Later patient developed septic shock post FOLFOX chemo and is hence discontinued. LFTs are normal with AST 38, ALT 31, ALP elevated 170, normal T. Rah 0.2mg/dl. Pt c/o mild fatigue and does not have any other complains. Denies any F/C, N/V,D/P, change in appetite or weight loss. \par Discussed the chemo regimen with Moca/ Abraxane- answered all questions and concerns.\par \par 11/9//20; Ms Desouza is seen as follow up for C# 2. She complains of fatigue which is more prominent this cycle- She denies fevers. She does complain of nausea after chemo . She had some Constipation that was resolved with Miralax. Her appetite is diminished  and sleep is disturbed- Unable to sleep through the night. Will order Remeron 15 mg Q HS for appetite and sleep.\par \par 11/23/20; Ms Desouza is seen as follow up for C#3. She feels well. She feels this chemo is more tolerable. She has fatigue that lasts for 1-2 days after chemo and the week she is off chemo - she feels well. She does experience some nausea  and constipation occasionally that is relived by anti nausea meds and MiraLAX. She denies  neuropathy.\par Denies pain or discomfort. Filled out the paperwork for FMLA- for son Davina. she notices darkening of her toes -on the right foot - The first second and fourth digit noted.\par \par 12/8//20; Ms Desouza is seen as follow up - In the interim she was hospitalized at [facility] on 11/23/20 - for Acute MI and had a cardiac stent placed. She is scheduled to see Dr Toussaint for follow up next week. She is now on Brilanta/ Metoprolol/ Atorvastatin.\par She feels very tired and feels wiped out - She denies N/V/D/ Constipation. Her abdomen is distended- She has some lower back pain  which is new - Will plan to schedule a CT scan A/P/C prior to the next chemotherapy- Will arrange for a TEB visit with Dr Paul after completion of scans- Her f/u appt with Dr Toussaint is on 12/18/20.\par She complains of feeling dizzy at times- Does not have a BP machine at home- \par \par 12/15/20:  Ms Desouza is seen as TEB viist to discuss the scans results. She had the restaging scans on 12/14 that showed interval regression in hepatic mass , mediastinal and retroperitoneal lymph nodes. Interval stability in size and number of pulm metastases. Overall 39% regression of tumor based on RECIST . She has a follow up with cardiologist following week. \par \par \par  [de-identified] : adenocarcinoma [de-identified] : 12/15/20:  Ms Desouza is seen for follow up .In the interim she was seen by Cardiology - Dr Toussaint- \par She endorses she has episodes of light headed ness- Today her BP was in low 90's . She states she feels fatigued from chemotherapy - We discussed that we dose reduced the chemo this cycle- She also mentioned that she has been having some shortness of breath even at rest- Emailed Cardiology Dr Toussaint and think Brillanta can cause SOB- Plan is to switch Brillanta to Plavix- \par \par \par  [FreeTextEntry3] : Hypotension, bradycardia  [FreeTextEntry4] : 6/17/20 [FreeTextEntry5] : IRIN [FreeTextEntry2] : x 4 daYS [FreeTextEntry8] : Sepsis, transaminitis  [de-identified] : OXaliplatin

## 2020-12-21 NOTE — PHYSICAL EXAM
[de-identified] : Ocaasional cramps, constipation relieved.  [de-identified] : darkening of nail beds on the right foot- !st / 2nd and 4th toe noted.

## 2020-12-26 NOTE — PATIENT PROFILE ADULT - NSPROPTRIGHTSUPPORTPHONE_GEN_A_NUR
ED Resident Physician Note      Patient : Zulma Taylor Age: 20 year old Sex: female   MRN: [id number] Encounter Date: 12/26/2020      History     Chief Complaint   Patient presents with   • Mouth/Lip Problem       HPI: 20 year old female with PMHx Strep throat and peritonsillar abscess 4/2020, COVID+ 9/2020 who presents with sore throat x1 week with worsening throat and L ear pain x1 day.  Patient also reports productive cough of white to gray sputum, congestion.  Recently came home from college, denies known sick contacts or COVID exposures.  Has been taking ibuprofen.  Denies fever, chills, nausea, vomiting, chest pain, chest pressure, shortness of breath, difficulty breathing, abdominal pain, decreased appetite, diarrhea, muscle aches, hemoptysis, ear drainage, mastoid pain, changes in vision.      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    No family history on file.    Social History     Tobacco Use   • Smoking status: Never Smoker   • Smokeless tobacco: Never Used   Substance Use Topics   • Alcohol use: Not Currently   • Drug use: Not on file       No Known Allergies    Review of Systems:  10 point ROS reviewed and otherwise negative apart from what is mentioned in HPI      Physical Exam     ED Triage Vitals [12/26/20 1022]   ED Triage Vitals Group      Temp 99.1 °F (37.3 °C)      Heart Rate 79      Resp 18      BP (!) 140/90      SpO2 94 %      EtCO2 mmHg       Height       Weight 141 lb 1.5 oz (64 kg)      Weight Scale Used Standing scale      BMI (Calculated)       IBW/kg (Calculated)        Physical Exam  Constitutional:       General: She is not in acute distress.     Appearance: Normal appearance.   HENT:      Head: Normocephalic and atraumatic.      Right Ear: Ear canal and external ear normal.      Left Ear: Ear canal and external ear normal.      Ears:      Comments: Dull cone of light on left. No mastoid  tenderness bilaterally     Nose: Congestion present.      Comments: No frontal or maxillary sinus tenderness     Mouth/Throat:      Mouth: Mucous membranes are moist.      Pharynx: Posterior oropharyngeal erythema present.      Comments: Tonsils enlarged, erythematous bilaterally. Uvula midline  Eyes:      General: No scleral icterus.     Extraocular Movements: Extraocular movements intact.   Neck:      Musculoskeletal: Muscular tenderness present.   Cardiovascular:      Rate and Rhythm: Normal rate and regular rhythm.      Heart sounds: Normal heart sounds. No murmur.   Pulmonary:      Effort: Pulmonary effort is normal. No respiratory distress.      Breath sounds: Normal breath sounds. No wheezing, rhonchi or rales.   Abdominal:      General: Abdomen is flat. Bowel sounds are normal. There is no distension.      Palpations: Abdomen is soft.      Tenderness: There is no abdominal tenderness. There is no guarding.      Comments: No palpable splenomegaly   Musculoskeletal:      Right lower leg: No edema.      Left lower leg: No edema.   Lymphadenopathy:      Cervical: Cervical adenopathy present.   Skin:     General: Skin is warm and dry.   Neurological:      General: No focal deficit present.      Mental Status: She is alert and oriented to person, place, and time.   Psychiatric:         Mood and Affect: Mood normal.         Behavior: Behavior normal.         Thought Content: Thought content normal.         Judgment: Judgment normal.          ED Course     EKG: No results found for this or any previous visit (from the past 4464 hour(s)).       Lab Results     Results for orders placed or performed during the hospital encounter of 12/26/20   Streptococcus group A PCR    Specimen: Throat; Swab   Result Value Ref Range    STREPTOCOCCUS GROUP A PCR Not Detected Not Detected         Radiology Results     Imaging Results    None         MDM: Patient presents with sore throat, tonsillar swelling, L ear pain. Will obtain  rapid strep and monospot.  Due to patient testing COVID positive ~90 days ago, do not feel the need to retest, especially with patient's predominant symptoms atypical for COVID infection.    Rapid strep negative.  Monospot pending, will take >24hr to result.  Will discharge home with 10d Augmentin given clinical suspicion for Strep pharyngitis and instructions to follow up with PCP as needed.      Clinical Impression     Acute pharyngitis      Disposition        Discharge 12/26/2020  2:50 PM  Zulma Taylor discharge to home/self care.        Nazanin Paz DO   PGY-1  12/26/2020        Nazanin Paz,   Resident  12/26/20 6234     [id number] and [phone]

## 2021-01-01 ENCOUNTER — LABORATORY RESULT (OUTPATIENT)
Age: 71
End: 2021-01-01

## 2021-01-01 ENCOUNTER — EMERGENCY (EMERGENCY)
Facility: HOSPITAL | Age: 71
LOS: 1 days | Discharge: HOME | End: 2021-01-01
Attending: EMERGENCY MEDICINE
Payer: COMMERCIAL

## 2021-01-01 ENCOUNTER — NON-APPOINTMENT (OUTPATIENT)
Age: 71
End: 2021-01-01

## 2021-01-01 ENCOUNTER — APPOINTMENT (OUTPATIENT)
Dept: INFUSION THERAPY | Facility: HOSPITAL | Age: 71
End: 2021-01-01

## 2021-01-01 ENCOUNTER — APPOINTMENT (OUTPATIENT)
Dept: HEMATOLOGY ONCOLOGY | Facility: CLINIC | Age: 71
End: 2021-01-01
Payer: MEDICARE

## 2021-01-01 ENCOUNTER — RESULT REVIEW (OUTPATIENT)
Age: 71
End: 2021-01-01

## 2021-01-01 ENCOUNTER — TRANSCRIPTION ENCOUNTER (OUTPATIENT)
Age: 71
End: 2021-01-01

## 2021-01-01 ENCOUNTER — OUTPATIENT (OUTPATIENT)
Dept: OUTPATIENT SERVICES | Facility: HOSPITAL | Age: 71
LOS: 1 days | End: 2021-01-01
Payer: MEDICARE

## 2021-01-01 ENCOUNTER — APPOINTMENT (OUTPATIENT)
Dept: OTOLARYNGOLOGY | Facility: CLINIC | Age: 71
End: 2021-01-01
Payer: MEDICARE

## 2021-01-01 ENCOUNTER — APPOINTMENT (OUTPATIENT)
Dept: CARDIOLOGY | Facility: CLINIC | Age: 71
End: 2021-01-01
Payer: MEDICARE

## 2021-01-01 ENCOUNTER — OUTPATIENT (OUTPATIENT)
Dept: OUTPATIENT SERVICES | Facility: HOSPITAL | Age: 71
LOS: 1 days | End: 2021-01-01

## 2021-01-01 ENCOUNTER — FORM ENCOUNTER (OUTPATIENT)
Age: 71
End: 2021-01-01

## 2021-01-01 ENCOUNTER — APPOINTMENT (OUTPATIENT)
Dept: CARDIOLOGY | Facility: CLINIC | Age: 71
End: 2021-01-01

## 2021-01-01 ENCOUNTER — APPOINTMENT (OUTPATIENT)
Dept: DERMATOLOGY | Facility: CLINIC | Age: 71
End: 2021-01-01
Payer: MEDICARE

## 2021-01-01 ENCOUNTER — OUTPATIENT (OUTPATIENT)
Dept: OUTPATIENT SERVICES | Facility: HOSPITAL | Age: 71
LOS: 1 days | Discharge: HOME | End: 2021-01-01

## 2021-01-01 ENCOUNTER — APPOINTMENT (OUTPATIENT)
Dept: INTERNAL MEDICINE | Facility: CLINIC | Age: 71
End: 2021-01-01
Payer: MEDICARE

## 2021-01-01 ENCOUNTER — OUTPATIENT (OUTPATIENT)
Dept: OUTPATIENT SERVICES | Facility: HOSPITAL | Age: 71
LOS: 1 days | End: 2021-01-01
Payer: COMMERCIAL

## 2021-01-01 ENCOUNTER — APPOINTMENT (OUTPATIENT)
Dept: CT IMAGING | Facility: IMAGING CENTER | Age: 71
End: 2021-01-01
Payer: MEDICARE

## 2021-01-01 ENCOUNTER — APPOINTMENT (OUTPATIENT)
Dept: HEMATOLOGY ONCOLOGY | Facility: CLINIC | Age: 71
End: 2021-01-01

## 2021-01-01 ENCOUNTER — APPOINTMENT (OUTPATIENT)
Dept: CV DIAGNOSITCS | Facility: HOSPITAL | Age: 71
End: 2021-01-01
Payer: MEDICARE

## 2021-01-01 ENCOUNTER — INPATIENT (INPATIENT)
Facility: HOSPITAL | Age: 71
LOS: 3 days | Discharge: HOSPICE | End: 2021-07-16
Attending: INTERNAL MEDICINE | Admitting: INTERNAL MEDICINE
Payer: MEDICARE

## 2021-01-01 ENCOUNTER — INPATIENT (INPATIENT)
Facility: HOSPITAL | Age: 71
LOS: 2 days | Discharge: HOME | End: 2021-01-10
Attending: STUDENT IN AN ORGANIZED HEALTH CARE EDUCATION/TRAINING PROGRAM | Admitting: STUDENT IN AN ORGANIZED HEALTH CARE EDUCATION/TRAINING PROGRAM
Payer: MEDICARE

## 2021-01-01 ENCOUNTER — APPOINTMENT (OUTPATIENT)
Dept: DERMATOLOGY | Facility: CLINIC | Age: 71
End: 2021-01-01

## 2021-01-01 ENCOUNTER — APPOINTMENT (OUTPATIENT)
Dept: CV DIAGNOSITCS | Facility: HOSPITAL | Age: 71
End: 2021-01-01

## 2021-01-01 ENCOUNTER — INPATIENT (INPATIENT)
Facility: HOSPITAL | Age: 71
LOS: 5 days | DRG: 951 | End: 2021-07-29
Attending: FAMILY MEDICINE | Admitting: FAMILY MEDICINE
Payer: OTHER MISCELLANEOUS

## 2021-01-01 ENCOUNTER — APPOINTMENT (OUTPATIENT)
Age: 71
End: 2021-01-01
Payer: MEDICARE

## 2021-01-01 ENCOUNTER — INPATIENT (INPATIENT)
Facility: HOSPITAL | Age: 71
LOS: 2 days | Discharge: HOME | End: 2021-07-03
Attending: STUDENT IN AN ORGANIZED HEALTH CARE EDUCATION/TRAINING PROGRAM | Admitting: STUDENT IN AN ORGANIZED HEALTH CARE EDUCATION/TRAINING PROGRAM
Payer: MEDICARE

## 2021-01-01 ENCOUNTER — RX RENEWAL (OUTPATIENT)
Age: 71
End: 2021-01-01

## 2021-01-01 ENCOUNTER — APPOINTMENT (OUTPATIENT)
Dept: PULMONOLOGY | Facility: CLINIC | Age: 71
End: 2021-01-01
Payer: MEDICARE

## 2021-01-01 VITALS
DIASTOLIC BLOOD PRESSURE: 66 MMHG | WEIGHT: 128.97 LBS | BODY MASS INDEX: 22.02 KG/M2 | HEART RATE: 75 BPM | HEIGHT: 64 IN | SYSTOLIC BLOOD PRESSURE: 111 MMHG | TEMPERATURE: 97.4 F | RESPIRATION RATE: 16 BRPM | OXYGEN SATURATION: 98 %

## 2021-01-01 VITALS
HEART RATE: 74 BPM | BODY MASS INDEX: 22.96 KG/M2 | DIASTOLIC BLOOD PRESSURE: 66 MMHG | OXYGEN SATURATION: 97 % | SYSTOLIC BLOOD PRESSURE: 107 MMHG | WEIGHT: 134.48 LBS | RESPIRATION RATE: 16 BRPM | TEMPERATURE: 97.2 F

## 2021-01-01 VITALS
HEART RATE: 68 BPM | HEIGHT: 65 IN | DIASTOLIC BLOOD PRESSURE: 81 MMHG | TEMPERATURE: 97.8 F | BODY MASS INDEX: 22.99 KG/M2 | WEIGHT: 138 LBS | SYSTOLIC BLOOD PRESSURE: 185 MMHG | OXYGEN SATURATION: 96 %

## 2021-01-01 VITALS
OXYGEN SATURATION: 99 % | BODY MASS INDEX: 23.46 KG/M2 | WEIGHT: 136.69 LBS | SYSTOLIC BLOOD PRESSURE: 148 MMHG | HEART RATE: 60 BPM | RESPIRATION RATE: 16 BRPM | DIASTOLIC BLOOD PRESSURE: 75 MMHG | TEMPERATURE: 98 F

## 2021-01-01 VITALS
DIASTOLIC BLOOD PRESSURE: 75 MMHG | OXYGEN SATURATION: 99 % | HEIGHT: 64.25 IN | HEART RATE: 65 BPM | WEIGHT: 131.15 LBS | SYSTOLIC BLOOD PRESSURE: 115 MMHG | DIASTOLIC BLOOD PRESSURE: 76 MMHG | SYSTOLIC BLOOD PRESSURE: 168 MMHG | BODY MASS INDEX: 22.39 KG/M2 | HEIGHT: 65 IN | HEART RATE: 88 BPM | RESPIRATION RATE: 16 BRPM | OXYGEN SATURATION: 99 % | TEMPERATURE: 97 F | RESPIRATION RATE: 16 BRPM

## 2021-01-01 VITALS
HEART RATE: 71 BPM | TEMPERATURE: 98 F | RESPIRATION RATE: 16 BRPM | OXYGEN SATURATION: 99 % | SYSTOLIC BLOOD PRESSURE: 104 MMHG | DIASTOLIC BLOOD PRESSURE: 62 MMHG

## 2021-01-01 VITALS
WEIGHT: 132.03 LBS | TEMPERATURE: 97.1 F | HEART RATE: 79 BPM | BODY MASS INDEX: 22.67 KG/M2 | SYSTOLIC BLOOD PRESSURE: 114 MMHG | DIASTOLIC BLOOD PRESSURE: 73 MMHG | OXYGEN SATURATION: 96 % | RESPIRATION RATE: 16 BRPM

## 2021-01-01 VITALS
SYSTOLIC BLOOD PRESSURE: 110 MMHG | BODY MASS INDEX: 23.34 KG/M2 | RESPIRATION RATE: 16 BRPM | DIASTOLIC BLOOD PRESSURE: 67 MMHG | HEIGHT: 64 IN | OXYGEN SATURATION: 98 % | HEART RATE: 79 BPM | TEMPERATURE: 97.5 F | WEIGHT: 136.68 LBS

## 2021-01-01 VITALS
SYSTOLIC BLOOD PRESSURE: 99 MMHG | TEMPERATURE: 98 F | RESPIRATION RATE: 14 BRPM | HEART RATE: 80 BPM | WEIGHT: 131.18 LBS | DIASTOLIC BLOOD PRESSURE: 65 MMHG | BODY MASS INDEX: 22.34 KG/M2 | OXYGEN SATURATION: 99 %

## 2021-01-01 VITALS
HEART RATE: 78 BPM | HEIGHT: 64.92 IN | DIASTOLIC BLOOD PRESSURE: 77 MMHG | TEMPERATURE: 97.7 F | OXYGEN SATURATION: 100 % | SYSTOLIC BLOOD PRESSURE: 136 MMHG | RESPIRATION RATE: 16 BRPM | BODY MASS INDEX: 23.03 KG/M2 | WEIGHT: 138.23 LBS

## 2021-01-01 VITALS
BODY MASS INDEX: 23.9 KG/M2 | HEART RATE: 67 BPM | DIASTOLIC BLOOD PRESSURE: 80 MMHG | WEIGHT: 140 LBS | HEIGHT: 64 IN | TEMPERATURE: 97.2 F | SYSTOLIC BLOOD PRESSURE: 127 MMHG | OXYGEN SATURATION: 99 %

## 2021-01-01 VITALS
SYSTOLIC BLOOD PRESSURE: 112 MMHG | HEART RATE: 61 BPM | RESPIRATION RATE: 18 BRPM | TEMPERATURE: 98 F | OXYGEN SATURATION: 100 % | DIASTOLIC BLOOD PRESSURE: 95 MMHG

## 2021-01-01 VITALS
HEART RATE: 82 BPM | RESPIRATION RATE: 14 BRPM | OXYGEN SATURATION: 98 % | TEMPERATURE: 98.2 F | HEIGHT: 64.25 IN | SYSTOLIC BLOOD PRESSURE: 114 MMHG | BODY MASS INDEX: 22.58 KG/M2 | DIASTOLIC BLOOD PRESSURE: 73 MMHG | WEIGHT: 132.28 LBS

## 2021-01-01 VITALS
SYSTOLIC BLOOD PRESSURE: 110 MMHG | DIASTOLIC BLOOD PRESSURE: 70 MMHG | BODY MASS INDEX: 22.2 KG/M2 | WEIGHT: 130 LBS | TEMPERATURE: 98 F | HEIGHT: 64.25 IN

## 2021-01-01 VITALS
DIASTOLIC BLOOD PRESSURE: 52 MMHG | TEMPERATURE: 98 F | RESPIRATION RATE: 18 BRPM | HEART RATE: 72 BPM | SYSTOLIC BLOOD PRESSURE: 108 MMHG | OXYGEN SATURATION: 100 %

## 2021-01-01 VITALS
RESPIRATION RATE: 16 BRPM | SYSTOLIC BLOOD PRESSURE: 105 MMHG | OXYGEN SATURATION: 99 % | WEIGHT: 128.97 LBS | DIASTOLIC BLOOD PRESSURE: 67 MMHG | TEMPERATURE: 98.8 F | BODY MASS INDEX: 22.02 KG/M2 | HEART RATE: 81 BPM | HEIGHT: 64 IN

## 2021-01-01 VITALS — WEIGHT: 130 LBS | BODY MASS INDEX: 22.2 KG/M2 | HEIGHT: 64 IN

## 2021-01-01 VITALS
HEART RATE: 73 BPM | DIASTOLIC BLOOD PRESSURE: 48 MMHG | HEIGHT: 64 IN | OXYGEN SATURATION: 99 % | TEMPERATURE: 98 F | SYSTOLIC BLOOD PRESSURE: 99 MMHG | RESPIRATION RATE: 19 BRPM

## 2021-01-01 VITALS
OXYGEN SATURATION: 98 % | DIASTOLIC BLOOD PRESSURE: 78 MMHG | SYSTOLIC BLOOD PRESSURE: 149 MMHG | HEIGHT: 64 IN | TEMPERATURE: 98 F | HEART RATE: 63 BPM | RESPIRATION RATE: 16 BRPM

## 2021-01-01 VITALS
WEIGHT: 133.38 LBS | DIASTOLIC BLOOD PRESSURE: 62 MMHG | RESPIRATION RATE: 17 BRPM | HEART RATE: 69 BPM | BODY MASS INDEX: 22.77 KG/M2 | HEIGHT: 63.98 IN | TEMPERATURE: 97.9 F | SYSTOLIC BLOOD PRESSURE: 111 MMHG | OXYGEN SATURATION: 100 %

## 2021-01-01 VITALS
RESPIRATION RATE: 18 BRPM | HEIGHT: 64 IN | HEART RATE: 96 BPM | DIASTOLIC BLOOD PRESSURE: 70 MMHG | SYSTOLIC BLOOD PRESSURE: 110 MMHG | OXYGEN SATURATION: 100 %

## 2021-01-01 VITALS
DIASTOLIC BLOOD PRESSURE: 40 MMHG | HEART RATE: 110 BPM | RESPIRATION RATE: 21 BRPM | OXYGEN SATURATION: 75 % | SYSTOLIC BLOOD PRESSURE: 75 MMHG | TEMPERATURE: 100 F

## 2021-01-01 VITALS
HEART RATE: 69 BPM | DIASTOLIC BLOOD PRESSURE: 60 MMHG | SYSTOLIC BLOOD PRESSURE: 167 MMHG | TEMPERATURE: 99 F | RESPIRATION RATE: 16 BRPM | OXYGEN SATURATION: 100 %

## 2021-01-01 VITALS
BODY MASS INDEX: 23.07 KG/M2 | TEMPERATURE: 97 F | DIASTOLIC BLOOD PRESSURE: 75 MMHG | OXYGEN SATURATION: 99 % | WEIGHT: 135.14 LBS | HEIGHT: 64.17 IN | HEART RATE: 78 BPM | SYSTOLIC BLOOD PRESSURE: 121 MMHG | RESPIRATION RATE: 17 BRPM

## 2021-01-01 VITALS
TEMPERATURE: 97 F | DIASTOLIC BLOOD PRESSURE: 60 MMHG | BODY MASS INDEX: 22.88 KG/M2 | HEIGHT: 64 IN | SYSTOLIC BLOOD PRESSURE: 102 MMHG | WEIGHT: 134 LBS

## 2021-01-01 VITALS — SYSTOLIC BLOOD PRESSURE: 180 MMHG | DIASTOLIC BLOOD PRESSURE: 82 MMHG

## 2021-01-01 VITALS
HEIGHT: 64 IN | HEART RATE: 66 BPM | RESPIRATION RATE: 18 BRPM | SYSTOLIC BLOOD PRESSURE: 175 MMHG | OXYGEN SATURATION: 100 % | TEMPERATURE: 98 F | DIASTOLIC BLOOD PRESSURE: 80 MMHG

## 2021-01-01 VITALS
DIASTOLIC BLOOD PRESSURE: 69 MMHG | HEIGHT: 64.06 IN | TEMPERATURE: 97.3 F | SYSTOLIC BLOOD PRESSURE: 117 MMHG | RESPIRATION RATE: 17 BRPM | WEIGHT: 130.07 LBS | BODY MASS INDEX: 22.21 KG/M2 | OXYGEN SATURATION: 99 % | HEART RATE: 80 BPM

## 2021-01-01 VITALS
RESPIRATION RATE: 16 BRPM | SYSTOLIC BLOOD PRESSURE: 101 MMHG | HEART RATE: 94 BPM | DIASTOLIC BLOOD PRESSURE: 56 MMHG | HEIGHT: 64 IN | OXYGEN SATURATION: 99 % | TEMPERATURE: 99 F

## 2021-01-01 DIAGNOSIS — R06.02 SHORTNESS OF BREATH: ICD-10-CM

## 2021-01-01 DIAGNOSIS — C25.9 MALIGNANT NEOPLASM OF PANCREAS, UNSPECIFIED: ICD-10-CM

## 2021-01-01 DIAGNOSIS — Z92.29 PERSONAL HISTORY OF OTHER DRUG THERAPY: ICD-10-CM

## 2021-01-01 DIAGNOSIS — R09.89 OTHER SPECIFIED SYMPTOMS AND SIGNS INVOLVING THE CIRCULATORY AND RESPIRATORY SYSTEMS: ICD-10-CM

## 2021-01-01 DIAGNOSIS — C80.1 MALIGNANT (PRIMARY) NEOPLASM, UNSPECIFIED: ICD-10-CM

## 2021-01-01 DIAGNOSIS — Z51.89 ENCOUNTER FOR OTHER SPECIFIED AFTERCARE: ICD-10-CM

## 2021-01-01 DIAGNOSIS — Z95.828 PRESENCE OF OTHER VASCULAR IMPLANTS AND GRAFTS: Chronic | ICD-10-CM

## 2021-01-01 DIAGNOSIS — Z90.710 ACQUIRED ABSENCE OF BOTH CERVIX AND UTERUS: Chronic | ICD-10-CM

## 2021-01-01 DIAGNOSIS — T45.1X5A NAUSEA WITH VOMITING, UNSPECIFIED: ICD-10-CM

## 2021-01-01 DIAGNOSIS — I21.9 ACUTE MYOCARDIAL INFARCTION, UNSPECIFIED: ICD-10-CM

## 2021-01-01 DIAGNOSIS — Z79.899 OTHER LONG TERM (CURRENT) DRUG THERAPY: ICD-10-CM

## 2021-01-01 DIAGNOSIS — A41.9 SEPSIS, UNSPECIFIED ORGANISM: ICD-10-CM

## 2021-01-01 DIAGNOSIS — R21 RASH AND OTHER NONSPECIFIC SKIN ERUPTION: ICD-10-CM

## 2021-01-01 DIAGNOSIS — R53.83 OTHER FATIGUE: ICD-10-CM

## 2021-01-01 DIAGNOSIS — E78.5 HYPERLIPIDEMIA, UNSPECIFIED: ICD-10-CM

## 2021-01-01 DIAGNOSIS — D75.89 OTHER SPECIFIED DISEASES OF BLOOD AND BLOOD-FORMING ORGANS: ICD-10-CM

## 2021-01-01 DIAGNOSIS — G62.0 DRUG-INDUCED POLYNEUROPATHY: ICD-10-CM

## 2021-01-01 DIAGNOSIS — L82.1 OTHER SEBORRHEIC KERATOSIS: ICD-10-CM

## 2021-01-01 DIAGNOSIS — L70.8 OTHER ACNE: ICD-10-CM

## 2021-01-01 DIAGNOSIS — M25.562 PAIN IN RIGHT KNEE: ICD-10-CM

## 2021-01-01 DIAGNOSIS — Z01.818 ENCOUNTER FOR OTHER PREPROCEDURAL EXAMINATION: ICD-10-CM

## 2021-01-01 DIAGNOSIS — Z79.899 ENCOUNTER FOR THERAPEUTIC DRUG LVL MONITORING: ICD-10-CM

## 2021-01-01 DIAGNOSIS — Z51.11 ENCOUNTER FOR ANTINEOPLASTIC CHEMOTHERAPY: ICD-10-CM

## 2021-01-01 DIAGNOSIS — K21.00 GASTRO-ESOPHAGEAL REFLUX DISEASE WITH ESOPHAGITIS, WITHOUT BLEEDING: ICD-10-CM

## 2021-01-01 DIAGNOSIS — R11.2 NAUSEA WITH VOMITING, UNSPECIFIED: ICD-10-CM

## 2021-01-01 DIAGNOSIS — C78.00 SECONDARY MALIGNANT NEOPLASM OF UNSPECIFIED LUNG: ICD-10-CM

## 2021-01-01 DIAGNOSIS — N30.90 CYSTITIS, UNSPECIFIED WITHOUT HEMATURIA: ICD-10-CM

## 2021-01-01 DIAGNOSIS — Z51.81 ENCOUNTER FOR THERAPEUTIC DRUG LVL MONITORING: ICD-10-CM

## 2021-01-01 DIAGNOSIS — Z29.9 ENCOUNTER FOR PROPHYLACTIC MEASURES, UNSPECIFIED: ICD-10-CM

## 2021-01-01 DIAGNOSIS — I10 ESSENTIAL (PRIMARY) HYPERTENSION: ICD-10-CM

## 2021-01-01 DIAGNOSIS — M25.561 PAIN IN RIGHT KNEE: ICD-10-CM

## 2021-01-01 DIAGNOSIS — I50.9 HEART FAILURE, UNSPECIFIED: ICD-10-CM

## 2021-01-01 DIAGNOSIS — Z98.890 OTHER SPECIFIED POSTPROCEDURAL STATES: Chronic | ICD-10-CM

## 2021-01-01 DIAGNOSIS — D64.9 ANEMIA, UNSPECIFIED: ICD-10-CM

## 2021-01-01 DIAGNOSIS — R91.8 OTHER NONSPECIFIC ABNORMAL FINDING OF LUNG FIELD: ICD-10-CM

## 2021-01-01 DIAGNOSIS — R04.0 EPISTAXIS: ICD-10-CM

## 2021-01-01 DIAGNOSIS — R06.00 DYSPNEA, UNSPECIFIED: ICD-10-CM

## 2021-01-01 DIAGNOSIS — R00.2 PALPITATIONS: ICD-10-CM

## 2021-01-01 DIAGNOSIS — R50.9 FEVER, UNSPECIFIED: ICD-10-CM

## 2021-01-01 DIAGNOSIS — R80.9 PROTEINURIA, UNSPECIFIED: ICD-10-CM

## 2021-01-01 DIAGNOSIS — R73.03 PREDIABETES.: ICD-10-CM

## 2021-01-01 DIAGNOSIS — E78.49 OTHER HYPERLIPIDEMIA: ICD-10-CM

## 2021-01-01 DIAGNOSIS — L60.8 OTHER NAIL DISORDERS: ICD-10-CM

## 2021-01-01 DIAGNOSIS — G89.3 NEOPLASM RELATED PAIN (ACUTE) (CHRONIC): ICD-10-CM

## 2021-01-01 DIAGNOSIS — R80.0 ISOLATED PROTEINURIA: ICD-10-CM

## 2021-01-01 DIAGNOSIS — Z71.89 OTHER SPECIFIED COUNSELING: ICD-10-CM

## 2021-01-01 DIAGNOSIS — H01.009 UNSPECIFIED BLEPHARITIS UNSPECIFIED EYE, UNSPECIFIED EYELID: ICD-10-CM

## 2021-01-01 DIAGNOSIS — Z00.8 ENCOUNTER FOR OTHER GENERAL EXAMINATION: ICD-10-CM

## 2021-01-01 DIAGNOSIS — I25.10 ATHEROSCLEROTIC HEART DISEASE OF NATIVE CORONARY ARTERY WITHOUT ANGINA PECTORIS: ICD-10-CM

## 2021-01-01 DIAGNOSIS — G47.00 INSOMNIA, UNSPECIFIED: ICD-10-CM

## 2021-01-01 DIAGNOSIS — R19.7 DIARRHEA, UNSPECIFIED: ICD-10-CM

## 2021-01-01 DIAGNOSIS — D22.9 MELANOCYTIC NEVI, UNSPECIFIED: ICD-10-CM

## 2021-01-01 DIAGNOSIS — T45.1X5A DRUG-INDUCED POLYNEUROPATHY: ICD-10-CM

## 2021-01-01 DIAGNOSIS — R42 DIZZINESS AND GIDDINESS: ICD-10-CM

## 2021-01-01 DIAGNOSIS — A04.8 OTHER SPECIFIED BACTERIAL INTESTINAL INFECTIONS: ICD-10-CM

## 2021-01-01 DIAGNOSIS — I25.10 ATHEROSCLEROTIC HEART DISEASE OF NATIVE CORONARY ARTERY W/OUT ANGINA PECTORIS: ICD-10-CM

## 2021-01-01 DIAGNOSIS — Z51.5 ENCOUNTER FOR PALLIATIVE CARE: ICD-10-CM

## 2021-01-01 DIAGNOSIS — R07.89 OTHER CHEST PAIN: ICD-10-CM

## 2021-01-01 DIAGNOSIS — I95.9 HYPOTENSION, UNSPECIFIED: ICD-10-CM

## 2021-01-01 DIAGNOSIS — R05 COUGH: ICD-10-CM

## 2021-01-01 DIAGNOSIS — R00.1 BRADYCARDIA, UNSPECIFIED: ICD-10-CM

## 2021-01-01 DIAGNOSIS — Z12.83 ENCOUNTER FOR SCREENING FOR MALIGNANT NEOPLASM OF SKIN: ICD-10-CM

## 2021-01-01 DIAGNOSIS — R07.9 CHEST PAIN, UNSPECIFIED: ICD-10-CM

## 2021-01-01 DIAGNOSIS — F41.9 ANXIETY DISORDER, UNSPECIFIED: ICD-10-CM

## 2021-01-01 DIAGNOSIS — H00.019 HORDEOLUM EXTERNUM UNSPECIFIED EYE, UNSPECIFIED EYELID: ICD-10-CM

## 2021-01-01 LAB
-  AMIKACIN: SIGNIFICANT CHANGE UP
-  AMOXICILLIN/CLAVULANIC ACID: SIGNIFICANT CHANGE UP
-  AMPICILLIN/SULBACTAM: SIGNIFICANT CHANGE UP
-  AMPICILLIN: SIGNIFICANT CHANGE UP
-  AZTREONAM: SIGNIFICANT CHANGE UP
-  CEFAZOLIN: SIGNIFICANT CHANGE UP
-  CEFEPIME: SIGNIFICANT CHANGE UP
-  CEFOXITIN: SIGNIFICANT CHANGE UP
-  CEFTRIAXONE: SIGNIFICANT CHANGE UP
-  CIPROFLOXACIN: SIGNIFICANT CHANGE UP
-  ERTAPENEM: SIGNIFICANT CHANGE UP
-  GENTAMICIN: SIGNIFICANT CHANGE UP
-  IMIPENEM: SIGNIFICANT CHANGE UP
-  LEVOFLOXACIN: SIGNIFICANT CHANGE UP
-  MEROPENEM: SIGNIFICANT CHANGE UP
-  NITROFURANTOIN: SIGNIFICANT CHANGE UP
-  PIPERACILLIN/TAZOBACTAM: SIGNIFICANT CHANGE UP
-  TIGECYCLINE: SIGNIFICANT CHANGE UP
-  TOBRAMYCIN: SIGNIFICANT CHANGE UP
-  TRIMETHOPRIM/SULFAMETHOXAZOLE: SIGNIFICANT CHANGE UP
24R-OH-CALCIDIOL SERPL-MCNC: 19.1 NG/ML — LOW (ref 30–80)
A1C WITH ESTIMATED AVERAGE GLUCOSE RESULT: 4.4 % — SIGNIFICANT CHANGE UP (ref 4–5.6)
A1C WITH ESTIMATED AVERAGE GLUCOSE RESULT: 4.6 % — SIGNIFICANT CHANGE UP (ref 4–5.6)
ALBUMIN SERPL ELPH-MCNC: 2.6 G/DL — LOW (ref 3.3–5)
ALBUMIN SERPL ELPH-MCNC: 2.6 G/DL — LOW (ref 3.5–5)
ALBUMIN SERPL ELPH-MCNC: 2.8 G/DL — LOW (ref 3.3–5)
ALBUMIN SERPL ELPH-MCNC: 3.1 G/DL — LOW (ref 3.3–5)
ALBUMIN SERPL ELPH-MCNC: 3.1 G/DL — LOW (ref 3.3–5)
ALBUMIN SERPL ELPH-MCNC: 3.2 G/DL
ALBUMIN SERPL ELPH-MCNC: 3.2 G/DL — LOW (ref 3.3–5)
ALBUMIN SERPL ELPH-MCNC: 3.2 G/DL — LOW (ref 3.3–5)
ALBUMIN SERPL ELPH-MCNC: 3.3 G/DL — SIGNIFICANT CHANGE UP (ref 3.3–5)
ALBUMIN SERPL ELPH-MCNC: 3.3 G/DL — SIGNIFICANT CHANGE UP (ref 3.3–5)
ALBUMIN SERPL ELPH-MCNC: 3.5 G/DL — SIGNIFICANT CHANGE UP (ref 3.3–5)
ALBUMIN SERPL ELPH-MCNC: 3.6 G/DL — SIGNIFICANT CHANGE UP (ref 3.3–5)
ALBUMIN SERPL ELPH-MCNC: 3.8 G/DL
ALBUMIN SERPL ELPH-MCNC: 3.8 G/DL — SIGNIFICANT CHANGE UP (ref 3.3–5)
ALBUMIN SERPL ELPH-MCNC: 3.9 G/DL — SIGNIFICANT CHANGE UP (ref 3.3–5)
ALP BLD-CCNC: 113 U/L
ALP BLD-CCNC: 116 U/L
ALP SERPL-CCNC: 101 U/L — SIGNIFICANT CHANGE UP (ref 40–120)
ALP SERPL-CCNC: 108 U/L — SIGNIFICANT CHANGE UP (ref 40–120)
ALP SERPL-CCNC: 109 U/L — SIGNIFICANT CHANGE UP (ref 40–120)
ALP SERPL-CCNC: 109 U/L — SIGNIFICANT CHANGE UP (ref 40–120)
ALP SERPL-CCNC: 110 U/L — SIGNIFICANT CHANGE UP (ref 40–120)
ALP SERPL-CCNC: 111 U/L — SIGNIFICANT CHANGE UP (ref 40–120)
ALP SERPL-CCNC: 113 U/L — SIGNIFICANT CHANGE UP (ref 40–120)
ALP SERPL-CCNC: 124 U/L — HIGH (ref 40–120)
ALP SERPL-CCNC: 142 U/L — HIGH (ref 40–120)
ALP SERPL-CCNC: 163 U/L — HIGH (ref 40–120)
ALP SERPL-CCNC: 191 U/L — HIGH (ref 40–120)
ALP SERPL-CCNC: 211 U/L — HIGH (ref 40–120)
ALP SERPL-CCNC: 212 U/L — HIGH (ref 40–120)
ALT FLD-CCNC: 13 U/L — SIGNIFICANT CHANGE UP (ref 4–33)
ALT FLD-CCNC: 13 U/L — SIGNIFICANT CHANGE UP (ref 4–33)
ALT FLD-CCNC: 16 U/L — SIGNIFICANT CHANGE UP (ref 4–33)
ALT FLD-CCNC: 18 U/L — SIGNIFICANT CHANGE UP (ref 4–33)
ALT FLD-CCNC: 20 U/L — SIGNIFICANT CHANGE UP (ref 4–33)
ALT FLD-CCNC: 21 U/L — SIGNIFICANT CHANGE UP (ref 4–33)
ALT FLD-CCNC: 23 U/L — SIGNIFICANT CHANGE UP (ref 10–45)
ALT FLD-CCNC: 24 U/L DA — SIGNIFICANT CHANGE UP (ref 10–60)
ALT FLD-CCNC: 24 U/L — SIGNIFICANT CHANGE UP (ref 4–33)
ALT FLD-CCNC: 30 U/L — SIGNIFICANT CHANGE UP (ref 4–33)
ALT FLD-CCNC: 35 U/L — HIGH (ref 4–33)
ALT FLD-CCNC: 45 U/L — HIGH (ref 4–33)
ALT FLD-CCNC: 53 U/L — HIGH (ref 4–33)
ALT SERPL-CCNC: 16 U/L
ALT SERPL-CCNC: 21 U/L
ANION GAP SERPL CALC-SCNC: 10 MMOL/L
ANION GAP SERPL CALC-SCNC: 10 MMOL/L — SIGNIFICANT CHANGE UP (ref 5–17)
ANION GAP SERPL CALC-SCNC: 10 MMOL/L — SIGNIFICANT CHANGE UP (ref 7–14)
ANION GAP SERPL CALC-SCNC: 11 MMOL/L — SIGNIFICANT CHANGE UP (ref 7–14)
ANION GAP SERPL CALC-SCNC: 12 MMOL/L
ANION GAP SERPL CALC-SCNC: 12 MMOL/L — SIGNIFICANT CHANGE UP (ref 7–14)
ANION GAP SERPL CALC-SCNC: 13 MMOL/L — SIGNIFICANT CHANGE UP (ref 7–14)
ANION GAP SERPL CALC-SCNC: 13 MMOL/L — SIGNIFICANT CHANGE UP (ref 7–14)
ANION GAP SERPL CALC-SCNC: 14 MMOL/L — SIGNIFICANT CHANGE UP (ref 7–14)
ANION GAP SERPL CALC-SCNC: 7 MMOL/L — SIGNIFICANT CHANGE UP (ref 7–14)
ANION GAP SERPL CALC-SCNC: 7 MMOL/L — SIGNIFICANT CHANGE UP (ref 7–14)
ANION GAP SERPL CALC-SCNC: 8 MMOL/L — SIGNIFICANT CHANGE UP (ref 5–17)
APPEARANCE UR: CLEAR — SIGNIFICANT CHANGE UP
APPEARANCE UR: CLEAR — SIGNIFICANT CHANGE UP
APTT BLD: 30.2 SEC — SIGNIFICANT CHANGE UP (ref 27.5–35.5)
APTT BLD: 30.6 SEC — SIGNIFICANT CHANGE UP (ref 27–36.3)
APTT BLD: 33 SEC — SIGNIFICANT CHANGE UP (ref 27–36.3)
APTT BLD: 34.2 SEC — SIGNIFICANT CHANGE UP (ref 27–36.3)
APTT BLD: 35.3 SEC — SIGNIFICANT CHANGE UP (ref 27–36.3)
AST SERPL-CCNC: 25 U/L
AST SERPL-CCNC: 28 U/L — SIGNIFICANT CHANGE UP (ref 10–40)
AST SERPL-CCNC: 29 U/L — SIGNIFICANT CHANGE UP (ref 4–32)
AST SERPL-CCNC: 35 U/L — HIGH (ref 4–32)
AST SERPL-CCNC: 37 U/L — HIGH (ref 4–32)
AST SERPL-CCNC: 38 U/L
AST SERPL-CCNC: 38 U/L — HIGH (ref 4–32)
AST SERPL-CCNC: 38 U/L — HIGH (ref 4–32)
AST SERPL-CCNC: 38 U/L — SIGNIFICANT CHANGE UP (ref 10–40)
AST SERPL-CCNC: 39 U/L — HIGH (ref 4–32)
AST SERPL-CCNC: 39 U/L — HIGH (ref 4–32)
AST SERPL-CCNC: 44 U/L — HIGH (ref 4–32)
AST SERPL-CCNC: 47 U/L — HIGH (ref 4–32)
AST SERPL-CCNC: 48 U/L — HIGH (ref 4–32)
AST SERPL-CCNC: 79 U/L — HIGH (ref 4–32)
BACTERIA # UR AUTO: ABNORMAL
BASE EXCESS BLDV CALC-SCNC: -5.1 MMOL/L — LOW (ref -3–2)
BASOPHILS # BLD AUTO: 0 K/UL — SIGNIFICANT CHANGE UP (ref 0–0.2)
BASOPHILS # BLD AUTO: 0.02 K/UL — SIGNIFICANT CHANGE UP (ref 0–0.2)
BASOPHILS # BLD AUTO: 0.03 K/UL — SIGNIFICANT CHANGE UP (ref 0–0.2)
BASOPHILS # BLD AUTO: 0.04 K/UL — SIGNIFICANT CHANGE UP (ref 0–0.2)
BASOPHILS # BLD AUTO: 0.04 K/UL — SIGNIFICANT CHANGE UP (ref 0–0.2)
BASOPHILS # BLD AUTO: 0.05 K/UL — SIGNIFICANT CHANGE UP (ref 0–0.2)
BASOPHILS # BLD AUTO: 0.05 K/UL — SIGNIFICANT CHANGE UP (ref 0–0.2)
BASOPHILS # BLD AUTO: 0.07 K/UL — SIGNIFICANT CHANGE UP (ref 0–0.2)
BASOPHILS # BLD AUTO: 0.11 K/UL — SIGNIFICANT CHANGE UP (ref 0–0.2)
BASOPHILS # BLD AUTO: 0.14 K/UL — SIGNIFICANT CHANGE UP (ref 0–0.2)
BASOPHILS # BLD AUTO: 0.33 K/UL — HIGH (ref 0–0.2)
BASOPHILS NFR BLD AUTO: 0 % — SIGNIFICANT CHANGE UP (ref 0–2)
BASOPHILS NFR BLD AUTO: 0.1 % — SIGNIFICANT CHANGE UP (ref 0–2)
BASOPHILS NFR BLD AUTO: 0.1 % — SIGNIFICANT CHANGE UP (ref 0–2)
BASOPHILS NFR BLD AUTO: 0.2 % — SIGNIFICANT CHANGE UP (ref 0–2)
BASOPHILS NFR BLD AUTO: 0.3 % — SIGNIFICANT CHANGE UP (ref 0–2)
BASOPHILS NFR BLD AUTO: 0.4 % — SIGNIFICANT CHANGE UP (ref 0–2)
BASOPHILS NFR BLD AUTO: 0.5 % — SIGNIFICANT CHANGE UP (ref 0–2)
BASOPHILS NFR BLD AUTO: 0.6 % — SIGNIFICANT CHANGE UP (ref 0–2)
BASOPHILS NFR BLD AUTO: 0.6 % — SIGNIFICANT CHANGE UP (ref 0–2)
BASOPHILS NFR BLD AUTO: 0.7 % — SIGNIFICANT CHANGE UP (ref 0–2)
BASOPHILS NFR BLD AUTO: 0.7 % — SIGNIFICANT CHANGE UP (ref 0–2)
BASOPHILS NFR BLD AUTO: 0.9 % — SIGNIFICANT CHANGE UP (ref 0–2)
BASOPHILS NFR BLD AUTO: 1 % — SIGNIFICANT CHANGE UP (ref 0–2)
BILIRUB SERPL-MCNC: 0.2 MG/DL
BILIRUB SERPL-MCNC: 0.2 MG/DL — SIGNIFICANT CHANGE UP (ref 0.2–1.2)
BILIRUB SERPL-MCNC: 0.2 MG/DL — SIGNIFICANT CHANGE UP (ref 0.2–1.2)
BILIRUB SERPL-MCNC: 0.4 MG/DL — SIGNIFICANT CHANGE UP (ref 0.2–1.2)
BILIRUB SERPL-MCNC: 0.5 MG/DL — SIGNIFICANT CHANGE UP (ref 0.2–1.2)
BILIRUB SERPL-MCNC: 0.5 MG/DL — SIGNIFICANT CHANGE UP (ref 0.2–1.2)
BILIRUB SERPL-MCNC: 0.9 MG/DL — SIGNIFICANT CHANGE UP (ref 0.2–1.2)
BILIRUB SERPL-MCNC: 1 MG/DL — SIGNIFICANT CHANGE UP (ref 0.2–1.2)
BILIRUB SERPL-MCNC: 1.1 MG/DL — SIGNIFICANT CHANGE UP (ref 0.2–1.2)
BILIRUB SERPL-MCNC: 1.2 MG/DL
BILIRUB SERPL-MCNC: 1.2 MG/DL — SIGNIFICANT CHANGE UP (ref 0.2–1.2)
BILIRUB SERPL-MCNC: 1.2 MG/DL — SIGNIFICANT CHANGE UP (ref 0.2–1.2)
BILIRUB SERPL-MCNC: 1.4 MG/DL — HIGH (ref 0.2–1.2)
BILIRUB SERPL-MCNC: 1.6 MG/DL — HIGH (ref 0.2–1.2)
BILIRUB SERPL-MCNC: 1.6 MG/DL — HIGH (ref 0.2–1.2)
BILIRUB UR-MCNC: NEGATIVE — SIGNIFICANT CHANGE UP
BILIRUB UR-MCNC: NEGATIVE — SIGNIFICANT CHANGE UP
BLD GP AB SCN SERPL QL: NEGATIVE — SIGNIFICANT CHANGE UP
BLOOD GAS VENOUS - CREATININE: 1.2 MG/DL — SIGNIFICANT CHANGE UP (ref 0.5–1.3)
BLOOD GAS VENOUS COMPREHENSIVE RESULT: SIGNIFICANT CHANGE UP
BUN SERPL-MCNC: 11 MG/DL — SIGNIFICANT CHANGE UP (ref 7–23)
BUN SERPL-MCNC: 12 MG/DL
BUN SERPL-MCNC: 12 MG/DL — SIGNIFICANT CHANGE UP (ref 7–23)
BUN SERPL-MCNC: 13 MG/DL — SIGNIFICANT CHANGE UP (ref 7–23)
BUN SERPL-MCNC: 17 MG/DL — SIGNIFICANT CHANGE UP (ref 7–23)
BUN SERPL-MCNC: 18 MG/DL — SIGNIFICANT CHANGE UP (ref 7–23)
BUN SERPL-MCNC: 18 MG/DL — SIGNIFICANT CHANGE UP (ref 7–23)
BUN SERPL-MCNC: 21 MG/DL
BUN SERPL-MCNC: 23 MG/DL — SIGNIFICANT CHANGE UP (ref 7–23)
BUN SERPL-MCNC: 23 MG/DL — SIGNIFICANT CHANGE UP (ref 7–23)
BUN SERPL-MCNC: 25 MG/DL — HIGH (ref 7–23)
BUN SERPL-MCNC: 25 MG/DL — HIGH (ref 7–23)
BUN SERPL-MCNC: 26 MG/DL — HIGH (ref 7–23)
BUN SERPL-MCNC: 30 MG/DL — HIGH (ref 7–23)
BUN SERPL-MCNC: 31 MG/DL — HIGH (ref 7–23)
BUN SERPL-MCNC: 43 MG/DL — HIGH (ref 7–18)
BUN SERPL-MCNC: 8 MG/DL — SIGNIFICANT CHANGE UP (ref 7–23)
CALCIUM SERPL-MCNC: 7.8 MG/DL — LOW (ref 8.4–10.5)
CALCIUM SERPL-MCNC: 7.9 MG/DL — LOW (ref 8.4–10.5)
CALCIUM SERPL-MCNC: 8 MG/DL — LOW (ref 8.4–10.5)
CALCIUM SERPL-MCNC: 8.1 MG/DL
CALCIUM SERPL-MCNC: 8.1 MG/DL — LOW (ref 8.4–10.5)
CALCIUM SERPL-MCNC: 8.2 MG/DL — LOW (ref 8.4–10.5)
CALCIUM SERPL-MCNC: 8.3 MG/DL — LOW (ref 8.4–10.5)
CALCIUM SERPL-MCNC: 8.4 MG/DL — SIGNIFICANT CHANGE UP (ref 8.4–10.5)
CALCIUM SERPL-MCNC: 8.5 MG/DL — SIGNIFICANT CHANGE UP (ref 8.4–10.5)
CALCIUM SERPL-MCNC: 8.6 MG/DL — SIGNIFICANT CHANGE UP (ref 8.4–10.5)
CALCIUM SERPL-MCNC: 8.7 MG/DL
CALCIUM SERPL-MCNC: 8.7 MG/DL — SIGNIFICANT CHANGE UP (ref 8.4–10.5)
CALCIUM SERPL-MCNC: 9.1 MG/DL — SIGNIFICANT CHANGE UP (ref 8.4–10.5)
CHLORIDE BLDV-SCNC: 111 MMOL/L — HIGH (ref 96–108)
CHLORIDE SERPL-SCNC: 100 MMOL/L
CHLORIDE SERPL-SCNC: 100 MMOL/L — SIGNIFICANT CHANGE UP (ref 98–107)
CHLORIDE SERPL-SCNC: 102 MMOL/L — SIGNIFICANT CHANGE UP (ref 98–107)
CHLORIDE SERPL-SCNC: 103 MMOL/L — SIGNIFICANT CHANGE UP (ref 96–108)
CHLORIDE SERPL-SCNC: 104 MMOL/L — SIGNIFICANT CHANGE UP (ref 98–107)
CHLORIDE SERPL-SCNC: 105 MMOL/L — SIGNIFICANT CHANGE UP (ref 96–108)
CHLORIDE SERPL-SCNC: 105 MMOL/L — SIGNIFICANT CHANGE UP (ref 98–107)
CHLORIDE SERPL-SCNC: 105 MMOL/L — SIGNIFICANT CHANGE UP (ref 98–107)
CHLORIDE SERPL-SCNC: 106 MMOL/L — SIGNIFICANT CHANGE UP (ref 98–107)
CHLORIDE SERPL-SCNC: 107 MMOL/L — SIGNIFICANT CHANGE UP (ref 98–107)
CHLORIDE SERPL-SCNC: 108 MMOL/L — HIGH (ref 98–107)
CHLORIDE SERPL-SCNC: 108 MMOL/L — HIGH (ref 98–107)
CHLORIDE SERPL-SCNC: 109 MMOL/L — HIGH (ref 98–107)
CHLORIDE SERPL-SCNC: 110 MMOL/L
CHOLEST SERPL-MCNC: 105 MG/DL — SIGNIFICANT CHANGE UP
CHOLEST SERPL-MCNC: 117 MG/DL — SIGNIFICANT CHANGE UP
CK MB BLD-MCNC: 2.1 % — SIGNIFICANT CHANGE UP (ref 0–2.5)
CK MB BLD-MCNC: 3.5 % — HIGH (ref 0–2.5)
CK MB CFR SERPL CALC: 1.1 NG/ML — SIGNIFICANT CHANGE UP
CK MB CFR SERPL CALC: 1.7 NG/ML — SIGNIFICANT CHANGE UP
CK SERPL-CCNC: 48 U/L — SIGNIFICANT CHANGE UP (ref 25–170)
CK SERPL-CCNC: 53 U/L — SIGNIFICANT CHANGE UP (ref 25–170)
CO2 SERPL-SCNC: 16 MMOL/L — LOW (ref 22–31)
CO2 SERPL-SCNC: 17 MMOL/L
CO2 SERPL-SCNC: 17 MMOL/L — LOW (ref 22–31)
CO2 SERPL-SCNC: 18 MMOL/L — LOW (ref 22–31)
CO2 SERPL-SCNC: 19 MMOL/L — LOW (ref 22–31)
CO2 SERPL-SCNC: 19 MMOL/L — LOW (ref 22–31)
CO2 SERPL-SCNC: 22 MMOL/L — SIGNIFICANT CHANGE UP (ref 22–31)
CO2 SERPL-SCNC: 23 MMOL/L — SIGNIFICANT CHANGE UP (ref 22–31)
CO2 SERPL-SCNC: 23 MMOL/L — SIGNIFICANT CHANGE UP (ref 22–31)
CO2 SERPL-SCNC: 24 MMOL/L
CO2 SERPL-SCNC: 24 MMOL/L — SIGNIFICANT CHANGE UP (ref 22–31)
CO2 SERPL-SCNC: 26 MMOL/L — SIGNIFICANT CHANGE UP (ref 22–31)
CO2 SERPL-SCNC: 28 MMOL/L — SIGNIFICANT CHANGE UP (ref 22–31)
COLOR SPEC: COLORLESS — SIGNIFICANT CHANGE UP
COLOR SPEC: YELLOW — SIGNIFICANT CHANGE UP
COVID-19 SPIKE DOMAIN AB INTERP: POSITIVE
COVID-19 SPIKE DOMAIN ANTIBODY RESULT: 225 U/ML — HIGH
COVID-19 SPIKE DOMAIN ANTIBODY RESULT: >250 U/ML — HIGH
COVID-19 SPIKE DOMAIN ANTIBODY RESULT: >250 U/ML — HIGH
CREAT SERPL-MCNC: 0.43 MG/DL — LOW (ref 0.5–1.3)
CREAT SERPL-MCNC: 0.44 MG/DL — LOW (ref 0.5–1.3)
CREAT SERPL-MCNC: 0.44 MG/DL — LOW (ref 0.5–1.3)
CREAT SERPL-MCNC: 0.52 MG/DL — SIGNIFICANT CHANGE UP (ref 0.5–1.3)
CREAT SERPL-MCNC: 0.61 MG/DL
CREAT SERPL-MCNC: 0.62 MG/DL — SIGNIFICANT CHANGE UP (ref 0.5–1.3)
CREAT SERPL-MCNC: 0.68 MG/DL — SIGNIFICANT CHANGE UP (ref 0.5–1.3)
CREAT SERPL-MCNC: 0.75 MG/DL — SIGNIFICANT CHANGE UP (ref 0.5–1.3)
CREAT SERPL-MCNC: 0.75 MG/DL — SIGNIFICANT CHANGE UP (ref 0.5–1.3)
CREAT SERPL-MCNC: 0.81 MG/DL — SIGNIFICANT CHANGE UP (ref 0.5–1.3)
CREAT SERPL-MCNC: 0.82 MG/DL — SIGNIFICANT CHANGE UP (ref 0.5–1.3)
CREAT SERPL-MCNC: 1.01 MG/DL
CREAT SERPL-MCNC: 1.04 MG/DL — SIGNIFICANT CHANGE UP (ref 0.5–1.3)
CREAT SERPL-MCNC: 1.12 MG/DL — SIGNIFICANT CHANGE UP (ref 0.5–1.3)
CREAT SERPL-MCNC: 1.13 MG/DL — SIGNIFICANT CHANGE UP (ref 0.5–1.3)
CREAT SERPL-MCNC: 1.23 MG/DL — SIGNIFICANT CHANGE UP (ref 0.5–1.3)
CREAT SERPL-MCNC: 1.25 MG/DL — SIGNIFICANT CHANGE UP (ref 0.5–1.3)
CULTURE RESULTS: SIGNIFICANT CHANGE UP
DIFF PNL FLD: ABNORMAL
DIFF PNL FLD: ABNORMAL
EOSINOPHIL # BLD AUTO: 0 K/UL — SIGNIFICANT CHANGE UP (ref 0–0.5)
EOSINOPHIL # BLD AUTO: 0.01 K/UL — SIGNIFICANT CHANGE UP (ref 0–0.5)
EOSINOPHIL # BLD AUTO: 0.02 K/UL — SIGNIFICANT CHANGE UP (ref 0–0.5)
EOSINOPHIL # BLD AUTO: 0.03 K/UL — SIGNIFICANT CHANGE UP (ref 0–0.5)
EOSINOPHIL # BLD AUTO: 0.04 K/UL — SIGNIFICANT CHANGE UP (ref 0–0.5)
EOSINOPHIL # BLD AUTO: 0.05 K/UL — SIGNIFICANT CHANGE UP (ref 0–0.5)
EOSINOPHIL # BLD AUTO: 0.06 K/UL — SIGNIFICANT CHANGE UP (ref 0–0.5)
EOSINOPHIL # BLD AUTO: 0.07 K/UL — SIGNIFICANT CHANGE UP (ref 0–0.5)
EOSINOPHIL NFR BLD AUTO: 0 % — SIGNIFICANT CHANGE UP (ref 0–6)
EOSINOPHIL NFR BLD AUTO: 0.1 % — SIGNIFICANT CHANGE UP (ref 0–6)
EOSINOPHIL NFR BLD AUTO: 0.2 % — SIGNIFICANT CHANGE UP (ref 0–6)
EOSINOPHIL NFR BLD AUTO: 0.3 % — SIGNIFICANT CHANGE UP (ref 0–6)
EOSINOPHIL NFR BLD AUTO: 0.3 % — SIGNIFICANT CHANGE UP (ref 0–6)
EOSINOPHIL NFR BLD AUTO: 0.4 % — SIGNIFICANT CHANGE UP (ref 0–6)
EOSINOPHIL NFR BLD AUTO: 0.4 % — SIGNIFICANT CHANGE UP (ref 0–6)
EOSINOPHIL NFR BLD AUTO: 0.5 % — SIGNIFICANT CHANGE UP (ref 0–6)
EOSINOPHIL NFR BLD AUTO: 0.6 % — SIGNIFICANT CHANGE UP (ref 0–6)
EOSINOPHIL NFR BLD AUTO: 0.6 % — SIGNIFICANT CHANGE UP (ref 0–6)
EOSINOPHIL NFR BLD AUTO: 0.7 % — SIGNIFICANT CHANGE UP (ref 0–6)
EOSINOPHIL NFR BLD AUTO: 0.8 % — SIGNIFICANT CHANGE UP (ref 0–6)
EOSINOPHIL NFR BLD AUTO: 0.8 % — SIGNIFICANT CHANGE UP (ref 0–6)
EOSINOPHIL NFR BLD AUTO: 1 % — SIGNIFICANT CHANGE UP (ref 0–6)
EOSINOPHIL NFR BLD AUTO: 1.1 % — SIGNIFICANT CHANGE UP (ref 0–6)
EPI CELLS # UR: 2 /HPF — SIGNIFICANT CHANGE UP (ref 0–5)
ESTIMATED AVERAGE GLUCOSE: 80 — SIGNIFICANT CHANGE UP
ESTIMATED AVERAGE GLUCOSE: 85 — SIGNIFICANT CHANGE UP
FERRITIN SERPL-MCNC: 523 NG/ML — HIGH (ref 15–150)
FERRITIN SERPL-MCNC: 538 NG/ML — HIGH (ref 15–150)
GAS PNL BLDV: 136 MMOL/L — SIGNIFICANT CHANGE UP (ref 136–146)
GLUCOSE BLDV-MCNC: 80 MG/DL — SIGNIFICANT CHANGE UP (ref 70–99)
GLUCOSE SERPL-MCNC: 104 MG/DL — HIGH (ref 70–99)
GLUCOSE SERPL-MCNC: 108 MG/DL
GLUCOSE SERPL-MCNC: 120 MG/DL — HIGH (ref 70–99)
GLUCOSE SERPL-MCNC: 125 MG/DL
GLUCOSE SERPL-MCNC: 167 MG/DL — HIGH (ref 70–99)
GLUCOSE SERPL-MCNC: 72 MG/DL — SIGNIFICANT CHANGE UP (ref 70–99)
GLUCOSE SERPL-MCNC: 72 MG/DL — SIGNIFICANT CHANGE UP (ref 70–99)
GLUCOSE SERPL-MCNC: 75 MG/DL — SIGNIFICANT CHANGE UP (ref 70–99)
GLUCOSE SERPL-MCNC: 79 MG/DL — SIGNIFICANT CHANGE UP (ref 70–99)
GLUCOSE SERPL-MCNC: 80 MG/DL — SIGNIFICANT CHANGE UP (ref 70–99)
GLUCOSE SERPL-MCNC: 85 MG/DL — SIGNIFICANT CHANGE UP (ref 70–99)
GLUCOSE SERPL-MCNC: 86 MG/DL — SIGNIFICANT CHANGE UP (ref 70–99)
GLUCOSE SERPL-MCNC: 88 MG/DL — SIGNIFICANT CHANGE UP (ref 70–99)
GLUCOSE SERPL-MCNC: 89 MG/DL — SIGNIFICANT CHANGE UP (ref 70–99)
GLUCOSE SERPL-MCNC: 89 MG/DL — SIGNIFICANT CHANGE UP (ref 70–99)
GLUCOSE SERPL-MCNC: 96 MG/DL — SIGNIFICANT CHANGE UP (ref 70–99)
GLUCOSE SERPL-MCNC: 97 MG/DL — SIGNIFICANT CHANGE UP (ref 70–99)
GLUCOSE UR QL: NEGATIVE — SIGNIFICANT CHANGE UP
GLUCOSE UR QL: NEGATIVE — SIGNIFICANT CHANGE UP
HCO3 BLDV-SCNC: 19 MMOL/L — LOW (ref 20–27)
HCT VFR BLD CALC: 20.2 % — CRITICAL LOW (ref 34.5–45)
HCT VFR BLD CALC: 21.8 % — LOW (ref 34.5–45)
HCT VFR BLD CALC: 23.7 % — LOW (ref 34.5–45)
HCT VFR BLD CALC: 24.2 % — LOW (ref 34.5–45)
HCT VFR BLD CALC: 24.4 % — LOW (ref 34.5–45)
HCT VFR BLD CALC: 24.7 % — LOW (ref 34.5–45)
HCT VFR BLD CALC: 25.3 % — LOW (ref 34.5–45)
HCT VFR BLD CALC: 25.6 % — LOW (ref 34.5–45)
HCT VFR BLD CALC: 26.2 % — LOW (ref 34.5–45)
HCT VFR BLD CALC: 26.3 % — LOW (ref 34.5–45)
HCT VFR BLD CALC: 27 % — LOW (ref 34.5–45)
HCT VFR BLD CALC: 27.6 % — LOW (ref 34.5–45)
HCT VFR BLD CALC: 28 % — LOW (ref 34.5–45)
HCT VFR BLD CALC: 28.1 % — LOW (ref 34.5–45)
HCT VFR BLD CALC: 28.2 % — LOW (ref 34.5–45)
HCT VFR BLD CALC: 28.2 % — LOW (ref 34.5–45)
HCT VFR BLD CALC: 28.3 % — LOW (ref 34.5–45)
HCT VFR BLD CALC: 29.2 % — LOW (ref 34.5–45)
HCT VFR BLD CALC: 30.2 % — LOW (ref 34.5–45)
HCT VFR BLD CALC: 30.2 % — LOW (ref 34.5–45)
HCT VFR BLD CALC: 30.5 % — LOW (ref 34.5–45)
HCT VFR BLD CALC: 31.4 % — LOW (ref 34.5–45)
HCT VFR BLD CALC: 31.8 % — LOW (ref 34.5–45)
HCT VFR BLD CALC: 32.2 % — LOW (ref 34.5–45)
HCT VFR BLD CALC: 32.7 % — LOW (ref 34.5–45)
HCT VFR BLD CALC: 32.8 % — LOW (ref 34.5–45)
HCT VFR BLD CALC: 33.1 % — LOW (ref 34.5–45)
HCT VFR BLD CALC: 33.4 % — LOW (ref 34.5–45)
HCT VFR BLD CALC: 33.9 % — LOW (ref 34.5–45)
HCT VFR BLDA CALC: 27.2 % — LOW (ref 34.5–46.5)
HDLC SERPL-MCNC: 44 MG/DL — LOW
HDLC SERPL-MCNC: 47 MG/DL — LOW
HGB BLD CALC-MCNC: 8.7 G/DL — LOW (ref 11.5–15.5)
HGB BLD-MCNC: 10 G/DL — LOW (ref 11.5–15.5)
HGB BLD-MCNC: 10.2 G/DL — LOW (ref 11.5–15.5)
HGB BLD-MCNC: 10.2 G/DL — LOW (ref 11.5–15.5)
HGB BLD-MCNC: 10.3 G/DL — LOW (ref 11.5–15.5)
HGB BLD-MCNC: 10.3 G/DL — LOW (ref 11.5–15.5)
HGB BLD-MCNC: 10.4 G/DL — LOW (ref 11.5–15.5)
HGB BLD-MCNC: 10.6 G/DL — LOW (ref 11.5–15.5)
HGB BLD-MCNC: 11.1 G/DL — LOW (ref 11.5–15.5)
HGB BLD-MCNC: 6.4 G/DL — CRITICAL LOW (ref 11.5–15.5)
HGB BLD-MCNC: 7 G/DL — CRITICAL LOW (ref 11.5–15.5)
HGB BLD-MCNC: 7.5 G/DL — LOW (ref 11.5–15.5)
HGB BLD-MCNC: 7.8 G/DL — LOW (ref 11.5–15.5)
HGB BLD-MCNC: 7.9 G/DL — LOW (ref 11.5–15.5)
HGB BLD-MCNC: 8 G/DL — LOW (ref 11.5–15.5)
HGB BLD-MCNC: 8.1 G/DL — LOW (ref 11.5–15.5)
HGB BLD-MCNC: 8.3 G/DL — LOW (ref 11.5–15.5)
HGB BLD-MCNC: 8.4 G/DL — LOW (ref 11.5–15.5)
HGB BLD-MCNC: 8.4 G/DL — LOW (ref 11.5–15.5)
HGB BLD-MCNC: 8.5 G/DL — LOW (ref 11.5–15.5)
HGB BLD-MCNC: 8.8 G/DL — LOW (ref 11.5–15.5)
HGB BLD-MCNC: 8.9 G/DL — LOW (ref 11.5–15.5)
HGB BLD-MCNC: 9 G/DL — LOW (ref 11.5–15.5)
HGB BLD-MCNC: 9.2 G/DL — LOW (ref 11.5–15.5)
HGB BLD-MCNC: 9.3 G/DL — LOW (ref 11.5–15.5)
HGB BLD-MCNC: 9.6 G/DL — LOW (ref 11.5–15.5)
HGB BLD-MCNC: 9.6 G/DL — LOW (ref 11.5–15.5)
HGB BLD-MCNC: 9.9 G/DL — LOW (ref 11.5–15.5)
HYALINE CASTS # UR AUTO: 1 /LPF — SIGNIFICANT CHANGE UP (ref 0–7)
IANC: 11.55 K/UL — HIGH (ref 1.5–8.5)
IANC: 14.91 K/UL — HIGH (ref 1.5–8.5)
IANC: 17.36 K/UL — HIGH (ref 1.5–8.5)
IANC: 2.01 K/UL — SIGNIFICANT CHANGE UP (ref 1.5–8.5)
IANC: 2.23 K/UL — SIGNIFICANT CHANGE UP (ref 1.5–8.5)
IANC: 2.71 K/UL — SIGNIFICANT CHANGE UP (ref 1.5–8.5)
IANC: 2.86 K/UL — SIGNIFICANT CHANGE UP (ref 1.5–8.5)
IANC: 22.08 K/UL — HIGH (ref 1.5–8.5)
IANC: 3.29 K/UL — SIGNIFICANT CHANGE UP (ref 1.5–8.5)
IMM GRANULOCYTES NFR BLD AUTO: 0.2 % — SIGNIFICANT CHANGE UP (ref 0–1.5)
IMM GRANULOCYTES NFR BLD AUTO: 0.2 % — SIGNIFICANT CHANGE UP (ref 0–1.5)
IMM GRANULOCYTES NFR BLD AUTO: 0.3 % — SIGNIFICANT CHANGE UP (ref 0–1.5)
IMM GRANULOCYTES NFR BLD AUTO: 0.4 % — SIGNIFICANT CHANGE UP (ref 0–1.5)
IMM GRANULOCYTES NFR BLD AUTO: 0.5 % — SIGNIFICANT CHANGE UP (ref 0–1.5)
IMM GRANULOCYTES NFR BLD AUTO: 0.6 % — SIGNIFICANT CHANGE UP (ref 0–1.5)
IMM GRANULOCYTES NFR BLD AUTO: 0.7 % — SIGNIFICANT CHANGE UP (ref 0–1.5)
IMM GRANULOCYTES NFR BLD AUTO: 0.8 % — SIGNIFICANT CHANGE UP (ref 0–1.5)
IMM GRANULOCYTES NFR BLD AUTO: 1 % — SIGNIFICANT CHANGE UP (ref 0–1.5)
IMM GRANULOCYTES NFR BLD AUTO: 1.1 % — SIGNIFICANT CHANGE UP (ref 0–1.5)
IMM GRANULOCYTES NFR BLD AUTO: 1.5 % — SIGNIFICANT CHANGE UP (ref 0–1.5)
IMM GRANULOCYTES NFR BLD AUTO: 1.7 % — HIGH (ref 0–1.5)
IMM GRANULOCYTES NFR BLD AUTO: 2.2 % — HIGH (ref 0–1.5)
IMM GRANULOCYTES NFR BLD AUTO: 43.9 % — HIGH (ref 0–1.5)
INR BLD: 1.1 RATIO — SIGNIFICANT CHANGE UP (ref 0.88–1.16)
INR BLD: 1.1 RATIO — SIGNIFICANT CHANGE UP (ref 0.88–1.16)
INR BLD: 1.12 RATIO — SIGNIFICANT CHANGE UP (ref 0.88–1.16)
INR BLD: 1.15 RATIO — SIGNIFICANT CHANGE UP (ref 0.88–1.16)
INR BLD: 1.18 RATIO — HIGH (ref 0.88–1.16)
IRON SATN MFR SERPL: 17 % — SIGNIFICANT CHANGE UP (ref 14–50)
IRON SATN MFR SERPL: 23 % — SIGNIFICANT CHANGE UP (ref 14–50)
IRON SATN MFR SERPL: 44 UG/DL — SIGNIFICANT CHANGE UP (ref 30–160)
IRON SATN MFR SERPL: 59 UG/DL — SIGNIFICANT CHANGE UP (ref 30–160)
KETONES UR-MCNC: NEGATIVE — SIGNIFICANT CHANGE UP
KETONES UR-MCNC: NEGATIVE — SIGNIFICANT CHANGE UP
LACTATE BLDV-MCNC: 1 MMOL/L — SIGNIFICANT CHANGE UP (ref 0.5–2)
LEUKOCYTE ESTERASE UR-ACNC: ABNORMAL
LEUKOCYTE ESTERASE UR-ACNC: NEGATIVE — SIGNIFICANT CHANGE UP
LIPID PNL WITH DIRECT LDL SERPL: 39 MG/DL — SIGNIFICANT CHANGE UP
LIPID PNL WITH DIRECT LDL SERPL: 51 MG/DL — SIGNIFICANT CHANGE UP
LYMPHOCYTES # BLD AUTO: 0.99 K/UL — LOW (ref 1–3.3)
LYMPHOCYTES # BLD AUTO: 1.04 K/UL — SIGNIFICANT CHANGE UP (ref 1–3.3)
LYMPHOCYTES # BLD AUTO: 1.16 K/UL — SIGNIFICANT CHANGE UP (ref 1–3.3)
LYMPHOCYTES # BLD AUTO: 1.19 K/UL — SIGNIFICANT CHANGE UP (ref 1–3.3)
LYMPHOCYTES # BLD AUTO: 1.2 K/UL — SIGNIFICANT CHANGE UP (ref 1–3.3)
LYMPHOCYTES # BLD AUTO: 1.22 K/UL — SIGNIFICANT CHANGE UP (ref 1–3.3)
LYMPHOCYTES # BLD AUTO: 1.31 K/UL — SIGNIFICANT CHANGE UP (ref 1–3.3)
LYMPHOCYTES # BLD AUTO: 1.36 K/UL — SIGNIFICANT CHANGE UP (ref 1–3.3)
LYMPHOCYTES # BLD AUTO: 1.42 K/UL — SIGNIFICANT CHANGE UP (ref 1–3.3)
LYMPHOCYTES # BLD AUTO: 1.44 K/UL — SIGNIFICANT CHANGE UP (ref 1–3.3)
LYMPHOCYTES # BLD AUTO: 1.46 K/UL — SIGNIFICANT CHANGE UP (ref 1–3.3)
LYMPHOCYTES # BLD AUTO: 1.47 K/UL — SIGNIFICANT CHANGE UP (ref 1–3.3)
LYMPHOCYTES # BLD AUTO: 1.64 K/UL — SIGNIFICANT CHANGE UP (ref 1–3.3)
LYMPHOCYTES # BLD AUTO: 1.66 K/UL — SIGNIFICANT CHANGE UP (ref 1–3.3)
LYMPHOCYTES # BLD AUTO: 1.69 K/UL — SIGNIFICANT CHANGE UP (ref 1–3.3)
LYMPHOCYTES # BLD AUTO: 1.72 K/UL — SIGNIFICANT CHANGE UP (ref 1–3.3)
LYMPHOCYTES # BLD AUTO: 1.82 K/UL — SIGNIFICANT CHANGE UP (ref 1–3.3)
LYMPHOCYTES # BLD AUTO: 1.83 K/UL — SIGNIFICANT CHANGE UP (ref 1–3.3)
LYMPHOCYTES # BLD AUTO: 1.85 K/UL — SIGNIFICANT CHANGE UP (ref 1–3.3)
LYMPHOCYTES # BLD AUTO: 1.86 K/UL — SIGNIFICANT CHANGE UP (ref 1–3.3)
LYMPHOCYTES # BLD AUTO: 1.88 K/UL — SIGNIFICANT CHANGE UP (ref 1–3.3)
LYMPHOCYTES # BLD AUTO: 10.3 % — LOW (ref 13–44)
LYMPHOCYTES # BLD AUTO: 12.4 % — LOW (ref 13–44)
LYMPHOCYTES # BLD AUTO: 13.9 % — SIGNIFICANT CHANGE UP (ref 13–44)
LYMPHOCYTES # BLD AUTO: 15.4 % — SIGNIFICANT CHANGE UP (ref 13–44)
LYMPHOCYTES # BLD AUTO: 17.1 % — SIGNIFICANT CHANGE UP (ref 13–44)
LYMPHOCYTES # BLD AUTO: 18 % — SIGNIFICANT CHANGE UP (ref 13–44)
LYMPHOCYTES # BLD AUTO: 18.2 % — SIGNIFICANT CHANGE UP (ref 13–44)
LYMPHOCYTES # BLD AUTO: 18.2 % — SIGNIFICANT CHANGE UP (ref 13–44)
LYMPHOCYTES # BLD AUTO: 2.02 K/UL — SIGNIFICANT CHANGE UP (ref 1–3.3)
LYMPHOCYTES # BLD AUTO: 2.09 K/UL — SIGNIFICANT CHANGE UP (ref 1–3.3)
LYMPHOCYTES # BLD AUTO: 2.13 K/UL — SIGNIFICANT CHANGE UP (ref 1–3.3)
LYMPHOCYTES # BLD AUTO: 24 % — SIGNIFICANT CHANGE UP (ref 13–44)
LYMPHOCYTES # BLD AUTO: 25.8 % — SIGNIFICANT CHANGE UP (ref 13–44)
LYMPHOCYTES # BLD AUTO: 26.2 % — SIGNIFICANT CHANGE UP (ref 13–44)
LYMPHOCYTES # BLD AUTO: 27.4 % — SIGNIFICANT CHANGE UP (ref 13–44)
LYMPHOCYTES # BLD AUTO: 27.7 % — SIGNIFICANT CHANGE UP (ref 13–44)
LYMPHOCYTES # BLD AUTO: 27.9 % — SIGNIFICANT CHANGE UP (ref 13–44)
LYMPHOCYTES # BLD AUTO: 3.6 % — LOW (ref 13–44)
LYMPHOCYTES # BLD AUTO: 30 % — SIGNIFICANT CHANGE UP (ref 13–44)
LYMPHOCYTES # BLD AUTO: 31 % — SIGNIFICANT CHANGE UP (ref 13–44)
LYMPHOCYTES # BLD AUTO: 32 % — SIGNIFICANT CHANGE UP (ref 13–44)
LYMPHOCYTES # BLD AUTO: 34.6 % — SIGNIFICANT CHANGE UP (ref 13–44)
LYMPHOCYTES # BLD AUTO: 35.5 % — SIGNIFICANT CHANGE UP (ref 13–44)
LYMPHOCYTES # BLD AUTO: 36 % — SIGNIFICANT CHANGE UP (ref 13–44)
LYMPHOCYTES # BLD AUTO: 36.3 % — SIGNIFICANT CHANGE UP (ref 13–44)
LYMPHOCYTES # BLD AUTO: 6.7 % — LOW (ref 13–44)
LYMPHOCYTES # BLD AUTO: 6.7 % — LOW (ref 13–44)
LYMPHOCYTES # BLD AUTO: 8.3 % — LOW (ref 13–44)
LYMPHOCYTES # BLD AUTO: 8.3 % — LOW (ref 13–44)
MAGNESIUM SERPL-MCNC: 2 MG/DL — SIGNIFICANT CHANGE UP (ref 1.6–2.6)
MAGNESIUM SERPL-MCNC: 2.2 MG/DL — SIGNIFICANT CHANGE UP (ref 1.6–2.6)
MAGNESIUM SERPL-MCNC: 2.2 MG/DL — SIGNIFICANT CHANGE UP (ref 1.6–2.6)
MCHC RBC-ENTMCNC: 26.7 PG — LOW (ref 27–34)
MCHC RBC-ENTMCNC: 27 PG — SIGNIFICANT CHANGE UP (ref 27–34)
MCHC RBC-ENTMCNC: 27.1 PG — SIGNIFICANT CHANGE UP (ref 27–34)
MCHC RBC-ENTMCNC: 27.3 PG — SIGNIFICANT CHANGE UP (ref 27–34)
MCHC RBC-ENTMCNC: 27.5 PG — SIGNIFICANT CHANGE UP (ref 27–34)
MCHC RBC-ENTMCNC: 27.5 PG — SIGNIFICANT CHANGE UP (ref 27–34)
MCHC RBC-ENTMCNC: 28 PG — SIGNIFICANT CHANGE UP (ref 27–34)
MCHC RBC-ENTMCNC: 28.1 PG — SIGNIFICANT CHANGE UP (ref 27–34)
MCHC RBC-ENTMCNC: 28.3 PG — SIGNIFICANT CHANGE UP (ref 27–34)
MCHC RBC-ENTMCNC: 28.3 PG — SIGNIFICANT CHANGE UP (ref 27–34)
MCHC RBC-ENTMCNC: 28.4 PG — SIGNIFICANT CHANGE UP (ref 27–34)
MCHC RBC-ENTMCNC: 28.4 PG — SIGNIFICANT CHANGE UP (ref 27–34)
MCHC RBC-ENTMCNC: 28.6 PG — SIGNIFICANT CHANGE UP (ref 27–34)
MCHC RBC-ENTMCNC: 28.7 PG — SIGNIFICANT CHANGE UP (ref 27–34)
MCHC RBC-ENTMCNC: 28.7 PG — SIGNIFICANT CHANGE UP (ref 27–34)
MCHC RBC-ENTMCNC: 28.8 PG — SIGNIFICANT CHANGE UP (ref 27–34)
MCHC RBC-ENTMCNC: 28.8 PG — SIGNIFICANT CHANGE UP (ref 27–34)
MCHC RBC-ENTMCNC: 28.9 PG — SIGNIFICANT CHANGE UP (ref 27–34)
MCHC RBC-ENTMCNC: 29.1 PG — SIGNIFICANT CHANGE UP (ref 27–34)
MCHC RBC-ENTMCNC: 29.3 PG — SIGNIFICANT CHANGE UP (ref 27–34)
MCHC RBC-ENTMCNC: 29.4 PG — SIGNIFICANT CHANGE UP (ref 27–34)
MCHC RBC-ENTMCNC: 29.5 PG — SIGNIFICANT CHANGE UP (ref 27–34)
MCHC RBC-ENTMCNC: 29.7 PG — SIGNIFICANT CHANGE UP (ref 27–34)
MCHC RBC-ENTMCNC: 29.9 PG — SIGNIFICANT CHANGE UP (ref 27–34)
MCHC RBC-ENTMCNC: 30.4 GM/DL — LOW (ref 32–36)
MCHC RBC-ENTMCNC: 31.1 GM/DL — LOW (ref 32–36)
MCHC RBC-ENTMCNC: 31.1 GM/DL — LOW (ref 32–36)
MCHC RBC-ENTMCNC: 31.2 GM/DL — LOW (ref 32–36)
MCHC RBC-ENTMCNC: 31.5 GM/DL — LOW (ref 32–36)
MCHC RBC-ENTMCNC: 31.6 G/DL — LOW (ref 32–36)
MCHC RBC-ENTMCNC: 31.6 GM/DL — LOW (ref 32–36)
MCHC RBC-ENTMCNC: 31.6 GM/DL — LOW (ref 32–36)
MCHC RBC-ENTMCNC: 31.7 GM/DL — LOW (ref 32–36)
MCHC RBC-ENTMCNC: 31.8 G/DL — LOW (ref 32–36)
MCHC RBC-ENTMCNC: 31.8 GM/DL — LOW (ref 32–36)
MCHC RBC-ENTMCNC: 31.8 GM/DL — LOW (ref 32–36)
MCHC RBC-ENTMCNC: 31.9 G/DL — LOW (ref 32–36)
MCHC RBC-ENTMCNC: 31.9 G/DL — LOW (ref 32–36)
MCHC RBC-ENTMCNC: 32 G/DL — SIGNIFICANT CHANGE UP (ref 32–36)
MCHC RBC-ENTMCNC: 32 G/DL — SIGNIFICANT CHANGE UP (ref 32–36)
MCHC RBC-ENTMCNC: 32 GM/DL — SIGNIFICANT CHANGE UP (ref 32–36)
MCHC RBC-ENTMCNC: 32.1 G/DL — SIGNIFICANT CHANGE UP (ref 32–36)
MCHC RBC-ENTMCNC: 32.3 G/DL — SIGNIFICANT CHANGE UP (ref 32–36)
MCHC RBC-ENTMCNC: 32.4 G/DL — SIGNIFICANT CHANGE UP (ref 32–36)
MCHC RBC-ENTMCNC: 32.5 G/DL — SIGNIFICANT CHANGE UP (ref 32–36)
MCHC RBC-ENTMCNC: 32.8 GM/DL — SIGNIFICANT CHANGE UP (ref 32–36)
MCHC RBC-ENTMCNC: 33.2 G/DL — SIGNIFICANT CHANGE UP (ref 32–36)
MCHC RBC-ENTMCNC: 33.3 G/DL — SIGNIFICANT CHANGE UP (ref 32–36)
MCHC RBC-ENTMCNC: 33.5 G/DL — SIGNIFICANT CHANGE UP (ref 32–36)
MCV RBC AUTO: 81.2 FL — SIGNIFICANT CHANGE UP (ref 80–100)
MCV RBC AUTO: 83.9 FL — SIGNIFICANT CHANGE UP (ref 80–100)
MCV RBC AUTO: 84.8 FL — SIGNIFICANT CHANGE UP (ref 80–100)
MCV RBC AUTO: 85.1 FL — SIGNIFICANT CHANGE UP (ref 80–100)
MCV RBC AUTO: 85.5 FL — SIGNIFICANT CHANGE UP (ref 80–100)
MCV RBC AUTO: 85.9 FL — SIGNIFICANT CHANGE UP (ref 80–100)
MCV RBC AUTO: 86.8 FL — SIGNIFICANT CHANGE UP (ref 80–100)
MCV RBC AUTO: 88 FL — SIGNIFICANT CHANGE UP (ref 80–100)
MCV RBC AUTO: 88.8 FL — SIGNIFICANT CHANGE UP (ref 80–100)
MCV RBC AUTO: 89 FL — SIGNIFICANT CHANGE UP (ref 80–100)
MCV RBC AUTO: 89.2 FL — SIGNIFICANT CHANGE UP (ref 80–100)
MCV RBC AUTO: 89.2 FL — SIGNIFICANT CHANGE UP (ref 80–100)
MCV RBC AUTO: 89.3 FL — SIGNIFICANT CHANGE UP (ref 80–100)
MCV RBC AUTO: 89.5 FL — SIGNIFICANT CHANGE UP (ref 80–100)
MCV RBC AUTO: 89.7 FL — SIGNIFICANT CHANGE UP (ref 80–100)
MCV RBC AUTO: 90 FL — SIGNIFICANT CHANGE UP (ref 80–100)
MCV RBC AUTO: 90.1 FL — SIGNIFICANT CHANGE UP (ref 80–100)
MCV RBC AUTO: 90.5 FL — SIGNIFICANT CHANGE UP (ref 80–100)
MCV RBC AUTO: 90.7 FL — SIGNIFICANT CHANGE UP (ref 80–100)
MCV RBC AUTO: 90.7 FL — SIGNIFICANT CHANGE UP (ref 80–100)
MCV RBC AUTO: 90.8 FL — SIGNIFICANT CHANGE UP (ref 80–100)
MCV RBC AUTO: 91 FL — SIGNIFICANT CHANGE UP (ref 80–100)
MCV RBC AUTO: 91.1 FL — SIGNIFICANT CHANGE UP (ref 80–100)
MCV RBC AUTO: 91.5 FL — SIGNIFICANT CHANGE UP (ref 80–100)
MCV RBC AUTO: 92.1 FL — SIGNIFICANT CHANGE UP (ref 80–100)
MCV RBC AUTO: 92.7 FL — SIGNIFICANT CHANGE UP (ref 80–100)
MCV RBC AUTO: 93.1 FL — SIGNIFICANT CHANGE UP (ref 80–100)
MCV RBC AUTO: 93.4 FL — SIGNIFICANT CHANGE UP (ref 80–100)
MCV RBC AUTO: 93.5 FL — SIGNIFICANT CHANGE UP (ref 80–100)
METHOD TYPE: SIGNIFICANT CHANGE UP
MONOCYTES # BLD AUTO: 0.27 K/UL — SIGNIFICANT CHANGE UP (ref 0–0.9)
MONOCYTES # BLD AUTO: 0.36 K/UL — SIGNIFICANT CHANGE UP (ref 0–0.9)
MONOCYTES # BLD AUTO: 0.4 K/UL — SIGNIFICANT CHANGE UP (ref 0–0.9)
MONOCYTES # BLD AUTO: 0.46 K/UL — SIGNIFICANT CHANGE UP (ref 0–0.9)
MONOCYTES # BLD AUTO: 0.47 K/UL — SIGNIFICANT CHANGE UP (ref 0–0.9)
MONOCYTES # BLD AUTO: 0.47 K/UL — SIGNIFICANT CHANGE UP (ref 0–0.9)
MONOCYTES # BLD AUTO: 0.48 K/UL — SIGNIFICANT CHANGE UP (ref 0–0.9)
MONOCYTES # BLD AUTO: 0.5 K/UL — SIGNIFICANT CHANGE UP (ref 0–0.9)
MONOCYTES # BLD AUTO: 0.51 K/UL — SIGNIFICANT CHANGE UP (ref 0–0.9)
MONOCYTES # BLD AUTO: 0.51 K/UL — SIGNIFICANT CHANGE UP (ref 0–0.9)
MONOCYTES # BLD AUTO: 0.61 K/UL — SIGNIFICANT CHANGE UP (ref 0–0.9)
MONOCYTES # BLD AUTO: 0.63 K/UL — SIGNIFICANT CHANGE UP (ref 0–0.9)
MONOCYTES # BLD AUTO: 0.65 K/UL — SIGNIFICANT CHANGE UP (ref 0–0.9)
MONOCYTES # BLD AUTO: 0.72 K/UL — SIGNIFICANT CHANGE UP (ref 0–0.9)
MONOCYTES # BLD AUTO: 0.75 K/UL — SIGNIFICANT CHANGE UP (ref 0–0.9)
MONOCYTES # BLD AUTO: 0.78 K/UL — SIGNIFICANT CHANGE UP (ref 0–0.9)
MONOCYTES # BLD AUTO: 0.81 K/UL — SIGNIFICANT CHANGE UP (ref 0–0.9)
MONOCYTES # BLD AUTO: 0.83 K/UL — SIGNIFICANT CHANGE UP (ref 0–0.9)
MONOCYTES # BLD AUTO: 0.84 K/UL — SIGNIFICANT CHANGE UP (ref 0–0.9)
MONOCYTES # BLD AUTO: 0.84 K/UL — SIGNIFICANT CHANGE UP (ref 0–0.9)
MONOCYTES # BLD AUTO: 0.86 K/UL — SIGNIFICANT CHANGE UP (ref 0–0.9)
MONOCYTES # BLD AUTO: 0.86 K/UL — SIGNIFICANT CHANGE UP (ref 0–0.9)
MONOCYTES # BLD AUTO: 0.9 K/UL — SIGNIFICANT CHANGE UP (ref 0–0.9)
MONOCYTES # BLD AUTO: 0.93 K/UL — HIGH (ref 0–0.9)
MONOCYTES # BLD AUTO: 0.98 K/UL — HIGH (ref 0–0.9)
MONOCYTES # BLD AUTO: 1.3 K/UL — HIGH (ref 0–0.9)
MONOCYTES NFR BLD AUTO: 1.2 % — LOW (ref 2–14)
MONOCYTES NFR BLD AUTO: 10 % — SIGNIFICANT CHANGE UP (ref 2–14)
MONOCYTES NFR BLD AUTO: 10.1 % — SIGNIFICANT CHANGE UP (ref 2–14)
MONOCYTES NFR BLD AUTO: 10.9 % — SIGNIFICANT CHANGE UP (ref 2–14)
MONOCYTES NFR BLD AUTO: 11 % — SIGNIFICANT CHANGE UP (ref 2–14)
MONOCYTES NFR BLD AUTO: 11 % — SIGNIFICANT CHANGE UP (ref 2–14)
MONOCYTES NFR BLD AUTO: 12.4 % — SIGNIFICANT CHANGE UP (ref 2–14)
MONOCYTES NFR BLD AUTO: 12.6 % — SIGNIFICANT CHANGE UP (ref 2–14)
MONOCYTES NFR BLD AUTO: 12.8 % — SIGNIFICANT CHANGE UP (ref 2–14)
MONOCYTES NFR BLD AUTO: 13.8 % — SIGNIFICANT CHANGE UP (ref 2–14)
MONOCYTES NFR BLD AUTO: 16.1 % — HIGH (ref 2–14)
MONOCYTES NFR BLD AUTO: 2.7 % — SIGNIFICANT CHANGE UP (ref 2–14)
MONOCYTES NFR BLD AUTO: 4.5 % — SIGNIFICANT CHANGE UP (ref 2–14)
MONOCYTES NFR BLD AUTO: 4.9 % — SIGNIFICANT CHANGE UP (ref 2–14)
MONOCYTES NFR BLD AUTO: 4.9 % — SIGNIFICANT CHANGE UP (ref 2–14)
MONOCYTES NFR BLD AUTO: 5.6 % — SIGNIFICANT CHANGE UP (ref 2–14)
MONOCYTES NFR BLD AUTO: 6 % — SIGNIFICANT CHANGE UP (ref 2–14)
MONOCYTES NFR BLD AUTO: 6.6 % — SIGNIFICANT CHANGE UP (ref 2–14)
MONOCYTES NFR BLD AUTO: 7 % — SIGNIFICANT CHANGE UP (ref 2–14)
MONOCYTES NFR BLD AUTO: 7.2 % — SIGNIFICANT CHANGE UP (ref 2–14)
MONOCYTES NFR BLD AUTO: 8.3 % — SIGNIFICANT CHANGE UP (ref 2–14)
MONOCYTES NFR BLD AUTO: 8.4 % — SIGNIFICANT CHANGE UP (ref 2–14)
MONOCYTES NFR BLD AUTO: 8.6 % — SIGNIFICANT CHANGE UP (ref 2–14)
MONOCYTES NFR BLD AUTO: 8.7 % — SIGNIFICANT CHANGE UP (ref 2–14)
MONOCYTES NFR BLD AUTO: 9.1 % — SIGNIFICANT CHANGE UP (ref 2–14)
MONOCYTES NFR BLD AUTO: 9.9 % — SIGNIFICANT CHANGE UP (ref 2–14)
NEUTROPHILS # BLD AUTO: 10.76 K/UL — HIGH (ref 1.8–7.4)
NEUTROPHILS # BLD AUTO: 11.55 K/UL — HIGH (ref 1.8–7.4)
NEUTROPHILS # BLD AUTO: 12.88 K/UL — HIGH (ref 1.8–7.4)
NEUTROPHILS # BLD AUTO: 14.91 K/UL — HIGH (ref 1.8–7.4)
NEUTROPHILS # BLD AUTO: 17.36 K/UL — HIGH (ref 1.8–7.4)
NEUTROPHILS # BLD AUTO: 2.01 K/UL — SIGNIFICANT CHANGE UP (ref 1.8–7.4)
NEUTROPHILS # BLD AUTO: 2.16 K/UL — SIGNIFICANT CHANGE UP (ref 1.8–7.4)
NEUTROPHILS # BLD AUTO: 2.23 K/UL — SIGNIFICANT CHANGE UP (ref 1.8–7.4)
NEUTROPHILS # BLD AUTO: 2.47 K/UL — SIGNIFICANT CHANGE UP (ref 1.8–7.4)
NEUTROPHILS # BLD AUTO: 2.53 K/UL — SIGNIFICANT CHANGE UP (ref 1.8–7.4)
NEUTROPHILS # BLD AUTO: 2.71 K/UL — SIGNIFICANT CHANGE UP (ref 1.8–7.4)
NEUTROPHILS # BLD AUTO: 2.83 K/UL — SIGNIFICANT CHANGE UP (ref 1.8–7.4)
NEUTROPHILS # BLD AUTO: 2.86 K/UL — SIGNIFICANT CHANGE UP (ref 1.8–7.4)
NEUTROPHILS # BLD AUTO: 3.29 K/UL — SIGNIFICANT CHANGE UP (ref 1.8–7.4)
NEUTROPHILS # BLD AUTO: 3.89 K/UL — SIGNIFICANT CHANGE UP (ref 1.8–7.4)
NEUTROPHILS # BLD AUTO: 30.26 K/UL — HIGH (ref 1.8–7.4)
NEUTROPHILS # BLD AUTO: 4.23 K/UL — SIGNIFICANT CHANGE UP (ref 1.8–7.4)
NEUTROPHILS # BLD AUTO: 4.33 K/UL — SIGNIFICANT CHANGE UP (ref 1.8–7.4)
NEUTROPHILS # BLD AUTO: 4.97 K/UL — SIGNIFICANT CHANGE UP (ref 1.8–7.4)
NEUTROPHILS # BLD AUTO: 5.2 K/UL — SIGNIFICANT CHANGE UP (ref 1.8–7.4)
NEUTROPHILS # BLD AUTO: 5.75 K/UL — SIGNIFICANT CHANGE UP (ref 1.8–7.4)
NEUTROPHILS # BLD AUTO: 6.55 K/UL — SIGNIFICANT CHANGE UP (ref 1.8–7.4)
NEUTROPHILS # BLD AUTO: 7.22 K/UL — SIGNIFICANT CHANGE UP (ref 1.8–7.4)
NEUTROPHILS # BLD AUTO: 7.46 K/UL — HIGH (ref 1.8–7.4)
NEUTROPHILS # BLD AUTO: 8.06 K/UL — HIGH (ref 1.8–7.4)
NEUTROPHILS # BLD AUTO: 9.62 K/UL — HIGH (ref 1.8–7.4)
NEUTROPHILS NFR BLD AUTO: 47.4 % — SIGNIFICANT CHANGE UP (ref 43–77)
NEUTROPHILS NFR BLD AUTO: 48 % — SIGNIFICANT CHANGE UP (ref 43–77)
NEUTROPHILS NFR BLD AUTO: 48.9 % — SIGNIFICANT CHANGE UP (ref 43–77)
NEUTROPHILS NFR BLD AUTO: 50.1 % — SIGNIFICANT CHANGE UP (ref 43–77)
NEUTROPHILS NFR BLD AUTO: 57 % — SIGNIFICANT CHANGE UP (ref 43–77)
NEUTROPHILS NFR BLD AUTO: 57 % — SIGNIFICANT CHANGE UP (ref 43–77)
NEUTROPHILS NFR BLD AUTO: 57.2 % — SIGNIFICANT CHANGE UP (ref 43–77)
NEUTROPHILS NFR BLD AUTO: 58.3 % — SIGNIFICANT CHANGE UP (ref 43–77)
NEUTROPHILS NFR BLD AUTO: 58.6 % — SIGNIFICANT CHANGE UP (ref 43–77)
NEUTROPHILS NFR BLD AUTO: 59 % — SIGNIFICANT CHANGE UP (ref 43–77)
NEUTROPHILS NFR BLD AUTO: 61.5 % — SIGNIFICANT CHANGE UP (ref 43–77)
NEUTROPHILS NFR BLD AUTO: 62.2 % — SIGNIFICANT CHANGE UP (ref 43–77)
NEUTROPHILS NFR BLD AUTO: 63 % — SIGNIFICANT CHANGE UP (ref 43–77)
NEUTROPHILS NFR BLD AUTO: 63.6 % — SIGNIFICANT CHANGE UP (ref 43–77)
NEUTROPHILS NFR BLD AUTO: 69.5 % — SIGNIFICANT CHANGE UP (ref 43–77)
NEUTROPHILS NFR BLD AUTO: 72 % — SIGNIFICANT CHANGE UP (ref 43–77)
NEUTROPHILS NFR BLD AUTO: 73.8 % — SIGNIFICANT CHANGE UP (ref 43–77)
NEUTROPHILS NFR BLD AUTO: 75 % — SIGNIFICANT CHANGE UP (ref 43–77)
NEUTROPHILS NFR BLD AUTO: 76.1 % — SIGNIFICANT CHANGE UP (ref 43–77)
NEUTROPHILS NFR BLD AUTO: 76.1 % — SIGNIFICANT CHANGE UP (ref 43–77)
NEUTROPHILS NFR BLD AUTO: 76.6 % — SIGNIFICANT CHANGE UP (ref 43–77)
NEUTROPHILS NFR BLD AUTO: 79.4 % — HIGH (ref 43–77)
NEUTROPHILS NFR BLD AUTO: 82.8 % — HIGH (ref 43–77)
NEUTROPHILS NFR BLD AUTO: 84.8 % — HIGH (ref 43–77)
NEUTROPHILS NFR BLD AUTO: 85.8 % — HIGH (ref 43–77)
NEUTROPHILS NFR BLD AUTO: 87.4 % — HIGH (ref 43–77)
NITRITE UR-MCNC: NEGATIVE — SIGNIFICANT CHANGE UP
NITRITE UR-MCNC: POSITIVE
NON HDL CHOLESTEROL: 61 MG/DL — SIGNIFICANT CHANGE UP
NON HDL CHOLESTEROL: 70 MG/DL — SIGNIFICANT CHANGE UP
NRBC # BLD: 0 /100 WBCS — SIGNIFICANT CHANGE UP
NRBC # BLD: 0 /100 WBCS — SIGNIFICANT CHANGE UP (ref 0–0)
NRBC # BLD: 0 /100 — SIGNIFICANT CHANGE UP (ref 0–0)
NRBC # BLD: 1 /100 — HIGH (ref 0–0)
NRBC # BLD: SIGNIFICANT CHANGE UP /100 WBCS (ref 0–0)
NRBC # FLD: 0 K/UL — SIGNIFICANT CHANGE UP
NT-PROBNP SERPL-SCNC: 6873 PG/ML — HIGH
NT-PROBNP SERPL-SCNC: HIGH PG/ML (ref 0–125)
OB PNL STL: NEGATIVE — SIGNIFICANT CHANGE UP
ORGANISM # SPEC MICROSCOPIC CNT: SIGNIFICANT CHANGE UP
ORGANISM # SPEC MICROSCOPIC CNT: SIGNIFICANT CHANGE UP
PCO2 BLDV: 40 MMHG — LOW (ref 41–51)
PH BLDV: 7.32 — SIGNIFICANT CHANGE UP (ref 7.32–7.43)
PH UR: 6.5 — SIGNIFICANT CHANGE UP (ref 5–8)
PH UR: 7 — SIGNIFICANT CHANGE UP (ref 5–8)
PHOSPHATE SERPL-MCNC: 3.2 MG/DL — SIGNIFICANT CHANGE UP (ref 2.5–4.5)
PHOSPHATE SERPL-MCNC: 3.5 MG/DL — SIGNIFICANT CHANGE UP (ref 2.5–4.5)
PHOSPHATE SERPL-MCNC: 3.9 MG/DL — SIGNIFICANT CHANGE UP (ref 2.5–4.5)
PHOSPHATE SERPL-MCNC: 4 MG/DL — SIGNIFICANT CHANGE UP (ref 2.5–4.5)
PHOSPHATE SERPL-MCNC: 4.2 MG/DL — SIGNIFICANT CHANGE UP (ref 2.5–4.5)
PHOSPHATE SERPL-MCNC: 4.3 MG/DL — SIGNIFICANT CHANGE UP (ref 2.5–4.5)
PHOSPHATE SERPL-MCNC: 4.4 MG/DL — SIGNIFICANT CHANGE UP (ref 2.5–4.5)
PHOSPHATE SERPL-MCNC: 4.4 MG/DL — SIGNIFICANT CHANGE UP (ref 2.5–4.5)
PLAT MORPH BLD: NORMAL — SIGNIFICANT CHANGE UP
PLATELET # BLD AUTO: 148 K/UL — LOW (ref 150–400)
PLATELET # BLD AUTO: 156 K/UL — SIGNIFICANT CHANGE UP (ref 150–400)
PLATELET # BLD AUTO: 165 K/UL — SIGNIFICANT CHANGE UP (ref 150–400)
PLATELET # BLD AUTO: 166 K/UL — SIGNIFICANT CHANGE UP (ref 150–400)
PLATELET # BLD AUTO: 167 K/UL — SIGNIFICANT CHANGE UP (ref 150–400)
PLATELET # BLD AUTO: 173 K/UL — SIGNIFICANT CHANGE UP (ref 150–400)
PLATELET # BLD AUTO: 174 K/UL — SIGNIFICANT CHANGE UP (ref 150–400)
PLATELET # BLD AUTO: 188 K/UL — SIGNIFICANT CHANGE UP (ref 150–400)
PLATELET # BLD AUTO: 194 K/UL — SIGNIFICANT CHANGE UP (ref 150–400)
PLATELET # BLD AUTO: 195 K/UL — SIGNIFICANT CHANGE UP (ref 150–400)
PLATELET # BLD AUTO: 204 K/UL — SIGNIFICANT CHANGE UP (ref 150–400)
PLATELET # BLD AUTO: 207 K/UL — SIGNIFICANT CHANGE UP (ref 150–400)
PLATELET # BLD AUTO: 209 K/UL — SIGNIFICANT CHANGE UP (ref 150–400)
PLATELET # BLD AUTO: 224 K/UL — SIGNIFICANT CHANGE UP (ref 150–400)
PLATELET # BLD AUTO: 272 K/UL — SIGNIFICANT CHANGE UP (ref 150–400)
PLATELET # BLD AUTO: 311 K/UL — SIGNIFICANT CHANGE UP (ref 150–400)
PLATELET # BLD AUTO: 317 K/UL — SIGNIFICANT CHANGE UP (ref 150–400)
PLATELET # BLD AUTO: 408 K/UL — HIGH (ref 150–400)
PLATELET # BLD AUTO: 42 K/UL — LOW (ref 150–400)
PLATELET # BLD AUTO: 48 K/UL — LOW (ref 150–400)
PLATELET # BLD AUTO: 49 K/UL — LOW (ref 150–400)
PLATELET # BLD AUTO: 54 K/UL — LOW (ref 150–400)
PLATELET # BLD AUTO: 59 K/UL — LOW (ref 150–400)
PLATELET # BLD AUTO: 60 K/UL — LOW (ref 150–400)
PLATELET # BLD AUTO: 67 K/UL — LOW (ref 150–400)
PLATELET # BLD AUTO: 69 K/UL — LOW (ref 150–400)
PLATELET # BLD AUTO: 75 K/UL — LOW (ref 150–400)
PLATELET # BLD AUTO: 86 K/UL — LOW (ref 150–400)
PLATELET # BLD AUTO: 93 K/UL — LOW (ref 150–400)
PO2 BLDV: 26 MMHG — LOW (ref 35–40)
POTASSIUM BLDV-SCNC: 3.9 MMOL/L — SIGNIFICANT CHANGE UP (ref 3.4–4.5)
POTASSIUM SERPL-MCNC: 3.5 MMOL/L — SIGNIFICANT CHANGE UP (ref 3.5–5.3)
POTASSIUM SERPL-MCNC: 3.6 MMOL/L — SIGNIFICANT CHANGE UP (ref 3.5–5.3)
POTASSIUM SERPL-MCNC: 3.6 MMOL/L — SIGNIFICANT CHANGE UP (ref 3.5–5.3)
POTASSIUM SERPL-MCNC: 3.8 MMOL/L — SIGNIFICANT CHANGE UP (ref 3.5–5.3)
POTASSIUM SERPL-MCNC: 3.9 MMOL/L — SIGNIFICANT CHANGE UP (ref 3.5–5.3)
POTASSIUM SERPL-MCNC: 4 MMOL/L — SIGNIFICANT CHANGE UP (ref 3.5–5.3)
POTASSIUM SERPL-MCNC: 4.1 MMOL/L — SIGNIFICANT CHANGE UP (ref 3.5–5.3)
POTASSIUM SERPL-MCNC: 4.1 MMOL/L — SIGNIFICANT CHANGE UP (ref 3.5–5.3)
POTASSIUM SERPL-MCNC: 4.7 MMOL/L — SIGNIFICANT CHANGE UP (ref 3.5–5.3)
POTASSIUM SERPL-MCNC: 5.1 MMOL/L — SIGNIFICANT CHANGE UP (ref 3.5–5.3)
POTASSIUM SERPL-SCNC: 3.5 MMOL/L — SIGNIFICANT CHANGE UP (ref 3.5–5.3)
POTASSIUM SERPL-SCNC: 3.6 MMOL/L — SIGNIFICANT CHANGE UP (ref 3.5–5.3)
POTASSIUM SERPL-SCNC: 3.6 MMOL/L — SIGNIFICANT CHANGE UP (ref 3.5–5.3)
POTASSIUM SERPL-SCNC: 3.8 MMOL/L — SIGNIFICANT CHANGE UP (ref 3.5–5.3)
POTASSIUM SERPL-SCNC: 3.9 MMOL/L — SIGNIFICANT CHANGE UP (ref 3.5–5.3)
POTASSIUM SERPL-SCNC: 4 MMOL/L
POTASSIUM SERPL-SCNC: 4 MMOL/L — SIGNIFICANT CHANGE UP (ref 3.5–5.3)
POTASSIUM SERPL-SCNC: 4.1 MMOL/L — SIGNIFICANT CHANGE UP (ref 3.5–5.3)
POTASSIUM SERPL-SCNC: 4.1 MMOL/L — SIGNIFICANT CHANGE UP (ref 3.5–5.3)
POTASSIUM SERPL-SCNC: 4.6 MMOL/L
POTASSIUM SERPL-SCNC: 4.7 MMOL/L — SIGNIFICANT CHANGE UP (ref 3.5–5.3)
POTASSIUM SERPL-SCNC: 5.1 MMOL/L — SIGNIFICANT CHANGE UP (ref 3.5–5.3)
PROT SERPL-MCNC: 6.6 G/DL
PROT SERPL-MCNC: 6.6 G/DL — SIGNIFICANT CHANGE UP (ref 6–8.3)
PROT SERPL-MCNC: 6.7 G/DL — SIGNIFICANT CHANGE UP (ref 6–8.3)
PROT SERPL-MCNC: 6.7 G/DL — SIGNIFICANT CHANGE UP (ref 6–8.3)
PROT SERPL-MCNC: 6.9 G/DL — SIGNIFICANT CHANGE UP (ref 6–8.3)
PROT SERPL-MCNC: 7 G/DL — SIGNIFICANT CHANGE UP (ref 6–8.3)
PROT SERPL-MCNC: 7.2 G/DL — SIGNIFICANT CHANGE UP (ref 6–8.3)
PROT SERPL-MCNC: 7.2 G/DL — SIGNIFICANT CHANGE UP (ref 6–8.3)
PROT SERPL-MCNC: 7.3 G/DL — SIGNIFICANT CHANGE UP (ref 6–8.3)
PROT SERPL-MCNC: 7.5 G/DL
PROT SERPL-MCNC: 7.5 G/DL — SIGNIFICANT CHANGE UP (ref 6–8.3)
PROT SERPL-MCNC: 7.7 G/DL — SIGNIFICANT CHANGE UP (ref 6–8.3)
PROT SERPL-MCNC: 7.8 G/DL — SIGNIFICANT CHANGE UP (ref 6–8.3)
PROT SERPL-MCNC: 8.2 G/DL — SIGNIFICANT CHANGE UP (ref 6–8.3)
PROT SERPL-MCNC: 8.3 G/DL — SIGNIFICANT CHANGE UP (ref 6–8.3)
PROT UR-MCNC: ABNORMAL
PROT UR-MCNC: NEGATIVE — SIGNIFICANT CHANGE UP
PROTHROM AB SERPL-ACNC: 12.6 SEC — SIGNIFICANT CHANGE UP (ref 10.6–13.6)
PROTHROM AB SERPL-ACNC: 12.8 SEC — SIGNIFICANT CHANGE UP (ref 10.6–13.6)
PROTHROM AB SERPL-ACNC: 13 SEC — SIGNIFICANT CHANGE UP (ref 10.6–13.6)
PROTHROM AB SERPL-ACNC: 13.1 SEC — SIGNIFICANT CHANGE UP (ref 10.6–13.6)
PROTHROM AB SERPL-ACNC: 13.4 SEC — SIGNIFICANT CHANGE UP (ref 10.6–13.6)
RBC # BLD: 2.18 M/UL — LOW (ref 3.8–5.2)
RBC # BLD: 2.42 M/UL — LOW (ref 3.8–5.2)
RBC # BLD: 2.61 M/UL — LOW (ref 3.8–5.2)
RBC # BLD: 2.67 M/UL — LOW (ref 3.8–5.2)
RBC # BLD: 2.73 M/UL — LOW (ref 3.8–5.2)
RBC # BLD: 2.75 M/UL — LOW (ref 3.8–5.2)
RBC # BLD: 2.79 M/UL — LOW (ref 3.8–5.2)
RBC # BLD: 2.87 M/UL — LOW (ref 3.8–5.2)
RBC # BLD: 2.88 M/UL — LOW (ref 3.8–5.2)
RBC # BLD: 2.95 M/UL — LOW (ref 3.8–5.2)
RBC # BLD: 3.04 M/UL — LOW (ref 3.8–5.2)
RBC # BLD: 3.06 M/UL — LOW (ref 3.8–5.2)
RBC # BLD: 3.11 M/UL — LOW (ref 3.8–5.2)
RBC # BLD: 3.11 M/UL — LOW (ref 3.8–5.2)
RBC # BLD: 3.14 M/UL — LOW (ref 3.8–5.2)
RBC # BLD: 3.28 M/UL — LOW (ref 3.8–5.2)
RBC # BLD: 3.3 M/UL — LOW (ref 3.8–5.2)
RBC # BLD: 3.4 M/UL — LOW (ref 3.8–5.2)
RBC # BLD: 3.4 M/UL — LOW (ref 3.8–5.2)
RBC # BLD: 3.46 M/UL — LOW (ref 3.8–5.2)
RBC # BLD: 3.55 M/UL — LOW (ref 3.8–5.2)
RBC # BLD: 3.55 M/UL — LOW (ref 3.8–5.2)
RBC # BLD: 3.6 M/UL — LOW (ref 3.8–5.2)
RBC # BLD: 3.6 M/UL — LOW (ref 3.8–5.2)
RBC # BLD: 3.63 M/UL — LOW (ref 3.8–5.2)
RBC # BLD: 3.71 M/UL — LOW (ref 3.8–5.2)
RBC # BLD: 3.71 M/UL — LOW (ref 3.8–5.2)
RBC # BLD: 3.74 M/UL — LOW (ref 3.8–5.2)
RBC # BLD: 3.75 M/UL — LOW (ref 3.8–5.2)
RBC # FLD: 15.2 % — HIGH (ref 10.3–14.5)
RBC # FLD: 16.1 % — HIGH (ref 10.3–14.5)
RBC # FLD: 16.1 % — HIGH (ref 10.3–14.5)
RBC # FLD: 16.3 % — HIGH (ref 10.3–14.5)
RBC # FLD: 16.4 % — HIGH (ref 10.3–14.5)
RBC # FLD: 16.6 % — HIGH (ref 10.3–14.5)
RBC # FLD: 16.7 % — HIGH (ref 10.3–14.5)
RBC # FLD: 16.7 % — HIGH (ref 10.3–14.5)
RBC # FLD: 16.8 % — HIGH (ref 10.3–14.5)
RBC # FLD: 16.8 % — HIGH (ref 10.3–14.5)
RBC # FLD: 16.9 % — HIGH (ref 10.3–14.5)
RBC # FLD: 17 % — HIGH (ref 10.3–14.5)
RBC # FLD: 17 % — HIGH (ref 10.3–14.5)
RBC # FLD: 17.1 % — HIGH (ref 10.3–14.5)
RBC # FLD: 17.3 % — HIGH (ref 10.3–14.5)
RBC # FLD: 17.4 % — HIGH (ref 10.3–14.5)
RBC # FLD: 17.4 % — HIGH (ref 10.3–14.5)
RBC # FLD: 18.2 % — HIGH (ref 10.3–14.5)
RBC # FLD: 18.5 % — HIGH (ref 10.3–14.5)
RBC # FLD: 18.7 % — HIGH (ref 10.3–14.5)
RBC # FLD: 18.9 % — HIGH (ref 10.3–14.5)
RBC # FLD: 19.2 % — HIGH (ref 10.3–14.5)
RBC # FLD: 19.2 % — HIGH (ref 10.3–14.5)
RBC # FLD: 19.5 % — HIGH (ref 10.3–14.5)
RBC # FLD: 19.9 % — HIGH (ref 10.3–14.5)
RBC # FLD: 20 % — HIGH (ref 10.3–14.5)
RBC # FLD: 22.2 % — HIGH (ref 10.3–14.5)
RBC # FLD: 22.3 % — HIGH (ref 10.3–14.5)
RBC # FLD: 22.8 % — HIGH (ref 10.3–14.5)
RBC BLD AUTO: SIGNIFICANT CHANGE UP
RBC CASTS # UR COMP ASSIST: 60 /HPF — HIGH (ref 0–4)
RETICS #: 129.5 K/UL — HIGH (ref 25–125)
RETICS/RBC NFR: 3.8 % — HIGH (ref 0.5–2.5)
RH IG SCN BLD-IMP: POSITIVE — SIGNIFICANT CHANGE UP
SAO2 % BLDV: 37.9 % — LOW (ref 60–85)
SARS-COV-2 IGG SERPL QL IA: NEGATIVE — SIGNIFICANT CHANGE UP
SARS-COV-2 IGG+IGM SERPL QL IA: 225 U/ML — HIGH
SARS-COV-2 IGG+IGM SERPL QL IA: >250 U/ML — HIGH
SARS-COV-2 IGG+IGM SERPL QL IA: >250 U/ML — HIGH
SARS-COV-2 IGG+IGM SERPL QL IA: POSITIVE
SARS-COV-2 IGM SERPL IA-ACNC: 0.07 INDEX — SIGNIFICANT CHANGE UP
SARS-COV-2 RNA SPEC QL NAA+PROBE: SIGNIFICANT CHANGE UP
SODIUM SERPL-SCNC: 134 MMOL/L — LOW (ref 135–145)
SODIUM SERPL-SCNC: 134 MMOL/L — LOW (ref 135–145)
SODIUM SERPL-SCNC: 135 MMOL/L — SIGNIFICANT CHANGE UP (ref 135–145)
SODIUM SERPL-SCNC: 136 MMOL/L
SODIUM SERPL-SCNC: 136 MMOL/L — SIGNIFICANT CHANGE UP (ref 135–145)
SODIUM SERPL-SCNC: 137 MMOL/L
SODIUM SERPL-SCNC: 137 MMOL/L — SIGNIFICANT CHANGE UP (ref 135–145)
SODIUM SERPL-SCNC: 138 MMOL/L — SIGNIFICANT CHANGE UP (ref 135–145)
SODIUM SERPL-SCNC: 139 MMOL/L — SIGNIFICANT CHANGE UP (ref 135–145)
SODIUM SERPL-SCNC: 139 MMOL/L — SIGNIFICANT CHANGE UP (ref 135–145)
SODIUM SERPL-SCNC: 140 MMOL/L — SIGNIFICANT CHANGE UP (ref 135–145)
SP GR SPEC: 1.01 — LOW (ref 1.01–1.02)
SP GR SPEC: 1.01 — SIGNIFICANT CHANGE UP (ref 1.01–1.02)
SPECIMEN SOURCE: SIGNIFICANT CHANGE UP
SURGICAL PATHOLOGY STUDY: SIGNIFICANT CHANGE UP
TIBC SERPL-MCNC: 190 UG/DL — LOW (ref 220–430)
TIBC SERPL-MCNC: 343 UG/DL — SIGNIFICANT CHANGE UP (ref 220–430)
TRIGL SERPL-MCNC: 108 MG/DL — SIGNIFICANT CHANGE UP
TRIGL SERPL-MCNC: 93 MG/DL — SIGNIFICANT CHANGE UP
TROPONIN I SERPL-MCNC: 0.04 NG/ML — SIGNIFICANT CHANGE UP (ref 0–0.04)
TROPONIN T, HIGH SENSITIVITY RESULT: 12 NG/L — SIGNIFICANT CHANGE UP
TROPONIN T, HIGH SENSITIVITY RESULT: 15 NG/L — SIGNIFICANT CHANGE UP
TROPONIN T, HIGH SENSITIVITY RESULT: 24 NG/L — SIGNIFICANT CHANGE UP
TROPONIN T, HIGH SENSITIVITY RESULT: 33 NG/L — SIGNIFICANT CHANGE UP
TSH SERPL-MCNC: 3.23 UIU/ML — SIGNIFICANT CHANGE UP (ref 0.27–4.2)
TSH SERPL-MCNC: 5.43 UIU/ML — HIGH (ref 0.27–4.2)
UIBC SERPL-MCNC: 146 UG/DL — SIGNIFICANT CHANGE UP (ref 110–370)
UIBC SERPL-MCNC: 284 UG/DL — SIGNIFICANT CHANGE UP (ref 110–370)
UROBILINOGEN FLD QL: SIGNIFICANT CHANGE UP
UROBILINOGEN FLD QL: SIGNIFICANT CHANGE UP
VANCOMYCIN TROUGH SERPL-MCNC: 4.6 UG/ML — LOW (ref 10–20)
WBC # BLD: 10.02 K/UL — SIGNIFICANT CHANGE UP (ref 3.8–10.5)
WBC # BLD: 10.9 K/UL — HIGH (ref 3.8–10.5)
WBC # BLD: 11.61 K/UL — HIGH (ref 3.8–10.5)
WBC # BLD: 12.53 K/UL — HIGH (ref 3.8–10.5)
WBC # BLD: 14.74 K/UL — HIGH (ref 3.8–10.5)
WBC # BLD: 15.17 K/UL — HIGH (ref 3.8–10.5)
WBC # BLD: 20.48 K/UL — HIGH (ref 3.8–10.5)
WBC # BLD: 3.79 K/UL — LOW (ref 3.8–10.5)
WBC # BLD: 31.01 K/UL — HIGH (ref 3.8–10.5)
WBC # BLD: 36.46 K/UL — HIGH (ref 3.8–10.5)
WBC # BLD: 4 K/UL — SIGNIFICANT CHANGE UP (ref 3.8–10.5)
WBC # BLD: 4 K/UL — SIGNIFICANT CHANGE UP (ref 3.8–10.5)
WBC # BLD: 4.25 K/UL — SIGNIFICANT CHANGE UP (ref 3.8–10.5)
WBC # BLD: 4.26 K/UL — SIGNIFICANT CHANGE UP (ref 3.8–10.5)
WBC # BLD: 4.57 K/UL — SIGNIFICANT CHANGE UP (ref 3.8–10.5)
WBC # BLD: 4.66 K/UL — SIGNIFICANT CHANGE UP (ref 3.8–10.5)
WBC # BLD: 4.74 K/UL — SIGNIFICANT CHANGE UP (ref 3.8–10.5)
WBC # BLD: 4.8 K/UL — SIGNIFICANT CHANGE UP (ref 3.8–10.5)
WBC # BLD: 5.28 K/UL — SIGNIFICANT CHANGE UP (ref 3.8–10.5)
WBC # BLD: 5.32 K/UL — SIGNIFICANT CHANGE UP (ref 3.8–10.5)
WBC # BLD: 5.34 K/UL — SIGNIFICANT CHANGE UP (ref 3.8–10.5)
WBC # BLD: 6.63 K/UL — SIGNIFICANT CHANGE UP (ref 3.8–10.5)
WBC # BLD: 6.68 K/UL — SIGNIFICANT CHANGE UP (ref 3.8–10.5)
WBC # BLD: 6.82 K/UL — SIGNIFICANT CHANGE UP (ref 3.8–10.5)
WBC # BLD: 7.23 K/UL — SIGNIFICANT CHANGE UP (ref 3.8–10.5)
WBC # BLD: 7.52 K/UL — SIGNIFICANT CHANGE UP (ref 3.8–10.5)
WBC # BLD: 8.25 K/UL — SIGNIFICANT CHANGE UP (ref 3.8–10.5)
WBC # BLD: 9.44 K/UL — SIGNIFICANT CHANGE UP (ref 3.8–10.5)
WBC # BLD: 9.8 K/UL — SIGNIFICANT CHANGE UP (ref 3.8–10.5)
WBC # FLD AUTO: 10.02 K/UL — SIGNIFICANT CHANGE UP (ref 3.8–10.5)
WBC # FLD AUTO: 10.9 K/UL — HIGH (ref 3.8–10.5)
WBC # FLD AUTO: 11.61 K/UL — HIGH (ref 3.8–10.5)
WBC # FLD AUTO: 12.53 K/UL — HIGH (ref 3.8–10.5)
WBC # FLD AUTO: 14.74 K/UL — HIGH (ref 3.8–10.5)
WBC # FLD AUTO: 15.17 K/UL — HIGH (ref 3.8–10.5)
WBC # FLD AUTO: 20.48 K/UL — HIGH (ref 3.8–10.5)
WBC # FLD AUTO: 3.79 K/UL — LOW (ref 3.8–10.5)
WBC # FLD AUTO: 31.01 K/UL — HIGH (ref 3.8–10.5)
WBC # FLD AUTO: 36.46 K/UL — HIGH (ref 3.8–10.5)
WBC # FLD AUTO: 4 K/UL — SIGNIFICANT CHANGE UP (ref 3.8–10.5)
WBC # FLD AUTO: 4 K/UL — SIGNIFICANT CHANGE UP (ref 3.8–10.5)
WBC # FLD AUTO: 4.25 K/UL — SIGNIFICANT CHANGE UP (ref 3.8–10.5)
WBC # FLD AUTO: 4.26 K/UL — SIGNIFICANT CHANGE UP (ref 3.8–10.5)
WBC # FLD AUTO: 4.57 K/UL — SIGNIFICANT CHANGE UP (ref 3.8–10.5)
WBC # FLD AUTO: 4.66 K/UL — SIGNIFICANT CHANGE UP (ref 3.8–10.5)
WBC # FLD AUTO: 4.74 K/UL — SIGNIFICANT CHANGE UP (ref 3.8–10.5)
WBC # FLD AUTO: 4.8 K/UL — SIGNIFICANT CHANGE UP (ref 3.8–10.5)
WBC # FLD AUTO: 5.28 K/UL — SIGNIFICANT CHANGE UP (ref 3.8–10.5)
WBC # FLD AUTO: 5.32 K/UL — SIGNIFICANT CHANGE UP (ref 3.8–10.5)
WBC # FLD AUTO: 5.34 K/UL — SIGNIFICANT CHANGE UP (ref 3.8–10.5)
WBC # FLD AUTO: 6.63 K/UL — SIGNIFICANT CHANGE UP (ref 3.8–10.5)
WBC # FLD AUTO: 6.68 K/UL — SIGNIFICANT CHANGE UP (ref 3.8–10.5)
WBC # FLD AUTO: 6.82 K/UL — SIGNIFICANT CHANGE UP (ref 3.8–10.5)
WBC # FLD AUTO: 7.23 K/UL — SIGNIFICANT CHANGE UP (ref 3.8–10.5)
WBC # FLD AUTO: 7.52 K/UL — SIGNIFICANT CHANGE UP (ref 3.8–10.5)
WBC # FLD AUTO: 8.25 K/UL — SIGNIFICANT CHANGE UP (ref 3.8–10.5)
WBC # FLD AUTO: 9.44 K/UL — SIGNIFICANT CHANGE UP (ref 3.8–10.5)
WBC # FLD AUTO: 9.8 K/UL — SIGNIFICANT CHANGE UP (ref 3.8–10.5)
WBC UR QL: 17 /HPF — HIGH (ref 0–5)

## 2021-01-01 PROCEDURE — 93010 ELECTROCARDIOGRAM REPORT: CPT

## 2021-01-01 PROCEDURE — 99072 ADDL SUPL MATRL&STAF TM PHE: CPT

## 2021-01-01 PROCEDURE — 99223 1ST HOSP IP/OBS HIGH 75: CPT

## 2021-01-01 PROCEDURE — 86900 BLOOD TYPING SEROLOGIC ABO: CPT

## 2021-01-01 PROCEDURE — 43239 EGD BIOPSY SINGLE/MULTIPLE: CPT | Mod: GC

## 2021-01-01 PROCEDURE — 80053 COMPREHEN METABOLIC PANEL: CPT

## 2021-01-01 PROCEDURE — 86923 COMPATIBILITY TEST ELECTRIC: CPT

## 2021-01-01 PROCEDURE — 71045 X-RAY EXAM CHEST 1 VIEW: CPT | Mod: 26

## 2021-01-01 PROCEDURE — 99203 OFFICE O/P NEW LOW 30 MIN: CPT

## 2021-01-01 PROCEDURE — 99285 EMERGENCY DEPT VISIT HI MDM: CPT

## 2021-01-01 PROCEDURE — 99214 OFFICE O/P EST MOD 30 MIN: CPT

## 2021-01-01 PROCEDURE — ZZZZZ: CPT

## 2021-01-01 PROCEDURE — 99232 SBSQ HOSP IP/OBS MODERATE 35: CPT

## 2021-01-01 PROCEDURE — 93000 ELECTROCARDIOGRAM COMPLETE: CPT

## 2021-01-01 PROCEDURE — 99233 SBSQ HOSP IP/OBS HIGH 50: CPT

## 2021-01-01 PROCEDURE — 84484 ASSAY OF TROPONIN QUANT: CPT

## 2021-01-01 PROCEDURE — 99233 SBSQ HOSP IP/OBS HIGH 50: CPT | Mod: GC

## 2021-01-01 PROCEDURE — 71275 CT ANGIOGRAPHY CHEST: CPT | Mod: 26

## 2021-01-01 PROCEDURE — 96374 THER/PROPH/DIAG INJ IV PUSH: CPT

## 2021-01-01 PROCEDURE — 74177 CT ABD & PELVIS W/CONTRAST: CPT

## 2021-01-01 PROCEDURE — 71260 CT THORAX DX C+: CPT

## 2021-01-01 PROCEDURE — 86850 RBC ANTIBODY SCREEN: CPT

## 2021-01-01 PROCEDURE — 86901 BLOOD TYPING SEROLOGIC RH(D): CPT

## 2021-01-01 PROCEDURE — 99285 EMERGENCY DEPT VISIT HI MDM: CPT | Mod: 25

## 2021-01-01 PROCEDURE — 99239 HOSP IP/OBS DSCHRG MGMT >30: CPT

## 2021-01-01 PROCEDURE — 99212 OFFICE O/P EST SF 10 MIN: CPT

## 2021-01-01 PROCEDURE — 99223 1ST HOSP IP/OBS HIGH 75: CPT | Mod: GC

## 2021-01-01 PROCEDURE — 31238 NSL/SINS NDSC SRG NSL HEMRRG: CPT | Mod: RT

## 2021-01-01 PROCEDURE — 99239 HOSP IP/OBS DSCHRG MGMT >30: CPT | Mod: GC

## 2021-01-01 PROCEDURE — 99214 OFFICE O/P EST MOD 30 MIN: CPT | Mod: 95

## 2021-01-01 PROCEDURE — 36415 COLL VENOUS BLD VENIPUNCTURE: CPT

## 2021-01-01 PROCEDURE — 71260 CT THORAX DX C+: CPT | Mod: 26

## 2021-01-01 PROCEDURE — 99053 MED SERV 10PM-8AM 24 HR FAC: CPT

## 2021-01-01 PROCEDURE — 83880 ASSAY OF NATRIURETIC PEPTIDE: CPT

## 2021-01-01 PROCEDURE — 12345: CPT | Mod: NC,GC

## 2021-01-01 PROCEDURE — 85025 COMPLETE CBC W/AUTO DIFF WBC: CPT

## 2021-01-01 PROCEDURE — 71046 X-RAY EXAM CHEST 2 VIEWS: CPT | Mod: 26

## 2021-01-01 PROCEDURE — 93306 TTE W/DOPPLER COMPLETE: CPT | Mod: 26

## 2021-01-01 PROCEDURE — 99233 SBSQ HOSP IP/OBS HIGH 50: CPT | Mod: GV

## 2021-01-01 PROCEDURE — 99204 OFFICE O/P NEW MOD 45 MIN: CPT

## 2021-01-01 PROCEDURE — 99283 EMERGENCY DEPT VISIT LOW MDM: CPT | Mod: 25

## 2021-01-01 PROCEDURE — 99222 1ST HOSP IP/OBS MODERATE 55: CPT | Mod: GC

## 2021-01-01 PROCEDURE — 99204 OFFICE O/P NEW MOD 45 MIN: CPT | Mod: 25

## 2021-01-01 PROCEDURE — 99232 SBSQ HOSP IP/OBS MODERATE 35: CPT | Mod: GC

## 2021-01-01 PROCEDURE — 99223 1ST HOSP IP/OBS HIGH 75: CPT | Mod: GV

## 2021-01-01 PROCEDURE — 99213 OFFICE O/P EST LOW 20 MIN: CPT

## 2021-01-01 PROCEDURE — 99284 EMERGENCY DEPT VISIT MOD MDM: CPT

## 2021-01-01 PROCEDURE — 86769 SARS-COV-2 COVID-19 ANTIBODY: CPT

## 2021-01-01 PROCEDURE — 82565 ASSAY OF CREATININE: CPT

## 2021-01-01 PROCEDURE — 99215 OFFICE O/P EST HI 40 MIN: CPT

## 2021-01-01 PROCEDURE — 93005 ELECTROCARDIOGRAM TRACING: CPT

## 2021-01-01 PROCEDURE — 30903 CONTROL OF NOSEBLEED: CPT

## 2021-01-01 PROCEDURE — 99284 EMERGENCY DEPT VISIT MOD MDM: CPT | Mod: 25

## 2021-01-01 PROCEDURE — 74177 CT ABD & PELVIS W/CONTRAST: CPT | Mod: 26

## 2021-01-01 PROCEDURE — 88305 TISSUE EXAM BY PATHOLOGIST: CPT | Mod: 26

## 2021-01-01 PROCEDURE — 85610 PROTHROMBIN TIME: CPT

## 2021-01-01 PROCEDURE — 87635 SARS-COV-2 COVID-19 AMP PRB: CPT

## 2021-01-01 PROCEDURE — 71045 X-RAY EXAM CHEST 1 VIEW: CPT

## 2021-01-01 PROCEDURE — 85730 THROMBOPLASTIN TIME PARTIAL: CPT

## 2021-01-01 PROCEDURE — G9005: CPT

## 2021-01-01 PROCEDURE — 12345: CPT | Mod: NC

## 2021-01-01 PROCEDURE — 99441: CPT

## 2021-01-01 RX ORDER — SODIUM CHLORIDE 9 MG/ML
1000 INJECTION, SOLUTION INTRAVENOUS ONCE
Refills: 0 | Status: COMPLETED | OUTPATIENT
Start: 2021-01-01 | End: 2021-01-01

## 2021-01-01 RX ORDER — PANTOPRAZOLE SODIUM 20 MG/1
40 TABLET, DELAYED RELEASE ORAL
Refills: 0 | Status: DISCONTINUED | OUTPATIENT
Start: 2021-01-01 | End: 2021-01-01

## 2021-01-01 RX ORDER — BENZOCAINE AND MENTHOL 5; 1 G/100ML; G/100ML
1 LIQUID ORAL EVERY 6 HOURS
Refills: 0 | Status: DISCONTINUED | OUTPATIENT
Start: 2021-01-01 | End: 2021-01-01

## 2021-01-01 RX ORDER — MORPHINE SULFATE 50 MG/1
5 CAPSULE, EXTENDED RELEASE ORAL
Refills: 0 | Status: DISCONTINUED | OUTPATIENT
Start: 2021-01-01 | End: 2021-01-01

## 2021-01-01 RX ORDER — LIPASE/PROTEASE/AMYLASE 16-48-48K
2 CAPSULE,DELAYED RELEASE (ENTERIC COATED) ORAL
Refills: 0 | Status: DISCONTINUED | OUTPATIENT
Start: 2021-01-01 | End: 2021-01-01

## 2021-01-01 RX ORDER — SENNA PLUS 8.6 MG/1
2 TABLET ORAL AT BEDTIME
Refills: 0 | Status: DISCONTINUED | OUTPATIENT
Start: 2021-01-01 | End: 2021-01-01

## 2021-01-01 RX ORDER — CLINDAMYCIN 1 G/10ML
1 GEL TOPICAL TWICE DAILY
Qty: 1 | Refills: 0 | Status: COMPLETED | COMMUNITY
Start: 2021-01-01 | End: 2021-01-01

## 2021-01-01 RX ORDER — TOBRAMYCIN 3 MG/ML
0.3 SOLUTION/ DROPS OPHTHALMIC 4 TIMES DAILY
Qty: 1 | Refills: 0 | Status: DISCONTINUED | COMMUNITY
Start: 2021-01-01 | End: 2021-01-01

## 2021-01-01 RX ORDER — SCOPALAMINE 1 MG/3D
1 PATCH, EXTENDED RELEASE TRANSDERMAL
Refills: 0 | Status: DISCONTINUED | OUTPATIENT
Start: 2021-01-01 | End: 2021-01-01

## 2021-01-01 RX ORDER — CLOPIDOGREL BISULFATE 75 MG/1
75 TABLET, FILM COATED ORAL DAILY
Refills: 0 | Status: DISCONTINUED | OUTPATIENT
Start: 2021-01-01 | End: 2021-01-01

## 2021-01-01 RX ORDER — FUROSEMIDE 40 MG
1 TABLET ORAL
Qty: 30 | Refills: 0
Start: 2021-01-01

## 2021-01-01 RX ORDER — MORPHINE SULFATE 50 MG/1
4 CAPSULE, EXTENDED RELEASE ORAL
Refills: 0 | Status: DISCONTINUED | OUTPATIENT
Start: 2021-01-01 | End: 2021-01-01

## 2021-01-01 RX ORDER — ASPIRIN/CALCIUM CARB/MAGNESIUM 324 MG
81 TABLET ORAL DAILY
Refills: 0 | Status: DISCONTINUED | OUTPATIENT
Start: 2021-01-01 | End: 2021-01-01

## 2021-01-01 RX ORDER — ACETAMINOPHEN 500 MG
650 TABLET ORAL ONCE
Refills: 0 | Status: COMPLETED | OUTPATIENT
Start: 2021-01-01 | End: 2021-01-01

## 2021-01-01 RX ORDER — FUROSEMIDE 40 MG
20 TABLET ORAL DAILY
Refills: 0 | Status: DISCONTINUED | OUTPATIENT
Start: 2021-01-01 | End: 2021-01-01

## 2021-01-01 RX ORDER — TICAGRELOR 90 MG/1
90 TABLET ORAL TWICE DAILY
Qty: 60 | Refills: 5 | Status: DISCONTINUED | COMMUNITY
Start: 2020-01-01 | End: 2021-01-01

## 2021-01-01 RX ORDER — PANTOPRAZOLE SODIUM 20 MG/1
80 TABLET, DELAYED RELEASE ORAL ONCE
Refills: 0 | Status: COMPLETED | OUTPATIENT
Start: 2021-01-01 | End: 2021-01-01

## 2021-01-01 RX ORDER — ONDANSETRON 8 MG/1
8 TABLET, ORALLY DISINTEGRATING ORAL EVERY 6 HOURS
Qty: 90 | Refills: 0 | Status: COMPLETED | COMMUNITY
Start: 2020-06-09 | End: 2021-01-01

## 2021-01-01 RX ORDER — FUROSEMIDE 40 MG
40 TABLET ORAL DAILY
Refills: 0 | Status: DISCONTINUED | OUTPATIENT
Start: 2021-01-01 | End: 2021-01-01

## 2021-01-01 RX ORDER — CARVEDILOL PHOSPHATE 80 MG/1
1 CAPSULE, EXTENDED RELEASE ORAL
Qty: 0 | Refills: 0 | DISCHARGE

## 2021-01-01 RX ORDER — VANCOMYCIN HCL 1 G
1000 VIAL (EA) INTRAVENOUS EVERY 12 HOURS
Refills: 0 | Status: DISCONTINUED | OUTPATIENT
Start: 2021-01-01 | End: 2021-01-01

## 2021-01-01 RX ORDER — CLOPIDOGREL BISULFATE 75 MG/1
1 TABLET, FILM COATED ORAL
Qty: 0 | Refills: 0 | DISCHARGE

## 2021-01-01 RX ORDER — MORPHINE SULFATE 50 MG/1
5 CAPSULE, EXTENDED RELEASE ORAL EVERY 6 HOURS
Refills: 0 | Status: DISCONTINUED | OUTPATIENT
Start: 2021-01-01 | End: 2021-01-01

## 2021-01-01 RX ORDER — CEFTRIAXONE 500 MG/1
1000 INJECTION, POWDER, FOR SOLUTION INTRAMUSCULAR; INTRAVENOUS EVERY 24 HOURS
Refills: 0 | Status: COMPLETED | OUTPATIENT
Start: 2021-01-01 | End: 2021-01-01

## 2021-01-01 RX ORDER — ATORVASTATIN CALCIUM 80 MG/1
1 TABLET, FILM COATED ORAL
Qty: 0 | Refills: 0 | DISCHARGE
Start: 2021-01-01

## 2021-01-01 RX ORDER — ROBINUL 0.2 MG/ML
0.4 INJECTION INTRAMUSCULAR; INTRAVENOUS EVERY 6 HOURS
Refills: 0 | Status: DISCONTINUED | OUTPATIENT
Start: 2021-01-01 | End: 2021-01-01

## 2021-01-01 RX ORDER — OMEPRAZOLE 10 MG/1
1 CAPSULE, DELAYED RELEASE ORAL
Qty: 0 | Refills: 0 | DISCHARGE
Start: 2021-01-01 | End: 2021-01-01

## 2021-01-01 RX ORDER — HYDROCORTISONE 10 MG/G
1 CREAM TOPICAL
Qty: 60 | Refills: 0 | Status: ACTIVE | COMMUNITY
Start: 2021-01-01 | End: 1900-01-01

## 2021-01-01 RX ORDER — LIPASE/PROTEASE/AMYLASE 16-48-48K
1 CAPSULE,DELAYED RELEASE (ENTERIC COATED) ORAL
Refills: 0 | Status: DISCONTINUED | OUTPATIENT
Start: 2021-01-01 | End: 2021-01-01

## 2021-01-01 RX ORDER — ERLOTINIB HYDROCHLORIDE 100 MG/1
100 TABLET, FILM COATED ORAL DAILY
Refills: 0 | Status: DISCONTINUED | OUTPATIENT
Start: 2021-01-01 | End: 2021-01-01

## 2021-01-01 RX ORDER — ERLOTINIB HYDROCHLORIDE 100 MG/1
100 TABLET, FILM COATED ORAL EVERY OTHER DAY
Qty: 15 | Refills: 6 | Status: ACTIVE | COMMUNITY
Start: 2021-01-01

## 2021-01-01 RX ORDER — MORPHINE SULFATE 50 MG/1
2 CAPSULE, EXTENDED RELEASE ORAL
Refills: 0 | Status: DISCONTINUED | OUTPATIENT
Start: 2021-01-01 | End: 2021-01-01

## 2021-01-01 RX ORDER — VANCOMYCIN HCL 1 G
1000 VIAL (EA) INTRAVENOUS ONCE
Refills: 0 | Status: COMPLETED | OUTPATIENT
Start: 2021-01-01 | End: 2021-01-01

## 2021-01-01 RX ORDER — LIDOCAINE AND PRILOCAINE 25; 25 MG/G; MG/G
2.5-2.5 CREAM TOPICAL
Qty: 1 | Refills: 2 | Status: COMPLETED | COMMUNITY
Start: 2020-06-09 | End: 2021-01-01

## 2021-01-01 RX ORDER — LACTOBACILLUS ACIDOPHILUS 100MM CELL
1 CAPSULE ORAL THREE TIMES A DAY
Refills: 0 | Status: DISCONTINUED | OUTPATIENT
Start: 2021-01-01 | End: 2021-01-01

## 2021-01-01 RX ORDER — CLOPIDOGREL BISULFATE 75 MG/1
75 TABLET, FILM COATED ORAL DAILY
Qty: 90 | Refills: 1 | Status: COMPLETED | COMMUNITY
Start: 2020-01-01 | End: 2021-01-01

## 2021-01-01 RX ORDER — ERYTHROMYCIN BASE 5 MG/GRAM
1 OINTMENT (GRAM) OPHTHALMIC (EYE)
Qty: 5 | Refills: 0
Start: 2021-01-01 | End: 2021-01-01

## 2021-01-01 RX ORDER — MORPHINE SULFATE 50 MG/1
2 CAPSULE, EXTENDED RELEASE ORAL ONCE
Refills: 0 | Status: DISCONTINUED | OUTPATIENT
Start: 2021-01-01 | End: 2021-01-01

## 2021-01-01 RX ORDER — TICAGRELOR 90 MG/1
1 TABLET ORAL
Qty: 0 | Refills: 0 | DISCHARGE

## 2021-01-01 RX ORDER — METOPROLOL TARTRATE 50 MG
25 TABLET ORAL DAILY
Refills: 0 | Status: DISCONTINUED | OUTPATIENT
Start: 2021-01-01 | End: 2021-01-01

## 2021-01-01 RX ORDER — MORPHINE SULFATE 50 MG/1
2 CAPSULE, EXTENDED RELEASE ORAL EVERY 4 HOURS
Refills: 0 | Status: DISCONTINUED | OUTPATIENT
Start: 2021-01-01 | End: 2021-01-01

## 2021-01-01 RX ORDER — ATORVASTATIN CALCIUM 80 MG/1
40 TABLET, FILM COATED ORAL AT BEDTIME
Refills: 0 | Status: DISCONTINUED | OUTPATIENT
Start: 2021-01-01 | End: 2021-01-01

## 2021-01-01 RX ORDER — ERLOTINIB HYDROCHLORIDE 100 MG/1
100 TABLET, FILM COATED ORAL
Refills: 0 | Status: DISCONTINUED | OUTPATIENT
Start: 2021-01-01 | End: 2021-01-01

## 2021-01-01 RX ORDER — POLYETHYLENE GLYCOL 3350 17 G/17G
17 POWDER, FOR SOLUTION ORAL DAILY
Refills: 0 | Status: DISCONTINUED | OUTPATIENT
Start: 2021-01-01 | End: 2021-01-01

## 2021-01-01 RX ORDER — FENTANYL CITRATE 50 UG/ML
1 INJECTION INTRAVENOUS
Refills: 0 | Status: DISCONTINUED | OUTPATIENT
Start: 2021-01-01 | End: 2021-01-01

## 2021-01-01 RX ORDER — ONDANSETRON 8 MG/1
4 TABLET, FILM COATED ORAL EVERY 6 HOURS
Refills: 0 | Status: DISCONTINUED | OUTPATIENT
Start: 2021-01-01 | End: 2021-01-01

## 2021-01-01 RX ORDER — ONDANSETRON 8 MG/1
1 TABLET, FILM COATED ORAL
Qty: 0 | Refills: 0 | DISCHARGE

## 2021-01-01 RX ORDER — ASPIRIN/CALCIUM CARB/MAGNESIUM 324 MG
1 TABLET ORAL
Qty: 0 | Refills: 0 | DISCHARGE
Start: 2021-01-01

## 2021-01-01 RX ORDER — ERYTHROMYCIN BASE 5 MG/GRAM
1 OINTMENT (GRAM) OPHTHALMIC (EYE) ONCE
Refills: 0 | Status: DISCONTINUED | OUTPATIENT
Start: 2021-01-01 | End: 2021-01-01

## 2021-01-01 RX ORDER — ALPHA-D-GALACTOSIDASE 400 UNIT
1000 TABLET ORAL
Qty: 0 | Refills: 0 | DISCHARGE

## 2021-01-01 RX ORDER — ERLOTINIB HYDROCHLORIDE 100 MG/1
1 TABLET, FILM COATED ORAL
Qty: 0 | Refills: 0 | DISCHARGE

## 2021-01-01 RX ORDER — OMEPRAZOLE 10 MG/1
1 CAPSULE, DELAYED RELEASE ORAL
Qty: 30 | Refills: 0
Start: 2021-01-01

## 2021-01-01 RX ORDER — PANCRELIPASE 60000; 12000; 38000 [USP'U]/1; [USP'U]/1; [USP'U]/1
12000-38000 CAPSULE, DELAYED RELEASE PELLETS ORAL
Qty: 270 | Refills: 3 | Status: ACTIVE | COMMUNITY
Start: 2020-06-09 | End: 1900-01-01

## 2021-01-01 RX ORDER — SODIUM CHLORIDE 9 MG/ML
3 INJECTION INTRAMUSCULAR; INTRAVENOUS; SUBCUTANEOUS EVERY 8 HOURS
Refills: 0 | Status: DISCONTINUED | OUTPATIENT
Start: 2021-01-01 | End: 2021-01-01

## 2021-01-01 RX ORDER — ERYTHROMYCIN 5 MG/G
5 OINTMENT OPHTHALMIC
Qty: 4 | Refills: 0 | Status: DISCONTINUED | COMMUNITY
Start: 2021-01-01 | End: 2021-01-01

## 2021-01-01 RX ORDER — ENOXAPARIN SODIUM 100 MG/ML
40 INJECTION SUBCUTANEOUS DAILY
Refills: 0 | Status: DISCONTINUED | OUTPATIENT
Start: 2021-01-01 | End: 2021-01-01

## 2021-01-01 RX ORDER — LIPASE/PROTEASE/AMYLASE 16-48-48K
1 CAPSULE,DELAYED RELEASE (ENTERIC COATED) ORAL
Qty: 0 | Refills: 0 | DISCHARGE
Start: 2021-01-01

## 2021-01-01 RX ORDER — LIPASE/PROTEASE/AMYLASE 16-48-48K
1 CAPSULE,DELAYED RELEASE (ENTERIC COATED) ORAL
Qty: 0 | Refills: 0 | DISCHARGE

## 2021-01-01 RX ORDER — LIDOCAINE 4 G/100G
1 CREAM TOPICAL ONCE
Refills: 0 | Status: COMPLETED | OUTPATIENT
Start: 2021-01-01 | End: 2021-01-01

## 2021-01-01 RX ORDER — ONDANSETRON 8 MG/1
4 TABLET, FILM COATED ORAL ONCE
Refills: 0 | Status: COMPLETED | OUTPATIENT
Start: 2021-01-01 | End: 2021-01-01

## 2021-01-01 RX ORDER — ACETAMINOPHEN 500 MG
650 TABLET ORAL EVERY 6 HOURS
Refills: 0 | Status: DISCONTINUED | OUTPATIENT
Start: 2021-01-01 | End: 2021-01-01

## 2021-01-01 RX ORDER — FUROSEMIDE 40 MG
20 TABLET ORAL ONCE
Refills: 0 | Status: COMPLETED | OUTPATIENT
Start: 2021-01-01 | End: 2021-01-01

## 2021-01-01 RX ORDER — CEFEPIME 1 G/1
2000 INJECTION, POWDER, FOR SOLUTION INTRAMUSCULAR; INTRAVENOUS ONCE
Refills: 0 | Status: COMPLETED | OUTPATIENT
Start: 2021-01-01 | End: 2021-01-01

## 2021-01-01 RX ORDER — HEPARIN SODIUM 5000 [USP'U]/ML
5000 INJECTION INTRAVENOUS; SUBCUTANEOUS EVERY 12 HOURS
Refills: 0 | Status: DISCONTINUED | OUTPATIENT
Start: 2021-01-01 | End: 2021-01-01

## 2021-01-01 RX ORDER — METOPROLOL SUCCINATE 25 MG/1
25 TABLET, EXTENDED RELEASE ORAL DAILY
Qty: 30 | Refills: 3 | Status: DISCONTINUED | COMMUNITY
Start: 2020-01-01 | End: 2021-01-01

## 2021-01-01 RX ORDER — METOPROLOL TARTRATE 50 MG
12.5 TABLET ORAL DAILY
Refills: 0 | Status: DISCONTINUED | OUTPATIENT
Start: 2021-01-01 | End: 2021-01-01

## 2021-01-01 RX ORDER — METOPROLOL TARTRATE 50 MG
1 TABLET ORAL
Qty: 0 | Refills: 0 | DISCHARGE

## 2021-01-01 RX ORDER — LIPASE/PROTEASE/AMYLASE 16-48-48K
2 CAPSULE,DELAYED RELEASE (ENTERIC COATED) ORAL
Qty: 180 | Refills: 0
Start: 2021-01-01

## 2021-01-01 RX ORDER — ATORVASTATIN CALCIUM 40 MG/1
40 TABLET, FILM COATED ORAL
Qty: 90 | Refills: 1 | Status: ACTIVE | COMMUNITY
Start: 2020-01-01 | End: 1900-01-01

## 2021-01-01 RX ORDER — PANTOPRAZOLE SODIUM 20 MG/1
40 TABLET, DELAYED RELEASE ORAL EVERY 12 HOURS
Refills: 0 | Status: DISCONTINUED | OUTPATIENT
Start: 2021-01-01 | End: 2021-01-01

## 2021-01-01 RX ORDER — LACTOBACILLUS ACIDOPHILUS 100MM CELL
1 CAPSULE ORAL
Refills: 0 | Status: DISCONTINUED | OUTPATIENT
Start: 2021-01-01 | End: 2021-01-01

## 2021-01-01 RX ORDER — HYDROCORTISONE 25 MG/G
2.5 OINTMENT TOPICAL
Qty: 1 | Refills: 0 | Status: ACTIVE | COMMUNITY
Start: 2021-01-01 | End: 1900-01-01

## 2021-01-01 RX ORDER — DOXYCYCLINE HYCLATE 100 MG/1
100 TABLET ORAL
Qty: 60 | Refills: 2 | Status: DISCONTINUED | COMMUNITY
Start: 2021-01-01 | End: 2021-01-01

## 2021-01-01 RX ORDER — OMEPRAZOLE 20 MG/1
20 CAPSULE, DELAYED RELEASE ORAL
Qty: 30 | Refills: 0 | Status: DISCONTINUED | COMMUNITY
Start: 2020-01-01

## 2021-01-01 RX ORDER — POLYETHYLENE GLYCOL 3350 17 G/17G
17 POWDER, FOR SOLUTION ORAL
Qty: 0 | Refills: 0 | DISCHARGE

## 2021-01-01 RX ORDER — ASPIRIN/CALCIUM CARB/MAGNESIUM 324 MG
1 TABLET ORAL
Qty: 0 | Refills: 0 | DISCHARGE

## 2021-01-01 RX ORDER — ASPIRIN ENTERIC COATED TABLETS 81 MG 81 MG/1
81 TABLET, DELAYED RELEASE ORAL DAILY
Qty: 90 | Refills: 2 | Status: ACTIVE | COMMUNITY
Start: 2021-01-01

## 2021-01-01 RX ORDER — LACTOBACILLUS ACIDOPHILUS 100MM CELL
1 CAPSULE ORAL
Qty: 0 | Refills: 0 | DISCHARGE
Start: 2021-01-01

## 2021-01-01 RX ORDER — ACETAMINOPHEN 500 MG
2 TABLET ORAL
Qty: 0 | Refills: 0 | DISCHARGE

## 2021-01-01 RX ORDER — OMEPRAZOLE 10 MG/1
1 CAPSULE, DELAYED RELEASE ORAL
Qty: 0 | Refills: 0 | DISCHARGE

## 2021-01-01 RX ORDER — PANTOPRAZOLE SODIUM 20 MG/1
40 TABLET, DELAYED RELEASE ORAL ONCE
Refills: 0 | Status: COMPLETED | OUTPATIENT
Start: 2021-01-01 | End: 2021-01-01

## 2021-01-01 RX ORDER — ROBINUL 0.2 MG/ML
0.2 INJECTION INTRAMUSCULAR; INTRAVENOUS EVERY 6 HOURS
Refills: 0 | Status: DISCONTINUED | OUTPATIENT
Start: 2021-01-01 | End: 2021-01-01

## 2021-01-01 RX ORDER — OXYMETAZOLINE HYDROCHLORIDE 0.5 MG/ML
1 SPRAY NASAL ONCE
Refills: 0 | Status: COMPLETED | OUTPATIENT
Start: 2021-01-01 | End: 2021-01-01

## 2021-01-01 RX ORDER — SALIVA STIMULANT COMB. NO.3
SPRAY, NON-AEROSOL (ML) MUCOUS MEMBRANE
Qty: 2 | Refills: 0 | Status: COMPLETED | COMMUNITY
Start: 2020-06-09 | End: 2021-01-01

## 2021-01-01 RX ADMIN — Medication 1 TABLET(S): at 12:30

## 2021-01-01 RX ADMIN — Medication 1 TABLET(S): at 22:07

## 2021-01-01 RX ADMIN — MORPHINE SULFATE 2 MILLIGRAM(S): 50 CAPSULE, EXTENDED RELEASE ORAL at 09:45

## 2021-01-01 RX ADMIN — MORPHINE SULFATE 2 MILLIGRAM(S): 50 CAPSULE, EXTENDED RELEASE ORAL at 12:29

## 2021-01-01 RX ADMIN — ENOXAPARIN SODIUM 40 MILLIGRAM(S): 100 INJECTION SUBCUTANEOUS at 12:05

## 2021-01-01 RX ADMIN — ATORVASTATIN CALCIUM 40 MILLIGRAM(S): 80 TABLET, FILM COATED ORAL at 21:24

## 2021-01-01 RX ADMIN — Medication 81 MILLIGRAM(S): at 14:38

## 2021-01-01 RX ADMIN — Medication 25 MILLIGRAM(S): at 05:55

## 2021-01-01 RX ADMIN — MORPHINE SULFATE 2 MILLIGRAM(S): 50 CAPSULE, EXTENDED RELEASE ORAL at 18:46

## 2021-01-01 RX ADMIN — MORPHINE SULFATE 5 MILLIGRAM(S): 50 CAPSULE, EXTENDED RELEASE ORAL at 11:48

## 2021-01-01 RX ADMIN — Medication 1 TABLET(S): at 17:54

## 2021-01-01 RX ADMIN — Medication 650 MILLIGRAM(S): at 06:21

## 2021-01-01 RX ADMIN — MORPHINE SULFATE 2 MILLIGRAM(S): 50 CAPSULE, EXTENDED RELEASE ORAL at 21:16

## 2021-01-01 RX ADMIN — MORPHINE SULFATE 5 MILLIGRAM(S): 50 CAPSULE, EXTENDED RELEASE ORAL at 17:54

## 2021-01-01 RX ADMIN — SODIUM CHLORIDE 3 MILLILITER(S): 9 INJECTION INTRAMUSCULAR; INTRAVENOUS; SUBCUTANEOUS at 14:31

## 2021-01-01 RX ADMIN — Medication 20 MILLIGRAM(S): at 05:56

## 2021-01-01 RX ADMIN — CEFTRIAXONE 100 MILLIGRAM(S): 500 INJECTION, POWDER, FOR SOLUTION INTRAMUSCULAR; INTRAVENOUS at 06:10

## 2021-01-01 RX ADMIN — Medication 81 MILLIGRAM(S): at 14:34

## 2021-01-01 RX ADMIN — SODIUM CHLORIDE 3 MILLILITER(S): 9 INJECTION INTRAMUSCULAR; INTRAVENOUS; SUBCUTANEOUS at 05:38

## 2021-01-01 RX ADMIN — Medication 1 TABLET(S): at 22:03

## 2021-01-01 RX ADMIN — SODIUM CHLORIDE 3 MILLILITER(S): 9 INJECTION INTRAMUSCULAR; INTRAVENOUS; SUBCUTANEOUS at 21:29

## 2021-01-01 RX ADMIN — MORPHINE SULFATE 2 MILLIGRAM(S): 50 CAPSULE, EXTENDED RELEASE ORAL at 08:29

## 2021-01-01 RX ADMIN — ENOXAPARIN SODIUM 40 MILLIGRAM(S): 100 INJECTION SUBCUTANEOUS at 12:48

## 2021-01-01 RX ADMIN — Medication 650 MILLIGRAM(S): at 08:22

## 2021-01-01 RX ADMIN — OXYMETAZOLINE HYDROCHLORIDE 1 SPRAY(S): 0.5 SPRAY NASAL at 01:30

## 2021-01-01 RX ADMIN — HEPARIN SODIUM 5000 UNIT(S): 5000 INJECTION INTRAVENOUS; SUBCUTANEOUS at 18:10

## 2021-01-01 RX ADMIN — MORPHINE SULFATE 2 MILLIGRAM(S): 50 CAPSULE, EXTENDED RELEASE ORAL at 05:35

## 2021-01-01 RX ADMIN — Medication 1 MILLIGRAM(S): at 17:05

## 2021-01-01 RX ADMIN — BENZOCAINE AND MENTHOL 1 LOZENGE: 5; 1 LIQUID ORAL at 10:11

## 2021-01-01 RX ADMIN — PANTOPRAZOLE SODIUM 40 MILLIGRAM(S): 20 TABLET, DELAYED RELEASE ORAL at 04:46

## 2021-01-01 RX ADMIN — MORPHINE SULFATE 2 MILLIGRAM(S): 50 CAPSULE, EXTENDED RELEASE ORAL at 10:52

## 2021-01-01 RX ADMIN — Medication 1 CAPSULE(S): at 10:10

## 2021-01-01 RX ADMIN — SODIUM CHLORIDE 3 MILLILITER(S): 9 INJECTION INTRAMUSCULAR; INTRAVENOUS; SUBCUTANEOUS at 21:56

## 2021-01-01 RX ADMIN — Medication 2 CAPSULE(S): at 17:54

## 2021-01-01 RX ADMIN — MORPHINE SULFATE 2 MILLIGRAM(S): 50 CAPSULE, EXTENDED RELEASE ORAL at 19:47

## 2021-01-01 RX ADMIN — MORPHINE SULFATE 5 MILLIGRAM(S): 50 CAPSULE, EXTENDED RELEASE ORAL at 11:15

## 2021-01-01 RX ADMIN — Medication 1 CAPSULE(S): at 09:19

## 2021-01-01 RX ADMIN — MORPHINE SULFATE 5 MILLIGRAM(S): 50 CAPSULE, EXTENDED RELEASE ORAL at 18:52

## 2021-01-01 RX ADMIN — MORPHINE SULFATE 2 MILLIGRAM(S): 50 CAPSULE, EXTENDED RELEASE ORAL at 04:48

## 2021-01-01 RX ADMIN — Medication 1 CAPSULE(S): at 09:07

## 2021-01-01 RX ADMIN — BENZOCAINE AND MENTHOL 1 LOZENGE: 5; 1 LIQUID ORAL at 16:59

## 2021-01-01 RX ADMIN — PANTOPRAZOLE SODIUM 40 MILLIGRAM(S): 20 TABLET, DELAYED RELEASE ORAL at 07:02

## 2021-01-01 RX ADMIN — SODIUM CHLORIDE 3 MILLILITER(S): 9 INJECTION INTRAMUSCULAR; INTRAVENOUS; SUBCUTANEOUS at 12:52

## 2021-01-01 RX ADMIN — MORPHINE SULFATE 2 MILLIGRAM(S): 50 CAPSULE, EXTENDED RELEASE ORAL at 09:56

## 2021-01-01 RX ADMIN — MORPHINE SULFATE 4 MILLIGRAM(S): 50 CAPSULE, EXTENDED RELEASE ORAL at 14:52

## 2021-01-01 RX ADMIN — Medication 650 MILLIGRAM(S): at 02:29

## 2021-01-01 RX ADMIN — Medication 81 MILLIGRAM(S): at 12:26

## 2021-01-01 RX ADMIN — Medication 1 TABLET(S): at 18:58

## 2021-01-01 RX ADMIN — PANTOPRAZOLE SODIUM 40 MILLIGRAM(S): 20 TABLET, DELAYED RELEASE ORAL at 06:29

## 2021-01-01 RX ADMIN — SODIUM CHLORIDE 3 MILLILITER(S): 9 INJECTION INTRAMUSCULAR; INTRAVENOUS; SUBCUTANEOUS at 23:08

## 2021-01-01 RX ADMIN — BENZOCAINE AND MENTHOL 1 LOZENGE: 5; 1 LIQUID ORAL at 05:38

## 2021-01-01 RX ADMIN — ENOXAPARIN SODIUM 40 MILLIGRAM(S): 100 INJECTION SUBCUTANEOUS at 11:55

## 2021-01-01 RX ADMIN — MORPHINE SULFATE 5 MILLIGRAM(S): 50 CAPSULE, EXTENDED RELEASE ORAL at 05:56

## 2021-01-01 RX ADMIN — Medication 650 MILLIGRAM(S): at 20:40

## 2021-01-01 RX ADMIN — MORPHINE SULFATE 5 MILLIGRAM(S): 50 CAPSULE, EXTENDED RELEASE ORAL at 12:22

## 2021-01-01 RX ADMIN — ATORVASTATIN CALCIUM 40 MILLIGRAM(S): 80 TABLET, FILM COATED ORAL at 22:16

## 2021-01-01 RX ADMIN — MORPHINE SULFATE 2 MILLIGRAM(S): 50 CAPSULE, EXTENDED RELEASE ORAL at 06:15

## 2021-01-01 RX ADMIN — Medication 20 MILLIGRAM(S): at 13:32

## 2021-01-01 RX ADMIN — Medication 20 MILLIGRAM(S): at 16:07

## 2021-01-01 RX ADMIN — MORPHINE SULFATE 5 MILLIGRAM(S): 50 CAPSULE, EXTENDED RELEASE ORAL at 23:09

## 2021-01-01 RX ADMIN — Medication 1 MILLIGRAM(S): at 07:40

## 2021-01-01 RX ADMIN — ROBINUL 0.4 MILLIGRAM(S): 0.2 INJECTION INTRAMUSCULAR; INTRAVENOUS at 09:41

## 2021-01-01 RX ADMIN — MORPHINE SULFATE 2 MILLIGRAM(S): 50 CAPSULE, EXTENDED RELEASE ORAL at 12:04

## 2021-01-01 RX ADMIN — Medication 1 MILLIGRAM(S): at 11:16

## 2021-01-01 RX ADMIN — PANTOPRAZOLE SODIUM 80 MILLIGRAM(S): 20 TABLET, DELAYED RELEASE ORAL at 22:00

## 2021-01-01 RX ADMIN — ENOXAPARIN SODIUM 40 MILLIGRAM(S): 100 INJECTION SUBCUTANEOUS at 12:26

## 2021-01-01 RX ADMIN — HEPARIN SODIUM 5000 UNIT(S): 5000 INJECTION INTRAVENOUS; SUBCUTANEOUS at 18:58

## 2021-01-01 RX ADMIN — Medication 1 MILLIGRAM(S): at 22:24

## 2021-01-01 RX ADMIN — FENTANYL CITRATE 1 PATCH: 50 INJECTION INTRAVENOUS at 07:22

## 2021-01-01 RX ADMIN — MORPHINE SULFATE 5 MILLIGRAM(S): 50 CAPSULE, EXTENDED RELEASE ORAL at 18:23

## 2021-01-01 RX ADMIN — Medication 650 MILLIGRAM(S): at 06:53

## 2021-01-01 RX ADMIN — Medication 1 MILLIGRAM(S): at 05:47

## 2021-01-01 RX ADMIN — Medication 1 TABLET(S): at 17:28

## 2021-01-01 RX ADMIN — MORPHINE SULFATE 2 MILLIGRAM(S): 50 CAPSULE, EXTENDED RELEASE ORAL at 05:47

## 2021-01-01 RX ADMIN — Medication 1 TABLET(S): at 06:29

## 2021-01-01 RX ADMIN — MORPHINE SULFATE 5 MILLIGRAM(S): 50 CAPSULE, EXTENDED RELEASE ORAL at 05:52

## 2021-01-01 RX ADMIN — MORPHINE SULFATE 2 MILLIGRAM(S): 50 CAPSULE, EXTENDED RELEASE ORAL at 02:38

## 2021-01-01 RX ADMIN — MORPHINE SULFATE 2 MILLIGRAM(S): 50 CAPSULE, EXTENDED RELEASE ORAL at 11:16

## 2021-01-01 RX ADMIN — SODIUM CHLORIDE 3 MILLILITER(S): 9 INJECTION INTRAMUSCULAR; INTRAVENOUS; SUBCUTANEOUS at 05:46

## 2021-01-01 RX ADMIN — Medication 250 MILLIGRAM(S): at 22:49

## 2021-01-01 RX ADMIN — SODIUM CHLORIDE 3 MILLILITER(S): 9 INJECTION INTRAMUSCULAR; INTRAVENOUS; SUBCUTANEOUS at 23:29

## 2021-01-01 RX ADMIN — HEPARIN SODIUM 5000 UNIT(S): 5000 INJECTION INTRAVENOUS; SUBCUTANEOUS at 04:47

## 2021-01-01 RX ADMIN — CLOPIDOGREL BISULFATE 75 MILLIGRAM(S): 75 TABLET, FILM COATED ORAL at 12:48

## 2021-01-01 RX ADMIN — MORPHINE SULFATE 4 MILLIGRAM(S): 50 CAPSULE, EXTENDED RELEASE ORAL at 18:55

## 2021-01-01 RX ADMIN — Medication 81 MILLIGRAM(S): at 12:05

## 2021-01-01 RX ADMIN — SODIUM CHLORIDE 3 MILLILITER(S): 9 INJECTION INTRAMUSCULAR; INTRAVENOUS; SUBCUTANEOUS at 14:38

## 2021-01-01 RX ADMIN — Medication 1 CAPSULE(S): at 12:26

## 2021-01-01 RX ADMIN — Medication 20 MILLIGRAM(S): at 05:45

## 2021-01-01 RX ADMIN — MORPHINE SULFATE 2 MILLIGRAM(S): 50 CAPSULE, EXTENDED RELEASE ORAL at 18:31

## 2021-01-01 RX ADMIN — Medication 81 MILLIGRAM(S): at 12:48

## 2021-01-01 RX ADMIN — Medication 1 MILLIGRAM(S): at 22:32

## 2021-01-01 RX ADMIN — MORPHINE SULFATE 2 MILLIGRAM(S): 50 CAPSULE, EXTENDED RELEASE ORAL at 12:44

## 2021-01-01 RX ADMIN — Medication 20 MILLIGRAM(S): at 06:52

## 2021-01-01 RX ADMIN — ROBINUL 0.4 MILLIGRAM(S): 0.2 INJECTION INTRAMUSCULAR; INTRAVENOUS at 10:38

## 2021-01-01 RX ADMIN — ROBINUL 0.4 MILLIGRAM(S): 0.2 INJECTION INTRAMUSCULAR; INTRAVENOUS at 02:32

## 2021-01-01 RX ADMIN — Medication 650 MILLIGRAM(S): at 07:40

## 2021-01-01 RX ADMIN — Medication 20 MILLIGRAM(S): at 05:52

## 2021-01-01 RX ADMIN — SODIUM CHLORIDE 3 MILLILITER(S): 9 INJECTION INTRAMUSCULAR; INTRAVENOUS; SUBCUTANEOUS at 21:09

## 2021-01-01 RX ADMIN — MORPHINE SULFATE 5 MILLIGRAM(S): 50 CAPSULE, EXTENDED RELEASE ORAL at 17:56

## 2021-01-01 RX ADMIN — BENZOCAINE AND MENTHOL 1 LOZENGE: 5; 1 LIQUID ORAL at 06:35

## 2021-01-01 RX ADMIN — Medication 1 TABLET(S): at 05:45

## 2021-01-01 RX ADMIN — FENTANYL CITRATE 1 PATCH: 50 INJECTION INTRAVENOUS at 19:42

## 2021-01-01 RX ADMIN — Medication 40 MILLIGRAM(S): at 06:29

## 2021-01-01 RX ADMIN — ATORVASTATIN CALCIUM 40 MILLIGRAM(S): 80 TABLET, FILM COATED ORAL at 20:56

## 2021-01-01 RX ADMIN — MORPHINE SULFATE 2 MILLIGRAM(S): 50 CAPSULE, EXTENDED RELEASE ORAL at 09:41

## 2021-01-01 RX ADMIN — MORPHINE SULFATE 2 MILLIGRAM(S): 50 CAPSULE, EXTENDED RELEASE ORAL at 22:31

## 2021-01-01 RX ADMIN — Medication 1 MILLIGRAM(S): at 14:19

## 2021-01-01 RX ADMIN — Medication 650 MILLIGRAM(S): at 21:36

## 2021-01-01 RX ADMIN — MORPHINE SULFATE 2 MILLIGRAM(S): 50 CAPSULE, EXTENDED RELEASE ORAL at 00:05

## 2021-01-01 RX ADMIN — MORPHINE SULFATE 2 MILLIGRAM(S): 50 CAPSULE, EXTENDED RELEASE ORAL at 06:43

## 2021-01-01 RX ADMIN — MORPHINE SULFATE 2 MILLIGRAM(S): 50 CAPSULE, EXTENDED RELEASE ORAL at 12:18

## 2021-01-01 RX ADMIN — Medication 25 MILLIGRAM(S): at 06:52

## 2021-01-01 RX ADMIN — Medication 650 MILLIGRAM(S): at 19:47

## 2021-01-01 RX ADMIN — LIDOCAINE 1 APPLICATION(S): 4 CREAM TOPICAL at 01:32

## 2021-01-01 RX ADMIN — MORPHINE SULFATE 2 MILLIGRAM(S): 50 CAPSULE, EXTENDED RELEASE ORAL at 07:57

## 2021-01-01 RX ADMIN — Medication 250 MILLIGRAM(S): at 12:48

## 2021-01-01 RX ADMIN — Medication 1 MILLIGRAM(S): at 21:48

## 2021-01-01 RX ADMIN — Medication 1 MILLIGRAM(S): at 12:29

## 2021-01-01 RX ADMIN — MORPHINE SULFATE 5 MILLIGRAM(S): 50 CAPSULE, EXTENDED RELEASE ORAL at 11:51

## 2021-01-01 RX ADMIN — CLOPIDOGREL BISULFATE 75 MILLIGRAM(S): 75 TABLET, FILM COATED ORAL at 12:05

## 2021-01-01 RX ADMIN — Medication 1 CAPSULE(S): at 10:19

## 2021-01-01 RX ADMIN — MORPHINE SULFATE 5 MILLIGRAM(S): 50 CAPSULE, EXTENDED RELEASE ORAL at 07:35

## 2021-01-01 RX ADMIN — PANTOPRAZOLE SODIUM 40 MILLIGRAM(S): 20 TABLET, DELAYED RELEASE ORAL at 11:24

## 2021-01-01 RX ADMIN — MORPHINE SULFATE 5 MILLIGRAM(S): 50 CAPSULE, EXTENDED RELEASE ORAL at 06:56

## 2021-01-01 RX ADMIN — Medication 1 MILLIGRAM(S): at 09:41

## 2021-01-01 RX ADMIN — ATORVASTATIN CALCIUM 40 MILLIGRAM(S): 80 TABLET, FILM COATED ORAL at 21:08

## 2021-01-01 RX ADMIN — PANTOPRAZOLE SODIUM 40 MILLIGRAM(S): 20 TABLET, DELAYED RELEASE ORAL at 02:05

## 2021-01-01 RX ADMIN — Medication 25 MILLIGRAM(S): at 17:56

## 2021-01-01 RX ADMIN — MORPHINE SULFATE 5 MILLIGRAM(S): 50 CAPSULE, EXTENDED RELEASE ORAL at 17:53

## 2021-01-01 RX ADMIN — SODIUM CHLORIDE 3 MILLILITER(S): 9 INJECTION INTRAMUSCULAR; INTRAVENOUS; SUBCUTANEOUS at 13:25

## 2021-01-01 RX ADMIN — MORPHINE SULFATE 2 MILLIGRAM(S): 50 CAPSULE, EXTENDED RELEASE ORAL at 05:54

## 2021-01-01 RX ADMIN — CLOPIDOGREL BISULFATE 75 MILLIGRAM(S): 75 TABLET, FILM COATED ORAL at 12:26

## 2021-01-01 RX ADMIN — Medication 1 CAPSULE(S): at 16:59

## 2021-01-01 RX ADMIN — MORPHINE SULFATE 5 MILLIGRAM(S): 50 CAPSULE, EXTENDED RELEASE ORAL at 19:01

## 2021-01-01 RX ADMIN — MORPHINE SULFATE 2 MILLIGRAM(S): 50 CAPSULE, EXTENDED RELEASE ORAL at 05:34

## 2021-01-01 RX ADMIN — Medication 1 CAPSULE(S): at 17:41

## 2021-01-01 RX ADMIN — Medication 40 MILLIGRAM(S): at 04:47

## 2021-01-01 RX ADMIN — Medication 1 CAPSULE(S): at 11:54

## 2021-01-01 RX ADMIN — MORPHINE SULFATE 5 MILLIGRAM(S): 50 CAPSULE, EXTENDED RELEASE ORAL at 00:43

## 2021-01-01 RX ADMIN — SENNA PLUS 2 TABLET(S): 8.6 TABLET ORAL at 23:08

## 2021-01-01 RX ADMIN — ATORVASTATIN CALCIUM 40 MILLIGRAM(S): 80 TABLET, FILM COATED ORAL at 22:09

## 2021-01-01 RX ADMIN — ROBINUL 0.4 MILLIGRAM(S): 0.2 INJECTION INTRAMUSCULAR; INTRAVENOUS at 10:05

## 2021-01-01 RX ADMIN — ATORVASTATIN CALCIUM 40 MILLIGRAM(S): 80 TABLET, FILM COATED ORAL at 22:03

## 2021-01-01 RX ADMIN — Medication 650 MILLIGRAM(S): at 18:05

## 2021-01-01 RX ADMIN — MORPHINE SULFATE 2 MILLIGRAM(S): 50 CAPSULE, EXTENDED RELEASE ORAL at 12:20

## 2021-01-01 RX ADMIN — ROBINUL 0.4 MILLIGRAM(S): 0.2 INJECTION INTRAMUSCULAR; INTRAVENOUS at 12:07

## 2021-01-01 RX ADMIN — SENNA PLUS 2 TABLET(S): 8.6 TABLET ORAL at 22:24

## 2021-01-01 RX ADMIN — Medication 1 CAPSULE(S): at 11:23

## 2021-01-01 RX ADMIN — Medication 1 TABLET(S): at 06:26

## 2021-01-01 RX ADMIN — MORPHINE SULFATE 2 MILLIGRAM(S): 50 CAPSULE, EXTENDED RELEASE ORAL at 10:44

## 2021-01-01 RX ADMIN — Medication 1 MILLIGRAM(S): at 04:05

## 2021-01-01 RX ADMIN — SODIUM CHLORIDE 3 MILLILITER(S): 9 INJECTION INTRAMUSCULAR; INTRAVENOUS; SUBCUTANEOUS at 06:03

## 2021-01-01 RX ADMIN — SODIUM CHLORIDE 3 MILLILITER(S): 9 INJECTION INTRAMUSCULAR; INTRAVENOUS; SUBCUTANEOUS at 13:43

## 2021-01-01 RX ADMIN — SODIUM CHLORIDE 1000 MILLILITER(S): 9 INJECTION, SOLUTION INTRAVENOUS at 21:29

## 2021-01-01 RX ADMIN — HEPARIN SODIUM 5000 UNIT(S): 5000 INJECTION INTRAVENOUS; SUBCUTANEOUS at 05:45

## 2021-01-01 RX ADMIN — PANTOPRAZOLE SODIUM 40 MILLIGRAM(S): 20 TABLET, DELAYED RELEASE ORAL at 06:27

## 2021-01-01 RX ADMIN — CLOPIDOGREL BISULFATE 75 MILLIGRAM(S): 75 TABLET, FILM COATED ORAL at 11:54

## 2021-01-01 RX ADMIN — SENNA PLUS 2 TABLET(S): 8.6 TABLET ORAL at 21:29

## 2021-01-01 RX ADMIN — MORPHINE SULFATE 5 MILLIGRAM(S): 50 CAPSULE, EXTENDED RELEASE ORAL at 11:12

## 2021-01-01 RX ADMIN — Medication 1 TABLET(S): at 17:05

## 2021-01-01 RX ADMIN — Medication 25 MILLIGRAM(S): at 05:52

## 2021-01-01 RX ADMIN — MORPHINE SULFATE 2 MILLIGRAM(S): 50 CAPSULE, EXTENDED RELEASE ORAL at 10:38

## 2021-01-01 RX ADMIN — ATORVASTATIN CALCIUM 40 MILLIGRAM(S): 80 TABLET, FILM COATED ORAL at 21:43

## 2021-01-01 RX ADMIN — PANTOPRAZOLE SODIUM 40 MILLIGRAM(S): 20 TABLET, DELAYED RELEASE ORAL at 17:53

## 2021-01-01 RX ADMIN — Medication 1 CAPSULE(S): at 08:48

## 2021-01-01 RX ADMIN — SODIUM CHLORIDE 3 MILLILITER(S): 9 INJECTION INTRAMUSCULAR; INTRAVENOUS; SUBCUTANEOUS at 12:05

## 2021-01-01 RX ADMIN — MORPHINE SULFATE 5 MILLIGRAM(S): 50 CAPSULE, EXTENDED RELEASE ORAL at 00:09

## 2021-01-01 RX ADMIN — Medication 1 MILLIGRAM(S): at 13:50

## 2021-01-01 RX ADMIN — CEFTRIAXONE 100 MILLIGRAM(S): 500 INJECTION, POWDER, FOR SOLUTION INTRAMUSCULAR; INTRAVENOUS at 05:29

## 2021-01-01 RX ADMIN — MORPHINE SULFATE 2 MILLIGRAM(S): 50 CAPSULE, EXTENDED RELEASE ORAL at 21:17

## 2021-01-01 RX ADMIN — MORPHINE SULFATE 2 MILLIGRAM(S): 50 CAPSULE, EXTENDED RELEASE ORAL at 01:35

## 2021-01-01 RX ADMIN — Medication 1 TABLET(S): at 18:10

## 2021-01-01 RX ADMIN — Medication 1 CAPSULE(S): at 12:05

## 2021-01-01 RX ADMIN — SODIUM CHLORIDE 3 MILLILITER(S): 9 INJECTION INTRAMUSCULAR; INTRAVENOUS; SUBCUTANEOUS at 14:24

## 2021-01-01 RX ADMIN — Medication 1 CAPSULE(S): at 09:58

## 2021-01-01 RX ADMIN — Medication 1 MILLIGRAM(S): at 07:44

## 2021-01-01 RX ADMIN — FENTANYL CITRATE 1 PATCH: 50 INJECTION INTRAVENOUS at 07:42

## 2021-01-01 RX ADMIN — MORPHINE SULFATE 2 MILLIGRAM(S): 50 CAPSULE, EXTENDED RELEASE ORAL at 02:45

## 2021-01-01 RX ADMIN — PANTOPRAZOLE SODIUM 40 MILLIGRAM(S): 20 TABLET, DELAYED RELEASE ORAL at 17:12

## 2021-01-01 RX ADMIN — BENZOCAINE AND MENTHOL 1 LOZENGE: 5; 1 LIQUID ORAL at 17:41

## 2021-01-01 RX ADMIN — SODIUM CHLORIDE 1000 MILLILITER(S): 9 INJECTION, SOLUTION INTRAVENOUS at 01:44

## 2021-01-01 RX ADMIN — MORPHINE SULFATE 2 MILLIGRAM(S): 50 CAPSULE, EXTENDED RELEASE ORAL at 20:50

## 2021-01-01 RX ADMIN — MORPHINE SULFATE 5 MILLIGRAM(S): 50 CAPSULE, EXTENDED RELEASE ORAL at 06:15

## 2021-01-01 RX ADMIN — MORPHINE SULFATE 2 MILLIGRAM(S): 50 CAPSULE, EXTENDED RELEASE ORAL at 12:33

## 2021-01-01 RX ADMIN — MORPHINE SULFATE 2 MILLIGRAM(S): 50 CAPSULE, EXTENDED RELEASE ORAL at 02:30

## 2021-01-01 RX ADMIN — Medication 1 CAPSULE(S): at 12:48

## 2021-01-01 RX ADMIN — CEFEPIME 100 MILLIGRAM(S): 1 INJECTION, POWDER, FOR SOLUTION INTRAMUSCULAR; INTRAVENOUS at 21:29

## 2021-01-01 RX ADMIN — MORPHINE SULFATE 2 MILLIGRAM(S): 50 CAPSULE, EXTENDED RELEASE ORAL at 08:06

## 2021-01-01 RX ADMIN — FENTANYL CITRATE 1 PATCH: 50 INJECTION INTRAVENOUS at 18:53

## 2021-01-01 RX ADMIN — Medication 20 MILLIGRAM(S): at 05:55

## 2021-01-01 RX ADMIN — CEFEPIME 2000 MILLIGRAM(S): 1 INJECTION, POWDER, FOR SOLUTION INTRAMUSCULAR; INTRAVENOUS at 23:52

## 2021-01-01 RX ADMIN — Medication 650 MILLIGRAM(S): at 01:35

## 2021-01-01 RX ADMIN — Medication 650 MILLIGRAM(S): at 17:05

## 2021-01-01 RX ADMIN — Medication 2 CAPSULE(S): at 11:41

## 2021-01-01 RX ADMIN — MORPHINE SULFATE 4 MILLIGRAM(S): 50 CAPSULE, EXTENDED RELEASE ORAL at 15:00

## 2021-01-01 RX ADMIN — PANTOPRAZOLE SODIUM 40 MILLIGRAM(S): 20 TABLET, DELAYED RELEASE ORAL at 05:20

## 2021-01-01 RX ADMIN — HEPARIN SODIUM 5000 UNIT(S): 5000 INJECTION INTRAVENOUS; SUBCUTANEOUS at 06:28

## 2021-01-01 RX ADMIN — MORPHINE SULFATE 2 MILLIGRAM(S): 50 CAPSULE, EXTENDED RELEASE ORAL at 21:48

## 2021-01-01 RX ADMIN — Medication 81 MILLIGRAM(S): at 09:19

## 2021-01-01 RX ADMIN — Medication 1 TABLET(S): at 04:47

## 2021-01-01 RX ADMIN — ROBINUL 0.4 MILLIGRAM(S): 0.2 INJECTION INTRAMUSCULAR; INTRAVENOUS at 21:29

## 2021-01-01 RX ADMIN — SODIUM CHLORIDE 3 MILLILITER(S): 9 INJECTION INTRAMUSCULAR; INTRAVENOUS; SUBCUTANEOUS at 08:14

## 2021-01-01 RX ADMIN — HEPARIN SODIUM 5000 UNIT(S): 5000 INJECTION INTRAVENOUS; SUBCUTANEOUS at 17:08

## 2021-01-01 RX ADMIN — ONDANSETRON 4 MILLIGRAM(S): 8 TABLET, FILM COATED ORAL at 17:06

## 2021-01-01 RX ADMIN — Medication 250 MILLIGRAM(S): at 23:54

## 2021-01-01 RX ADMIN — SCOPALAMINE 1 PATCH: 1 PATCH, EXTENDED RELEASE TRANSDERMAL at 19:19

## 2021-01-01 RX ADMIN — SCOPALAMINE 1 PATCH: 1 PATCH, EXTENDED RELEASE TRANSDERMAL at 17:02

## 2021-01-01 RX ADMIN — Medication 650 MILLIGRAM(S): at 22:43

## 2021-01-01 RX ADMIN — CEFTRIAXONE 100 MILLIGRAM(S): 500 INJECTION, POWDER, FOR SOLUTION INTRAMUSCULAR; INTRAVENOUS at 06:34

## 2021-01-01 RX ADMIN — MORPHINE SULFATE 5 MILLIGRAM(S): 50 CAPSULE, EXTENDED RELEASE ORAL at 11:45

## 2021-01-01 RX ADMIN — Medication 650 MILLIGRAM(S): at 02:30

## 2021-01-01 RX ADMIN — FENTANYL CITRATE 1 PATCH: 50 INJECTION INTRAVENOUS at 20:44

## 2021-01-05 NOTE — ASSESSMENT
[FreeTextEntry1] : 70 year old F with H/O metastatic disease diagnosed in June 2020 and is s/p 9 cycles of chemotherapy which was initiated with FOLFIRINOX and ended with FOLFOX with discontinuation of irinotecan due to severe side effects requiring hospitalization. Now FOLFOX is discontinued due to the development of septic shock and transaminitis. The current plan is to initiate Mineral/Abraxane+onpro. \par \par 1-Therapeutic: \par -----------------\par Pancreatic cancer with mets to lungs.\par Started patient on FOLFORINOX regimen.She received alternate cycles of FOLFOX with FOLFIRI- since she developed AE  .She was treated with 4 cycles of FOLFOX- She was hospitalized after her last chemo for sepsis-\par Switched her chemo to Mineral/ Abraxane- every 2 weeks.\par Sustained MI on 11/24 - Had a stent placed RCA.\par Resumed chemo with dose reduced Mineral/ Abraxane- 700/ 70 - \par \par \par \par 2-Diagnostic: \par ---------------\par Labs:Monthly CEA, , \par Ca 19.9- 142- 47---->75->47->42->41->53->45\par CEA: 1.9--->2-->1.7-->1.9->1.4->1.1\par Restaging scan stable disease with regression of tumor - 39.4% regression \par \par 3- Pharmacogenetics testing:\par ----------------------------------\par -Foundation Liquid- LIBAN , TMB 4 Mut/Mb \par \par 4- Procedures:\par -----------------\par - S/P  Port-A-Cath placement on 06/15/20\par - PORT CARE\par - EMLA CREAM PRESCRIBED\par \par 5-Supportive: \par -----------------\par MI - S/P Stent- Will need cardiology eval to ascertain cause of MI . -Experiencing SOB- Discussed with Cardiology- switched  Brillanta to Plavix.\par Pancreatic insufficiency: CREON - [address]68[address] units (2 pills with each snack and 4 pills with each meal) for pancreatic replacement\par DVT (PE): High risk, close monitoring\par Antiemetic: Zofran PRN and Compazine PRN\par Emla cream: for port numbing before use\par Prevention of mouth sores: Biotene mouthwash\par Skin reactions: Udderly smooth cream and Vitamin B 6 100 mg BID for prevention of HFS\par Diarrhea: The usual dose of Imodium is 4 milligrams (mg) (2 capsules) after the first loose bowel movement, and 2 mg (1 capsule) after each loose bowel movement after the first dose has been taken. No more than 16 mg (8 capsules) should be taken in any twenty-four-hour period. \par \par \par 6- RTC:  \par --------\par \par F/u for next chemo\par

## 2021-01-05 NOTE — HISTORY OF PRESENT ILLNESS
[Disease: _____________________] : Disease: [unfilled] [M: ___] : M[unfilled] [AJCC Stage: ____] : AJCC Stage: [unfilled] [3] : 3, Severe [4] : 4, Life-threatening [Date: ____________] : Patient's last distress assessment performed on [unfilled]. [4 - Distress Level] : Distress Level: 4 [de-identified] : Diagnosis: Pancreatic adenocarcinoma \par \par Genetic Testing: Foundation Liquid- LIBAN , TMB 4 Mut/Mb \par \par Other Current Medical Problems: Arthritis/ HLD \par \par Treatment History:\par \par Cycle # 1 06/17/20- FOLFORINOX- 5 FU 2400 mg/ kg /  mg/ m 2/ Irinotecan 150 mg/ m 2 / Oxaliplatin 85 mg/ m2\par Cycle # 2 06/30/20- FOLFORINOX- 5 FU 2400 mg/ kg /  mg/ m 2/ Irinotecan 140 mg/ m 2/ Oxaliplatin 85 mg/ m2\par Cycle # 3- 07/15/20- Skipped Irinotecan / Oxaliplatin-  (ANC 1.4) - 5 FU 2400 mg/ kg /  mg/ m 2\par Cycle #4 - 07/29/20- FOLFOX-  5 FU 2000 mg/ kg /  mg/ m 2/ Oxaliplatin 85 mg/ m2-----------------Tolerated well without Irinotecan \par \par Restaging Scans---------------Stable disease \par Cycle # 5 - 08/12/20- FOLFOX- 5 FU 2000 mg/ kg /  mg/ m 2/ Oxaliplatin 85 mg/ m2--------\par Cycle # 6- 08/26/20-FOLFIRI-  5 FU 2000 mg/ kg /  mg/ m 2/ Irinotecan 150 mg- Held oxaliplatin \par \par Hospitalized at [facility] for adverse reaction from Irinotecan- 8/28- 8/30-------------Will hold IRINOTECAN in future- \par \par Cycle # 7 09/09/20 - 5 FU 2000 mg/  mg/ m2 -----------Oxali was held since ANC was 1.29 \par Cycle # 8 -09/23/20- 5 FU 2000 mg/  mg/ m2 / Oxaliplatin 85 mg/ m2 \par \par Restaging Scans- Stable disease \par \par Cycle #9 -10/6/20-  5 FU 2000 mg/  mg/ m2 / Oxaliplatin 85 mg/ m2 \par \par Hospitalized for sepsis after chemotherapy \par \par Cycle #1 10/27/20 Gemcitabine 1000 mg/m2 + Abraxane 100 mg/m2\par Cycle # 2 11/9/20- Gemcitabine 1000 mg/m2 + Abraxane 100 mg/m2\par Cycle # 3 11/23/20-  Gemcitabine 1000 mg/m2 + Abraxane 100 mg/m2\par \par Hospitalized for Acute MI - Had a cardiac stent - 11/23/20\par \par Restaging scans- Interval regression in hepatic mass/ mediastinal and retroperitoneal lymph nodes\par \par Cycle # 4 12/21/20- Gemcitabine 700 mg/ m2 and Abraxane 70 mg/ m2\par Cycle # 5 1/4/21-  Gemcitabine 700 mg/ m2 and Abraxane 70 mg/ m2\par \par Oncological History :\par Ms. Griselda Corona is a 70 year old female who presents for initial consultation in our [facility] for a new diagnosis of pancreas adenocarcinoma. \par Patient states she had gastrointestinal symptoms dating back to Nov 2019, including discomfort, bloating, diarrhea followed by small amounts of blood per rectum. She eventually went to GI doctor Dr Florian Pace.  She had a Colonoscopy in Feb 20 that showed small ulcer. She went the ER at [facility] 3/10/20 for abdominal pain.  CT of her abdomen was performed and showed 3.2 x 3.5 cm hypoenhancing mass in the pancreatic head/ body concerning for malignancy. Mass results in severe compression of SMV. Retroperitoneal lymphadenopathy concerning for metastatic disease with multiple nodules in lung base concerning for metastatic disease.\par \par She was referred  to Dr Bernabe for EUS, which was performed June 1st 2020, significant for an ill-defined, heterogeneous, hypoechoic lesion seen in the head of pancreas. The lesion abutted but did not invade the SMV. The main pancreatic duct was dilated and the parenchyma was heterogeneous within the pancreatic body/tail. Fine needle biopsy was performed.\par \par Pathology: Gastric mucosa positive for H pylori. Pancreas mass: Invasive adenocarcinoma.\par \par Patient is taking medication regimen for the H.Pylori infection, otherwise not taking medications except for multivitamin supplement. \par \par \par FHX: No hx of Cancer \par \par Socail HX: Lives with her children, , Non smoker, no alcohol use\par \par PSX: No surgeries in past \par \par \par Disease: Pancreatic adenocarcinoma \par Pathology:\par \par AJCC Stage : Stage 4 \par \par Tumor Markers: CA 19.9 was 155 \par \par 6/10/20- MsClaudia Desouza is seen in [facility] clinic. She states he has abdominal pain 7-8/10 and gets relief with Tylenol. She has had 2 lbs weight loss- and appetite was decreased .\par Ct abd/ pelvis/ chest- Large pancreatic mass suspicious for adenocarcinoma, with marked arterial \par and venous involvement. Numerous bilateral pulmonary nodules new since 2018 and suspicious for metastatic disease. Retroperitoneal and peripancreatic lymphadenopathy. \par Omental nodule raising the possibility of peritoneal carcinomatosis. \par \par 06/17/20- MsClaudia Desouza is seen for follow up. She had the port placed earlier last week and is scheduled for her chemotherapy today . We discussed the chemo side effects and what to expect from chemotherapy- SE- Drug sheet was provided Risks/ benefits and alternatives of chemotherapy were discussed and included but not limited to fatigue,low blood counts, nausea/vomiting, skin reactions, hair thinning, possible blood transfusion requirement,increased risk of infection,neuropathy. Patient agreed to above and informed consent was signed.PET Scan- done- on 06/16/20-FDG-avid mass in pancreatic body and neck corresponds to known malignancy. Hypermetabolic left periaortic lymphadenopathy is compatible with metastatic disease.  Multiple bilateral pulmonary nodules, not significantly changed as compared to CT dated 6/9/2020, compatible with metastatic disease. \par \par 6/30/20 - Here for Cycle 2 \par 1- Developed G3 fatigue following C1 , and had to remain in bed for at least 4 days; son was cooking, she was able to go to bathroom and shower by herself\par 2- Also had decreased appetite and G1 nausea but no vomiting\par 3- Declines neuropathy\par 4- Denies diarrhea\par 5- No fever\par 6- No sxs suggestive of COVID-19\par 7- lost weight upto 10 lbs\par 8- also I am aware of her reaction to IRIN during infusion\par \par \par 7/8/20- Recieved a call from patient stating she had episodes of vomiting yesterday. She went out yesterday in the summer sun  and had some food from outside and she has been throwing up. She states she lost weight >30 lbs- She states the Creon has been causing a lot of abdominal pain - She has been experiencing some abdominal pain but is afraid to use the Oxycodone or the medical marijuana. \par She lives with her 2 sons and her sister in law has been helping her intermittently - However she needs a lot of reinstatement of instructions- Explained to her that she needs to take the Pain meds when needed and the pain is not from Creon. \par She is requesting for a Home COVID swab - will arrange for the same. \par \par 7/17/20- Pt was scheduled for C#3 with FOLFOX instead of FOLFORINOX to identify the culprit for the reaction. But her ANC was 1.4 so held Oxaliplatin and gave only 5 FU and LCV. She tolerated this chemo better did not have any reaction like previously. She denies N/V/D. She has been experiencing spasms in her fingers after the previous chemotherapy with FOLFORINOX- Her tumor markers CEA was rising and is concerning from 0.7- 3.6- 7.9.Patient states she was diagnosed with LUPUS by her PCP in March Unclear if she is on any treatment for the same. Will get the medical records from her PCP.\par \par 7/29/20- Pt is scheduled for C#4 with FOLFOX instead of FOLFORINOX to identify the culprit for the reaction. She tolerated the last chemotherapy well. She has been eating better and denies N/V. Today she tolerated the chemo with FOLFOX well - No reaction. next cycle we plan to give FOLFIRI\par \par 8/10//20- Pt is seen for TEB visit after she had the restaging scans done- She had CT scan done that showed- Metastatic pancreatic adenocarcinoma. Stable disease. She tolerated the chemo well without the irinotecan. we answered all her questions and concerns. Her son was not in the telephone conference. Dr Paul left a message with the son on his cell phone. Plan is to continue with the chemo \par \par 8/26/20- Ms. Desouza is seen for follow up- She tolerated the last chemo well- denies N/V/D. She denies any neuropathy - Denies pain - does not use mediacl marijuana- \par Explained to her that she will receive FOLFIRI this cycle and to inform if she develops any reaction to the same. \par \par 09/3/20- Ms Deo has a telephone visit with me. She was hospitalized at [facility] after her last chemo- when she came for the disconnect- She was hospitalized at [facility] from 8/28/20 to 8/30/20- When she came for the disconnect her BP was low and she was bradycardiac and after a fluid challenge she still did not recover- She was sent to hospital for further work up where Blood cx/ Ua/ Urine cx was negative and it was deemed that she was dehydrated and no infectious process.\par \par Spoke with the patient who states she had a rough time with this chemotherapy and she is feeling better now- however she has been having episodes of diarrhea and nausea. She has been taking the anti nausea meds and that has helped her. She feels that she dis better with FOLFOX - Discussed with Dr Paul and plan is to hold off on nay future doses of Irinotecan.\par \par 09/9/20- Ms Deo is seen for C #7 - Her ANC was 1.23- Discussed with Dr Paul and plan to hold off on Oxaliplatin - Pt will get 5 FU and LCV - She will receive onpro on disconnect day. I explained to her hat she will not get any mores doses of Irinotecan and to keep a close eye on any symptoms she develop post chemotherapy. She denies pain/ N/V/D. \par \par 09/23/20- Ms. Desouza is seen in office for follow up on C# 8. She denies n/v/dizziness/ neuropathy . Her appetite is  good, she has good energy level and is tolerating chemo well. She will get her restaging scans done after this round of chemo. Advised she can bring her son in for the next office appointment with Dr Paul to discuss the results of the scan. \par \par 10/6/20-Ms. Desouza is seen in office for follow up after her restaging scans- SHe had restaging scans on 10/1/20 that showed metastatic pancreatic cancer with no interval change since 08/9/20 in pancreatic mass, lung nodules, and adenopathy / peritoneal nodules.  She has been tolerating this chemo regimen  well. today she presents to office with her son - Davina . We discussed that we can entertain the idea of RT aftre she completes 12 cycles of Chemo.She complainst that she has been having episodes where she feels her head is usually hot and she feels sweaty. 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 \par \par 10/22/20-Patient was admitted at [facility] for sepsis and needed vasopressors for pressure support. She was in the ICU for a short time - She received IV abx for Likely bacterial pneumonia. While she was in the hospital her LFTS were still elevated- Unclear etiology for elevated LFTs- Likely chemo related vs Antibiotics- Pt is feeling better after she is discharged home. plan  is to omit FOLFOX in future and plan for [place] Abraxane with Onpro. Will recheck labs to confirm her LFTs are downtrending. Plan to start her on gem- Abraxane if LFTS normalize. \par Her scans in the hospital showed- Metastatic pancreatic cancer involving the retroperitoneal lymph nodes and visualized bilateral lungs. Stable disease compared to 10/1/2020. \par Small volume ascites, new since 10/1/2020. \par \par Drug sheet was provided Risks/ benefits and alternatives of chemotherapy were discussed and included but not limited to fatigue,low blood counts, nausea/vomiting, skin reactions, hair thinning, possible blood transfusion requirement,increased risk of infection,neuropathy. Patient agreed to above and informed consent was signed.\par \par 10/27/2020: Pt is here to start a new chemo regimen with [place]/Abraxane with onpro. Irinotecan was discontinued in August due to severe fatigue, hypotension resistant to fluid challenge and bradycardia. Later patient developed septic shock post FOLFOX chemo and is hence discontinued. LFTs are normal with AST 38, ALT 31, ALP elevated 170, normal T. Rah 0.2mg/dl. Pt c/o mild fatigue and does not have any other complains. Denies any F/C, N/V,D/P, change in appetite or weight loss. \par Discussed the chemo regimen with Highland/ Abraxane- answered all questions and concerns.\par \par 11/9//20; Ms Desouza is seen as follow up for C# 2. She complains of fatigue which is more prominent this cycle- She denies fevers. She does complain of nausea after chemo . She had some Constipation that was resolved with Miralax. Her appetite is diminished  and sleep is disturbed- Unable to sleep through the night. Will order Remeron 15 mg Q HS for appetite and sleep.\par \par 11/23/20; Ms Desouza is seen as follow up for C#3. She feels well. She feels this chemo is more tolerable. She has fatigue that lasts for 1-2 days after chemo and the week she is off chemo - she feels well. She does experience some nausea  and constipation occasionally that is relived by anti nausea meds and MiraLAX. She denies  neuropathy.\par Denies pain or discomfort. Filled out the paperwork for FMLA- for son Davina. she notices darkening of her toes -on the right foot - The first second and fourth digit noted.\par \par 12/8//20; Ms Desouza is seen as follow up - In the interim she was hospitalized at [facility] on 11/23/20 - for Acute MI and had a cardiac stent placed. She is scheduled to see Dr Toussaint for follow up next week. She is now on Brilanta/ Metoprolol/ Atorvastatin.\par She feels very tired and feels wiped out - She denies N/V/D/ Constipation. Her abdomen is distended- She has some lower back pain  which is new - Will plan to schedule a CT scan A/P/C prior to the next chemotherapy- Will arrange for a TEB visit with Dr Paul after completion of scans- Her f/u appt with Dr Toussaint is on 12/18/20.\par She complains of feeling dizzy at times- Does not have a BP machine at home- \par \par 12/15/20:  Ms Desouza is seen as TEB viist to discuss the scans results. She had the restaging scans on 12/14 that showed interval regression in hepatic mass , mediastinal and retroperitoneal lymph nodes. Interval stability in size and number of pulm metastases. Overall 39% regression of tumor based on RECIST . She has a follow up with cardiologist following week. \par \par 12/21/20:  Ms Desouza is seen for follow up .In the interim she was seen by Cardiology - Dr Toussaint- \par She endorses she has episodes of light headed ness- Today her BP was in low 90's . She states she feels fatigued from chemotherapy - We discussed that we dose reduced the chemo this cycle- She also mentioned that she has been having some shortness of breath even at rest- Emailed Cardiology Dr Toussaint and think Brillanta can cause SOB- Plan is to switch Brillanta to Plavix- \par \par \par  [de-identified] : adenocarcinoma [de-identified] : 1/4/21:  Ms Desouza is seen for follow up .In the interim she was seen by Cardiology - Dr Toussaint- Brilanta was switched to Plavix and her shortness of breath has improved ever since- She feels well - She does experience some numbness and pins and needles feeling on her shins b/l .Abraxane was reduced to 70 mg/m2 - Will discuss with Dr Paul if dose needs to be further reduced. Neuropathy is grade 1 .  [FreeTextEntry3] : Hypotension, bradycardia  [FreeTextEntry4] : 6/17/20 [FreeTextEntry5] : IRIN [FreeTextEntry2] : x 4 daYS [FreeTextEntry8] : Sepsis, transaminitis  [de-identified] : OXaliplatin

## 2021-01-05 NOTE — PHYSICAL EXAM
[Restricted in physically strenuous activity but ambulatory and able to carry out work of a light or sedentary nature] : Status 1- Restricted in physically strenuous activity but ambulatory and able to carry out work of a light or sedentary nature, e.g., light house work, office work [Normal] : grossly intact [de-identified] : Ocaasional cramps, constipation relieved.

## 2021-01-05 NOTE — END OF VISIT
[FreeTextEntry3] : \par \par Recovering from acute MI\par Under care by cardio-oncology\par \par Restaging scan showed: 39.3% shrinkage on current chemo\par \par Plan to resume chemo\par \par Fabian Paul MD\par  [FreeTextEntry2] : Gemcitabine is not considered a cardiotoxic agent generally; so far only very few case reports have been reported in the literature on different aspects of cardiac side effects. \par \par Anecdotal reports of cardiotoxicity including congestive cardiac failure, cardiomyopathy and MI when treated with gemcitabine in patients with pancreatic cancers have been reported. \par \par  As per available literature, patients with diabetes and having received a total dose greater than 15,000 mg/m2 are generally at a higher risk and require close surveillance.\par \par [email or web address].com/Article/Fulltext/353401\par \par Ophelia FM, Gerardo SP, Stan PF, Veto MM. Gemcitabine and acute myocardial infarction--a case report. Angiology. 2006 May-Jun;57(3):367-71. doi: 10.1177/[id number]. PMID: 44679559.\par \par Gemcitabine is a highly liposoluble molecule, and its clearance yields a primary deamination metabolite, difluorodeoxyuridine (dFdU). Gemcitabine’s pharmacokinetics and distribution volumes depend on infusion modality and are influenced by patient’s factors that include age, gender, infusion length and body surface area. There is a paucity of data regarding gemcitabine cardiotoxicity and a review of phase II - III clinical trials showed a low cardiotoxic profile and extremely rare cardiac events after gemcitabine infusion: ventricular tachyarrhythmias in 0-1.4%, reduction of left ventricular ejection fraction in 0.2-0.9% and exudative pericarditis in 0.2%.\par \par Though the mechanisms of gemcitabine-related myocardial ischemia are not well understood, some authors have speculated direct endothelial injury resulting in acute coronary thrombosis as a possible mechanism. The evidence for prothrombotic and procoagulant effects of gemcitabine comes from other macrovascular events such as strokes, visceral infractions and vasculitides that were reported in association with gemcitabine infusion. Guilhermeura et al described higher incidence of thrombosis and vascular events in patients treated with gemcitabine. There is also some conjecture about coronary vasospasm as being another possible pathogenetic mechanism. It was proposed by Shereen et al as a cause of chest pain and a new-onset left bundle branch block during gemcitabine infusion. In that particular case, vasospasm, chest pain and EKG changes had resolved within 10 min of antianginal therapy.\par \par \par \par Adan ET, Breezy AT, Laquita DJ, Marco SW, Vargas J, Pedrito C, Prudence HAYLEY, et al. Cardiovascular complications of cancer therapy: diagnosis, pathogenesis, and management. Circulation. 2004;109(25):1998-3344.\par \par Maricarmen C, Pema F, Scooby P, Kobi F, Jarrett E, Rolando SOSA, Adam X, et al. Gemcitabine as a single agent in the treatment of relapsed or refractory low-grade non-Hodgkin's lymphoma. Br J Haematol. 2001;113(3):772-778.\par \par Howard QUEZADA, Xiomara OVALLE, Florian SOSA. Safety profile of gemcitabine. Anticancer Drugs. 1995;6(Suppl 6):27-32.\par \par Shereen B, Smith G, Kyle U, Shaw E, Glenn G, Noe S, Lavelle R, et al. Gemcitabine-induced acute coronary syndrome: a case report. Med Princ Pract. 2009;18(1):76-80.\par \par Ophelia FM, Gerardo SP, Pierce PF, Veto MM. Gemcitabine and acute myocardial infarction - a case report. Angiology. 2006;57(3):367-371.\par \par Addie ALCOCER, Dequan GOODMAN. Gemcitabine-induced coronary vasospasm: A case report. [facility]. 2017;45(2):172-175.\par Kathleen T, Saad M, Vikram MONIQUE. Acute myocardial infarction following gemcitabine therapy - a case report. Angiology. 1999;50(12):7931-3849.\par \par [email or web address].bond.com/doi/full/10.1002/[email or web address]\par \par [email or web address]\par \par \par ABRAXANE:\par \par per PI:\par Cardiovascular: Hypotension, during the 30-minute infusion, occurred in 5% of patients in the\par randomized metastatic breast cancer trial. Bradycardia, during the 30-minute infusion, occurred\par in <1% of patients. These vital sign changes most often caused no symptoms and required\par neither specific therapy nor treatment discontinuation.\par Severe cardiovascular events possibly related to single-agent ABRAXANE occurred in\par approximately 3% of patients in the randomized trial. These events included chest pain, cardiac\par arrest, supraventricular tachycardia, edema, thrombosis, pulmonary thromboembolism,\par pulmonary emboli, and hypertension. Cases of cerebrovascular attacks (strokes) and transient\par ischemic attacks have been reported rarely.\par Electrocardiogram (ECG) abnormalities were common among patients at baseline. ECG\par abnormalities on study did not usually result in symptoms, were not dose-limiting, and required\par no intervention. ECG abnormalities were noted in 60% of patients in the metastatic breast\par cancer randomized trial. Among patients with a normal ECG prior to study entry, 35% of all\par patients developed an abnormal tracing while on study. The most frequently reported ECG\par modifications were non-specific repolarization abnormalities, sinus bradycardia, and sinus tachycardia.\par \par https://www.accessdata.fda.gov/drugsatfda_docs/label/[email or web address].pdf\par

## 2021-01-07 NOTE — ED PROVIDER NOTE - PROGRESS NOTE DETAILS
Pablo Yang D.O., PGY2: ED Provider Sepsis Reassessment Note:   Patient evaluated after fluid administration:  -LUNGS: CTA b/l; CV: RRR, DP pulse 2+ b/l; SKIN- Cap. refill < 2s; EXTREMITIES: LE warm, no edema  Patient responding well to resuscitation.

## 2021-01-07 NOTE — ED ADULT TRIAGE NOTE - CHIEF COMPLAINT QUOTE
S/P chemo 2 days ago with fever after treatment.  102.3 yesterday and today 101.0.  Pt endorses getting fever after chemo.  Denies any Covid-19 symptoms

## 2021-01-07 NOTE — ED PROVIDER NOTE - OBJECTIVE STATEMENT
70F hx of pancreatic cancer, currently getting chemo via chemoport, last 2 days ago, MI on plavix, HTN, presenting to the ED for fever, tmax 102 after chemo as well as sore throat (has been ongoing) and bilateral knee pain (new since chemo). Denies chest pain, shortness of breath, cough, chills, abdominal pain, diarrhea, urinary sxs. Took tylenol 1g this AM, none since 6hrs.     Oncologist: Dr. Paul

## 2021-01-07 NOTE — ED ADULT NURSE NOTE - OBJECTIVE STATEMENT
A&Ox4 and ambulatory. Pt. has hx of pancreatic CA, on chemo Q 2 weeks. Pt. also states that she had an MI r/t chemo about 1 month ago. Last chemotherapy was 2 days ago, yesterday pt. reports having a fever and sore throat. Pt. states that this usually happens after receiving chemotherapy. Pt. has unacessed right wall double chest port. Area has no redness, drainage or swelling. Denies any SOB, CP, abdominal pain or back pan.

## 2021-01-07 NOTE — ED PROVIDER NOTE - PHYSICAL EXAMINATION
GENERAL: elderly female, lying in bed, NAD. Vital signs are within normal limits  HEENT: NC/AT, conjunctiva noninjected, sclera anicteric, moist mucous membranes,  NECK: Supple, trachea midline  LUNG: CTAB, no w/r/r appreciated  CV: RRR, no m/r/g appreciated, Pulses- Radial: 2+ b/l; posterior tibial: 2+ b/l; dorsalis pedis: 2+ bilateral. chemo port   ABDOMEN: Soft, NTND, no rebound or guarding, no hernias, no pulsatile masses  BACK: No midline spinal tenderness or step-offs. No paraspinal tenderness to palpation. No CVA tenderness  MSK: No edema, no visible deformities, full range of motion UE/LE b/l, 5/5 muscle strength UE/LE b/l, nontender extremities  NEURO: AAOx4 (to person, place, time, event), sensation grossly intact, steady gait  SKIN: Warm, dry, well perfused, no evidence of rash  PSYCH: Normal mood and affect

## 2021-01-07 NOTE — ED PROVIDER NOTE - CLINICAL SUMMARY MEDICAL DECISION MAKING FREE TEXT BOX
70F hx pancreatitic cancer here for fevers w/o signs of possible source that started after chemo 2 days prior. Rectally febrile 100.2F s/p tylenol. Exam benign. Full range of motion bilateral LE, no erythema, swelling. Eval for sepsis and possible source. Check labs, abxs, fluids, reassess.

## 2021-01-07 NOTE — ED PROVIDER NOTE - PMH
GERD (gastroesophageal reflux disease)    Hyperlipidemia    Pancreatic adenocarcinoma    Systemic lupus erythematosus

## 2021-01-07 NOTE — ED PROVIDER NOTE - ATTENDING CONTRIBUTION TO CARE
DR. BLOCH, ATTENDING MD-  I performed a face to face bedside interview with patient regarding history of present illness, review of symptoms and past medical history. I completed an independent physical exam.  I have discussed patient's plan of care with the resident.  Patient examined, mildly chronically ill appearing,  NAD HEENT nml heart sounds nml lungs clear, abd soft nontender. neuro motor and sensory intact.

## 2021-01-08 NOTE — H&P ADULT - PROBLEM SELECTOR PLAN 5
Diet: Regular  DVT: lovenox  Dispo: pending improvement in symptoms -Patient reports taking 3 medications: metoprolol, atorvastatin, plavix; however patient was taking plavix BID instead of daily since starting it  -Patient does not endorse taking ASA  -Restarted ASA, c/w plavix, atorvastatin; hold metoprolol  -Will need careful medication management prior to discharge as patient seems to be forgetful at times

## 2021-01-08 NOTE — CONSULT NOTE ADULT - ATTENDING COMMENTS
I have seen the patient and reviewed the case in detail with Dr. Ludwig, the oncology consult fellow and agree with the assessment and recommendations as outlined in the note. Patient with pancreatic cancer on chemotherapy with gemcitabine/abraxane (last 1/4/21) admitted with fever, found to have urosepsis. Treatment of urosepsis per primary team. No oncologic treatment while inpatient. [place] with Dr. Paul [place] [facility] [place]

## 2021-01-08 NOTE — H&P ADULT - NSHPREVIEWOFSYSTEMS_GEN_ALL_CORE
CONSTITUTIONAL: No weakness, +fevers, no chills  EYES/ENT: No visual changes;  No vertigo, +throat pain   NECK: No pain or stiffness  RESPIRATORY: No cough, wheezing, hemoptysis; No shortness of breath  CARDIOVASCULAR: No chest pain or palpitations  GASTROINTESTINAL: No abdominal or epigastric pain. No nausea, vomiting, or hematemesis; No diarrhea or constipation. No melena or hematochezia.  GENITOURINARY: No dysuria, +frequency, no hematuria  NEUROLOGICAL: No numbness or tingling  SKIN: No itching, rashes Constitutional Symptoms: No weakness, +fevers, no chills  Eyes: No visual changes, headache, eye pain, double vision  Ears, Nose, Mouth, Throat: +throat pain, runny nose, sinus pain, ear pain, tinnitus, dysphagia, odynophagia  Cardiovascular: No chest pain, palpitations, edema  Respiratory: No cough, wheezing, hemoptysis, shortness of breath  Gastrointestinal: No abdominal pain, dysphagia, anorexia, nausea/vomiting, diarrhea/constipation, hematemesis, BRBPR, melena  Genitourinary: No dysuria, +frequency, no hematuria  Musculoskeletal: No joint pain, joint swelling, decreased ROM  Skin: No pruritus, rashes, lesions, wounds  Neurologic:  No seizures, headache, paraesthesia, numbness, limb weakness    Positives and pertinent negatives noted and all other systems negative.

## 2021-01-08 NOTE — CONSULT NOTE ADULT - ASSESSMENT
Admitted for fever with + UA. Undergoing infectious w/u and receiving broad spectrum abx. RCA PCI in late November managed with BMS. She completed 30 days of DAPT, but would continue aspirin and clopidogrel if tolerated.    - continue aspirin and clopidogrel  - continue statin and low dose metoprolol  - follow up with me outpatient for repeat echocardiogram    Please feel free to call me with questions or if I may be of assistance with her care.      Zachary Adair MD  Cardio-Oncology  [phone]
70 F w/ PMHx pancreatic cancer (last chemo 1/4), MI in 11/20 s/p stent placement (2/2 chemo) presenting to the ED for fever, sore throat, and knee pain after chemo; currently being treated for urosepsis.     # Metastatic pancreatic cancer  - Follows with Dr. Paul, s/p C5 gemzar/abraxane on 1/4  - No plans for inpatient chemo; treatment of urosepsis per primary team  - [facility] [place]; continue outpatient follow up     Rebeka Ludwig, PGY-4  Hematology-Oncology Fellow  [phone] ([place]) [phone] ([facility])  I can also be reached on [facility] Wellness Teams  Please page fellow on call after 5pm and on weekends

## 2021-01-08 NOTE — ED ADULT NURSE REASSESSMENT NOTE - NS ED NURSE REASSESS COMMENT FT1
Report received from RN Carey Wade. Pt. received A&Ox4, with 20G IV in left ac, right chest wall port not accessed at present. VS as noted. Pt. states her blood pressure is usually "90/50s," MD Yang aware of pt. blood pressure. Pt. denies dizziness, weakness. Respirations even and unlabored. Awaiting further orders. Will continue to monitor.

## 2021-01-08 NOTE — ED ADULT NURSE REASSESSMENT NOTE - NS ED NURSE REASSESS COMMENT FT1
VS as noted. Pt. remains A&Ox4, denies dizziness, weakness. Respirations even & unlabored. MD Link aware of pt. blood pressure. Fluids infusing as per order. As per MD, reassess after fluids completed. Will continue to monitor.

## 2021-01-08 NOTE — H&P ADULT - ASSESSMENT
71 yo F w/ PMHx pancreatic cancer (last chemo 1/4), MI in 11/20 s/p stent placement (2/2 chemo) presenting to the ED for fever, sore throat after chemo admitted for septic workup.

## 2021-01-08 NOTE — H&P ADULT - PROBLEM SELECTOR PLAN 4
-Patient reports taking 3 medications: metoprolol, atorvastatin, plavix; however patient was taking plavix BID instead of daily since starting it  -Patient does not endorse taking ASA  -Restarted ASA, c/w plavix, metoprolol, atorvastatin  -Will need careful medication management prior to discharge as patient seems to be forgetful at times -Patient reports taking 3 medications: metoprolol, atorvastatin, plavix; however patient was taking plavix BID instead of daily since starting it  -Patient does not endorse taking ASA  -Restarted ASA, c/w plavix, atorvastatin; hold metoprolol  -Will need careful medication management prior to discharge as patient seems to be forgetful at times -Acute MI 11/23/2020 s/p stent placement, per patient it was 2/2 chemo  -Per outpt cards note, patient had dyslipidemia w/ low HDL but no hypercholesterolemia, DM, HTN, or other precluding factors that suggest patient has underlying CAD  -pt was recently switched from Brilinta to Plavix 2/2 SOB  -Patient seemingly does not take all of her medications correctly (see below)  -pt on statin, BB, DAPT, but no ace/arb because patient has soft pressures

## 2021-01-08 NOTE — PROGRESS NOTE ADULT - PROBLEM SELECTOR PLAN 6
Diet: Regular  DVT: lovenox  Dispo: pending improvement in symptoms Diet: Regular  DVT: lovenox  Dispo: pending PT eval

## 2021-01-08 NOTE — H&P ADULT - PROBLEM SELECTOR PLAN 1
-Patient meets sepsis criteria: fever, leukocytosis, potential source (urine); also elevated lactate noted in ED which improved with IV fluids  -s/p vanc and cefepime in ED; given the likely source of any infection is urine, will continue to treat with rocephin x3 days for cystitis given +UA  -f/u blood cultures, f/u urine culture  -There are also other reasons that patient has leukocytosis (Neulasta given with chemo on 1/4), fever (pt endorses fever as part of her usual post-chemo symptoms)  -Low suspicion for pyelo given no CVA tenderness and other more compelling reasons, however cannot be taken off differential at this time  -s/p 2L IVF in ED, no hx of HF (EF 55%). If patient is taking limited PO then can consider maintenance fluids -Patient meets sepsis criteria: fever, leukocytosis, potential source (urine); also elevated lactate noted in ED which improved with IV fluids  -s/p vanc and cefepime in ED; given the likely source of any infection is urine, will continue to treat with rocephin x3 days for cystitis given +UA; patient also has port so will cover with vanc until negative blood cultures  -f/u blood cultures, f/u urine culture  -There are also other reasons that patient has leukocytosis (Neulasta given with chemo on 1/4), fever (pt endorses fever as part of her usual post-chemo symptoms)  -Low suspicion for pyelo given no CVA tenderness and other more compelling reasons, however cannot be taken off differential at this time  -s/p 2L IVF in ED, no hx of HF (EF 55%). If patient is taking limited PO then can consider maintenance fluids

## 2021-01-08 NOTE — H&P ADULT - PROBLEM SELECTOR PLAN 2
-Patient has stage IV pancreatic adenocarcinoma w/ known lung mets  -Most recent chemo on 1/4 (Gemzar, Abraxane); per patient also received Neulasta at that time which she gets [id number]  -Heme/Onc c/s in AM (may already be aware), appreciate recs -Patient has stage IV pancreatic adenocarcinoma w/ known lung mets  -Most recent chemo on 1/4 (Gemzar, Abraxane); per patient also received Neulasta at that time which she gets [id number]  -c/w Creon TID  -Heme/Onc c/s in AM (may already be aware), appreciate recs Abx as above

## 2021-01-08 NOTE — CONSULT NOTE ADULT - SUBJECTIVE AND OBJECTIVE BOX
HPI:  70 F w/ PMHx pancreatic cancer (last chemo 1/4), MI in 11/20 s/p stent placement (2/2 chemo) presenting to the ED for fever, sore throat, and knee pain after chemo. Per patient, she generally has this exact reaction after she has cycles of chemo and has been hospitalized for this constellation of symptoms in the past. She reports that the day after chemo she was experiencing fevers of 102 and was managing it with tylenol, but office told her to come to the ED. She does note that she has been experiencing small nosebleeds recently, however upon medication review, patient has been taking plavix BID instead of daily since starting the medication. She denies sick contacts, recent travel or cough.     In the ED, patient was febrile, had soft pressures (baseline is mid 90s-110s), and satting well on room air. Patient reported that she had Neulasta (pegfilgrastin) during her last chemo which could explain her leukocytosis. (08 Jan 2021 03:57)      14 point ROS otherwise negative    PAST MEDICAL & SURGICAL HISTORY:  Pancreatic adenocarcinoma    GERD (gastroesophageal reflux disease)    Systemic lupus erythematosus    Hyperlipidemia    H/O total hysterectomy        Allergies    No Known Allergies    Intolerances        MEDICATIONS  (STANDING):  aspirin enteric coated 81 milliGRAM(s) Oral daily  atorvastatin 40 milliGRAM(s) Oral at bedtime  cefTRIAXone   IVPB 1000 milliGRAM(s) IV Intermittent every 24 hours  clopidogrel Tablet 75 milliGRAM(s) Oral daily  enoxaparin Injectable 40 milliGRAM(s) SubCutaneous daily  pancrelipase  (CREON 12,000 Lipase Units) 1 Capsule(s) Oral three times a day with meals  vancomycin  IVPB 1000 milliGRAM(s) IV Intermittent every 12 hours    MEDICATIONS  (PRN):  benzocaine 15 mG/menthol 3.6 mG (Sugar-Free) Lozenge 1 Lozenge Oral every 6 hours PRN Sore Throat      FAMILY HISTORY:  Family history of diabetes mellitus        SOCIAL HISTORY: No EtOH, no tobacco    Height (cm): 162.6 (01-08 @ 06:54)  Weight (kg): 61.5 (01-08 @ 06:54)  BMI (kg/m2): 23.3 (01-08 @ 06:54)  BSA (m2): 1.66 (01-08 @ 06:54)    VITALS:   T(F): 98 (01-08-21 @ 06:54), Max: 100.2 (01-07-21 @ 23:24)  HR: 73 (01-08-21 @ 06:54)  BP: 101/47 (01-08-21 @ 06:54)  RR: 18 (01-08-21 @ 06:54)  SpO2: 100% (01-08-21 @ 06:54)  Wt(kg): --    PHYSICAL EXAM    GENERAL: NAD, well-developed  HEAD:  Atraumatic, Normocephalic  EYES: EOMI, PERRLA, conjunctiva and sclera clear  NECK: Supple, No JVD  CHEST/LUNG: Clear to auscultation bilaterally; No wheeze  HEART: Regular rate and rhythm; No murmurs, rubs, or gallops  ABDOMEN: Soft, Nontender, Nondistended; Bowel sounds present  EXTREMITIES:  2+ Peripheral Pulses, No clubbing, cyanosis, or edema  NEUROLOGY: non-focal  SKIN: No rashes or lesions    LABS:                         8.8    31.01 )-----------( 167      ( 08 Jan 2021 06:32 )             28.3     01-08    140  |  105  |  8   ----------------------------<  89  3.8   |  28  |  0.44<L>    Ca    8.7      08 Jan 2021 06:32  Phos  3.2     01-08  Mg     2.0     01-08    TPro  7.0  /  Alb  3.2<L>  /  TBili  0.5  /  DBili  x   /  AST  29  /  ALT  30  /  AlkPhos  163<H>  01-08    Magnesium, Serum: 2.0 mg/dL (01-08 @ 06:32)  Phosphorus Level, Serum: 3.2 mg/dL (01-08 @ 06:32)    PT/INR - ( 07 Jan 2021 22:41 )   PT: 13.4 sec;   INR: 1.18 ratio         PTT - ( 07 Jan 2021 22:41 )  PTT:30.6 sec      IMAGING:
Patient is a 70y old  Female who presents with a chief complaint of fever (08 Jan 2021 16:33)    HPI:   70 year old woman with stage IV pancreatic adenocarcinoma on gemcitabine/abraxane (previously on 5FU) with recent RCA STEMI in November treated with meena-coated BMS who is admitted for fever and has UTI. Recently changed ticagrelor to clopidogrel because of dyspnea. She reports taking clopidogrel twice daily like ticagrelor instead of once daily as prescribed. But denies any bleeding problems or other adverse effect. Feels better today. No chest pain, palps, shortness of breath, or edema.      PAST MEDICAL & SURGICAL HISTORY:  Pancreatic adenocarcinoma    GERD (gastroesophageal reflux disease)    Systemic lupus erythematosus    Hyperlipidemia    H/O total hysterectomy        FAMILY HISTORY:  Family history of diabetes mellitus        Home Medications:  Creon 12,000 units oral delayed release capsule: 1 cap(s) orally 3 times a day (08 Jan 2021 04:12)  MiraLax oral powder for reconstitution:  (08 Jan 2021 04:12)  Plavix 75 mg oral tablet: 1 tab(s) orally once a day (08 Jan 2021 04:12)      Social History:  No EtOH, tobacco, illicit drugs (08 Jan 2021 03:57)      Medications:  aspirin enteric coated 81 milliGRAM(s) Oral daily  atorvastatin 40 milliGRAM(s) Oral at bedtime  benzocaine 15 mG/menthol 3.6 mG (Sugar-Free) Lozenge 1 Lozenge Oral every 6 hours PRN  cefTRIAXone   IVPB 1000 milliGRAM(s) IV Intermittent every 24 hours  clopidogrel Tablet 75 milliGRAM(s) Oral daily  enoxaparin Injectable 40 milliGRAM(s) SubCutaneous daily  pancrelipase  (CREON 12,000 Lipase Units) 1 Capsule(s) Oral three times a day with meals  vancomycin  IVPB 1000 milliGRAM(s) IV Intermittent every 12 hours      Review of systems:  10 point review of systems completed and are negative unless noted in HPI    Vitals:  T(C): 36.7 (01-08-21 @ 06:54), Max: 37.9 (01-07-21 @ 23:24)  HR: 73 (01-08-21 @ 06:54) (73 - 94)  BP: 101/47 (01-08-21 @ 06:54) (89/29 - 102/35)  BP(mean): 57 (01-08-21 @ 05:06) (57 - 59)  RR: 18 (01-08-21 @ 06:54) (15 - 18)  SpO2: 100% (01-08-21 @ 06:54) (98% - 100%)    Daily Height in cm: 162.56 (08 Jan 2021 06:54)    Daily     Weight (kg): 61.5 (01-08 @ 06:54)    I&O's Summary      Physical Exam:  Appearance: No apparent distress  HEENT: moist mucosa, anicteric sclerae.   Neck: no JVD  Cardiovascular: regular rate and rhythm. No murmur. No gallop. No friction rub.  Respiratory: Clear to auscultation bilaterally  Gastrointestinal: Soft, Non-tender, benign	  Skin: no clubbing or cyanosis.   Neurologic: No focal deficit  Extremities: warm. No edema.  Psychiatry: A & O x 3, Mood & affect appropriate  Vascular: pulse regular, equal    Labs:                        8.8    31.01 )-----------( 167      ( 08 Jan 2021 06:32 )             28.3     01-08    140  |  105  |  8   ----------------------------<  89  3.8   |  28  |  0.44<L>    Ca    8.7      08 Jan 2021 06:32  Phos  3.2     01-08  Mg     2.0     01-08    TPro  7.0  /  Alb  3.2<L>  /  TBili  0.5  /  DBili  x   /  AST  29  /  ALT  30  /  AlkPhos  163<H>  01-08    PT/INR - ( 07 Jan 2021 22:41 )   PT: 13.4 sec;   INR: 1.18 ratio         PTT - ( 07 Jan 2021 22:41 )  PTT:30.6 sec          TELEMETRY:    Echocardiogram:    EKG:    CXR/Radiology:

## 2021-01-08 NOTE — H&P ADULT - NSICDXPASTMEDICALHX_GEN_ALL_CORE_FT
PAST MEDICAL HISTORY:  GERD (gastroesophageal reflux disease)     Hyperlipidemia     Pancreatic adenocarcinoma     Systemic lupus erythematosus

## 2021-01-08 NOTE — PHYSICAL THERAPY INITIAL EVALUATION ADULT - PERTINENT HX OF CURRENT PROBLEM, REHAB EVAL
This is a 69 yo F w/ PMHx pancreatic cancer (last chemo 1/4), MI 11/20  presenting to the ED for fever, sore throat after chemo admitted for septic workup.

## 2021-01-08 NOTE — H&P ADULT - HISTORY OF PRESENT ILLNESS
69 yo F w/ PMHx pancreatic cancer (last chemo 1/4), MI in 11/20 s/p stent placement (2/2 chemo) presenting to the ED for fever, sore throat, and knee pain after chemo. Per patient, she generally has this exact reaction after she has cycles of chemo and has been hospitalized for this constellation of symptoms in the past. She reports that the day after chemo she was experiencing fevers of 102 and was managing it with tylenol, but office told her to come to the ED. She does note that she has been experiencing small nosebleeds recently, however upon medication review, patient has been taking plavix BID instead of daily since starting the medication. She denies sick contacts, recent travel or cough.     In the ED, patient was febrile, had soft pressures (baseline is [id number]), and satting well on room air. Patient reported that she had Neulasta (pegfilgrastin) during her last chemo which could explain her leukocytosis. 69 yo F w/ PMHx pancreatic cancer (last chemo 1/4), MI in 11/20 s/p stent placement (2/2 chemo) presenting to the ED for fever, sore throat, and knee pain after chemo. Per patient, she generally has this exact reaction after she has cycles of chemo and has been hospitalized for this constellation of symptoms in the past. She reports that the day after chemo she was experiencing fevers of 102 and was managing it with tylenol, but office told her to come to the ED. She does note that she has been experiencing small nosebleeds recently, however upon medication review, patient has been taking plavix BID instead of daily since starting the medication. She denies sick contacts, recent travel or cough.     In the ED, patient was febrile, had soft pressures (baseline is mid 90s-110s), and satting well on room air. Patient reported that she had Neulasta (pegfilgrastin) during her last chemo which could explain her leukocytosis. 70 F w/ PMHx pancreatic cancer (last chemo 1/4), MI in 11/20 s/p stent placement (2/2 chemo) presenting to the ED for fever, sore throat, and knee pain after chemo. Per patient, she generally has this exact reaction after she has cycles of chemo and has been hospitalized for this constellation of symptoms in the past. She reports that the day after chemo she was experiencing fevers of 102 and was managing it with tylenol, but office told her to come to the ED. She does note that she has been experiencing small nosebleeds recently, however upon medication review, patient has been taking plavix BID instead of daily since starting the medication. She denies sick contacts, recent travel or cough.     In the ED, patient was febrile, had soft pressures (baseline is mid 90s-110s), and satting well on room air. Patient reported that she had Neulasta (pegfilgrastin) during her last chemo which could explain her leukocytosis.

## 2021-01-08 NOTE — ED ADULT NURSE REASSESSMENT NOTE - NS ED NURSE REASSESS COMMENT FT1
Pt. remains A&Ox4, in no acute distress. Respirations even and unlabored on room air. VS as noted. Pt. remains hypotensive at present. Denies dizziness, weakness. MD Link aware and states pt. ok to go to admitted bed at this time. Awaiting transport at present.

## 2021-01-08 NOTE — H&P ADULT - PROBLEM SELECTOR PLAN 3
-Acute MI 11/23/2020 s/p stent placement, per patient it was 2/2 chemo  -Per outpt cards note, patient had dyslipidemia w/ low HDL but no hypercholesterolemia, DM, HTN, or other precluding factors that suggest patient has underlying CAD  -pt was recently switched from Brilinta to Plavix 2/2 SOB  -Patient seemingly does not take all of her medications correctly (see below)  -pt on statin, BB, DAPT, but no ace/arb because patient has soft pressures -Patient has stage IV pancreatic adenocarcinoma w/ known lung mets  -Most recent chemo on 1/4 (Gemzar, Abraxane); per patient also received Neulasta at that time which she gets [id number]  -c/w Creon TID  -Heme/Onc c/s in AM (may already be aware), appreciate recs

## 2021-01-08 NOTE — H&P ADULT - NSHPPHYSICALEXAM_GEN_ALL_CORE
GENERAL: NAD, well-groomed, well-developed  HEAD: Atraumatic, Normocephalic, sparse hair  EYES: EOMI, PERRLA, conjunctiva and sclera clear  ENMT: Moist mucous membranes  NECK: Supple, Normal Thyroid  NERVOUS SYSTEM: Alert & Oriented X3, Good concentration; Motor Strength 5/5 B/L upper and lower extremities  CHEST/LUNG: Clear to auscultation bilaterally; No rales, rhonchi, wheezing, or rubs  HEART: Regular rate and rhythm; S1 and S2; No murmurs, rubs, or gallops  ABDOMEN: Soft, Nontender, Nondistended: Bowel sounds present  EXTREMITIES: 2+ Peripheral Pulses, No clubbing, cyanosis, or edema, no knee effusions, ROM intact  LYMPH: No lymphadenopathy noted  SKIN: No rashes or lesions GENERAL: NAD, well-groomed, well-developed  HEAD: Atraumatic, Normocephalic, sparse hair  EYES: EOMI, PERRLA, conjunctiva and sclera clear  ENMT: Moist mucous membranes  NECK: Supple, Normal Thyroid  NERVOUS SYSTEM: Alert & Oriented X3, Good concentration; Motor Strength 5/5 B/L upper and lower extremities  CHEST/LUNG: Clear to auscultation bilaterally; No rales, rhonchi, wheezing, or rubs  BACK: no CVA tenderness, no spine point tenderness  HEART: Regular rate and rhythm; S1 and S2; No murmurs, rubs, or gallops  ABDOMEN: Soft, Nontender, Nondistended: Bowel sounds present  EXTREMITIES: 2+ Peripheral Pulses, No clubbing, cyanosis, or edema, no knee effusions, ROM intact  LYMPH: No lymphadenopathy noted  SKIN: No rashes or lesions T(C): 37 (01-08-21 @ 05:06), Max: 37.9 (01-07-21 @ 23:24)  HR: 79 (01-08-21 @ 05:06) (79 - 94)  BP: 101/38 (01-08-21 @ 05:06) (89/29 - 102/35)  RR: 18 (01-08-21 @ 05:06) (15 - 18)  SpO2: 100% (01-08-21 @ 05:06) (98% - 100%)    Constitutional: NAD, well-developed, well-nourished  Ears, Nose, Mouth, and Throat: normal external ears and nose, normal hearing, moist oral mucosa  Eyes: normal conjunctiva, EOMI, PERRL  Neck: supple, no JVD  Respiratory: Clear to auscultation bilaterally. No wheezes, rales or rhonchi. Normal respiratory effort  Cardiovascular: RRR, no M/R/G, no edema, 2+ Peripheral Pulses  Gastrointestinal: soft, nontender, nondistended, +BS, no hernia  Skin: warm, dry, no rash  Neurologic: sensation grossly intact, CN grossly intact, non-focal exam  Musculoskeletal: no clubbing, no cyanosis, no joint swelling  Psychiatric: AOX3, appropriate mood, affect

## 2021-01-10 NOTE — DISCHARGE NOTE PROVIDER - CARE PROVIDER_API CALL
Fabian Paul)  Medical Oncology  [address] [place] [address]  Phone: [phone]  Fax: [phone]  Follow Up Time:

## 2021-01-10 NOTE — PROGRESS NOTE ADULT - ASSESSMENT
69 yo F w/ PMHx pancreatic cancer (last chemo 1/4), MI in 11/20 s/p stent placement (2/2 chemo) presenting to the ED for fever, sore throat after chemo admitted for septic workup w/ positive UA and urine culture. 
71 yo F w/ PMHx pancreatic cancer (last chemo 1/4), MI in 11/20 s/p stent placement (2/2 chemo) presenting to the ED for fever, sore throat after chemo admitted for septic workup w/ positive UA
69 yo F w/ PMHx pancreatic cancer (last chemo 1/4), MI in 11/20 s/p stent placement (2/2 chemo) presenting to the ED for fever, sore throat after chemo admitted for septic workup w/ positive UA and urine culture.

## 2021-01-10 NOTE — PROGRESS NOTE ADULT - PROBLEM SELECTOR PLAN 4
-Acute MI 11/23/2020 s/p stent placement, per patient it was 2/2 chemo  -Per outpt cards note, patient had dyslipidemia w/ low HDL but no hypercholesterolemia, DM, HTN, or other precluding factors that suggest patient has underlying CAD  -pt was recently switched from Brilinta to Plavix 2/2 SOB  -Patient seemingly does not take all of her medications correctly and will need nursing care at home on discharge, d/w onc  -pt on statin, BB, DAPT. BB held in the setting low BP.
-Acute MI 11/23/2020 s/p stent placement, per patient it was 2/2 chemo  -Per outpt cards note, patient had dyslipidemia w/ low HDL but no hypercholesterolemia, DM, HTN, or other precluding factors that suggest patient has underlying CAD  -pt was recently switched from Brilinta to Plavix 2/2 SOB  -Patient seemingly does not take all of her medications correctly and will need nursing care at home on discharge, d/w onc  -pt on statin, BB, DAPT
-Acute MI 11/23/2020 s/p stent placement, per patient it was 2/2 chemo  -Per outpt cards note, patient had dyslipidemia w/ low HDL but no hypercholesterolemia, DM, HTN, or other precluding factors that suggest patient has underlying CAD  -pt was recently switched from Brilinta to Plavix 2/2 SOB  -Patient seemingly does not take all of her medications correctly and will need nursing care at home on discharge, d/w onc  -pt on statin, BB, DAPT

## 2021-01-10 NOTE — DISCHARGE NOTE PROVIDER - NSDCMRMEDTOKEN_GEN_ALL_CORE_FT
aspirin 81 mg oral delayed release tablet: 1 tab(s) orally once a day  atorvastatin 40 mg oral tablet: 1 tab(s) orally once a day (at bedtime)  Creon 12,000 units oral delayed release capsule: 1 cap(s) orally 3 times a day  MiraLax oral powder for reconstitution: 17 gram(s) orally once a day, As Needed  omeprazole 20 mg oral delayed release capsule: 1 cap(s) orally once a day  Plavix 75 mg oral tablet: 1 tab(s) orally once a day

## 2021-01-10 NOTE — PROGRESS NOTE ADULT - PROBLEM SELECTOR PLAN 1
-Patient meets sepsis criteria: fever, leukocytosis, potential source (urine);  -s/p vanc and cefepime in ED; given the likely source of any infection is urine, will continue to treat with rocephin x3 days for cystitis given +UA; patient also has port so will cover with vanc until negative blood cultures, no prior culture data to compare too  -f/u blood cultures, f/u urine culture  -There are also other reasons that patient has leukocytosis (Neulasta given with chemo on 1/4), fever (pt endorses fever as part of her usual post-chemo symptoms)  -Low suspicion for pyelo given no CVA tenderness and other more compelling reasons, however cannot be taken off differential at this time
-Patient meets sepsis criteria: fever, leukocytosis, potential source (urine);  -s/p vanc and cefepime in ED;   -UA positive with positive urine culture- growing E.coli.  - finished 3 day course of CTX today.  - d/c vancomycin as the blood cultures are neg x2  -There are also other reasons that patient has leukocytosis (Neulasta given with chemo on 1/4), fever (pt endorses fever as part of her usual post-chemo symptoms)  -Low suspicion for pyelo given no CVA tenderness and other more compelling reasons, however cannot be taken off differential at this time  - WBC trending down
-Patient meets sepsis criteria: fever, leukocytosis, potential source (urine);  -s/p vanc and cefepime in ED;   -UA positive with positive urine culture- growing E.coli.  - c/w CTX for 3 days  - d/c vancomycin as the blood cultures are neg x2  -There are also other reasons that patient has leukocytosis (Neulasta given with chemo on 1/4), fever (pt endorses fever as part of her usual post-chemo symptoms)  -Low suspicion for pyelo given no CVA tenderness and other more compelling reasons, however cannot be taken off differential at this time  - WBC trending down

## 2021-01-10 NOTE — DISCHARGE NOTE PROVIDER - HOSPITAL COURSE
68F PMH HLD Peritoneal metastatic cancer x1 year recently switched to paclitaxel presents to ED for 5 days significant n/v, currently unable to tolerate PO. Pt found to have Urosepsis.     Sepsis  -2/2 UTI   -s/p Vanco, Cefepime in ED  -Notable leukocytosis 22K, she did receive Neulasta on 1/4/21  -c/w Ceftriaxone, Vancomycin  -Pt with leukocytosis, likely 2/2 Neulasta given with chemo on 1/4, fever (pt endorses fever as part of her usual post-chemo symptoms)  -s/p 2L IVF in ED, no hx of HF (EF 55)  -Ucx- grew E,.coli Vanco d/c'ed 1/9/21  -1/10-Pt completed 3 days of ceftriaxone, Bcx negative     Pancreatic adenocarcinoma  -Patient has stage IV pancreatic adenocarcinoma w/ known lung mets  -Most recent chemo on 1/4 (C5 Gemzar, Abraxane); per patient also received Neulasta at that time which she gets [id number]  -c/w Creon TID  -Onc c/s--> F/u outpt with Dr. Paul    Myocardial infarction  -Acute MI 11/23/2020 s/p stent placement, per patient it was 2/2 chemo  -Per outpt cards note, patient had dyslipidemia w/ low HDL but no hypercholesterolemia, DM, HTN, or other precluding factors that suggest patient has underlying CAD  -pt was recently switched from Brilinta to Plavix 2/2 SOB  -Patient seemingly does not take all of her medications correctly (see below)  -pt on statin, BB, DAPT, but no ace/arb because patient has soft pressures.     Medication management  -Patient reports taking 3 medications: metoprolol, atorvastatin, plavix; however patient was taking plavix BID instead of daily since starting it  -Patient does not endorse taking ASA  -Restarted ASA, c/w plavix, atorvastatin; metoprolol held in the setting of sepsis         Pt is medically stable for discharge, she is clinically improved, Leukocytosis improved to 11K,VSS, Pt N/V resolved, Pt is tolerating Po intake.

## 2021-01-10 NOTE — DISCHARGE NOTE PROVIDER - NSDCFUSCHEDAPPT_GEN_ALL_CORE_FT
CORONA, GRISELDA ; 01/11/2021 ; [facility] [phone]  CORONA, GRISELDA ; 01/20/2021 ; [facility]  CORONA, GRISELDA ; 01/20/2021 ; [facility]  CORONA, GRISELDA ; 02/03/2021 ; [facility]

## 2021-01-10 NOTE — DISCHARGE NOTE PROVIDER - NSDCCPCAREPLAN_GEN_ALL_CORE_FT
PRINCIPAL DISCHARGE DIAGNOSIS  Diagnosis: Sepsis secondary to UTI  Assessment and Plan of Treatment: You completed antibiotics while in the hospital   Follow up with your Primary Care Doctor within 1 week of discharge. Call to schedule an appointment        SECONDARY DISCHARGE DIAGNOSES  Diagnosis: Myocardial infarction  Assessment and Plan of Treatment: continue Aspirin and Plavix    Diagnosis: Pancreatic adenocarcinoma  Assessment and Plan of Treatment: follow up with Oncologist, Dr. Paul at [facility], within 1-2 week of discharge, You have a scheduled appointment on 1/20/21 at 11:30am, call to confirm appointment or if scheduling is required.     PRINCIPAL DISCHARGE DIAGNOSIS  Diagnosis: Sepsis secondary to UTI  Assessment and Plan of Treatment: You completed antibiotics while in the hospital   Follow up with your Primary Care Doctor within 1 week of discharge. Call to schedule an appointment        SECONDARY DISCHARGE DIAGNOSES  Diagnosis: Myocardial infarction  Assessment and Plan of Treatment: continue Aspirin and Plavix  Stop taking metoprolol at this time, due your blood pressure being Low 100's.   Follow up with you Cardiologist, Dr. Bah for ongoing management and consideration when to restart Metoprolol medication.    Diagnosis: Pancreatic adenocarcinoma  Assessment and Plan of Treatment: follow up with Oncologist, Dr. Paul at [facility], within 1-2 week of discharge, You have a scheduled appointment on 1/20/21 at 11:30am, call to confirm appointment or if scheduling is required.

## 2021-01-10 NOTE — PROGRESS NOTE ADULT - SUBJECTIVE AND OBJECTIVE BOX
Patient is a 70y old  Female who presents with a chief complaint of fever (2021 03:57)      SUBJECTIVE / OVERNIGHT EVENTS:    Patient seen and examined at bedside. Reported chemo on , had fevers afterwards with urinary frequency. Chronic back pain. No dyspnea, chest pain.     MEDICATIONS  (STANDING):  aspirin enteric coated 81 milliGRAM(s) Oral daily  atorvastatin 40 milliGRAM(s) Oral at bedtime  cefTRIAXone   IVPB 1000 milliGRAM(s) IV Intermittent every 24 hours  clopidogrel Tablet 75 milliGRAM(s) Oral daily  enoxaparin Injectable 40 milliGRAM(s) SubCutaneous daily  pancrelipase  (CREON 12,000 Lipase Units) 1 Capsule(s) Oral three times a day with meals  vancomycin  IVPB 1000 milliGRAM(s) IV Intermittent every 12 hours    MEDICATIONS  (PRN):      Vital Signs Last 24 Hrs  T(C): 36.7 (2021 06:54), Max: 37.9 (2021 23:24)  T(F): 98 (2021 06:54), Max: 100.2 (2021 23:24)  HR: 73 (2021 06:54) (73 - 94)  BP: 101/47 (2021 06:54) (89/29 - 102/35)  BP(mean): 57 (2021 05:06) (57 - 59)  RR: 18 (2021 06:54) (15 - 18)  SpO2: 100% (2021 06:54) (98% - 100%)  CAPILLARY BLOOD GLUCOSE        I&O's Summary      PHYSICAL EXAM:  Vital Signs Last 24 Hrs  T(C): 36.7 (21 @ 06:54)  T(F): 98 (21 @ 06:54), Max: 100.2 (21 @ 23:24)  HR: 73 (21 @ 06:54) (73 - 94)  BP: 101/47 (21 @ 06:54)  BP(mean): 57 (21 @ 05:06) (57 - 59)  RR: 18 (21 @ 06:54) (15 - 18)  SpO2: 100% (21 @ 06:54) (98% - 100%)  Wt(kg): --    Constitutional: NAD, well-developed, well-nourished  Ears, Nose, Mouth, and Throat: normal external ears and nose, normal hearing, moist oral mucosa  Eyes: normal conjunctiva, EOMI, PERRL  Neck: supple, no JVD  Respiratory: Clear to auscultation bilaterally. No wheezes, rales or rhonchi. Normal respiratory effort  Cardiovascular: S1, S2, no murmurs  Gastrointestinal: soft, nontender, nondistended, +BS, no hernia  Skin: warm, dry, no rash  Neurologic: sensation grossly intact, CN grossly intact, non-focal exam  Musculoskeletal: no clubbing, no cyanosis, no joint swelling  Psychiatric: AOX3, appropriate mood, affect      LABS:                        8.8    31.01 )-----------( 167      ( 2021 06:32 )             28.3     01-08    140  |  105  |  8   ----------------------------<  89  3.8   |  28  |  0.44<L>    Ca    8.7      2021 06:32  Phos  3.2     -08  Mg     2.0     -08    TPro  7.0  /  Alb  3.2<L>  /  TBili  0.5  /  DBili  x   /  AST  29  /  ALT  30  /  AlkPhos  163<H>  -08    PT/INR - ( 2021 22:41 )   PT: 13.4 sec;   INR: 1.18 ratio         PTT - ( 2021 22:41 )  PTT:30.6 sec      Urinalysis Basic - ( 2021 22:41 )    Color: Colorless / Appearance: Clear / S.007 / pH: x  Gluc: x / Ketone: Negative  / Bili: Negative / Urobili: <2 mg/dL   Blood: x / Protein: Negative / Nitrite: Positive   Leuk Esterase: Large / RBC: 2 /HPF / WBC 50 /HPF   Sq Epi: x / Non Sq Epi: 1 /HPF / Bacteria: Moderate        RADIOLOGY & ADDITIONAL TESTS:    Imaging Personally Reviewed:    Consultant(s) Notes Reviewed:      Care Discussed with Consultants/Other Providers:  
Estephanie Ward MD  [facility] Division of Hospital Medicine  Pager [phone] (M-S 8AM-8PM)  Other Times: [phone]    Patient is a 70y old  Female who presents with a chief complaint of fever (2021 18:30)      SUBJECTIVE / OVERNIGHT EVENTS:  Pt seen and examined at bedside.   Pt denies any dysuria or lower back pain.   Pt afebrile overnight.     MEDICATIONS  (STANDING):  aspirin enteric coated 81 milliGRAM(s) Oral daily  atorvastatin 40 milliGRAM(s) Oral at bedtime  cefTRIAXone   IVPB 1000 milliGRAM(s) IV Intermittent every 24 hours  clopidogrel Tablet 75 milliGRAM(s) Oral daily  enoxaparin Injectable 40 milliGRAM(s) SubCutaneous daily  pancrelipase  (CREON 12,000 Lipase Units) 1 Capsule(s) Oral three times a day with meals    MEDICATIONS  (PRN):  benzocaine 15 mG/menthol 3.6 mG (Sugar-Free) Lozenge 1 Lozenge Oral every 6 hours PRN Sore Throat      PHYSICAL EXAM:  Vital Signs Last 24 Hrs  T(C): 36.8 (2021 05:25), Max: 37.6 (2021 20:46)  T(F): 98.2 (2021 05:25), Max: 99.7 (2021 20:46)  HR: 71 (2021 05:25) (71 - 91)  BP: 100/51 (2021 05:25) (100/51 - 116/53)  BP(mean): --  RR: 16 (2021 05:25) (16 - 18)  SpO2: 98% (2021 05:25) (97% - 98%)    CONSTITUTIONAL: NAD, well-developed, well-groomed  RESPIRATORY: Normal respiratory effort; lungs are clear to auscultation bilaterally  CARDIOVASCULAR: Regular rate and rhythm, normal S1 and S2, no murmur/rub/gallop; No lower extremity edema  GASTROINTESTINAL: Nontender to palpation, normoactive bowel sounds, no rebound/guarding; No hepatosplenomegaly  MUSCULOSKELETAL:  no clubbing or cyanosis of digits; no joint swelling or tenderness to palpation  NEUROLOGY: non-focal; no gross sensory deficits   PSYCH: A+O to person, place, and time; affect appropriate  SKIN: No rashes; warm     LABS:                        10.2   20.48 )-----------( 195      ( 2021 11:10 )             32.8         135  |  102  |  11  ----------------------------<  104<H>  3.6   |  26  |  0.43<L>    Ca    9.1      2021 11:10  Phos  3.5       Mg     2.0         TPro  8.2  /  Alb  3.8  /  TBili  0.2  /  DBili  x   /  AST  48<H>  /  ALT  53<H>  /  AlkPhos  212<H>      PT/INR - ( 2021 22:41 )   PT: 13.4 sec;   INR: 1.18 ratio         PTT - ( 2021 22:41 )  PTT:30.6 sec      Urinalysis Basic - ( 2021 22:41 )    Color: Colorless / Appearance: Clear / S.007 / pH: x  Gluc: x / Ketone: Negative  / Bili: Negative / Urobili: <2 mg/dL   Blood: x / Protein: Negative / Nitrite: Positive   Leuk Esterase: Large / RBC: 2 /HPF / WBC 50 /HPF   Sq Epi: x / Non Sq Epi: 1 /HPF / Bacteria: Moderate        Culture - Urine (collected 2021 02:44)  Source: .Urine Clean Catch (Midstream)  Preliminary Report (2021 10:02):    >100,000 CFU/ml Escherichia coli    Culture - Blood (collected 2021 02:22)  Source: .Blood Blood-Peripheral  Preliminary Report (2021 03:02):    No growth to date.    Culture - Blood (collected 2021 02:22)  Source: .Blood Blood-Peripheral  Preliminary Report (2021 03:02):    No growth to date.        RADIOLOGY & ADDITIONAL TESTS:  Results Reviewed:   Imaging Personally Reviewed:  Electrocardiogram Personally Reviewed:    COORDINATION OF CARE:  Care Discussed with Consultants/Other Providers [Y/N]: LORENZO Delatorre  Prior or Outpatient Records Reviewed [Y/N]:  
Estephanie Ward MD  [facility] Division of Hospital Medicine  Pager [phone] (M-S 8AM-8PM)  Other Times: [phone]    Patient is a 70y old  Female who presents with a chief complaint of fever (10 Ronaldo 2021 11:02)      SUBJECTIVE / OVERNIGHT EVENTS:  Pt seen and examined at bedside.   Pt denies any complaints.   Afebrile overnight.     MEDICATIONS  (STANDING):  aspirin enteric coated 81 milliGRAM(s) Oral daily  atorvastatin 40 milliGRAM(s) Oral at bedtime  clopidogrel Tablet 75 milliGRAM(s) Oral daily  enoxaparin Injectable 40 milliGRAM(s) SubCutaneous daily  pancrelipase  (CREON 12,000 Lipase Units) 1 Capsule(s) Oral three times a day with meals    MEDICATIONS  (PRN):  benzocaine 15 mG/menthol 3.6 mG (Sugar-Free) Lozenge 1 Lozenge Oral every 6 hours PRN Sore Throat      PHYSICAL EXAM:  Vital Signs Last 24 Hrs  T(C): 36.9 (10 Ronaldo 2021 06:28), Max: 37.1 (09 Jan 2021 15:14)  T(F): 98.5 (10 Ronaldo 2021 06:28), Max: 98.7 (09 Jan 2021 15:14)  HR: 71 (10 Ronaldo 2021 06:28) (71 - 82)  BP: 104/62 (10 Ronaldo 2021 06:28) (104/62 - 112/65)  BP(mean): --  RR: 16 (10 Ronaldo 2021 06:28) (16 - 18)  SpO2: 99% (10 Ronaldo 2021 06:28) (98% - 100%)    CONSTITUTIONAL: NAD, well-developed, well-groomed  RESPIRATORY: Normal respiratory effort; lungs are clear to auscultation bilaterally  CARDIOVASCULAR: Regular rate and rhythm, normal S1 and S2, no murmur/rub/gallop; No lower extremity edema  GASTROINTESTINAL: Nontender to palpation, normoactive bowel sounds, no rebound/guarding; No hepatosplenomegaly  MUSCULOSKELETAL:  no clubbing or cyanosis of digits; no joint swelling or tenderness to palpation  NEUROLOGY: non-focal; no gross sensory deficits   PSYCH: A+O to person, place, and time; affect appropriate  SKIN: No rashes; warm     LABS:                        10.2   15.17 )-----------( 165      ( 10 Ronaldo 2021 06:25 )             32.7     01-10    138  |  104  |  13  ----------------------------<  96  3.8   |  23  |  0.44<L>    Ca    9.1      10 Ronaldo 2021 06:25  Phos  4.0     01-10  Mg     2.0     01-10    TPro  7.7  /  Alb  3.6  /  TBili  0.2  /  DBili  x   /  AST  38<H>  /  ALT  45<H>  /  AlkPhos  191<H>  01-10              Culture - Urine (collected 08 Jan 2021 02:44)  Source: .Urine Clean Catch (Midstream)  Preliminary Report (09 Jan 2021 10:02):    >100,000 CFU/ml Escherichia coli    Culture - Blood (collected 08 Jan 2021 02:22)  Source: .Blood Blood-Peripheral  Preliminary Report (09 Jan 2021 03:02):    No growth to date.    Culture - Blood (collected 08 Jan 2021 02:22)  Source: .Blood Blood-Peripheral  Preliminary Report (09 Jan 2021 03:02):    No growth to date.        RADIOLOGY & ADDITIONAL TESTS:  Results Reviewed:   Imaging Personally Reviewed:  Electrocardiogram Personally Reviewed:    COORDINATION OF CARE:  Care Discussed with Consultants/Other Providers [Y/N]: LORENZO Delatorre  Prior or Outpatient Records Reviewed [Y/N]:

## 2021-01-10 NOTE — PROGRESS NOTE ADULT - PROBLEM SELECTOR PLAN 3
-Patient has stage IV pancreatic adenocarcinoma w/ known lung mets  -Most recent chemo on 1/4 (Gemzar, Abraxane); per patient also received Neulasta at that time which she gets [id number]  -c/w Creon TID  - f/u onc reccs- no plans for chemo as inpatient.
-Patient has stage IV pancreatic adenocarcinoma w/ known lung mets  -Most recent chemo on 1/4 (Gemzar, Abraxane); per patient also received Neulasta at that time which she gets [id number]  -c/w Creon TID  - f/u onc reccs
-Patient has stage IV pancreatic adenocarcinoma w/ known lung mets  -Most recent chemo on 1/4 (Gemzar, Abraxane); per patient also received Neulasta at that time which she gets [id number]  -c/w Creon TID  - f/u onc reccs- no plans for chemo as inpatient.

## 2021-01-10 NOTE — PROGRESS NOTE ADULT - PROBLEM SELECTOR PLAN 5
-Patient reports taking 3 medications: metoprolol, atorvastatin, plavix; however patient was taking plavix BID instead of daily since starting it  -Patient does not endorse taking ASA  -Restarted ASA, c/w plavix, atorvastatin  -Will need careful medication management prior to discharge as patient seems to be forgetful at times

## 2021-01-10 NOTE — DISCHARGE NOTE NURSING/CASE MANAGEMENT/SOCIAL WORK - PATIENT PORTAL LINK FT
You can access the FollowMyHealth Patient Portal offered by [facility] by registering at the following website: [email or web address]. By joining [email or web address]’s FollowMyHealth portal, you will also be able to view your health information using other applications (apps) compatible with our system.

## 2021-01-18 NOTE — DISCHARGE NOTE PROVIDER - NSDCCPCAREPLAN_GEN_ALL_CORE_FT
Roomed by Nga Prabhakar MA  NPT    Patient Active Problem List   Diagnosis   • HTN (hypertension), benign   • Major depressive disorder, recurrent, in remission (CMS/HCC)   • Generalized anxiety disorder   • PTSD (post-traumatic stress disorder)   • Ankle pain   • Pain in right knee   • Low back pain   • Osteopenia       Current Outpatient Medications   Medication Sig Dispense Refill   • prazosin (MINIPRESS) 1 MG capsule Take 1 capsule by mouth 2 times daily. 180 capsule 0   • acetaminophen-codeine (TYLENOL NO.3) 300-30 MG per tablet Take 1 tablet by mouth 3 times daily as needed.     • loratadine (CLARITIN) 10 MG tablet      • cannabidiol (CBD) oil Take 2 drops by mouth.     • albuterol (PROVENTIL HFA) 108 (90 Base) MCG/ACT inhaler Inhale 2 puffs into the lungs.     • metoPROLOL succinate (TOPROL-XL) 25 MG 24 hr tablet Take 25 mg by mouth.     • montelukast (SINGULAIR) 10 MG tablet Take 10 mg by mouth.     • albuterol 108 (90 Base) MCG/ACT inhaler Inhale 2 puffs into the lungs.     • Cholecalciferol (VITAMIN D) 2000 units capsule      • diclofenac (VOLTAREN) 1 % gel      • fluticasone (FLONASE) 50 MCG/ACT nasal spray        No current facility-administered medications for this visit.        SUBJECTIVE:  Doron Dugan is a 26 year old male here today for a full skin exam.   Pt does have particular areas of concern that he would like checked today.  See lesion description below (if applicable).  Patient does not have a history of prior skin cancer.  There is not a family history of skin cancer.    Patient does not  have any history of immunosuppression, including long term steroid use, TB infection, organ transplants, skin grafts, chemo or radiation therapies.    Patient uses sunscreens never.  Pt does not have a history of regular tanning bed use.  Patient does not  currently use tanning beds.    Overall health is good.    Pt denies long term kidney or liver disease.  Pt is not diabetic.      ROS: General: Alert  & oriented, WDWN, No respiratory distress, normal mood and affect  Eyes: No injection. No diabetes, HTN- on meds, or ulcers. No weight change or adenopathy. Reviewed 1/18/2021      OBJECTIVE:  Pt is well developed, well nourished 26 year old male.  Mood and affect are pleasant and appropriate.    There is no height or weight on file to calculate BMI.     Examined: skin of face, ears, neck, back, shoulders, arms, hands, chest, abdomen, buttocks, legs, feet, and toenails.  No suspicious lesions found.  There is no axillary or cervical adenopathy.   large 2 cm fluctuant cyst of right axilla- this has been drained twice per patient   comedonal acne of back and chest   Very few sharply marginated, evenly pigmented, symmetric nevi on torso    ASSESSMENT:  Acne   Cyst  Nevi    PLAN:  See med for acne-  Reviewed possible side effects of minocin including stomach upset, sun sensitivity, rash, yeast infection, pseudotumor cerebri and drug induced autoimmune disease. He will discontinue and call if problems occur.  Cleocin topical BID   Discussed accutane if no improvement     See gen surg for excision of cyst to right axilla   Sun protection discussed with patient.  Advised patient to perform monthly self skin exams to look for new or changing lesions.  F/U 3 month(s) and prn for any new concerns.    I have personally reviewed and analyzed past dermatology clinic notes and PCP notes (if relevant)    Jany Hong, CNP, DCNP  Collaborating Physician: Dr. Fletcher Ni              PRINCIPAL DISCHARGE DIAGNOSIS  Diagnosis: Chemotherapy adverse reaction, initial encounter  Assessment and Plan of Treatment: You were admitted for adverse reaction during chemotherapy. Symptoms had resolved. Adverse reaction most likely secondary to receiving higher dose of chemotherapy as you stated you had similar symptoms in the past. Blood cultures were drawn 8/29 which showed ______. Urinalysis was negative. Oncology was consulted and stated most likely reaction caused by dehydration and not infectious process. You did not need chemo inpatient. Please follow up with your primary care physician and oncologist after discharge for continued monitoring and management. PRINCIPAL DISCHARGE DIAGNOSIS  Diagnosis: Chemotherapy adverse reaction, initial encounter  Assessment and Plan of Treatment: You were admitted for adverse reaction during chemotherapy. Symptoms had resolved. Adverse reaction most likely secondary to receiving higher dose of chemotherapy as you stated you had similar symptoms in the past. Blood cultures were drawn 8/29 which has been negative to date. Urinalysis was negative. Oncology was consulted and stated most likely reaction caused by dehydration and not infectious process. You did not need chemo inpatient. Please follow up with your primary care physician and oncologist after discharge for continued monitoring and management.

## 2021-01-20 NOTE — END OF VISIT
[FreeTextEntry3] : \par \par Recovering from acute MI\par Under care by cardio-oncology\par \par Restaging scan showed: 39.3% shrinkage on current chemo\par \par Plan to resume chemo\par \par Fabian Paul MD\par  [FreeTextEntry2] : Gemcitabine is not considered a cardiotoxic agent generally; so far only very few case reports have been reported in the literature on different aspects of cardiac side effects. \par \par Anecdotal reports of cardiotoxicity including congestive cardiac failure, cardiomyopathy and MI when treated with gemcitabine in patients with pancreatic cancers have been reported. \par \par  As per available literature, patients with diabetes and having received a total dose greater than 15,000 mg/m2 are generally at a higher risk and require close surveillance.\par \par https://www.[employer].com/Article/Fulltext/604266\par \par Ophelia FM, Gerardo SP, Stan PF, Veto MM. Gemcitabine and acute myocardial infarction--a case report. Angiology. 2006 May-Jun;57(3):367-71. doi: 10.1177/[id number]. PMID: 72178735.\par \par Gemcitabine is a highly liposoluble molecule, and its clearance yields a primary deamination metabolite, difluorodeoxyuridine (dFdU). Gemcitabine’s pharmacokinetics and distribution volumes depend on infusion modality and are influenced by patient’s factors that include age, gender, infusion length and body surface area. There is a paucity of data regarding gemcitabine cardiotoxicity and a review of phase II - III clinical trials showed a low cardiotoxic profile and extremely rare cardiac events after gemcitabine infusion: ventricular tachyarrhythmias in 0-1.4%, reduction of left ventricular ejection fraction in 0.2-0.9% and exudative pericarditis in 0.2%.\par \par Though the mechanisms of gemcitabine-related myocardial ischemia are not well understood, some authors have speculated direct endothelial injury resulting in acute coronary thrombosis as a possible mechanism. The evidence for prothrombotic and procoagulant effects of gemcitabine comes from other macrovascular events such as strokes, visceral infractions and vasculitides that were reported in association with gemcitabine infusion. Guilhermeura et al described higher incidence of thrombosis and vascular events in patients treated with gemcitabine. There is also some conjecture about coronary vasospasm as being another possible pathogenetic mechanism. It was proposed by Shereen et al as a cause of chest pain and a new-onset left bundle branch block during gemcitabine infusion. In that particular case, vasospasm, chest pain and EKG changes had resolved within 10 min of antianginal therapy.\par \par \par \par Adan ET, Breezy AT, Laquita DJ, Marco SW, Vargas J, Pedrito C, Prudence HAYLEY, et al. Cardiovascular complications of cancer therapy: diagnosis, pathogenesis, and management. Circulation. 2004;109(25):1088-8159.\par \par Maricarmen C, Pema F, Scooby P, Kobi F, Jarrett E, Rolando SOSA, Adam X, et al. Gemcitabine as a single agent in the treatment of relapsed or refractory low-grade non-Hodgkin's lymphoma. Br J Haematol. 2001;113(3):772-778.\par \par Howard QUEZADA, Xiomara OVALLE, Florian SOSA. Safety profile of gemcitabine. Anticancer Drugs. 1995;6(Suppl 6):27-32.\par \par Shereen B, Smith G, Kyle U, Shaw E, Glenn G, Noe S, Lavelle R, et al. Gemcitabine-induced acute coronary syndrome: a case report. Med Princ Pract. 2009;18(1):76-80.\par \par Ophelia FM, Gerardo SP, Pierce PF, Veto MM. Gemcitabine and acute myocardial infarction - a case report. Angiology. 2006;57(3):367-371.\par \par Addie ALCOCER, Dequan GOODMAN. Gemcitabine-induced coronary vasospasm: A case report. [facility]. 2017;45(2):172-175.\par Kathleen T, Saad M, Vikram MONIQUE. Acute myocardial infarction following gemcitabine therapy - a case report. Angiology. 1999;50(12):9393-4856.\par \par [email or web address].bond.com/doi/full/10.1002/ehf2.79730\par \par [email or web address]\par \par \par ABRAXANE:\par \par per PI:\par Cardiovascular: Hypotension, during the 30-minute infusion, occurred in 5% of patients in the\par randomized metastatic breast cancer trial. Bradycardia, during the 30-minute infusion, occurred\par in <1% of patients. These vital sign changes most often caused no symptoms and required\par neither specific therapy nor treatment discontinuation.\par Severe cardiovascular events possibly related to single-agent ABRAXANE occurred in\par approximately 3% of patients in the randomized trial. These events included chest pain, cardiac\par arrest, supraventricular tachycardia, edema, thrombosis, pulmonary thromboembolism,\par pulmonary emboli, and hypertension. Cases of cerebrovascular attacks (strokes) and transient\par ischemic attacks have been reported rarely.\par Electrocardiogram (ECG) abnormalities were common among patients at baseline. ECG\par abnormalities on study did not usually result in symptoms, were not dose-limiting, and required\par no intervention. ECG abnormalities were noted in 60% of patients in the metastatic breast\par cancer randomized trial. Among patients with a normal ECG prior to study entry, 35% of all\par patients developed an abnormal tracing while on study. The most frequently reported ECG\par modifications were non-specific repolarization abnormalities, sinus bradycardia, and sinus tachycardia.\par \par https://www.accessdata.fda.gov/drugsatfda_docs/label/2008/[email or web address].pdf\par

## 2021-01-20 NOTE — ASSESSMENT
[FreeTextEntry1] : 70 year old F with H/O metastatic disease diagnosed in June 2020 and is s/p 9 cycles of chemotherapy which was initiated with FOLFIRINOX and ended with FOLFOX with discontinuation of irinotecan due to severe side effects requiring hospitalization. Now FOLFOX is discontinued due to the development of septic shock and transaminitis. The current plan is to initiate Costilla/Abraxane+onpro. \par \par 1-Therapeutic: \par -----------------\par Pancreatic cancer with mets to lungs.\par Started patient on FOLFORINOX regimen.She received alternate cycles of FOLFOX with FOLFIRI- since she developed AE  .She was treated with 4 cycles of FOLFOX- She was hospitalized after her last chemo for sepsis-\par Switched her chemo to Costilla/ Abraxane- every 2 weeks.\par Sustained MI on 11/24 - Had a stent placed RCA.\par Resumed chemo with dose reduced Costilla/ Abraxane- 700/ 70 - \par \par \par \par 2-Diagnostic: \par ---------------\par Labs:Monthly CEA, , \par Ca 19.9- 142- 28---->75->47->42->41->53->45->42\par CEA: 1.9--->2-->1.7-->1.9->1.4->1.1->1.6\par Restaging scan stable disease with regression of tumor - 39.4% regression \par \par 3- Pharmacogenetics testing:\par ----------------------------------\par -Foundation Liquid- LIBAN , TMB 4 Mut/Mb \par \par 4- Procedures:\par -----------------\par - S/P  Port-A-Cath placement on 06/15/20\par - PORT CARE\par - EMLA CREAM PRESCRIBED\par \par 5-Supportive: \par -----------------\par MI - S/P Stent- On ASA and Plavix  Follows with Cardiology Dr Duff-\par Pancreatic insufficiency: CREON - [address] units (2 pills with each snack and 4 pills with each meal) for pancreatic replacement\par DVT (PE): High risk, close monitoring\par Antiemetic: Zofran PRN and Compazine PRN\par Emla cream: for port numbing before use\par Prevention of mouth sores: Biotene mouthwash\par Skin reactions: Udderly smooth cream and Vitamin B 6 100 mg BID for prevention of HFS\par Diarrhea: The usual dose of Imodium is 4 milligrams (mg) (2 capsules) after the first loose bowel movement, and 2 mg (1 capsule) after each loose bowel movement after the first dose has been taken. No more than 16 mg (8 capsules) should be taken in any twenty-four-hour period. \par \par \par 6- RTC:  \par --------\par In 2 weeks.\par F/u for next chemo\par

## 2021-01-20 NOTE — PHYSICAL EXAM
[Restricted in physically strenuous activity but ambulatory and able to carry out work of a light or sedentary nature] : Status 1- Restricted in physically strenuous activity but ambulatory and able to carry out work of a light or sedentary nature, e.g., light house work, office work [Normal] : grossly intact [de-identified] : Ocaasional cramps, constipation relieved.  [de-identified] : Numbness in lower legs

## 2021-01-20 NOTE — HISTORY OF PRESENT ILLNESS
[Disease: _____________________] : Disease: [unfilled] [M: ___] : M[unfilled] [AJCC Stage: ____] : AJCC Stage: [unfilled] [3] : 3, Severe [4] : 4, Life-threatening [Date: ____________] : Patient's last distress assessment performed on [unfilled]. [4 - Distress Level] : Distress Level: 4 [de-identified] : Diagnosis: Pancreatic adenocarcinoma \par \par Genetic Testing: Foundation Liquid- LIBAN , TMB 4 Mut/Mb \par \par Other Current Medical Problems: Arthritis/ HLD \par \par Treatment History:\par \par Cycle # 1 06/17/20- FOLFORINOX- 5 FU 2400 mg/ kg /  mg/ m 2/ Irinotecan 150 mg/ m 2 / Oxaliplatin 85 mg/ m2\par Cycle # 2 06/30/20- FOLFORINOX- 5 FU 2400 mg/ kg /  mg/ m 2/ Irinotecan 140 mg/ m 2/ Oxaliplatin 85 mg/ m2\par Cycle # 3- 07/15/20- Skipped Irinotecan / Oxaliplatin-  (ANC 1.4) - 5 FU 2400 mg/ kg /  mg/ m 2\par Cycle #4 - 07/29/20- FOLFOX-  5 FU 2000 mg/ kg /  mg/ m 2/ Oxaliplatin 85 mg/ m2-----------------Tolerated well without Irinotecan \par \par Restaging Scans---------------Stable disease \par Cycle # 5 - 08/12/20- FOLFOX- 5 FU 2000 mg/ kg /  mg/ m 2/ Oxaliplatin 85 mg/ m2--------\par Cycle # 6- 08/26/20-FOLFIRI-  5 FU 2000 mg/ kg /  mg/ m 2/ Irinotecan 150 mg- Held oxaliplatin \par \par Hospitalized at [facility] for adverse reaction from Irinotecan- 8/28- 8/30-------------Will hold IRINOTECAN in future- \par \par Cycle # 7 09/09/20 - 5 FU 2000 mg/  mg/ m2 -----------Oxali was held since ANC was 1.29 \par Cycle # 8 -09/23/20- 5 FU 2000 mg/  mg/ m2 / Oxaliplatin 85 mg/ m2 \par \par Restaging Scans- Stable disease \par \par Cycle #9 -10/6/20-  5 FU 2000 mg/  mg/ m2 / Oxaliplatin 85 mg/ m2 \par \par Hospitalized for sepsis after chemotherapy \par \par Cycle #1 10/27/20 Gemcitabine 1000 mg/m2 + Abraxane 100 mg/m2\par Cycle # 2 11/9/20- Gemcitabine 1000 mg/m2 + Abraxane 100 mg/m2\par Cycle # 3 11/23/20-  Gemcitabine 1000 mg/m2 + Abraxane 100 mg/m2\par \par Hospitalized for Acute MI - Had a cardiac stent - 11/23/20\par \par Restaging scans- Interval regression in hepatic mass/ mediastinal and retroperitoneal lymph nodes\par \par Cycle # 4 12/21/20- Gemcitabine 700 mg/ m2 and Abraxane 70 mg/ m2\par Cycle # 5 1/4/21-  Gemcitabine 700 mg/ m2 and Abraxane 70 mg/ m2\par Cycle # 6 1/20/21- Gemcitabine 700 mg/ m2 and Abraxane 70 mg/ m2\par \par Oncological History :\par Ms. Griselda Corona is a 70 year old female who presents for initial consultation in our [facility] for a new diagnosis of pancreas adenocarcinoma. \par Patient states she had gastrointestinal symptoms dating back to Nov 2019, including discomfort, bloating, diarrhea followed by small amounts of blood per rectum. She eventually went to GI doctor Dr Florian Pace.  She had a Colonoscopy in Feb 20 that showed small ulcer. She went the ER at [facility] 3/10/20 for abdominal pain.  CT of her abdomen was performed and showed 3.2 x 3.5 cm hypoenhancing mass in the pancreatic head/ body concerning for malignancy. Mass results in severe compression of SMV. Retroperitoneal lymphadenopathy concerning for metastatic disease with multiple nodules in lung base concerning for metastatic disease.\par \par She was referred  to Dr Bernabe for EUS, which was performed June 1st 2020, significant for an ill-defined, heterogeneous, hypoechoic lesion seen in the head of pancreas. The lesion abutted but did not invade the SMV. The main pancreatic duct was dilated and the parenchyma was heterogeneous within the pancreatic body/tail. Fine needle biopsy was performed.\par \par Pathology: Gastric mucosa positive for H pylori. Pancreas mass: Invasive adenocarcinoma.\par \par Patient is taking medication regimen for the H.Pylori infection, otherwise not taking medications except for multivitamin supplement. \par \par \par FHX: No hx of Cancer \par \par Socail HX: Lives with her children, , Non smoker, no alcohol use\par \par PSX: No surgeries in past \par \par \par Disease: Pancreatic adenocarcinoma \par Pathology:\par \par AJCC Stage : Stage 4 \par \par Tumor Markers: CA 19.9 was 155 \par \par 6/10/20- MsClaudia Desouza is seen in [facility] clinic. She states he has abdominal pain 7-8/10 and gets relief with Tylenol. She has had 2 lbs weight loss- and appetite was decreased .\par Ct abd/ pelvis/ chest- Large pancreatic mass suspicious for adenocarcinoma, with marked arterial \par and venous involvement. Numerous bilateral pulmonary nodules new since 2018 and suspicious for metastatic disease. Retroperitoneal and peripancreatic lymphadenopathy. \par Omental nodule raising the possibility of peritoneal carcinomatosis. \par \par 06/17/20- MsClaudia Desouza is seen for follow up. She had the port placed earlier last week and is scheduled for her chemotherapy today . We discussed the chemo side effects and what to expect from chemotherapy- SE- Drug sheet was provided Risks/ benefits and alternatives of chemotherapy were discussed and included but not limited to fatigue,low blood counts, nausea/vomiting, skin reactions, hair thinning, possible blood transfusion requirement,increased risk of infection,neuropathy. Patient agreed to above and informed consent was signed.PET Scan- done- on 06/16/20-FDG-avid mass in pancreatic body and neck corresponds to known malignancy. Hypermetabolic left periaortic lymphadenopathy is compatible with metastatic disease.  Multiple bilateral pulmonary nodules, not significantly changed as compared to CT dated 6/9/2020, compatible with metastatic disease. \par \par 6/30/20 - Here for Cycle 2 \par 1- Developed G3 fatigue following C1 , and had to remain in bed for at least 4 days; son was cooking, she was able to go to bathroom and shower by herself\par 2- Also had decreased appetite and G1 nausea but no vomiting\par 3- Declines neuropathy\par 4- Denies diarrhea\par 5- No fever\par 6- No sxs suggestive of COVID-19\par 7- lost weight upto 10 lbs\par 8- also I am aware of her reaction to IRIN during infusion\par \par \par 7/8/20- Recieved a call from patient stating she had episodes of vomiting yesterday. She went out yesterday in the summer sun  and had some food from outside and she has been throwing up. She states she lost weight >30 lbs- She states the Creon has been causing a lot of abdominal pain - She has been experiencing some abdominal pain but is afraid to use the Oxycodone or the medical marijuana. \par She lives with her 2 sons and her sister in law has been helping her intermittently - However she needs a lot of reinstatement of instructions- Explained to her that she needs to take the Pain meds when needed and the pain is not from Creon. \par She is requesting for a Home COVID swab - will arrange for the same. \par \par 7/17/20- Pt was scheduled for C#3 with FOLFOX instead of FOLFORINOX to identify the culprit for the reaction. But her ANC was 1.4 so held Oxaliplatin and gave only 5 FU and LCV. She tolerated this chemo better did not have any reaction like previously. She denies N/V/D. She has been experiencing spasms in her fingers after the previous chemotherapy with FOLFORINOX- Her tumor markers CEA was rising and is concerning from 0.7- 3.6- 7.9.Patient states she was diagnosed with LUPUS by her PCP in March Unclear if she is on any treatment for the same. Will get the medical records from her PCP.\par \par 7/29/20- Pt is scheduled for C#4 with FOLFOX instead of FOLFORINOX to identify the culprit for the reaction. She tolerated the last chemotherapy well. She has been eating better and denies N/V. Today she tolerated the chemo with FOLFOX well - No reaction. next cycle we plan to give FOLFIRI\par \par 8/10//20- Pt is seen for TEB visit after she had the restaging scans done- She had CT scan done that showed- Metastatic pancreatic adenocarcinoma. Stable disease. She tolerated the chemo well without the irinotecan. we answered all her questions and concerns. Her son was not in the telephone conference. Dr Paul left a message with the son on his cell phone. Plan is to continue with the chemo \par \par 8/26/20- Ms. Desouza is seen for follow up- She tolerated the last chemo well- denies N/V/D. She denies any neuropathy - Denies pain - does not use mediacl marijuana- \par Explained to her that she will receive FOLFIRI this cycle and to inform if she develops any reaction to the same. \par \par 09/3/20- Ms Deo has a telephone visit with me. She was hospitalized at [facility] after her last chemo- when she came for the disconnect- She was hospitalized at [facility] from 8/28/20 to 8/30/20- When she came for the disconnect her BP was low and she was bradycardiac and after a fluid challenge she still did not recover- She was sent to hospital for further work up where Blood cx/ Ua/ Urine cx was negative and it was deemed that she was dehydrated and no infectious process.\par \par Spoke with the patient who states she had a rough time with this chemotherapy and she is feeling better now- however she has been having episodes of diarrhea and nausea. She has been taking the anti nausea meds and that has helped her. She feels that she dis better with FOLFOX - Discussed with Dr Paul and plan is to hold off on nay future doses of Irinotecan.\par \par 09/9/20- Ms Deo is seen for C #7 - Her ANC was 1.23- Discussed with Dr Paul and plan to hold off on Oxaliplatin - Pt will get 5 FU and LCV - She will receive onpro on disconnect day. I explained to her hat she will not get any mores doses of Irinotecan and to keep a close eye on any symptoms she develop post chemotherapy. She denies pain/ N/V/D. \par \par 09/23/20- Ms. Desouza is seen in office for follow up on C# 8. She denies n/v/dizziness/ neuropathy . Her appetite is  good, she has good energy level and is tolerating chemo well. She will get her restaging scans done after this round of chemo. Advised she can bring her son in for the next office appointment with Dr Paul to discuss the results of the scan. \par \par 10/6/20-Ms. Desouza is seen in office for follow up after her restaging scans- SHe had restaging scans on 10/1/20 that showed metastatic pancreatic cancer with no interval change since 08/9/20 in pancreatic mass, lung nodules, and adenopathy / peritoneal nodules.  She has been tolerating this chemo regimen  well. today she presents to office with her son - Davina . We discussed that we can entertain the idea of RT aftre she completes 12 cycles of Chemo.She complainst that she has been having episodes where she feels her head is usually hot and she feels sweaty. 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 \par \par 10/22/20-Patient was admitted at [facility] for sepsis and needed vasopressors for pressure support. She was in the ICU for a short time - She received IV abx for Likely bacterial pneumonia. While she was in the hospital her LFTS were still elevated- Unclear etiology for elevated LFTs- Likely chemo related vs Antibiotics- Pt is feeling better after she is discharged home. plan  is to omit FOLFOX in future and plan for [place] Abraxane with Onpro. Will recheck labs to confirm her LFTs are downtrending. Plan to start her on gem- Abraxane if LFTS normalize. \par Her scans in the hospital showed- Metastatic pancreatic cancer involving the retroperitoneal lymph nodes and visualized bilateral lungs. Stable disease compared to 10/1/2020. \par Small volume ascites, new since 10/1/2020. \par \par Drug sheet was provided Risks/ benefits and alternatives of chemotherapy were discussed and included but not limited to fatigue,low blood counts, nausea/vomiting, skin reactions, hair thinning, possible blood transfusion requirement,increased risk of infection,neuropathy. Patient agreed to above and informed consent was signed.\par \par 10/27/2020: Pt is here to start a new chemo regimen with [place]/Abraxane with onpro. Irinotecan was discontinued in August due to severe fatigue, hypotension resistant to fluid challenge and bradycardia. Later patient developed septic shock post FOLFOX chemo and is hence discontinued. LFTs are normal with AST 38, ALT 31, ALP elevated 170, normal T. Rah 0.2mg/dl. Pt c/o mild fatigue and does not have any other complains. Denies any F/C, N/V,D/P, change in appetite or weight loss. \par Discussed the chemo regimen with [place]/ Abraxane- answered all questions and concerns.\par \par 11/9//20; Ms Desouza is seen as follow up for C# 2. She complains of fatigue which is more prominent this cycle- She denies fevers. She does complain of nausea after chemo . She had some Constipation that was resolved with Miralax. Her appetite is diminished  and sleep is disturbed- Unable to sleep through the night. Will order Remeron 15 mg Q HS for appetite and sleep.\par \par 11/23/20; Ms Desouza is seen as follow up for C#3. She feels well. She feels this chemo is more tolerable. She has fatigue that lasts for 1-2 days after chemo and the week she is off chemo - she feels well. She does experience some nausea  and constipation occasionally that is relived by anti nausea meds and MiraLAX. She denies  neuropathy.\par Denies pain or discomfort. Filled out the paperwork for FMLA- for son Daivna. she notices darkening of her toes -on the right foot - The first second and fourth digit noted.\par \par 12/8//20; Ms Desouza is seen as follow up - In the interim she was hospitalized at [facility] on 11/23/20 - for Acute MI and had a cardiac stent placed. She is scheduled to see Dr Toussaint for follow up next week. She is now on Brilanta/ Metoprolol/ Atorvastatin.\par She feels very tired and feels wiped out - She denies N/V/D/ Constipation. Her abdomen is distended- She has some lower back pain  which is new - Will plan to schedule a CT scan A/P/C prior to the next chemotherapy- Will arrange for a TEB visit with Dr Paul after completion of scans- Her f/u appt with Dr Toussaint is on 12/18/20.\par She complains of feeling dizzy at times- Does not have a BP machine at home- \par \par 12/15/20:  Ms Desouza is seen as TEB viist to discuss the scans results. She had the restaging scans on 12/14 that showed interval regression in hepatic mass , mediastinal and retroperitoneal lymph nodes. Interval stability in size and number of pulm metastases. Overall 39% regression of tumor based on RECIST . She has a follow up with cardiologist following week. \par \par 12/21/20:  Ms Desouza is seen for follow up .In the interim she was seen by Cardiology - Dr Toussaint- \par She endorses she has episodes of light headed ness- Today her BP was in low 90's . She states she feels fatigued from chemotherapy - We discussed that we dose reduced the chemo this cycle- She also mentioned that she has been having some shortness of breath even at rest- Emailed Cardiology Dr Toussaint and think Brillanta can cause SOB- Plan is to switch Brillanta to Plavix- \par \par 1/4/21:  Ms Desouza is seen for follow up .In the interim she was seen by Cardiology - Dr Toussaint- Brilanta was switched to Plavix and her shortness of breath has improved ever since- She feels well - She does experience some numbness and pins and needles feeling on her shins b/l .Abraxane was reduced to 70 mg/m2 - Will discuss with Dr Paul if dose needs to be further reduced. Neuropathy is grade 1 . \par \par  [de-identified] : adenocarcinoma [de-identified] : 1/20/21:  Ms Desouza is seen for follow up .In the interim she was hospitalized at [facility] for UTI  from 1/7/21 to 1/10/21 - She was treated with IV  Abx for UTI  and was discharged home. She has been experiencing some numbness in her lower legs bilaterally - she is on Abraxane 70 mg - She also mentioned that since her discharge from the hospital she stopped taking Metoprolol as she was advised to do so. She has a f/u appt with cardiology Dr Toussaint- Advised her to f/u with him in regards to meds. \par  [FreeTextEntry3] : Hypotension, bradycardia  [FreeTextEntry4] : 6/17/20 [FreeTextEntry5] : IRIN [FreeTextEntry2] : x 4 daYS [FreeTextEntry8] : Sepsis, transaminitis  [de-identified] : OXaliplatin

## 2021-01-22 NOTE — ASSESSMENT
[FreeTextEntry1] : Stable from the cardiac point of view. Unclear if dizziness was from the low dose of metoprolol. Will obtain 7 day event monitor to evaluate her palpitations. She is to begin EGFR-TKI therapy so will require monitoring of BP, QTc, and ongoing VTE risk assessment. Recent echo reviewed and without worrisome findings. Current medications reviewed in detail with the patient and reconciled with the electronic medical record. Will maintain dual antiplatelet therapy for now given recent myocardial infarction with BMS. Continue lipid lowering therapy with statin. Hold metoprolol for now and f/u event monitor. Follow up in one month.\par

## 2021-01-22 NOTE — PHYSICAL EXAM
[Well Groomed] : well groomed [General Appearance - In No Acute Distress] : no acute distress [Normal Conjunctiva] : the conjunctiva exhibited no abnormalities [FreeTextEntry1] : moist mucosa, anicteric sclerae [Normal Jugular Venous V Waves Present] : normal jugular venous V waves present [No Jugular Venous Martinez A Waves] : no jugular venous martinez A waves [] : no respiratory distress [Respiration, Rhythm And Depth] : normal respiratory rhythm and effort [Exaggerated Use Of Accessory Muscles For Inspiration] : no accessory muscle use [Edema] : no peripheral edema present [Heart Rate And Rhythm] : heart rate and rhythm were normal [Abdomen Tenderness] : non-tender [Abnormal Walk] : normal gait [Nail Clubbing] : no clubbing of the fingernails [Petechial Hemorrhages (___cm)] : no petechial hemorrhages [Cyanosis, Localized] : no localized cyanosis [Skin Turgor] : normal skin turgor [Oriented To Time, Place, And Person] : oriented to person, place, and time

## 2021-01-22 NOTE — HISTORY OF PRESENT ILLNESS
[FreeTextEntry1] : 70 year old woman with a history stage IV pancreatic adenocarcinoma treated with 5FU/oxaliplatin based regimen from 6/2020-10/2020, switched to gemcitabine/abraxane 10/27 following admission for sepsis who presented with acute RCA STEMI on 12/1. Underwent cardiac cath with deployment of a BMS (meena-coated device) with ARIA 3 flow. LVEF by echo was 55 % on 12/12. She was discharged on aspirin and ticagrelor. She denies any history of cardiovascular disease. She had dyslipidemia with low HDL on lipid panel during hospitalization, but no prior history of hypercholesterolemia, diabetes, hypertension, or tobacco smoking.\par \par She reports adherence to her new medications including DAPT and atorvastatin. She denies any bleeding problems with these. She stopped low dose metoprolol 12.5 mg, due to dizziness and  lower blood pressure. No syncope/near syncope or postural symptoms. She did have epistaxis in the past - prior to starting antiplatelet therapy, but not since. She does report periods of fatigue following chemotherapy.\par \par Today she reports intermittent palpitations which feel like her heart beating faster and slower. This does not occur every day. It has been present in the past, but she notices it more lately.\par \par Repeat echocardiogram done 1/14 was reviewed, normal function and no effusion or wall motion abnormality.\par \par To begin erlotinib.\par \par \par \par

## 2021-02-03 NOTE — ASSESSMENT
[FreeTextEntry1] : 70 year old F with H/O metastatic disease diagnosed in June 2020 and is s/p 9 cycles of chemotherapy which was initiated with FOLFIRINOX and ended with FOLFOX with discontinuation of irinotecan due to severe side effects requiring hospitalization. Now FOLFOX is discontinued due to the development of septic shock and transaminitis. The current plan is to initiate [place]/Abraxane+onpro. \par \par 1-Therapeutic: \par -----------------\par Pancreatic cancer with mets to lungs.\par Started patient on FOLFORINOX regimen.She received alternate cycles of FOLFOX with FOLFIRI- since she developed AE  .She was treated with 4 cycles of FOLFOX- She was hospitalized after her last chemo for sepsis-\par Switched her chemo to [place]/ Abraxane- every 2 weeks.\par Sustained MI on 11/24 - Had a stent placed RCA.\par Resumed chemo with dose reduced [place]/ Abraxane- 700/ 70 - Completed 6 cycles \par Switched chemo to gem- Tarceva since she developed Grade 2 Neuropathy - \par \par \par \par 2-Diagnostic: \par ---------------\par Labs:Monthly CEA, , \par Ca 19.9- 142- 08---->75->47->42->41->53->45->42\par CEA: 1.9--->2-->1.7-->1.9->1.4->1.1->1.6\par Restaging scan  to be done after x 2 cycles of [place]- Tarceva \par \par 3- Pharmacogenetics testing:\par ----------------------------------\par -Foundation Liquid- LIBAN , TMB 4 Mut/Mb \par \par 4- Procedures:\par -----------------\par - S/P  Port-A-Cath placement on 06/15/20\par - PORT CARE\par - EMLA CREAM PRESCRIBED\par \par 5-Supportive: \par -----------------\par MI - S/P Stent- On ASA and Plavix  Follows with Cardiology Dr Duff-\par Was advised to hold Metoprolol since she developed dizziness- \par Advised to use a Holter monitor x 7 days \par Pancreatic insufficiency: CREON - [address] units (2 pills with each snack and 4 pills with each meal) for pancreatic replacement\par DVT (PE): High risk, close monitoring\par Antiemetic: Zofran PRN and Compazine PRN\par Emla cream: for port numbing before use\par Prevention of mouth sores: Biotene mouthwash\par Skin reactions: Udderly smooth cream and Vitamin B 6 100 mg BID for prevention of HFS\par Diarrhea: The usual dose of Imodium is 4 milligrams (mg) (2 capsules) after the first loose bowel movement, and 2 mg (1 capsule) after each loose bowel movement after the first dose has been taken. No more than 16 mg (8 capsules) should be taken in any twenty-four-hour period. \par \par \par 6- RTC:  \par --------\par In 2 weeks.\par F/u for next chemo\par

## 2021-02-03 NOTE — PHYSICAL EXAM
[Restricted in physically strenuous activity but ambulatory and able to carry out work of a light or sedentary nature] : Status 1- Restricted in physically strenuous activity but ambulatory and able to carry out work of a light or sedentary nature, e.g., light house work, office work [Normal] : pharynx is unremarkable, moist mucus membrane, no oral lesions [Ulcers] : no ulcers [Mucositis] : no mucositis [Thrush] : no thrush [de-identified] : Light headeness- was advised to stop metoprolol by Cardiologist  [de-identified] : Ocaasional cramps, constipation relieved.  [de-identified] : Numbness in lower legs / Pins and needles in both lower legs.

## 2021-02-03 NOTE — END OF VISIT
[FreeTextEntry3] : \par \par Recovering from acute MI\par Under care by cardio-oncology\par \par Restaging scan showed: 39.3% shrinkage on current chemo\par \par Plan to resume chemo\par \par Fabian Paul MD\par

## 2021-02-03 NOTE — HISTORY OF PRESENT ILLNESS
[Disease: _____________________] : Disease: [unfilled] [M: ___] : M[unfilled] [AJCC Stage: ____] : AJCC Stage: [unfilled] [3] : 3, Severe [4] : 4, Life-threatening [Date: ____________] : Patient's last distress assessment performed on [unfilled]. [4 - Distress Level] : Distress Level: 4 [2] : 2, Moderate [de-identified] : Diagnosis: Pancreatic adenocarcinoma \par \par Genetic Testing: Foundation Liquid- LIBAN , TMB 4 Mut/Mb \par \par Other Current Medical Problems: Arthritis/ HLD \par \par Treatment History:\par \par Cycle # 1 06/17/20- FOLFORINOX- 5 FU 2400 mg/ kg /  mg/ m 2/ Irinotecan 150 mg/ m 2 / Oxaliplatin 85 mg/ m2\par Cycle # 2 06/30/20- FOLFORINOX- 5 FU 2400 mg/ kg /  mg/ m 2/ Irinotecan 140 mg/ m 2/ Oxaliplatin 85 mg/ m2\par Cycle # 3- 07/15/20- Skipped Irinotecan / Oxaliplatin-  (ANC 1.4) - 5 FU 2400 mg/ kg /  mg/ m 2\par Cycle #4 - 07/29/20- FOLFOX-  5 FU 2000 mg/ kg /  mg/ m 2/ Oxaliplatin 85 mg/ m2-----------------Tolerated well without Irinotecan \par \par Restaging Scans---------------Stable disease \par Cycle # 5 - 08/12/20- FOLFOX- 5 FU 2000 mg/ kg /  mg/ m 2/ Oxaliplatin 85 mg/ m2--------\par Cycle # 6- 08/26/20-FOLFIRI-  5 FU 2000 mg/ kg /  mg/ m 2/ Irinotecan 150 mg- Held oxaliplatin \par \par Hospitalized at [facility] for adverse reaction from Irinotecan- 8/28- 8/30-------------Will hold IRINOTECAN in future- \par \par Cycle # 7 09/09/20 - 5 FU 2000 mg/  mg/ m2 -----------Oxali was held since ANC was 1.29 \par Cycle # 8 -09/23/20- 5 FU 2000 mg/  mg/ m2 / Oxaliplatin 85 mg/ m2 \par \par Restaging Scans- Stable disease \par \par Cycle #9 -10/6/20-  5 FU 2000 mg/  mg/ m2 / Oxaliplatin 85 mg/ m2 \par \par Hospitalized for sepsis after chemotherapy \par \par Cycle #1 10/27/20 Gemcitabine 1000 mg/m2 + Abraxane 100 mg/m2\par Cycle # 2 11/9/20- Gemcitabine 1000 mg/m2 + Abraxane 100 mg/m2\par Cycle # 3 11/23/20-  Gemcitabine 1000 mg/m2 + Abraxane 100 mg/m2\par \par Hospitalized for Acute MI - Had a cardiac stent - 11/23/20\par \par Restaging scans- 12/15/20Interval regression in hepatic mass/ mediastinal and retroperitoneal lymph nodes\par \par Cycle # 4 12/21/20- Gemcitabine 700 mg/ m2 and Abraxane 70 mg/ m2\par Cycle # 5 1/4/21-  Gemcitabine 700 mg/ m2 and Abraxane 70 mg/ m2\par Cycle # 6 1/20/21- Gemcitabine 700 mg/ m2 and Abraxane 70 mg/ m2\par \par Cycle # 1 - 2/3/21- Gemcitabine 700 mg/m 2 / Erlotinib 100 mg PO daily  \par \par Oncological History :\par Ms. Griselda Corona is a 70 year old female who presents for initial consultation in our [facility] for a new diagnosis of pancreas adenocarcinoma. \par Patient states she had gastrointestinal symptoms dating back to Nov 2019, including discomfort, bloating, diarrhea followed by small amounts of blood per rectum. She eventually went to GI doctor Dr Florian Pace.  She had a Colonoscopy in Feb 20 that showed small ulcer. She went the ER at [facility] 3/10/20 for abdominal pain.  CT of her abdomen was performed and showed 3.2 x 3.5 cm hypoenhancing mass in the pancreatic head/ body concerning for malignancy. Mass results in severe compression of SMV. Retroperitoneal lymphadenopathy concerning for metastatic disease with multiple nodules in lung base concerning for metastatic disease.\par \par She was referred  to Dr Bernabe for EUS, which was performed June 1st 2020, significant for an ill-defined, heterogeneous, hypoechoic lesion seen in the head of pancreas. The lesion abutted but did not invade the SMV. The main pancreatic duct was dilated and the parenchyma was heterogeneous within the pancreatic body/tail. Fine needle biopsy was performed.\par \par Pathology: Gastric mucosa positive for H pylori. Pancreas mass: Invasive adenocarcinoma.\par \par Patient is taking medication regimen for the H.Pylori infection, otherwise not taking medications except for multivitamin supplement. \par \par \par FHX: No hx of Cancer \par \par Socail HX: Lives with her children, , Non smoker, no alcohol use\par \par PSX: No surgeries in past \par \par \par Disease: Pancreatic adenocarcinoma \par Pathology:\par \par AJCC Stage : Stage 4 \par \par Tumor Markers: CA 19.9 was 155 \par \par 6/10/20- Ms. Desouza is seen in [facility] clinic. She states he has abdominal pain 7-8/10 and gets relief with Tylenol. She has had 2 lbs weight loss- and appetite was decreased .\par Ct abd/ pelvis/ chest- Large pancreatic mass suspicious for adenocarcinoma, with marked arterial \par and venous involvement. Numerous bilateral pulmonary nodules new since 2018 and suspicious for metastatic disease. Retroperitoneal and peripancreatic lymphadenopathy. \par Omental nodule raising the possibility of peritoneal carcinomatosis. \par \par 06/17/20- Ms. Desouza is seen for follow up. She had the port placed earlier last week and is scheduled for her chemotherapy today . We discussed the chemo side effects and what to expect from chemotherapy- SE- Drug sheet was provided Risks/ benefits and alternatives of chemotherapy were discussed and included but not limited to fatigue,low blood counts, nausea/vomiting, skin reactions, hair thinning, possible blood transfusion requirement,increased risk of infection,neuropathy. Patient agreed to above and informed consent was signed.PET Scan- done- on 06/16/20-FDG-avid mass in pancreatic body and neck corresponds to known malignancy. Hypermetabolic left periaortic lymphadenopathy is compatible with metastatic disease.  Multiple bilateral pulmonary nodules, not significantly changed as compared to CT dated 6/9/2020, compatible with metastatic disease. \par \par 6/30/20 - Here for Cycle 2 \par 1- Developed G3 fatigue following C1 , and had to remain in bed for at least 4 days; son was cooking, she was able to go to bathroom and shower by herself\par 2- Also had decreased appetite and G1 nausea but no vomiting\par 3- Declines neuropathy\par 4- Denies diarrhea\par 5- No fever\par 6- No sxs suggestive of COVID-19\par 7- lost weight upto 10 lbs\par 8- also I am aware of her reaction to IRIN during infusion\par \par \par 7/8/20- Recieved a call from patient stating she had episodes of vomiting yesterday. She went out yesterday in the summer sun  and had some food from outside and she has been throwing up. She states she lost weight >30 lbs- She states the Creon has been causing a lot of abdominal pain - She has been experiencing some abdominal pain but is afraid to use the Oxycodone or the medical marijuana. \par She lives with her 2 sons and her sister in law has been helping her intermittently - However she needs a lot of reinstatement of instructions- Explained to her that she needs to take the Pain meds when needed and the pain is not from Creon. \par She is requesting for a Home COVID swab - will arrange for the same. \par \par 7/17/20- Pt was scheduled for C#3 with FOLFOX instead of FOLFORINOX to identify the culprit for the reaction. But her ANC was 1.4 so held Oxaliplatin and gave only 5 FU and LCV. She tolerated this chemo better did not have any reaction like previously. She denies N/V/D. She has been experiencing spasms in her fingers after the previous chemotherapy with FOLFORINOX- Her tumor markers CEA was rising and is concerning from 0.7- 3.6- 7.9.Patient states she was diagnosed with LUPUS by her PCP in March Unclear if she is on any treatment for the same. Will get the medical records from her PCP.\par \par 7/29/20- Pt is scheduled for C#4 with FOLFOX instead of FOLFORINOX to identify the culprit for the reaction. She tolerated the last chemotherapy well. She has been eating better and denies N/V. Today she tolerated the chemo with FOLFOX well - No reaction. next cycle we plan to give FOLFIRI\par \par 8/10//20- Pt is seen for TEB visit after she had the restaging scans done- She had CT scan done that showed- Metastatic pancreatic adenocarcinoma. Stable disease. She tolerated the chemo well without the irinotecan. we answered all her questions and concerns. Her son was not in the telephone conference. Dr Paul left a message with the son on his cell phone. Plan is to continue with the chemo \par \par 8/26/20- Ms. Deo is seen for follow up- She tolerated the last chemo well- denies N/V/D. She denies any neuropathy - Denies pain - does not use mediacl marijuana- \par Explained to her that she will receive FOLFIRI this cycle and to inform if she develops any reaction to the same. \par \par 09/3/20- Ms Deo has a telephone visit with me. She was hospitalized [facility] after her last chemo- when she came for the disconnect- She was hospitalized at [facility] from 8/28/20 to 8/30/20- When she came for the disconnect her BP was low and she was bradycardiac and after a fluid challenge she still did not recover- She was sent to hospital for further work up where Blood cx/ Ua/ Urine cx was negative and it was deemed that she was dehydrated and no infectious process.\par \par Spoke with the patient who states she had a rough time with this chemotherapy and she is feeling better now- however she has been having episodes of diarrhea and nausea. She has been taking the anti nausea meds and that has helped her. She feels that she dis better with FOLFOX - Discussed with Dr Paul and plan is to hold off on nay future doses of Irinotecan.\par \par 09/9/20- Ms Deo is seen for C #7 - Her ANC was 1.23- Discussed with Dr Paul and plan to hold off on Oxaliplatin - Pt will get 5 FU and LCV - She will receive onpro on disconnect day. I explained to her hat she will not get any mores doses of Irinotecan and to keep a close eye on any symptoms she develop post chemotherapy. She denies pain/ N/V/D. \par \par 09/23/20- Ms. Deo is seen in office for follow up on C# 8. She denies n/v/dizziness/ neuropathy . Her appetite is  good, she has good energy level and is tolerating chemo well. She will get her restaging scans done after this round of chemo. Advised she can bring her son in for the next office appointment with Dr Paul to discuss the results of the scan. \par \par 10/6/20-Ms. Deo is seen in office for follow up after her restaging scans- SHe had restaging scans on 10/1/20 that showed metastatic pancreatic cancer with no interval change since 08/9/20 in pancreatic mass, lung nodules, and adenopathy / peritoneal nodules.  She has been tolerating this chemo regimen  well. today she presents to office with her son - Davina . We discussed that we can entertain the idea of RT aftre she completes 12 cycles of Chemo.She complainst that she has been having episodes where she feels her head is usually hot and she feels sweaty. 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 \par \par 10/22/20-Patient was admitted at [facility] for sepsis and needed vasopressors for pressure support. She was in the ICU for a short time - She received IV abx for Likely bacterial pneumonia. While she was in the hospital her LFTS were still elevated- Unclear etiology for elevated LFTs- Likely chemo related vs Antibiotics- Pt is feeling better after she is discharged home. plan  is to omit FOLFOX in future and plan for [place] Abraxane with Onpro. Will recheck labs to confirm her LFTs are downtrending. Plan to start her on gem- Abraxane if LFTS normalize. \par Her scans in the hospital showed- Metastatic pancreatic cancer involving the retroperitoneal lymph nodes and visualized bilateral lungs. Stable disease compared to 10/1/2020. \par Small volume ascites, new since 10/1/2020. \par \par Drug sheet was provided Risks/ benefits and alternatives of chemotherapy were discussed and included but not limited to fatigue,low blood counts, nausea/vomiting, skin reactions, hair thinning, possible blood transfusion requirement,increased risk of infection,neuropathy. Patient agreed to above and informed consent was signed.\par \par 10/27/2020: Pt is here to start a new chemo regimen with [place]/Abraxane with onpro. Irinotecan was discontinued in August due to severe fatigue, hypotension resistant to fluid challenge and bradycardia. Later patient developed septic shock post FOLFOX chemo and is hence discontinued. LFTs are normal with AST 38, ALT 31, ALP elevated 170, normal T. Rah 0.2mg/dl. Pt c/o mild fatigue and does not have any other complains. Denies any F/C, N/V,D/P, change in appetite or weight loss. \par Discussed the chemo regimen with [place]/ Abraxane- answered all questions and concerns.\par \par 11/9//20; Ms Desouza is seen as follow up for C# 2. She complains of fatigue which is more prominent this cycle- She denies fevers. She does complain of nausea after chemo . She had some Constipation that was resolved with Miralax. Her appetite is diminished  and sleep is disturbed- Unable to sleep through the night. Will order Remeron 15 mg Q HS for appetite and sleep.\par \par 11/23/20; Ms Desouza is seen as follow up for C#3. She feels well. She feels this chemo is more tolerable. She has fatigue that lasts for 1-2 days after chemo and the week she is off chemo - she feels well. She does experience some nausea  and constipation occasionally that is relived by anti nausea meds and MiraLAX. She denies  neuropathy.\par Denies pain or discomfort. Filled out the paperwork for FMLA- for [employer]. she notices darkening of her toes -on the right foot - The first second and fourth digit noted.\par \par 12/8//20; Ms Desouza is seen as follow up - In the interim she was hospitalized at [facility] on 11/23/20 - for Acute MI and had a cardiac stent placed. She is scheduled to see Dr Toussaint for follow up next week. She is now on Brilanta/ Metoprolol/ Atorvastatin.\par She feels very tired and feels wiped out - She denies N/V/D/ Constipation. Her abdomen is distended- She has some lower back pain  which is new - Will plan to schedule a CT scan A/P/C prior to the next chemotherapy- Will arrange for a TEB visit with Dr Paul after completion of scans- Her f/u appt with Dr Toussaint is on 12/18/20.\par She complains of feeling dizzy at times- Does not have a BP machine at home- \par \par 12/15/20:  Ms Desouza is seen as TEB viist to discuss the scans results. She had the restaging scans on 12/14 that showed interval regression in hepatic mass , mediastinal and retroperitoneal lymph nodes. Interval stability in size and number of pulm metastases. Overall 39% regression of tumor based on RECIST . She has a follow up with cardiologist following week. \par \par 12/21/20:  Ms Desouza is seen for follow up .In the interim she was seen by Cardiology - Dr Toussaint- \par She endorses she has episodes of light headed ness- Today her BP was in low 90's . She states she feels fatigued from chemotherapy - We discussed that we dose reduced the chemo this cycle- She also mentioned that she has been having some shortness of breath even at rest- Emailed Cardiology Dr Toussaint and think Brillanta can cause SOB- Plan is to switch Brillanta to Plavix- \par \par 1/4/21:  Ms Desouza is seen for follow up .In the interim she was seen by Cardiology - Dr Toussaint- Brilanta was switched to Plavix and her shortness of breath has improved ever since- She feels well - She does experience some numbness and pins and needles feeling on her shins b/l .Abraxane was reduced to 70 mg/m2 - Will discuss with Dr Paul if dose needs to be further reduced. Neuropathy is grade 1 . \par \par 1/20/21:  Ms Desouza is seen for follow up .In the interim she was hospitalized at [facility] for UTI  from 1/7/21 to 1/10/21 - She was treated with IV  Abx for UTI  and was discharged home. She has been experiencing some numbness in her lower legs bilaterally - she is on Abraxane 70 mg - She also mentioned that since her discharge from the hospital she stopped taking Metoprolol as she was advised to do so. She has a f/u appt with cardiology Dr Toussaint- Advised her to f/u with him in regards to meds. \par \par \par  [de-identified] : adenocarcinoma [de-identified] : 2/3/21:  Ms Desozua is seen for follow up .In the interim she was evaluated by cardiology and metoprolol is on hold for now-  Since she has been having vague neuropathy symptoms with numbness in her legs-we discussed on stopping the Abraxane and starting her on Tarceva with this cycle. \par  Drug sheet was provided Risks/ benefits and alternatives of chemotherapy were discussed and included but not limited to fatigue,low blood counts, nausea/vomiting, skin reactions, hair thinning, possible blood transfusion requirement,increased risk of infection,neuropathy. Patient agreed to above and informed consent was signed.\par \par CY inhibitors or a combined CY and CY inhibitor increase erlotinib plasma concentrations. Avoid concomitant use. If not possible, reduce TARCEVA dose. \par ? CY inducers decrease erlotinib plasma concentrations. Avoid concomitant use. If not possible, increase TARCEVA dose. \par ? Cigarette smoking and CY inducers decrease erlotinib plasma concentrations. Avoid concomitant use. If not possible, increase\par TARCEVA dose.\par ? Drugs that increase gastric pH decrease erlotinib plasma concentrations.\par For proton pump inhibitors avoid concomitant use if possible. \par For H-2 receptor antagonists, take TARCEVA 10 hours after H-2 receptor antagonist\par \par  [FreeTextEntry3] : Hypotension, bradycardia  [FreeTextEntry4] : 6/17/20 [FreeTextEntry5] : IRIN [FreeTextEntry2] : x 4 daYS [FreeTextEntry8] : Sepsis, transaminitis  [de-identified] : OXaliplatin  [de-identified] : neuropathy  [de-identified] : Abraxane

## 2021-02-16 PROBLEM — R73.03 PREDIABETES: Status: ACTIVE | Noted: 2020-06-09

## 2021-02-16 PROBLEM — G47.00 INSOMNIA: Status: ACTIVE | Noted: 2020-01-01

## 2021-02-16 PROBLEM — I95.9 HYPOTENSION: Status: ACTIVE | Noted: 2020-01-01

## 2021-02-16 NOTE — HISTORY OF PRESENT ILLNESS
[Disease: _____________________] : Disease: [unfilled] [M: ___] : M[unfilled] [AJCC Stage: ____] : AJCC Stage: [unfilled] [3] : 3, Severe [4] : 4, Life-threatening [2] : 2, Moderate [Date: ____________] : Patient's last distress assessment performed on [unfilled]. [4 - Distress Level] : Distress Level: 4 [de-identified] : Diagnosis: Pancreatic adenocarcinoma \par \par Genetic Testing: Foundation Liquid- LIBAN , TMB 4 Mut/Mb \par \par Other Current Medical Problems: Arthritis/ HLD \par \par Treatment History:\par \par Cycle # 1 20- FOLFORINOX- 5 FU 2400 mg/ kg /  mg/ m 2/ Irinotecan 150 mg/ m 2 / Oxaliplatin 85 mg/ m2\par Cycle # 2 20- FOLFORINOX- 5 FU 2400 mg/ kg /  mg/ m 2/ Irinotecan 140 mg/ m 2/ Oxaliplatin 85 mg/ m2\par Cycle # 3- 07/15/20- Skipped Irinotecan / Oxaliplatin-  (ANC 1.4) - 5 FU 2400 mg/ kg /  mg/ m 2\par Cycle #4 - 20- FOLFOX-  5 FU 2000 mg/ kg /  mg/ m 2/ Oxaliplatin 85 mg/ m2-----------------Tolerated well without Irinotecan \par \par Restaging Scans---------------Stable disease \par Cycle # 5 - 20- FOLFOX- 5 FU 2000 mg/ kg /  mg/ m 2/ Oxaliplatin 85 mg/ m2--------\par Cycle # 6- 20-FOLFIRI-  5 FU 2000 mg/ kg /  mg/ m 2/ Irinotecan 150 mg- Held oxaliplatin \par \par Hospitalized at [facility] for adverse reaction from Irinotecan- - -------------Will hold IRINOTECAN in future- \par \par Cycle # 7 20 - 5 FU 2000 mg/  mg/ m2 -----------Oxali was held since ANC was 1.29 \par Cycle #  -20- 5 FU 2000 mg/  mg/ m2 / Oxaliplatin 85 mg/ m2 \par \par Restaging Scans- Stable disease \par \par Cycle #9 -10/6/20-  5 FU 2000 mg/  mg/ m2 / Oxaliplatin 85 mg/ m2 \par \par Hospitalized for sepsis after chemotherapy \par \par Cycle #1 10/27/20 Gemcitabine 1000 mg/m2 + Abraxane 100 mg/m2\par Cycle # 2 20- Gemcitabine 1000 mg/m2 + Abraxane 100 mg/m2\par Cycle # 3 20-  Gemcitabine 1000 mg/m2 + Abraxane 100 mg/m2\par \par Hospitalized for Acute MI - Had a cardiac stent - 20\par \par Restaging scans- 12/15/20Interval regression in hepatic mass/ mediastinal and retroperitoneal lymph nodes\par \par Cycle # 4 20- Gemcitabine 700 mg/ m2 and Abraxane 70 mg/ m2\par Cycle # 5 21-  Gemcitabine 700 mg/ m2 and Abraxane 70 mg/ m2\par Cycle # 6 21- Gemcitabine 700 mg/ m2 and Abraxane 70 mg/ m2\par \par Cycle # 1 - 2/3/21- Gemcitabine 700 mg/m 2 / Erlotinib 100 mg PO daily  \par Cycle # 2 - 21-  Gemcitabine 700 mg/m 2 / Erlotinib 100 mg PO daily\par \par Oncological History :\par Ms. Griselda Corona is a 70 year old female who presents for initial consultation in our [facility] for a new diagnosis of pancreas adenocarcinoma. \par Patient states she had gastrointestinal symptoms dating back to 2019, including discomfort, bloating, diarrhea followed by small amounts of blood per rectum. She eventually went to GI doctor Dr Florian Pace.  She had a Colonoscopy in  that showed small ulcer. She went the ER at [facility] 3/10/20 for abdominal pain.  CT of her abdomen was performed and showed 3.2 x 3.5 cm hypoenhancing mass in the pancreatic head/ body concerning for malignancy. Mass results in severe compression of SMV. Retroperitoneal lymphadenopathy concerning for metastatic disease with multiple nodules in lung base concerning for metastatic disease.\par \par She was referred  to Dr Bernabe for EUS, which was performed 2020, significant for an ill-defined, heterogeneous, hypoechoic lesion seen in the head of pancreas. The lesion abutted but did not invade the SMV. The main pancreatic duct was dilated and the parenchyma was heterogeneous within the pancreatic body/tail. Fine needle biopsy was performed.\par \par Pathology: Gastric mucosa positive for H pylori. Pancreas mass: Invasive adenocarcinoma.\par \par Patient is taking medication regimen for the H.Pylori infection, otherwise not taking medications except for multivitamin supplement. \par \par \par FHX: No hx of Cancer \par \par Socail HX: Lives with her children, , Non smoker, no alcohol use\par \par PSX: No surgeries in past \par \par \par Disease: Pancreatic adenocarcinoma \par Pathology:\par \par AJCC Stage : Stage 4 \par \par Tumor Markers: CA 19.9 was 155 \par \par 6/10/20- Ms. Desouza is seen in [facility] clinic. She states he has abdominal pain 7-8/10 and gets relief with Tylenol. She has had 2 lbs weight loss- and appetite was decreased .\par Ct abd/ pelvis/ chest- Large pancreatic mass suspicious for adenocarcinoma, with marked arterial \par and venous involvement. Numerous bilateral pulmonary nodules new since 2018 and suspicious for metastatic disease. Retroperitoneal and peripancreatic lymphadenopathy. \par Omental nodule raising the possibility of peritoneal carcinomatosis. \par \par 20- Ms. Desouza is seen for follow up. She had the port placed earlier last week and is scheduled for her chemotherapy today . We discussed the chemo side effects and what to expect from chemotherapy- SE- Drug sheet was provided Risks/ benefits and alternatives of chemotherapy were discussed and included but not limited to fatigue,low blood counts, nausea/vomiting, skin reactions, hair thinning, possible blood transfusion requirement,increased risk of infection,neuropathy. Patient agreed to above and informed consent was signed.PET Scan- done- on 20-FDG-avid mass in pancreatic body and neck corresponds to known malignancy. Hypermetabolic left periaortic lymphadenopathy is compatible with metastatic disease.  Multiple bilateral pulmonary nodules, not significantly changed as compared to CT dated 2020, compatible with metastatic disease. \par \par 20 - Here for Cycle 2 \par 1- Developed G3 fatigue following C1 , and had to remain in bed for at least 4 days; son was cooking, she was able to go to bathroom and shower by herself\par 2- Also had decreased appetite and G1 nausea but no vomiting\par 3- Declines neuropathy\par 4- Denies diarrhea\par 5- No fever\par 6- No sxs suggestive of COVID-19\par 7- lost weight upto 10 lbs\par 8- also I am aware of her reaction to IRIN during infusion\par \par \par 20- Recieved a call from patient stating she had episodes of vomiting yesterday. She went out yesterday in the summer sun  and had some food from outside and she has been throwing up. She states she lost weight >30 lbs- She states the Creon has been causing a lot of abdominal pain - She has been experiencing some abdominal pain but is afraid to use the Oxycodone or the medical marijuana. \par She lives with her 2 sons and her sister in law has been helping her intermittently - However she needs a lot of reinstatement of instructions- Explained to her that she needs to take the Pain meds when needed and the pain is not from Creon. \par She is requesting for a Home COVID swab - will arrange for the same. \par \par 20- Pt was scheduled for C#3 with FOLFOX instead of FOLFORINOX to identify the culprit for the reaction. But her ANC was 1.4 so held Oxaliplatin and gave only 5 FU and LCV. She tolerated this chemo better did not have any reaction like previously. She denies N/V/D. She has been experiencing spasms in her fingers after the previous chemotherapy with FOLFORINOX- Her tumor markers CEA was rising and is concerning from 0.7- 3.6- 7.9.Patient states she was diagnosed with LUPUS by her PCP in March Unclear if she is on any treatment for the same. Will get the medical records from her PCP.\par \par 20- Pt is scheduled for C#4 with FOLFOX instead of FOLFORINOX to identify the culprit for the reaction. She tolerated the last chemotherapy well. She has been eating better and denies N/V. Today she tolerated the chemo with FOLFOX well - No reaction. next cycle we plan to give FOLFIRI\par \par 8/10//20- Pt is seen for TEB visit after she had the restaging scans done- She had CT scan done that showed- Metastatic pancreatic adenocarcinoma. Stable disease. She tolerated the chemo well without the irinotecan. we answered all her questions and concerns. Her son was not in the telephone conference. Dr Paul left a message with the son on his cell phone. Plan is to continue with the chemo \par \par 20- Ms. Deo is seen for follow up- She tolerated the last chemo well- denies N/V/D. She denies any neuropathy - Denies pain - does not use mediacl marijuana- \par Explained to her that she will receive FOLFIRI this cycle and to inform if she develops any reaction to the same. \par \par 09/3/20- Ms Deo has a telephone visit with me. She was hospitalized at [facility] after her last chemo- when she came for the disconnect- She was hospitalized at [facility] from 20 to 20- When she came for the disconnect her BP was low and she was bradycardiac and after a fluid challenge she still did not recover- She was sent to hospital for further work up where Blood cx/ Ua/ Urine cx was negative and it was deemed that she was dehydrated and no infectious process.\par \par Spoke with the patient who states she had a rough time with this chemotherapy and she is feeling better now- however she has been having episodes of diarrhea and nausea. She has been taking the anti nausea meds and that has helped her. She feels that she dis better with FOLFOX - Discussed with Dr Paul and plan is to hold off on nay future doses of Irinotecan.\par \par 20- Ms Deo is seen for C #7 - Her ANC was 1.23- Discussed with Dr Paul and plan to hold off on Oxaliplatin - Pt will get 5 FU and LCV - She will receive onpro on disconnect day. I explained to her hat she will not get any mores doses of Irinotecan and to keep a close eye on any symptoms she develop post chemotherapy. She denies pain/ N/V/D. \par \par 20- Ms. Deo is seen in office for follow up on C# 8. She denies n/v/dizziness/ neuropathy . Her appetite is  good, she has good energy level and is tolerating chemo well. She will get her restaging scans done after this round of chemo. Advised she can bring her son in for the next office appointment with Dr Paul to discuss the results of the scan. \par \par 10/6/20-Ms. Desouza is seen in office for follow up after her restaging scans- SHe had restaging scans on 10/1/20 that showed metastatic pancreatic cancer with no interval change since 20 in pancreatic mass, lung nodules, and adenopathy / peritoneal nodules.  She has been tolerating this chemo regimen  well. today she presents to office with her son - Davina . We discussed that we can entertain the idea of RT aftre she completes 12 cycles of Chemo.She complainst that she has been having episodes where she feels her head is usually hot and she feels sweaty. 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 \par \par 10/22/20-Patient was admitted at [facility] for sepsis and needed vasopressors for pressure support. She was in the ICU for a short time - She received IV abx for Likely bacterial pneumonia. While she was in the hospital her LFTS were still elevated- Unclear etiology for elevated LFTs- Likely chemo related vs Antibiotics- Pt is feeling better after she is discharged home. plan  is to omit FOLFOX in future and plan for Cobden Abraxane with Onpro. Will recheck labs to confirm her LFTs are downtrending. Plan to start her on gem- Abraxane if LFTS normalize. \par Her scans in the hospital showed- Metastatic pancreatic cancer involving the retroperitoneal lymph nodes and visualized bilateral lungs. Stable disease compared to 10/1/2020. \par Small volume ascites, new since 10/1/2020. \par \par Drug sheet was provided Risks/ benefits and alternatives of chemotherapy were discussed and included but not limited to fatigue,low blood counts, nausea/vomiting, skin reactions, hair thinning, possible blood transfusion requirement,increased risk of infection,neuropathy. Patient agreed to above and informed consent was signed.\par \par 10/27/2020: Pt is here to start a new chemo regimen with Cobden/Abraxane with onpro. Irinotecan was discontinued in August due to severe fatigue, hypotension resistant to fluid challenge and bradycardia. Later patient developed septic shock post FOLFOX chemo and is hence discontinued. LFTs are normal with AST 38, ALT 31, ALP elevated 170, normal T. Rah 0.2mg/dl. Pt c/o mild fatigue and does not have any other complains. Denies any F/C, N/V,D/P, change in appetite or weight loss. \par Discussed the chemo regimen with Cobden/ Abraxane- answered all questions and concerns.\par \par 20; Ms Desouza is seen as follow up for C# 2. She complains of fatigue which is more prominent this cycle- She denies fevers. She does complain of nausea after chemo . She had some Constipation that was resolved with Miralax. Her appetite is diminished  and sleep is disturbed- Unable to sleep through the night. Will order Remeron 15 mg Q HS for appetite and sleep.\par \par 20; Ms Desouza is seen as follow up for C#3. She feels well. She feels this chemo is more tolerable. She has fatigue that lasts for 1-2 days after chemo and the week she is off chemo - she feels well. She does experience some nausea  and constipation occasionally that is relived by anti nausea meds and MiraLAX. She denies  neuropathy.\par Denies pain or discomfort. Filled out the paperwork for FMLA- for [employer]. she notices darkening of her toes -on the right foot - The first second and fourth digit noted.\par \par 20; Ms Desouza is seen as follow up - In the interim she was hospitalized at [facility] on 20 - for Acute MI and had a cardiac stent placed. She is scheduled to see Dr Toussaint for follow up next week. She is now on Brilanta/ Metoprolol/ Atorvastatin.\par She feels very tired and feels wiped out - She denies N/V/D/ Constipation. Her abdomen is distended- She has some lower back pain  which is new - Will plan to schedule a CT scan A/P/C prior to the next chemotherapy- Will arrange for a TEB visit with Dr Paul after completion of scans- Her f/u appt with Dr Toussaint is on 20.\par She complains of feeling dizzy at times- Does not have a BP machine at home- \par \par 12/15/20:  Ms Desouza is seen as TEB viist to discuss the scans results. She had the restaging scans on  that showed interval regression in hepatic mass , mediastinal and retroperitoneal lymph nodes. Interval stability in size and number of pulm metastases. Overall 39% regression of tumor based on RECIST . She has a follow up with cardiologist following week. \par \par 20:  Ms Desouza is seen for follow up .In the interim she was seen by Cardiology - Dr Toussaint- \par She endorses she has episodes of light headed ness- Today her BP was in low 90's . She states she feels fatigued from chemotherapy - We discussed that we dose reduced the chemo this cycle- She also mentioned that she has been having some shortness of breath even at rest- Emailed Cardiology Dr Toussaint and think Brillanta can cause SOB- Plan is to switch Brillanta to Plavix- \par \par 21:  Ms Desouza is seen for follow up .In the interim she was seen by Cardiology - Dr Toussaint- Brilanta was switched to Plavix and her shortness of breath has improved ever since- She feels well - She does experience some numbness and pins and needles feeling on her shins b/l .Abraxane was reduced to 70 mg/m2 - Will discuss with Dr Paul if dose needs to be further reduced. Neuropathy is grade 1 . \par \par 21:  Ms Desouza is seen for follow up .In the interim she was hospitalized at [facility] for UTI  from 21 to 1/10/21 - She was treated with IV  Abx for UTI  and was discharged home. She has been experiencing some numbness in her lower legs bilaterally - she is on Abraxane 70 mg - She also mentioned that since her discharge from the hospital she stopped taking Metoprolol as she was advised to do so. She has a f/u appt with cardiology Dr Toussaint- Advised her to f/u with him in regards to meds. \par \par 2/3/21:  Ms Desouza is seen for follow up .In the interim she was evaluated by cardiology and metoprolol is on hold for now-  Since she has been having vague neuropathy symptoms with numbness in her legs-we discussed on stopping the Abraxane and starting her on Tarceva with this cycle. \par  Drug sheet was provided Risks/ benefits and alternatives of chemotherapy were discussed and included but not limited to fatigue,low blood counts, nausea/vomiting, skin reactions, hair thinning, possible blood transfusion requirement,increased risk of infection,neuropathy. Patient agreed to above and informed consent was signed.\par \par CY inhibitors or a combined CY and CY inhibitor increase erlotinib plasma concentrations. Avoid concomitant use. If not possible, reduce TARCEVA dose. \par ? CY inducers decrease erlotinib plasma concentrations. Avoid concomitant use. If not possible, increase TARCEVA dose. \par ? Cigarette smoking and CY inducers decrease erlotinib plasma concentrations. Avoid concomitant use. If not possible, increase\par TARCEVA dose.\par ? Drugs that increase gastric pH decrease erlotinib plasma concentrations.\par For proton pump inhibitors avoid concomitant use if possible. \par For H-2 receptor antagonists, take TARCEVA 10 hours after H-2 receptor antagonist\par \par \par \par  [de-identified] : adenocarcinoma [de-identified] : 2/16/21:  Ms Desouza is seen for follow up .In the interim she was started on Tarceva. Pt had nose bleed, ecchymosis on her thigh. She also developed stye on her eye. Suggested her to use nasal spray to prevent further nose bleed. Denies any rash. Will do exam when she comes for chemo.\par \par  [FreeTextEntry3] : Hypotension, bradycardia  [FreeTextEntry4] : 6/17/20 [FreeTextEntry5] : IRIN [FreeTextEntry2] : x 4 daYS [FreeTextEntry8] : Sepsis, transaminitis  [de-identified] : OXaliplatin  [de-identified] : neuropathy  [de-identified] : Abraxane

## 2021-02-16 NOTE — END OF VISIT
[FreeTextEntry3] : On Erlotinib and gemcitabine currently as abraxane omitted due to neuropathy\par

## 2021-02-16 NOTE — PHYSICAL EXAM
[Restricted in physically strenuous activity but ambulatory and able to carry out work of a light or sedentary nature] : Status 1- Restricted in physically strenuous activity but ambulatory and able to carry out work of a light or sedentary nature, e.g., light house work, office work [Normal] : grossly intact [Ulcers] : no ulcers [Mucositis] : no mucositis [Thrush] : no thrush [de-identified] : Light headeness- was advised to stop metoprolol by Cardiologist  [de-identified] : Ocaasional cramps, constipation relieved.  [de-identified] : Numbness in lower legs / Pins and needles in both lower legs.

## 2021-02-16 NOTE — ASSESSMENT
[FreeTextEntry1] : 70 year old F with H/O metastatic disease diagnosed in June 2020 and is s/p 9 cycles of chemotherapy which was initiated with FOLFIRINOX and ended with FOLFOX with discontinuation of irinotecan due to severe side effects requiring hospitalization. Now FOLFOX is discontinued due to the development of septic shock and transaminitis. The current plan is to initiate [place]/Abraxane+onpro. \par \par 1-Therapeutic: \par -----------------\par Pancreatic cancer with mets to lungs.\par Started patient on FOLFORINOX regimen.She received alternate cycles of FOLFOX with FOLFIRI- since she developed AE  .She was treated with 4 cycles of FOLFOX- She was hospitalized after her last chemo for sepsis-\par Switched her chemo to [place]/ Abraxane- every 2 weeks.\par Sustained MI on 11/24 - Had a stent placed RCA.\par Resumed chemo with dose reduced [place]/ Abraxane- 700/ 70 - Completed 6 cycles \par Switched chemo to gem- Tarceva since she developed Grade 2 Neuropathy - \par \par \par \par 2-Diagnostic: \par ---------------\par Labs:Monthly CEA, , \par Ca 19.9- 142- 84---->75->47->42->41->53->45->42\par CEA: 1.9--->2-->1.7-->1.9->1.4->1.1->1.6\par Restaging scan  to be done after x 2 cycles of [place]- Tarceva \par \par 3- Pharmacogenetics testing:\par ----------------------------------\par -Foundation Liquid- LIBAN , TMB 4 Mut/Mb \par \par 4- Procedures:\par -----------------\par - S/P  Port-A-Cath placement on 06/15/20\par - PORT CARE\par - EMLA CREAM PRESCRIBED\par \par 5-Supportive: \par -----------------\par MI - S/P Stent- On ASA and Plavix  Follows with Cardiology Dr Duff-\par Was advised to hold Metoprolol since she developed dizziness- \par Advised to use a Holter monitor x 7 days \par Pancreatic insufficiency: CREON - [address] units (2 pills with each snack and 4 pills with each meal) for pancreatic replacement\par DVT (PE): High risk, close monitoring\par Antiemetic: Zofran PRN and Compazine PRN\par Emla cream: for port numbing before use\par Prevention of mouth sores: Biotene mouthwash\par Skin reactions: Udderly smooth cream and Vitamin B 6 100 mg BID for prevention of HFS\par Diarrhea: The usual dose of Imodium is 4 milligrams (mg) (2 capsules) after the first loose bowel movement, and 2 mg (1 capsule) after each loose bowel movement after the first dose has been taken. No more than 16 mg (8 capsules) should be taken in any twenty-four-hour period. \par \par \par 6- RTC:  \par --------\par In 2 weeks.\par F/u for next chemo\par

## 2021-02-17 NOTE — ED PROCEDURE NOTE - PROCEDURE ADDITIONAL DETAILS
7.5cm rhino rocket inserted into the R nare with 3cc air insufflated with proper hemorrhage control.

## 2021-02-17 NOTE — ED PROVIDER NOTE - NS ED ROS FT
GENERAL: no fever, no chills  EYES: no change in vision, no irritation  HEENT: no trouble swallowing or speaking, no throat pain, no ear pain, +epistaxis  CARDIAC: no chest pain, no palpitations   PULMONARY: no cough, no shortness of breath, no wheezing  GI: no abdominal pain, no nausea, no vomiting, no diarrhea, no constipation  : no changes in urination, no dysuria  SKIN: no rashes  NEURO: no headache, no numbness, no weakness, +lightheadedness   MSK: no joint pain, no muscle pain, no back pain, no calf pain     -Selwyn Barron, PGY2

## 2021-02-17 NOTE — ED PROVIDER NOTE - NSFOLLOWUPCLINICS_GEN_ALL_ED_FT
[facility] - ENT  Otolaryngology (ENT)  [address] [place] [address]  Phone: [phone]  Fax:   Follow Up Time: 4-6 Days

## 2021-02-17 NOTE — ED PROVIDER NOTE - NSPTACCESSSVCSAPPTDETAILS_ED_ALL_ED_FT
URGENT: Needs to be seen for epistaxis with rhino rocket placement in ED. Must be seen within 5 days please.

## 2021-02-17 NOTE — ED PROVIDER NOTE - PATIENT PORTAL LINK FT
You can access the FollowMyHealth Patient Portal offered by [facility] by registering at the following website: [email or web address]. By joining [employer]’s FollowMyHealth portal, you will also be able to view your health information using other applications (apps) compatible with our system.

## 2021-02-17 NOTE — ED PROCEDURE NOTE - ATTENDING CONTRIBUTION TO CARE
I was physically present for the E/M service provided. I was physically present for the key portions of the service provided. YANA.

## 2021-02-17 NOTE — ED PROVIDER NOTE - PHYSICAL EXAMINATION
GENERAL: A&Ox3, non-toxic appearing, no acute distress  HEENT: NCAT, EOMI, oral mucosa moist, normal conjunctiva  NOSE:   RESP: CTAB, no respiratory distress, no wheezes/rhonchi/rales, speaking in full sentences  CV: RRR, no murmurs/rubs/gallops  ABDOMEN: soft, non-tender, non-distended, no guarding  MSK: no visible deformities  NEURO: no focal sensory or motor deficits  SKIN: warm, normal color, well perfused, no rash  PSYCH: normal affect    -Selwyn Barron, PGY2 GENERAL: A&Ox3, non-toxic appearing, no acute distress  HEENT: NCAT, EOMI, oral mucosa moist, R eye w/ scleral injection and erythematous swelling of the upper eyelid  NOSE: large clot in R nare, bleeding controlled L nare, R nare bright red blood from septum  RESP: CTAB, no respiratory distress, no wheezes/rhonchi/rales, speaking in full sentences  CV: RRR, no murmurs/rubs/gallops  ABDOMEN: soft, non-tender, non-distended, no guarding  MSK: no visible deformities  NEURO: no focal sensory or motor deficits  SKIN: warm, normal color, well perfused, no rash  PSYCH: normal affect    -Selwyn Barron, PGY2

## 2021-02-17 NOTE — ED ADULT NURSE NOTE - OBJECTIVE STATEMENT
71y female BIBEMS a/o x3, complaining of epistaxis, pt reports it starting 2 days ago, pt also reports frequent episodes on epistaxis, PMH pancreatic CA on chemo, CAD on plavix, MI, GERD, HTN, HLD, pt reports no headache, chest pain, shortness of breath, moves all extremities w/+ pulses, no n/v/d, 20g IV placed right forearm, labs drawn, bed in lowest position, comfort and safety provided.

## 2021-02-17 NOTE — ED PROVIDER NOTE - OBJECTIVE STATEMENT
71 year old female PMH pancreatic CA on oral chemo, HTN, HLD, GERD, presents to ED for epistaxis. States for past 2 days she has been experiencing intermittent epistaxis. Yesterday was L nostil only but today is both nostrils. Denies trauma. Today started around 5pm, soaking through towel. Called 911 and EMS applied gauze w/ compressive wrap. Patient c/o lightheadedness since epistaxis started. She is currently on Plavix. Denies f/c, cp, sob, abd pain, n/v, or weakness. 71 year old female PMH pancreatic CA on oral chemo, HTN, HLD, GERD, CAD on Plavix, presents to ED for epistaxis. States for past 2 days she has been experiencing intermittent epistaxis. Yesterday was L nostil only but today is both nostrils. Denies trauma. Today started around 5pm, soaking through towel. Called 911 and EMS applied gauze w/ compressive wrap. Patient c/o lightheadedness since epistaxis started. She is currently on Plavix. Denies f/c, cp, sob, abd pain, n/v, or weakness.

## 2021-02-17 NOTE — ED PROVIDER NOTE - PROGRESS NOTE DETAILS
Selwyn Barron, PGY2: Rhino rocket inserted in R nare w/ bleeding control achieved. Plan for d/c w/ ENT follow up. Patient on Plavix for CAD w/ stent - will advise patient to speak with her cardiologist regarding medication continuation however will likely need to continue. Discussed return precautions and all questions answered. Pt in agreement w/ plan. CAOx3, NAD, VSS. Stable for d/c.

## 2021-02-17 NOTE — ED PROVIDER NOTE - NSFOLLOWUPINSTRUCTIONS_ED_ALL_ED_FT
CHEST PA   LATERAL

 

Clinical indications: Screening study for interstitial lung disease. 

 

COMPARISON: August 20, 2018.

 

Findings: No acute lung infiltrate or pleural effusion or pulmonary edema 

or lung mass or pneumothorax is seen. Cardiomegaly is evident. The heart 

size, pulmonary vasculature, mediastinum and both fiona are stable. The 

osseous structures appear intact.

 

Impression: No acute radiographic abnormality is seen.

 

Electronically signed by: Constantine Hutton MD (6/30/2020 5:27 PM) [id number] You were seen in the emergency department for nose bleed. You had a rhino rocket placed in the right nare that stopped the bleeding. Please keep this in place until you follow up with ENT. Call the number above to schedule an urgent appointment (3-5 days). We will contact you within 2 days if you cannot schedule an appointment.     Please return to the ER for worsening nose bleeds, dizziness, syncope, or any other new or concerning symptoms.     Rest, drink plenty of fluids.  Advance activity as tolerated.  Continue all previously prescribed medications as directed.  Follow up with your primary care physician in 48-72 hours- bring copies of your results.  Return to the ER for worsening or persistent symptoms, and/or ANY NEW OR CONCERNING SYMPTOMS. If you have issues obtaining follow up, please call: [phone] to obtain a doctor or specialist who takes your insurance in your area.

## 2021-02-17 NOTE — ED PROVIDER NOTE - ATTENDING CONTRIBUTION TO CARE
Afebrile. Awake and Alert. Right nares large clot with steady blood stream. CN II-XII grossly intact. Moves all extremities without lateralization.    Afrin and pressure followed by RhinoRocket placement

## 2021-02-22 NOTE — ASSESSMENT
[FreeTextEntry1] : Right Epistaxis, on AC and immunotherapy for metastatic ca:\par - Surgicel placed along right anterior inferior turb\par - Nasal moisturization, No nose blowing, Open mouth Sneezing, and to avoid bending with head below the heart or heavy lifting.\par - F/U if bleeding recurs\par \par right eyelid inflammation:\par - she thinks related to the new chemo\par - on erythromycin ointment per ED\par - f/u with ophtho

## 2021-02-22 NOTE — PROCEDURE
[FreeTextEntry6] : Flexible scope #L2 was used. Right nasal passage with normal inferior, middle and superior turbinates. Nasal passage patent with clear middle meatus and sphenoethmoid recess. Right anterior inferior turb with some bloody adherent scab. Left nasal passage with normal inferior, middle and superior turbinates. Nasal passage was patent with clear middle meatus and sphenoethmoid recess. No mucopurulence or polyps appreciated. Nasopharynx clear.  Right nasal septum with dryness and scabbing. \par \par

## 2021-02-22 NOTE — PHYSICAL EXAM
[Midline] : trachea located in midline position [Normal] : no rashes [de-identified] : Right with rapid rhino in place.  Removed, Surgicel and bacitracin placed.  Left wnl.  [de-identified] : right eyelid with erythema and mild edema

## 2021-02-22 NOTE — CONSULT LETTER
[Dear  ___] : Dear  [unfilled], [Consult Letter:] : I had the pleasure of evaluating your patient, [unfilled]. [Please see my note below.] : Please see my note below. [Consult Closing:] : Thank you very much for allowing me to participate in the care of this patient.  If you have any questions, please do not hesitate to contact me. [Sincerely,] : Sincerely, [FreeTextEntry3] : Tracey Cavanaugh MD\par Otolaryngology and Cranial Base Surgery\par Attending Physician - Department of Otolaryngology and Head & Neck Surgery\par [facility]\par  - [employer] [facility]\par Office: [phone]\par Fax: [phone]\par  [DrClaudia  ___] : Dr. MORATAYA

## 2021-02-24 NOTE — PHYSICAL EXAM
[Restricted in physically strenuous activity but ambulatory and able to carry out work of a light or sedentary nature] : Status 1- Restricted in physically strenuous activity but ambulatory and able to carry out work of a light or sedentary nature, e.g., light house work, office work [Normal] : grossly intact [Ulcers] : no ulcers [Mucositis] : no mucositis [Thrush] : no thrush [de-identified] : Nasal bleed previosuly needing nasal rocket to stop the bleed.evaluated by ENT  [de-identified] : Light headeness- was advised to stop metoprolol by Cardiologist  [de-identified] : Ocaasional cramps, constipation relieved.  [de-identified] : Numbness in lower legs / Pins and needles in both lower legs.

## 2021-02-24 NOTE — HISTORY OF PRESENT ILLNESS
[Disease: _____________________] : Disease: [unfilled] [M: ___] : M[unfilled] [AJCC Stage: ____] : AJCC Stage: [unfilled] [3] : 3, Severe [4] : 4, Life-threatening [2] : 2, Moderate [Date: ____________] : Patient's last distress assessment performed on [unfilled]. [4 - Distress Level] : Distress Level: 4 [de-identified] : Diagnosis: Pancreatic adenocarcinoma \par \par Genetic Testing: Foundation Liquid- LIBAN , TMB 4 Mut/Mb \par \par Other Current Medical Problems: Arthritis/ HLD \par \par Treatment History:\par \par Cycle # 1 20- FOLFORINOX- 5 FU 2400 mg/ kg /  mg/ m 2/ Irinotecan 150 mg/ m 2 / Oxaliplatin 85 mg/ m2\par Cycle # 2 20- FOLFORINOX- 5 FU 2400 mg/ kg /  mg/ m 2/ Irinotecan 140 mg/ m 2/ Oxaliplatin 85 mg/ m2\par Cycle # 3- 07/15/20- Skipped Irinotecan / Oxaliplatin-  (ANC 1.4) - 5 FU 2400 mg/ kg /  mg/ m 2\par Cycle #4 - 20- FOLFOX-  5 FU 2000 mg/ kg /  mg/ m 2/ Oxaliplatin 85 mg/ m2-----------------Tolerated well without Irinotecan \par \par Restaging Scans---------------Stable disease \par Cycle # 5 - 20- FOLFOX- 5 FU 2000 mg/ kg /  mg/ m 2/ Oxaliplatin 85 mg/ m2--------\par Cycle # 6- 20-FOLFIRI-  5 FU 2000 mg/ kg /  mg/ m 2/ Irinotecan 150 mg- Held oxaliplatin \par \par Hospitalized at [facility] for adverse reaction from Irinotecan- - -------------Will hold IRINOTECAN in future- \par \par Cycle # 7 20 - 5 FU 2000 mg/  mg/ m2 -----------Oxali was held since ANC was 1.29 \par Cycle #  -20- 5 FU 2000 mg/  mg/ m2 / Oxaliplatin 85 mg/ m2 \par \par Restaging Scans- Stable disease \par \par Cycle #9 -10/6/20-  5 FU 2000 mg/  mg/ m2 / Oxaliplatin 85 mg/ m2 \par \par Hospitalized for sepsis after chemotherapy \par \par Cycle #1 10/27/20 Gemcitabine 1000 mg/m2 + Abraxane 100 mg/m2\par Cycle # 2 20- Gemcitabine 1000 mg/m2 + Abraxane 100 mg/m2\par Cycle # 3 20-  Gemcitabine 1000 mg/m2 + Abraxane 100 mg/m2\par \par Hospitalized for Acute MI - Had a cardiac stent - 20\par \par Restaging scans- 12/15/20Interval regression in hepatic mass/ mediastinal and retroperitoneal lymph nodes\par \par Cycle # 4 20- Gemcitabine 700 mg/ m2 and Abraxane 70 mg/ m2\par Cycle # 5 21-  Gemcitabine 700 mg/ m2 and Abraxane 70 mg/ m2\par Cycle # 6 21- Gemcitabine 700 mg/ m2 and Abraxane 70 mg/ m2\par \par Cycle # 1 - 2/3/21- Gemcitabine 700 mg/m 2 / Erlotinib 100 mg PO daily  \par Cycle # 2 - 21-  Gemcitabine 700 mg/m 2 / Erlotinib 100 mg PO daily\par \par Chemo on hold since pt had nasal bleeding -----Pt stopped taking Tarceva on 21- sec to epistaxis and stye on the eye,\par \par Oncological History :\par Ms. Griselda Corona is a 70 year old female who presents for initial consultation in our [facility] for a new diagnosis of pancreas adenocarcinoma. \par Patient states she had gastrointestinal symptoms dating back to 2019, including discomfort, bloating, diarrhea followed by small amounts of blood per rectum. She eventually went to GI doctor Dr Florian Pace.  She had a Colonoscopy in  that showed small ulcer. She went the ER at [facility] 3/10/20 for abdominal pain.  CT of her abdomen was performed and showed 3.2 x 3.5 cm hypoenhancing mass in the pancreatic head/ body concerning for malignancy. Mass results in severe compression of SMV. Retroperitoneal lymphadenopathy concerning for metastatic disease with multiple nodules in lung base concerning for metastatic disease.\par \par She was referred  to Dr Bernabe for EUS, which was performed 2020, significant for an ill-defined, heterogeneous, hypoechoic lesion seen in the head of pancreas. The lesion abutted but did not invade the SMV. The main pancreatic duct was dilated and the parenchyma was heterogeneous within the pancreatic body/tail. Fine needle biopsy was performed.\par \par Pathology: Gastric mucosa positive for H pylori. Pancreas mass: Invasive adenocarcinoma.\par \par Patient is taking medication regimen for the H.Pylori infection, otherwise not taking medications except for multivitamin supplement. \par \par \par FHX: No hx of Cancer \par \par Socail HX: Lives with her children, , Non smoker, no alcohol use\par \par PSX: No surgeries in past \par \par \par Disease: Pancreatic adenocarcinoma \par Pathology:\par \par AJCC Stage : Stage 4 \par \par Tumor Markers: CA 19.9 was 155 \par \par 6/10/20- MsClaudia Desouza is seen in [facility] clinic. She states he has abdominal pain 7-8/10 and gets relief with Tylenol. She has had 2 lbs weight loss- and appetite was decreased .\par Ct abd/ pelvis/ chest- Large pancreatic mass suspicious for adenocarcinoma, with marked arterial \par and venous involvement. Numerous bilateral pulmonary nodules new since 2018 and suspicious for metastatic disease. Retroperitoneal and peripancreatic lymphadenopathy. \par Omental nodule raising the possibility of peritoneal carcinomatosis. \par \par 20- MsClaudia Desouza is seen for follow up. She had the port placed earlier last week and is scheduled for her chemotherapy today . We discussed the chemo side effects and what to expect from chemotherapy- SE- Drug sheet was provided Risks/ benefits and alternatives of chemotherapy were discussed and included but not limited to fatigue,low blood counts, nausea/vomiting, skin reactions, hair thinning, possible blood transfusion requirement,increased risk of infection,neuropathy. Patient agreed to above and informed consent was signed.PET Scan- done- on 20-FDG-avid mass in pancreatic body and neck corresponds to known malignancy. Hypermetabolic left periaortic lymphadenopathy is compatible with metastatic disease.  Multiple bilateral pulmonary nodules, not significantly changed as compared to CT dated 2020, compatible with metastatic disease. \par \par 20 - Here for Cycle 2 \par 1- Developed G3 fatigue following C1 , and had to remain in bed for at least 4 days; son was cooking, she was able to go to bathroom and shower by herself\par 2- Also had decreased appetite and G1 nausea but no vomiting\par 3- Declines neuropathy\par 4- Denies diarrhea\par 5- No fever\par 6- No sxs suggestive of COVID-19\par 7- lost weight upto 10 lbs\par 8- also I am aware of her reaction to IRIN during infusion\par \par \par 20- Recieved a call from patient stating she had episodes of vomiting yesterday. She went out yesterday in the summer sun  and had some food from outside and she has been throwing up. She states she lost weight >30 lbs- She states the Creon has been causing a lot of abdominal pain - She has been experiencing some abdominal pain but is afraid to use the Oxycodone or the medical marijuana. \par She lives with her 2 sons and her sister in law has been helping her intermittently - However she needs a lot of reinstatement of instructions- Explained to her that she needs to take the Pain meds when needed and the pain is not from Creon. \par She is requesting for a Home COVID swab - will arrange for the same. \par \par 20- Pt was scheduled for C#3 with FOLFOX instead of FOLFORINOX to identify the culprit for the reaction. But her ANC was 1.4 so held Oxaliplatin and gave only 5 FU and LCV. She tolerated this chemo better did not have any reaction like previously. She denies N/V/D. She has been experiencing spasms in her fingers after the previous chemotherapy with FOLFORINOX- Her tumor markers CEA was rising and is concerning from 0.7- 3.6- 7.9.Patient states she was diagnosed with LUPUS by her PCP in March Unclear if she is on any treatment for the same. Will get the medical records from her PCP.\par \par 20- Pt is scheduled for C#4 with FOLFOX instead of FOLFORINOX to identify the culprit for the reaction. She tolerated the last chemotherapy well. She has been eating better and denies N/V. Today she tolerated the chemo with FOLFOX well - No reaction. next cycle we plan to give FOLFIRI\par \par 8/10//20- Pt is seen for TEB visit after she had the restaging scans done- She had CT scan done that showed- Metastatic pancreatic adenocarcinoma. Stable disease. She tolerated the chemo well without the irinotecan. we answered all her questions and concerns. Her son was not in the telephone conference. Dr Paul left a message with the son on his cell phone. Plan is to continue with the chemo \par \par 20- Ms. Deo is seen for follow up- She tolerated the last chemo well- denies N/V/D. She denies any neuropathy - Denies pain - does not use mediacl marijuana- \par Explained to her that she will receive FOLFIRI this cycle and to inform if she develops any reaction to the same. \par \par 09/3/20- Ms Deo has a telephone visit with me. She was hospitalized at [facility] after her last chemo- when she came for the disconnect- She was hospitalized at [facility] from 20 to 20- When she came for the disconnect her BP was low and she was bradycardiac and after a fluid challenge she still did not recover- She was sent to hospital for further work up where Blood cx/ Ua/ Urine cx was negative and it was deemed that she was dehydrated and no infectious process.\par \par Spoke with the patient who states she had a rough time with this chemotherapy and she is feeling better now- however she has been having episodes of diarrhea and nausea. She has been taking the anti nausea meds and that has helped her. She feels that she dis better with FOLFOX - Discussed with Dr Paul and plan is to hold off on nay future doses of Irinotecan.\par \par 20- Ms Deo is seen for C #7 - Her ANC was 1.23- Discussed with Dr Paul and plan to hold off on Oxaliplatin - Pt will get 5 FU and LCV - She will receive onpro on disconnect day. I explained to her hat she will not get any mores doses of Irinotecan and to keep a close eye on any symptoms she develop post chemotherapy. She denies pain/ N/V/D. \par \par 20- Ms. Deo is seen in office for follow up on C# 8. She denies n/v/dizziness/ neuropathy . Her appetite is  good, she has good energy level and is tolerating chemo well. She will get her restaging scans done after this round of chemo. Advised she can bring her son in for the next office appointment with Dr Paul to discuss the results of the scan. \par \par 10/6/20-Ms. Desouza is seen in office for follow up after her restaging scans- SHe had restaging scans on 10/1/20 that showed metastatic pancreatic cancer with no interval change since 20 in pancreatic mass, lung nodules, and adenopathy / peritoneal nodules.  She has been tolerating this chemo regimen  well. today she presents to office with her son - Davina . We discussed that we can entertain the idea of RT aftre she completes 12 cycles of Chemo.She complainst that she has been having episodes where she feels her head is usually hot and she feels sweaty. 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 \par \par 10/22/20-Patient was admitted at [facility] for sepsis and needed vasopressors for pressure support. She was in the ICU for a short time - She received IV abx for Likely bacterial pneumonia. While she was in the hospital her LFTS were still elevated- Unclear etiology for elevated LFTs- Likely chemo related vs Antibiotics- Pt is feeling better after she is discharged home. plan  is to omit FOLFOX in future and plan for Thayer Abraxane with Onpro. Will recheck labs to confirm her LFTs are downtrending. Plan to start her on gem- Abraxane if LFTS normalize. \par Her scans in the hospital showed- Metastatic pancreatic cancer involving the retroperitoneal lymph nodes and visualized bilateral lungs. Stable disease compared to 10/1/2020. \par Small volume ascites, new since 10/1/2020. \par \par Drug sheet was provided Risks/ benefits and alternatives of chemotherapy were discussed and included but not limited to fatigue,low blood counts, nausea/vomiting, skin reactions, hair thinning, possible blood transfusion requirement,increased risk of infection,neuropathy. Patient agreed to above and informed consent was signed.\par \par 10/27/2020: Pt is here to start a new chemo regimen with Thayer/Abraxane with onpro. Irinotecan was discontinued in August due to severe fatigue, hypotension resistant to fluid challenge and bradycardia. Later patient developed septic shock post FOLFOX chemo and is hence discontinued. LFTs are normal with AST 38, ALT 31, ALP elevated 170, normal T. Rah 0.2mg/dl. Pt c/o mild fatigue and does not have any other complains. Denies any F/C, N/V,D/P, change in appetite or weight loss. \par Discussed the chemo regimen with Thayer/ Abraxane- answered all questions and concerns.\par \par 20; Ms Desouza is seen as follow up for C# 2. She complains of fatigue which is more prominent this cycle- She denies fevers. She does complain of nausea after chemo . She had some Constipation that was resolved with Miralax. Her appetite is diminished  and sleep is disturbed- Unable to sleep through the night. Will order Remeron 15 mg Q HS for appetite and sleep.\par \par 20; Ms Desouza is seen as follow up for C#3. She feels well. She feels this chemo is more tolerable. She has fatigue that lasts for 1-2 days after chemo and the week she is off chemo - she feels well. She does experience some nausea  and constipation occasionally that is relived by anti nausea meds and MiraLAX. She denies  neuropathy.\par Denies pain or discomfort. Filled out the paperwork for FMLA- for son Davina. she notices darkening of her toes -on the right foot - The first second and fourth digit noted.\par \par 20; Ms Desouza is seen as follow up - In the interim she was hospitalized at [facility] on 20 - for Acute MI and had a cardiac stent placed. She is scheduled to see Dr Toussaint for follow up next week. She is now on Brilanta/ Metoprolol/ Atorvastatin.\par She feels very tired and feels wiped out - She denies N/V/D/ Constipation. Her abdomen is distended- She has some lower back pain  which is new - Will plan to schedule a CT scan A/P/C prior to the next chemotherapy- Will arrange for a TEB visit with Dr Paul after completion of scans- Her f/u appt with Dr Toussaint is on 20.\par She complains of feeling dizzy at times- Does not have a BP machine at home- \par \par 12/15/20:  Ms Desouza is seen as TEB viist to discuss the scans results. She had the restaging scans on  that showed interval regression in hepatic mass , mediastinal and retroperitoneal lymph nodes. Interval stability in size and number of pulm metastases. Overall 39% regression of tumor based on RECIST . She has a follow up with cardiologist following week. \par \par 20:  Ms Desouza is seen for follow up .In the interim she was seen by Cardiology - Dr Toussaint- \par She endorses she has episodes of light headed ness- Today her BP was in low 90's . She states she feels fatigued from chemotherapy - We discussed that we dose reduced the chemo this cycle- She also mentioned that she has been having some shortness of breath even at rest- Emailed Cardiology Dr Toussaint and think Brillanta can cause SOB- Plan is to switch Brillanta to Plavix- \par \par 21:  Ms Desouza is seen for follow up .In the interim she was seen by Cardiology - Dr Toussaint- Brilanta was switched to Plavix and her shortness of breath has improved ever since- She feels well - She does experience some numbness and pins and needles feeling on her shins b/l .Abraxane was reduced to 70 mg/m2 - Will discuss with Dr Paul if dose needs to be further reduced. Neuropathy is grade 1 . \par \par 21:  Ms Desouza is seen for follow up .In the interim she was hospitalized at [facility] for UTI  from 21 to 1/10/21 - She was treated with IV  Abx for UTI  and was discharged home. She has been experiencing some numbness in her lower legs bilaterally - she is on Abraxane 70 mg - She also mentioned that since her discharge from the hospital she stopped taking Metoprolol as she was advised to do so. She has a f/u appt with cardiology Dr Toussaint- Advised her to f/u with him in regards to meds. \par \par 2/3/21:  Ms Desouza is seen for follow up .In the interim she was evaluated by cardiology and metoprolol is on hold for now-  Since she has been having vague neuropathy symptoms with numbness in her legs-we discussed on stopping the Abraxane and starting her on Tarceva with this cycle. \par  Drug sheet was provided Risks/ benefits and alternatives of chemotherapy were discussed and included but not limited to fatigue,low blood counts, nausea/vomiting, skin reactions, hair thinning, possible blood transfusion requirement,increased risk of infection,neuropathy. Patient agreed to above and informed consent was signed.\par \par CY inhibitors or a combined CY and CY inhibitor increase erlotinib plasma concentrations. Avoid concomitant use. If not possible, reduce TARCEVA dose. \par ? CY inducers decrease erlotinib plasma concentrations. Avoid concomitant use. If not possible, increase TARCEVA dose. \par ? Cigarette smoking and CY inducers decrease erlotinib plasma concentrations. Avoid concomitant use. If not possible, increase\par TARCEVA dose.\par ? Drugs that increase gastric pH decrease erlotinib plasma concentrations.\par For proton pump inhibitors avoid concomitant use if possible. \par For H-2 receptor antagonists, take TARCEVA 10 hours after H-2 receptor antagonist\par \par \par 21:  Ms Desouza is seen for follow up .In the interim she was started on Tarceva. Pt had nose bleed, ecchymosis on her thigh. She also developed stye on her eye. Suggested her to use nasal spray to prevent further nose bleed. Denies any rash. Will do exam when she comes for chemo.\par \par \par \par \par \par  [de-identified] : adenocarcinoma [de-identified] : 2/24/21:  Ms Desouza is seen for follow up .In the interim she was hospitalized for nasal bleed- she had a nasal rocket that was placed since her bleed. She also developed a stye and had stopped taking Tarceva on 2/17/21- She has numbness on b/l feet - 7/10. unsteady on her feet - She is reluctant to start any medications for neuropathy \par She would like to wait and see how she fels.\par \par She had nasal bleeding which stopped now- however today her H & h was 7.5 - Will transfuse 1 unit of PRBC.\par Plan to resume chemo on 3/3/21.\par \par \par  [FreeTextEntry3] : Hypotension, bradycardia  [FreeTextEntry4] : 6/17/20 [FreeTextEntry5] : IRIN [FreeTextEntry2] : x 4 daYS [FreeTextEntry8] : Sepsis, transaminitis  [de-identified] : OXaliplatin  [de-identified] : neuropathy  [de-identified] : Abraxane

## 2021-02-24 NOTE — ASSESSMENT
[FreeTextEntry1] : 70 year old F with H/O metastatic disease diagnosed in June 2020 and is s/p 9 cycles of chemotherapy which was initiated with FOLFIRINOX and ended with FOLFOX with discontinuation of irinotecan due to severe side effects requiring hospitalization. Now FOLFOX is discontinued due to the development of septic shock and transaminitis. The current plan is to initiate [place]/Abraxane+onpro. \par \par 1-Therapeutic: \par -----------------\par Pancreatic cancer with mets to lungs.\par Started patient on FOLFORINOX regimen.She received alternate cycles of FOLFOX with FOLFIRI- since she developed AE  .She was treated with 4 cycles of FOLFOX- She was hospitalized after her last chemo for sepsis-\par Switched her chemo to [place]/ Abraxane- every 2 weeks.\par Sustained MI on 11/24 - Had a stent placed RCA.\par Resumed chemo with dose reduced [place]/ Abraxane- 700/ 70 - Completed 6 cycles \par Switched chemo to gem- Tarceva since she developed Grade 2 Neuropathy - \par She had nasal bleeding and needed to be evaluated in the ER \par Chemo on hold since 2/17- \par Today her H & h was 7.5 - Plan to transfuse 1 unit PRBC on 2/27\par \par \par \par 2-Diagnostic: \par ---------------\par Labs:Monthly CEA, , \par Ca 19.9- 142- 71---->75->47->42->41->53->45->42\par CEA: 1.9--->2-->1.7-->1.9->1.4->1.1->1.6\par Restaging scan  to be done after x 2 cycles of [place]- Tarceva \par \par 3- Pharmacogenetics testing:\par ----------------------------------\par -Foundation Liquid- LIBAN , TMB 4 Mut/Mb \par \par 4- Procedures:\par -----------------\par - S/P  Port-A-Cath placement on 06/15/20\par - PORT CARE\par - EMLA CREAM PRESCRIBED\par \par 5-Supportive: \par -----------------\par Epistaxis : Seen and evaluated by ENT - Had a nasal rocket that has been removed- \par Anemia sec to blood loss- Will transfuse 1 unit PRBC this week \par MI - S/P Stent- On ASA and Plavix  Follows with Cardiology Dr Duff-\par Was advised to hold Metoprolol since she developed dizziness- \par Advised to use a Holter monitor x 7 days \par Pancreatic insufficiency: CREON - [address] units (2 pills with each snack and 4 pills with each meal) for pancreatic replacement\par DVT (PE): High risk, close monitoring\par Antiemetic: Zofran PRN and Compazine PRN\par Emla cream: for port numbing before use\par Prevention of mouth sores: Biotene mouthwash\par Skin reactions: Udderly smooth cream and Vitamin B 6 100 mg BID for prevention of HFS\par Diarrhea: The usual dose of Imodium is 4 milligrams (mg) (2 capsules) after the first loose bowel movement, and 2 mg (1 capsule) after each loose bowel movement after the first dose has been taken. No more than 16 mg (8 capsules) should be taken in any twenty-four-hour period. \par \par \par 6- RTC:  \par --------\par Blood transfusion on 2/27/21\par F/u for next chemo in a week \par

## 2021-02-26 PROBLEM — R00.1 BRADYCARDIA BY ELECTROCARDIOGRAM: Status: ACTIVE | Noted: 2020-01-01

## 2021-02-26 NOTE — PHYSICAL EXAM
[Well Groomed] : well groomed [General Appearance - In No Acute Distress] : no acute distress [Normal Conjunctiva] : the conjunctiva exhibited no abnormalities [FreeTextEntry1] : moist mucosa, anicteric sclerae [Normal Jugular Venous V Waves Present] : normal jugular venous V waves present [No Jugular Venous Martinez A Waves] : no jugular venous martinez A waves [] : no respiratory distress [Respiration, Rhythm And Depth] : normal respiratory rhythm and effort [Exaggerated Use Of Accessory Muscles For Inspiration] : no accessory muscle use [Heart Rate And Rhythm] : heart rate and rhythm were normal [Edema] : no peripheral edema present [Abdomen Tenderness] : non-tender [Abnormal Walk] : normal gait [Nail Clubbing] : no clubbing of the fingernails [Cyanosis, Localized] : no localized cyanosis [Petechial Hemorrhages (___cm)] : no petechial hemorrhages [Skin Turgor] : normal skin turgor [Oriented To Time, Place, And Person] : oriented to person, place, and time

## 2021-02-26 NOTE — HISTORY OF PRESENT ILLNESS
[FreeTextEntry1] : 71 year old woman with a history stage IV pancreatic adenocarcinoma treated with 5FU/oxaliplatin based regimen from 6/2020-10/2020, switched to gemcitabine/abraxane 10/27 following admission for sepsis who presented with acute RCA STEMI on 12/1. Underwent cardiac cath with deployment of a BMS (meena-coated device) with ARIA 3 flow. LVEF by echo was 55 % on 12/12. She was discharged on aspirin and ticagrelor. She denies any history of cardiovascular disease. She had dyslipidemia with low HDL on lipid panel during hospitalization, but no prior history of hypercholesterolemia, diabetes, hypertension, or tobacco smoking.\par \par She reports adherence to her new medications including DAPT and atorvastatin. She denies any bleeding problems with these. She stopped low dose metoprolol 12.5 mg, due to dizziness and  lower blood pressure. No syncope/near syncope or postural symptoms. She did have epistaxis in the past - prior to starting antiplatelet therapy, but not since. She does report periods of fatigue following chemotherapy.\par \par Today she reports intermittent palpitations which feel like her heart beating faster and slower. This does not occur every day. It has been present in the past, but she notices it more lately.\par \par Repeat echocardiogram done 1/14 was reviewed, normal function and no effusion or wall motion abnormality.\par \par Inteval Events:\par - began treatment with erlotinib but developed epistaxis associated with significant anemia requiring transfusion, nasal rockets. She stopped aspirin, but remains on clopidogrel.\par - Referred for [employer] monitor for palpitations but has not connected yet. Brought box with her today and we connected it for monitoring. Still has palpitations as described previously.\par \par \par \par \par

## 2021-02-26 NOTE — ASSESSMENT
[FreeTextEntry1] : Stable from the cardiac point of view. Unclear if dizziness was from the low dose of metoprolol. Will obtain 7 day event monitor to evaluate her palpitations. She is to begin EGFR-TKI therapy so will require monitoring of BP, QTc, and ongoing VTE risk assessment. Recent echo reviewed and without worrisome findings.\par \par Will continue with single agent antiplatelet therapy given significant epistaxis. She may continue on aspirin or clopidogrel, holding as needed for thrombocytopenia. Continue lipid lowering therapy with statin.\par \par I attached [employer] monitor and reviewed its use with her. I am not certain she understood fully, but encouraged her to call our office and/or the company for assistance.\par \par Continue single antiplatelet therapy as tolerated.\par \par Follow up with me in one month

## 2021-03-03 NOTE — PHYSICAL EXAM
[Restricted in physically strenuous activity but ambulatory and able to carry out work of a light or sedentary nature] : Status 1- Restricted in physically strenuous activity but ambulatory and able to carry out work of a light or sedentary nature, e.g., light house work, office work [Normal] : grossly intact [Ulcers] : no ulcers [Mucositis] : no mucositis [Thrush] : no thrush [de-identified] : blepharitis resolved-  [de-identified] : Nasal bleed previosuly needing nasal rocket to stop the bleed.evaluated by ENT  [de-identified] : Light headeness- was advised to stop metoprolol by Cardiologist  [de-identified] : Ocaasional cramps, constipation relieved.  [de-identified] : Numbness in lower legs / Pins and needles in both lower legs.

## 2021-03-03 NOTE — HISTORY OF PRESENT ILLNESS
[Disease: _____________________] : Disease: [unfilled] [M: ___] : M[unfilled] [AJCC Stage: ____] : AJCC Stage: [unfilled] [3] : 3, Severe [4] : 4, Life-threatening [2] : 2, Moderate [Date: ____________] : Patient's last distress assessment performed on [unfilled]. [4 - Distress Level] : Distress Level: 4 [de-identified] : Diagnosis: Pancreatic adenocarcinoma \par \par Genetic Testing: [employer]- LIBAN , TMB 4 Mut/Mb \par \par Other Current Medical Problems: Arthritis/ HLD \par \par Treatment History:\par \par Cycle # 1 20- FOLFORINOX- 5 FU 2400 mg/ kg /  mg/ m 2/ Irinotecan 150 mg/ m 2 / Oxaliplatin 85 mg/ m2\par Cycle # 2 20- FOLFORINOX- 5 FU 2400 mg/ kg /  mg/ m 2/ Irinotecan 140 mg/ m 2/ Oxaliplatin 85 mg/ m2\par Cycle # 3- 07/15/20- Skipped Irinotecan / Oxaliplatin-  (ANC 1.4) - 5 FU 2400 mg/ kg /  mg/ m 2\par Cycle #4 - 20- FOLFOX-  5 FU 2000 mg/ kg /  mg/ m 2/ Oxaliplatin 85 mg/ m2-----------------Tolerated well without Irinotecan \par \par Restaging Scans---------------Stable disease \par Cycle # 5 - 20- FOLFOX- 5 FU 2000 mg/ kg /  mg/ m 2/ Oxaliplatin 85 mg/ m2--------\par Cycle # 6- 20-FOLFIRI-  5 FU 2000 mg/ kg /  mg/ m 2/ Irinotecan 150 mg- Held oxaliplatin \par \par Hospitalized at [facility] for adverse reaction from Irinotecan- - -------------Will hold IRINOTECAN in future- \par \par Cycle # 7 20 - 5 FU 2000 mg/  mg/ m2 -----------Oxali was held since ANC was 1.29 \par Cycle #  -20- 5 FU 2000 mg/  mg/ m2 / Oxaliplatin 85 mg/ m2 \par \par Restaging Scans- Stable disease \par \par Cycle #9 -10/6/20-  5 FU 2000 mg/  mg/ m2 / Oxaliplatin 85 mg/ m2 \par \par Hospitalized for sepsis after chemotherapy \par \par Cycle #1 10/27/20 Gemcitabine 1000 mg/m2 + Abraxane 100 mg/m2\par Cycle # 2 20- Gemcitabine 1000 mg/m2 + Abraxane 100 mg/m2\par Cycle # 3 20-  Gemcitabine 1000 mg/m2 + Abraxane 100 mg/m2\par \par Hospitalized for Acute MI - Had a cardiac stent - 20\par \par Restaging scans- 12/15/20Interval regression in hepatic mass/ mediastinal and retroperitoneal lymph nodes\par \par Cycle # 4 20- Gemcitabine 700 mg/ m2 and Abraxane 70 mg/ m2\par Cycle # 5 21-  Gemcitabine 700 mg/ m2 and Abraxane 70 mg/ m2\par Cycle # 6 21- Gemcitabine 700 mg/ m2 and Abraxane 70 mg/ m2\par \par Cycle # 1 - 2/3/21- Gemcitabine 700 mg/m 2 / Erlotinib 100 mg PO daily  \par Cycle # 2 - 21-  Gemcitabine 700 mg/m 2 / Erlotinib 100 mg PO daily\par \par Chemo on hold since pt had nasal bleeding -----Pt stopped taking Tarceva on 21- sec to epistaxis and stye on the eye,\par \par 21- Pt received 1 unit of PRBC for H & H at 7.5/25.7\par \par Cycle # 3- 3/3/21- Gemcitabine 700 mg/ m2 / Erlotinib 100 mg PO daily \par \par Oncological History :\par Ms. Griselda Corona is a 70 year old female who presents for initial consultation in our [facility] for a new diagnosis of pancreas adenocarcinoma. \par Patient states she had gastrointestinal symptoms dating back to 2019, including discomfort, bloating, diarrhea followed by small amounts of blood per rectum. She eventually went to GI doctor Dr Florian Pace.  She had a Colonoscopy in  that showed small ulcer. She went the ER at [facility] 3/10/20 for abdominal pain.  CT of her abdomen was performed and showed 3.2 x 3.5 cm hypoenhancing mass in the pancreatic head/ body concerning for malignancy. Mass results in severe compression of SMV. Retroperitoneal lymphadenopathy concerning for metastatic disease with multiple nodules in lung base concerning for metastatic disease.\par \par She was referred  to Dr Bernabe for EUS, which was performed 2020, significant for an ill-defined, heterogeneous, hypoechoic lesion seen in the head of pancreas. The lesion abutted but did not invade the SMV. The main pancreatic duct was dilated and the parenchyma was heterogeneous within the pancreatic body/tail. Fine needle biopsy was performed.\par \par Pathology: Gastric mucosa positive for H pylori. Pancreas mass: Invasive adenocarcinoma.\par \par Patient is taking medication regimen for the H.Pylori infection, otherwise not taking medications except for multivitamin supplement. \par \par \par FHX: No hx of Cancer \par \par Socail HX: Lives with her children, , Non smoker, no alcohol use\par \par PSX: No surgeries in past \par \par \par Disease: Pancreatic adenocarcinoma \par Pathology:\par \par AJCC Stage : Stage 4 \par \par Tumor Markers: CA 19.9 was 155 \par \par 6/10/20- MsClaudia Desouza is seen in [facility] clinic. She states he has abdominal pain 7-8/10 and gets relief with Tylenol. She has had 2 lbs weight loss- and appetite was decreased .\par Ct abd/ pelvis/ chest- Large pancreatic mass suspicious for adenocarcinoma, with marked arterial \par and venous involvement. Numerous bilateral pulmonary nodules new since 2018 and suspicious for metastatic disease. Retroperitoneal and peripancreatic lymphadenopathy. \par Omental nodule raising the possibility of peritoneal carcinomatosis. \par \par 20- Ms. Deo is seen for follow up. She had the port placed earlier last week and is scheduled for her chemotherapy today . We discussed the chemo side effects and what to expect from chemotherapy- SE- Drug sheet was provided Risks/ benefits and alternatives of chemotherapy were discussed and included but not limited to fatigue,low blood counts, nausea/vomiting, skin reactions, hair thinning, possible blood transfusion requirement,increased risk of infection,neuropathy. Patient agreed to above and informed consent was signed.PET Scan- done- on 20-FDG-avid mass in pancreatic body and neck corresponds to known malignancy. Hypermetabolic left periaortic lymphadenopathy is compatible with metastatic disease.  Multiple bilateral pulmonary nodules, not significantly changed as compared to CT dated 2020, compatible with metastatic disease. \par \par 20 - Here for Cycle 2 \par 1- Developed G3 fatigue following C1 , and had to remain in bed for at least 4 days; son was cooking, she was able to go to bathroom and shower by herself\par 2- Also had decreased appetite and G1 nausea but no vomiting\par 3- Declines neuropathy\par 4- Denies diarrhea\par 5- No fever\par 6- No sxs suggestive of COVID-19\par 7- lost weight upto 10 lbs\par 8- also I am aware of her reaction to IRIN during infusion\par \par \par 20- Recieved a call from patient stating she had episodes of vomiting yesterday. She went out yesterday in the summer sun  and had some food from outside and she has been throwing up. She states she lost weight >30 lbs- She states the Creon has been causing a lot of abdominal pain - She has been experiencing some abdominal pain but is afraid to use the Oxycodone or the medical marijuana. \par She lives with her 2 sons and her sister in law has been helping her intermittently - However she needs a lot of reinstatement of instructions- Explained to her that she needs to take the Pain meds when needed and the pain is not from Creon. \par She is requesting for a Home COVID swab - will arrange for the same. \par \par 20- Pt was scheduled for C#3 with FOLFOX instead of FOLFORINOX to identify the culprit for the reaction. But her ANC was 1.4 so held Oxaliplatin and gave only 5 FU and LCV. She tolerated this chemo better did not have any reaction like previously. She denies N/V/D. She has been experiencing spasms in her fingers after the previous chemotherapy with FOLFORINOX- Her tumor markers CEA was rising and is concerning from 0.7- 3.6- 7.9.Patient states she was diagnosed with LUPUS by her PCP in March Unclear if she is on any treatment for the same. Will get the medical records from her PCP.\par \par 20- Pt is scheduled for C#4 with FOLFOX instead of FOLFORINOX to identify the culprit for the reaction. She tolerated the last chemotherapy well. She has been eating better and denies N/V. Today she tolerated the chemo with FOLFOX well - No reaction. next cycle we plan to give FOLFIRI\par \par 8/10//20- Pt is seen for TEB visit after she had the restaging scans done- She had CT scan done that showed- Metastatic pancreatic adenocarcinoma. Stable disease. She tolerated the chemo well without the irinotecan. we answered all her questions and concerns. Her son was not in the telephone conference. Dr Paul left a message with the son on his cell phone. Plan is to continue with the chemo \par \par 20- Ms. Deo is seen for follow up- She tolerated the last chemo well- denies N/V/D. She denies any neuropathy - Denies pain - does not use mediacl marijuana- \par Explained to her that she will receive FOLFIRI this cycle and to inform if she develops any reaction to the same. \par \par 09/3/20- Ms Deo has a telephone visit with me. She was hospitalized at [facility] after her last chemo- when she came for the disconnect- She was hospitalized at [facility] from 20 to 20- When she came for the disconnect her BP was low and she was bradycardiac and after a fluid challenge she still did not recover- She was sent to hospital for further work up where Blood cx/ Ua/ Urine cx was negative and it was deemed that she was dehydrated and no infectious process.\par \par Spoke with the patient who states she had a rough time with this chemotherapy and she is feeling better now- however she has been having episodes of diarrhea and nausea. She has been taking the anti nausea meds and that has helped her. She feels that she dis better with FOLFOX - Discussed with Dr Paul and plan is to hold off on nay future doses of Irinotecan.\par \par 20- Ms Deo is seen for C #7 - Her ANC was 1.23- Discussed with Dr Paul and plan to hold off on Oxaliplatin - Pt will get 5 FU and LCV - She will receive onpro on disconnect day. I explained to her hat she will not get any mores doses of Irinotecan and to keep a close eye on any symptoms she develop post chemotherapy. She denies pain/ N/V/D. \par \par 20- Ms. Desouza is seen in office for follow up on C# 8. She denies n/v/dizziness/ neuropathy . Her appetite is  good, she has good energy level and is tolerating chemo well. She will get her restaging scans done after this round of chemo. Advised she can bring her son in for the next office appointment with Dr Paul to discuss the results of the scan. \par \par 10/6/20-Ms. Desouza is seen in office for follow up after her restaging scans- SHe had restaging scans on 10/1/20 that showed metastatic pancreatic cancer with no interval change since 20 in pancreatic mass, lung nodules, and adenopathy / peritoneal nodules.  She has been tolerating this chemo regimen  well. today she presents to office with her son - Davina . We discussed that we can entertain the idea of RT aftre she completes 12 cycles of Chemo.She complainst that she has been having episodes where she feels her head is usually hot and she feels sweaty. 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 \par \par 10/22/20-Patient was admitted at [facility] for sepsis and needed vasopressors for pressure support. She was in the ICU for a short time - She received IV abx for Likely bacterial pneumonia. While she was in the hospital her LFTS were still elevated- Unclear etiology for elevated LFTs- Likely chemo related vs Antibiotics- Pt is feeling better after she is discharged home. plan  is to omit FOLFOX in future and plan for [place] Abraxane with Onpro. Will recheck labs to confirm her LFTs are downtrending. Plan to start her on gem- Abraxane if LFTS normalize. \par Her scans in the hospital showed- Metastatic pancreatic cancer involving the retroperitoneal lymph nodes and visualized bilateral lungs. Stable disease compared to 10/1/2020. \par Small volume ascites, new since 10/1/2020. \par \par Drug sheet was provided Risks/ benefits and alternatives of chemotherapy were discussed and included but not limited to fatigue,low blood counts, nausea/vomiting, skin reactions, hair thinning, possible blood transfusion requirement,increased risk of infection,neuropathy. Patient agreed to above and informed consent was signed.\par \par 10/27/2020: Pt is here to start a new chemo regimen with [place]/Abraxane with onpro. Irinotecan was discontinued in August due to severe fatigue, hypotension resistant to fluid challenge and bradycardia. Later patient developed septic shock post FOLFOX chemo and is hence discontinued. LFTs are normal with AST 38, ALT 31, ALP elevated 170, normal T. Rah 0.2mg/dl. Pt c/o mild fatigue and does not have any other complains. Denies any F/C, N/V,D/P, change in appetite or weight loss. \par Discussed the chemo regimen with [place]/ Abraxane- answered all questions and concerns.\par \par 20; Ms Desouza is seen as follow up for C# 2. She complains of fatigue which is more prominent this cycle- She denies fevers. She does complain of nausea after chemo . She had some Constipation that was resolved with Miralax. Her appetite is diminished  and sleep is disturbed- Unable to sleep through the night. Will order Remeron 15 mg Q HS for appetite and sleep.\par \par 20; Ms Desouza is seen as follow up for C#3. She feels well. She feels this chemo is more tolerable. She has fatigue that lasts for 1-2 days after chemo and the week she is off chemo - she feels well. She does experience some nausea  and constipation occasionally that is relived by anti nausea meds and MiraLAX. She denies  neuropathy.\par Denies pain or discomfort. Filled out the paperwork for FMLA- for son Davina. she notices darkening of her toes -on the right foot - The first second and fourth digit noted.\par \par 20; Ms Desouza is seen as follow up - In the interim she was hospitalized at [facility] on 20 - for Acute MI and had a cardiac stent placed. She is scheduled to see Dr Toussaint for follow up next week. She is now on Brilanta/ Metoprolol/ Atorvastatin.\par She feels very tired and feels wiped out - She denies N/V/D/ Constipation. Her abdomen is distended- She has some lower back pain  which is new - Will plan to schedule a CT scan A/P/C prior to the next chemotherapy- Will arrange for a TEB visit with Dr Paul after completion of scans- Her f/u appt with Dr Toussaint is on 20.\par She complains of feeling dizzy at times- Does not have a BP machine at home- \par \par 12/15/20:  Ms Desouza is seen as TEB viist to discuss the scans results. She had the restaging scans on  that showed interval regression in hepatic mass , mediastinal and retroperitoneal lymph nodes. Interval stability in size and number of pulm metastases. Overall 39% regression of tumor based on RECIST . She has a follow up with cardiologist following week. \par \par 20:  Ms Desouza is seen for follow up .In the interim she was seen by Cardiology - Dr Toussaint- \par She endorses she has episodes of light headed ness- Today her BP was in low 90's . She states she feels fatigued from chemotherapy - We discussed that we dose reduced the chemo this cycle- She also mentioned that she has been having some shortness of breath even at rest- Emailed Cardiology Dr Toussaint and think Brillanta can cause SOB- Plan is to switch Brillanta to Plavix- \par \par 21:  Ms Desouza is seen for follow up .In the interim she was seen by Cardiology - Dr Toussaint- Brilanta was switched to Plavix and her shortness of breath has improved ever since- She feels well - She does experience some numbness and pins and needles feeling on her shins b/l .Abraxane was reduced to 70 mg/m2 - Will discuss with Dr Paul if dose needs to be further reduced. Neuropathy is grade 1 . \par \par 21:  Ms Desouza is seen for follow up .In the interim she was hospitalized at [facility] for UTI  from 21 to 1/10/21 - She was treated with IV  Abx for UTI  and was discharged home. She has been experiencing some numbness in her lower legs bilaterally - she is on Abraxane 70 mg - She also mentioned that since her discharge from the hospital she stopped taking Metoprolol as she was advised to do so. She has a f/u appt with cardiology Dr Toussaint- Advised her to f/u with him in regards to meds. \par \par 2/3/21:  Ms Desouza is seen for follow up .In the interim she was evaluated by cardiology and metoprolol is on hold for now-  Since she has been having vague neuropathy symptoms with numbness in her legs-we discussed on stopping the Abraxane and starting her on Tarceva with this cycle. \par  Drug sheet was provided Risks/ benefits and alternatives of chemotherapy were discussed and included but not limited to fatigue,low blood counts, nausea/vomiting, skin reactions, hair thinning, possible blood transfusion requirement,increased risk of infection,neuropathy. Patient agreed to above and informed consent was signed.\par \par CY inhibitors or a combined CY and CY inhibitor increase erlotinib plasma concentrations. Avoid concomitant use. If not possible, reduce TARCEVA dose. \par ? CY inducers decrease erlotinib plasma concentrations. Avoid concomitant use. If not possible, increase TARCEVA dose. \par ? Cigarette smoking and CY inducers decrease erlotinib plasma concentrations. Avoid concomitant use. If not possible, increase\par TARCEVA dose.\par ? Drugs that increase gastric pH decrease erlotinib plasma concentrations.\par For proton pump inhibitors avoid concomitant use if possible. \par For H-2 receptor antagonists, take TARCEVA 10 hours after H-2 receptor antagonist\par \par \par 21:  Ms Desouza is seen for follow up .In the interim she was started on Tarceva. Pt had nose bleed, ecchymosis on her thigh. She also developed stye on her eye. Suggested her to use nasal spray to prevent further nose bleed. Denies any rash. Will do exam when she comes for chemo.\par \par 21:  Ms Desouza is seen for follow up .In the interim she was hospitalized for nasal bleed- she had a nasal rocket that was placed since her bleed. She also developed a stye and had stopped taking Tarceva on 21- She has numbness on b/l feet - 7/10. unsteady on her feet - She is reluctant to start any medications for neuropathy \par She would like to wait and see how she fels.\par \par She had nasal bleeding which stopped now- however today her H & h was 7.5 - Will transfuse 1 unit of PRBC.\par Plan to resume chemo on 3/3/21.\par \par \par \par \par  [de-identified] : adenocarcinoma [de-identified] : 3/3/21:  Ms Desouza is seen for follow up .In the interim she received 1 unit of PRBC last week and her H & H is 10.3 today. \par She saw cardiology last week and she stopped taking ASA since she had episodes of epistaxis- \par I explained to pt that since she has had a good response to chemo - she should restart taking the Tarceva pills. since she developed blepharitis and acne like rash- I refered her to dermatology. Plan is to resume the erlotinib today and resume chemo with gemzar- She will also get Restaging CT scans before her next viist with DENISE Robert. \par Gave her the MOLST form and advised her to discuss with her family and return it to me next week after she fills out her wishes. \par \par \par  [FreeTextEntry3] : Hypotension, bradycardia  [FreeTextEntry4] : 6/17/20 [FreeTextEntry5] : IRIN [FreeTextEntry2] : x 4 daYS [FreeTextEntry8] : Sepsis, transaminitis  [de-identified] : OXaliplatin  [de-identified] : neuropathy  [de-identified] : Abraxane

## 2021-03-08 PROBLEM — R04.0 EPISTAXIS: Status: ACTIVE | Noted: 2021-01-01

## 2021-03-08 NOTE — HISTORY OF PRESENT ILLNESS
[de-identified] :  72 y/o F on Plavix, 81mg ASA and Chemo for metastatic Pancreatic Ca.  Recent change in Chemo meds to immunotherapy.  Started having right side epistaxis on 2/16/21.  Had Rapid Rhino placed in ED.  No bleeding since.  No hx of prior epistaxis.  [FreeTextEntry1] : At last visit Rapid Rhino removed and Surgicel placed.  Using Nasal saline and Vaseline to right nare.  No bleeding since last visit.   Also notes minimal sore throat x 2 days.  No change in voice.  Using gargles.

## 2021-03-08 NOTE — CONSULT LETTER
[Dear  ___] : Dear  [unfilled], [Consult Letter:] : I had the pleasure of evaluating your patient, [unfilled]. [Please see my note below.] : Please see my note below. [Consult Closing:] : Thank you very much for allowing me to participate in the care of this patient.  If you have any questions, please do not hesitate to contact me. [Sincerely,] : Sincerely, [DrClaudia  ___] : Dr. MORATAYA [FreeTextEntry3] : Tracey Cavanaugh MD\par Otolaryngology and Cranial Base Surgery\par Attending Physician - Department of Otolaryngology and Head & Neck Surgery\par [facility]\par  - [employer] [facility]\par Office: [phone]\par Fax: [phone]\par

## 2021-03-08 NOTE — ASSESSMENT
[FreeTextEntry1] : Right Epistaxis, on AC and immunotherapy for metastatic ca:\par - Resolved - Continue moisturization. \par - F/U if bleeding recurs\par \par right eyelid inflammation:\par - she thinks related to the new chemo\par - on abx, having improvement. \par - if worsens can f/u with ophtho\par \par sore throat:\par - improving with gargles\par - no evidence of candidiasis\par - return for re-eval if persists or worsens

## 2021-03-08 NOTE — PHYSICAL EXAM
[Normal] : mucosa is normal [Midline] : trachea located in midline position [de-identified] : Right with rapid rhino in place.  Removed, Surgicel and bacitracin placed.  Left wnl.  [Edentulous] : edentulous [de-identified] : no thrush

## 2021-03-10 PROBLEM — M25.561 ACUTE PAIN OF BOTH KNEES: Status: ACTIVE | Noted: 2021-01-01

## 2021-03-10 PROBLEM — Z51.11 ENCOUNTER FOR ANTINEOPLASTIC CHEMOTHERAPY: Status: ACTIVE | Noted: 2021-01-01

## 2021-03-15 PROBLEM — D22.9 MULTIPLE MELANOCYTIC NEVI: Status: ACTIVE | Noted: 2021-01-01

## 2021-03-15 PROBLEM — L82.1 SEBORRHEIC KERATOSES: Status: ACTIVE | Noted: 2021-01-01

## 2021-03-15 PROBLEM — Z12.83 SCREENING EXAM FOR SKIN CANCER: Status: ACTIVE | Noted: 2021-01-01

## 2021-03-15 PROBLEM — L60.8 MELANONYCHIA: Status: ACTIVE | Noted: 2021-01-01

## 2021-03-16 PROBLEM — G89.3 PAIN, CANCER: Status: ACTIVE | Noted: 2020-06-17

## 2021-03-16 PROBLEM — R11.2 CINV (CHEMOTHERAPY-INDUCED NAUSEA AND VOMITING): Status: ACTIVE | Noted: 2020-07-08

## 2021-03-17 NOTE — HISTORY OF PRESENT ILLNESS
[Disease: _____________________] : Disease: [unfilled] [M: ___] : M[unfilled] [AJCC Stage: ____] : AJCC Stage: [unfilled] [3] : 3, Severe [4] : 4, Life-threatening [2] : 2, Moderate [Date: ____________] : Patient's last distress assessment performed on [unfilled]. [4 - Distress Level] : Distress Level: 4 [de-identified] : Diagnosis: Pancreatic adenocarcinoma \par \par Genetic Testing: Foundation Liquid- LIBAN , TMB 4 Mut/Mb \par \par Other Current Medical Problems: Arthritis/ HLD \par \par Treatment History:\par \par Cycle # 1 20- FOLFORINOX- 5 FU 2400 mg/ kg /  mg/ m 2/ Irinotecan 150 mg/ m 2 / Oxaliplatin 85 mg/ m2\par Cycle # 2 20- FOLFORINOX- 5 FU 2400 mg/ kg /  mg/ m 2/ Irinotecan 140 mg/ m 2/ Oxaliplatin 85 mg/ m2\par Cycle # 3- 07/15/20- Skipped Irinotecan / Oxaliplatin-  (ANC 1.4) - 5 FU 2400 mg/ kg /  mg/ m 2\par Cycle #4 - 20- FOLFOX-  5 FU 2000 mg/ kg /  mg/ m 2/ Oxaliplatin 85 mg/ m2-----------------Tolerated well without Irinotecan \par \par Restaging Scans---------------Stable disease \par Cycle # 5 - 20- FOLFOX- 5 FU 2000 mg/ kg /  mg/ m 2/ Oxaliplatin 85 mg/ m2--------\par Cycle # 6- 20-FOLFIRI-  5 FU 2000 mg/ kg /  mg/ m 2/ Irinotecan 150 mg- Held oxaliplatin \par \par Hospitalized at [facility] for adverse reaction from Irinotecan- - -------------Will hold IRINOTECAN in future- \par \par Cycle # 7 20 - 5 FU 2000 mg/  mg/ m2 -----------Oxali was held since ANC was 1.29 \par Cycle #  -20- 5 FU 2000 mg/  mg/ m2 / Oxaliplatin 85 mg/ m2 \par \par Restaging Scans- Stable disease \par \par Cycle #9 -10/6/20-  5 FU 2000 mg/  mg/ m2 / Oxaliplatin 85 mg/ m2 \par \par Hospitalized for sepsis after chemotherapy \par \par Cycle #1 10/27/20 Gemcitabine 1000 mg/m2 + Abraxane 100 mg/m2\par Cycle # 2 20- Gemcitabine 1000 mg/m2 + Abraxane 100 mg/m2\par Cycle # 3 20-  Gemcitabine 1000 mg/m2 + Abraxane 100 mg/m2\par \par Hospitalized for Acute MI - Had a cardiac stent - 20\par \par Restaging scans- 12/15/20Interval regression in hepatic mass/ mediastinal and retroperitoneal lymph nodes\par \par Cycle # 4 20- Gemcitabine 700 mg/ m2 and Abraxane 70 mg/ m2\par Cycle # 5 21-  Gemcitabine 700 mg/ m2 and Abraxane 70 mg/ m2\par Cycle # 6 21- Gemcitabine 700 mg/ m2 and Abraxane 70 mg/ m2\par \par Cycle # 1 - 2/3/21- Gemcitabine 700 mg/m 2 / Erlotinib 100 mg PO daily  \par Cycle # 2 - 21-  Gemcitabine 700 mg/m 2 / Erlotinib 100 mg PO daily\par \par Chemo on hold since pt had nasal bleeding -----Pt stopped taking Tarceva on 21- sec to epistaxis and stye on the eye,\par \par 21- Pt received 1 unit of PRBC for H & H at 7.5/25.7\par \par Cycle # 3- 3/3/21- Gemcitabine 700 mg/ m2 / Erlotinib 100 mg PO daily \par Restaging Scans- Stable disease \par \par Cycle # 4- 3/17/21- Gemcitabine 700 mg/ m2 / Erlotinib 100 mg PO daily \par \par Oncological History :\par Ms. Griselda Corona is a 70 year old female who presents for initial consultation in our [facility] for a new diagnosis of pancreas adenocarcinoma. \par Patient states she had gastrointestinal symptoms dating back to 2019, including discomfort, bloating, diarrhea followed by small amounts of blood per rectum. She eventually went to GI doctor Dr Florian Pace.  She had a Colonoscopy in  that showed small ulcer. She went the ER at [facility] 3/10/20 for abdominal pain.  CT of her abdomen was performed and showed 3.2 x 3.5 cm hypoenhancing mass in the pancreatic head/ body concerning for malignancy. Mass results in severe compression of SMV. Retroperitoneal lymphadenopathy concerning for metastatic disease with multiple nodules in lung base concerning for metastatic disease.\par \par She was referred  to Dr Bernabe for EUS, which was performed 2020, significant for an ill-defined, heterogeneous, hypoechoic lesion seen in the head of pancreas. The lesion abutted but did not invade the SMV. The main pancreatic duct was dilated and the parenchyma was heterogeneous within the pancreatic body/tail. Fine needle biopsy was performed.\par \par Pathology: Gastric mucosa positive for H pylori. Pancreas mass: Invasive adenocarcinoma.\par \par Patient is taking medication regimen for the H.Pylori infection, otherwise not taking medications except for multivitamin supplement. \par \par \par FHX: No hx of Cancer \par \par Socail HX: Lives with her children, , Non smoker, no alcohol use\par \par PSX: No surgeries in past \par \par \par Disease: Pancreatic adenocarcinoma \par Pathology:\par \par AJCC Stage : Stage 4 \par \par Tumor Markers: CA 19.9 was 155 \par \par 6/10/20- Ms. Deo is seen in [facility] clinic. She states he has abdominal pain 7-8/10 and gets relief with Tylenol. She has had 2 lbs weight loss- and appetite was decreased .\par Ct abd/ pelvis/ chest- Large pancreatic mass suspicious for adenocarcinoma, with marked arterial \par and venous involvement. Numerous bilateral pulmonary nodules new since 2018 and suspicious for metastatic disease. Retroperitoneal and peripancreatic lymphadenopathy. \par Omental nodule raising the possibility of peritoneal carcinomatosis. \par \par 20- Ms. Deo is seen for follow up. She had the port placed earlier last week and is scheduled for her chemotherapy today . We discussed the chemo side effects and what to expect from chemotherapy- SE- Drug sheet was provided Risks/ benefits and alternatives of chemotherapy were discussed and included but not limited to fatigue,low blood counts, nausea/vomiting, skin reactions, hair thinning, possible blood transfusion requirement,increased risk of infection,neuropathy. Patient agreed to above and informed consent was signed.PET Scan- done- on 20-FDG-avid mass in pancreatic body and neck corresponds to known malignancy. Hypermetabolic left periaortic lymphadenopathy is compatible with metastatic disease.  Multiple bilateral pulmonary nodules, not significantly changed as compared to CT dated 2020, compatible with metastatic disease. \par \par 20 - Here for Cycle 2 \par 1- Developed G3 fatigue following C1 , and had to remain in bed for at least 4 days; son was cooking, she was able to go to bathroom and shower by herself\par 2- Also had decreased appetite and G1 nausea but no vomiting\par 3- Declines neuropathy\par 4- Denies diarrhea\par 5- No fever\par 6- No sxs suggestive of COVID-19\par 7- lost weight upto 10 lbs\par 8- also I am aware of her reaction to IRIN during infusion\par \par \par 20- Recieved a call from patient stating she had episodes of vomiting yesterday. She went out yesterday in the summer sun  and had some food from outside and she has been throwing up. She states she lost weight >30 lbs- She states the Creon has been causing a lot of abdominal pain - She has been experiencing some abdominal pain but is afraid to use the Oxycodone or the medical marijuana. \par She lives with her 2 sons and her sister in law has been helping her intermittently - However she needs a lot of reinstatement of instructions- Explained to her that she needs to take the Pain meds when needed and the pain is not from Creon. \par She is requesting for a Home COVID swab - will arrange for the same. \par \par 20- Pt was scheduled for C#3 with FOLFOX instead of FOLFORINOX to identify the culprit for the reaction. But her ANC was 1.4 so held Oxaliplatin and gave only 5 FU and LCV. She tolerated this chemo better did not have any reaction like previously. She denies N/V/D. She has been experiencing spasms in her fingers after the previous chemotherapy with FOLFORINOX- Her tumor markers CEA was rising and is concerning from 0.7- 3.6- 7.9.Patient states she was diagnosed with LUPUS by her PCP in March Unclear if she is on any treatment for the same. Will get the medical records from her PCP.\par \par 20- Pt is scheduled for C#4 with FOLFOX instead of FOLFORINOX to identify the culprit for the reaction. She tolerated the last chemotherapy well. She has been eating better and denies N/V. Today she tolerated the chemo with FOLFOX well - No reaction. next cycle we plan to give FOLFIRI\par \par 8/10//20- Pt is seen for TEB visit after she had the restaging scans done- She had CT scan done that showed- Metastatic pancreatic adenocarcinoma. Stable disease. She tolerated the chemo well without the irinotecan. we answered all her questions and concerns. Her son was not in the telephone conference. Dr Paul left a message with the son on his cell phone. Plan is to continue with the chemo \par \par 20- Ms. Desouza is seen for follow up- She tolerated the last chemo well- denies N/V/D. She denies any neuropathy - Denies pain - does not use mediacl marijuana- \par Explained to her that she will receive FOLFIRI this cycle and to inform if she develops any reaction to the same. \par \par 09/3/20- Ms Desouza has a telephone visit with me. She was hospitalized at [facility] after her last chemo- when she came for the disconnect- She was hospitalized at [facility] from 20 to 20- When she came for the disconnect her BP was low and she was bradycardiac and after a fluid challenge she still did not recover- She was sent to hospital for further work up where Blood cx/ Ua/ Urine cx was negative and it was deemed that she was dehydrated and no infectious process.\par \par Spoke with the patient who states she had a rough time with this chemotherapy and she is feeling better now- however she has been having episodes of diarrhea and nausea. She has been taking the anti nausea meds and that has helped her. She feels that she dis better with FOLFOX - Discussed with Dr Paul and plan is to hold off on nay future doses of Irinotecan.\par \par 20- Ms Desouza is seen for C #7 - Her ANC was 1.23- Discussed with Dr Paul and plan to hold off on Oxaliplatin - Pt will get 5 FU and LCV - She will receive onpro on disconnect day. I explained to her hat she will not get any mores doses of Irinotecan and to keep a close eye on any symptoms she develop post chemotherapy. She denies pain/ N/V/D. \par \par 20- Ms. Desouza is seen in office for follow up on C# 8. She denies n/v/dizziness/ neuropathy . Her appetite is  good, she has good energy level and is tolerating chemo well. She will get her restaging scans done after this round of chemo. Advised she can bring her son in for the next office appointment with Dr Paul to discuss the results of the scan. \par \par 10/6/20-Ms. Desouza is seen in office for follow up after her restaging scans- SHe had restaging scans on 10/1/20 that showed metastatic pancreatic cancer with no interval change since 20 in pancreatic mass, lung nodules, and adenopathy / peritoneal nodules.  She has been tolerating this chemo regimen  well. today she presents to office with her son - Davina . We discussed that we can entertain the idea of RT aftre she completes 12 cycles of Chemo.She complainst that she has been having episodes where she feels her head is usually hot and she feels sweaty. 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 \par \par 10/22/20-Patient was admitted at [facility] for sepsis and needed vasopressors for pressure support. She was in the ICU for a short time - She received IV abx for Likely bacterial pneumonia. While she was in the hospital her LFTS were still elevated- Unclear etiology for elevated LFTs- Likely chemo related vs Antibiotics- Pt is feeling better after she is discharged home. plan  is to omit FOLFOX in future and plan for [facility] Abraxane with Onpro. Will recheck labs to confirm her LFTs are downtrending. Plan to start her on gem- Abraxane if LFTS normalize. \par Her scans in the hospital showed- Metastatic pancreatic cancer involving the retroperitoneal lymph nodes and visualized bilateral lungs. Stable disease compared to 10/1/2020. \par Small volume ascites, new since 10/1/2020. \par \par Drug sheet was provided Risks/ benefits and alternatives of chemotherapy were discussed and included but not limited to fatigue,low blood counts, nausea/vomiting, skin reactions, hair thinning, possible blood transfusion requirement,increased risk of infection,neuropathy. Patient agreed to above and informed consent was signed.\par \par 10/27/2020: Pt is here to start a new chemo regimen with [facility]/Abraxane with onpro. Irinotecan was discontinued in August due to severe fatigue, hypotension resistant to fluid challenge and bradycardia. Later patient developed septic shock post FOLFOX chemo and is hence discontinued. LFTs are normal with AST 38, ALT 31, ALP elevated 170, normal T. Rah 0.2mg/dl. Pt c/o mild fatigue and does not have any other complains. Denies any F/C, N/V,D/P, change in appetite or weight loss. \par Discussed the chemo regimen with [facility]/ Abraxane- answered all questions and concerns.\par \par 20; Ms Desouza is seen as follow up for C# 2. She complains of fatigue which is more prominent this cycle- She denies fevers. She does complain of nausea after chemo . She had some Constipation that was resolved with Miralax. Her appetite is diminished  and sleep is disturbed- Unable to sleep through the night. Will order Remeron 15 mg Q HS for appetite and sleep.\par \par 20; Ms Desouza is seen as follow up for C#3. She feels well. She feels this chemo is more tolerable. She has fatigue that lasts for 1-2 days after chemo and the week she is off chemo - she feels well. She does experience some nausea  and constipation occasionally that is relived by anti nausea meds and MiraLAX. She denies  neuropathy.\par Denies pain or discomfort. Filled out the paperwork for FMLA- for son Davina. she notices darkening of her toes -on the right foot - The first second and fourth digit noted.\par \par 20; Ms Desouza is seen as follow up - In the interim she was hospitalized at [facility] on 20 - for Acute MI and had a cardiac stent placed. She is scheduled to see Dr Toussaint for follow up next week. She is now on Brilanta/ Metoprolol/ Atorvastatin.\par She feels very tired and feels wiped out - She denies N/V/D/ Constipation. Her abdomen is distended- She has some lower back pain  which is new - Will plan to schedule a CT scan A/P/C prior to the next chemotherapy- Will arrange for a TEB visit with Dr Paul after completion of scans- Her f/u appt with Dr Toussaint is on 20.\par She complains of feeling dizzy at times- Does not have a BP machine at home- \par \par 12/15/20:  Ms Desouza is seen as TEB viist to discuss the scans results. She had the restaging scans on  that showed interval regression in hepatic mass , mediastinal and retroperitoneal lymph nodes. Interval stability in size and number of pulm metastases. Overall 39% regression of tumor based on RECIST . She has a follow up with cardiologist following week. \par \par 20:  Ms Desouza is seen for follow up .In the interim she was seen by Cardiology - Dr Toussaint- \par She endorses she has episodes of light headed ness- Today her BP was in low 90's . She states she feels fatigued from chemotherapy - We discussed that we dose reduced the chemo this cycle- She also mentioned that she has been having some shortness of breath even at rest- Emailed Cardiology Dr Toussaint and think Brillanta can cause SOB- Plan is to switch Brillanta to Plavix- \par \par 21:  Ms Desouza is seen for follow up .In the interim she was seen by Cardiology - Dr Toussaint- Brilanta was switched to Plavix and her shortness of breath has improved ever since- She feels well - She does experience some numbness and pins and needles feeling on her shins b/l .Abraxane was reduced to 70 mg/m2 - Will discuss with Dr Paul if dose needs to be further reduced. Neuropathy is grade 1 . \par \par 21:  Ms Desouza is seen for follow up .In the interim she was hospitalized at [facility] for UTI  from 21 to 1/10/21 - She was treated with IV  Abx for UTI  and was discharged home. She has been experiencing some numbness in her lower legs bilaterally - she is on Abraxane 70 mg - She also mentioned that since her discharge from the hospital she stopped taking Metoprolol as she was advised to do so. She has a f/u appt with cardiology Dr Toussaint- Advised her to f/u with him in regards to meds. \par \par 2/3/21:  Ms Desouza is seen for follow up .In the interim she was evaluated by cardiology and metoprolol is on hold for now-  Since she has been having vague neuropathy symptoms with numbness in her legs-we discussed on stopping the Abraxane and starting her on Tarceva with this cycle. \par  Drug sheet was provided Risks/ benefits and alternatives of chemotherapy were discussed and included but not limited to fatigue,low blood counts, nausea/vomiting, skin reactions, hair thinning, possible blood transfusion requirement,increased risk of infection,neuropathy. Patient agreed to above and informed consent was signed.\par \par CY inhibitors or a combined CY and CY inhibitor increase erlotinib plasma concentrations. Avoid concomitant use. If not possible, reduce TARCEVA dose. \par ? CY inducers decrease erlotinib plasma concentrations. Avoid concomitant use. If not possible, increase TARCEVA dose. \par ? Cigarette smoking and CY inducers decrease erlotinib plasma concentrations. Avoid concomitant use. If not possible, increase\par TARCEVA dose.\par ? Drugs that increase gastric pH decrease erlotinib plasma concentrations.\par For proton pump inhibitors avoid concomitant use if possible. \par For H-2 receptor antagonists, take TARCEVA 10 hours after H-2 receptor antagonist\par \par \par 21:  Ms Desouza is seen for follow up .In the interim she was started on Tarceva. Pt had nose bleed, ecchymosis on her thigh. She also developed stye on her eye. Suggested her to use nasal spray to prevent further nose bleed. Denies any rash. Will do exam when she comes for chemo.\par \par 21:  Ms Desouza is seen for follow up .In the interim she was hospitalized for nasal bleed- she had a nasal rocket that was placed since her bleed. She also developed a stye and had stopped taking Tarceva on 21- She has numbness on b/l feet - 10. unsteady on her feet - She is reluctant to start any medications for neuropathy \par She would like to wait and see how she fels.\par \par She had nasal bleeding which stopped now- however today her H & h was 7.5 - Will transfuse 1 unit of PRBC.\par Plan to resume chemo on 3/3/21.\par \par 3/3/21:  Ms Desouza is seen for follow up .In the interim she received 1 unit of PRBC last week and her H & H is 10.3 today. \par She saw cardiology last week and she stopped taking ASA since she had episodes of epistaxis- \par I explained to pt that since she has had a good response to chemo - she should restart taking the Tarceva pills. since she developed blepharitis and acne like rash- I refered her to dermatology. Plan is to resume the erlotinib today and resume chemo with gemzar- She will also get Restaging CT scans before her next viist with DENISE Robert. \par Gave her the MOLST form and advised her to discuss with her family and return it to me next week after she fills out her wishes. \par \par \par \par \par \par  [de-identified] : adenocarcinoma [de-identified] : 3/16/21:  Ms Desouza is seen for follow up .In the interim she had her restaging scans that showed- Slight interval decrease in size of the known pancreatic head and neck mass.\par No significant interval change in small right hilar and mediastinal lymph nodes or peripancreatic and retroperitoneal lymph nodes with reference nodes as described above.\par Innumerable bilateral pulmonary nodules which are largely without significant interval change although several pulmonary nodules have shown interval minimal increase in size including reference pulmonary nodules above.\par \par Recieved covid vaccine moderna on 3/12- had some low grade fever/ nausea/ pain on the site -\par Otherwise doing well- No acne eruption noted- \par Nasal bleeding under control- She is following cardiology and ENT as well. Will continue with the same chemo regimen. \par \par \par  [FreeTextEntry3] : Hypotension, bradycardia  [FreeTextEntry4] : 6/17/20 [FreeTextEntry5] : IRIN [FreeTextEntry2] : x 4 daYS [FreeTextEntry8] : Sepsis, transaminitis  [de-identified] : OXaliplatin  [de-identified] : neuropathy  [de-identified] : Abraxane

## 2021-03-17 NOTE — ASSESSMENT
[FreeTextEntry1] : 70 year old F with H/O metastatic disease diagnosed in June 2020 and is s/p 9 cycles of chemotherapy which was initiated with FOLFIRINOX and ended with FOLFOX with discontinuation of irinotecan due to severe side effects requiring hospitalization. Now FOLFOX is discontinued due to the development of septic shock and transaminitis. The current plan is to initiate Brown/Abraxane+onpro. \par \par 1-Therapeutic: \par -----------------\par Pancreatic cancer with mets to lungs.\par Started patient on FOLFORINOX regimen.She received alternate cycles of FOLFOX with FOLFIRI- since she developed AE  .She was treated with 4 cycles of FOLFOX- She was hospitalized after her last chemo for sepsis-\par Switched her chemo to Brown/ Abraxane- every 2 weeks.\par Sustained MI on 11/24 - Had a stent placed RCA.\par Resumed chemo with dose reduced Brown/ Abraxane- 700/ 70 - Completed 6 cycles \par Switched chemo to gem- Tarceva since she developed Grade 2 Neuropathy - \par She had nasal bleeding and needed to be evaluated in the ER - Stopped taking ASA with epistaxis.\par Chemo on hold since 2/17- with nasal bleed and acute anemia \par Resume chemo with Brown/ Tarceva on 3/3/21- Will continue same treatment. \par \par \par \par 2-Diagnostic: \par ---------------\par Labs:Monthly CEA, , \par Ca 19.9- 142- 28---->75->47->42->41->53->45->42->38->29\par CEA: 1.9--->2-->1.7-->1.9->1.4->1.1->1.6->1.8->1.4\par Restaging scan  ordered \par \par 3- Pharmacogenetics testing:\par ----------------------------------\par -Foundation Liquid- LIBAN , TMB 4 Mut/Mb \par \par 4- Procedures:\par -----------------\par - S/P  Port-A-Cath placement on 06/15/20\par - PORT CARE\par - EMLA CREAM PRESCRIBED\par \par 5-Supportive: \par -----------------\par Tarceva Rash- Started on Clindamycin Topical ointmnet \par Epistaxis : Seen and evaluated by ENT - Had a nasal rocket that has been removed- Pt stopped taking ASA \par Anemia sec to blood loss- Will transfuse 1 unit PRBC this week \par MI - S/P Stent- On Plavix  Follows with Cardiology Dr Duff-\par Was advised to hold Metoprolol since she developed dizziness- \par Advised to use a Holter monitor x 7 days \par Pancreatic insufficiency: CREON - [address] units (2 pills with each snack and 4 pills with each meal) for pancreatic replacement\par DVT (PE): High risk, close monitoring\par Antiemetic: Zofran PRN and Compazine PRN\par Emla cream: for port numbing before use\par Prevention of mouth sores: Biotene mouthwash\par Skin reactions: Udderly smooth cream and Vitamin B 6 100 mg BID for prevention of HFS\par Diarrhea: The usual dose of Imodium is 4 milligrams (mg) (2 capsules) after the first loose bowel movement, and 2 mg (1 capsule) after each loose bowel movement after the first dose has been taken. No more than 16 mg (8 capsules) should be taken in any twenty-four-hour period. \par \par \par 6- RTC:  \par --------\par F/u MOLST form \par F/u in 2 weeks\par \par

## 2021-03-17 NOTE — PHYSICAL EXAM
[Restricted in physically strenuous activity but ambulatory and able to carry out work of a light or sedentary nature] : Status 1- Restricted in physically strenuous activity but ambulatory and able to carry out work of a light or sedentary nature, e.g., light house work, office work [Normal] : grossly intact [Ulcers] : no ulcers [Mucositis] : no mucositis [Thrush] : no thrush [de-identified] : blepharitis resolved-  [de-identified] : Nasal bleed previosuly needing nasal rocket to stop the bleed.evaluated by ENT  [de-identified] : Light headeness- was advised to stop metoprolol by Cardiologist  [de-identified] : Ocaasional cramps, constipation relieved.  [de-identified] : Numbness in lower legs / Pins and needles in both lower legs.

## 2021-03-17 NOTE — END OF VISIT
[] : Fellow [FreeTextEntry3] : \par CT scan showed SD\par Continue Bullock-Erlotinib\par Continue FU with cardiologist

## 2021-03-26 PROBLEM — C80.1 MALIGNANT NEOPLASM: Status: ACTIVE | Noted: 2020-01-01

## 2021-03-26 PROBLEM — R00.2 PALPITATIONS: Status: ACTIVE | Noted: 2021-01-01

## 2021-03-26 NOTE — HISTORY OF PRESENT ILLNESS
[FreeTextEntry1] : 71 year old woman with a history stage IV pancreatic adenocarcinoma treated with 5FU/oxaliplatin based regimen from 6/2020-10/2020, switched to gemcitabine/abraxane 10/27 following admission for sepsis who presented with acute RCA STEMI on 12/1. Underwent cardiac cath with deployment of a BMS (meena-coated device) with ARIA 3 flow. LVEF by echo was 55 % on 12/12. She was discharged on aspirin and ticagrelor. She denies any history of cardiovascular disease. She had dyslipidemia with low HDL on lipid panel during hospitalization, but no prior history of hypercholesterolemia, diabetes, hypertension, or tobacco smoking.\par \par She stopped low dose metoprolol 12.5 mg, due to dizziness and lower blood pressure. No syncope/near syncope or postural symptoms. She reports fatigue following chemotherapy.\par \par She reports intermittent palpitations which feel like her heart beating faster and slower. This does not occur every day. It has been present in the past, but she notices it more since beginning cancer therapy.\par \par Echocardiogram done 1/14 showed normal function and no effusion or wall motion abnormality.\par \par Inteval Events 2/24/21:\par - began treatment with erlotinib but developed epistaxis associated with significant anemia requiring transfusion, nasal rockets. She stopped aspirin, but remains on clopidogrel.\par \par 3/26/2021:\par - [employer] monitor showed occasional PACs, episodes of sinus rhythm and sinus tachycardia correlated with symptoms.\par - on clopidogrel and atorvastatin, no bleeding.\par \par \par \par EKG: NSR at 81 bpm. LAD, QTc 427, no significant change from prior.\par

## 2021-03-26 NOTE — REASON FOR VISIT
[Follow-Up - Clinic] : a clinic follow-up of [Coronary Artery Disease] : coronary artery disease [Palpitations] : palpitations

## 2021-03-26 NOTE — REVIEW OF SYSTEMS
[Feeling Fatigued] : feeling fatigued [see HPI] : see HPI [Change in Appetite] : change in appetite [Negative] : Heme/Lymph

## 2021-03-26 NOTE — ASSESSMENT
[FreeTextEntry1] : Stable from the cardiac point of view. Unclear if dizziness was from the low dose of metoprolol. Rhythm during 7 day event monitoring was normal. On EGFR-TKI therapy so will require monitoring of BP, QTc, and ongoing VTE risk assessment. EKG today, no changes and normal QTc. BP well controlled.\par \par Continue lipid lowering therapy with statin.\par Continue single antiplatelet therapy as tolerated.\par \par Follow up with me in one month

## 2021-03-31 NOTE — PHYSICAL EXAM
[Restricted in physically strenuous activity but ambulatory and able to carry out work of a light or sedentary nature] : Status 1- Restricted in physically strenuous activity but ambulatory and able to carry out work of a light or sedentary nature, e.g., light house work, office work [Normal] : grossly intact [Ulcers] : no ulcers [Mucositis] : no mucositis [Thrush] : no thrush [de-identified] : blepharitis resolved-  [de-identified] : Nasal bleed previosuly needing nasal rocket to stop the bleed.evaluated by ENT  [de-identified] : Light headeness- was advised to stop metoprolol by Cardiologist  [de-identified] : Ocaasional cramps, constipation relieved.  [de-identified] : Numbness in lower legs / Pins and needles in both lower legs.

## 2021-03-31 NOTE — END OF VISIT
[] : Fellow [FreeTextEntry3] : \par CT scan showed SD\par Continue [place]-Erlotinib\par Continue FU with cardiologist

## 2021-03-31 NOTE — ASSESSMENT
[FreeTextEntry1] : 70 year old F with H/O metastatic disease diagnosed in June 2020 and is s/p 9 cycles of chemotherapy which was initiated with FOLFIRINOX and ended with FOLFOX with discontinuation of irinotecan due to severe side effects requiring hospitalization. Now FOLFOX is discontinued due to the development of septic shock and transaminitis. The current plan is to initiate [place]/Abraxane+onpro. \par \par 1-Therapeutic: \par -----------------\par Pancreatic cancer with mets to lungs.\par Started patient on FOLFORINOX regimen.She received alternate cycles of FOLFOX with FOLFIRI- since she developed AE  .She was treated with 4 cycles of FOLFOX- She was hospitalized after her last chemo for sepsis-\par Switched her chemo to [place]/ Abraxane- every 2 weeks.\par Sustained MI on 11/24 - Had a stent placed RCA.\par Resumed chemo with dose reduced [place]/ Abraxane- 700/ 70 - Completed 6 cycles \par Switched chemo to gem- Tarceva since she developed Grade 2 Neuropathy - \par She had nasal bleeding and needed to be evaluated in the ER - Stopped taking ASA with epistaxis.\par Chemo on hold since 2/17- with nasal bleed and acute anemia \par Resume chemo with [place]/ Tarceva on 3/3/21- Will continue same treatment. \par \par \par \par 2-Diagnostic: \par ---------------\par Labs:Monthly CEA, , \par Ca 19.9- 142- 52---->75->47->42->41->53->45->42->38->29\par CEA: 1.9--->2-->1.7-->1.9->1.4->1.1->1.6->1.8->1.4\par \par 3- Pharmacogenetics testing:\par ----------------------------------\par -Foundation Liquid- LIBAN , TMB 4 Mut/Mb \par \par 4- Procedures:\par -----------------\par - S/P  Port-A-Cath placement on 06/15/20\par - PORT CARE\par - EMLA CREAM PRESCRIBED\par \par 5-Supportive: \par -----------------\par Tarceva Rash- Started on Clindamycin Topical ointmnet \par Epistaxis : Seen and evaluated by ENT - Pt stopped taking ASA \par Anemia sec to blood loss- Will transfuse 1 unit PRBC this week \par MI - S/P Stent- On Plavix  Follows with Cardiology Dr Duff-\par Was advised to hold Metoprolol since she developed dizziness- \par Advised to use a Holter monitor x 7 days \par Pancreatic insufficiency: CREON - [address] units (2 pills with each snack and 4 pills with each meal) for pancreatic replacement\par DVT (PE): High risk, close monitoring\par Antiemetic: Zofran PRN and Compazine PRN\par Emla cream: for port numbing before use\par Prevention of mouth sores: Biotene mouthwash\par Skin reactions: Udderly smooth cream and Vitamin B 6 100 mg BID for prevention of HFS\par Diarrhea: The usual dose of Imodium is 4 milligrams (mg) (2 capsules) after the first loose bowel movement, and 2 mg (1 capsule) after each loose bowel movement after the first dose has been taken. No more than 16 mg (8 capsules) should be taken in any twenty-four-hour period. \par \par \par 6- RTC:  \par --------\par F/u MOLST form \par F/u in 2 weeks\par \par

## 2021-03-31 NOTE — HISTORY OF PRESENT ILLNESS
[Disease: _____________________] : Disease: [unfilled] [M: ___] : M[unfilled] [AJCC Stage: ____] : AJCC Stage: [unfilled] [3] : 3, Severe [4] : 4, Life-threatening [2] : 2, Moderate [Date: ____________] : Patient's last distress assessment performed on [unfilled]. [4 - Distress Level] : Distress Level: 4 [de-identified] : Diagnosis: Pancreatic adenocarcinoma \par \par Genetic Testing: Foundation Liquid- LIBAN , TMB 4 Mut/Mb \par \par Other Current Medical Problems: Arthritis/ HLD \par \par Treatment History:\par \par Cycle # 1 20- FOLFORINOX- 5 FU 2400 mg/ kg /  mg/ m 2/ Irinotecan 150 mg/ m 2 / Oxaliplatin 85 mg/ m2\par Cycle # 2 20- FOLFORINOX- 5 FU 2400 mg/ kg /  mg/ m 2/ Irinotecan 140 mg/ m 2/ Oxaliplatin 85 mg/ m2\par Cycle # 3- 07/15/20- Skipped Irinotecan / Oxaliplatin-  (ANC 1.4) - 5 FU 2400 mg/ kg /  mg/ m 2\par Cycle #4 - 20- FOLFOX-  5 FU 2000 mg/ kg /  mg/ m 2/ Oxaliplatin 85 mg/ m2-----------------Tolerated well without Irinotecan \par \par Restaging Scans---------------Stable disease \par Cycle # 5 - 20- FOLFOX- 5 FU 2000 mg/ kg /  mg/ m 2/ Oxaliplatin 85 mg/ m2--------\par Cycle # 6- 20-FOLFIRI-  5 FU 2000 mg/ kg /  mg/ m 2/ Irinotecan 150 mg- Held oxaliplatin \par \par Hospitalized at [facility] for adverse reaction from Irinotecan- - -------------Will hold IRINOTECAN in future- \par \par Cycle # 7 20 - 5 FU 2000 mg/  mg/ m2 -----------Oxali was held since ANC was 1.29 \par Cycle #  -20- 5 FU 2000 mg/  mg/ m2 / Oxaliplatin 85 mg/ m2 \par \par Restaging Scans- Stable disease \par \par Cycle #9 -10/6/20-  5 FU 2000 mg/  mg/ m2 / Oxaliplatin 85 mg/ m2 \par \par Hospitalized for sepsis after chemotherapy \par \par Cycle #1 10/27/20 Gemcitabine 1000 mg/m2 + Abraxane 100 mg/m2\par Cycle # 2 20- Gemcitabine 1000 mg/m2 + Abraxane 100 mg/m2\par Cycle # 3 20-  Gemcitabine 1000 mg/m2 + Abraxane 100 mg/m2\par \par Hospitalized for Acute MI - Had a cardiac stent - 20\par \par Restaging scans- 12/15/20Interval regression in hepatic mass/ mediastinal and retroperitoneal lymph nodes\par \par Cycle # 4 20- Gemcitabine 700 mg/ m2 and Abraxane 70 mg/ m2\par Cycle # 5 21-  Gemcitabine 700 mg/ m2 and Abraxane 70 mg/ m2\par Cycle # 6 21- Gemcitabine 700 mg/ m2 and Abraxane 70 mg/ m2\par \par Cycle # 1 - 2/3/21- Gemcitabine 700 mg/m 2 / Erlotinib 100 mg PO daily  \par Cycle # 2 - 21-  Gemcitabine 700 mg/m 2 / Erlotinib 100 mg PO daily\par \par Chemo on hold since pt had nasal bleeding -----Pt stopped taking Tarceva on 21- sec to epistaxis and stye on the eye,\par \par 21- Pt received 1 unit of PRBC for H & H at 7.5/25.7\par \par Cycle # 3- 3/3/21- Gemcitabine 700 mg/ m2 / Erlotinib 100 mg PO daily \par Restaging Scans- Stable disease \par \par Cycle # 4- 3/17/21- Gemcitabine 700 mg/ m2 / Erlotinib 100 mg PO daily \par Cycle # 5- 21- Gemcitabine 700 mg/ m2 / Erlotinib 100 mg PO daily \par \par Oncological History :\par Ms. Griselda Corona is a 70 year old female who presents for initial consultation in our [facility] for a new diagnosis of pancreas adenocarcinoma. \par Patient states she had gastrointestinal symptoms dating back to 2019, including discomfort, bloating, diarrhea followed by small amounts of blood per rectum. She eventually went to GI doctor Dr Florian Pace.  She had a Colonoscopy in  that showed small ulcer. She went the ER at [facility] 3/10/20 for abdominal pain.  CT of her abdomen was performed and showed 3.2 x 3.5 cm hypoenhancing mass in the pancreatic head/ body concerning for malignancy. Mass results in severe compression of SMV. Retroperitoneal lymphadenopathy concerning for metastatic disease with multiple nodules in lung base concerning for metastatic disease.\par \par She was referred  to Dr Bernabe for EUS, which was performed 2020, significant for an ill-defined, heterogeneous, hypoechoic lesion seen in the head of pancreas. The lesion abutted but did not invade the SMV. The main pancreatic duct was dilated and the parenchyma was heterogeneous within the pancreatic body/tail. Fine needle biopsy was performed.\par \par Pathology: Gastric mucosa positive for H pylori. Pancreas mass: Invasive adenocarcinoma.\par \par Patient is taking medication regimen for the H.Pylori infection, otherwise not taking medications except for multivitamin supplement. \par \par \par FHX: No hx of Cancer \par \par Socail HX: Lives with her children, , Non smoker, no alcohol use\par \par PSX: No surgeries in past \par \par \par Disease: Pancreatic adenocarcinoma \par Pathology:\par \par AJCC Stage : Stage 4 \par \par Tumor Markers: CA 19.9 was 155 \par \par 6/10/20- MsClaudia Desouza is seen in [facility] clinic. She states he has abdominal pain 7-8/10 and gets relief with Tylenol. She has had 2 lbs weight loss- and appetite was decreased .\par Ct abd/ pelvis/ chest- Large pancreatic mass suspicious for adenocarcinoma, with marked arterial \par and venous involvement. Numerous bilateral pulmonary nodules new since 2018 and suspicious for metastatic disease. Retroperitoneal and peripancreatic lymphadenopathy. \par Omental nodule raising the possibility of peritoneal carcinomatosis. \par \par 20- MsClaudia Desouza is seen for follow up. She had the port placed earlier last week and is scheduled for her chemotherapy today . We discussed the chemo side effects and what to expect from chemotherapy- SE- Drug sheet was provided Risks/ benefits and alternatives of chemotherapy were discussed and included but not limited to fatigue,low blood counts, nausea/vomiting, skin reactions, hair thinning, possible blood transfusion requirement,increased risk of infection,neuropathy. Patient agreed to above and informed consent was signed.PET Scan- done- on 20-FDG-avid mass in pancreatic body and neck corresponds to known malignancy. Hypermetabolic left periaortic lymphadenopathy is compatible with metastatic disease.  Multiple bilateral pulmonary nodules, not significantly changed as compared to CT dated 2020, compatible with metastatic disease. \par \par 20 - Here for Cycle 2 \par 1- Developed G3 fatigue following C1 , and had to remain in bed for at least 4 days; son was cooking, she was able to go to bathroom and shower by herself\par 2- Also had decreased appetite and G1 nausea but no vomiting\par 3- Declines neuropathy\par 4- Denies diarrhea\par 5- No fever\par 6- No sxs suggestive of COVID-19\par 7- lost weight upto 10 lbs\par 8- also I am aware of her reaction to IRIN during infusion\par \par \par 20- Recieved a call from patient stating she had episodes of vomiting yesterday. She went out yesterday in the summer sun  and had some food from outside and she has been throwing up. She states she lost weight >30 lbs- She states the Creon has been causing a lot of abdominal pain - She has been experiencing some abdominal pain but is afraid to use the Oxycodone or the medical marijuana. \par She lives with her 2 sons and her sister in law has been helping her intermittently - However she needs a lot of reinstatement of instructions- Explained to her that she needs to take the Pain meds when needed and the pain is not from Creon. \par She is requesting for a Home COVID swab - will arrange for the same. \par \par 20- Pt was scheduled for C#3 with FOLFOX instead of FOLFORINOX to identify the culprit for the reaction. But her ANC was 1.4 so held Oxaliplatin and gave only 5 FU and LCV. She tolerated this chemo better did not have any reaction like previously. She denies N/V/D. She has been experiencing spasms in her fingers after the previous chemotherapy with FOLFORINOX- Her tumor markers CEA was rising and is concerning from 0.7- 3.6- 7.9.Patient states she was diagnosed with LUPUS by her PCP in March Unclear if she is on any treatment for the same. Will get the medical records from her PCP.\par \par 20- Pt is scheduled for C#4 with FOLFOX instead of FOLFORINOX to identify the culprit for the reaction. She tolerated the last chemotherapy well. She has been eating better and denies N/V. Today she tolerated the chemo with FOLFOX well - No reaction. next cycle we plan to give FOLFIRI\par \par 8/10//20- Pt is seen for TEB visit after she had the restaging scans done- She had CT scan done that showed- Metastatic pancreatic adenocarcinoma. Stable disease. She tolerated the chemo well without the irinotecan. we answered all her questions and concerns. Her son was not in the telephone conference. Dr Pual left a message with the son on his cell phone. Plan is to continue with the chemo \par \par 20- Ms. Deo is seen for follow up- She tolerated the last chemo well- denies N/V/D. She denies any neuropathy - Denies pain - does not use mediacl marijuana- \par Explained to her that she will receive FOLFIRI this cycle and to inform if she develops any reaction to the same. \par \par 09/3/20- Ms Deo has a telephone visit with me. She was hospitalized at [facility] after her last chemo- when she came for the disconnect- She was hospitalized at [facility] from 20 to 20- When she came for the disconnect her BP was low and she was bradycardiac and after a fluid challenge she still did not recover- She was sent to hospital for further work up where Blood cx/ Ua/ Urine cx was negative and it was deemed that she was dehydrated and no infectious process.\par \par Spoke with the patient who states she had a rough time with this chemotherapy and she is feeling better now- however she has been having episodes of diarrhea and nausea. She has been taking the anti nausea meds and that has helped her. She feels that she dis better with FOLFOX - Discussed with Dr Paul and plan is to hold off on nay future doses of Irinotecan.\par \par 20- Ms Deo is seen for C #7 - Her ANC was 1.23- Discussed with Dr Paul and plan to hold off on Oxaliplatin - Pt will get 5 FU and LCV - She will receive onpro on  day. I explained to her hat she will not get any mores doses of Irinotecan and to keep a close eye on any symptoms she develop post chemotherapy. She denies pain/ N/V/D. \par \par 20- Ms. Desouza is seen in office for follow up on C# 8. She denies n/v/dizziness/ neuropathy . Her appetite is  good, she has good energy level and is tolerating chemo well. She will get her restaging scans done after this round of chemo. Advised she can bring her son in for the next office appointment with Dr Paul to discuss the results of the scan. \par \par 10/6/20-Ms. Desouza is seen in office for follow up after her restaging scans- SHe had restaging scans on 10/1/20 that showed metastatic pancreatic cancer with no interval change since 20 in pancreatic mass, lung nodules, and adenopathy / peritoneal nodules.  She has been tolerating this chemo regimen  well. today she presents to office with her son - Davina . We discussed that we can entertain the idea of RT aftre she completes 12 cycles of Chemo.She complainst that she has been having episodes where she feels her head is usually hot and she feels sweaty. 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 \par \par 10/22/20-Patient was admitted at [facility] for sepsis and needed vasopressors for pressure support. She was in the ICU for a short time - She received IV abx for Likely bacterial pneumonia. While she was in the hospital her LFTS were still elevated- Unclear etiology for elevated LFTs- Likely chemo related vs Antibiotics- Pt is feeling better after she is discharged home. plan  is to omit FOLFOX in future and plan for [place] Abraxane with Onpro. Will recheck labs to confirm her LFTs are downtrending. Plan to start her on gem- Abraxane if LFTS normalize. \par Her scans in the hospital showed- Metastatic pancreatic cancer involving the retroperitoneal lymph nodes and visualized bilateral lungs. Stable disease compared to 10/1/2020. \par Small volume ascites, new since 10/1/2020. \par \par Drug sheet was provided Risks/ benefits and alternatives of chemotherapy were discussed and included but not limited to fatigue,low blood counts, nausea/vomiting, skin reactions, hair thinning, possible blood transfusion requirement,increased risk of infection,neuropathy. Patient agreed to above and informed consent was signed.\par \par 10/27/2020: Pt is here to start a new chemo regimen with [place]/Abraxane with onpro. Irinotecan was discontinued in August due to severe fatigue, hypotension resistant to fluid challenge and bradycardia. Later patient developed septic shock post FOLFOX chemo and is hence discontinued. LFTs are normal with AST 38, ALT 31, ALP elevated 170, normal T. Rah 0.2mg/dl. Pt c/o mild fatigue and does not have any other complains. Denies any F/C, N/V,D/P, change in appetite or weight loss. \par Discussed the chemo regimen with [place]/ Abraxane- answered all questions and concerns.\par \par 20; Ms Desouza is seen as follow up for C# 2. She complains of fatigue which is more prominent this cycle- She denies fevers. She does complain of nausea after chemo . She had some Constipation that was resolved with Miralax. Her appetite is diminished  and sleep is disturbed- Unable to sleep through the night. Will order Remeron 15 mg Q HS for appetite and sleep.\par \par 20; Ms Desouza is seen as follow up for C#3. She feels well. She feels this chemo is more tolerable. She has fatigue that lasts for 1-2 days after chemo and the week she is off chemo - she feels well. She does experience some nausea  and constipation occasionally that is relived by anti nausea meds and MiraLAX. She denies  neuropathy.\par Denies pain or discomfort. Filled out the paperwork for FMLA- for son Davina. she notices darkening of her toes -on the right foot - The first second and fourth digit noted.\par \par 20; Ms Desouza is seen as follow up - In the interim she was hospitalized at [facility] on 20 - for Acute MI and had a cardiac stent placed. She is scheduled to see Dr Toussaint for follow up next week. She is now on Brilanta/ Metoprolol/ Atorvastatin.\par She feels very tired and feels wiped out - She denies N/V/D/ Constipation. Her abdomen is distended- She has some lower back pain  which is new - Will plan to schedule a CT scan A/P/C prior to the next chemotherapy- Will arrange for a TEB visit with Dr Paul after completion of scans- Her f/u appt with Dr Toussaint is on 20.\par She complains of feeling dizzy at times- Does not have a BP machine at home- \par \par 12/15/20:  Ms Desouza is seen as TEB viist to discuss the scans results. She had the restaging scans on  that showed interval regression in hepatic mass , mediastinal and retroperitoneal lymph nodes. Interval stability in size and number of pulm metastases. Overall 39% regression of tumor based on RECIST . She has a follow up with cardiologist following week. \par \par 20:  Ms Desouza is seen for follow up .In the interim she was seen by Cardiology - Dr Toussaint- \par She endorses she has episodes of light headed ness- Today her BP was in low 90's . She states she feels fatigued from chemotherapy - We discussed that we dose reduced the chemo this cycle- She also mentioned that she has been having some shortness of breath even at rest- Emailed Cardiology Dr Toussaint and think Brillanta can cause SOB- Plan is to switch Brillanta to Plavix- \par \par 21:  Ms Desouza is seen for follow up .In the interim she was seen by Cardiology - Dr Toussaint- Brilanta was switched to Plavix and her shortness of breath has improved ever since- She feels well - She does experience some numbness and pins and needles feeling on her shins b/l .Abraxane was reduced to 70 mg/m2 - Will discuss with Dr Paul if dose needs to be further reduced. Neuropathy is grade 1 . \par \par 21:  Ms Desouza is seen for follow up .In the interim she was hospitalized at [facility] for UTI  from 21 to 1/10/21 - She was treated with IV  Abx for UTI  and was discharged home. She has been experiencing some numbness in her lower legs bilaterally - she is on Abraxane 70 mg - She also mentioned that since her discharge from the hospital she stopped taking Metoprolol as she was advised to do so. She has a f/u appt with cardiology Dr Toussaint- Advised her to f/u with him in regards to meds. \par \par 2/3/21:  Ms Desouza is seen for follow up .In the interim she was evaluated by cardiology and metoprolol is on hold for now-  Since she has been having vague neuropathy symptoms with numbness in her legs-we discussed on stopping the Abraxane and starting her on Tarceva with this cycle. \par  Drug sheet was provided Risks/ benefits and alternatives of chemotherapy were discussed and included but not limited to fatigue,low blood counts, nausea/vomiting, skin reactions, hair thinning, possible blood transfusion requirement,increased risk of infection,neuropathy. Patient agreed to above and informed consent was signed.\par \par CY inhibitors or a combined CY and CY inhibitor increase erlotinib plasma concentrations. Avoid concomitant use. If not possible, reduce TARCEVA dose. \par ? CY inducers decrease erlotinib plasma concentrations. Avoid concomitant use. If not possible, increase TARCEVA dose. \par ? Cigarette smoking and CY inducers decrease erlotinib plasma concentrations. Avoid concomitant use. If not possible, increase\par TARCEVA dose.\par ? Drugs that increase gastric pH decrease erlotinib plasma concentrations.\par For proton pump inhibitors avoid concomitant use if possible. \par For H-2 receptor antagonists, take TARCEVA 10 hours after H-2 receptor antagonist\par \par \par 21:  Ms Desouza is seen for follow up .In the interim she was started on Tarceva. Pt had nose bleed, ecchymosis on her thigh. She also developed stye on her eye. Suggested her to use nasal spray to prevent further nose bleed. Denies any rash. Will do exam when she comes for chemo.\par \par 21:  Ms Desouza is seen for follow up .In the interim she was hospitalized for nasal bleed- she had a nasal rocket that was placed since her bleed. She also developed a stye and had stopped taking Tarceva on 21- She has numbness on b/l feet - 7/10. unsteady on her feet - She is reluctant to start any medications for neuropathy \par She would like to wait and see how she fels.\par \par She had nasal bleeding which stopped now- however today her H & h was 7.5 - Will transfuse 1 unit of PRBC.\par Plan to resume chemo on 3/3/21.\par \par 3/3/21:  Ms Desouza is seen for follow up .In the interim she received 1 unit of PRBC last week and her H & H is 10.3 today. \par She saw cardiology last week and she stopped taking ASA since she had episodes of epistaxis- \par I explained to pt that since she has had a good response to chemo - she should restart taking the Tarceva pills. since she developed blepharitis and acne like rash- I refered her to dermatology. Plan is to resume the erlotinib today and resume chemo with gemzar- She will also get Restaging CT scans before her next viist with DENISE Robert. \par Gave her the MOLST form and advised her to discuss with her family and return it to me next week after she fills out her wishes. \par \par 3/16/21:  Ms Desouza is seen for follow up .In the interim she had her restaging scans that showed- Slight interval decrease in size of the known pancreatic head and neck mass.\par No significant interval change in small right hilar and mediastinal lymph nodes or peripancreatic and retroperitoneal lymph nodes with reference nodes as described above.\par Innumerable bilateral pulmonary nodules which are largely without significant interval change although several pulmonary nodules have shown interval minimal increase in size including reference pulmonary nodules above.\par \par Recieved covid vaccine moderna on 3/12- had some low grade fever/ nausea/ pain on the site -\par Otherwise doing well- No acne eruption noted- \par Nasal bleeding under control- She is following cardiology and ENT as well. Will continue with the same chemo regimen. \par \par \par \par \par  [de-identified] : adenocarcinoma [de-identified] : 3/31/21:  Ms Desouza is seen for follow up . She is scheduled for second dose of COVID on 4/9/21- She has developed some tarceva related acnes on her face - She is applying the clindamycin topical ointment- \par Allergies She has been having some allergies - taking Claritin for the same- Her Appetite is  low but she is pushing to eat more frequent meals- She has not lost weight - \par She has not filled the MOLST form yet- Advised her to fill it sooner.\par  [FreeTextEntry3] : Hypotension, bradycardia  [FreeTextEntry4] : 6/17/20 [FreeTextEntry5] : IRIN [FreeTextEntry2] : x 4 daYS [FreeTextEntry8] : Sepsis, transaminitis  [de-identified] : OXaliplatin  [de-identified] : neuropathy  [de-identified] : Abraxane

## 2021-04-09 NOTE — DISCHARGE NOTE PROVIDER - NSDCQMAMI_CARD_ALL_CORE
Final Anesthesia Post-op Assessment    Patient: Carol Olson  Procedure(s) Performed: OPEN REDUCTION AND INTERNAL FIXATION OF RIGHT TIBIAL TUBERCLE FRACTURE WITH SCREWS - RIGHT  Anesthesia type: General    Vitals Value Taken Time   Temp 36.2C 04/08/21 2030   Pulse 113 04/08/21 2030   Resp 20 04/08/21 2030   SpO2 98 % 04/08/21 2029   /60 04/08/21 2030   Vitals shown include unvalidated device data.      Patient Location: PACU Phase 1  Post-op Vital Signs:stable  Level of Consciousness: sedated and follows commands  Respiratory Status: spontaneous ventilation  Cardiovascular stable  Hydration: euvolemic  Pain Management: adequately controlled  Handoff: Handoff to receiving nurse was performed and questions were answered  Vomiting: none   Nausea: None  Airway Patency:patent  Post-op Assessment: no complications, patient tolerated procedure well with no complications, recovered from regional anesthetic, dentition within defined limits and moving all extremities      No complications documented.   
No

## 2021-04-14 NOTE — END OF VISIT
[] : Fellow [FreeTextEntry3] : \par CT scan showed SD\par Continue [place]-Erlotinib\par Continue FU with cardiologist

## 2021-04-14 NOTE — ASSESSMENT
[With Patient/Caregiver] : With Patient/Caregiver [Comfort Care Only] : comfort care only [DNR] : DNR [DNI] : DNI [FreeTextEntry1] : 70 year old F with H/O metastatic disease diagnosed in June 2020 and is s/p 9 cycles of chemotherapy which was initiated with FOLFIRINOX and ended with FOLFOX with discontinuation of irinotecan due to severe side effects requiring hospitalization. Now FOLFOX is discontinued due to the development of septic shock and transaminitis. The current plan is to initiate [place]/Abraxane+onpro. \par \par 1-Therapeutic: \par -----------------\par Pancreatic cancer with mets to lungs.\par Started patient on FOLFORINOX regimen.She received alternate cycles of FOLFOX with FOLFIRI- since she developed AE  .She was treated with 4 cycles of FOLFOX- She was hospitalized after her last chemo for sepsis-\par Switched her chemo to [place]/ Abraxane- every 2 weeks.\par Sustained MI on 11/24 - Had a stent placed RCA.\par Resumed chemo with dose reduced [place]/ Abraxane- 700/ 70 - Completed 6 cycles \par Switched chemo to gem- Tarceva since she developed Grade 2 Neuropathy - \par She had nasal bleeding and needed to be evaluated in the ER - Stopped taking ASA with epistaxis.\par Chemo  was on hold since 2/17- with nasal bleed and acute anemia \par Resumed  chemo with [place]/ Tarceva on 3/3/21- Will continue same treatment. \par \par \par \par 2-Diagnostic: \par ---------------\par Labs:Monthly CEA, , \par Ca 19.9- 142- 86---->75->47->42->41->53->45->42->38->29\par CEA: 1.9--->2-->1.7-->1.9->1.4->1.1->1.6->1.8->1.4\par \par 3- Pharmacogenetics testing:\par ----------------------------------\par -Foundation Liquid- LIBAN , TMB 4 Mut/Mb \par \par 4- Procedures:\par -----------------\par - S/P  Port-A-Cath placement on 06/15/20\par - PORT CARE\par - EMLA CREAM PRESCRIBED\par \par 5-Supportive: \par -----------------\par Tarceva Rash- Started on Clindamycin Topical ointmnet \par Epistaxis : Seen and evaluated by ENT - Pt stopped taking ASA \par Anemia sec to blood loss- Will transfuse 1 unit PRBC this week \par MI - S/P Stent- On Plavix  Follows with Cardiology Dr Duff-\par Was advised to hold Metoprolol since she developed dizziness- \par Advised to use a Holter monitor x 7 days \par Pancreatic insufficiency: CREON - [address] units (2 pills with each snack and 4 pills with each meal) for pancreatic replacement\par DVT (PE): High risk, close monitoring\par Antiemetic: Zofran PRN and Compazine PRN\par Emla cream: for port numbing before use\par Prevention of mouth sores: Biotene mouthwash\par Skin reactions: Udderly smooth cream and Vitamin B 6 100 mg BID for prevention of HFS\par Diarrhea: The usual dose of Imodium is 4 milligrams (mg) (2 capsules) after the first loose bowel movement, and 2 mg (1 capsule) after each loose bowel movement after the first dose has been taken. No more than 16 mg (8 capsules) should be taken in any twenty-four-hour period. \par \par \par 6- RTC:  \par --------\par \par F/u in 2 weeks\par \par  [AdvancecareDate] : 04/14/21

## 2021-04-14 NOTE — HISTORY OF PRESENT ILLNESS
[Disease: _____________________] : Disease: [unfilled] [M: ___] : M[unfilled] [AJCC Stage: ____] : AJCC Stage: [unfilled] [3] : 3, Severe [4] : 4, Life-threatening [2] : 2, Moderate [Date: ____________] : Patient's last distress assessment performed on [unfilled]. [4 - Distress Level] : Distress Level: 4 [de-identified] : Diagnosis: Pancreatic adenocarcinoma \par \par Genetic Testing: Foundation Liquid- LIBAN , TMB 4 Mut/Mb \par \par Other Current Medical Problems: Arthritis/ HLD \par \par Treatment History:\par \par Cycle # 1 20- FOLFORINOX- 5 FU 2400 mg/ kg /  mg/ m 2/ Irinotecan 150 mg/ m 2 / Oxaliplatin 85 mg/ m2\par Cycle # 2 20- FOLFORINOX- 5 FU 2400 mg/ kg /  mg/ m 2/ Irinotecan 140 mg/ m 2/ Oxaliplatin 85 mg/ m2\par Cycle # 3- 07/15/20- Skipped Irinotecan / Oxaliplatin-  (ANC 1.4) - 5 FU 2400 mg/ kg /  mg/ m 2\par Cycle #4 - 20- FOLFOX-  5 FU 2000 mg/ kg /  mg/ m 2/ Oxaliplatin 85 mg/ m2-----------------Tolerated well without Irinotecan \par \par Restaging Scans---------------Stable disease \par Cycle # 5 - 20- FOLFOX- 5 FU 2000 mg/ kg /  mg/ m 2/ Oxaliplatin 85 mg/ m2--------\par Cycle # 6- 20-FOLFIRI-  5 FU 2000 mg/ kg /  mg/ m 2/ Irinotecan 150 mg- Held oxaliplatin \par \par Hospitalized at [facility] for adverse reaction from Irinotecan- - -------------Will hold IRINOTECAN in future- \par \par Cycle # 7 20 - 5 FU 2000 mg/  mg/ m2 -----------Oxali was held since ANC was 1.29 \par Cycle #  -20- 5 FU 2000 mg/  mg/ m2 / Oxaliplatin 85 mg/ m2 \par \par Restaging Scans- Stable disease \par \par Cycle #9 -10/6/20-  5 FU 2000 mg/  mg/ m2 / Oxaliplatin 85 mg/ m2 \par \par Hospitalized for sepsis after chemotherapy \par \par Cycle #1 10/27/20 Gemcitabine 1000 mg/m2 + Abraxane 100 mg/m2\par Cycle # 2 20- Gemcitabine 1000 mg/m2 + Abraxane 100 mg/m2\par Cycle # 3 20-  Gemcitabine 1000 mg/m2 + Abraxane 100 mg/m2\par \par Hospitalized for Acute MI - Had a cardiac stent - 20\par \par Restaging scans- 12/15/20Interval regression in hepatic mass/ mediastinal and retroperitoneal lymph nodes\par \par Cycle # 4 20- Gemcitabine 700 mg/ m2 and Abraxane 70 mg/ m2\par Cycle # 5 21-  Gemcitabine 700 mg/ m2 and Abraxane 70 mg/ m2\par Cycle # 6 21- Gemcitabine 700 mg/ m2 and Abraxane 70 mg/ m2\par \par Cycle # 1 - 2/3/21- Gemcitabine 700 mg/m 2 / Erlotinib 100 mg PO daily  \par Cycle # 2 - 21-  Gemcitabine 700 mg/m 2 / Erlotinib 100 mg PO daily\par \par Chemo on hold since pt had nasal bleeding -----Pt stopped taking Tarceva on 21- sec to epistaxis and stye on the eye,\par \par 21- Pt received 1 unit of PRBC for H & H at 7.5/25.7\par \par Cycle # 3- 3/3/21- Gemcitabine 700 mg/ m2 / Erlotinib 100 mg PO daily \par Restaging Scans- Stable disease \par \par Cycle # 4- 3/17/21- Gemcitabine 700 mg/ m2 / Erlotinib 100 mg PO daily \par Cycle # 5- 21- Gemcitabine 700 mg/ m2 / Erlotinib 100 mg PO daily \par Cycle # 6- 21- Gemcitabine 700 mg/ m2 / Erlotinib 100 mg PO daily\par \par Oncological History :\par Ms. Griselda Corona is a 70 year old female who presents for initial consultation in our [facility] for a new diagnosis of pancreas adenocarcinoma. \par Patient states she had gastrointestinal symptoms dating back to 2019, including discomfort, bloating, diarrhea followed by small amounts of blood per rectum. She eventually went to GI doctor Dr Florian Pace.  She had a Colonoscopy in  that showed small ulcer. She went the ER at [facility] 3/10/20 for abdominal pain.  CT of her abdomen was performed and showed 3.2 x 3.5 cm hypoenhancing mass in the pancreatic head/ body concerning for malignancy. Mass results in severe compression of SMV. Retroperitoneal lymphadenopathy concerning for metastatic disease with multiple nodules in lung base concerning for metastatic disease.\par \par She was referred  to Dr Bernabe for EUS, which was performed 2020, significant for an ill-defined, heterogeneous, hypoechoic lesion seen in the head of pancreas. The lesion abutted but did not invade the SMV. The main pancreatic duct was dilated and the parenchyma was heterogeneous within the pancreatic body/tail. Fine needle biopsy was performed.\par \par Pathology: Gastric mucosa positive for H pylori. Pancreas mass: Invasive adenocarcinoma.\par \par Patient is taking medication regimen for the H.Pylori infection, otherwise not taking medications except for multivitamin supplement. \par \par \par FHX: No hx of Cancer \par \par Socail HX: Lives with her children, , Non smoker, no alcohol use\par \par PSX: No surgeries in past \par \par \par Disease: Pancreatic adenocarcinoma \par Pathology:\par \par AJCC Stage : Stage 4 \par \par Tumor Markers: CA 19.9 was 155 \par \par 6/10/20- MsClaudia Desouza is seen in [facility] clinic. She states he has abdominal pain 7-8/10 and gets relief with Tylenol. She has had 2 lbs weight loss- and appetite was decreased .\par Ct abd/ pelvis/ chest- Large pancreatic mass suspicious for adenocarcinoma, with marked arterial \par and venous involvement. Numerous bilateral pulmonary nodules new since 2018 and suspicious for metastatic disease. Retroperitoneal and peripancreatic lymphadenopathy. \par Omental nodule raising the possibility of peritoneal carcinomatosis. \par \par 20- Ms. Desouza is seen for follow up. She had the port placed earlier last week and is scheduled for her chemotherapy today . We discussed the chemo side effects and what to expect from chemotherapy- SE- Drug sheet was provided Risks/ benefits and alternatives of chemotherapy were discussed and included but not limited to fatigue,low blood counts, nausea/vomiting, skin reactions, hair thinning, possible blood transfusion requirement,increased risk of infection,neuropathy. Patient agreed to above and informed consent was signed.PET Scan- done- on 20-FDG-avid mass in pancreatic body and neck corresponds to known malignancy. Hypermetabolic left periaortic lymphadenopathy is compatible with metastatic disease.  Multiple bilateral pulmonary nodules, not significantly changed as compared to CT dated 2020, compatible with metastatic disease. \par \par 20 - Here for Cycle 2 \par 1- Developed G3 fatigue following C1 , and had to remain in bed for at least 4 days; son was cooking, she was able to go to bathroom and shower by herself\par 2- Also had decreased appetite and G1 nausea but no vomiting\par 3- Declines neuropathy\par 4- Denies diarrhea\par 5- No fever\par 6- No sxs suggestive of COVID-19\par 7- lost weight upto 10 lbs\par 8- also I am aware of her reaction to IRIN during infusion\par \par \par 20- Recieved a call from patient stating she had episodes of vomiting yesterday. She went out yesterday in the summer sun  and had some food from outside and she has been throwing up. She states she lost weight >30 lbs- She states the Creon has been causing a lot of abdominal pain - She has been experiencing some abdominal pain but is afraid to use the Oxycodone or the medical marijuana. \par She lives with her 2 sons and her sister in law has been helping her intermittently - However she needs a lot of reinstatement of instructions- Explained to her that she needs to take the Pain meds when needed and the pain is not from Creon. \par She is requesting for a Home COVID swab - will arrange for the same. \par \par 20- Pt was scheduled for C#3 with FOLFOX instead of FOLFORINOX to identify the culprit for the reaction. But her ANC was 1.4 so held Oxaliplatin and gave only 5 FU and LCV. She tolerated this chemo better did not have any reaction like previously. She denies N/V/D. She has been experiencing spasms in her fingers after the previous chemotherapy with FOLFORINOX- Her tumor markers CEA was rising and is concerning from 0.7- 3.6- 7.9.Patient states she was diagnosed with LUPUS by her PCP in March Unclear if she is on any treatment for the same. Will get the medical records from her PCP.\par \par 20- Pt is scheduled for C#4 with FOLFOX instead of FOLFORINOX to identify the culprit for the reaction. She tolerated the last chemotherapy well. She has been eating better and denies N/V. Today she tolerated the chemo with FOLFOX well - No reaction. next cycle we plan to give FOLFIRI\par \par 8/10//20- Pt is seen for TEB visit after she had the restaging scans done- She had CT scan done that showed- Metastatic pancreatic adenocarcinoma. Stable disease. She tolerated the chemo well without the irinotecan. we answered all her questions and concerns. Her son was not in the telephone conference. Dr Paul left a message with the son on his cell phone. Plan is to continue with the chemo \par \par 20- Ms. Deo is seen for follow up- She tolerated the last chemo well- denies N/V/D. She denies any neuropathy - Denies pain - does not use mediacl marijuana- \par Explained to her that she will receive FOLFIRI this cycle and to inform if she develops any reaction to the same. \par \par 09/3/20- Ms Deo has a telephone visit with me. She was hospitalized at [facility] after her last chemo- when she came for the disconnect- She was hospitalized at [facility] from 20 to 20- When she came for the disconnect her BP was low and she was bradycardiac and after a fluid challenge she still did not recover- She was sent to hospital for further work up where Blood cx/ Ua/ Urine cx was negative and it was deemed that she was dehydrated and no infectious process.\par \par Spoke with the patient who states she had a rough time with this chemotherapy and she is feeling better now- however she has been having episodes of diarrhea and nausea. She has been taking the anti nausea meds and that has helped her. She feels that she dis better with FOLFOX - Discussed with Dr Paul and plan is to hold off on nay future doses of Irinotecan.\par \par 20- Ms Deo is seen for C #7 - Her ANC was 1.23- Discussed with Dr Paul and plan to hold off on Oxaliplatin - Pt will get 5 FU and LCV - She will receive onpro on  day. I explained to her hat she will not get any mores doses of Irinotecan and to keep a close eye on any symptoms she develop post chemotherapy. She denies pain/ N/V/D. \par \par 20- Ms. Desouza is seen in office for follow up on C# 8. She denies n/v/dizziness/ neuropathy . Her appetite is  good, she has good energy level and is tolerating chemo well. She will get her restaging scans done after this round of chemo. Advised she can bring her son in for the next office appointment with Dr Paul to discuss the results of the scan. \par \par 10/6/20-Ms. Desouza is seen in office for follow up after her restaging scans- SHe had restaging scans on 10/1/20 that showed metastatic pancreatic cancer with no interval change since 20 in pancreatic mass, lung nodules, and adenopathy / peritoneal nodules.  She has been tolerating this chemo regimen  well. today she presents to office with her son - Davina . We discussed that we can entertain the idea of RT aftre she completes 12 cycles of Chemo.She complainst that she has been having episodes where she feels her head is usually hot and she feels sweaty. 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 \par \par 10/22/20-Patient was admitted at [facility] for sepsis and needed vasopressors for pressure support. She was in the ICU for a short time - She received IV abx for Likely bacterial pneumonia. While she was in the hospital her LFTS were still elevated- Unclear etiology for elevated LFTs- Likely chemo related vs Antibiotics- Pt is feeling better after she is discharged home. plan  is to omit FOLFOX in future and plan for Colbert Abraxane with Onpro. Will recheck labs to confirm her LFTs are downtrending. Plan to start her on gem- Abraxane if LFTS normalize. \par Her scans in the hospital showed- Metastatic pancreatic cancer involving the retroperitoneal lymph nodes and visualized bilateral lungs. Stable disease compared to 10/1/2020. \par Small volume ascites, new since 10/1/2020. \par \par Drug sheet was provided Risks/ benefits and alternatives of chemotherapy were discussed and included but not limited to fatigue,low blood counts, nausea/vomiting, skin reactions, hair thinning, possible blood transfusion requirement,increased risk of infection,neuropathy. Patient agreed to above and informed consent was signed.\par \par 10/27/2020: Pt is here to start a new chemo regimen with Colbert/Abraxane with onpro. Irinotecan was discontinued in August due to severe fatigue, hypotension resistant to fluid challenge and bradycardia. Later patient developed septic shock post FOLFOX chemo and is hence discontinued. LFTs are normal with AST 38, ALT 31, ALP elevated 170, normal T. Rah 0.2mg/dl. Pt c/o mild fatigue and does not have any other complains. Denies any F/C, N/V,D/P, change in appetite or weight loss. \par Discussed the chemo regimen with Colbert/ Abraxane- answered all questions and concerns.\par \par 20; Ms Desouza is seen as follow up for C# 2. She complains of fatigue which is more prominent this cycle- She denies fevers. She does complain of nausea after chemo . She had some Constipation that was resolved with Miralax. Her appetite is diminished  and sleep is disturbed- Unable to sleep through the night. Will order Remeron 15 mg Q HS for appetite and sleep.\par \par 20; Ms Desouza is seen as follow up for C#3. She feels well. She feels this chemo is more tolerable. She has fatigue that lasts for 1-2 days after chemo and the week she is off chemo - she feels well. She does experience some nausea  and constipation occasionally that is relived by anti nausea meds and MiraLAX. She denies  neuropathy.\par Denies pain or discomfort. Filled out the paperwork for FMLA- for son Davina. she notices darkening of her toes -on the right foot - The first second and fourth digit noted.\par \par 20; Ms Desouza is seen as follow up - In the interim she was hospitalized at [facility] on 20 - for Acute MI and had a cardiac stent placed. She is scheduled to see Dr Toussaint for follow up next week. She is now on Brilanta/ Metoprolol/ Atorvastatin.\par She feels very tired and feels wiped out - She denies N/V/D/ Constipation. Her abdomen is distended- She has some lower back pain  which is new - Will plan to schedule a CT scan A/P/C prior to the next chemotherapy- Will arrange for a TEB visit with Dr Paul after completion of scans- Her f/u appt with Dr Toussaint is on 20.\par She complains of feeling dizzy at times- Does not have a BP machine at home- \par \par 12/15/20:  Ms Desouza is seen as TEB viist to discuss the scans results. She had the restaging scans on  that showed interval regression in hepatic mass , mediastinal and retroperitoneal lymph nodes. Interval stability in size and number of pulm metastases. Overall 39% regression of tumor based on RECIST . She has a follow up with cardiologist following week. \par \par 20:  Ms Desouza is seen for follow up .In the interim she was seen by Cardiology - Dr Toussaint- \par She endorses she has episodes of light headed ness- Today her BP was in low 90's . She states she feels fatigued from chemotherapy - We discussed that we dose reduced the chemo this cycle- She also mentioned that she has been having some shortness of breath even at rest- Emailed Cardiology Dr Toussaint and think Brillanta can cause SOB- Plan is to switch Brillanta to Plavix- \par \par 21:  Ms Desouza is seen for follow up .In the interim she was seen by Cardiology - Dr Toussaint- Brilanta was switched to Plavix and her shortness of breath has improved ever since- She feels well - She does experience some numbness and pins and needles feeling on her shins b/l .Abraxane was reduced to 70 mg/m2 - Will discuss with Dr Paul if dose needs to be further reduced. Neuropathy is grade 1 . \par \par 21:  Ms Desouza is seen for follow up .In the interim she was hospitalized at [facility] for UTI  from 21 to 1/10/21 - She was treated with IV  Abx for UTI  and was discharged home. She has been experiencing some numbness in her lower legs bilaterally - she is on Abraxane 70 mg - She also mentioned that since her discharge from the hospital she stopped taking Metoprolol as she was advised to do so. She has a f/u appt with cardiology Dr Toussaint- Advised her to f/u with him in regards to meds. \par \par 2/3/21:  Ms Desouza is seen for follow up .In the interim she was evaluated by cardiology and metoprolol is on hold for now-  Since she has been having vague neuropathy symptoms with numbness in her legs-we discussed on stopping the Abraxane and starting her on Tarceva with this cycle. \par  Drug sheet was provided Risks/ benefits and alternatives of chemotherapy were discussed and included but not limited to fatigue,low blood counts, nausea/vomiting, skin reactions, hair thinning, possible blood transfusion requirement,increased risk of infection,neuropathy. Patient agreed to above and informed consent was signed.\par \par CY inhibitors or a combined CY and CY inhibitor increase erlotinib plasma concentrations. Avoid concomitant use. If not possible, reduce TARCEVA dose. \par ? CY inducers decrease erlotinib plasma concentrations. Avoid concomitant use. If not possible, increase TARCEVA dose. \par ? Cigarette smoking and CY inducers decrease erlotinib plasma concentrations. Avoid concomitant use. If not possible, increase\par TARCEVA dose.\par ? Drugs that increase gastric pH decrease erlotinib plasma concentrations.\par For proton pump inhibitors avoid concomitant use if possible. \par For H-2 receptor antagonists, take TARCEVA 10 hours after H-2 receptor antagonist\par \par \par 21:  Ms Desouza is seen for follow up .In the interim she was started on Tarceva. Pt had nose bleed, ecchymosis on her thigh. She also developed stye on her eye. Suggested her to use nasal spray to prevent further nose bleed. Denies any rash. Will do exam when she comes for chemo.\par \par 21:  Ms Desouza is seen for follow up .In the interim she was hospitalized for nasal bleed- she had a nasal rocket that was placed since her bleed. She also developed a stye and had stopped taking Tarceva on 21- She has numbness on b/l feet - 7/10. unsteady on her feet - She is reluctant to start any medications for neuropathy \par She would like to wait and see how she fels.\par \par She had nasal bleeding which stopped now- however today her H & h was 7.5 - Will transfuse 1 unit of PRBC.\par Plan to resume chemo on 3/3/21.\par \par 3/3/21:  Ms Desouza is seen for follow up .In the interim she received 1 unit of PRBC last week and her H & H is 10.3 today. \par She saw cardiology last week and she stopped taking ASA since she had episodes of epistaxis- \par I explained to pt that since she has had a good response to chemo - she should restart taking the Tarceva pills. since she developed blepharitis and acne like rash- I refered her to dermatology. Plan is to resume the erlotinib today and resume chemo with gemzar- She will also get Restaging CT scans before her next viist with DENISE Robert. \par Gave her the MOLST form and advised her to discuss with her family and return it to me next week after she fills out her wishes. \par \par 3/16/21:  Ms Desouza is seen for follow up .In the interim she had her restaging scans that showed- Slight interval decrease in size of the known pancreatic head and neck mass.\par No significant interval change in small right hilar and mediastinal lymph nodes or peripancreatic and retroperitoneal lymph nodes with reference nodes as described above.\par Innumerable bilateral pulmonary nodules which are largely without significant interval change although several pulmonary nodules have shown interval minimal increase in size including reference pulmonary nodules above.\par \par Recieved covid vaccine moderna on 3/12- had some low grade fever/ nausea/ pain on the site -\par Otherwise doing well- No acne eruption noted- \par Nasal bleeding under control- She is following cardiology and ENT as well. Will continue with the same chemo regimen. \par \par 3/31/21:  Ms Desouza is seen for follow up . She is scheduled for second dose of COVID on 21- She has developed some tarceva related acnes on her face - She is applying the clindamycin topical ointment- \par Allergies She has been having some allergies - taking Claritin for the same- Her Appetite is  low but she is pushing to eat more frequent meals- She has not lost weight - \par She has not filled the MOLST form yet- Advised her to fill it sooner.\par \par \par \par  [de-identified] : adenocarcinoma [de-identified] : 4/14//21:  Ms Desouza is seen for follow up . She received second dose of  Moderna COVID on 4/9/21- Her Tarceva related rash is improved and she is doing well. She will need to follow up with dermatology as well.  Denies N/V/D.\par Has good activity level. She did fill out the MOLST form- Copy made and gave the originals to the patient.\par  [FreeTextEntry3] : Hypotension, bradycardia  [FreeTextEntry4] : 6/17/20 [FreeTextEntry5] : IRIN [FreeTextEntry2] : x 4 daYS [FreeTextEntry8] : Sepsis, transaminitis  [de-identified] : OXaliplatin  [de-identified] : neuropathy  [de-identified] : Abraxane

## 2021-04-14 NOTE — PHYSICAL EXAM
[Restricted in physically strenuous activity but ambulatory and able to carry out work of a light or sedentary nature] : Status 1- Restricted in physically strenuous activity but ambulatory and able to carry out work of a light or sedentary nature, e.g., light house work, office work [Normal] : grossly intact [Ulcers] : no ulcers [Mucositis] : no mucositis [Thrush] : no thrush [de-identified] : blepharitis resolved-  [de-identified] : Light headeness- was advised to stop metoprolol by Cardiologist  [de-identified] : Ocaasional cramps, constipation relieved.  [de-identified] : Numbness in lower legs / Pins and needles in both lower legs.

## 2021-04-23 PROBLEM — I21.9 MYOCARDIAL INFARCT: Status: ACTIVE | Noted: 2020-01-01

## 2021-04-23 NOTE — PHYSICAL EXAM
[Well Groomed] : well groomed [General Appearance - In No Acute Distress] : no acute distress [Normal Conjunctiva] : the conjunctiva exhibited no abnormalities [Normal Jugular Venous V Waves Present] : normal jugular venous V waves present [No Jugular Venous Martinez A Waves] : no jugular venous martinez A waves [Respiration, Rhythm And Depth] : normal respiratory rhythm and effort [] : no respiratory distress [Exaggerated Use Of Accessory Muscles For Inspiration] : no accessory muscle use [Heart Rate And Rhythm] : heart rate and rhythm were normal [Edema] : no peripheral edema present [Abdomen Tenderness] : non-tender [Abnormal Walk] : normal gait [Nail Clubbing] : no clubbing of the fingernails [Cyanosis, Localized] : no localized cyanosis [Petechial Hemorrhages (___cm)] : no petechial hemorrhages [Skin Turgor] : normal skin turgor [Oriented To Time, Place, And Person] : oriented to person, place, and time [Auscultation Breath Sounds / Voice Sounds] : lungs were clear to auscultation bilaterally [FreeTextEntry1] : moist mucosa, anicteric sclerae

## 2021-04-23 NOTE — HISTORY OF PRESENT ILLNESS
[FreeTextEntry1] : 71 year old woman with a history stage IV pancreatic adenocarcinoma treated with 5FU/oxaliplatin based regimen from 6/2020-10/2020, switched to gemcitabine/abraxane 10/27 following admission for sepsis who presented with acute RCA STEMI on 12/1. Underwent cardiac cath with deployment of a BMS (meena-coated device) with ARIA 3 flow. LVEF by echo was 55 % on 12/12. She was discharged on aspirin and ticagrelor. She denies any history of cardiovascular disease. She had dyslipidemia with low HDL on lipid panel during hospitalization, but no prior history of hypercholesterolemia, diabetes, hypertension, or tobacco smoking.\par \par She stopped low dose metoprolol 12.5 mg, due to dizziness and lower blood pressure. No syncope/near syncope or postural symptoms. She reports fatigue following chemotherapy. She reports intermittent palpitations which feel like her heart beating faster and slower. This does not occur every day. It was present in the past, but notices it more since beginning cancer therapy.\par \par 2/24/21:\par - began treatment with erlotinib but developed epistaxis associated with significant anemia requiring transfusion, nasal rockets. She stopped aspirin, but remains on clopidogrel.\par \par 3/26/2021:\par - [employer] monitor showed occasional PACs, episodes of sinus rhythm and sinus tachycardia correlated with symptoms.\par \par 4/23/2021:\par Acneiform eruption attributed to erlotinib, but no new cardiac symptoms. Received Moderna vaccine series. No chest pain, no palps. No edema. Takes clopidogrel and atorvastatin.No bleeding. \par \par EKG 4/23/2021: NSR, LAD, QTc 430, no change from prior.\par \par \par EKG 3/26: NSR at 81 bpm. LAD, QTc 427, no significant change from prior.\par \par Echocardiogram done 1/14 showed normal function and no effusion or wall motion abnormality.

## 2021-04-23 NOTE — ASSESSMENT
[FreeTextEntry1] : Stable from the cardiac point of view. Unclear if dizziness was from the low dose of metoprolol. Rhythm during 7 day event monitoring was normal. On EGFR-TKI therapy so will require monitoring of BP, QTc, and ongoing VTE risk assessment. EKG today, no changes and normal QTc. BP well controlled.\par \par Continue lipid lowering therapy with statin.\par Continue clopidogrel for now (PCI was 12/1) for now.\par Follow up in 3 months.\par

## 2021-04-28 NOTE — PHYSICAL EXAM
[Restricted in physically strenuous activity but ambulatory and able to carry out work of a light or sedentary nature] : Status 1- Restricted in physically strenuous activity but ambulatory and able to carry out work of a light or sedentary nature, e.g., light house work, office work [Normal] : grossly intact [Ulcers] : no ulcers [Mucositis] : no mucositis [Thrush] : no thrush [de-identified] : blepharitis resolved-  [de-identified] : Light headeness- was advised to stop metoprolol by Cardiologist  [de-identified] : Ocaasional cramps, constipation relieved.  [de-identified] : Numbness in lower legs / Pins and needles in both lower legs.

## 2021-04-28 NOTE — HISTORY OF PRESENT ILLNESS
[Disease: _____________________] : Disease: [unfilled] [M: ___] : M[unfilled] [AJCC Stage: ____] : AJCC Stage: [unfilled] [3] : 3, Severe [4] : 4, Life-threatening [2] : 2, Moderate [Date: ____________] : Patient's last distress assessment performed on [unfilled]. [4 - Distress Level] : Distress Level: 4 [de-identified] : Diagnosis: Pancreatic adenocarcinoma \par \par Genetic Testing: Foundation Liquid- LIBAN , TMB 4 Mut/Mb \par \par Other Current Medical Problems: Arthritis/ HLD \par \par Treatment History:\par \par Cycle # 1 20- FOLFORINOX- 5 FU 2400 mg/ kg /  mg/ m 2/ Irinotecan 150 mg/ m 2 / Oxaliplatin 85 mg/ m2\par Cycle # 2 20- FOLFORINOX- 5 FU 2400 mg/ kg /  mg/ m 2/ Irinotecan 140 mg/ m 2/ Oxaliplatin 85 mg/ m2\par Cycle # 3- 07/15/20- Skipped Irinotecan / Oxaliplatin-  (ANC 1.4) - 5 FU 2400 mg/ kg /  mg/ m 2\par Cycle #4 - 20- FOLFOX-  5 FU 2000 mg/ kg /  mg/ m 2/ Oxaliplatin 85 mg/ m2-----------------Tolerated well without Irinotecan \par \par Restaging Scans---------------Stable disease \par Cycle # 5 - 20- FOLFOX- 5 FU 2000 mg/ kg /  mg/ m 2/ Oxaliplatin 85 mg/ m2--------\par Cycle # 6- 20-FOLFIRI-  5 FU 2000 mg/ kg /  mg/ m 2/ Irinotecan 150 mg- Held oxaliplatin \par \par Hospitalized at [facility] for adverse reaction from Irinotecan- - -------------Will hold IRINOTECAN in future- \par \par Cycle # 7 20 - 5 FU 2000 mg/  mg/ m2 -----------Oxali was held since ANC was 1.29 \par Cycle #  -20- 5 FU 2000 mg/  mg/ m2 / Oxaliplatin 85 mg/ m2 \par \par Restaging Scans- Stable disease \par \par Cycle #9 -10/6/20-  5 FU 2000 mg/  mg/ m2 / Oxaliplatin 85 mg/ m2 \par \par Hospitalized for sepsis after chemotherapy \par \par Cycle #1 10/27/20 Gemcitabine 1000 mg/m2 + Abraxane 100 mg/m2\par Cycle # 2 20- Gemcitabine 1000 mg/m2 + Abraxane 100 mg/m2\par Cycle # 3 20-  Gemcitabine 1000 mg/m2 + Abraxane 100 mg/m2\par \par Hospitalized for Acute MI - Had a cardiac stent - 20\par \par Restaging scans- 12/15/20Interval regression in hepatic mass/ mediastinal and retroperitoneal lymph nodes\par \par Cycle # 4 20- Gemcitabine 700 mg/ m2 and Abraxane 70 mg/ m2\par Cycle # 5 21-  Gemcitabine 700 mg/ m2 and Abraxane 70 mg/ m2\par Cycle # 6 21- Gemcitabine 700 mg/ m2 and Abraxane 70 mg/ m2\par \par Cycle # 1 - 2/3/21- Gemcitabine 700 mg/m 2 / Erlotinib 100 mg PO daily  \par Cycle # 2 - 21-  Gemcitabine 700 mg/m 2 / Erlotinib 100 mg PO daily\par \par Chemo on hold since pt had nasal bleeding -----Pt stopped taking Tarceva on 21- sec to epistaxis and stye on the eye,\par \par 21- Pt received 1 unit of PRBC for H & H at 7.5/25.7\par \par Cycle # 3- 3/3/21- Gemcitabine 700 mg/ m2 / Erlotinib 100 mg PO daily \par Restaging Scans- Stable disease \par \par Cycle # 4- 3/17/21- Gemcitabine 700 mg/ m2 / Erlotinib 100 mg PO daily \par Cycle # 5- 21- Gemcitabine 700 mg/ m2 / Erlotinib 100 mg PO daily \par Cycle # 6- 21- Gemcitabine 700 mg/ m2 / Erlotinib 100 mg PO daily\par Cycle #7- 21- Gemcitabine 700 mg/ m2 / Erlotinib 100 mg PO daily\par \par Oncological History :\par Ms. Griselda Corona is a 70 year old female who presents for initial consultation in our [facility] for a new diagnosis of pancreas adenocarcinoma. \par Patient states she had gastrointestinal symptoms dating back to 2019, including discomfort, bloating, diarrhea followed by small amounts of blood per rectum. She eventually went to GI doctor Dr Florian Pace.  She had a Colonoscopy in  that showed small ulcer. She went the ER at [facility] 3/10/20 for abdominal pain.  CT of her abdomen was performed and showed 3.2 x 3.5 cm hypoenhancing mass in the pancreatic head/ body concerning for malignancy. Mass results in severe compression of SMV. Retroperitoneal lymphadenopathy concerning for metastatic disease with multiple nodules in lung base concerning for metastatic disease.\par \par She was referred  to Dr Bernabe for EUS, which was performed 2020, significant for an ill-defined, heterogeneous, hypoechoic lesion seen in the head of pancreas. The lesion abutted but did not invade the SMV. The main pancreatic duct was dilated and the parenchyma was heterogeneous within the pancreatic body/tail. Fine needle biopsy was performed.\par \par Pathology: Gastric mucosa positive for H pylori. Pancreas mass: Invasive adenocarcinoma.\par \par Patient is taking medication regimen for the H.Pylori infection, otherwise not taking medications except for multivitamin supplement. \par \par \par FHX: No hx of Cancer \par \par Socail HX: Lives with her children, , Non smoker, no alcohol use\par \par PSX: No surgeries in past \par \par \par Disease: Pancreatic adenocarcinoma \par Pathology:\par \par AJCC Stage : Stage 4 \par \par Tumor Markers: CA 19.9 was 155 \par \par 6/10/20- Ms. Desouza is seen in [facility] clinic. She states he has abdominal pain 7-8/10 and gets relief with Tylenol. She has had 2 lbs weight loss- and appetite was decreased .\par Ct abd/ pelvis/ chest- Large pancreatic mass suspicious for adenocarcinoma, with marked arterial \par and venous involvement. Numerous bilateral pulmonary nodules new since 2018 and suspicious for metastatic disease. Retroperitoneal and peripancreatic lymphadenopathy. \par Omental nodule raising the possibility of peritoneal carcinomatosis. \par \par 20- Ms. Desouza is seen for follow up. She had the port placed earlier last week and is scheduled for her chemotherapy today . We discussed the chemo side effects and what to expect from chemotherapy- SE- Drug sheet was provided Risks/ benefits and alternatives of chemotherapy were discussed and included but not limited to fatigue,low blood counts, nausea/vomiting, skin reactions, hair thinning, possible blood transfusion requirement,increased risk of infection,neuropathy. Patient agreed to above and informed consent was signed.PET Scan- done- on 20-FDG-avid mass in pancreatic body and neck corresponds to known malignancy. Hypermetabolic left periaortic lymphadenopathy is compatible with metastatic disease.  Multiple bilateral pulmonary nodules, not significantly changed as compared to CT dated 2020, compatible with metastatic disease. \par \par 20 - Here for Cycle 2 \par 1- Developed G3 fatigue following C1 , and had to remain in bed for at least 4 days; son was cooking, she was able to go to bathroom and shower by herself\par 2- Also had decreased appetite and G1 nausea but no vomiting\par 3- Declines neuropathy\par 4- Denies diarrhea\par 5- No fever\par 6- No sxs suggestive of COVID-19\par 7- lost weight upto 10 lbs\par 8- also I am aware of her reaction to IRIN during infusion\par \par \par 20- Recieved a call from patient stating she had episodes of vomiting yesterday. She went out yesterday in the summer sun  and had some food from outside and she has been throwing up. She states she lost weight >30 lbs- She states the Creon has been causing a lot of abdominal pain - She has been experiencing some abdominal pain but is afraid to use the Oxycodone or the medical marijuana. \par She lives with her 2 sons and her sister in law has been helping her intermittently - However she needs a lot of reinstatement of instructions- Explained to her that she needs to take the Pain meds when needed and the pain is not from Creon. \par She is requesting for a Home COVID swab - will arrange for the same. \par \par 20- Pt was scheduled for C#3 with FOLFOX instead of FOLFORINOX to identify the culprit for the reaction. But her ANC was 1.4 so held Oxaliplatin and gave only 5 FU and LCV. She tolerated this chemo better did not have any reaction like previously. She denies N/V/D. She has been experiencing spasms in her fingers after the previous chemotherapy with FOLFORINOX- Her tumor markers CEA was rising and is concerning from 0.7- 3.6- 7.9.Patient states she was diagnosed with LUPUS by her PCP in March Unclear if she is on any treatment for the same. Will get the medical records from her PCP.\par \par 20- Pt is scheduled for C#4 with FOLFOX instead of FOLFORINOX to identify the culprit for the reaction. She tolerated the last chemotherapy well. She has been eating better and denies N/V. Today she tolerated the chemo with FOLFOX well - No reaction. next cycle we plan to give FOLFIRI\par \par 8/10//20- Pt is seen for TEB visit after she had the restaging scans done- She had CT scan done that showed- Metastatic pancreatic adenocarcinoma. Stable disease. She tolerated the chemo well without the irinotecan. we answered all her questions and concerns. Her son was not in the telephone conference. Dr Paul left a message with the son on his cell phone. Plan is to continue with the chemo \par \par 20- MsClaudia Desouza is seen for follow up- She tolerated the last chemo well- denies N/V/D. She denies any neuropathy - Denies pain - does not use mediacl marijuana- \par Explained to her that she will receive FOLFIRI this cycle and to inform if she develops any reaction to the same. \par \par 09/3/20- Ms Desouza has a telephone visit with me. She was hospitalized at [facility] after her last chemo- when she came for the disconnect- She was hospitalized at [facility] from 20 to 20- When she came for the disconnect her BP was low and she was bradycardiac and after a fluid challenge she still did not recover- She was sent to hospital for further work up where Blood cx/ Ua/ Urine cx was negative and it was deemed that she was dehydrated and no infectious process.\par \par Spoke with the patient who states she had a rough time with this chemotherapy and she is feeling better now- however she has been having episodes of diarrhea and nausea. She has been taking the anti nausea meds and that has helped her. She feels that she dis better with FOLFOX - Discussed with Dr Paul and plan is to hold off on nay future doses of Irinotecan.\par \par 20- Ms Deo is seen for C #7 - Her ANC was 1.23- Discussed with Dr Paul and plan to hold off on Oxaliplatin - Pt will get 5 FU and LCV - She will receive onpro on disconnect day. I explained to her hat she will not get any mores doses of Irinotecan and to keep a close eye on any symptoms she develop post chemotherapy. She denies pain/ N/V/D. \par \par 20- Ms. Desouza is seen in office for follow up on C# 8. She denies n/v/dizziness/ neuropathy . Her appetite is  good, she has good energy level and is tolerating chemo well. She will get her restaging scans done after this round of chemo. Advised she can bring her son in for the next office appointment with Dr Paul to discuss the results of the scan. \par \par 10/6/20-MsClaudia Desouza is seen in office for follow up after her restaging scans- SHe had restaging scans on 10/1/20 that showed metastatic pancreatic cancer with no interval change since 20 in pancreatic mass, lung nodules, and adenopathy / peritoneal nodules.  She has been tolerating this chemo regimen  well. today she presents to office with her son - Davina . We discussed that we can entertain the idea of RT aftre she completes 12 cycles of Chemo.She complainst that she has been having episodes where she feels her head is usually hot and she feels sweaty. 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 \par \par 10/22/20-Patient was admitted at [facility] for sepsis and needed vasopressors for pressure support. She was in the ICU for a short time - She received IV abx for Likely bacterial pneumonia. While she was in the hospital her LFTS were still elevated- Unclear etiology for elevated LFTs- Likely chemo related vs Antibiotics- Pt is feeling better after she is discharged home. plan  is to omit FOLFOX in future and plan for [place] Abraxane with Onpro. Will recheck labs to confirm her LFTs are downtrending. Plan to start her on gem- Abraxane if LFTS normalize. \par Her scans in the hospital showed- Metastatic pancreatic cancer involving the retroperitoneal lymph nodes and visualized bilateral lungs. Stable disease compared to 10/1/2020. \par Small volume ascites, new since 10/1/2020. \par \par Drug sheet was provided Risks/ benefits and alternatives of chemotherapy were discussed and included but not limited to fatigue,low blood counts, nausea/vomiting, skin reactions, hair thinning, possible blood transfusion requirement,increased risk of infection,neuropathy. Patient agreed to above and informed consent was signed.\par \par 10/27/2020: Pt is here to start a new chemo regimen with [place]/Abraxane with onpro. Irinotecan was discontinued in August due to severe fatigue, hypotension resistant to fluid challenge and bradycardia. Later patient developed septic shock post FOLFOX chemo and is hence discontinued. LFTs are normal with AST 38, ALT 31, ALP elevated 170, normal T. Rah 0.2mg/dl. Pt c/o mild fatigue and does not have any other complains. Denies any F/C, N/V,D/P, change in appetite or weight loss. \par Discussed the chemo regimen with Coinjock/ Abraxane- answered all questions and concerns.\par \par 20; Ms Desouza is seen as follow up for C# 2. She complains of fatigue which is more prominent this cycle- She denies fevers. She does complain of nausea after chemo . She had some Constipation that was resolved with Miralax. Her appetite is diminished  and sleep is disturbed- Unable to sleep through the night. Will order Remeron 15 mg Q HS for appetite and sleep.\par \par 20; Ms Desouza is seen as follow up for C#3. She feels well. She feels this chemo is more tolerable. She has fatigue that lasts for 1-2 days after chemo and the week she is off chemo - she feels well. She does experience some nausea  and constipation occasionally that is relived by anti nausea meds and MiraLAX. She denies  neuropathy.\par Denies pain or discomfort. Filled out the paperwork for FMLA- for son Davina. she notices darkening of her toes -on the right foot - The first second and fourth digit noted.\par \par 20; Ms Desouza is seen as follow up - In the interim she was hospitalized at [facility] on 20 - for Acute MI and had a cardiac stent placed. She is scheduled to see Dr Toussaint for follow up next week. She is now on Brilanta/ Metoprolol/ Atorvastatin.\par She feels very tired and feels wiped out - She denies N/V/D/ Constipation. Her abdomen is distended- She has some lower back pain  which is new - Will plan to schedule a CT scan A/P/C prior to the next chemotherapy- Will arrange for a TEB visit with Dr Paul after completion of scans- Her f/u appt with Dr Toussaint is on 20.\par She complains of feeling dizzy at times- Does not have a BP machine at home- \par \par 12/15/20:  Ms Desouza is seen as TEB viist to discuss the scans results. She had the restaging scans on  that showed interval regression in hepatic mass , mediastinal and retroperitoneal lymph nodes. Interval stability in size and number of pulm metastases. Overall 39% regression of tumor based on RECIST . She has a follow up with cardiologist following week. \par \par 20:  Ms Desouza is seen for follow up .In the interim she was seen by Cardiology - Dr Toussaint- \par She endorses she has episodes of light headed ness- Today her BP was in low 90's . She states she feels fatigued from chemotherapy - We discussed that we dose reduced the chemo this cycle- She also mentioned that she has been having some shortness of breath even at rest- Emailed Cardiology Dr Toussaint and think Brillanta can cause SOB- Plan is to switch Brillanta to Plavix- \par \par 21:  Ms Desouza is seen for follow up .In the interim she was seen by Cardiology - Dr Toussaint- Brilanta was switched to Plavix and her shortness of breath has improved ever since- She feels well - She does experience some numbness and pins and needles feeling on her shins b/l .Abraxane was reduced to 70 mg/m2 - Will discuss with Dr Paul if dose needs to be further reduced. Neuropathy is grade 1 . \par \par 21:  Ms Desouza is seen for follow up .In the interim she was hospitalized at [facility] for UTI  from 21 to 1/10/21 - She was treated with IV  Abx for UTI  and was discharged home. She has been experiencing some numbness in her lower legs bilaterally - she is on Abraxane 70 mg - She also mentioned that since her discharge from the hospital she stopped taking Metoprolol as she was advised to do so. She has a f/u appt with cardiology Dr Toussaint- Advised her to f/u with him in regards to meds. \par \par 2/3/21:  Ms Desouza is seen for follow up .In the interim she was evaluated by cardiology and metoprolol is on hold for now-  Since she has been having vague neuropathy symptoms with numbness in her legs-we discussed on stopping the Abraxane and starting her on Tarceva with this cycle. \par  Drug sheet was provided Risks/ benefits and alternatives of chemotherapy were discussed and included but not limited to fatigue,low blood counts, nausea/vomiting, skin reactions, hair thinning, possible blood transfusion requirement,increased risk of infection,neuropathy. Patient agreed to above and informed consent was signed.\par \par CY inhibitors or a combined CY and CY inhibitor increase erlotinib plasma concentrations. Avoid concomitant use. If not possible, reduce TARCEVA dose. \par ? CY inducers decrease erlotinib plasma concentrations. Avoid concomitant use. If not possible, increase TARCEVA dose. \par ? Cigarette smoking and CY inducers decrease erlotinib plasma concentrations. Avoid concomitant use. If not possible, increase\par TARCEVA dose.\par ? Drugs that increase gastric pH decrease erlotinib plasma concentrations.\par For proton pump inhibitors avoid concomitant use if possible. \par For H-2 receptor antagonists, take TARCEVA 10 hours after H-2 receptor antagonist\par \par \par 21:  Ms Desouza is seen for follow up .In the interim she was started on Tarceva. Pt had nose bleed, ecchymosis on her thigh. She also developed stye on her eye. Suggested her to use nasal spray to prevent further nose bleed. Denies any rash. Will do exam when she comes for chemo.\par \par 21:  Ms Desouza is seen for follow up .In the interim she was hospitalized for nasal bleed- she had a nasal rocket that was placed since her bleed. She also developed a stye and had stopped taking Tarceva on 21- She has numbness on b/l feet - 7/10. unsteady on her feet - She is reluctant to start any medications for neuropathy \par She would like to wait and see how she fels.\par \par She had nasal bleeding which stopped now- however today her H & h was 7.5 - Will transfuse 1 unit of PRBC.\par Plan to resume chemo on 3/3/21.\par \par 3/3/21:  Ms Desouza is seen for follow up .In the interim she received 1 unit of PRBC last week and her H & H is 10.3 today. \par She saw cardiology last week and she stopped taking ASA since she had episodes of epistaxis- \par I explained to pt that since she has had a good response to chemo - she should restart taking the Tarceva pills. since she developed blepharitis and acne like rash- I refered her to dermatology. Plan is to resume the erlotinib today and resume chemo with gemzar- She will also get Restaging CT scans before her next viist with DENISE Robert. \par Gave her the MOLST form and advised her to discuss with her family and return it to me next week after she fills out her wishes. \par \par 3/16/21:  Ms Desouza is seen for follow up .In the interim she had her restaging scans that showed- Slight interval decrease in size of the known pancreatic head and neck mass.\par No significant interval change in small right hilar and mediastinal lymph nodes or peripancreatic and retroperitoneal lymph nodes with reference nodes as described above.\par Innumerable bilateral pulmonary nodules which are largely without significant interval change although several pulmonary nodules have shown interval minimal increase in size including reference pulmonary nodules above.\par \par Recieved covid vaccine moderna on 3/12- had some low grade fever/ nausea/ pain on the site -\par Otherwise doing well- No acne eruption noted- \par Nasal bleeding under control- She is following cardiology and ENT as well. Will continue with the same chemo regimen. \par \par 3/31/21:  Ms Desouza is seen for follow up . She is scheduled for second dose of COVID on 21- She has developed some tarceva related acnes on her face - She is applying the clindamycin topical ointment- \par Allergies She has been having some allergies - taking Claritin for the same- Her Appetite is  low but she is pushing to eat more frequent meals- She has not lost weight - \par She has not filled the MOLST form yet- Advised her to fill it sooner.\par \par 21:  Ms Desouza is seen for follow up . She received second dose of  Moderna COVID on 21- Her Tarceva related rash is improved and she is doing well. She will need to follow up with dermatology as well.  Denies N/V/D.\par Has good activity level. She did fill out the MOLST form- Copy made and gave the originals to the patient.\par \par \par  [de-identified] : adenocarcinoma [de-identified] : 4/28/21:  Ms Desouza is seen for follow up .She has b/l blepheritis likely tarceva induced. Will place the patient on Tobramycin eye gtts and reassess. \par Her skin rash has improved. She denies N/V/D.\par Tolerating chemo well. she is active and able to carry ADLS. Denies neuropathy .\par  [FreeTextEntry3] : Hypotension, bradycardia  [FreeTextEntry4] : 6/17/20 [FreeTextEntry5] : IRIN [FreeTextEntry2] : x 4 daYS [de-identified] : OXaliplatin  [FreeTextEntry8] : Sepsis, transaminitis  [de-identified] : neuropathy  [de-identified] : Abraxane

## 2021-04-28 NOTE — ASSESSMENT
[With Patient/Caregiver] : With Patient/Caregiver [Comfort Care Only] : comfort care only [DNR] : DNR [DNI] : DNI [FreeTextEntry1] : 70 year old F with H/O metastatic disease diagnosed in June 2020 and is s/p 9 cycles of chemotherapy which was initiated with FOLFIRINOX and ended with FOLFOX with discontinuation of irinotecan due to severe side effects requiring hospitalization. Now FOLFOX is discontinued due to the development of septic shock and transaminitis. The current plan is to initiate Mobile/Abraxane+onpro. \par \par 1-Therapeutic: \par -----------------\par Pancreatic cancer with mets to lungs.\par Started patient on FOLFORINOX regimen.She received alternate cycles of FOLFOX with FOLFIRI- since she developed AE  .She was treated with 4 cycles of FOLFOX- She was hospitalized after her last chemo for sepsis-\par Switched her chemo to Mobile/ Abraxane- every 2 weeks.\par Sustained MI on 11/24 - Had a stent placed RCA.\par Resumed chemo with dose reduced Mobile/ Abraxane- 700/ 70 - Completed 6 cycles \par Switched chemo to gem- Tarceva since she developed Grade 2 Neuropathy - \par She had nasal bleeding and needed to be evaluated in the ER - Stopped taking ASA with epistaxis.\par Resumed  chemo with Mobile/ Tarceva on 3/3/21- Will continue same treatment. \par \par \par \par 2-Diagnostic: \par ---------------\par Labs:Monthly CEA, , \par Ca 19.9- 142- 74---->75->47->42->41->53->45->42->38->29\par CEA: 1.9--->2-->1.7-->1.9->1.4->1.1->1.6->1.8->1.4\par Restaging scans after this round of chemo \par \par 3- Pharmacogenetics testing:\par ----------------------------------\par -Foundation Liquid- LIBAN , TMB 4 Mut/Mb \par \par 4- Procedures:\par -----------------\par - S/P  Port-A-Cath placement on 06/15/20\par - PORT CARE\par - EMLA CREAM PRESCRIBED\par \par 5-Supportive: \par -----------------\par Blephritis- Will start Tobramycin eye gtts. \par Tarceva Rash- Started on Clindamycin Topical ointmnet \par MI - S/P Stent- On Plavix  Follows with Cardiology Dr Duff-\par Was advised to hold Metoprolol since she developed dizziness- \par Pancreatic insufficiency: CREON - [address] units (2 pills with each snack and 4 pills with each meal) for pancreatic replacement\par DVT (PE): High risk, close monitoring\par Antiemetic: Zofran PRN and Compazine PRN\par Emla cream: for port numbing before use\par Prevention of mouth sores: Biotene mouthwash\par Skin reactions: Udderly smooth cream and Vitamin B 6 100 mg BID for prevention of HFS\par Diarrhea: The usual dose of Imodium is 4 milligrams (mg) (2 capsules) after the first loose bowel movement, and 2 mg (1 capsule) after each loose bowel movement after the first dose has been taken. No more than 16 mg (8 capsules) should be taken in any twenty-four-hour period. \par \par \par 6- RTC:  \par --------\par \par F/u in 2 weeks\par F/U TEB wit Dr Paul after scans done.\par \par  [AdvancecareDate] : 04/14/21

## 2021-05-03 PROBLEM — R21 RASH: Status: ACTIVE | Noted: 2021-01-01

## 2021-05-05 NOTE — PATIENT PROFILE ADULT - MONEY FOR FOOD
Problem: Respiratory Impairment - Respiratory Therapy 253  Intervention: Volume expansion techniques  Note: Intervention Status  Done     Initial done.  Pt got 2125 mL.  Turned over to nursing.   no

## 2021-05-18 PROBLEM — L70.8 ACNEIFORM ERUPTION: Status: ACTIVE | Noted: 2021-01-01

## 2021-05-18 PROBLEM — H01.009 BLEPHARITIS: Status: ACTIVE | Noted: 2021-01-01

## 2021-05-18 NOTE — PHYSICAL EXAM
[Ulcers] : no ulcers [Mucositis] : no mucositis [Thrush] : no thrush [de-identified] : blepharitis resolved-  [de-identified] : Light headeness- was advised to stop metoprolol by Cardiologist  [de-identified] : Ocaasional cramps, constipation relieved.  [de-identified] : Numbness in lower legs / Pins and needles in both lower legs.

## 2021-05-18 NOTE — ASSESSMENT
[FreeTextEntry1] : 70 year old F with H/O metastatic disease diagnosed in June 2020 and is s/p 9 cycles of chemotherapy which was initiated with FOLFIRINOX and ended with FOLFOX with discontinuation of irinotecan due to severe side effects requiring hospitalization. Now FOLFOX is discontinued due to the development of septic shock and transaminitis. The current plan is to initiate Henderson/Abraxane+onpro. \par \par 1-Therapeutic: \par -----------------\par Pancreatic cancer with mets to lungs.\par Started patient on FOLFORINOX regimen.She received alternate cycles of FOLFOX with FOLFIRI- since she developed AE  .She was treated with 4 cycles of FOLFOX- She was hospitalized after her last chemo for sepsis-\par Switched her chemo to Henderson/ Abraxane- every 2 weeks.\par Sustained MI on 11/24 - Had a stent placed RCA.\par Resumed chemo with dose reduced Henderson/ Abraxane- 700/ 70 - Completed 6 cycles \par Switched chemo to gem- Tarceva since she developed Grade 2 Neuropathy - \par She had nasal bleeding and needed to be evaluated in the ER - Stopped taking ASA with epistaxis.\par Resumed  chemo with Henderson/ Tarceva on 3/3/21- Will continue same treatment. \par \par \par \par 2-Diagnostic: \par ---------------\par Labs:Monthly CEA, , \par Ca 19.9- 142- 97---->75->47->42->41->53->45->42->38->29\par CEA: 1.9--->2-->1.7-->1.9->1.4->1.1->1.6->1.8->1.4\par Restaging scans after 4 cycles of chemo\par \par 3- Pharmacogenetics testing:\par ----------------------------------\par -Foundation Liquid- LIBAN , TMB 4 Mut/Mb \par \par 4- Procedures:\par -----------------\par - S/P  Port-A-Cath placement on 06/15/20\par - PORT CARE\par - EMLA CREAM PRESCRIBED\par \par 5-Supportive: \par -----------------\par \par Tarceva Rash- Started on Clindamycin Topical ointmnet \par MI - S/P Stent- On Plavix  Follows with Cardiology Dr Duff-\par Was advised to hold Metoprolol since she developed dizziness- \par Pancreatic insufficiency: CREON - [address] units (2 pills with each snack and 4 pills with each meal) for pancreatic replacement\par DVT (PE): High risk, close monitoring\par Antiemetic: Zofran PRN and Compazine PRN\par Emla cream: for port numbing before use\par Prevention of mouth sores: Biotene mouthwash\par Skin reactions: Udderly smooth cream and Vitamin B 6 100 mg BID for prevention of HFS\par Diarrhea: The usual dose of Imodium is 4 milligrams (mg) (2 capsules) after the first loose bowel movement, and 2 mg (1 capsule) after each loose bowel movement after the first dose has been taken. No more than 16 mg (8 capsules) should be taken in any twenty-four-hour period. \par \par \par 6- RTC:  \par --------\par \par F/u in 2 weeks\par \par \par  [Palliative] : Goals of care discussed with patient: Palliative [AdvancecareDate] : 04/14/21

## 2021-05-18 NOTE — HISTORY OF PRESENT ILLNESS
[de-identified] : Diagnosis: Pancreatic adenocarcinoma \par \par Genetic Testing: Foundation Liquid- LIBAN , TMB 4 Mut/Mb \par \par Other Current Medical Problems: Arthritis/ HLD \par \par Treatment History:\par \par Cycle # 1 20- FOLFORINOX- 5 FU 2400 mg/ kg /  mg/ m 2/ Irinotecan 150 mg/ m 2 / Oxaliplatin 85 mg/ m2\par Cycle # 2 20- FOLFORINOX- 5 FU 2400 mg/ kg /  mg/ m 2/ Irinotecan 140 mg/ m 2/ Oxaliplatin 85 mg/ m2\par Cycle # 3- 07/15/20- Skipped Irinotecan / Oxaliplatin-  (ANC 1.4) - 5 FU 2400 mg/ kg /  mg/ m 2\par Cycle #4 - 20- FOLFOX-  5 FU 2000 mg/ kg /  mg/ m 2/ Oxaliplatin 85 mg/ m2-----------------Tolerated well without Irinotecan \par \par Restaging Scans---------------Stable disease \par Cycle # 5 - 20- FOLFOX- 5 FU 2000 mg/ kg /  mg/ m 2/ Oxaliplatin 85 mg/ m2--------\par Cycle # 6- 20-FOLFIRI-  5 FU 2000 mg/ kg /  mg/ m 2/ Irinotecan 150 mg- Held oxaliplatin \par \par Hospitalized at [facility] for adverse reaction from Irinotecan- - -------------Will hold IRINOTECAN in future- \par \par Cycle # 7 20 - 5 FU 2000 mg/  mg/ m2 -----------Oxali was held since ANC was 1.29 \par Cycle #  -20- 5 FU 2000 mg/  mg/ m2 / Oxaliplatin 85 mg/ m2 \par \par Restaging Scans- Stable disease \par \par Cycle #9 -10/6/20-  5 FU 2000 mg/  mg/ m2 / Oxaliplatin 85 mg/ m2 \par \par Hospitalized for sepsis after chemotherapy \par \par Cycle #1 10/27/20 Gemcitabine 1000 mg/m2 + Abraxane 100 mg/m2\par Cycle # 2 20- Gemcitabine 1000 mg/m2 + Abraxane 100 mg/m2\par Cycle # 3 20-  Gemcitabine 1000 mg/m2 + Abraxane 100 mg/m2\par \par Hospitalized for Acute MI - Had a cardiac stent - 20\par \par Restaging scans- 12/15/20Interval regression in hepatic mass/ mediastinal and retroperitoneal lymph nodes\par \par Cycle # 4 20- Gemcitabine 700 mg/ m2 and Abraxane 70 mg/ m2\par Cycle # 5 21-  Gemcitabine 700 mg/ m2 and Abraxane 70 mg/ m2\par Cycle # 6 21- Gemcitabine 700 mg/ m2 and Abraxane 70 mg/ m2\par \par Cycle # 1 - 2/3/21- Gemcitabine 700 mg/m 2 / Erlotinib 100 mg PO daily  \par Cycle # 2 - 21-  Gemcitabine 700 mg/m 2 / Erlotinib 100 mg PO daily\par \par Chemo on hold since pt had nasal bleeding -----Pt stopped taking Tarceva on 21- sec to epistaxis and stye on the eye,\par \par 21- Pt received 1 unit of PRBC for H & H at 7.5/25.7\par \par Cycle # 3- 3/3/21- Gemcitabine 700 mg/ m2 / Erlotinib 100 mg PO daily \par Restaging Scans- Stable disease \par \par Cycle # 4- 3/17/21- Gemcitabine 700 mg/ m2 / Erlotinib 100 mg PO daily \par Cycle # 5- 21- Gemcitabine 700 mg/ m2 / Erlotinib 100 mg PO daily \par Cycle # 6- 21- Gemcitabine 700 mg/ m2 / Erlotinib 100 mg PO daily\par Cycle #7- 21- Gemcitabine 700 mg/ m2 / Erlotinib 100 mg PO daily\par \par Restaging scans- 5/10/21-\par \par Cycle 8- 21-Gemcitabine 700 mg/ m2 / Erlotinib 100 mg PO daily\par \par \par Oncological History :\par Ms. Griselda Corona is a 70 year old female who presents for initial consultation in our [facility] for a new diagnosis of pancreas adenocarcinoma. \par Patient states she had gastrointestinal symptoms dating back to 2019, including discomfort, bloating, diarrhea followed by small amounts of blood per rectum. She eventually went to GI doctor Dr Florian Pace.  She had a Colonoscopy in  that showed small ulcer. She went the ER at [facility] 3/10/20 for abdominal pain.  CT of her abdomen was performed and showed 3.2 x 3.5 cm hypoenhancing mass in the pancreatic head/ body concerning for malignancy. Mass results in severe compression of SMV. Retroperitoneal lymphadenopathy concerning for metastatic disease with multiple nodules in lung base concerning for metastatic disease.\par \par She was referred  to Dr Bernabe for EUS, which was performed 2020, significant for an ill-defined, heterogeneous, hypoechoic lesion seen in the head of pancreas. The lesion abutted but did not invade the SMV. The main pancreatic duct was dilated and the parenchyma was heterogeneous within the pancreatic body/tail. Fine needle biopsy was performed.\par \par Pathology: Gastric mucosa positive for H pylori. Pancreas mass: Invasive adenocarcinoma.\par \par Patient is taking medication regimen for the H.Pylori infection, otherwise not taking medications except for multivitamin supplement. \par \par \par FHX: No hx of Cancer \par \par Socail HX: Lives with her children, , Non smoker, no alcohol use\par \par PSX: No surgeries in past \par \par \par Disease: Pancreatic adenocarcinoma \par Pathology:\par \par AJCC Stage : Stage 4 \par \par Tumor Markers: CA 19.9 was 155 \par \par 6/10/20- MsClaudia Desouza is seen in [facility] clinic. She states he has abdominal pain 7-8/10 and gets relief with Tylenol. She has had 2 lbs weight loss- and appetite was decreased .\par Ct abd/ pelvis/ chest- Large pancreatic mass suspicious for adenocarcinoma, with marked arterial \par and venous involvement. Numerous bilateral pulmonary nodules new since 2018 and suspicious for metastatic disease. Retroperitoneal and peripancreatic lymphadenopathy. \par Omental nodule raising the possibility of peritoneal carcinomatosis. \par \par 20- MsClaudia Desouza is seen for follow up. She had the port placed earlier last week and is scheduled for her chemotherapy today . We discussed the chemo side effects and what to expect from chemotherapy- SE- Drug sheet was provided Risks/ benefits and alternatives of chemotherapy were discussed and included but not limited to fatigue,low blood counts, nausea/vomiting, skin reactions, hair thinning, possible blood transfusion requirement,increased risk of infection,neuropathy. Patient agreed to above and informed consent was signed.PET Scan- done- on 20-FDG-avid mass in pancreatic body and neck corresponds to known malignancy. Hypermetabolic left periaortic lymphadenopathy is compatible with metastatic disease.  Multiple bilateral pulmonary nodules, not significantly changed as compared to CT dated 2020, compatible with metastatic disease. \par \par 20 - Here for Cycle 2 \par 1- Developed G3 fatigue following C1 , and had to remain in bed for at least 4 days; son was cooking, she was able to go to bathroom and shower by herself\par 2- Also had decreased appetite and G1 nausea but no vomiting\par 3- Declines neuropathy\par 4- Denies diarrhea\par 5- No fever\par 6- No sxs suggestive of COVID-19\par 7- lost weight upto 10 lbs\par 8- also I am aware of her reaction to IRIN during infusion\par \par \par 20- Recieved a call from patient stating she had episodes of vomiting yesterday. She went out yesterday in the summer sun  and had some food from outside and she has been throwing up. She states she lost weight >30 lbs- She states the Creon has been causing a lot of abdominal pain - She has been experiencing some abdominal pain but is afraid to use the Oxycodone or the medical marijuana. \par She lives with her 2 sons and her sister in law has been helping her intermittently - However she needs a lot of reinstatement of instructions- Explained to her that she needs to take the Pain meds when needed and the pain is not from Creon. \par She is requesting for a Home COVID swab - will arrange for the same. \par \par 20- Pt was scheduled for C#3 with FOLFOX instead of FOLFORINOX to identify the culprit for the reaction. But her ANC was 1.4 so held Oxaliplatin and gave only 5 FU and LCV. She tolerated this chemo better did not have any reaction like previously. She denies N/V/D. She has been experiencing spasms in her fingers after the previous chemotherapy with FOLFORINOX- Her tumor markers CEA was rising and is concerning from 0.7- 3.6- 7.9.Patient states she was diagnosed with LUPUS by her PCP in March Unclear if she is on any treatment for the same. Will get the medical records from her PCP.\par \par 20- Pt is scheduled for C#4 with FOLFOX instead of FOLFORINOX to identify the culprit for the reaction. She tolerated the last chemotherapy well. She has been eating better and denies N/V. Today she tolerated the chemo with FOLFOX well - No reaction. next cycle we plan to give FOLFIRI\par \par 8/10//20- Pt is seen for TEB visit after she had the restaging scans done- She had CT scan done that showed- Metastatic pancreatic adenocarcinoma. Stable disease. She tolerated the chemo well without the irinotecan. we answered all her questions and concerns. Her son was not in the telephone conference. Dr Paul left a message with the son on his cell phone. Plan is to continue with the chemo \par \par 20- Ms. Deo is seen for follow up- She tolerated the last chemo well- denies N/V/D. She denies any neuropathy - Denies pain - does not use mediacl marijuana- \par Explained to her that she will receive FOLFIRI this cycle and to inform if she develops any reaction to the same. \par \par 09/3/20- Ms Deo has a telephone visit with me. She was hospitalized at [facility] after her last chemo- when she came for the disconnect- She was hospitalized at [facility] from 20 to 20- When she came for the disconnect her BP was low and she was bradycardiac and after a fluid challenge she still did not recover- She was sent to hospital for further work up where Blood cx/ Ua/ Urine cx was negative and it was deemed that she was dehydrated and no infectious process.\par \par Spoke with the patient who states she had a rough time with this chemotherapy and she is feeling better now- however she has been having episodes of diarrhea and nausea. She has been taking the anti nausea meds and that has helped her. She feels that she dis better with FOLFOX - Discussed with Dr Paul and plan is to hold off on nay future doses of Irinotecan.\par \par 20- Ms Deo is seen for C #7 - Her ANC was 1.23- Discussed with Dr Paul and plan to hold off on Oxaliplatin - Pt will get 5 FU and LCV - She will receive onpro on  day. I explained to her hat she will not get any mores doses of Irinotecan and to keep a close eye on any symptoms she develop post chemotherapy. She denies pain/ N/V/D. \par \par 20- Ms. Desouza is seen in office for follow up on C# 8. She denies n/v/dizziness/ neuropathy . Her appetite is  good, she has good energy level and is tolerating chemo well. She will get her restaging scans done after this round of chemo. Advised she can bring her son in for the next office appointment with Dr Paul to discuss the results of the scan. \par \par 10/6/20-MsClaudia Desouza is seen in office for follow up after her restaging scans- SHe had restaging scans on 10/1/20 that showed metastatic pancreatic cancer with no interval change since 20 in pancreatic mass, lung nodules, and adenopathy / peritoneal nodules.  She has been tolerating this chemo regimen  well. today she presents to office with her son - Davina . We discussed that we can entertain the idea of RT aftre she completes 12 cycles of Chemo.She complainst that she has been having episodes where she feels her head is usually hot and she feels sweaty. 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 \par \par 10/22/20-Patient was admitted at [facility] for sepsis and needed vasopressors for pressure support. She was in the ICU for a short time - She received IV abx for Likely bacterial pneumonia. While she was in the hospital her LFTS were still elevated- Unclear etiology for elevated LFTs- Likely chemo related vs Antibiotics- Pt is feeling better after she is discharged home. plan  is to omit FOLFOX in future and plan for Granby Abraxane with Onpro. Will recheck labs to confirm her LFTs are downtrending. Plan to start her on gem- Abraxane if LFTS normalize. \par Her scans in the hospital showed- Metastatic pancreatic cancer involving the retroperitoneal lymph nodes and visualized bilateral lungs. Stable disease compared to 10/1/2020. \par Small volume ascites, new since 10/1/2020. \par \par Drug sheet was provided Risks/ benefits and alternatives of chemotherapy were discussed and included but not limited to fatigue,low blood counts, nausea/vomiting, skin reactions, hair thinning, possible blood transfusion requirement,increased risk of infection,neuropathy. Patient agreed to above and informed consent was signed.\par \par 10/27/2020: Pt is here to start a new chemo regimen with Granby/Abraxane with onpro. Irinotecan was discontinued in August due to severe fatigue, hypotension resistant to fluid challenge and bradycardia. Later patient developed septic shock post FOLFOX chemo and is hence discontinued. LFTs are normal with AST 38, ALT 31, ALP elevated 170, normal T. Rah 0.2mg/dl. Pt c/o mild fatigue and does not have any other complains. Denies any F/C, N/V,D/P, change in appetite or weight loss. \par Discussed the chemo regimen with Granby/ Abraxane- answered all questions and concerns.\par \par 20; Ms Desouza is seen as follow up for C# 2. She complains of fatigue which is more prominent this cycle- She denies fevers. She does complain of nausea after chemo . She had some Constipation that was resolved with Miralax. Her appetite is diminished  and sleep is disturbed- Unable to sleep through the night. Will order Remeron 15 mg Q HS for appetite and sleep.\par \par 20; Ms Desouza is seen as follow up for C#3. She feels well. She feels this chemo is more tolerable. She has fatigue that lasts for 1-2 days after chemo and the week she is off chemo - she feels well. She does experience some nausea  and constipation occasionally that is relived by anti nausea meds and MiraLAX. She denies  neuropathy.\par Denies pain or discomfort. Filled out the paperwork for FMLA- for [employer]. she notices darkening of her toes -on the right foot - The first second and fourth digit noted.\par \par 20; Ms Desouza is seen as follow up - In the interim she was hospitalized at [facility] on 20 - for Acute MI and had a cardiac stent placed. She is scheduled to see Dr Toussaint for follow up next week. She is now on Brilanta/ Metoprolol/ Atorvastatin.\par She feels very tired and feels wiped out - She denies N/V/D/ Constipation. Her abdomen is distended- She has some lower back pain  which is new - Will plan to schedule a CT scan A/P/C prior to the next chemotherapy- Will arrange for a TEB visit with Dr Paul after completion of scans- Her f/u appt with Dr Toussaint is on 20.\par She complains of feeling dizzy at times- Does not have a BP machine at home- \par \par 12/15/20:  Ms Desouza is seen as TEB viist to discuss the scans results. She had the restaging scans on  that showed interval regression in hepatic mass , mediastinal and retroperitoneal lymph nodes. Interval stability in size and number of pulm metastases. Overall 39% regression of tumor based on RECIST . She has a follow up with cardiologist following week. \par \par 20:  Ms Desouza is seen for follow up .In the interim she was seen by Cardiology - Dr Toussaint- \par She endorses she has episodes of light headed ness- Today her BP was in low 90's . She states she feels fatigued from chemotherapy - We discussed that we dose reduced the chemo this cycle- She also mentioned that she has been having some shortness of breath even at rest- Emailed Cardiology Dr Toussaint and think Brillanta can cause SOB- Plan is to switch Brillanta to Plavix- \par \par 21:  Ms Desouza is seen for follow up .In the interim she was seen by Cardiology - Dr Toussaint- Brilanta was switched to Plavix and her shortness of breath has improved ever since- She feels well - She does experience some numbness and pins and needles feeling on her shins b/l .Abraxane was reduced to 70 mg/m2 - Will discuss with Dr Paul if dose needs to be further reduced. Neuropathy is grade 1 . \par \par 21:  Ms Desouza is seen for follow up .In the interim she was hospitalized at [facility] for UTI  from 21 to 1/10/21 - She was treated with IV  Abx for UTI  and was discharged home. She has been experiencing some numbness in her lower legs bilaterally - she is on Abraxane 70 mg - She also mentioned that since her discharge from the hospital she stopped taking Metoprolol as she was advised to do so. She has a f/u appt with cardiology Dr Toussaint- Advised her to f/u with him in regards to meds. \par \par 2/3/21:  Ms Desouza is seen for follow up .In the interim she was evaluated by cardiology and metoprolol is on hold for now-  Since she has been having vague neuropathy symptoms with numbness in her legs-we discussed on stopping the Abraxane and starting her on Tarceva with this cycle. \par  Drug sheet was provided Risks/ benefits and alternatives of chemotherapy were discussed and included but not limited to fatigue,low blood counts, nausea/vomiting, skin reactions, hair thinning, possible blood transfusion requirement,increased risk of infection,neuropathy. Patient agreed to above and informed consent was signed.\par \par CY inhibitors or a combined CY and CY inhibitor increase erlotinib plasma concentrations. Avoid concomitant use. If not possible, reduce TARCEVA dose. \par ? CY inducers decrease erlotinib plasma concentrations. Avoid concomitant use. If not possible, increase TARCEVA dose. \par ? Cigarette smoking and CY inducers decrease erlotinib plasma concentrations. Avoid concomitant use. If not possible, increase\par TARCEVA dose.\par ? Drugs that increase gastric pH decrease erlotinib plasma concentrations.\par For proton pump inhibitors avoid concomitant use if possible. \par For H-2 receptor antagonists, take TARCEVA 10 hours after H-2 receptor antagonist\par \par \par 21:  Ms Desouza is seen for follow up .In the interim she was started on Tarceva. Pt had nose bleed, ecchymosis on her thigh. She also developed stye on her eye. Suggested her to use nasal spray to prevent further nose bleed. Denies any rash. Will do exam when she comes for chemo.\par \par 21:  Ms Desouza is seen for follow up .In the interim she was hospitalized for nasal bleed- she had a nasal rocket that was placed since her bleed. She also developed a stye and had stopped taking Tarceva on 21- She has numbness on b/l feet - 7/10. unsteady on her feet - She is reluctant to start any medications for neuropathy \par She would like to wait and see how she fels.\par \par She had nasal bleeding which stopped now- however today her H & h was 7.5 - Will transfuse 1 unit of PRBC.\par Plan to resume chemo on 3/3/21.\par \par 3/3/21:  Ms Desouza is seen for follow up .In the interim she received 1 unit of PRBC last week and her H & H is 10.3 today. \par She saw cardiology last week and she stopped taking ASA since she had episodes of epistaxis- \par I explained to pt that since she has had a good response to chemo - she should restart taking the Tarceva pills. since she developed blepharitis and acne like rash- I refered her to dermatology. Plan is to resume the erlotinib today and resume chemo with gemzar- She will also get Restaging CT scans before her next viist with DENISE Robert. \par Gave her the MOLST form and advised her to discuss with her family and return it to me next week after she fills out her wishes. \par \par 3/16/21:  Ms Desouza is seen for follow up .In the interim she had her restaging scans that showed- Slight interval decrease in size of the known pancreatic head and neck mass.\par No significant interval change in small right hilar and mediastinal lymph nodes or peripancreatic and retroperitoneal lymph nodes with reference nodes as described above.\par Innumerable bilateral pulmonary nodules which are largely without significant interval change although several pulmonary nodules have shown interval minimal increase in size including reference pulmonary nodules above.\par \par Recieved covid vaccine moderna on 3/12- had some low grade fever/ nausea/ pain on the site -\par Otherwise doing well- No acne eruption noted- \par Nasal bleeding under control- She is following cardiology and ENT as well. Will continue with the same chemo regimen. \par \par 3/31/21:  Ms Desouza is seen for follow up . She is scheduled for second dose of COVID on 21- She has developed some tarceva related acnes on her face - She is applying the clindamycin topical ointment- \par Allergies She has been having some allergies - taking Claritin for the same- Her Appetite is  low but she is pushing to eat more frequent meals- She has not lost weight - \par She has not filled the MOLST form yet- Advised her to fill it sooner.\par \par 21:  Ms Desouza is seen for follow up . She received second dose of  Moderna COVID on 21- Her Tarceva related rash is improved and she is doing well. She will need to follow up with dermatology as well.  Denies N/V/D.\par Has good activity level. She did fill out the MOLST form- Copy made and gave the originals to the patient.\par \par 21:  Ms Desouza is seen for follow up .She has b/l blepheritis likely tarceva induced. Will place the patient on Tobramycin eye gtts and reassess. \par Her skin rash has improved. She denies N/V/D.Tolerating chemo well. she is active and able to carry ADLS. Denies neuropathy .\par  [de-identified] : adenocarcinoma [de-identified] : 5/11//21:  Ms Desouza is seen for follow up .In the interim she had restgaing scans- that showed Pancreatic mass with pulmonary metastatic disease not significantly changed from prior imaging 3/15/2021. She received her second dose of the COVID vaccine last week after her chemotherapy . She had fevers and felt weak and felt like she had "bronchitis" . She said she took some natural remedy medications with Tanika/ onions/ garlic and that made her feel better. Today she feels better. She thinks she had a bad reaction to the vaccine itself. \par Her leisions on her eyes are improved and she does not have any rash in her face now. she is followed closely by dermatology- Plan to continue with the same treatment.\par  [FreeTextEntry3] : Hypotension, bradycardia  [FreeTextEntry4] : 6/17/20 [FreeTextEntry5] : IRIN [FreeTextEntry2] : x 4 daYS [FreeTextEntry8] : Sepsis, transaminitis  [de-identified] : OXaliplatin  [de-identified] : neuropathy  [de-identified] : Abraxane

## 2021-05-18 NOTE — REASON FOR VISIT
[Home] : at home, [unfilled] , at the time of the visit. [Medical Office: ([facility])___] : at the medical office located in  [Verbal consent obtained from patient] : the patient, [unfilled]

## 2021-05-26 PROBLEM — Z92.29 COVID-19 VACCINE SERIES COMPLETED: Status: ACTIVE | Noted: 2021-01-01

## 2021-05-26 NOTE — HISTORY OF PRESENT ILLNESS
[de-identified] : Diagnosis: Pancreatic adenocarcinoma \par \par Genetic Testing: Foundation Liquid- LIBAN , TMB 4 Mut/Mb \par \par Other Current Medical Problems: Arthritis/ HLD \par \par Treatment History:\par \par Cycle # 1 20- FOLFORINOX- 5 FU 2400 mg/ kg /  mg/ m 2/ Irinotecan 150 mg/ m 2 / Oxaliplatin 85 mg/ m2\par Cycle # 2 20- FOLFORINOX- 5 FU 2400 mg/ kg /  mg/ m 2/ Irinotecan 140 mg/ m 2/ Oxaliplatin 85 mg/ m2\par Cycle # 3- 07/15/20- Skipped Irinotecan / Oxaliplatin-  (ANC 1.4) - 5 FU 2400 mg/ kg /  mg/ m 2\par Cycle #4 - 20- FOLFOX-  5 FU 2000 mg/ kg /  mg/ m 2/ Oxaliplatin 85 mg/ m2-----------------Tolerated well without Irinotecan \par \par Restaging Scans---------------Stable disease \par Cycle # 5 - 20- FOLFOX- 5 FU 2000 mg/ kg /  mg/ m 2/ Oxaliplatin 85 mg/ m2--------\par Cycle # 6- 20-FOLFIRI-  5 FU 2000 mg/ kg /  mg/ m 2/ Irinotecan 150 mg- Held oxaliplatin \par \par Hospitalized at [facility] for adverse reaction from Irinotecan- - -------------Will hold IRINOTECAN in future- \par \par Cycle # 7 20 - 5 FU 2000 mg/  mg/ m2 -----------Oxali was held since ANC was 1.29 \par Cycle #  -20- 5 FU 2000 mg/  mg/ m2 / Oxaliplatin 85 mg/ m2 \par \par Restaging Scans- Stable disease \par \par Cycle #9 -10/6/20-  5 FU 2000 mg/  mg/ m2 / Oxaliplatin 85 mg/ m2 \par \par Hospitalized for sepsis after chemotherapy \par \par Cycle #1 10/27/20 Gemcitabine 1000 mg/m2 + Abraxane 100 mg/m2\par Cycle # 2 20- Gemcitabine 1000 mg/m2 + Abraxane 100 mg/m2\par Cycle # 3 20-  Gemcitabine 1000 mg/m2 + Abraxane 100 mg/m2\par \par Hospitalized for Acute MI - Had a cardiac stent - 20\par \par Restaging scans- 12/15/20Interval regression in hepatic mass/ mediastinal and retroperitoneal lymph nodes\par \par Cycle # 4 20- Gemcitabine 700 mg/ m2 and Abraxane 70 mg/ m2\par Cycle # 5 21-  Gemcitabine 700 mg/ m2 and Abraxane 70 mg/ m2\par Cycle # 6 21- Gemcitabine 700 mg/ m2 and Abraxane 70 mg/ m2\par \par Cycle # 1 - 2/3/21- Gemcitabine 700 mg/m 2 / Erlotinib 100 mg PO daily  \par Cycle # 2 - 21-  Gemcitabine 700 mg/m 2 / Erlotinib 100 mg PO daily\par \par Chemo on hold since pt had nasal bleeding -----Pt stopped taking Tarceva on 21- sec to epistaxis and stye on the eye,\par \par 21- Pt received 1 unit of PRBC for H & H at 7.5/25.7\par \par Cycle # 3- 3/3/21- Gemcitabine 700 mg/ m2 / Erlotinib 100 mg PO daily \par Restaging Scans- Stable disease \par \par Cycle # 4- 3/17/21- Gemcitabine 700 mg/ m2 / Erlotinib 100 mg PO daily \par Cycle # 5- 21- Gemcitabine 700 mg/ m2 / Erlotinib 100 mg PO daily \par Cycle # 6- 21- Gemcitabine 700 mg/ m2 / Erlotinib 100 mg PO daily\par Cycle #7- 21- Gemcitabine 700 mg/ m2 / Erlotinib 100 mg PO daily\par \par Restaging scans- 5/10/21-\par \par Cycle 8- 21-Gemcitabine 700 mg/ m2 / Erlotinib 100 mg PO daily\par Cycle 9- 21- Gemcitabine 700 mg/ m2 / Erlotinib 100 mg PO daily\par \par Transfuse 1 unit PRBC - 21\par \par \par Oncological History :\par Ms. Griselda Corona is a 70 year old female who presents for initial consultation in our [facility] for a new diagnosis of pancreas adenocarcinoma. \par Patient states she had gastrointestinal symptoms dating back to 2019, including discomfort, bloating, diarrhea followed by small amounts of blood per rectum. She eventually went to GI doctor Dr Florian Pace.  She had a Colonoscopy in  that showed small ulcer. She went the ER at [facility] 3/10/20 for abdominal pain.  CT of her abdomen was performed and showed 3.2 x 3.5 cm hypoenhancing mass in the pancreatic head/ body concerning for malignancy. Mass results in severe compression of SMV. Retroperitoneal lymphadenopathy concerning for metastatic disease with multiple nodules in lung base concerning for metastatic disease.\par \par She was referred  to Dr Bernabe for EUS, which was performed 2020, significant for an ill-defined, heterogeneous, hypoechoic lesion seen in the head of pancreas. The lesion abutted but did not invade the SMV. The main pancreatic duct was dilated and the parenchyma was heterogeneous within the pancreatic body/tail. Fine needle biopsy was performed.\par \par Pathology: Gastric mucosa positive for H pylori. Pancreas mass: Invasive adenocarcinoma.\par \par Patient is taking medication regimen for the H.Pylori infection, otherwise not taking medications except for multivitamin supplement. \par \par \par FHX: No hx of Cancer \par \par Socail HX: Lives with her children, , Non smoker, no alcohol use\par \par PSX: No surgeries in past \par \par \par Disease: Pancreatic adenocarcinoma \par Pathology:\par \par AJCC Stage : Stage 4 \par \par Tumor Markers: CA 19.9 was 155 \par \par 6/10/20- Ms. Desouza is seen in [facility] clinic. She states he has abdominal pain 7-8/10 and gets relief with Tylenol. She has had 2 lbs weight loss- and appetite was decreased .\par Ct abd/ pelvis/ chest- Large pancreatic mass suspicious for adenocarcinoma, with marked arterial \par and venous involvement. Numerous bilateral pulmonary nodules new since 2018 and suspicious for metastatic disease. Retroperitoneal and peripancreatic lymphadenopathy. \par Omental nodule raising the possibility of peritoneal carcinomatosis. \par \par 20- Ms. Desouza is seen for follow up. She had the port placed earlier last week and is scheduled for her chemotherapy today . We discussed the chemo side effects and what to expect from chemotherapy- SE- Drug sheet was provided Risks/ benefits and alternatives of chemotherapy were discussed and included but not limited to fatigue,low blood counts, nausea/vomiting, skin reactions, hair thinning, possible blood transfusion requirement,increased risk of infection,neuropathy. Patient agreed to above and informed consent was signed.PET Scan- done- on 20-FDG-avid mass in pancreatic body and neck corresponds to known malignancy. Hypermetabolic left periaortic lymphadenopathy is compatible with metastatic disease.  Multiple bilateral pulmonary nodules, not significantly changed as compared to CT dated 2020, compatible with metastatic disease. \par \par 20 - Here for Cycle 2 \par 1- Developed G3 fatigue following C1 , and had to remain in bed for at least 4 days; son was cooking, she was able to go to bathroom and shower by herself\par 2- Also had decreased appetite and G1 nausea but no vomiting\par 3- Declines neuropathy\par 4- Denies diarrhea\par 5- No fever\par 6- No sxs suggestive of COVID-19\par 7- lost weight upto 10 lbs\par 8- also I am aware of her reaction to IRIN during infusion\par \par \par 20- Recieved a call from patient stating she had episodes of vomiting yesterday. She went out yesterday in the summer sun  and had some food from outside and she has been throwing up. She states she lost weight >30 lbs- She states the Creon has been causing a lot of abdominal pain - She has been experiencing some abdominal pain but is afraid to use the Oxycodone or the medical marijuana. \par She lives with her 2 sons and her sister in law has been helping her intermittently - However she needs a lot of reinstatement of instructions- Explained to her that she needs to take the Pain meds when needed and the pain is not from Creon. \par She is requesting for a Home COVID swab - will arrange for the same. \par \par 20- Pt was scheduled for C#3 with FOLFOX instead of FOLFORINOX to identify the culprit for the reaction. But her ANC was 1.4 so held Oxaliplatin and gave only 5 FU and LCV. She tolerated this chemo better did not have any reaction like previously. She denies N/V/D. She has been experiencing spasms in her fingers after the previous chemotherapy with FOLFORINOX- Her tumor markers CEA was rising and is concerning from 0.7- 3.6- 7.9.Patient states she was diagnosed with LUPUS by her PCP in March Unclear if she is on any treatment for the same. Will get the medical records from her PCP.\par \par 20- Pt is scheduled for C#4 with FOLFOX instead of FOLFORINOX to identify the culprit for the reaction. She tolerated the last chemotherapy well. She has been eating better and denies N/V. Today she tolerated the chemo with FOLFOX well - No reaction. next cycle we plan to give FOLFIRI\par \par 8/10//20- Pt is seen for TEB visit after she had the restaging scans done- She had CT scan done that showed- Metastatic pancreatic adenocarcinoma. Stable disease. She tolerated the chemo well without the irinotecan. we answered all her questions and concerns. Her son was not in the telephone conference. Dr Paul left a message with the son on his cell phone. Plan is to continue with the chemo \par \par 20- Ms. Deo is seen for follow up- She tolerated the last chemo well- denies N/V/D. She denies any neuropathy - Denies pain - does not use mediacl marijuana- \par Explained to her that she will receive FOLFIRI this cycle and to inform if she develops any reaction to the same. \par \par 09/3/20- Ms Deo has a telephone visit with me. She was hospitalized at [facility] after her last chemo- when she came for the disconnect- She was hospitalized at [facility] from 20 to 20- When she came for the disconnect her BP was low and she was bradycardiac and after a fluid challenge she still did not recover- She was sent to hospital for further work up where Blood cx/ Ua/ Urine cx was negative and it was deemed that she was dehydrated and no infectious process.\par \par Spoke with the patient who states she had a rough time with this chemotherapy and she is feeling better now- however she has been having episodes of diarrhea and nausea. She has been taking the anti nausea meds and that has helped her. She feels that she dis better with FOLFOX - Discussed with Dr Paul and plan is to hold off on nay future doses of Irinotecan.\par \par 20- Ms Deo is seen for C #7 - Her ANC was 1.23- Discussed with Dr Paul and plan to hold off on Oxaliplatin - Pt will get 5 FU and LCV - She will receive onpro on disconnect day. I explained to her hat she will not get any mores doses of Irinotecan and to keep a close eye on any symptoms she develop post chemotherapy. She denies pain/ N/V/D. \par \par 20- Ms. Desouza is seen in office for follow up on C# 8. She denies n/v/dizziness/ neuropathy . Her appetite is  good, she has good energy level and is tolerating chemo well. She will get her restaging scans done after this round of chemo. Advised she can bring her son in for the next office appointment with Dr Paul to discuss the results of the scan. \par \par 10/6/20-MsClaudia Desouza is seen in office for follow up after her restaging scans- SHe had restaging scans on 10/1/20 that showed metastatic pancreatic cancer with no interval change since 20 in pancreatic mass, lung nodules, and adenopathy / peritoneal nodules.  She has been tolerating this chemo regimen  well. today she presents to office with her son - Davina . We discussed that we can entertain the idea of RT aftre she completes 12 cycles of Chemo.She complainst that she has been having episodes where she feels her head is usually hot and she feels sweaty. 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 \par \par 10/22/20-Patient was admitted at [facility] for sepsis and needed vasopressors for pressure support. She was in the ICU for a short time - She received IV abx for Likely bacterial pneumonia. While she was in the hospital her LFTS were still elevated- Unclear etiology for elevated LFTs- Likely chemo related vs Antibiotics- Pt is feeling better after she is discharged home. plan  is to omit FOLFOX in future and plan for Madelia Abraxane with Onpro. Will recheck labs to confirm her LFTs are downtrending. Plan to start her on gem- Abraxane if LFTS normalize. \par Her scans in the hospital showed- Metastatic pancreatic cancer involving the retroperitoneal lymph nodes and visualized bilateral lungs. Stable disease compared to 10/1/2020. \par Small volume ascites, new since 10/1/2020. \par \par Drug sheet was provided Risks/ benefits and alternatives of chemotherapy were discussed and included but not limited to fatigue,low blood counts, nausea/vomiting, skin reactions, hair thinning, possible blood transfusion requirement,increased risk of infection,neuropathy. Patient agreed to above and informed consent was signed.\par \par 10/27/2020: Pt is here to start a new chemo regimen with Madelia/Abraxane with onpro. Irinotecan was discontinued in August due to severe fatigue, hypotension resistant to fluid challenge and bradycardia. Later patient developed septic shock post FOLFOX chemo and is hence discontinued. LFTs are normal with AST 38, ALT 31, ALP elevated 170, normal T. Rah 0.2mg/dl. Pt c/o mild fatigue and does not have any other complains. Denies any F/C, N/V,D/P, change in appetite or weight loss. \par Discussed the chemo regimen with Madelia/ Abraxane- answered all questions and concerns.\par \par 20; Ms Desouza is seen as follow up for C# 2. She complains of fatigue which is more prominent this cycle- She denies fevers. She does complain of nausea after chemo . She had some Constipation that was resolved with Miralax. Her appetite is diminished  and sleep is disturbed- Unable to sleep through the night. Will order Remeron 15 mg Q HS for appetite and sleep.\par \par 20; Ms Desouza is seen as follow up for C#3. She feels well. She feels this chemo is more tolerable. She has fatigue that lasts for 1-2 days after chemo and the week she is off chemo - she feels well. She does experience some nausea  and constipation occasionally that is relived by anti nausea meds and MiraLAX. She denies  neuropathy.\par Denies pain or discomfort. Filled out the paperwork for FMLA- for son Davina. she notices darkening of her toes -on the right foot - The first second and fourth digit noted.\par \par 20; Ms Desouza is seen as follow up - In the interim she was hospitalized at [facility] on 20 - for Acute MI and had a cardiac stent placed. She is scheduled to see Dr Toussaint for follow up next week. She is now on Brilanta/ Metoprolol/ Atorvastatin.\par She feels very tired and feels wiped out - She denies N/V/D/ Constipation. Her abdomen is distended- She has some lower back pain  which is new - Will plan to schedule a CT scan A/P/C prior to the next chemotherapy- Will arrange for a TEB visit with Dr Paul after completion of scans- Her f/u appt with Dr Toussaint is on 20.\par She complains of feeling dizzy at times- Does not have a BP machine at home- \par \par 12/15/20:  Ms Desouza is seen as TEB viist to discuss the scans results. She had the restaging scans on  that showed interval regression in hepatic mass , mediastinal and retroperitoneal lymph nodes. Interval stability in size and number of pulm metastases. Overall 39% regression of tumor based on RECIST . She has a follow up with cardiologist following week. \par \par 20:  Ms Desouza is seen for follow up .In the interim she was seen by Cardiology - Dr Toussaint- \par She endorses she has episodes of light headed ness- Today her BP was in low 90's . She states she feels fatigued from chemotherapy - We discussed that we dose reduced the chemo this cycle- She also mentioned that she has been having some shortness of breath even at rest- Emailed Cardiology Dr Toussaint and think Brillanta can cause SOB- Plan is to switch Brillanta to Plavix- \par \par 21:  Ms Desouza is seen for follow up .In the interim she was seen by Cardiology - Dr Toussaint- Brilanta was switched to Plavix and her shortness of breath has improved ever since- She feels well - She does experience some numbness and pins and needles feeling on her shins b/l .Abraxane was reduced to 70 mg/m2 - Will discuss with Dr Paul if dose needs to be further reduced. Neuropathy is grade 1 . \par \par 21:  Ms Desouza is seen for follow up .In the interim she was hospitalized at [facility] for UTI  from 21 to 1/10/21 - She was treated with IV  Abx for UTI  and was discharged home. She has been experiencing some numbness in her lower legs bilaterally - she is on Abraxane 70 mg - She also mentioned that since her discharge from the hospital she stopped taking Metoprolol as she was advised to do so. She has a f/u appt with cardiology Dr Toussaint- Advised her to f/u with him in regards to meds. \par \par 2/3/21:  Ms Desouza is seen for follow up .In the interim she was evaluated by cardiology and metoprolol is on hold for now-  Since she has been having vague neuropathy symptoms with numbness in her legs-we discussed on stopping the Abraxane and starting her on Tarceva with this cycle. \par  Drug sheet was provided Risks/ benefits and alternatives of chemotherapy were discussed and included but not limited to fatigue,low blood counts, nausea/vomiting, skin reactions, hair thinning, possible blood transfusion requirement,increased risk of infection,neuropathy. Patient agreed to above and informed consent was signed.\par \par CY inhibitors or a combined CY and CY inhibitor increase erlotinib plasma concentrations. Avoid concomitant use. If not possible, reduce TARCEVA dose. \par ? CY inducers decrease erlotinib plasma concentrations. Avoid concomitant use. If not possible, increase TARCEVA dose. \par ? Cigarette smoking and CY inducers decrease erlotinib plasma concentrations. Avoid concomitant use. If not possible, increase\par TARCEVA dose.\par ? Drugs that increase gastric pH decrease erlotinib plasma concentrations.\par For proton pump inhibitors avoid concomitant use if possible. \par For H-2 receptor antagonists, take TARCEVA 10 hours after H-2 receptor antagonist\par \par \par 21:  Ms Desouza is seen for follow up .In the interim she was started on Tarceva. Pt had nose bleed, ecchymosis on her thigh. She also developed stye on her eye. Suggested her to use nasal spray to prevent further nose bleed. Denies any rash. Will do exam when she comes for chemo.\par \par 21:  Ms Desouza is seen for follow up .In the interim she was hospitalized for nasal bleed- she had a nasal rocket that was placed since her bleed. She also developed a stye and had stopped taking Tarceva on 21- She has numbness on b/l feet - 7/10. unsteady on her feet - She is reluctant to start any medications for neuropathy \par She would like to wait and see how she fels.\par \par She had nasal bleeding which stopped now- however today her H & h was 7.5 - Will transfuse 1 unit of PRBC.\par Plan to resume chemo on 3/3/21.\par \par 3/3/21:  Ms Desouza is seen for follow up .In the interim she received 1 unit of PRBC last week and her H & H is 10.3 today. \par She saw cardiology last week and she stopped taking ASA since she had episodes of epistaxis- \par I explained to pt that since she has had a good response to chemo - she should restart taking the Tarceva pills. since she developed blepharitis and acne like rash- I refered her to dermatology. Plan is to resume the erlotinib today and resume chemo with gemzar- She will also get Restaging CT scans before her next viist with DENISE Robert. \par Gave her the MOLST form and advised her to discuss with her family and return it to me next week after she fills out her wishes. \par \par 3/16/21:  Ms Desouza is seen for follow up .In the interim she had her restaging scans that showed- Slight interval decrease in size of the known pancreatic head and neck mass.\par No significant interval change in small right hilar and mediastinal lymph nodes or peripancreatic and retroperitoneal lymph nodes with reference nodes as described above.\par Innumerable bilateral pulmonary nodules which are largely without significant interval change although several pulmonary nodules have shown interval minimal increase in size including reference pulmonary nodules above.\par \par Recieved covid vaccine moderna on 3/12- had some low grade fever/ nausea/ pain on the site -\par Otherwise doing well- No acne eruption noted- \par Nasal bleeding under control- She is following cardiology and ENT as well. Will continue with the same chemo regimen. \par \par 3/31/21:  Ms Desouza is seen for follow up . She is scheduled for second dose of COVID on 21- She has developed some tarceva related acnes on her face - She is applying the clindamycin topical ointment- \par Allergies She has been having some allergies - taking Claritin for the same- Her Appetite is  low but she is pushing to eat more frequent meals- She has not lost weight - \par She has not filled the MOLST form yet- Advised her to fill it sooner.\par \par 21:  Ms Desouza is seen for follow up . She received second dose of  Moderna COVID on 21- Her Tarceva related rash is improved and she is doing well. She will need to follow up with dermatology as well.  Denies N/V/D.\jennifer Has good activity level. She did fill out the MOLST form- Copy made and gave the originals to the patient.\par \par 21:  Ms Desouza is seen for follow up .She has b/l blepheritis likely tarceva induced. Will place the patient on Tobramycin eye gtts and reassess. \par Her skin rash has improved. She denies N/V/D.Tolerating chemo well. she is active and able to carry ADLS. Denies neuropathy .\par \par 21:  Ms Desouza is seen for follow up .In the interim she had restgaing scans- that showed Pancreatic mass with pulmonary metastatic disease not significantly changed from prior imaging 3/15/2021. She received her second dose of the COVID vaccine last week after her chemotherapy . She had fevers and felt weak and felt like she had "bronchitis" . She said she took some natural remedy medications with Tanika/ onions/ garlic and that made her feel better. Today she feels better. She thinks she had a bad reaction to the vaccine itself. \par Her leisions on her eyes are improved and she does not have any rash in her face now. she is followed closely by dermatology- Plan to continue with the same treatment.\par \par  [de-identified] : adenocarcinoma [de-identified] : 5/26//21:  Ms Desouza is seen for follow up . Today her h& h is 8.4. She denies any s/s of bleeding. She had received her second dose of the COVID vaccine x 3 weeks ago but she feels ever since she got the vaccine her taste buds have been affected . She denies N/V/D.\par She states she feels weaker but able to carry ADLs.\par Will schedule for a unit of PRBC transfusion\par Will also check COVID PCR\par \par  [FreeTextEntry3] : Hypotension, bradycardia  [FreeTextEntry4] : 6/17/20 [FreeTextEntry5] : IRIN [FreeTextEntry2] : x 4 daYS [FreeTextEntry8] : Sepsis, transaminitis  [de-identified] : OXaliplatin  [de-identified] : neuropathy  [de-identified] : Abraxane

## 2021-05-26 NOTE — ASSESSMENT
[FreeTextEntry1] : 70 year old F with H/O metastatic disease diagnosed in June 2020 and is s/p 9 cycles of chemotherapy which was initiated with FOLFIRINOX and ended with FOLFOX with discontinuation of irinotecan due to severe side effects requiring hospitalization. Now FOLFOX is discontinued due to the development of septic shock and transaminitis. The current plan is to initiate Turton/Abraxane+onpro. \par \par 1-Therapeutic: \par -----------------\par Pancreatic cancer with mets to lungs.\par Started patient on FOLFORINOX regimen.She received alternate cycles of FOLFOX with FOLFIRI- since she developed AE  .She was treated with 4 cycles of FOLFOX- She was hospitalized after her last chemo for sepsis-\par Switched her chemo to Turton/ Abraxane- every 2 weeks.\par Sustained MI on 11/24 - Had a stent placed RCA.\par Resumed chemo with dose reduced Turton/ Abraxane- 700/ 70 - Completed 6 cycles \par Switched chemo to gem- Tarceva since she developed Grade 2 Neuropathy - \par She had nasal bleeding and needed to be evaluated in the ER - Stopped taking ASA with epistaxis.\par Resumed  chemo with Turton/ Tarceva on 3/3/21- Will continue same treatment. \par \par \par \par 2-Diagnostic: \par ---------------\par Labs:Monthly CEA, , \par Will arrange for blood transfusion 1 unit PRBC \par Restaging scans after 4 cycles of chemo\par \par 3- Pharmacogenetics testing:\par ----------------------------------\par -Foundation Liquid- LIBAN , TMB 4 Mut/Mb \par \par 4- Procedures:\par -----------------\par - S/P  Port-A-Cath placement on 06/15/20\par - PORT CARE\par - EMLA CREAM PRESCRIBED\par \par 5-Supportive: \par -----------------\par Anemia: will transfuse 1 unit PRBC - Will arrange for blood tx on 5/29 - \par Tarceva Rash- Started on Clindamycin Topical ointmnet \par MI - S/P Stent- On Plavix  Follows with Cardiology Dr Duff-\par Was advised to hold Metoprolol since she developed dizziness- \par Pancreatic insufficiency: CREON - [address] units (2 pills with each snack and 4 pills with each meal) for pancreatic replacement\par DVT (PE): High risk, close monitoring\par Antiemetic: Zofran PRN and Compazine PRN\par Emla cream: for port numbing before use\par Prevention of mouth sores: Biotene mouthwash\par Skin reactions: Udderly smooth cream and Vitamin B 6 100 mg BID for prevention of HFS\par Diarrhea: The usual dose of Imodium is 4 milligrams (mg) (2 capsules) after the first loose bowel movement, and 2 mg (1 capsule) after each loose bowel movement after the first dose has been taken. No more than 16 mg (8 capsules) should be taken in any twenty-four-hour period. \par \par \par 6- RTC:  \par --------\par \par F/u in 2 weeks\par F/u Blood tx\par F/U H & h / COVID PCR\par \par \par  [AdvancecareDate] : 04/14/21

## 2021-05-26 NOTE — PHYSICAL EXAM
[Ulcers] : no ulcers [Mucositis] : no mucositis [Thrush] : no thrush [de-identified] : blepharitis resolved-  [de-identified] : Ocaasional cramps, constipation relieved.  [de-identified] : Numbness in lower legs / Pins and needles in both lower legs.

## 2021-06-17 PROBLEM — G62.0 CHEMOTHERAPY-INDUCED NEUROPATHY: Status: ACTIVE | Noted: 2021-01-01

## 2021-06-17 NOTE — PHYSICAL EXAM
[Restricted in physically strenuous activity but ambulatory and able to carry out work of a light or sedentary nature] : Status 1- Restricted in physically strenuous activity but ambulatory and able to carry out work of a light or sedentary nature, e.g., light house work, office work [Normal] : grossly intact [Ulcers] : no ulcers [Mucositis] : no mucositis [Thrush] : no thrush [de-identified] : blepharitis resolved-  [de-identified] : cough with active phelgm -  [de-identified] : Ocaasional cramps, constipation relieved.  [de-identified] : Numbness in lower legs / Pins and needles in both lower legs.

## 2021-06-17 NOTE — HISTORY OF PRESENT ILLNESS
[Disease: _____________________] : Disease: [unfilled] [M: ___] : M[unfilled] [AJCC Stage: ____] : AJCC Stage: [unfilled] [3] : 3, Severe [4] : 4, Life-threatening [2] : 2, Moderate [Date: ____________] : Patient's last distress assessment performed on [unfilled]. [4 - Distress Level] : Distress Level: 4 [de-identified] : Diagnosis: Pancreatic adenocarcinoma \par \par Genetic Testing: Foundation Liquid- LIBAN , TMB 4 Mut/Mb \par \par Other Current Medical Problems: Arthritis/ HLD \par \par Treatment History:\par \par Cycle # 1 20- FOLFORINOX- 5 FU 2400 mg/ kg /  mg/ m 2/ Irinotecan 150 mg/ m 2 / Oxaliplatin 85 mg/ m2\par Cycle # 2 20- FOLFORINOX- 5 FU 2400 mg/ kg /  mg/ m 2/ Irinotecan 140 mg/ m 2/ Oxaliplatin 85 mg/ m2\par Cycle # 3- 07/15/20- Skipped Irinotecan / Oxaliplatin-  (ANC 1.4) - 5 FU 2400 mg/ kg /  mg/ m 2\par Cycle #4 - 20- FOLFOX-  5 FU 2000 mg/ kg /  mg/ m 2/ Oxaliplatin 85 mg/ m2-----------------Tolerated well without Irinotecan \par \par Restaging Scans---------------Stable disease \par Cycle # 5 - 20- FOLFOX- 5 FU 2000 mg/ kg /  mg/ m 2/ Oxaliplatin 85 mg/ m2--------\par Cycle # 6- 20-FOLFIRI-  5 FU 2000 mg/ kg /  mg/ m 2/ Irinotecan 150 mg- Held oxaliplatin \par \par Hospitalized at [facility] for adverse reaction from Irinotecan- - -------------Will hold IRINOTECAN in future- \par \par Cycle # 7 20 - 5 FU 2000 mg/  mg/ m2 -----------Oxali was held since ANC was 1.29 \par Cycle #  -20- 5 FU 2000 mg/  mg/ m2 / Oxaliplatin 85 mg/ m2 \par \par Restaging Scans- Stable disease \par \par Cycle #9 -10/6/20-  5 FU 2000 mg/  mg/ m2 / Oxaliplatin 85 mg/ m2 \par \par Hospitalized for sepsis after chemotherapy \par \par Cycle #1 10/27/20 Gemcitabine 1000 mg/m2 + Abraxane 100 mg/m2\par Cycle # 2 20- Gemcitabine 1000 mg/m2 + Abraxane 100 mg/m2\par Cycle # 3 20-  Gemcitabine 1000 mg/m2 + Abraxane 100 mg/m2\par \par Hospitalized for Acute MI - Had a cardiac stent - 20\par \par Restaging scans- 12/15/20Interval regression in hepatic mass/ mediastinal and retroperitoneal lymph nodes\par \par Cycle # 4 20- Gemcitabine 700 mg/ m2 and Abraxane 70 mg/ m2\par Cycle # 5 21-  Gemcitabine 700 mg/ m2 and Abraxane 70 mg/ m2\par Cycle # 6 21- Gemcitabine 700 mg/ m2 and Abraxane 70 mg/ m2\par \par Cycle # 1 - 2/3/21- Gemcitabine 700 mg/m 2 / Erlotinib 100 mg PO daily  \par Cycle # 2 - 21-  Gemcitabine 700 mg/m 2 / Erlotinib 100 mg PO daily\par \par Chemo on hold since pt had nasal bleeding -----Pt stopped taking Tarceva on 21- sec to epistaxis and stye on the eye,\par \par 21- Pt received 1 unit of PRBC for H & H at 7.5/25.7\par \par Cycle # 3- 3/3/21- Gemcitabine 700 mg/ m2 / Erlotinib 100 mg PO daily \par Restaging Scans- Stable disease \par \par Cycle # 4- 3/17/21- Gemcitabine 700 mg/ m2 / Erlotinib 100 mg PO daily \par Cycle # 5- 21- Gemcitabine 700 mg/ m2 / Erlotinib 100 mg PO daily \par Cycle # 6- 21- Gemcitabine 700 mg/ m2 / Erlotinib 100 mg PO daily\par Cycle #7- 21- Gemcitabine 700 mg/ m2 / Erlotinib 100 mg PO daily\par \par Restaging scans- 5/10/21-\par \par Cycle 8- 21-Gemcitabine 700 mg/ m2 / Erlotinib 100 mg PO daily\par Cycle 9- 21- Gemcitabine 700 mg/ m2 / Erlotinib 100 mg PO daily\par \par Transfuse 1 unit PRBC - 21\par \par Cycle # 10- 21- Gemcitabine 700 mg/ m2/ Erlotinib 10 mg PO every other day . \par \par Oncological History :\par Ms. Griselda Corona is a 70 year old female who presents for initial consultation in our [facility] for a new diagnosis of pancreas adenocarcinoma. \par Patient states she had gastrointestinal symptoms dating back to 2019, including discomfort, bloating, diarrhea followed by small amounts of blood per rectum. She eventually went to GI doctor Dr Florian Pace.  She had a Colonoscopy in  that showed small ulcer. She went the ER at [facility], [place] 3/10/20 for abdominal pain.  CT of her abdomen was performed and showed 3.2 x 3.5 cm hypoenhancing mass in the pancreatic head/ body concerning for malignancy. Mass results in severe compression of SMV. Retroperitoneal lymphadenopathy concerning for metastatic disease with multiple nodules in lung base concerning for metastatic disease.\par \par She was referred  to Dr Bernabe for EUS, which was performed 2020, significant for an ill-defined, heterogeneous, hypoechoic lesion seen in the head of pancreas. The lesion abutted but did not invade the SMV. The main pancreatic duct was dilated and the parenchyma was heterogeneous within the pancreatic body/tail. Fine needle biopsy was performed.\par \par Pathology: Gastric mucosa positive for H pylori. Pancreas mass: Invasive adenocarcinoma.\par \par Patient is taking medication regimen for the H.Pylori infection, otherwise not taking medications except for multivitamin supplement. \par \par \par FHX: No hx of Cancer \par \par Socail HX: Lives with her children, , Non smoker, no alcohol use\par \par PSX: No surgeries in past \par \par \par Disease: Pancreatic adenocarcinoma \par Pathology:\par \par AJCC Stage : Stage 4 \par \par Tumor Markers: CA 19.9 was 155 \par \par 6/10/20- Ms. Desouza is seen in [facility] clinic. She states he has abdominal pain 7-8/10 and gets relief with Tylenol. She has had 2 lbs weight loss- and appetite was decreased .\par Ct abd/ pelvis/ chest- Large pancreatic mass suspicious for adenocarcinoma, with marked arterial \par and venous involvement. Numerous bilateral pulmonary nodules new since 2018 and suspicious for metastatic disease. Retroperitoneal and peripancreatic lymphadenopathy. \par Omental nodule raising the possibility of peritoneal carcinomatosis. \par \par 20- Ms. Desouza is seen for follow up. She had the port placed earlier last week and is scheduled for her chemotherapy today . We discussed the chemo side effects and what to expect from chemotherapy- SE- Drug sheet was provided Risks/ benefits and alternatives of chemotherapy were discussed and included but not limited to fatigue,low blood counts, nausea/vomiting, skin reactions, hair thinning, possible blood transfusion requirement,increased risk of infection,neuropathy. Patient agreed to above and informed consent was signed.PET Scan- done- on 20-FDG-avid mass in pancreatic body and neck corresponds to known malignancy. Hypermetabolic left periaortic lymphadenopathy is compatible with metastatic disease.  Multiple bilateral pulmonary nodules, not significantly changed as compared to CT dated 2020, compatible with metastatic disease. \par \par 20 - Here for Cycle 2 \par 1- Developed G3 fatigue following C1 , and had to remain in bed for at least 4 days; son was cooking, she was able to go to bathroom and shower by herself\par 2- Also had decreased appetite and G1 nausea but no vomiting\par 3- Declines neuropathy\par 4- Denies diarrhea\par 5- No fever\par 6- No sxs suggestive of COVID-19\par 7- lost weight upto 10 lbs\par 8- also I am aware of her reaction to IRIN during infusion\par \par \par 20- Recieved a call from patient stating she had episodes of vomiting yesterday. She went out yesterday in the summer sun  and had some food from outside and she has been throwing up. She states she lost weight >30 lbs- She states the Creon has been causing a lot of abdominal pain - She has been experiencing some abdominal pain but is afraid to use the Oxycodone or the medical marijuana. \par She lives with her 2 sons and her sister in law has been helping her intermittently - However she needs a lot of reinstatement of instructions- Explained to her that she needs to take the Pain meds when needed and the pain is not from Creon. \par She is requesting for a Home COVID swab - will arrange for the same. \par \par 20- Pt was scheduled for C#3 with FOLFOX instead of FOLFORINOX to identify the culprit for the reaction. But her ANC was 1.4 so held Oxaliplatin and gave only 5 FU and LCV. She tolerated this chemo better did not have any reaction like previously. She denies N/V/D. She has been experiencing spasms in her fingers after the previous chemotherapy with FOLFORINOX- Her tumor markers CEA was rising and is concerning from 0.7- 3.6- 7.9.Patient states she was diagnosed with LUPUS by her PCP in March Unclear if she is on any treatment for the same. Will get the medical records from her PCP.\par \par 20- Pt is scheduled for C#4 with FOLFOX instead of FOLFORINOX to identify the culprit for the reaction. She tolerated the last chemotherapy well. She has been eating better and denies N/V. Today she tolerated the chemo with FOLFOX well - No reaction. next cycle we plan to give FOLFIRI\par \par 8/10//20- Pt is seen for TEB visit after she had the restaging scans done- She had CT scan done that showed- Metastatic pancreatic adenocarcinoma. Stable disease. She tolerated the chemo well without the irinotecan. we answered all her questions and concerns. Her son was not in the telephone conference. Dr Paul left a message with the son on his cell phone. Plan is to continue with the chemo \par \par 20- Ms. Desouza is seen for follow up- She tolerated the last chemo well- denies N/V/D. She denies any neuropathy - Denies pain - does not use mediacl marijuana- \par Explained to her that she will receive FOLFIRI this cycle and to inform if she develops any reaction to the same. \par \par 09/3/20- Ms Deo has a telephone visit with me. She was hospitalized at [facility] after her last chemo- when she came for the disconnect- She was hospitalized at [facility] from 20 to 20- When she came for the disconnect her BP was low and she was bradycardiac and after a fluid challenge she still did not recover- She was sent to hospital for further work up where Blood cx/ Ua/ Urine cx was negative and it was deemed that she was dehydrated and no infectious process.\par \par Spoke with the patient who states she had a rough time with this chemotherapy and she is feeling better now- however she has been having episodes of diarrhea and nausea. She has been taking the anti nausea meds and that has helped her. She feels that she dis better with FOLFOX - Discussed with Dr Paul and plan is to hold off on nay future doses of Irinotecan.\par \par 20- Ms Deo is seen for C #7 - Her ANC was 1.23- Discussed with Dr Paul and plan to hold off on Oxaliplatin - Pt will get 5 FU and LCV - She will receive onpro on  day. I explained to her hat she will not get any mores doses of Irinotecan and to keep a close eye on any symptoms she develop post chemotherapy. She denies pain/ N/V/D. \par \par 20- Ms. Desouza is seen in office for follow up on C# 8. She denies n/v/dizziness/ neuropathy . Her appetite is  good, she has good energy level and is tolerating chemo well. She will get her restaging scans done after this round of chemo. Advised she can bring her son in for the next office appointment with Dr Paul to discuss the results of the scan. \par \par 10/6/20-MsClaudia Desouza is seen in office for follow up after her restaging scans- SHe had restaging scans on 10/1/20 that showed metastatic pancreatic cancer with no interval change since 20 in pancreatic mass, lung nodules, and adenopathy / peritoneal nodules.  She has been tolerating this chemo regimen  well. today she presents to office with her son - Davina . We discussed that we can entertain the idea of RT aftre she completes 12 cycles of Chemo.She complainst that she has been having episodes where she feels her head is usually hot and she feels sweaty. 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 \par \par 10/22/20-Patient was admitted at [facility] for sepsis and needed vasopressors for pressure support. She was in the ICU for a short time - She received IV abx for Likely bacterial pneumonia. While she was in the hospital her LFTS were still elevated- Unclear etiology for elevated LFTs- Likely chemo related vs Antibiotics- Pt is feeling better after she is discharged home. plan  is to omit FOLFOX in future and plan for [place] Abraxane with Onpro. Will recheck labs to confirm her LFTs are downtrending. Plan to start her on gem- Abraxane if LFTS normalize. \par Her scans in the hospital showed- Metastatic pancreatic cancer involving the retroperitoneal lymph nodes and visualized bilateral lungs. Stable disease compared to 10/1/2020. \par Small volume ascites, new since 10/1/2020. \par \par Drug sheet was provided Risks/ benefits and alternatives of chemotherapy were discussed and included but not limited to fatigue,low blood counts, nausea/vomiting, skin reactions, hair thinning, possible blood transfusion requirement,increased risk of infection,neuropathy. Patient agreed to above and informed consent was signed.\par \par 10/27/2020: Pt is here to start a new chemo regimen with [place]/Abraxane with onpro. Irinotecan was discontinued in August due to severe fatigue, hypotension resistant to fluid challenge and bradycardia. Later patient developed septic shock post FOLFOX chemo and is hence discontinued. LFTs are normal with AST 38, ALT 31, ALP elevated 170, normal T. Rah 0.2mg/dl. Pt c/o mild fatigue and does not have any other complains. Denies any F/C, N/V,D/P, change in appetite or weight loss. \par Discussed the chemo regimen with [place]/ Abraxane- answered all questions and concerns.\par \par 20; Ms Desouza is seen as follow up for C# 2. She complains of fatigue which is more prominent this cycle- She denies fevers. She does complain of nausea after chemo . She had some Constipation that was resolved with Miralax. Her appetite is diminished  and sleep is disturbed- Unable to sleep through the night. Will order Remeron 15 mg Q HS for appetite and sleep.\par \par 20; Ms Desouza is seen as follow up for C#3. She feels well. She feels this chemo is more tolerable. She has fatigue that lasts for 1-2 days after chemo and the week she is off chemo - she feels well. She does experience some nausea  and constipation occasionally that is relived by anti nausea meds and MiraLAX. She denies  neuropathy.\par Denies pain or discomfort. Filled out the paperwork for FMLA- for son Davina. she notices darkening of her toes -on the right foot - The first second and fourth digit noted.\par \par 20; Ms Desouza is seen as follow up - In the interim she was hospitalized at [facility] on 20 - for Acute MI and had a cardiac stent placed. She is scheduled to see Dr Toussaint for follow up next week. She is now on Brilanta/ Metoprolol/ Atorvastatin.\par She feels very tired and feels wiped out - She denies N/V/D/ Constipation. Her abdomen is distended- She has some lower back pain  which is new - Will plan to schedule a CT scan A/P/C prior to the next chemotherapy- Will arrange for a TEB visit with Dr Paul after completion of scans- Her f/u appt with Dr Toussaint is on 20.\par She complains of feeling dizzy at times- Does not have a BP machine at home- \par \par 12/15/20:  Ms Desouza is seen as TEB viist to discuss the scans results. She had the restaging scans on  that showed interval regression in hepatic mass , mediastinal and retroperitoneal lymph nodes. Interval stability in size and number of pulm metastases. Overall 39% regression of tumor based on RECIST . She has a follow up with cardiologist following week. \par \par 20:  Ms Desouza is seen for follow up .In the interim she was seen by Cardiology - Dr Toussaint- \par She endorses she has episodes of light headed ness- Today her BP was in low 90's . She states she feels fatigued from chemotherapy - We discussed that we dose reduced the chemo this cycle- She also mentioned that she has been having some shortness of breath even at rest- Emailed Cardiology Dr Toussaint and think Brillanta can cause SOB- Plan is to switch Brillanta to Plavix- \par \par 21:  Ms Desouza is seen for follow up .In the interim she was seen by Cardiology - Dr Toussaint- Brilanta was switched to Plavix and her shortness of breath has improved ever since- She feels well - She does experience some numbness and pins and needles feeling on her shins b/l .Abraxane was reduced to 70 mg/m2 - Will discuss with Dr Paul if dose needs to be further reduced. Neuropathy is grade 1 . \par \par 21:  Ms Desouza is seen for follow up .In the interim she was hospitalized at [facility] for UTI  from 21 to 1/10/21 - She was treated with IV  Abx for UTI  and was discharged home. She has been experiencing some numbness in her lower legs bilaterally - she is on Abraxane 70 mg - She also mentioned that since her discharge from the hospital she stopped taking Metoprolol as she was advised to do so. She has a f/u appt with cardiology Dr Toussaint- Advised her to f/u with him in regards to meds. \par \par 2/3/21:  Ms Desouza is seen for follow up .In the interim she was evaluated by cardiology and metoprolol is on hold for now-  Since she has been having vague neuropathy symptoms with numbness in her legs-we discussed on stopping the Abraxane and starting her on Tarceva with this cycle. \par  Drug sheet was provided Risks/ benefits and alternatives of chemotherapy were discussed and included but not limited to fatigue,low blood counts, nausea/vomiting, skin reactions, hair thinning, possible blood transfusion requirement,increased risk of infection,neuropathy. Patient agreed to above and informed consent was signed.\par \par CY inhibitors or a combined CY and CY inhibitor increase erlotinib plasma concentrations. Avoid concomitant use. If not possible, reduce TARCEVA dose. \par ? CY inducers decrease erlotinib plasma concentrations. Avoid concomitant use. If not possible, increase TARCEVA dose. \par ? Cigarette smoking and CY inducers decrease erlotinib plasma concentrations. Avoid concomitant use. If not possible, increase\par TARCEVA dose.\par ? Drugs that increase gastric pH decrease erlotinib plasma concentrations.\par For proton pump inhibitors avoid concomitant use if possible. \par For H-2 receptor antagonists, take TARCEVA 10 hours after H-2 receptor antagonist\par \par \par 21:  Ms Desouza is seen for follow up .In the interim she was started on Tarceva. Pt had nose bleed, ecchymosis on her thigh. She also developed stye on her eye. Suggested her to use nasal spray to prevent further nose bleed. Denies any rash. Will do exam when she comes for chemo.\par \par 21:  Ms Desouza is seen for follow up .In the interim she was hospitalized for nasal bleed- she had a nasal rocket that was placed since her bleed. She also developed a stye and had stopped taking Tarceva on 21- She has numbness on b/l feet - 7/10. unsteady on her feet - She is reluctant to start any medications for neuropathy \par She would like to wait and see how she fels.\par \par She had nasal bleeding which stopped now- however today her H & h was 7.5 - Will transfuse 1 unit of PRBC.\par Plan to resume chemo on 3/3/21.\par \par 3/3/21:  Ms Desouza is seen for follow up .In the interim she received 1 unit of PRBC last week and her H & H is 10.3 today. \par She saw cardiology last week and she stopped taking ASA since she had episodes of epistaxis- \par I explained to pt that since she has had a good response to chemo - she should restart taking the Tarceva pills. since she developed blepharitis and acne like rash- I refered her to dermatology. Plan is to resume the erlotinib today and resume chemo with gemzar- She will also get Restaging CT scans before her next viist with DENISE Robert. \par Gave her the MOLST form and advised her to discuss with her family and return it to me next week after she fills out her wishes. \par \par 3/16/21:  Ms Desouza is seen for follow up .In the interim she had her restaging scans that showed- Slight interval decrease in size of the known pancreatic head and neck mass.\par No significant interval change in small right hilar and mediastinal lymph nodes or peripancreatic and retroperitoneal lymph nodes with reference nodes as described above.\par Innumerable bilateral pulmonary nodules which are largely without significant interval change although several pulmonary nodules have shown interval minimal increase in size including reference pulmonary nodules above.\par \par Recieved covid vaccine moderna on 3/12- had some low grade fever/ nausea/ pain on the site -\par Otherwise doing well- No acne eruption noted- \par Nasal bleeding under control- She is following cardiology and ENT as well. Will continue with the same chemo regimen. \par \par 3/31/21:  Ms Desouza is seen for follow up . She is scheduled for second dose of COVID on 21- She has developed some tarceva related acnes on her face - She is applying the clindamycin topical ointment- \par Allergies She has been having some allergies - taking Claritin for the same- Her Appetite is  low but she is pushing to eat more frequent meals- She has not lost weight - \par She has not filled the MOLST form yet- Advised her to fill it sooner.\par \par 21:  Ms Desouza is seen for follow up . She received second dose of  Moderna COVID on 21- Her Tarceva related rash is improved and she is doing well. She will need to follow up with dermatology as well.  Denies N/V/D.\jennifer Has good activity level. She did fill out the MOLST form- Copy made and gave the originals to the patient.\par \par 21:  Ms Desouza is seen for follow up .She has b/l blepheritis likely tarceva induced. Will place the patient on Tobramycin eye gtts and reassess. \par Her skin rash has improved. She denies N/V/D.Tolerating chemo well. she is active and able to carry ADLS. Denies neuropathy .\par \par 21:  Ms Desouza is seen for follow up .In the interim she had restgaing scans- that showed Pancreatic mass with pulmonary metastatic disease not significantly changed from prior imaging 3/15/2021. She received her second dose of the COVID vaccine last week after her chemotherapy . She had fevers and felt weak and felt like she had "bronchitis" . She said she took some natural remedy medications with Tanika/ onions/ garlic and that made her feel better. Today she feels better. She thinks she had a bad reaction to the vaccine itself. \par Her lesions on her eyes are improved and she does not have any rash in her face now. she is followed closely by dermatology- Plan to continue with the same treatment.\par \par 21:  Ms Desouza is seen for follow up . Today her h& h is 8.4. She denies any s/s of bleeding. She had received her second dose of the COVID vaccine x 3 weeks ago but she feels ever since she got the vaccine her taste buds have been affected . She denies N/V/D.\par She states she feels weaker but able to carry ADLs.\par Will schedule for a unit of PRBC transfusion\par Will also check COVID PCR\par \par  [de-identified] : adenocarcinoma [de-identified] : 6/17/21-  Ms Desouza is seen for follow up . She has been having some weakness and occasional cough associated with shortness of breath. She believes the cardiac medications are causing these side effects. Advised that she should follow up with Cardiology ASAP. She has been taking Tarceva every other day and is tolerating that regimen better than daily. She has been coughing more phlegm - She thinks its her allergies as well.\par I also suggested she should follow up with  pulmonary since she has lung mets as well. She denies N/V/D/\par Will resume Gemzar with tarceva.  [FreeTextEntry3] : Hypotension, bradycardia  [FreeTextEntry4] : 6/17/20 [FreeTextEntry5] : IRIN [FreeTextEntry2] : x 4 daYS [FreeTextEntry8] : Sepsis, transaminitis  [de-identified] : OXaliplatin  [de-identified] : neuropathy  [de-identified] : Abraxane

## 2021-06-17 NOTE — ASSESSMENT
[Palliative] : Goals of care discussed with patient: Palliative [With Patient/Caregiver] : With Patient/Caregiver [Comfort Care Only] : comfort care only [DNR] : DNR [DNI] : DNI [FreeTextEntry1] : 70 year old F with H/O metastatic disease diagnosed in June 2020 and is s/p 9 cycles of chemotherapy which was initiated with FOLFIRINOX and ended with FOLFOX with discontinuation of irinotecan due to severe side effects requiring hospitalization. Now FOLFOX is discontinued due to the development of septic shock and transaminitis. The current plan is to initiate [place]/Abraxane+onpro. \par \par 1-Therapeutic: \par -----------------\par Pancreatic cancer with mets to lungs.\par Started patient on FOLFORINOX regimen.She received alternate cycles of FOLFOX with FOLFIRI- since she developed AE  .She was treated with 4 cycles of FOLFOX- She was hospitalized after her last chemo for sepsis-\par Switched her chemo to [place]/ Abraxane- every 2 weeks.\par Sustained MI on 11/24 - Had a stent placed RCA.\par Resumed chemo with dose reduced [place]/ Abraxane- 700/ 70 - Completed 6 cycles \par Switched chemo to gem- Tarceva since she developed Grade 2 Neuropathy - \par She had nasal bleeding and needed to be evaluated in the ER - Stopped taking ASA with epistaxis.\par Resumed  chemo with [place]/ Tarceva on 3/3/21-\par Switched Tarceav to every other day for better tolerability \par \par \par \par 2-Diagnostic: \par ---------------\par Labs:Monthly CEA, , \par Will arrange for blood transfusion 1 unit PRBC \par Restaging scans after 4 cycles of chemo\par \par 3- Pharmacogenetics testing:\par ----------------------------------\par -Foundation Liquid- LIBAN , TMB 4 Mut/Mb \par \par 4- Procedures:\par -----------------\par - S/P  Port-A-Cath placement on 06/15/20\par - PORT CARE\par - EMLA CREAM PRESCRIBED\par \par 5-Supportive: \par -----------------\par \par Tarceva Rash- Started on Clindamycin Topical ointmnet \par MI - S/P Stent- On Plavix  Follows with Cardiology Dr Duff-\par Was advised to hold Metoprolol since she developed dizziness- \par Pancreatic insufficiency: CREON - [id number] units (2 pills with each snack and 4 pills with each meal) for pancreatic replacement\par DVT (PE): High risk, close monitoring\par Antiemetic: Zofran PRN and Compazine PRN\par Emla cream: for port numbing before use\par Prevention of mouth sores: Biotene mouthwash\par Skin reactions: Udderly smooth cream and Vitamin B 6 100 mg BID for prevention of HFS\par Diarrhea: The usual dose of Imodium is 4 milligrams (mg) (2 capsules) after the first loose bowel movement, and 2 mg (1 capsule) after each loose bowel movement after the first dose has been taken. No more than 16 mg (8 capsules) should be taken in any twenty-four-hour period. \par \par \par 6- RTC:  \par --------\par \par F/u in 2 weeks\par f/u Cardiology \par F/U Pulmonary \par \par \par  [AdvancecareDate] : 04/14/21

## 2021-06-22 PROBLEM — R05 COUGH: Status: ACTIVE | Noted: 2021-01-01

## 2021-06-22 NOTE — PHYSICAL EXAM
[No Acute Distress] : no acute distress [Normal Rate/Rhythm] : normal rate/rhythm [Normal S1, S2] : normal s1, s2 [No Murmurs] : no murmurs [No Resp Distress] : no resp distress [Clear to Auscultation Bilaterally] : clear to auscultation bilaterally [Oriented x3] : oriented x3 [No Focal Deficits] : no focal deficits [Normal Affect] : normal affect

## 2021-06-22 NOTE — REVIEW OF SYSTEMS
[Fatigue] : fatigue [Cough] : cough [Negative] : Cardiovascular [Hemoptysis] : no hemoptysis [Dyspnea] : no dyspnea [SOB on Exertion] : no sob on exertion

## 2021-06-22 NOTE — ASSESSMENT
[FreeTextEntry1] : 71 year old female with pmh pancreatic cancer with mets to the lungs currently on chemotherapy and tarceva presents with a cough.  The most likely cause of her cough is pulmonary metastatic disease . However, the cough has upper airway characteristics. Therefore,  Will do a trial for nasal fluticasone 2 sprays each nostril twice daily.  Will follow up in 4 weeks or as necessary.

## 2021-06-22 NOTE — END OF VISIT
[FreeTextEntry3] : I obtained the history and examined the patient and developed a plan of care  Rl De Jesus MD\par

## 2021-06-29 PROBLEM — Z51.81 ENCOUNTER FOR MONITORING ERLOTINIB THERAPY: Status: ACTIVE | Noted: 2021-01-01

## 2021-06-30 NOTE — ED ADULT NURSE NOTE - CHIEF COMPLAINT QUOTE
pt with pancreatic ca on chemo, c/o diarrhea, blood in stool since January. Pt states she was at [facility] receiving blood transfusion for hgb 7.0. Pt sent to ED for further abdominal workup. Pt denies abdominal pain, fever, CP. Other PMH: HTN, cardiac stents

## 2021-06-30 NOTE — ED PROVIDER NOTE - PROGRESS NOTE DETAILS
KEYLA BRENNAN:  Dr. Lechuga answering service reached at this time; awaiting call back from Dr. Lechuga. khushbu: spoke to GI who will see patient tomorrow for potential further w/u. Discussed pt w/ heme onc, they will follow. endorsed to hosp, plan for admission

## 2021-06-30 NOTE — ED PROVIDER NOTE - CLINICAL SUMMARY MEDICAL DECISION MAKING FREE TEXT BOX
Pt is a 72 y/o F PMHx pancreatic ca, CAD s/p stents, HTN, anemia requiring blood transfusion p/w anemia today. -- anemia as seen on outpatient labs -- labs, T&S, PT/INR, guiac, trop, cxr

## 2021-06-30 NOTE — ED ADULT NURSE REASSESSMENT NOTE - NS ED NURSE REASSESS COMMENT FT1
Received report from RAYSHAWN Collazo, patient A&OX4, ambulatory with assistance due to feeling weak. Pt received with 20g IV to right hand, flushes without resistance, positive blood return. Pt given Protonix as per MD order. patient in no acute distress, respirations even and unlabored. Will continue to monitor.

## 2021-06-30 NOTE — ED ADULT TRIAGE NOTE - CHIEF COMPLAINT QUOTE
pt with pancreatic ca on chemo, c/o diarrhea, blood in stool since January. Pt states she was at [facility] receiving blood transfusion for hgb 7.0. Pt sent to ED for further abdominal workup. Pt denies abdominal pain, fever, CP. Other PMH: HTN, cardiac stents

## 2021-06-30 NOTE — ED ADULT NURSE REASSESSMENT NOTE - NS ED NURSE REASSESS COMMENT FT1
Report given to ESSU 1. Patient in no acute distress, respirations even and unlabored at time of transport to ESSU 1.

## 2021-06-30 NOTE — ED CLERICAL - NS ED CLERK NOTE PRE-ARRIVAL INFORMATION; ADDITIONAL PRE-ARRIVAL INFORMATION
Patient sent in for c/o of loose stool, generalized weakness and low HGB. Patient was given one unit of PRBC at the [facility] this afternoon.  Patient is being treated for pancreatic cancer last chemo on 6/17. Patient sent in for c/o of loose stool, generalized weakness and low HGB. Patient was given one unit of PRBC at the [facility] this afternoon.  Patient is being treated for pancreatic cancer last chemo on 6/17. Requesting Gi Consult.

## 2021-06-30 NOTE — ED PROVIDER NOTE - ATTENDING CONTRIBUTION TO CARE
Attending note:   After face to face evaluation of this patient, I concur with above noted hx, pe, and care plan for this patient.  Leigh: 71 yof with pancreatic ca, CAD, HTN, anemia sent to ED for anemia requesting GI eval as per paperwork. Pt is well appearing, no distress, clear lungs, RRR, abd soft and non tender, brown stool, no edema. Pt requiring admission for TF and GI eval as requested by her oncologist.

## 2021-06-30 NOTE — ED ADULT NURSE NOTE - OBJECTIVE STATEMENT
Pt sent from [facility] for further evaluation. Pt with history of Pancreatic CA, was being transfused for hgb 7.0 at [facility]. R CW port noted, not acessed. Pt denies abdominal pain, denies CP, denies SOB, respirations even and unlabored, no apparent distress noted. 20g placed in R hand, labs drawn as ordered. Continue to monitor.

## 2021-06-30 NOTE — ED PROVIDER NOTE - OBJECTIVE STATEMENT
Pt is a 72 y/o F PMHx pancreatic ca, CAD s/p stents, HTN, anemia requiring blood transfusion p/w anemia today.  Pt reports she had blood work today that shows Hgb of 7, for which she received 1 unit PRBC.  She was then directed to [facility] ED for evaluation.  Accompanying documentation reveals that she was sent for evaluation with GI Dr. Lechuga.  Pt reports she has had intermittent brbpr and dark stools for past 1.5 years.  Pt states she takes Plavix daily.  She also notes left sided chest discomfort, nonpleuritic, nonradiating, nonexertional, which occurs only when she lays on her left side for past 2 months.  She reports some generalized fatigue for past few weeks. She otherwise denies any fevers, chills, abdominal pain, nausea, vomiting, syncope, palpitations, calf pain/swelling, h/o dvt/pe, hemoptysis, recent surgeries, ETOH abuse.

## 2021-07-01 NOTE — H&P ADULT - PROBLEM SELECTOR PLAN 1
EKG NSR with no ST or T wave changes.  #1 Trop= 12.  Pt5 currently chest pain free.   Cardiac monitor  F/U #2 CE, Lipid panel.  Give O2, Plavix.

## 2021-07-01 NOTE — PHYSICAL THERAPY INITIAL EVALUATION ADULT - ADDITIONAL COMMENTS
patient lives with her children in private home, there re 5 steps to enter and no steps to bedroom/bathroom. Patient was getting assist with ADL prior to admission. Pt was using a cane sometimes for ambulation. Patient has an aide for 7hr/5days week.

## 2021-07-01 NOTE — H&P ADULT - GASTROINTESTINAL DETAILS
EMS soft/nontender/no distention/no masses palpable/bowel sounds normal/no bruit/no rebound tenderness/no guarding

## 2021-07-01 NOTE — CONSULT NOTE ADULT - ASSESSMENT
71F Hx metastatic pancreatic adenocarcinoma (on chemo, last 6/17), CAD s/p STEMI (11/2020) s/p PCI (now on plavix only), H pylori s/p tx, HLD who was found to have anemia with Hb 7.0 prior to chemo tx -- given 1U transfusion and sent here for evaluation of anemia.     Impression:   #Anemia: pt with normocytic anemia with Hb 7.0 at cancer center s/p 1U pRBCs with Hb at baseline now (9-10s). FOBT negative. HD stable. Reporting black stools x 1 year with intermittent BRBPR. Anemia may likely be in setting of receiving chemo, no overt GI bleeding seen however will r/o UGI bleeding sources -- ddx PUD, erosive gastropathy, erosive esophagitis, angioectasias, malignancy. Pt with recent colonoscopy within 1 year   #H Pylori: s/p tx, no repeat testing to confirm eradication.    #Metastatic pancreatic adenocarcinoma      Recommendations:   - Plan for EGD to r/o UGI bleeding sources contributing to anemia tomorrow  - NPO MN for EGD tomorrow  - Can place on PPI BID for now  - Trend CBC, transfuse Hb < 7  - Active T&S  - Obtain OSH records for colonoscopy   - Consider anemia labs   - Please obtain AM labs early (3am) for pt to avoid procedure delays (+ ensure adequately resuscitated prior to EGD)  - Rest of care per primary team       Thank you for involving us in the care of this patient, please reach out if any further questions.     Moises Narvaez MD  Gastroenterology Fellow, PGY4    Available on Microsoft Teams  [phone] ([facility])  [phone] ([facility])  Please contact on call fellow weekdays after 5pm-7am and weekends: [phone]   71F Hx metastatic pancreatic adenocarcinoma (on chemo, last 6/17), CAD s/p STEMI (11/2020) s/p PCI (now on plavix only), H pylori s/p tx, HLD who was found to have anemia with Hb 7.0 prior to chemo tx -- given 1U transfusion and sent here for evaluation of anemia.     Impression:   #Anemia: pt with normocytic anemia with Hb 7.0 at cancer center s/p 1U pRBCs with Hb at baseline now (9-10s). FOBT negative. HD stable. Reporting black stools x 1 year with intermittent BRBPR. Anemia may likely be in setting of receiving chemo, no overt GI bleeding seen however will r/o UGI bleeding sources -- ddx PUD, erosive gastropathy, erosive esophagitis, angioectasias, malignancy. Pt with recent colonoscopy within 1 year   #H Pylori: s/p tx, no repeat testing to confirm eradication.    #Metastatic pancreatic adenocarcinoma      Recommendations:   - Plan for EGD to r/o UGI bleeding sources contributing to anemia tomorrow  - NPO MN for EGD tomorrow  - Can place on PPI BID for now  - Trend CBC, transfuse Hb < 7  - Active T&S  - Obtain OSH records for colonoscopy   - Consider anemia labs   - Please obtain AM labs early (3am) for pt to avoid procedure delays (+ ensure adequately resuscitated prior to EGD)  - Can consider trial of Imodium and/or Creon for patient's chronic post-prandial diarrhea  - Rest of care per primary team       Thank you for involving us in the care of this patient, please reach out if any further questions.     Moises Narvaez MD  Gastroenterology Fellow, PGY4    Available on Microsoft Teams  [phone] ([facility])  [phone] ([facility])  Please contact on call fellow weekdays after 5pm-7am and weekends: [phone]   71F Hx metastatic pancreatic adenocarcinoma (on chemo, last 6/17), CAD s/p STEMI (11/2020) s/p PCI (now on plavix only), H pylori s/p tx, HLD who was found to have anemia with Hb 7.0 prior to chemo tx -- given 1U transfusion and sent here for evaluation of anemia.     Impression:   #Anemia, thrombocytopenia: pt with normocytic anemia with Hb 7.0 at cancer center s/p 1U pRBCs with Hb at baseline now (9-10s). FOBT negative. HD stable. Reporting black stools x 1 year with intermittent BRBPR. Anemia + thrombocytopenia may likely be in setting of receiving chemo, no overt GI bleeding seen however will r/o UGI bleeding sources -- ddx PUD, erosive gastropathy, erosive esophagitis, angioectasias, malignancy. Pt with recent colonoscopy within 1 year   #H Pylori: s/p tx, no repeat testing to confirm eradication.    #Metastatic pancreatic adenocarcinoma      Recommendations:   - Plan for EGD to r/o UGI bleeding sources contributing to anemia tomorrow (will need to be medically optimized with PLT > 50)  - NPO MN for EGD tomorrow  - Can place on PPI BID for now  - Trend CBC, transfuse Hb < 7, PLT < 50   - Active T&S  - Obtain OSH records for colonoscopy   - Consider anemia labs   - Please obtain AM labs early (3am) for pt to avoid procedure delays (+ ensure adequately resuscitated prior to EGD)  - Can consider trial of Imodium and/or Creon for patient's chronic post-prandial diarrhea  - Rest of care per primary team       Thank you for involving us in the care of this patient, please reach out if any further questions.     Moises Narvaez MD  Gastroenterology Fellow, PGY4    Available on Microsoft Teams  [phone] ([facility])  [phone] ([facility])  Please contact on call fellow weekdays after 5pm-7am and weekends: [phone]

## 2021-07-01 NOTE — H&P ADULT - NEGATIVE ENMT SYMPTOMS
no ear pain/no tinnitus/no vertigo/no sinus symptoms/no nasal congestion/no nasal discharge/no nasal obstruction/no dysphagia

## 2021-07-01 NOTE — H&P ADULT - MUSCULOSKELETAL
detailed exam details… no joint swelling/no joint erythema/no joint warmth/no calf tenderness/normal strength

## 2021-07-01 NOTE — H&P ADULT - PROBLEM SELECTOR PLAN 2
Stable with H &H = 10/ 31.4 s/p 1 U PRBC.   Pt signed consent and placed in chart if transfusion needed again on this admission.  F/U CBC.   Gi to see pt later today. Stable with H &H = 10/ 31.4 s/p 1 U PRBC.   Pt signed consent and placed in chart if transfusion needed again on this admission.  F/U CBC.   Gi to see pt later today.  F/u VitD25 level, Stable with H &H = 10/ 31.4 s/p 1 U PRBC.   Pt signed consent and placed in chart if transfusion needed again on this admission.  F/U CBC.   Gi to see pt later today.  Fe= studies done post transfusion.   F/u VitD25 level,

## 2021-07-01 NOTE — H&P ADULT - NSHPSOCIALHISTORY_GEN_ALL_CORE
.  Lives with family.  Denies Nicotine, ETOH, illicit/ recreational drug use/ history.  Retired [employer] employee.  Mammogram: 2020 : Normal.  Colonoscopy 2021: Benign polyps.  PAP: Denies due to Hysterectomy.  Flu Vax: 2020.  COVID Vax: 4/2021.  PNA Vax: Denies.

## 2021-07-01 NOTE — PHYSICAL THERAPY INITIAL EVALUATION ADULT - DISCHARGE DISPOSITION, PT EVAL
Patient would benefit from being followed while admitted to maintain balance and strength for safe discharge home./home/no skilled PT needs

## 2021-07-01 NOTE — H&P ADULT - NSHPLABSRESULTS_GEN_ALL_CORE
EKG NSR @67b/ min, LAD, QT/ QTC= 422/ 445.   CXR : Diffuse bilateral pulmonary metastases. No superimposed focal consolidation.    TROP= 12  Occult Blood: negative.  PT/ INR/ PTT= 13/ 1.15/ 33  Fe+ = 59.  IBC= 284  TIBC= 343  Ferritin= Elevated @523   TSH= 3.23  Retic= 129.5  Retic A%= 3.8                         10.0   4.57  )-----------( 60       ( 30 Jun 2021 19:04 )             31.4   06-30    134<L>  |  105  |  17  ----------------------------<  75  5.1   |  16<L>  |  0.68    Ca    8.3<L>      30 Jun 2021 19:04  Phos  4.4     06-30  Mg     2.20     06-30    TPro  7.8  /  Alb  3.3  /  TBili  1.6<H>  /  DBili  x   /  AST  79<H>  /  ALT  24  /  AlkPhos  111  06-30

## 2021-07-01 NOTE — H&P ADULT - HISTORY OF PRESENT ILLNESS
71F, ambulate with a cane, history of CAD s/p stent 2020, Pancreatic adenocarcinoma dx 4/ 2020, on chemo twice a month, last treatment was 6/15/21, Hyperlipidemia, H. Pylori, treated 2020, The pt was scheduled to have her 7/1 chemo treatment @ [facility], but had blood work done showing a hemoglobin= 7.0. The pt received 1U PRBC and sent to the Ed for evaluation with GI Dr Lechuga. The pt admits to occasional BRBPR and melena during BM, last episode was 1 week ago, says this has been on going since her dx and treatment for H. Pylori 4/2020. Also endorses dry cough, nausea s/p chemo treatments, currently nausea free, 2 episodes of diarrhea, starting 6/29 and last episode 6/30, @ 12pm and generalized abdominal discomfort considered minimal.     In the Ed: Dr Servin: spoke to GI who will see patient 7/1 for potential further w/u. Discussed pt w/ heme onc, they will follow.  Occult blood: Negative.  H & H = 10/ 31.4  EKG NSR @67b/ min, LAD, QT/ QTC= 422/ 445.   CXR : Diffuse bilateral pulmonary metastases. No superimposed focal consolidation.  TROP= 12.   Vitals: T Max; 98.3 oral, HR= 73b/ min, BP = 150/ 78, R= 17b/ min, SPO2= 99% ra.  71F, ambulate with a cane, history of CAD s/p stent 2020, Pancreatic adenocarcinoma dx 4/ 2020, on chemo twice a month, last treatment was 6/15/21, Hyperlipidemia, H. Pylori, treated 2020, The pt was scheduled to have her 7/1 chemo treatment @ [facility], but had blood work done showing a hemoglobin= 7.0. The pt received 1U PRBC and sent to the Ed for evaluation with GI Dr Lechuga. The pt admits to occasional BRBPR and melena during BM, last episode was 1 week ago, says this has been on going since her dx and treatment for H. Pylori 4/2020. Also endorses dry cough, nausea s/p chemo treatments, currently nausea free, 2 episodes of diarrhea, starting 6/29 and last episode 6/30, @ 12pm and generalized abdominal discomfort considered minimal. Pt endorsed L sided chest discomfort to the ED earlier, but currently denies chest pain / discomfort. Denies HA, dizziness, falls, dysphagia, blurry vision, SOB, palpitations, nausea, vomit, constipation, abdominal pain, dysuria, generalized body aches, chills.  Home medications reconciled with the patient.     In the Ed: Dr Servin: spoke to GI who will see patient 7/1 for potential further w/u. Discussed pt w/ heme onc, they will follow.  Occult blood: Negative.  H & H = 10/ 31.4  EKG NSR @67b/ min, LAD, QT/ QTC= 422/ 445.   CXR : Diffuse bilateral pulmonary metastases. No superimposed focal consolidation.  TROP= 12.     Vitals: T Max; 98.3 oral, HR= 73b/ min, BP = 150/ 78, R= 17b/ min, SPO2= 99% ra.  71F, ambulate with a cane, history of CAD s/p stent 2020, Pancreatic adenocarcinoma dx 4/ 2020, on chemo twice a month, last treatment was 6/15/21, Hyperlipidemia, H. Pylori, treated 2020, The pt was scheduled to have her 7/1 chemo treatment @ [facility], but had blood work done showing a hemoglobin= 7.0. The pt received 1U PRBC and sent to the ED for evaluation with GI Dr Lechuga.  The pt admits to occasional BRBPR and melena during BM, last episode was 1 week ago, says this has been on going since her dx and treatment for H. Pylori 4/2020. Also endorses dry cough, nausea s/p chemo treatments, currently nausea free, 2 episodes of diarrhea, starting 6/29 and last episode 6/30, @ 12pm and generalized abdominal discomfort considered minimal. Pt endorsed L sided chest discomfort to the ED earlier, but currently denies chest pain / discomfort. Denies HA, dizziness, falls, dysphagia, blurry vision, SOB, palpitations, nausea, vomit, constipation, abdominal pain, dysuria, generalized body aches, chills.  Home medications reconciled with the patient.     In the Ed: Dr Servin: spoke to GI who will see patient 7/1 for potential further w/u. Discussed pt w/ heme onc, they will follow.  Occult blood: Negative.  H & H = 10/ 31.4  EKG NSR @67b/ min, LAD, QT/ QTC= 422/ 445.   CXR : Diffuse bilateral pulmonary metastases. No superimposed focal consolidation.  TROP= 12.     Vitals: T Max; 98.3 oral, HR= 73b/ min, BP = 150/ 78, R= 17b/ min, SPO2= 99% ra.

## 2021-07-01 NOTE — PROGRESS NOTE ADULT - PROBLEM SELECTOR PLAN 2
Stable with H &H = 10/ 31.4 s/p 1 U PRBC.   Pt signed consent and placed in chart if transfusion needed again on this admission.  F/U CBC.   Gi to see pt later today.  Fe= studies done post transfusion.   F/u VitD25 level,

## 2021-07-01 NOTE — PROGRESS NOTE ADULT - SUBJECTIVE AND OBJECTIVE BOX
PROGRESS NOTE:   Authored by Susan Segundo MD  Internal Medicine  Pager [phone]  Pager [phone]    Patient is a 71y old  Female who presents with a chief complaint of Low blood level x 1 day (01 Jul 2021 01:25)      SUBJECTIVE / OVERNIGHT EVENTS:    MEDICATIONS  (STANDING):  atorvastatin 40 milliGRAM(s) Oral at bedtime  clopidogrel Tablet 75 milliGRAM(s) Oral daily  enoxaparin Injectable 40 milliGRAM(s) SubCutaneous daily  erlotinib 100 milliGRAM(s) Oral <User Schedule>  lactobacillus acidophilus 1 Tablet(s) Oral three times a day  pancrelipase  (CREON 12,000 Lipase Units) 1 Capsule(s) Oral with breakfast  pantoprazole    Tablet 40 milliGRAM(s) Oral before breakfast  sodium chloride 0.9% lock flush 3 milliLiter(s) IV Push every 8 hours    MEDICATIONS  (PRN):  acetaminophen   Tablet .. 650 milliGRAM(s) Oral every 6 hours PRN Temp greater or equal to 38C (100.4F), Mild Pain (1 - 3), Moderate Pain (4 - 6)  aluminum hydroxide/magnesium hydroxide/simethicone Suspension 30 milliLiter(s) Oral every 4 hours PRN Dyspepsia  ondansetron Injectable 4 milliGRAM(s) IV Push every 6 hours PRN Nausea and/or Vomiting  polyethylene glycol 3350 17 Gram(s) Oral daily PRN Constipation      CAPILLARY BLOOD GLUCOSE        I&O's Summary      PHYSICAL EXAM:  Vital Signs Last 24 Hrs  T(C): 36.8 (01 Jul 2021 04:45), Max: 36.8 (30 Jun 2021 13:20)  T(F): 98.2 (01 Jul 2021 04:45), Max: 98.3 (30 Jun 2021 23:18)  HR: 69 (01 Jul 2021 04:45) (63 - 81)  BP: 137/76 (01 Jul 2021 04:45) (136/72 - 170/80)  BP(mean): --  RR: 17 (01 Jul 2021 04:45) (16 - 17)  SpO2: 96% (01 Jul 2021 04:45) (96% - 99%)    GENERAL: No acute distress, well-developed  HEAD:  Atraumatic, Normocephalic  EYES: EOMI, PERRLA, conjunctiva and sclera clear  NECK: Supple, no lymphadenopathy, no JVD  CHEST/LUNG: CTAB; No wheezes, rales, or rhonchi  HEART: Regular rate and rhythm; No murmurs, rubs, or gallops  ABDOMEN: Soft, non-tender, non-distended; normal bowel sounds, no organomegaly  EXTREMITIES:  2+ peripheral pulses b/l, No clubbing, cyanosis, or edema  NEUROLOGY: A&O x 3, no focal deficits  SKIN: No rashes or lesions    LABS:                        8.9    4.00  )-----------( x        ( 01 Jul 2021 07:30 )             28.2     06-30    134<L>  |  105  |  17  ----------------------------<  75  5.1   |  16<L>  |  0.68    Ca    8.3<L>      30 Jun 2021 19:04  Phos  4.4     06-30  Mg     2.20     06-30    TPro  7.8  /  Alb  3.3  /  TBili  1.6<H>  /  DBili  x   /  AST  79<H>  /  ALT  24  /  AlkPhos  111  06-30    PT/INR - ( 30 Jun 2021 19:04 )   PT: 13.0 sec;   INR: 1.15 ratio         PTT - ( 30 Jun 2021 19:04 )  PTT:33.0 sec  CARDIAC MARKERS ( 01 Jul 2021 01:57 )  x     / x     / 53 U/L / x     / 1.1 ng/mL            RADIOLOGY & ADDITIONAL TESTS:  Results Reviewed:   Imaging Personally Reviewed:  Electrocardiogram Personally Reviewed:    COORDINATION OF CARE:  Care Discussed with Consultants/Other Providers [Y/N]:  Prior or Outpatient Records Reviewed [Y/N]:

## 2021-07-01 NOTE — H&P ADULT - NSICDXPASTSURGICALHX_GEN_ALL_CORE_FT
PAST SURGICAL HISTORY:  H/O total hysterectomy     History of cardiac cath 1 stent. 4/2020. [facility]    Port-A-Cath in place 4/2020     PAST SURGICAL HISTORY:  H/O total hysterectomy     History of cardiac cath 1 stent. 4/2020. [facility]    Port-A-Cath in place 4/2020 ~ right chest

## 2021-07-01 NOTE — H&P ADULT - ASSESSMENT
71F  history of CAD s/p stent, Pancreatic adenocarcinoma, on chemo twice a month, last treatment was 6/15/21, Hyperlipidemia, H. Pylori, treated 2020, 71F  history of CAD s/p stent, Pancreatic adenocarcinoma, on chemo twice a month, Hyperlipidemia, H. Pylori, treated 2020, admitted for GI evaluation for chronic intermittent BRBPR and melena during BM also endorsed chest pain to the ED, but currently chest pain free at this time and diarrhea.  [ x ]  Lab studies reviewed  [ x ]  Radiology reviewed  [ x ]  Old records reviewed    71F  history of CAD s/p stent, Pancreatic adenocarcinoma, on chemo twice a month, Hyperlipidemia, H. Pylori, treated 2020, admitted for GI evaluation for chronic intermittent BRBPR and melena during BM also endorsed chest pain to the ED, but currently chest pain free at this time and diarrhea.

## 2021-07-01 NOTE — PROGRESS NOTE ADULT - ASSESSMENT
71F  history of CAD s/p stent, Pancreatic adenocarcinoma, on chemo twice a month, Hyperlipidemia, H. Pylori, treated 2020, admitted for GI evaluation for chronic intermittent BRBPR and melena during BM also endorsed chest pain to the ED, but currently chest pain free at this time and diarrhea.  71F  history of CAD s/p stent, Pancreatic adenocarcinoma, on gemzar, Hyperlipidemia, H. Pylori, treated 2020, admitted for GI evaluation for chronic intermittent BRBPR and melena during BM also endorsed chest pain to the ED, but currently chest pain free at this time and diarrhea.

## 2021-07-01 NOTE — CONSULT NOTE ADULT - ATTENDING COMMENTS
GI consulted for anemia.   Patient reports occasional dark stools and scant rectal bleeding. She also reports chronic post-prandial diarrhea for which she had a colonoscopy within the last year. Patient is currently HD stable with no evidence of overt bleeding. Her labs are notable for Hgb 7.0 on admission (10.0 -> 8.9 after receiving 1U pRBC) and thrombocytopenia 49-60.   Given recent chemotherapy, suspect that her anemia and thrombocytopenia are likely due to chemotherapy. She has no evidence at this time of significant overt (or occult) bleeding. Discussed that can perform EGD tomorrow to rule out PUD or ectasias as potential etiology for intermittent dark stools; she is amenable.   Would defer colonoscopy at this time given reports of recent colonoscopic evaluation. Please try to obtain these records as they are not available in [place]/[employer].

## 2021-07-01 NOTE — H&P ADULT - NSICDXFAMILYHX_GEN_ALL_CORE_FT
FAMILY HISTORY:  Family history of diabetes mellitus    Father  Still living? No  FH: CAD (coronary artery disease), Age at diagnosis: Age Unknown

## 2021-07-01 NOTE — CONSULT NOTE ADULT - ASSESSMENT
69 yo F w/ hx metastatic pancreatic adenocarcinoma dx Jun 2020, s/p 9 cycles of chemotherapy (FOLFIRINOX, ended with FOLFOX due to irinotecan intolerance; switched to gemcitabine/abraxane with onpro support; switched to gemcitabine / erlotinib on 2/3/21, last cycle on 6/17/21, due for chemo on 7/1/21) who was sent to the ER from [facility] due to new worsened anemia, concern for GI bleed. During office visit on 6/29/21, she was noted to have Hgb 7.0 in the setting of self-reported black stools; she was given 2 units of PRBCs at [facility] on 6/30/21 and instructed to come to the ER for further evaluation given concern for GI bleed. In the ER, she denies any symptoms aside from some diarrhea and L-sided chest discomfort when laying down. CBC at 7:00 PM on 6/30 shows Hgb 10.0, plt 60 (prev 49). Only anticoagulation is clopidogrel.    -GI input appreciated  -Monitor CBC+Diff  -Transfuse as needed  -Monitor Bili, currently 1.6, up from baseline of 0.8. Last CT scan is from 5/2021. Please repeat CT c/a/p with contrast.   -Supportive care, pain control, Nutrition, PT, DVT ppx  -Outpatient oncology f/u    Will follow. Please do not hesitate to call back with questions.     Lilia Goodwin MD  Medical Oncology Attending  C: [phone]

## 2021-07-01 NOTE — H&P ADULT - NSHPOUTPATIENTPROVIDERS_GEN_ALL_CORE
Oncology; Fabian Candelario.  Pulmonary: Rl Burden.  Cardio: Zachary Cantrell.  Dermatology: Tiana Wilson.  Gastro: Semaj Spence

## 2021-07-01 NOTE — PHYSICAL THERAPY INITIAL EVALUATION ADULT - PERTINENT HX OF CURRENT PROBLEM, REHAB EVAL
The pt received 1U PRBC and sent to the ED for evaluation with GI Dr Lechuga.  The pt admits to occasional BRBPR and melena during BM, last episode was 1 week ago, says this has been on going since her dx and treatment for H. Pylori 4/2020. Also endorses dry cough, nausea s/p chemo treatments, currently nausea free, 2 episodes of diarrhea, starting 6/29 and last episode 6/30, @ 12pm and generalized abdominal discomfort considered minimal. Pt endorsed L sided chest discomfort to the ED earlier

## 2021-07-01 NOTE — H&P ADULT - PROBLEM SELECTOR PLAN 7
Says she completed her treatment for H. Pylori with 21 days of medications Rx by GI.   Continue PPI.

## 2021-07-01 NOTE — H&P ADULT - PROBLEM SELECTOR PLAN 6
with diffuse bilateral pulmonary metastases seen on CXR.  Receiving chemo treatments twice a month.  last treatment 6/15. Missed 7/1 treatment due to low H & H.   Heme Onc called by Ed and will follow thew pt on this admission.

## 2021-07-01 NOTE — H&P ADULT - ATTENDING COMMENTS
71 year old female, with past history as noted above, being managed for Anemia; presented s/p transfusion of 1 unit pRBC from Oncology.  Had complaint of L-sided chest pain and fatigue.  When seen, patient complained of epigastric pain.  Hgb = 10.  Hgb prior to transfusion appeared to be 7, with value before then at 9.3 on 06/17/2021.  ECG without any acute changes = NSR at 67 bpm, QTc = 445.  Troponin = 12 --> 15.  GI consult in the AM.  May need cardiology consult in the AM.  F/U cultures - including for C-diff.  F/U hemoglobin trend.  Oncology consult for continued Pancreatic cancer management.    Ensure optimal hydration.  F/U AM lab-work.    All other management as prescribed.

## 2021-07-01 NOTE — PROGRESS NOTE ADULT - ATTENDING COMMENTS
71F history of CAD s/p mid RCA stent 2020, Pancreatic adenocarcinoma on PO erlotinib and gemzar last dose 6/17/21, Hyperlipidemia, H. Pylori, treated 2020 admitted for normocytic anemia ddx includes acute blood loss anemia 2/2 UGI bleed vs chemotherapy induced anemia. s/p 1u PRBC at [facility] [place], H&H stable. Pt w/ melena and intermittent BRBPR for a year. GI plans for EGD tomorrow, keep NPO after MN, PPI BID. course c/b chemotherapy induced thrombocytopenia. Will hold plavix and DVT ppx in s/o thrombocytopenia, and ?UGI bleed. Can resume SAPT once cleared by GI. Heme/onc following, resume oral erlotinib. 71F history of CAD s/p mid RCA stent 2020, Pancreatic adenocarcinoma on PO erlotinib and gemzar last dose 6/17/21, Hyperlipidemia, H. Pylori, treated 2020 admitted for normocytic anemia ddx includes acute blood loss anemia 2/2 UGI bleed vs chemotherapy induced anemia. [place] [facility] [place]. Pt w/ melena and intermittent BRBPR for a year. GI plans for EGD tomorrow, keep NPO after MN, PPI BID. course c/b chemotherapy induced thrombocytopenia. Will hold plavix and DVT ppx in s/o thrombocytopenia, and ?UGI bleed. Can resume SAPT once cleared by GI. Heme/onc following, resume oral erlotinib.

## 2021-07-01 NOTE — H&P ADULT - NSICDXPASTMEDICALHX_GEN_ALL_CORE_FT
PAST MEDICAL HISTORY:  CAD (coronary artery disease)     GERD (gastroesophageal reflux disease)     H. pylori infection     Hyperlipidemia     Pancreatic adenocarcinoma

## 2021-07-02 NOTE — ASSESSMENT
[Palliative] : Goals of care discussed with patient: Palliative [With Patient/Caregiver] : With Patient/Caregiver [Comfort Care Only] : comfort care only [DNR] : DNR [DNI] : DNI [FreeTextEntry1] : 70 year old F with H/O metastatic disease diagnosed in June 2020 and is s/p 9 cycles of chemotherapy which was initiated with FOLFIRINOX and ended with FOLFOX with discontinuation of irinotecan due to severe side effects requiring hospitalization. Now FOLFOX is discontinued due to the development of septic shock and transaminitis. The current plan is to initiate [place]/Abraxane+onpro. \par \par 1-Therapeutic: \par -----------------\par Pancreatic cancer with mets to lungs.\par Started patient on FOLFORINOX regimen.She received alternate cycles of FOLFOX with FOLFIRI- since she developed AE  .She was treated with 4 cycles of FOLFOX- She was hospitalized after her last chemo for sepsis-\par Switched her chemo to [place]/ Abraxane- every 2 weeks.\par Sustained MI on 11/24 - Had a stent placed RCA.\par Resumed chemo with dose reduced [place]/ Abraxane- 700/ 70 - Completed 6 cycles \par Switched chemo to gem- Tarceva since she developed Grade 2 Neuropathy - \par She had nasal bleeding and needed to be evaluated in the ER - Stopped taking ASA with epistaxis.\par Resumed  chemo with [place]/ Tarceva on 3/3/21-\par Switched Tarceav to every other day for better tolerability \par Today pts H & H was 7.0 - Will transfuse 2 units PRBC \par Will hold chemo on 7/1/21- Refer to GI for further w/u of anemia / black stools.\par \par \par 2-Diagnostic: \par ---------------\par Labs:Monthly CEA, , \par Will arrange for blood transfusion 2 unit PRBC \par Will need GI follow up - for GI bleed/ \par \par \par \par 3- Pharmacogenetics testing:\par ----------------------------------\par -Foundation Liquid- LIBAN , TMB 4 Mut/Mb \par \par 4- Procedures:\par -----------------\par - S/P  Port-A-Cath placement on 06/15/20\par - PORT CARE\par - EMLA CREAM PRESCRIBED\par \par 5-Supportive: \par -----------------\par \par Tarceva Rash- Started on Clindamycin Topical ointmnet \par MI - S/P Stent- On Plavix  Follows with Cardiology Dr Duff-\par Was advised to hold Metoprolol since she developed dizziness- \par Pancreatic insufficiency: CREON - [address] units (2 pills with each snack and 4 pills with each meal) for pancreatic replacement\par DVT (PE): High risk, close monitoring\par Antiemetic: Zofran PRN and Compazine PRN\par Emla cream: for port numbing before use\par Prevention of mouth sores: Biotene mouthwash\par Skin reactions: [place] smooth cream and Vitamin B 6 100 mg BID for prevention of HFS\par Diarrhea: The usual dose of Imodium is 4 milligrams (mg) (2 capsules) after the first loose bowel movement, and 2 mg (1 capsule) after each loose bowel movement after the first dose has been taken. No more than 16 mg (8 capsules) should be taken in any twenty-four-hour period. \par \par \par 6- RTC:  \par --------\par \par F/u in 2 weeks\par f/u Cardiology \par F/U GI for GI bleed \par \par \par  [AdvancecareDate] : 04/14/21

## 2021-07-02 NOTE — PROGRESS NOTE ADULT - PROBLEM SELECTOR PLAN 1
EKG NSR with no ST or T wave changes.  #1 Trop= 12.  Pt5 currently chest pain free.   Cardiac monitor  F/U #2 CE, Lipid panel.  Give O2, Plavix. p/w hb 7, s/p 1 U prbc, stable, now at around bl ~ 9 - 10. likely 2/2 GIB with component of ACD.   - consented, T&S  - await EGD results per GI  - trend H&H  - transfuse Hb <7, plt <50 p/w hb 7, s/p 1 U prbc, stable, now at around bl ~ 9 - 10. likely 2/2 GIB with component of ACD.   - consented, T&S  - VitD OH 19.1  - await EGD results per GI  - trend H&H  - transfuse Hb <7, plt <50

## 2021-07-02 NOTE — HISTORY OF PRESENT ILLNESS
[Disease: _____________________] : Disease: [unfilled] [M: ___] : M[unfilled] [AJCC Stage: ____] : AJCC Stage: [unfilled] [3] : 3, Severe [4] : 4, Life-threatening [2] : 2, Moderate [Date: ____________] : Patient's last distress assessment performed on [unfilled]. [4 - Distress Level] : Distress Level: 4 [de-identified] : Diagnosis: Pancreatic adenocarcinoma \par \par Genetic Testing: Foundation Liquid- LIBAN , TMB 4 Mut/Mb \par \par Other Current Medical Problems: Arthritis/ HLD \par \par Treatment History:\par \par Cycle # 1 20- FOLFORINOX- 5 FU 2400 mg/ kg /  mg/ m 2/ Irinotecan 150 mg/ m 2 / Oxaliplatin 85 mg/ m2\par Cycle # 2 20- FOLFORINOX- 5 FU 2400 mg/ kg /  mg/ m 2/ Irinotecan 140 mg/ m 2/ Oxaliplatin 85 mg/ m2\par Cycle # 3- 07/15/20- Skipped Irinotecan / Oxaliplatin-  (ANC 1.4) - 5 FU 2400 mg/ kg /  mg/ m 2\par Cycle #4 - 20- FOLFOX-  5 FU 2000 mg/ kg /  mg/ m 2/ Oxaliplatin 85 mg/ m2-----------------Tolerated well without Irinotecan \par \par Restaging Scans---------------Stable disease \par Cycle # 5 - 20- FOLFOX- 5 FU 2000 mg/ kg /  mg/ m 2/ Oxaliplatin 85 mg/ m2--------\par Cycle # 6- 20-FOLFIRI-  5 FU 2000 mg/ kg /  mg/ m 2/ Irinotecan 150 mg- Held oxaliplatin \par \par Hospitalized at [facility] for adverse reaction from Irinotecan- - -------------Will hold IRINOTECAN in future- \par \par Cycle # 7 20 - 5 FU 2000 mg/  mg/ m2 -----------Oxali was held since ANC was 1.29 \par Cycle #  -20- 5 FU 2000 mg/  mg/ m2 / Oxaliplatin 85 mg/ m2 \par \par Restaging Scans- Stable disease \par \par Cycle #9 -10/6/20-  5 FU 2000 mg/  mg/ m2 / Oxaliplatin 85 mg/ m2 \par \par Hospitalized for sepsis after chemotherapy \par \par Cycle #1 10/27/20 Gemcitabine 1000 mg/m2 + Abraxane 100 mg/m2\par Cycle # 2 20- Gemcitabine 1000 mg/m2 + Abraxane 100 mg/m2\par Cycle # 3 20-  Gemcitabine 1000 mg/m2 + Abraxane 100 mg/m2\par \par Hospitalized for Acute MI - Had a cardiac stent - 20\par \par Restaging scans- 12/15/20Interval regression in hepatic mass/ mediastinal and retroperitoneal lymph nodes\par \par Cycle # 4 20- Gemcitabine 700 mg/ m2 and Abraxane 70 mg/ m2\par Cycle # 5 21-  Gemcitabine 700 mg/ m2 and Abraxane 70 mg/ m2\par Cycle # 6 21- Gemcitabine 700 mg/ m2 and Abraxane 70 mg/ m2\par \par Cycle # 1 - 2/3/21- Gemcitabine 700 mg/m 2 / Erlotinib 100 mg PO daily  \par Cycle # 2 - 21-  Gemcitabine 700 mg/m 2 / Erlotinib 100 mg PO daily\par \par Chemo on hold since pt had nasal bleeding -----Pt stopped taking Tarceva on 21- sec to epistaxis and stye on the eye,\par \par 21- Pt received 1 unit of PRBC for H & H at 7.5/25.7\par \par Cycle # 3- 3/3/21- Gemcitabine 700 mg/ m2 / Erlotinib 100 mg PO daily \par Restaging Scans- Stable disease \par \par Cycle # 4- 3/17/21- Gemcitabine 700 mg/ m2 / Erlotinib 100 mg PO daily \par Cycle # 5- 21- Gemcitabine 700 mg/ m2 / Erlotinib 100 mg PO daily \par Cycle # 6- 21- Gemcitabine 700 mg/ m2 / Erlotinib 100 mg PO daily\par Cycle #7- 21- Gemcitabine 700 mg/ m2 / Erlotinib 100 mg PO daily\par \par Restaging scans- 5/10/21-\par \par Cycle 8- 21-Gemcitabine 700 mg/ m2 / Erlotinib 100 mg PO daily\par Cycle 9- 21- Gemcitabine 700 mg/ m2 / Erlotinib 100 mg PO daily\par \par Transfuse 1 unit PRBC - 21\par \par Cycle # 10- 21- Gemcitabine 700 mg/ m2/ Erlotinib 10 mg PO every other day . \par Cycle # 21- Gemcitabine 700 mg/ m2/ Erlotinib 10 mg PO every other day . \par \par Oncological History :\par Ms. Griselda Corona is a 70 year old female who presents for initial consultation in our [facility] for a new diagnosis of pancreas adenocarcinoma. \par Patient states she had gastrointestinal symptoms dating back to 2019, including discomfort, bloating, diarrhea followed by small amounts of blood per rectum. She eventually went to GI doctor Dr Florian Pace.  She had a Colonoscopy in  that showed small ulcer. She went the ER at [facility] 3/10/20 for abdominal pain.  CT of her abdomen was performed and showed 3.2 x 3.5 cm hypoenhancing mass in the pancreatic head/ body concerning for malignancy. Mass results in severe compression of SMV. Retroperitoneal lymphadenopathy concerning for metastatic disease with multiple nodules in lung base concerning for metastatic disease.\par \par She was referred  to Dr Bernabe for EUS, which was performed 2020, significant for an ill-defined, heterogeneous, hypoechoic lesion seen in the head of pancreas. The lesion abutted but did not invade the SMV. The main pancreatic duct was dilated and the parenchyma was heterogeneous within the pancreatic body/tail. Fine needle biopsy was performed.\par \par Pathology: Gastric mucosa positive for H pylori. Pancreas mass: Invasive adenocarcinoma.\par \par Patient is taking medication regimen for the H.Pylori infection, otherwise not taking medications except for multivitamin supplement. \par \par \par FHX: No hx of Cancer \par \par Socail HX: Lives with her children, , Non smoker, no alcohol use\par \par PSX: No surgeries in past \par \par \par Disease: Pancreatic adenocarcinoma \par Pathology:\par \par AJCC Stage : Stage 4 \par \par Tumor Markers: CA 19.9 was 155 \par \par 6/10/20- Ms. Desouza is seen in [facility] clinic. She states he has abdominal pain 7-8/10 and gets relief with Tylenol. She has had 2 lbs weight loss- and appetite was decreased .\par Ct abd/ pelvis/ chest- Large pancreatic mass suspicious for adenocarcinoma, with marked arterial \par and venous involvement. Numerous bilateral pulmonary nodules new since 2018 and suspicious for metastatic disease. Retroperitoneal and peripancreatic lymphadenopathy. \par Omental nodule raising the possibility of peritoneal carcinomatosis. \par \par 20- Ms. Desouza is seen for follow up. She had the port placed earlier last week and is scheduled for her chemotherapy today . We discussed the chemo side effects and what to expect from chemotherapy- SE- Drug sheet was provided Risks/ benefits and alternatives of chemotherapy were discussed and included but not limited to fatigue,low blood counts, nausea/vomiting, skin reactions, hair thinning, possible blood transfusion requirement,increased risk of infection,neuropathy. Patient agreed to above and informed consent was signed.PET Scan- done- on 20-FDG-avid mass in pancreatic body and neck corresponds to known malignancy. Hypermetabolic left periaortic lymphadenopathy is compatible with metastatic disease.  Multiple bilateral pulmonary nodules, not significantly changed as compared to CT dated 2020, compatible with metastatic disease. \par \par 20 - Here for Cycle 2 \par 1- Developed G3 fatigue following C1 , and had to remain in bed for at least 4 days; son was cooking, she was able to go to bathroom and shower by herself\par 2- Also had decreased appetite and G1 nausea but no vomiting\par 3- Declines neuropathy\par 4- Denies diarrhea\par 5- No fever\par 6- No sxs suggestive of COVID-19\par 7- lost weight upto 10 lbs\par 8- also I am aware of her reaction to IRIN during infusion\par \par \par 20- Recieved a call from patient stating she had episodes of vomiting yesterday. She went out yesterday in the summer sun  and had some food from outside and she has been throwing up. She states she lost weight >30 lbs- She states the Creon has been causing a lot of abdominal pain - She has been experiencing some abdominal pain but is afraid to use the Oxycodone or the medical marijuana. \par She lives with her 2 sons and her sister in law has been helping her intermittently - However she needs a lot of reinstatement of instructions- Explained to her that she needs to take the Pain meds when needed and the pain is not from Creon. \par She is requesting for a Home COVID swab - will arrange for the same. \par \par 20- Pt was scheduled for C#3 with FOLFOX instead of FOLFORINOX to identify the culprit for the reaction. But her ANC was 1.4 so held Oxaliplatin and gave only 5 FU and LCV. She tolerated this chemo better did not have any reaction like previously. She denies N/V/D. She has been experiencing spasms in her fingers after the previous chemotherapy with FOLFORINOX- Her tumor markers CEA was rising and is concerning from 0.7- 3.6- 7.9.Patient states she was diagnosed with LUPUS by her PCP in March Unclear if she is on any treatment for the same. Will get the medical records from her PCP.\par \par 20- Pt is scheduled for C#4 with FOLFOX instead of FOLFORINOX to identify the culprit for the reaction. She tolerated the last chemotherapy well. She has been eating better and denies N/V. Today she tolerated the chemo with FOLFOX well - No reaction. next cycle we plan to give FOLFIRI\par \par 8/10//20- Pt is seen for TEB visit after she had the restaging scans done- She had CT scan done that showed- Metastatic pancreatic adenocarcinoma. Stable disease. She tolerated the chemo well without the irinotecan. we answered all her questions and concerns. Her son was not in the telephone conference. Dr Paul left a message with the son on his cell phone. Plan is to continue with the chemo \par \par 20- MsClaudia Desouza is seen for follow up- She tolerated the last chemo well- denies N/V/D. She denies any neuropathy - Denies pain - does not use mediacl marijuana- \par Explained to her that she will receive FOLFIRI this cycle and to inform if she develops any reaction to the same. \par \par 09/3/20- Ms Desouza has a telephone visit with me. She was hospitalized at [facility] after her last chemo- when she came for the disconnect- She was hospitalized at [facility] from 20 to 20- When she came for the disconnect her BP was low and she was bradycardiac and after a fluid challenge she still did not recover- She was sent to hospital for further work up where Blood cx/ Ua/ Urine cx was negative and it was deemed that she was dehydrated and no infectious process.\par \par Spoke with the patient who states she had a rough time with this chemotherapy and she is feeling better now- however she has been having episodes of diarrhea and nausea. She has been taking the anti nausea meds and that has helped her. She feels that she dis better with FOLFOX - Discussed with Dr Paul and plan is to hold off on nay future doses of Irinotecan.\par \par 20- Ms Deo is seen for C #7 - Her ANC was 1.23- Discussed with Dr Paul and plan to hold off on Oxaliplatin - Pt will get 5 FU and LCV - She will receive onpro on disconnect day. I explained to her hat she will not get any mores doses of Irinotecan and to keep a close eye on any symptoms she develop post chemotherapy. She denies pain/ N/V/D. \par \par 20- Ms. Desouza is seen in office for follow up on C# 8. She denies n/v/dizziness/ neuropathy . Her appetite is  good, she has good energy level and is tolerating chemo well. She will get her restaging scans done after this round of chemo. Advised she can bring her son in for the next office appointment with Dr Paul to discuss the results of the scan. \par \par 10/6/20-Ms. Desouza is seen in office for follow up after her restaging scans- SHe had restaging scans on 10/1/20 that showed metastatic pancreatic cancer with no interval change since 20 in pancreatic mass, lung nodules, and adenopathy / peritoneal nodules.  She has been tolerating this chemo regimen  well. today she presents to office with her son - Davina . We discussed that we can entertain the idea of RT aftre she completes 12 cycles of Chemo.She complainst that she has been having episodes where she feels her head is usually hot and she feels sweaty. 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 \par \par 10/22/20-Patient was admitted at [facility] for sepsis and needed vasopressors for pressure support. She was in the ICU for a short time - She received IV abx for Likely bacterial pneumonia. While she was in the hospital her LFTS were still elevated- Unclear etiology for elevated LFTs- Likely chemo related vs Antibiotics- Pt is feeling better after she is discharged home. plan  is to omit FOLFOX in future and plan for Callahan Abraxane with Onpro. Will recheck labs to confirm her LFTs are downtrending. Plan to start her on gem- Abraxane if LFTS normalize. \par Her scans in the hospital showed- Metastatic pancreatic cancer involving the retroperitoneal lymph nodes and visualized bilateral lungs. Stable disease compared to 10/1/2020. \par Small volume ascites, new since 10/1/2020. \par \par Drug sheet was provided Risks/ benefits and alternatives of chemotherapy were discussed and included but not limited to fatigue,low blood counts, nausea/vomiting, skin reactions, hair thinning, possible blood transfusion requirement,increased risk of infection,neuropathy. Patient agreed to above and informed consent was signed.\par \par 10/27/2020: Pt is here to start a new chemo regimen with Callahan/Abraxane with onpro. Irinotecan was discontinued in August due to severe fatigue, hypotension resistant to fluid challenge and bradycardia. Later patient developed septic shock post FOLFOX chemo and is hence discontinued. LFTs are normal with AST 38, ALT 31, ALP elevated 170, normal T. Rah 0.2mg/dl. Pt c/o mild fatigue and does not have any other complains. Denies any F/C, N/V,D/P, change in appetite or weight loss. \par Discussed the chemo regimen with Callahan/ Abraxane- answered all questions and concerns.\par \par 20; Ms Desouza is seen as follow up for C# 2. She complains of fatigue which is more prominent this cycle- She denies fevers. She does complain of nausea after chemo . She had some Constipation that was resolved with Miralax. Her appetite is diminished  and sleep is disturbed- Unable to sleep through the night. Will order Remeron 15 mg Q HS for appetite and sleep.\par \par 20; Ms Desouza is seen as follow up for C#3. She feels well. She feels this chemo is more tolerable. She has fatigue that lasts for 1-2 days after chemo and the week she is off chemo - she feels well. She does experience some nausea  and constipation occasionally that is relived by anti nausea meds and MiraLAX. She denies  neuropathy.\par Denies pain or discomfort. Filled out the paperwork for FMLA- for son Davina. she notices darkening of her toes -on the right foot - The first second and fourth digit noted.\par \par 20; Ms Desouza is seen as follow up - In the interim she was hospitalized at [facility] on 20 - for Acute MI and had a cardiac stent placed. She is scheduled to see Dr Toussaint for follow up next week. She is now on Brilanta/ Metoprolol/ Atorvastatin.\par She feels very tired and feels wiped out - She denies N/V/D/ Constipation. Her abdomen is distended- She has some lower back pain  which is new - Will plan to schedule a CT scan A/P/C prior to the next chemotherapy- Will arrange for a TEB visit with Dr Paul after completion of scans- Her f/u appt with Dr Toussaint is on 20.\par She complains of feeling dizzy at times- Does not have a BP machine at home- \par \par 12/15/20:  Ms Desouza is seen as TEB viist to discuss the scans results. She had the restaging scans on  that showed interval regression in hepatic mass , mediastinal and retroperitoneal lymph nodes. Interval stability in size and number of pulm metastases. Overall 39% regression of tumor based on RECIST . She has a follow up with cardiologist following week. \par \par 20:  Ms Desouza is seen for follow up .In the interim she was seen by Cardiology - Dr Toussaint- \par She endorses she has episodes of light headed ness- Today her BP was in low 90's . She states she feels fatigued from chemotherapy - We discussed that we dose reduced the chemo this cycle- She also mentioned that she has been having some shortness of breath even at rest- Emailed Cardiology Dr Toussaint and think Brillanta can cause SOB- Plan is to switch Brillanta to Plavix- \par \par 21:  Ms Desouza is seen for follow up .In the interim she was seen by Cardiology - Dr Toussaint- Brilanta was switched to Plavix and her shortness of breath has improved ever since- She feels well - She does experience some numbness and pins and needles feeling on her shins b/l .Abraxane was reduced to 70 mg/m2 - Will discuss with Dr Paul if dose needs to be further reduced. Neuropathy is grade 1 . \par \par 21:  Ms Desouza is seen for follow up .In the interim she was hospitalized at [facility] for UTI  from 21 to 1/10/21 - She was treated with IV  Abx for UTI  and was discharged home. She has been experiencing some numbness in her lower legs bilaterally - she is on Abraxane 70 mg - She also mentioned that since her discharge from the hospital she stopped taking Metoprolol as she was advised to do so. She has a f/u appt with cardiology Dr Toussaint- Advised her to f/u with him in regards to meds. \par \par 2/3/21:  Ms Desouza is seen for follow up .In the interim she was evaluated by cardiology and metoprolol is on hold for now-  Since she has been having vague neuropathy symptoms with numbness in her legs-we discussed on stopping the Abraxane and starting her on Tarceva with this cycle. \par  Drug sheet was provided Risks/ benefits and alternatives of chemotherapy were discussed and included but not limited to fatigue,low blood counts, nausea/vomiting, skin reactions, hair thinning, possible blood transfusion requirement,increased risk of infection,neuropathy. Patient agreed to above and informed consent was signed.\par \par CY inhibitors or a combined CY and CY inhibitor increase erlotinib plasma concentrations. Avoid concomitant use. If not possible, reduce TARCEVA dose. \par ? CY inducers decrease erlotinib plasma concentrations. Avoid concomitant use. If not possible, increase TARCEVA dose. \par ? Cigarette smoking and CY inducers decrease erlotinib plasma concentrations. Avoid concomitant use. If not possible, increase\par TARCEVA dose.\par ? Drugs that increase gastric pH decrease erlotinib plasma concentrations.\par For proton pump inhibitors avoid concomitant use if possible. \par For H-2 receptor antagonists, take TARCEVA 10 hours after H-2 receptor antagonist\par \par \par 21:  Ms Desouza is seen for follow up .In the interim she was started on Tarceva. Pt had nose bleed, ecchymosis on her thigh. She also developed stye on her eye. Suggested her to use nasal spray to prevent further nose bleed. Denies any rash. Will do exam when she comes for chemo.\par \par 21:  Ms Desouza is seen for follow up .In the interim she was hospitalized for nasal bleed- she had a nasal rocket that was placed since her bleed. She also developed a stye and had stopped taking Tarceva on 21- She has numbness on b/l feet - 7/10. unsteady on her feet - She is reluctant to start any medications for neuropathy \par She would like to wait and see how she fels.\par \par She had nasal bleeding which stopped now- however today her H & h was 7.5 - Will transfuse 1 unit of PRBC.\par Plan to resume chemo on 3/3/21.\par \par 3/3/21:  Ms Desouza is seen for follow up .In the interim she received 1 unit of PRBC last week and her H & H is 10.3 today. \par She saw cardiology last week and she stopped taking ASA since she had episodes of epistaxis- \par I explained to pt that since she has had a good response to chemo - she should restart taking the Tarceva pills. since she developed blepharitis and acne like rash- I refered her to dermatology. Plan is to resume the erlotinib today and resume chemo with gemzar- She will also get Restaging CT scans before her next viist with DENISE Robert. \par Gave her the MOLST form and advised her to discuss with her family and return it to me next week after she fills out her wishes. \par \par 3/16/21:  Ms Desouza is seen for follow up .In the interim she had her restaging scans that showed- Slight interval decrease in size of the known pancreatic head and neck mass.\par No significant interval change in small right hilar and mediastinal lymph nodes or peripancreatic and retroperitoneal lymph nodes with reference nodes as described above.\par Innumerable bilateral pulmonary nodules which are largely without significant interval change although several pulmonary nodules have shown interval minimal increase in size including reference pulmonary nodules above.\par \par Recieved covid vaccine moderna on 3/12- had some low grade fever/ nausea/ pain on the site -\par Otherwise doing well- No acne eruption noted- \par Nasal bleeding under control- She is following cardiology and ENT as well. Will continue with the same chemo regimen. \par \par 3/31/21:  Ms Desouza is seen for follow up . She is scheduled for second dose of COVID on 21- She has developed some tarceva related acnes on her face - She is applying the clindamycin topical ointment- \par Allergies She has been having some allergies - taking Claritin for the same- Her Appetite is  low but she is pushing to eat more frequent meals- She has not lost weight - \par She has not filled the MOLST form yet- Advised her to fill it sooner.\par \par 21:  Ms Desouza is seen for follow up . She received second dose of  Moderna COVID on 21- Her Tarceva related rash is improved and she is doing well. She will need to follow up with dermatology as well.  Denies N/V/D.\par Has good activity level. She did fill out the MOLST form- Copy made and gave the originals to the patient.\par \par 21:  Ms Desouza is seen for follow up .She has b/l blepheritis likely tarceva induced. Will place the patient on Tobramycin eye gtts and reassess. \par Her skin rash has improved. She denies N/V/D.Tolerating chemo well. she is active and able to carry ADLS. Denies neuropathy .\par \par 21:  Ms Desouza is seen for follow up .In the interim she had restgaing scans- that showed Pancreatic mass with pulmonary metastatic disease not significantly changed from prior imaging 3/15/2021. She received her second dose of the COVID vaccine last week after her chemotherapy . She had fevers and felt weak and felt like she had "bronchitis" . She said she took some natural remedy medications with Tanika/ onions/ garlic and that made her feel better. Today she feels better. She thinks she had a bad reaction to the vaccine itself. \par Her lesions on her eyes are improved and she does not have any rash in her face now. she is followed closely by dermatology- Plan to continue with the same treatment.\par \par 21:  Ms Desouza is seen for follow up . Today her h& h is 8.4. She denies any s/s of bleeding. She had received her second dose of the COVID vaccine x 3 weeks ago but she feels ever since she got the vaccine her taste buds have been affected . She denies N/V/D.\par She states she feels weaker but able to carry ADLs.\par Will schedule for a unit of PRBC transfusion\par Will also check COVID PCR\par \par 21-  Ms Desouza is seen for follow up . She has been having some weakness and occasional cough associated with shortness of breath. She believes the cardiac medications are causing these side effects. Advised that she should follow up with Cardiology ASAP. She has been taking Tarceva every other day and is tolerating that regimen better than daily. She has been coughing more phlegm - She thinks its her allergies as well.\par I also suggested she should follow up with  pulmonary since she has lung mets as well. She denies N/V/D/\par Will resume Gemzar with tarceva. \par \par  [de-identified] : adenocarcinoma [de-identified] : 6/28/21-  Ms Desouza is seen for follow up .She states he has been feeling very weak lately and she noticed that she had some black stools.Today her CBC showed Hb 7.0 / Platelets of 48. We will plan to hold off on her chemo for 7/1/21- \par Will plan to transfuse 2 units PRBC on 6/30- \par She will also need to follow up with GI- Will refer to GI for further work up. \par \par \par \par  [FreeTextEntry3] : Hypotension, bradycardia  [FreeTextEntry4] : 6/17/20 [FreeTextEntry5] : IRIN [FreeTextEntry2] : x 4 daYS [FreeTextEntry8] : Sepsis, transaminitis  [de-identified] : OXaliplatin  [de-identified] : neuropathy  [de-identified] : Abraxane

## 2021-07-02 NOTE — PROGRESS NOTE ADULT - PROBLEM SELECTOR PLAN 2
Stable with H &H = 10/ 31.4 s/p 1 U PRBC.   Pt signed consent and placed in chart if transfusion needed again on this admission.  F/U CBC.   Gi to see pt later today.  Fe= studies done post transfusion.   F/u VitD25 level, - Reports black tarry diarrhea with red mike since 2019. Last episode was 6/30 @ 12PM, HDS.  -     Lactobacillus po TID.  Monitor Lytes. - Reports black tarry diarrhea with red mike since 2019. Last episode was 6/30 @ 12PM, HDS.  - Lactobacillus po TID.  - daily chemistires

## 2021-07-02 NOTE — PHYSICAL EXAM
[Restricted in physically strenuous activity but ambulatory and able to carry out work of a light or sedentary nature] : Status 1- Restricted in physically strenuous activity but ambulatory and able to carry out work of a light or sedentary nature, e.g., light house work, office work [Normal] : grossly intact [Ulcers] : no ulcers [Mucositis] : no mucositis [Thrush] : no thrush [de-identified] : blepharitis resolved-  [de-identified] : cough with active phelgm -  [de-identified] : Ocaasional cramps, constipation relieved.  [de-identified] : Numbness in lower legs / Pins and needles in both lower legs.

## 2021-07-02 NOTE — PROGRESS NOTE ADULT - PROBLEM SELECTOR PLAN 6
with diffuse bilateral pulmonary metastases seen on CXR.  Receiving chemo treatments twice a month.  last treatment 6/15. Missed 7/1 treatment due to low H & H.   Heme Onc called by Ed and will follow thew pt on this admission. Says she completed her treatment for H. Pylori with 21 days of medications Rx by GI.   Continue PPI. - Says she completed her treatment for H. Pylori with 21 days of medications Rx by GI.   - Continue PPI  - await EGD bx results for h pylori infection

## 2021-07-02 NOTE — PROGRESS NOTE ADULT - ASSESSMENT
71F  history of CAD s/p stent, Pancreatic adenocarcinoma, on gemzar, Hyperlipidemia, H. Pylori, treated 2020, admitted for GI evaluation for chronic intermittent BRBPR and melena during BM also endorsed chest pain to the ED, but currently chest pain free at this time and diarrhea.

## 2021-07-02 NOTE — PROGRESS NOTE ADULT - PROBLEM SELECTOR PLAN 7
Says she completed her treatment for H. Pylori with 21 days of medications Rx by GI.   Continue PPI. DVT ppx: Lovenox.  Diet: regular

## 2021-07-02 NOTE — PROGRESS NOTE ADULT - SUBJECTIVE AND OBJECTIVE BOX
PROGRESS NOTE:   Authored by Susan Segundo MD  Internal Medicine  Pager BRANDON [phone]  Pager [phone]    Patient is a 71y old  Female who presents with a chief complaint of Low blood level x 1 day (01 Jul 2021 12:33)      SUBJECTIVE / OVERNIGHT EVENTS:    MEDICATIONS  (STANDING):  atorvastatin 40 milliGRAM(s) Oral at bedtime  erlotinib 100 milliGRAM(s) Oral <User Schedule>  lactobacillus acidophilus 1 Tablet(s) Oral three times a day  pancrelipase  (CREON 12,000 Lipase Units) 1 Capsule(s) Oral with breakfast  pantoprazole  Injectable 40 milliGRAM(s) IV Push every 12 hours  sodium chloride 0.9% lock flush 3 milliLiter(s) IV Push every 8 hours    MEDICATIONS  (PRN):  acetaminophen   Tablet .. 650 milliGRAM(s) Oral every 6 hours PRN Temp greater or equal to 38C (100.4F), Mild Pain (1 - 3), Moderate Pain (4 - 6)  aluminum hydroxide/magnesium hydroxide/simethicone Suspension 30 milliLiter(s) Oral every 4 hours PRN Dyspepsia  ondansetron Injectable 4 milliGRAM(s) IV Push every 6 hours PRN Nausea and/or Vomiting  polyethylene glycol 3350 17 Gram(s) Oral daily PRN Constipation      CAPILLARY BLOOD GLUCOSE        I&O's Summary    01 Jul 2021 07:01  -  02 Jul 2021 07:00  --------------------------------------------------------  IN: 480 mL / OUT: 0 mL / NET: 480 mL        PHYSICAL EXAM:  Vital Signs Last 24 Hrs  T(C): 36.7 (02 Jul 2021 07:51), Max: 37 (01 Jul 2021 21:26)  T(F): 98.1 (02 Jul 2021 07:51), Max: 98.6 (01 Jul 2021 21:26)  HR: 70 (02 Jul 2021 07:51) (70 - 72)  BP: 139/76 (02 Jul 2021 07:51) (136/74 - 159/76)  BP(mean): --  RR: 10 (02 Jul 2021 07:51) (10 - 18)  SpO2: 97% (02 Jul 2021 07:51) (97% - 100%)    GENERAL: No acute distress, well-developed  HEAD:  Atraumatic, Normocephalic  EYES: EOMI, PERRLA, conjunctiva and sclera clear  NECK: Supple, no lymphadenopathy, no JVD  CHEST/LUNG: CTAB; No wheezes, rales, or rhonchi  HEART: Regular rate and rhythm; No murmurs, rubs, or gallops  ABDOMEN: Soft, non-tender, non-distended; normal bowel sounds, no organomegaly  EXTREMITIES:  2+ peripheral pulses b/l, No clubbing, cyanosis, or edema  NEUROLOGY: A&O x 3, no focal deficits  SKIN: No rashes or lesions    LABS:                        8.4    4.66  )-----------( 75       ( 02 Jul 2021 04:40 )             25.6     07-02    135  |  107  |  25<H>  ----------------------------<  97  3.5   |  17<L>  |  0.82    Ca    8.1<L>      02 Jul 2021 04:40  Phos  3.9     07-02  Mg     2.00     07-02    TPro  6.7  /  Alb  3.1<L>  /  TBili  1.0  /  DBili  x   /  AST  39<H>  /  ALT  21  /  AlkPhos  109  07-02    PT/INR - ( 02 Jul 2021 04:40 )   PT: 12.8 sec;   INR: 1.12 ratio         PTT - ( 02 Jul 2021 04:40 )  PTT:35.3 sec  CARDIAC MARKERS ( 01 Jul 2021 01:57 )  x     / x     / 53 U/L / x     / 1.1 ng/mL            RADIOLOGY & ADDITIONAL TESTS:  Results Reviewed:   Imaging Personally Reviewed:  Electrocardiogram Personally Reviewed:    COORDINATION OF CARE:  Care Discussed with Consultants/Other Providers [Y/N]:  Prior or Outpatient Records Reviewed [Y/N]:   PROGRESS NOTE:   Authored by Susan Segundo MD  Internal Medicine  Pager BRANDON [phone]  Pager [phone]    Patient is a 71y old  Female who presents with a chief complaint of Low blood level x 1 day (01 Jul 2021 12:33)      SUBJECTIVE / OVERNIGHT EVENTS: No acute events overnight. Getting an EGD this morning. Curious about the h. pylori test on the EGD, has been treated for it in the past and is wondering if she still has it. Otherwise slept well.    MEDICATIONS  (STANDING):  atorvastatin 40 milliGRAM(s) Oral at bedtime  erlotinib 100 milliGRAM(s) Oral <User Schedule>  lactobacillus acidophilus 1 Tablet(s) Oral three times a day  pancrelipase  (CREON 12,000 Lipase Units) 1 Capsule(s) Oral with breakfast  pantoprazole  Injectable 40 milliGRAM(s) IV Push every 12 hours  sodium chloride 0.9% lock flush 3 milliLiter(s) IV Push every 8 hours    MEDICATIONS  (PRN):  acetaminophen   Tablet .. 650 milliGRAM(s) Oral every 6 hours PRN Temp greater or equal to 38C (100.4F), Mild Pain (1 - 3), Moderate Pain (4 - 6)  aluminum hydroxide/magnesium hydroxide/simethicone Suspension 30 milliLiter(s) Oral every 4 hours PRN Dyspepsia  ondansetron Injectable 4 milliGRAM(s) IV Push every 6 hours PRN Nausea and/or Vomiting  polyethylene glycol 3350 17 Gram(s) Oral daily PRN Constipation      CAPILLARY BLOOD GLUCOSE        I&O's Summary    01 Jul 2021 07:01  -  02 Jul 2021 07:00  --------------------------------------------------------  IN: 480 mL / OUT: 0 mL / NET: 480 mL        PHYSICAL EXAM:  Vital Signs Last 24 Hrs  T(C): 36.7 (02 Jul 2021 07:51), Max: 37 (01 Jul 2021 21:26)  T(F): 98.1 (02 Jul 2021 07:51), Max: 98.6 (01 Jul 2021 21:26)  HR: 70 (02 Jul 2021 07:51) (70 - 72)  BP: 139/76 (02 Jul 2021 07:51) (136/74 - 159/76)  BP(mean): --  RR: 10 (02 Jul 2021 07:51) (10 - 18)  SpO2: 97% (02 Jul 2021 07:51) (97% - 100%)    GENERAL: No acute distress  HEAD:  Atraumatic, Normocephalic  CHEST/LUNG: CTAB; No wheezes, rales, or rhonchi  HEART: Regular rate and rhythm; No murmurs, rubs, or gallops  ABDOMEN: Soft, non-tender, non-distended; normal bowel sounds, no organomegaly  EXTREMITIES:  2+ peripheral pulses b/l  NEUROLOGY: A&O x 3, no focal deficits  SKIN: No rashes or lesions    LABS:                        8.4    4.66  )-----------( 75       ( 02 Jul 2021 04:40 )             25.6     07-02    135  |  107  |  25<H>  ----------------------------<  97  3.5   |  17<L>  |  0.82    Ca    8.1<L>      02 Jul 2021 04:40  Phos  3.9     07-02  Mg     2.00     07-02    TPro  6.7  /  Alb  3.1<L>  /  TBili  1.0  /  DBili  x   /  AST  39<H>  /  ALT  21  /  AlkPhos  109  07-02    PT/INR - ( 02 Jul 2021 04:40 )   PT: 12.8 sec;   INR: 1.12 ratio         PTT - ( 02 Jul 2021 04:40 )  PTT:35.3 sec  CARDIAC MARKERS ( 01 Jul 2021 01:57 )  x     / x     / 53 U/L / x     / 1.1 ng/mL            RADIOLOGY & ADDITIONAL TESTS:  Results Reviewed:   Imaging Personally Reviewed:  Electrocardiogram Personally Reviewed:    COORDINATION OF CARE:  Care Discussed with Consultants/Other Providers [Y/N]:  Prior or Outpatient Records Reviewed [Y/N]:

## 2021-07-02 NOTE — PROGRESS NOTE ADULT - PROBLEM SELECTOR PLAN 8
VTE with Lovenox.  Fall, Aspiration, safety and seizure precautions.  PT, GI and Heme onc eval.
VTE with Lovenox.  Fall, Aspiration, safety and seizure precautions.  PT, GI and Heme onc eval.

## 2021-07-02 NOTE — PROGRESS NOTE ADULT - PROBLEM SELECTOR PROBLEM 4
Hyperlipidemia, unspecified hyperlipidemia type Gastroesophageal reflux disease with esophagitis without hemorrhage

## 2021-07-02 NOTE — PROGRESS NOTE ADULT - PROBLEM SELECTOR PLAN 5
Continue PPI. with diffuse bilateral pulmonary metastases seen on CXR.  Receiving chemo treatments twice a month.  last treatment 6/15. Missed 7/1 treatment due to low H & H.   Heme Onc called by Ed and will follow thew pt on this admission. with diffuse bilateral pulmonary metastases seen on CXR.  Receiving chemo treatments twice a month.  last treatment 6/15. Missed 7/1 treatment due to low H & H.   - heme onc following, rec CT- CAP

## 2021-07-02 NOTE — PROGRESS NOTE ADULT - ATTENDING COMMENTS
71F history of CAD s/p mid RCA stent 2020, Pancreatic adenocarcinoma on PO erlotinib and gemzar last dose 6/17/21, Hyperlipidemia, H. Pylori, treated 2020 admitted for normocytic anemia ddx includes acute blood loss anemia 2/2 UGI bleed vs chemotherapy induced anemia. s/p 2u PRBC at [facility] [place], H&H stable. Pt w/ melena and intermittent BRBPR for a year. EGD with erosive gastropathy. Recommend BID PPI. course c/b chemotherapy induced thrombocytopenia. Will hold plavix and DVT ppx in s/o thrombocytopenia. Can resume plavix given GI recs but will await AM CBC before starting (slight down trend in hgb today). Heme/onc following, c/w oral erlotinib.

## 2021-07-02 NOTE — PROGRESS NOTE ADULT - PROBLEM SELECTOR PLAN 3
Denies recent antibiotic use.  Last episode was 6/30 @ 12PM.  Lactobacillus po TID.  Monitor Lytes. F/U Lipid panel.  Continue Statin. 7/2 lipid panel unremarkable   Continue Statin.

## 2021-07-03 NOTE — PROGRESS NOTE ADULT - SUBJECTIVE AND OBJECTIVE BOX
Alexander Li MD  Internal Medicine Resident  [phone]    PATIENT:  GRISELDA CORONA  [id number]    CHIEF COMPLAINT:  Patient is a 71y old  Female who presents with a chief complaint of Low blood level x 1 day (02 Jul 2021 08:23)      INTERVAL HISTORY/OVERNIGHT EVENTS:      REVIEW OF SYSTEMS:    Constitutional:     [ ] negative [ ] fevers [ ] chills [ ] weight loss [ ] weight gain  HEENT:                  [ ] negative [ ] dry eyes [ ] eye irritation [ ] postnasal drip [ ] nasal congestion  CV:                         [ ] negative  [ ] chest pain [ ] orthopnea [ ] palpitations [ ] murmur  Resp:                     [ ] negative [ ] cough [ ] shortness of breath [ ] dyspnea [ ] wheezing [ ] sputum [ ] hemoptysis  GI:                          [ ] negative [ ] nausea [ ] vomiting [ ] diarrhea [ ] constipation [ ] abd pain [ ] dysphagia   :                        [ ] negative [ ] dysuria [ ] nocturia [ ] hematuria [ ] increased urinary frequency  Musculoskeletal: [ ] negative [ ] back pain [ ] myalgias [ ] arthralgias [ ] fracture  Skin:                       [ ] negative [ ] rash [ ] itch  Neurological:        [ ] negative [ ] headache [ ] dizziness [ ] syncope [ ] weakness [ ] numbness  Psychiatric:           [ ] negative [ ] anxiety [ ] depression  Endocrine:            [ ] negative [ ] diabetes [ ] thyroid problem  Heme/Lymph:      [ ] negative [ ] anemia [ ] bleeding problem  Allergic/Immune: [ ] negative [ ] itchy eyes [ ] nasal discharge [ ] hives [ ] angioedema    [ ] All other systems negative  [ ] Unable to assess ROS because ________.    MEDICATIONS:  MEDICATIONS  (STANDING):  atorvastatin 40 milliGRAM(s) Oral at bedtime  erlotinib 100 milliGRAM(s) Oral <User Schedule>  lactobacillus acidophilus 1 Tablet(s) Oral three times a day  pancrelipase  (CREON 12,000 Lipase Units) 1 Capsule(s) Oral with breakfast  pantoprazole  Injectable 40 milliGRAM(s) IV Push every 12 hours  sodium chloride 0.9% lock flush 3 milliLiter(s) IV Push every 8 hours    MEDICATIONS  (PRN):  acetaminophen   Tablet .. 650 milliGRAM(s) Oral every 6 hours PRN Temp greater or equal to 38C (100.4F), Mild Pain (1 - 3), Moderate Pain (4 - 6)  aluminum hydroxide/magnesium hydroxide/simethicone Suspension 30 milliLiter(s) Oral every 4 hours PRN Dyspepsia  ondansetron Injectable 4 milliGRAM(s) IV Push every 6 hours PRN Nausea and/or Vomiting  polyethylene glycol 3350 17 Gram(s) Oral daily PRN Constipation      ALLERGIES:  Allergies    No Known Allergies    Intolerances        OBJECTIVE:  ICU Vital Signs Last 24 Hrs  T(C): 36.9 (02 Jul 2021 21:31), Max: 36.9 (02 Jul 2021 12:55)  T(F): 98.4 (02 Jul 2021 21:31), Max: 98.4 (02 Jul 2021 12:55)  HR: 64 (02 Jul 2021 21:31) (59 - 70)  BP: 145/48 (02 Jul 2021 21:31) (126/61 - 145/48)  BP(mean): 82 (02 Jul 2021 10:50) (73 - 82)  ABP: --  ABP(mean): --  RR: 18 (02 Jul 2021 21:31) (10 - 20)  SpO2: 98% (02 Jul 2021 21:31) (97% - 100%)      Adult Advanced Hemodynamics Last 24 Hrs  CVP(mm Hg): --  CVP(cm H2O): --  CO: --  CI: --  PA: --  PA(mean): --  PCWP: --  SVR: --  SVRI: --  PVR: --  PVRI: --  CAPILLARY BLOOD GLUCOSE        CAPILLARY BLOOD GLUCOSE        I&O's Summary    Daily     Daily     PHYSICAL EXAMINATION:  General: WN/WD NAD  HEENT: PERRLA, EOMI, moist mucous membranes  Neurology: A&Ox3, nonfocal, [facility] x 4  Respiratory: CTA B/L, normal respiratory effort, no wheezes, crackles, rales  CV: RRR, S1S2, no murmurs, rubs or gallops  Abdominal: Soft, NT, ND +BS, Last BM  Extremities: No edema, + peripheral pulses  Incisions:   Tubes:    LABS:                          8.5    4.00  )-----------( 93       ( 03 Jul 2021 07:25 )             27.0     07-02    135  |  107  |  25<H>  ----------------------------<  97  3.5   |  17<L>  |  0.82    Ca    8.1<L>      02 Jul 2021 04:40  Phos  3.9     07-02  Mg     2.00     07-02    TPro  6.7  /  Alb  3.1<L>  /  TBili  1.0  /  DBili  x   /  AST  39<H>  /  ALT  21  /  AlkPhos  109  07-02    LIVER FUNCTIONS - ( 02 Jul 2021 04:40 )  Alb: 3.1 g/dL / Pro: 6.7 g/dL / ALK PHOS: 109 U/L / ALT: 21 U/L / AST: 39 U/L / GGT: x           PT/INR - ( 02 Jul 2021 04:40 )   PT: 12.8 sec;   INR: 1.12 ratio         PTT - ( 02 Jul 2021 04:40 )  PTT:35.3 sec            TELEMETRY:     EKG:     IMAGING:       Alexander Li MD  Internal Medicine Resident  [phone]    PATIENT:  GRISELDA CORONA  [id number]    CHIEF COMPLAINT:  Patient is a 71y old  Female who presents with a chief complaint of Low blood level x 1 day (02 Jul 2021 08:23)      INTERVAL HISTORY/OVERNIGHT EVENTS:  - patient states she is well  - denies vomiting, nausea, diarrhea, belly pain  - overall well  - bilirubin normal x 2 days now  - no more bleeding  - cleared by GI for discharge and to resume Plavix  - patient amenable to discharge    MEDICATIONS:  MEDICATIONS  (STANDING):  atorvastatin 40 milliGRAM(s) Oral at bedtime  erlotinib 100 milliGRAM(s) Oral <User Schedule>  lactobacillus acidophilus 1 Tablet(s) Oral three times a day  pancrelipase  (CREON 12,000 Lipase Units) 1 Capsule(s) Oral with breakfast  pantoprazole  Injectable 40 milliGRAM(s) IV Push every 12 hours  sodium chloride 0.9% lock flush 3 milliLiter(s) IV Push every 8 hours    MEDICATIONS  (PRN):  acetaminophen   Tablet .. 650 milliGRAM(s) Oral every 6 hours PRN Temp greater or equal to 38C (100.4F), Mild Pain (1 - 3), Moderate Pain (4 - 6)  aluminum hydroxide/magnesium hydroxide/simethicone Suspension 30 milliLiter(s) Oral every 4 hours PRN Dyspepsia  ondansetron Injectable 4 milliGRAM(s) IV Push every 6 hours PRN Nausea and/or Vomiting  polyethylene glycol 3350 17 Gram(s) Oral daily PRN Constipation    ALLERGIES:  Allergies  No Known Allergies    OBJECTIVE:  T(C): 36.9 (02 Jul 2021 21:31), Max: 36.9 (02 Jul 2021 12:55)  T(F): 98.4 (02 Jul 2021 21:31), Max: 98.4 (02 Jul 2021 12:55)  HR: 64 (02 Jul 2021 21:31) (59 - 70)  BP: 145/48 (02 Jul 2021 21:31) (126/61 - 145/48)  BP(mean): 82 (02 Jul 2021 10:50) (73 - 82)  RR: 18 (02 Jul 2021 21:31) (10 - 20)  SpO2: 98% (02 Jul 2021 21:31) (97% - 100%)    PHYSICAL EXAMINATION:  General: WN/WD NAD  HEENT: PERRLA, EOMI, moist mucous membranes  Neurology: A&Ox3, nonfocal, [facility] x 4  Respiratory: CTA B/L, normal respiratory effort, no wheezes, crackles, rales  CV: RRR, S1S2, no murmurs, rubs or gallops  Abdominal: Soft, NT, ND +BS, Last BM  Extremities: No edema, + peripheral pulses    LABS:                          8.5    4.00  )-----------( 93       ( 03 Jul 2021 07:25 )             27.0     07-02    135  |  107  |  25<H>  ----------------------------<  97  3.5   |  17<L>  |  0.82    Ca    8.1<L>      02 Jul 2021 04:40  Phos  3.9     07-02  Mg     2.00     07-02    TPro  6.7  /  Alb  3.1<L>  /  TBili  1.0  /  DBili  x   /  AST  39<H>  /  ALT  21  /  AlkPhos  109  07-02    LIVER FUNCTIONS - ( 02 Jul 2021 04:40 )  Alb: 3.1 g/dL / Pro: 6.7 g/dL / ALK PHOS: 109 U/L / ALT: 21 U/L / AST: 39 U/L / GGT: x           PT/INR - ( 02 Jul 2021 04:40 )   PT: 12.8 sec;   INR: 1.12 ratio         PTT - ( 02 Jul 2021 04:40 )  PTT:35.3 sec            TELEMETRY:     EKG:     IMAGING:

## 2021-07-03 NOTE — DISCHARGE NOTE PROVIDER - NSDCMRMEDTOKEN_GEN_ALL_CORE_FT
atorvastatin 40 mg oral tablet: 1 tab(s) orally once a day (at bedtime)  lactobacillus acidophilus oral capsule: 1 tab(s) orally 2 times a day  omeprazole 20 mg oral delayed release capsule: 1 cap(s) orally once a day  ondansetron 4 mg oral tablet: 1 tab(s) orally every 8 hours, As Needed  pancrelipase 12,000 units-38,000 units-60,000 units oral delayed release capsule: 1 cap(s) orally once a day (before a meal)  Plavix 75 mg oral tablet: 1 tab(s) orally once a day  Tarceva 100 mg oral tablet: 1 tab(s) orally every other day  Tylenol 325 mg oral tablet: 2 tab(s) orally every 6 hours, As Needed   atorvastatin 40 mg oral tablet: 1 tab(s) orally once a day (at bedtime)  lactobacillus acidophilus oral capsule: 1 tab(s) orally 2 times a day  omeprazole 20 mg oral delayed release capsule: 1 cap(s) orally 2 times a day  ondansetron 4 mg oral tablet: 1 tab(s) orally every 8 hours, As Needed  pancrelipase 12,000 units-38,000 units-60,000 units oral delayed release capsule: 1 cap(s) orally once a day (before a meal)  Plavix 75 mg oral tablet: 1 tab(s) orally once a day  Tarceva 100 mg oral tablet: 1 tab(s) orally every other day  Tylenol 325 mg oral tablet: 2 tab(s) orally every 6 hours, As Needed

## 2021-07-03 NOTE — DISCHARGE NOTE NURSING/CASE MANAGEMENT/SOCIAL WORK - NSDCVIVACCINE_GEN_ALL_CORE_FT
Influenza, injectable,quadrivalent, preservative free, pediatric; 07-Oct-2020 07:23; Arelis Hernandez (RN); [employer]; VIS (VIS Presented: 07-Oct-2020);

## 2021-07-03 NOTE — PROGRESS NOTE ADULT - PROBLEM SELECTOR PLAN 2
- Reports black tarry diarrhea with red mike since 2019. Last episode was 6/30 @ 12PM, HDS.  - Lactobacillus po TID.  - daily chemistires - resolved  - on Creon for post-prandial diarrhea

## 2021-07-03 NOTE — DISCHARGE NOTE PROVIDER - NSDCCPCAREPLAN_GEN_ALL_CORE_FT
PRINCIPAL DISCHARGE DIAGNOSIS  Diagnosis: Anemia  Assessment and Plan of Treatment: You came in with mildly reduced blood counts from your normal level, which is low typically. There was some concern for bleeding, and you underwnt a procedure called an EGD to look for bleeding, and there was none. You bleeding and diarrhea got better. You were cleared for dsichvarge by the GI team. You should follow up with the GI doctors and your primary care doctor within 1-3 days of discharge. Please seek medical attnetion if you have any further bleeding, weakness, or chest pain.      SECONDARY DISCHARGE DIAGNOSES  Diagnosis: Pancreatic cancer  Assessment and Plan of Treatment: Otis follow up at the [facility] within 1-3 days of discharge.     c

## 2021-07-03 NOTE — PROGRESS NOTE ADULT - ATTENDING COMMENTS
71F history of CAD s/p mid RCA stent 2020, Pancreatic adenocarcinoma on PO erlotinib and gemzar last dose 6/17/21, Hyperlipidemia, H. Pylori, treated 2020 admitted for normocytic anemia ddx includes acute blood loss anemia 2/2 UGI bleed vs chemotherapy induced anemia. s/p 2u PRBC at [facility], H&H stable. Pt w/ melena and intermittent BRBPR for a year. EGD with erosive gastropathy. Recommend BID PPI. course c/b chemotherapy induced thrombocytopenia. Will resume plavix as hgb stable.  Stable for dc w/outpatient onc and GI f/u.    I spent 35 minutes counseling this patient and coordinating their discharge.

## 2021-07-03 NOTE — PROGRESS NOTE ADULT - PROBLEM SELECTOR PLAN 1
p/w hb 7, s/p 1 U prbc, stable, now at around bl ~ 9 - 10. likely 2/2 GIB with component of ACD.   - consented, T&S  - VitD OH 19.1  - await EGD results per GI  - trend H&H  - transfuse Hb <7, plt <50 - possibly 2/2 mild GIB vs recent chemo  - s/p EGD showing mild gastritis  - HB has remained stable  - s/p 1 prbc in ED  - now asymptomatic, no further bleeding  - patient given referral for f/u outpatient with GI and also may follow up with her GI doctor  - cleared by GI for discharge.  - per GI ok to resume Plavix.  - platelets also improving

## 2021-07-03 NOTE — DISCHARGE NOTE PROVIDER - NSFOLLOWUPCLINICS_GEN_ALL_ED_FT
Gastroenterology at [facility]  Gastroenterology  [address] [place] [address]  Phone: [phone]  Fax:   Follow Up Time: 1-3 days    [facility] [place]  Gastroenterology  [address] [place] [address]  Phone: [phone]  Fax:   Follow Up Time: 1-3 days    [facility]  Gastroenterology  [address]  [place] [address]  Phone: [phone]  Fax:   Follow Up Time: 1-3 days

## 2021-07-03 NOTE — DISCHARGE NOTE PROVIDER - CARE PROVIDERS DIRECT ADDRESSES
,DirectAddress_Unknown,[email or web address]

## 2021-07-03 NOTE — DISCHARGE NOTE PROVIDER - NSDCCPTREATMENT_GEN_ALL_CORE_FT
PRINCIPAL PROCEDURE  Procedure: EGD  Findings and Treatment: Impression:          - Normal esophagus.                       - Non-bleeding erosive gastropathy and duodenopathy,                        gastric biopsies taken for H pylori                       - Mild barotrauma (likely in setting of patient with                        friable mucosa + plavix use) at the end of procedure.                        Patient's abdomen was soft at end of procedure.                       - Anemia/Hb drop less likely from UGI bleeding sources,                        would consider side effects of chemo/other etiologies at                        this time  Recommendation:      - Return patient to hospital colmenares for ongoing care.                       - PPI BID x 2 weeks in setting of minimal barotrauma,                        then PPI daily.                       - Follow up pathology from gastric biopsies.                       - Would investigate sources of anemia other than GI                        bleeding at this time                       - Ok to resume diet.                       - No role for endoscopic evaluation with colonoscopy at                        this time (can obtain outpatient records of colonoscopy                        within last year).                       - No absolute contraindication to Plavix at this time.

## 2021-07-03 NOTE — DISCHARGE NOTE PROVIDER - NSDCFUSCHEDAPPT_GEN_ALL_CORE_FT
LIZ, GRISELDA ; 07/09/2021 ; [facility] Cardio [address]  CORONA, GRISELDA ; 07/12/2021 ; NPP Med Int [address]  CORONA, GRISELDA ; 07/15/2021 ; [facility]  LIZ, GRISELDA ; 07/19/2021 ; NPP Derm [address]  CORONA, GRISELDA ; 07/29/2021 ; [facility]  LIZ, GRISELDA ; 08/12/2021 ; [facility]  LIZ, GRISELDA ; 08/26/2021 ; [facility]  LIZ, GRISELDA ; 09/09/2021 ; [facility]  LIZ, GRISELDA ; 09/23/2021 ; [facility] CORONA, GRISELDA ; 10/08/2021 ; NPP Cardio [address] [facility]

## 2021-07-03 NOTE — DISCHARGE NOTE PROVIDER - CARE PROVIDER_API CALL
Zachary Deng)  Cardiovascular Disease; Internal Medicine  [address], [address]  [place], [place] [id number]  Phone: [phone]  Fax: [phone]  Established Patient  Follow Up Time: 1-3 days    Janine Bassett)  Internal Medicine  [address] [facility], [address]  [place] [address]  Phone: [phone]  Fax: [phone]  Established Patient  Follow Up Time: 1-3 days    Fabian Paul)  Medical Oncology  [address] [place] [address]  Phone: [phone]  Fax: [phone]  Follow Up Time: 1-3 days

## 2021-07-03 NOTE — DISCHARGE NOTE PROVIDER - HOSPITAL COURSE
71F, ambulate with a cane, history of CAD s/p stent 2020, Pancreatic adenocarcinoma dx 4/ 2020, on chemo twice a month, last treatment was 6/15/21, Hyperlipidemia, H. Pylori, treated 2020, The pt was scheduled to have her 7/1 chemo treatment @ [facility], but had blood work done showing a hemoglobin= 7.0. The pt received 1U PRBC and sent to the ED for evaluation with GI Dr Lechuga.  The pt admits to occasional BRBPR and melena during BM, last episode was 1 week ago, says this has been on going since her dx and treatment for H. Pylori 4/2020. Also endorses dry cough, nausea s/p chemo treatments, currently nausea free, 2 episodes of diarrhea, starting 6/29 and last episode 6/30, @ 12pm and generalized abdominal discomfort considered minimal. Pt endorsed L sided chest discomfort to the ED earlier, but currently denies chest pain / discomfort. Denies HA, dizziness, falls, dysphagia, blurry vision, SOB, palpitations, nausea, vomit, constipation, abdominal pain, dysuria, generalized body aches, chills.     Coming in with anemia I/s/o recent chemo with associated thrombocytopenia and ?mild rectal bleed. Underwent EGD with mild gastritis. Cleared by GI to resume Plavix. No furhter bleeding during hospitalization, and patient Hb stabilized after 1 u prbc. Post prandial diarrhea also resolved. Patient follow up with GI, oncology, and primary care doctor after hospitalization. 71w, history of CAD s/p stent 2020, Pancreatic adenocarcinoma dx 4/ 2020, on chemo twice a month, last treatment was 6/15/21, Hyperlipidemia, H. Pylori, treated 2020, The pt was scheduled to have her 7/1 chemo treatment @ [facility], but had blood work done showing a hemoglobin= 7.0. The pt received 1U PRBC and sent to the ED for evaluation with GI Dr Lechuga.  The pt admits to occasional BRBPR and melena during BM, last episode was 1 week ago, says this has been on going since her dx and treatment for H. Pylori 4/2020. Also endorses dry cough, nausea s/p chemo treatments, currently nausea free, 2 episodes of diarrhea, starting 6/29 and last episode 6/30, @ 12pm and generalized abdominal discomfort considered minimal. Pt endorsed L sided chest discomfort to the ED earlier, but currently denies chest pain / discomfort. Denies HA, dizziness, falls, dysphagia, blurry vision, SOB, palpitations, nausea, vomit, constipation, abdominal pain, dysuria, generalized body aches, chills.     Coming in with anemia I/s/o recent chemo with associated thrombocytopenia and ?mild rectal bleed. Underwent EGD with mild gastritis. Cleared by GI to resume Plavix. No furhter bleeding during hospitalization, and patient Hb stabilized after 1 u prbc. Post prandial diarrhea also resolved. Patient follow up with GI, oncology, and primary care doctor after hospitalization.

## 2021-07-03 NOTE — PROGRESS NOTE ADULT - PROBLEM SELECTOR PLAN 6
- Says she completed her treatment for H. Pylori with 21 days of medications Rx by GI.   - Continue PPI  - await EGD bx results for h pylori infection - Says she completed her treatment for H. Pylori with 21 days of medications Rx by GI.   - Continue PPI  - f/u outpatient for EGD biopsy results

## 2021-07-03 NOTE — DISCHARGE NOTE NURSING/CASE MANAGEMENT/SOCIAL WORK - PATIENT PORTAL LINK FT
You can access the FollowMyHealth Patient Portal offered by [facility] by registering at the following website: [email or web address]. By joining [employer]’s FollowMyHealth portal, you will also be able to view your health information using other applications (apps) compatible with our system.

## 2021-07-03 NOTE — PROGRESS NOTE ADULT - PROBLEM SELECTOR PLAN 5
with diffuse bilateral pulmonary metastases seen on CXR.  Receiving chemo treatments twice a month.  last treatment 6/15. Missed 7/1 treatment due to low H & H.   - heme onc following, rec CT- CAP - with diffuse bilateral pulmonary metastases seen on CXR.  - Receiving chemo treatments twice a month.  - last treatment 6/15. Missed 7/1 treatment due to low H & H.   - heme onc following - bilirubin elevation now resolved  - - with diffuse bilateral pulmonary metastases seen on CXR.  - Receiving chemo treatments twice a month.  - last treatment 6/15. Missed 7/1 treatment due to low H & H.   - heme onc following - bilirubin elevation now resolved and per oncology 7/3, patient can follow up outpatient at [facility] to have imaging if indicated.

## 2021-07-03 NOTE — DISCHARGE NOTE PROVIDER - PROVIDER TOKENS
PROVIDER:[TOKEN:[[id number]],FOLLOWUP:[1-3 days],ESTABLISHEDPATIENT:[T]],PROVIDER:[TOKEN:[[id number]],FOLLOWUP:[1-3 days],ESTABLISHEDPATIENT:[T]],PROVIDER:[TOKEN:[[id number]],FOLLOWUP:[1-3 days]]

## 2021-07-07 PROBLEM — I25.10 ATHEROSCLEROTIC HEART DISEASE OF NATIVE CORONARY ARTERY WITHOUT ANGINA PECTORIS: Chronic | Status: ACTIVE | Noted: 2021-01-01

## 2021-07-07 PROBLEM — A04.8 OTHER SPECIFIED BACTERIAL INTESTINAL INFECTIONS: Chronic | Status: ACTIVE | Noted: 2021-01-01

## 2021-07-09 PROBLEM — R53.83 FATIGUE DUE TO TREATMENT: Status: ACTIVE | Noted: 2020-01-01

## 2021-07-09 PROBLEM — I25.10 CORONARY ARTERY DISEASE INVOLVING NATIVE CORONARY ARTERY OF NATIVE HEART WITHOUT ANGINA PECTORIS: Status: ACTIVE | Noted: 2020-01-01

## 2021-07-09 NOTE — REVIEW OF SYSTEMS
[Feeling Fatigued] : feeling fatigued [Change In The Stool] : change in stool [Under Stress] : under stress [Negative] : Heme/Lymph

## 2021-07-09 NOTE — REASON FOR VISIT
[CV Risk Factors and Non-Cardiac Disease] : CV risk factors and non-cardiac disease [FreeTextEntry3] : Dr. Paul

## 2021-07-09 NOTE — ASSESSMENT
[FreeTextEntry1] : On EGFR-TKI therapy so will require monitoring of BP, QTc, and ongoing VTE risk assessment. EKG today, no changes and normal QTc. BP well controlled. She is symptomatic from anemia. Because of CAD with recent MI, a higher Hgb threshold may be reasonable. Will coordinate transfusion of one unit PRBCs today.\par \par Remains on clopidogrel after GI evaluation. However, as colonoscopy is still pending and the patient was treated with a BMS (meena-coated), will switch to aspirin for now. Continue statin. Continue monitoring H/H.\par \par Continue lipid lowering therapy with statin.\par Continue single antiplatelet therapy as tolerated.\par \par \par Follow up with me in 3 months. I discussed the plan of care with her oncology team.

## 2021-07-09 NOTE — HISTORY OF PRESENT ILLNESS
[FreeTextEntry1] : 71 year old woman with stage IV pancreatic adenocarcinoma and coronary artery disease seen for follow up.\par \par History:\par Treated with 5FU/oxaliplatin based regimen from 6/2020-10/2020, switched to gemcitabine/abraxane 10/27 following admission for sepsis who presented with acute RCA STEMI on 12/1. Underwent cardiac cath with deployment of a BMS (meena-coated stent) with ARIA 3 flow. LVEF by echo was 55 % on 12/12. She was discharged on aspirin and ticagrelor. She denies any history of cardiovascular disease. She had dyslipidemia with low HDL on lipid panel during hospitalization, but no prior history of hypercholesterolemia, diabetes, hypertension, or tobacco smoking.\par \par She stopped low dose metoprolol 12.5 mg, due to dizziness and lower blood pressure. No syncope/near syncope or postural symptoms. She reports fatigue following chemotherapy. She reports intermittent palpitations which feel like her heart beating faster and slower. This does not occur every day. It was present in the past, but notices more since beginning cancer therapy.\par \par 2/24/21:\par - began treatment with erlotinib but developed epistaxis associated with significant anemia requiring transfusion, nasal rockets. She stopped aspirin, but remains on clopidogrel. (Ticagrelor caused dyspnea)\par \par 3/26/2021:\par - [employer] monitor showed occasional PACs, episodes of sinus rhythm and sinus tachycardia correlated with symptoms.\par \par 4/23/2021:\par Acneiform eruption attributed to erlotinib, but no new cardiac symptoms. Received Moderna vaccine series. No chest pain, no palps. No edema. Takes clopidogrel and atorvastatin. No bleeding. \par \par 7/9/2021\par Resumed gemcitabine/erlotinib, hospitalized last week for symptomatic anemia, Hgb 7. Transfused one unit. Had EGD which showed erosive gastropathy but no source of bleeding. Remains on clopidogrel. Colonoscopy planned in a few weeks time.\par \par She feels fatigued. Short of breath with minimal exertion. No chest pain.  \par

## 2021-07-09 NOTE — PHYSICAL EXAM
[No Acute Distress] : no acute distress [Frail] : frail [Ill-Appearing] : ill-appearing [No Xanthelasma] : no xanthelasma [Normal Venous Pressure] : normal venous pressure [No Carotid Bruit] : no carotid bruit [Normal S1, S2] : normal S1, S2 [No Murmur] : no murmur [No Rub] : no rub [No Gallop] : no gallop [Clear Lung Fields] : clear lung fields [Good Air Entry] : good air entry [No Respiratory Distress] : no respiratory distress  [Soft] : abdomen soft [Non Tender] : non-tender [Normal Gait] : normal gait [No Edema] : no edema [No Cyanosis] : no cyanosis [No Clubbing] : no clubbing [No Varicosities] : no varicosities [No Rash] : no rash [No Skin Lesions] : no skin lesions [Moves all extremities] : moves all extremities [No Focal Deficits] : no focal deficits [Normal Speech] : normal speech [Alert and Oriented] : alert and oriented [Normal memory] : normal memory [de-identified] : pale conjunctiva

## 2021-07-09 NOTE — CARDIOLOGY SUMMARY
[de-identified] : 7/9/2021: NSR, LAD, QTc 471 ms\par EKG 4/23/2021: NSR, LAD, QTc 430, no change from prior.\par EKG 3/26: NSR at 81 bpm. LAD, QTc 427, no significant change from prior.\par  [de-identified] : 4/2021: 7 day monitor showed NSR with PACs [de-identified] : Echo 1/14: normal function and no effusion or wall motion abnormality.

## 2021-07-12 PROBLEM — C25.9 PANCREATIC ADENOCARCINOMA: Status: ACTIVE | Noted: 2020-05-28

## 2021-07-12 PROBLEM — Z01.818 PRE-OP TESTING: Status: RESOLVED | Noted: 2020-05-30 | Resolved: 2021-01-01

## 2021-07-12 PROBLEM — R06.02 SHORTNESS OF BREATH: Status: ACTIVE | Noted: 2021-01-01

## 2021-07-12 PROBLEM — H00.019 STYE: Status: RESOLVED | Noted: 2021-01-01 | Resolved: 2021-01-01

## 2021-07-12 NOTE — ED PROVIDER NOTE - PROGRESS NOTE DETAILS
Dorie Hurst PGY-2 patient states that she is severely SOB. would like to stay in the hospital. states she feels too weak to go home.

## 2021-07-12 NOTE — ED ADULT NURSE NOTE - OBJECTIVE STATEMENT
FLOAT: Received patient to 18 for weakness. A&o4, ambulatory, pt stating has new dx of anemia and was at [facility] yesterday for blood transfusion, pt having worsening weakness and dizziness. Hx of pancreatic cancer, chemo (2x weeks ago with right chest wall port) and oral chemo. Denies chest pain/SOB.  Left 20G AC placed, labs drawn. Primary RN Kristen at bedside. RR even and unlabored.

## 2021-07-12 NOTE — ED PROVIDER NOTE - NS ED ROS FT
Constitutional: No fever, chills.  Eyes:  No visual changes  ENMT:  No neck pain  Cardiac:  +chest pressure   Respiratory:  No cough, +SOB  GI:  No nausea, vomiting, diarrhea, abdominal pain.  :  No dysuria, hematuria  MS:  No back pain.  Neuro:  No headache or lightheadedness  Skin:  No skin rash  Endocrine: No history of thyroid disease or diabetes.  Except as documented in the HPI,  all other systems are negative.

## 2021-07-12 NOTE — HISTORY OF PRESENT ILLNESS
[FreeTextEntry8] : Patient presented for the first time for transition of care, she's looking for a new PCP and requesting a PE.   Last visit with previous PCP was about 6 months ago.\par \par Pt was diagnosed with pancreatic cancer in 6/2021, and has been receiving chemotherapy.  No surgery or XRT.  Pt c/o feeling weak,a dn found to have anemia.  She has received blood transfusion 3x in the last few months, the last transfusion was from yesterday.  She had EGD 7/2/2021, no source of bleeding, and she is going to f/u with GI for colonoscopy.\par \par Pt also saw cardiologist for SOB, fatigue and elevated BP.  Pt was taken off Plavix, but to go on low dose ASA.  Pt had an MI a couple of months ago in 12/2020 and had stent in RCA.\par \par Pt has SOB, and felt it was related to anemia.  She had blood transfusion yesterday, she still has MERAZ.\par \par BP has been high for the last 3 days, she has dizziness but no CP.  She has not been flaquito to sleep over the last 5 nights.

## 2021-07-12 NOTE — ED PROVIDER NOTE - PHYSICAL EXAMINATION
Vital signs reviewed  GENERAL: Patient nontoxic appearing, NAD  HEAD: NCAT  EYES: Anicteric  ENT: MMM  NECK: Supple, non tender  RESPIRATORY: +crackles in b/l base   CARDIOVASCULAR: Regular rate and rhythm  ABDOMEN: Soft. Nondistended. Nontender. No guarding or rebound. No CVA tenderness.  MUSCULOSKELETAL/EXTREMITIES: Brisk cap refill. 2+ radial pulses. No leg edema.  SKIN:  Warm and dry  NEURO: AAOx3. No gross FND.  PSYCHIATRIC: Cooperative. Affect appropriate.

## 2021-07-12 NOTE — ED PROVIDER NOTE - ATTENDING CONTRIBUTION TO CARE
Faisal Dixon DO:  patient seen and evaluated with the resident.  I was present for key portions of the History & Physical, and I agree with the Impression & Plan. 70 yo f pmh CAD s/p stent 2020, Pancreatic adenocarcinoma dx 4/ 2020, on chemo twice a month, last treatment was 6/15/21, Hyperlipidemia, H. Pylori, treated 2020, pw fatigue and sob. reports two weeks ago taken off plavix for anemia. has been recently admitted for anemia and received transfusions. reports greenwood. mild left chest pressure w/ palpation. reports fatigue. denies f/c, cough, congestion. reports bp has been elevated. no ha, vision changes, paresthesias. arrives hds, vss. stable. crackles at lung bases b/l, pp 2+. ekg nsr. do not suspect acs upon initial eval. possible pe or anemia. labs, imaging, will give fluids if pt can tolerate. reassess.

## 2021-07-12 NOTE — ED PROVIDER NOTE - OBJECTIVE STATEMENT
70 yo f pmh CAD s/p stent 2020, Pancreatic adenocarcinoma dx 4/ 2020, on chemo twice a month, last treatment was 6/15/21, Hyperlipidemia, H. Pylori, treated 2020 p/w fatigue, sob, and chest pressure x 1 week. pt reports dark stools, was anemic on outpatient labs 1 week ago and had her plavix discontinued. given 1 unit prbc. pt continues to endorse weakness, fatigue, greenwood, and chest pressure. advised to come to ED by oncologist.   pt denies any fever, chills, dizziness, palpitations, cough, abdominal pain, n/v/d, dysuria, hematuria

## 2021-07-12 NOTE — ED PROVIDER NOTE - CLINICAL SUMMARY MEDICAL DECISION MAKING FREE TEXT BOX
70 yo f pmh CAD s/p stent 2020, Pancreatic adenocarcinoma dx 4/ 2020, on chemo twice a month, last treatment was 6/15/21, Hyperlipidemia, H. Pylori, treated 2020 p/w fatigue, sob, and chest pressure x 1 week.   vitals wnl, on exam patient has crackles in base, lethargic on appearance, dry appearing. will eval for anginal equivalents, PE, anemia, fluid overlad status with labs and imaging. likely admit for further management

## 2021-07-12 NOTE — ED ADULT NURSE REASSESSMENT NOTE - NS ED NURSE REASSESS COMMENT FT1
Patient received from float RN. patient resting quietly in bed, breathing even and nonlabored. No acute distress. No physical indicators of pain. COVID swab sent. Safety maintained. Patient stable upon exiting the room.

## 2021-07-12 NOTE — PHYSICAL EXAM
[No Acute Distress] : no acute distress [Well Nourished] : well nourished [Well Developed] : well developed [No JVD] : no jugular venous distention [Supple] : supple [No Respiratory Distress] : no respiratory distress  [Clear to Auscultation] : lungs were clear to auscultation bilaterally [Normal Rate] : normal rate  [Regular Rhythm] : with a regular rhythm [Normal S1, S2] : normal S1 and S2 [No Edema] : there was no peripheral edema [Soft] : abdomen soft [Non Tender] : non-tender [Normal Bowel Sounds] : normal bowel sounds [No CVA Tenderness] : no CVA  tenderness [No Spinal Tenderness] : no spinal tenderness [No Joint Swelling] : no joint swelling [Speech Grossly Normal] : speech grossly normal [Normal Affect] : the affect was normal [Alert and Oriented x3] : oriented to person, place, and time [de-identified] : female in stated age,  [de-identified] : Pt is a poor historian, ? memory deficit?

## 2021-07-12 NOTE — ED ADULT TRIAGE NOTE - CHIEF COMPLAINT QUOTE
Pt was at PMD's office this morning and was found to have high blood pressure. Pt on chemo, last chemo 2 weeks ago. Was told by her oncologist to go to the hospital. Pt c/o feeling dizziness and fatigue. Denies chest pain, c/o SOB. PMH of cardiac stents.

## 2021-07-12 NOTE — ED ADULT NURSE NOTE - NSFALLRSKASSESSTYPE_ED_ALL_ED
January 17, 2018        Enrrique Moise  [address]  [facility] [address]  Phone: [phone]  Fax: [phone]             [facility]  [address]  [facility] [address]  Phone: [phone]   Patient: Holly Patel   MR Number: [id number]   YOB: 1989   Date of Visit: 1/17/2018       Dear Dr. Enrrique Moise    Thank you for referring Holly Patel to me for evaluation. Attached you will find relevant portions of my assessment and plan of care.    If you have questions, please do not hesitate to call me. I look forward to following Holly Patel along with you.    Sincerely,    Lei Pinedo MD    Enclosure    If you would like to receive this communication electronically, please contact [email or web address] or [phone] to request [employer] Link access.    [employer] Link is a tool which provides read-only access to select patient information with whom you have a relationship. Its easy to use and provides real time access to review your patients record including encounter summaries, notes, results, and demographic information.    If you feel you have received this communication in error or would no longer like to receive these types of communications, please e-mail [email or web address]        Initial (On Arrival)

## 2021-07-12 NOTE — ED PROVIDER NOTE - PMH
CAD (coronary artery disease)    GERD (gastroesophageal reflux disease)    H. pylori infection    Hyperlipidemia    Pancreatic adenocarcinoma

## 2021-07-13 NOTE — PROGRESS NOTE ADULT - PROBLEM SELECTOR PLAN 1
Admit to tele, continue monitoring,  Troponin 24, repeat ordered.  EKG Sinus rhythm, no ischemic changes.  ProBNP elevated at 6873.  f/u echo  CT showing Extensive bilateral pulmonary metastatic disease are significantly increased in size and number from recent prior, bilateral pleural effusions. NO PE Admit to tele, continue monitoring,  Troponin 24, repeat ordered.  EKG Sinus rhythm, no ischemic changes.  ProBNP elevated at 6873.  f/u echo  CT showing Extensive bilateral pulmonary metastatic disease are significantly increased in size and number from recent prior, bilateral pleural effusions. NO PE  - continue lasix 20  - monitor I&Os, daily weights

## 2021-07-13 NOTE — H&P ADULT - ATTENDING COMMENTS
Pt with pancreatic ca s/p multiple cycles of chemotherapy Pt with pancreatic ca s/p multiple cycles of chemotherapy p/w chest pressure and dyspnea along with orthopnea for the past few days.  No edema, cough, fever or chills.   On exam, no resp distress, + rales.  No edema.  Heart RRR  Labs and CT reviewed  EKG reviewed  Dyspnea and chest pressure possibly from CHF vs increased tumor burden in lungs.    Will check TTE  Cr slightly above baseline, continue to trend.

## 2021-07-13 NOTE — CONSULT NOTE ADULT - ATTENDING COMMENTS
71 F with CAD, pancreatic adenoca on chemo here with acute hypoxemic respiratory failure of unclear etiology.    CT chest shows pulmonary nodules, likely progression of disease, along with bilateral effusions.  Likely has component of pulmonary edema given findings as well as elevated pro bnp    #metastatic pancreatic ca  #acute hypoxemic respiratory failure   #acute pulmonary edema  - would start on diuresis given hypoxemia, will likely help  - if diuresis does not improve hypoxemia, may benefit from steroids for possible lymphangitic spread, btu would try diuresis first

## 2021-07-13 NOTE — PROGRESS NOTE ADULT - PROBLEM SELECTOR PLAN 4
CT showing: Extensive bilateral pulmonary metastatic disease are significantly increased in size and number from recent prior.  Oncology consult emailed. CT showing: Extensive bilateral pulmonary metastatic disease are significantly increased in size and number from recent prior.  Oncology consult emailed.  Consult pulm

## 2021-07-13 NOTE — H&P ADULT - HISTORY OF PRESENT ILLNESS
72 y/o F with PMH of CAD s/p stent in 2020, pancreatic adenocarcinoma diagnosed in April 2020 on Chemo twice a month, last treatment end of June, Hyperlipidemia, H. pylori treated in 2020, recent admission for Low hemoglobin in July, plavix discontinued, presents to the ED complaining of fatigue, shortness of breath and chest pressure. Patient reports that chest pressure and shortness of breath have been occurring for several weeks. Patient endorses dyspnea on exertion. Patient states pressure is left sided and intermittent. Patient endorses abdominal discomfort which she attributes to her pancreatic cancer. Patient denies, fever, chills, nausea, vomiting, dysuria.    In ED patient was found to have troponin of 24, ProBNP of 6873. CTA chest negative for PE but patient found to have Extensive bilateral pulmonary metastatic disease are significantly increased in size and number from recent prior.Nonspecific bilateral patchy ground glass opacities, interlobular septal thickening, and bilateral pleural effusions. Differential includes pulmonary edema versus lymphangitic disease.

## 2021-07-13 NOTE — CONSULT NOTE ADULT - ASSESSMENT
71 year-old F with PMH metastatic pancreatic cancer who presented to the hospital with cough, fatigue, and shortness of breath in the setting of worsening pulmonary metastasis. Pulmonology consulted for evaluation..    Fatigue, Shortness of Breath  - Likely in the setting of progressive metastatic disease  - Agree with gentle diuresis as patient with bilateral effusions and GGOs, potentially some component of pulmonary edema  - TTE per primary team, follow up results  - Overall poor prognosis given progression of disease on treatment    Patrick Durbin, PGY-6  Pulmonary and Critical Care Medicine  [id number] 71 year-old F with PMH metastatic pancreatic cancer who presented to the hospital with cough, fatigue, and shortness of breath in the setting of worsening pulmonary metastasis. Pulmonology consulted for evaluation..    Fatigue, Shortness of Breath  - Likely in the setting of progressive metastatic disease  - Agree with gentle diuresis as patient with bilateral effusions and GGOs, potentially some component of pulmonary edema, elevated pro-BNP  - TTE per primary team, follow up results  - Overall poor prognosis given progression of disease on treatment.    Patrick Durbin, PGY-6  Pulmonary and Critical Care Medicine  [id number]

## 2021-07-13 NOTE — PROGRESS NOTE ADULT - ATTENDING COMMENTS
71F with PMH of CAD s/p stent 2020, apncreatic adenocarcinoma on chemo, HLD, H pylori s/p tx here w/ weakness/chest pressure. Initial labs notable for BNP 6.8k. Trop relatively flat 24->33. UA shows microscopic hematuria. CT chest showed no e/o PE. There was b/l pulmonary mets, that seems to have progressed in number and sized. Bilateral GGO w/ interlobular septal thickening and bilateral pleural effusion appreciated as well. Admit for evaluation of suspected new onset CHF.    Pt seen at bedside. No o/n events. States she is still SOB, particularly when laying down. No CP currently. No abd pain, diarrhea, dysuria. AFVSS. On 2l by NC satting 100%. Pt had bilateral crackles/diminished BS at bases. Good inspiratory effort. No peripheral edema. Labs today remain stable.     -CP/SOB likely multifactorial. Lower suspicion of ACS w/ flat trops and no ischemic EKG changes. BNP elevation and lung exam is concerning for hypervolemia 2' new onset CHF - etiology unclear. Progressive malignancy is also likely contributing.   -Start IV lasix. Daily wts, I:O's. Check ECHO.  -Onc evaluation.   -Outside cardiology to be contacted.   -Recheck UA - unclear cause of hematuria. Has renal cyst on CT back in May 2021.    Rest of plan as discussed above w/ HS6

## 2021-07-13 NOTE — H&P ADULT - ASSESSMENT
72 y/o F with PMH of CAD s/p stent in 2020, pancreatic adenocarcinoma diagnosed in April 2020 on Chemo twice a month, last treatment end of June, Hyperlipidemia, H. pylori treated in 2020, recent admission for Low hemoglobin in July, plavix discontinued, presents to the ED complaining of fatigue, shortness of breath and chest pressure.

## 2021-07-13 NOTE — CONSULT NOTE ADULT - SUBJECTIVE AND OBJECTIVE BOX
Patient is a 71y old  Female who presents with a chief complaint of Fatigue, chest pressure, shortness of breath (13 Jul 2021 08:54)      HPI:  72 y/o F with PMH of CAD s/p stent in 2020, pancreatic adenocarcinoma diagnosed in April 2020 on Chemo twice a month, last treatment end of June, Hyperlipidemia, H. pylori treated in 2020, recent admission for Low hemoglobin in July, plavix discontinued, presents to the ED complaining of fatigue, shortness of breath and chest pressure. Patient reports that chest pressure and shortness of breath have been occurring for several weeks. Patient endorses dyspnea on exertion. Patient states pressure is left sided and intermittent. Patient endorses abdominal discomfort which she attributes to her pancreatic cancer. Patient denies, fever, chills, nausea, vomiting, dysuria.    In ED patient was found to have troponin of 24, ProBNP of 6873. CTA chest negative for PE but patient found to have Extensive bilateral pulmonary metastatic disease are significantly increased in size and number from recent prior.Nonspecific bilateral patchy ground glass opacities, interlobular septal thickening, and bilateral pleural effusions. Differential includes pulmonary edema versus lymphangitic disease.   (13 Jul 2021 05:33)     ROS: as above     PAST MEDICAL & SURGICAL HISTORY:  Hyperlipidemia    GERD (gastroesophageal reflux disease)    Pancreatic adenocarcinoma    H. pylori infection    CAD (coronary artery disease)    H/O total hysterectomy    History of cardiac cath  1 stent. 4/2020. [facility]    Port-A-Cath in place  4/2020 ~ right chest        SOCIAL HISTORY:    FAMILY HISTORY:  Family history of diabetes mellitus    FH: CAD (coronary artery disease) (Father)        MEDICATIONS  (STANDING):  atorvastatin 40 milliGRAM(s) Oral at bedtime  erlotinib 100 milliGRAM(s) Oral daily  furosemide    Tablet 20 milliGRAM(s) Oral daily  heparin   Injectable 5000 Unit(s) SubCutaneous every 12 hours  lactobacillus acidophilus 1 Tablet(s) Oral two times a day  pancrelipase  (CREON 12,000 Lipase Units) 1 Capsule(s) Oral with breakfast  pantoprazole    Tablet 40 milliGRAM(s) Oral before breakfast  sodium chloride 0.9% lock flush 3 milliLiter(s) IV Push every 8 hours    MEDICATIONS  (PRN):      Allergies    No Known Allergies    Intolerances        Vital Signs Last 24 Hrs  T(C): 36.7 (13 Jul 2021 03:07), Max: 36.9 (12 Jul 2021 22:47)  T(F): 98.1 (13 Jul 2021 03:07), Max: 98.4 (12 Jul 2021 22:47)  HR: 68 (13 Jul 2021 06:35) (65 - 79)  BP: 150/68 (13 Jul 2021 06:35) (108/92 - 176/76)  BP(mean): --  RR: 19 (13 Jul 2021 06:35) (16 - 19)  SpO2: 100% (13 Jul 2021 06:35) (97% - 100%)    PHYSICAL EXAM  General: adult in NAD  HEENT: clear oropharynx, anicteric sclera, pink conjunctiva  Neck: supple  CV: normal S1/S2 with no murmur rubs or gallops  Lungs: positive air movement b/l ant lungs, clear to auscultation, no wheezes, no rales  Abdomen: soft non-tender non-distended, no hepatosplenomegaly  Ext: no clubbing cyanosis or edema  Skin: no rashes and no petechiae  Neuro: alert and oriented X 3, none focal    LABS:                          8.3    4.74  )-----------( 54       ( 13 Jul 2021 07:03 )             26.2         Mean Cell Volume : 91.0 fL  Mean Cell Hemoglobin : 28.8 pg  Mean Cell Hemoglobin Concentration : 31.7 gm/dL  Auto Neutrophil # : 2.71 K/uL  Auto Lymphocyte # : 1.47 K/uL  Auto Monocyte # : 0.47 K/uL  Auto Eosinophil # : 0.05 K/uL  Auto Basophil # : 0.03 K/uL  Auto Neutrophil % : 57.2 %  Auto Lymphocyte % : 31.0 %  Auto Monocyte % : 9.9 %  Auto Eosinophil % : 1.1 %  Auto Basophil % : 0.6 %      Serial CBC's  07-13 @ 07:03  Hct-26.2 / Hgb-8.3 / Plat-54 / RBC-2.88 / WBC-4.74  Serial CBC's  07-12 @ 21:45  Hct-28.1 / Hgb-8.9 / Plat-69 / RBC-3.14 / WBC-5.28      07-13    136  |  109<H>  |  23  ----------------------------<  80  4.0   |  16<L>  |  1.04    Ca    8.2<L>      13 Jul 2021 07:03  Phos  4.2     07-13  Mg     2.00     07-13    TPro  7.5  /  Alb  3.3  /  TBili  1.4<H>  /  DBili  x   /  AST  44<H>  /  ALT  16  /  AlkPhos  124<H>  07-12      PT/INR - ( 12 Jul 2021 21:45 )   PT: 12.6 sec;   INR: 1.10 ratio         PTT - ( 12 Jul 2021 21:45 )  PTT:34.2 sec                RADIOLOGY & ADDITIONAL STUDIES:

## 2021-07-13 NOTE — H&P ADULT - PROBLEM SELECTOR PLAN 2
Patient with ProBNP of 6873.  Echo ordered.  Strict I&Os, daily weights.  Crackles heard on exam  Will start patient on Lasix 40 mg IVP daily.  Consider Cardio c/s in AM. Patient with ProBNP of 6873.  Echo ordered.  Strict I&Os, daily weights.  Crackles heard on exam  Will start patient on Lasix 40 mg IVP daily.  Consider Cardio c/s in AM.  Patient placed on tele. Patient with ProBNP of 6873.  Possibly new CHF  Echo ordered.  Strict I&Os, daily weights.  Crackles heard on exam  Will start patient on Lasix 40 mg IVP daily.  Patient placed on tele. Patient with ProBNP of 6873.  Possibly new CHF  Echo ordered.  Strict I&Os, daily weights.  Crackles heard on exam  Patient placed on tele.

## 2021-07-13 NOTE — H&P ADULT - PROBLEM SELECTOR PLAN 3
Patient with elevated blood pressure of 175. Patient asymptomatic.  Continue to monitor, start patient on Hydralazine for blood pressure control.  DASH/TLC diet ordered. Patient with elevated blood pressure of 175. Patient asymptomatic.  Continue to monitor, consider ACE/ARB if Cr improves.  DASH/TLC diet ordered. Patient with elevated blood pressure of 175. Patient asymptomatic.  Continue to monitor off antihypertensives for now.    DASH/TLC diet ordered.

## 2021-07-13 NOTE — H&P ADULT - PROBLEM SELECTOR PLAN 5
Continue monitoring Patient with urine protein of 300.  Consider renal consult. Patient with urine protein of 300.  Spot Cr/protein ratio ordered.

## 2021-07-13 NOTE — PROGRESS NOTE ADULT - PROBLEM SELECTOR PLAN 2
Patient with ProBNP of 6873.  Possibly new CHF  Echo ordered.  Strict I&Os, daily weights.  Crackles heard on exam  Patient placed on tele.

## 2021-07-13 NOTE — CONSULT NOTE ADULT - SUBJECTIVE AND OBJECTIVE BOX
CC: Fatigue, shortness of breath    HPI:  72 y/o F with PMH of CAD s/p stent in , pancreatic adenocarcinoma diagnosed in 2020 on Chemo twice a month, last treatment end of , Hyperlipidemia, H. pylori treated in , recent admission for Low hemoglobin in July, plavix discontinued, presents to the ED complaining of fatigue, shortness of breath and chest pressure. Patient reports that chest pressure and shortness of breath have been occurring for several weeks. Patient endorses dyspnea on exertion. Patient states pressure is left sided and intermittent. Patient endorses abdominal discomfort which she attributes to her pancreatic cancer. Patient denies, fever, chills, nausea, vomiting, dysuria.    In ED patient was found to have troponin of 24, ProBNP of 6873. CTA chest negative for PE but patient found to have Extensive bilateral pulmonary metastatic disease are significantly increased in size and number from recent prior.Nonspecific bilateral patchy ground glass opacities, interlobular septal thickening, and bilateral pleural effusions. Differential includes pulmonary edema versus lymphangitic disease.   (2021 05:33)    Patient seen and examined at bedside. Pulmonology consulted for evaluation of worsening metastatic disease. She reports that she has been having a cough with phlegm for the past 2-3 weeks. She tried drinking watercress and red onion to alleviate this. She otherwise denies sick contacts and recent travel. She last received chemotherapy a little over 2 weeks ago. She denies chest pain, fevers, and lower extremity edema.    PAST MEDICAL & SURGICAL HISTORY:  Hyperlipidemia    GERD (gastroesophageal reflux disease)    Pancreatic adenocarcinoma    H. pylori infection    CAD (coronary artery disease)    H/O total hysterectomy    History of cardiac cath  1 stent. 2020. [facility]    Port-A-Cath in place  2020 ~ right chest        FAMILY HISTORY:  Family history of diabetes mellitus    FH: CAD (coronary artery disease) (Father)        SOCIAL HISTORY:  Smoking: None  EtOH Use: None  Marital Status:  Occupation: Worked as a   Exposures: None reported  Recent Travel: None reported    Allergies    No Known Allergies    Intolerances        HOME MEDICATIONS:    REVIEW OF SYSTEMS:  Constitutional: No fevers or chills.  Eyes: No itching or discharge from the eyes  ENT: No ear pain. No ear discharge. No nasal congestion.  CV: No chest pain. No palpitations. No lightheadedness or dizziness.   Resp: No dyspnea at rest. No orthopnea. No wheezing. +Cough. No stridor.  GI: No nausea. No vomiting. No diarrhea.  MSK: No joint pain or pain in any extremities  Integumentary: No skin lesions. No pedal edema.  Neurological: No gross motor weakness. No sensory changes.  [x] All other systems negative  [ ] Unable to assess ROS because ________    OBJECTIVE:  ICU Vital Signs Last 24 Hrs  T(C): 36.4 (2021 11:55), Max: 36.9 (2021 22:47)  T(F): 97.5 (2021 11:55), Max: 98.4 (2021 22:47)  HR: 64 (2021 11:55) (64 - 79)  BP: 165/70 (2021 11:55) (108/92 - 176/76)  BP(mean): --  ABP: --  ABP(mean): --  RR: 18 (2021 11:55) (16 - 19)  SpO2: 100% (2021 11:55) (97% - 100%)    CAPILLARY BLOOD GLUCOSE    PHYSICAL EXAM:  General: Awake, alert, oriented X 3.   HEENT: Atraumatic, normocephalic.   Lymph Nodes: No palpable lymphadenopathy  Neck: No JVD. No carotid bruit.   Respiratory: Normal chest expansion. Bibasilar rales.  Cardiovascular: S1 S2 normal. No murmurs, rubs or gallops.   Abdomen: Soft, non-tender, non-distended. No organomegaly.  Extremities: Warm to touch. Peripheral pulse palpable. No pedal edema.   Skin: No rashes or skin lesions  Neurological: Motor and sensory examination equal and normal in all four extremities.  Psychiatry: Appropriate mood and affect.    HOSPITAL MEDICATIONS:  MEDICATIONS  (STANDING):  atorvastatin 40 milliGRAM(s) Oral at bedtime  erlotinib 100 milliGRAM(s) Oral daily  furosemide    Tablet 20 milliGRAM(s) Oral daily  heparin   Injectable 5000 Unit(s) SubCutaneous every 12 hours  lactobacillus acidophilus 1 Tablet(s) Oral two times a day  pancrelipase  (CREON 12,000 Lipase Units) 1 Capsule(s) Oral with breakfast  pantoprazole    Tablet 40 milliGRAM(s) Oral before breakfast  sodium chloride 0.9% lock flush 3 milliLiter(s) IV Push every 8 hours    MEDICATIONS  (PRN):      LABS:                        8.3    4.74  )-----------( 54       ( 2021 07:03 )             26.2     07-13    136  |  109<H>  |  23  ----------------------------<  80  4.0   |  16<L>  |  1.04    Ca    8.2<L>      2021 07:03  Phos  4.2       Mg     2.00         TPro  7.5  /  Alb  3.3  /  TBili  1.4<H>  /  DBili  x   /  AST  44<H>  /  ALT  16  /  AlkPhos  124<H>  12    PT/INR - ( 2021 21:45 )   PT: 12.6 sec;   INR: 1.10 ratio         PTT - ( 2021 21:45 )  PTT:34.2 sec  Urinalysis Basic - ( 2021 02:44 )    Color: Yellow / Appearance: Clear / S.010 / pH: x  Gluc: x / Ketone: Negative  / Bili: Negative / Urobili: <2 mg/dL   Blood: x / Protein: 300 mg/dL / Nitrite: Negative   Leuk Esterase: Negative / RBC: 60 /HPF / WBC 17 /HPF   Sq Epi: x / Non Sq Epi: 2 /HPF / Bacteria: Occasional    Venous Blood Gas:   @ 21:45  7.32/40/26/19/37.9  VBG Lactate: 1.0      MICROBIOLOGY:     RADIOLOGY:  [x] Reviewed and interpreted by me  CT Chest: Worsening metastatic disease, groundglass opacities and bilateral small pleural effusions    Point of Care Ultrasound Findings;    PFT:    EKG: CC: Fatigue, shortness of breath    HPI:  70 y/o F with PMH of CAD s/p stent in , pancreatic adenocarcinoma diagnosed in 2020 on Chemo twice a month, last treatment end of , Hyperlipidemia, H. pylori treated in , recent admission for Low hemoglobin in July, plavix discontinued, presents to the ED complaining of fatigue, shortness of breath and chest pressure. Patient reports that chest pressure and shortness of breath have been occurring for several weeks. Patient endorses dyspnea on exertion. Patient states pressure is left sided and intermittent. Patient endorses abdominal discomfort which she attributes to her pancreatic cancer. Patient denies, fever, chills, nausea, vomiting, dysuria.    In ED patient was found to have troponin of 24, ProBNP of 6873. CTA chest negative for PE but patient found to have Extensive bilateral pulmonary metastatic disease are significantly increased in size and number from recent prior.Nonspecific bilateral patchy ground glass opacities, interlobular septal thickening, and bilateral pleural effusions. Differential includes pulmonary edema versus lymphangitic disease.   (2021 05:33)    Patient seen and examined at bedside. Pulmonology consulted for evaluation of worsening metastatic disease. She reports that she has been having a cough with phlegm for the past 2-3 weeks. She tried drinking watercress and red onion to alleviate this. She otherwise denies sick contacts and recent travel. She last received chemotherapy a little over 2 weeks ago. She denies chest pain, fevers, and lower extremity edema.    PAST MEDICAL & SURGICAL HISTORY:  Hyperlipidemia    GERD (gastroesophageal reflux disease)    Pancreatic adenocarcinoma    H. pylori infection    CAD (coronary artery disease)    H/O total hysterectomy    History of cardiac cath  1 stent. 2020. [facility]    Port-A-Cath in place  2020 ~ right chest        FAMILY HISTORY:  Family history of diabetes mellitus parents    FH: CAD (coronary artery disease) (Father)        SOCIAL HISTORY:  Smoking: None  EtOH Use: None  Marital Status:  Occupation: Worked as a   Exposures: None reported  Recent Travel: None reported    Allergies    No Known Allergies    Intolerances        HOME MEDICATIONS:    REVIEW OF SYSTEMS:  Constitutional: No fevers or chills.  Eyes: No itching or discharge from the eyes  ENT: No ear pain. No ear discharge. No nasal congestion.  CV: No chest pain. No palpitations. No lightheadedness or dizziness.   Resp: No dyspnea at rest. No orthopnea. No wheezing. +Cough. No stridor.  GI: No nausea. No vomiting. No diarrhea.  MSK: No joint pain or pain in any extremities  Integumentary: No skin lesions. No pedal edema.  Neurological: No gross motor weakness. No sensory changes.  [x] All other systems negative except as above (14 point ros)  [ ] Unable to assess ROS because ________    OBJECTIVE:  ICU Vital Signs Last 24 Hrs  T(C): 36.4 (2021 11:55), Max: 36.9 (2021 22:47)  T(F): 97.5 (2021 11:55), Max: 98.4 (2021 22:47)  HR: 64 (2021 11:55) (64 - 79)  BP: 165/70 (2021 11:55) (108/92 - 176/76)  BP(mean): --  ABP: --  ABP(mean): --  RR: 18 (2021 11:55) (16 - 19)  SpO2: 100% (2021 11:55) (97% - 100%)    CAPILLARY BLOOD GLUCOSE    PHYSICAL EXAM:  General: Awake, alert, oriented X 3.   HEENT: Atraumatic, normocephalic.   Lymph Nodes: No palpable lymphadenopathy  Neck: No JVD. No carotid bruit.   Respiratory: Normal chest expansion. Bibasilar rales.  Cardiovascular: S1 S2 normal. No murmurs, rubs or gallops.   Abdomen: Soft, non-tender, non-distended. No organomegaly.  Extremities: Warm to touch. Peripheral pulse palpable. No pedal edema.   Skin: No rashes or skin lesions  Neurological: Motor and sensory examination equal and normal in all four extremities.  Psychiatry: Appropriate mood and affect.    HOSPITAL MEDICATIONS:  MEDICATIONS  (STANDING):  atorvastatin 40 milliGRAM(s) Oral at bedtime  erlotinib 100 milliGRAM(s) Oral daily  furosemide    Tablet 20 milliGRAM(s) Oral daily  heparin   Injectable 5000 Unit(s) SubCutaneous every 12 hours  lactobacillus acidophilus 1 Tablet(s) Oral two times a day  pancrelipase  (CREON 12,000 Lipase Units) 1 Capsule(s) Oral with breakfast  pantoprazole    Tablet 40 milliGRAM(s) Oral before breakfast  sodium chloride 0.9% lock flush 3 milliLiter(s) IV Push every 8 hours    MEDICATIONS  (PRN):      LABS:                        8.3    4.74  )-----------( 54       ( 2021 07:03 )             26.2     07-13    136  |  109<H>  |  23  ----------------------------<  80  4.0   |  16<L>  |  1.04    Ca    8.2<L>      2021 07:03  Phos  4.2       Mg     2.00         TPro  7.5  /  Alb  3.3  /  TBili  1.4<H>  /  DBili  x   /  AST  44<H>  /  ALT  16  /  AlkPhos  124<H>      PT/INR - ( 2021 21:45 )   PT: 12.6 sec;   INR: 1.10 ratio         PTT - ( 2021 21:45 )  PTT:34.2 sec  Urinalysis Basic - ( 2021 02:44 )    Color: Yellow / Appearance: Clear / S.010 / pH: x  Gluc: x / Ketone: Negative  / Bili: Negative / Urobili: <2 mg/dL   Blood: x / Protein: 300 mg/dL / Nitrite: Negative   Leuk Esterase: Negative / RBC: 60 /HPF / WBC 17 /HPF   Sq Epi: x / Non Sq Epi: 2 /HPF / Bacteria: Occasional    Venous Blood Gas:   @ 21:45  7.32/40/26/19/37.9  VBG Lactate: 1.0      MICROBIOLOGY:     RADIOLOGY:  [x] Reviewed and interpreted by me  CT Chest: Worsening metastatic disease, groundglass opacities and bilateral small pleural effusions    Point of Care Ultrasound Findings;    PFT:    EKG:

## 2021-07-13 NOTE — H&P ADULT - PROBLEM SELECTOR PLAN 1
A Admit to tele, continue monitoring,  Troponin 24, repeat ordered.  EKG Sinus rhythm, no ischemic changes.  ProBNP elevated at 6873.  May be in setting of New heart failure.  Echo ordered.  CT showing Extensive bilateral pulmonary metastatic disease are significantly increased in size and number from recent prior. Admit to tele, continue monitoring,  Troponin 24, repeat ordered.  EKG Sinus rhythm, no ischemic changes.  ProBNP elevated at 6873.  Echo ordered.  CT showing Extensive bilateral pulmonary metastatic disease are significantly increased in size and number from recent prior.

## 2021-07-13 NOTE — PROGRESS NOTE ADULT - PROBLEM SELECTOR PLAN 3
Patient with elevated blood pressure of 175. Patient asymptomatic.  Continue to monitor off antihypertensives for now.    DASH/TLC diet ordered.

## 2021-07-13 NOTE — PROGRESS NOTE ADULT - SUBJECTIVE AND OBJECTIVE BOX
PawelbjJo Stapletonraj PGY1  Pager: [phone]    Patient is a 71y old  Female who presents with a chief complaint of Fatigue, chest pressure and shortness of breath (2021 05:33)  Hx: GERD, HLD, CAD s/p stent placement in , pancreatic adenocarcinoma dx in 2020, recent admission in July for low Hb and plavix was D/C'd    SUBJECTIVE / OVERNIGHT EVENTS: Patient presented with shortness of breath and chest pressure for 3-4 days. She states that she had dyspnea on exertion, as well as at night while trying to sleep. Patient had to sleep sitting up. The chest pressure was left sided, intermittent. Today, she continues to have shortness of breath. Patient denies chest pain, abdominal pain, N/V at the moment. No issues with urination or bowel movements.    Review of Systems: All other systems negative except as noted above    MEDICATIONS  (STANDING):  atorvastatin 40 milliGRAM(s) Oral at bedtime  erlotinib 100 milliGRAM(s) Oral daily  heparin   Injectable 5000 Unit(s) SubCutaneous every 12 hours  lactobacillus acidophilus 1 Tablet(s) Oral two times a day  pancrelipase  (CREON 12,000 Lipase Units) 1 Capsule(s) Oral with breakfast  pantoprazole    Tablet 40 milliGRAM(s) Oral before breakfast  sodium chloride 0.9% lock flush 3 milliLiter(s) IV Push every 8 hours    MEDICATIONS  (PRN):      CAPILLARY BLOOD GLUCOSE        I&O's Summary      Vital Signs Last 24 Hrs  T(C): 36.7 (2021 03:07), Max: 36.9 (2021 22:47)  T(F): 98.1 (2021 03:07), Max: 98.4 (2021 22:47)  HR: 68 (2021 06:35) (65 - 79)  BP: 150/68 (2021 06:35) (108/92 - 176/76)  BP(mean): --  RR: 19 (2021 06:35) (16 - 19)  SpO2: 100% (2021 06:35) (97% - 100%)    PHYSICAL EXAM:  GENERAL: no distress  PSYCH: A&O x3  HEAD: Atraumatic, Normocephalic  NECK: Supple, No JVD  CHEST/LUNG: Inspiratory crackles in all lungs fields, greater in the lower lung fields, normal work of breathing, no wheezes  HEART: normal S1, S2. regular rate and rhythm, no murmurs or rubs  ABDOMEN: nontender to palpation, no rebound tenderness/guarding  EXTREMITIES: no edema on bilateral LE  NEUROLOGY: no focal neurologic deficit  SKIN: No rashes or lesions    LABS:                        8.3    4.74  )-----------( 54       ( 2021 07:03 )             26.2      07-    136  |  109<H>  |  23  ----------------------------<  80  4.0   |  16<L>  |  1.04    Ca    8.2<L>      2021 07:03  Phos  4.2       Mg     2.00         TPro  7.5  /  Alb  3.3  /  TBili  1.4<H>  /  DBili  x   /  AST  44<H>  /  ALT  16  /  AlkPhos  124<H>  12    PT/INR - ( 2021 21:45 )   PT: 12.6 sec;   INR: 1.10 ratio         PTT - ( 2021 21:45 )  PTT:34.2 sec  CARDIAC MARKERS ( 2021 07:03 )  x     / x     / 48 U/L / x     / 1.7 ng/mL      Urinalysis Basic - ( 2021 02:44 )    Color: Yellow / Appearance: Clear / S.010 / pH: x  Gluc: x / Ketone: Negative  / Bili: Negative / Urobili: <2 mg/dL   Blood: x / Protein: 300 mg/dL / Nitrite: Negative   Leuk Esterase: Negative / RBC: 60 /HPF / WBC 17 /HPF   Sq Epi: x / Non Sq Epi: 2 /HPF / Bacteria: Occasional        RADIOLOGY & ADDITIONAL TESTS:  < from: CT Angio Chest PE Protocol w/ IV Cont (21 @ 00:48) >  No pulmonary embolism.    Extensive bilateral pulmonary metastatic disease are significantly increased in size and number from recent prior.    Nonspecific bilateral patchy ground glass opacities, interlobular septal thickening, and bilateral pleural effusions. Differential includes pulmonary edema versus lymphangitic disease.      < end of copied text >  < from: Xray Chest 2 Views PA/Lat (07.12.21 @ 21:54) >  INTERPRETATION:  Similar-appearing diffuse bilateral nodular opacities, consistent with known pulmonary metastases.    < end of copied text >    Imaging Personally Reviewed:    Consultant(s) Notes Reviewed:      Care Discussed with Consultants/Other Providers:

## 2021-07-13 NOTE — PROGRESS NOTE ADULT - ASSESSMENT
72 y/o F with PMH of CAD s/p stent in 2020, pancreatic adenocarcinoma diagnosed in April 2020 on Chemo twice a month, last treatment end of June, Hyperlipidemia, H. pylori treated in 2020, recent admission for Low hemoglobin in July, plavix discontinued, presents to the ED complaining of fatigue, shortness of breath and chest pressure concerning for new onset heart failure.

## 2021-07-13 NOTE — H&P ADULT - PROBLEM SELECTOR PLAN 4
CT showing: Extensive bilateral pulmonary metastatic disease are significantly increased in size and number from recent prior.  Oncology consult emailed.

## 2021-07-13 NOTE — H&P ADULT - NSICDXPASTSURGICALHX_GEN_ALL_CORE_FT
PAST SURGICAL HISTORY:  H/O total hysterectomy     History of cardiac cath 1 stent. 4/2020. [facility]    Port-A-Cath in place 4/2020 ~ right chest

## 2021-07-13 NOTE — H&P ADULT - NSHPOUTPATIENTPROVIDERS_GEN_ALL_CORE
Oncology; Fabian Candelario.  Pulmonary: Rl Burden.  Cardio: Zachary Cantrell.  Dermatology: Tiana Wilson.  Gastro: Semaj Spence Oncology; Fabian Candelario.  Pulmonary: Rl Burden.  Dr Bassett (PMD)  Cardio: Zachary Cantrell.  Dermatology: Tiana Wilson.  Gastro: Semaj Spence

## 2021-07-13 NOTE — H&P ADULT - NSHPLABSRESULTS_GEN_ALL_CORE
Vital Signs Last 24 Hrs  T(C): 36.7 (2021 03:07), Max: 36.9 (2021 22:47)  T(F): 98.1 (2021 03:07), Max: 98.4 (2021 22:47)  HR: 65 (2021 03:07) (65 - 79)  BP: 176/76 (2021 03:07) (108/92 - 176/76)  BP(mean): --  RR: 18 (2021 03:07) (16 - 18)  SpO2: 100% (2021 03:07) (97% - 100%)                          8.9    5.28  )-----------( 69       ( 2021 21:45 )             28.1     07-12    134<L>  |  106  |  23  ----------------------------<  79  4.1   |  16<L>  |  1.13    Ca    8.5      2021 21:45  Phos  3.9     07-12  Mg     2.00     07-12    TPro  7.5  /  Alb  3.3  /  TBili  1.4<H>  /  DBili  x   /  AST  44<H>  /  ALT  16  /  AlkPhos  124<H>  07-12    PT/INR - ( 2021 21:45 )   PT: 12.6 sec;   INR: 1.10 ratio         PTT - ( 2021 21:45 )  PTT:34.2 sec    ProBNP:6873    21:45 - VBG - pH: 7.32  | pCO2: 40    | pO2: 26    | Lactate: 1.0      Urinalysis Basic - ( 2021 02:44 )    Color: Yellow / Appearance: Clear / S.010 / pH: x  Gluc: x / Ketone: Negative  / Bili: Negative / Urobili: <2 mg/dL   Blood: x / Protein: 300 mg/dL / Nitrite: Negative   Leuk Esterase: Negative / RBC: 60 /HPF / WBC 17 /HPF   Sq Epi: x / Non Sq Epi: 2 /HPF / Bacteria: Occasional    COVID 19 PCR: Negative    CTA:  FINDINGS:    LUNGS, PLEURA, AND AIRWAYS: Patent central airways.Bilateral pleural effusions and associated basilar atelectasis. Extensive bilateral pulmonary metastatic disease are significantly increased in size and number from recent prior. Previously referenced right lower lobe nodule now measures 1.2 cm (3:195) and left upper lobe nodule measures 1.0 cm (3:143), slightly increased. Nonspecific bilateral patchy groundglass opacities and interlobular septal thickening.  MEDIASTINUM AND ALRRY: Unchanged subcentimeter mediastinal and hilar lymph nodes.  VESSELS: No pulmonary embolism. Evaluation of distal segmental and subsegmental pulmonary arteries is limited by motion. Right chest wall Mediport with catheter tip at the cavoatrial junction.    HEART: Heart size is normal. No pericardial effusion.  CHEST WALL AND LOWER NECK: Within normal limits.  VISUALIZED UPPER ABDOMEN: Ill-defined pancreatic body mass is not well evaluated on early contrast phase. Left upper pole renal cyst.  BONES: Degenerative changes    IMPRESSION:  No pulmonary embolism.    Extensive bilateral pulmonary metastatic disease are significantly increased in size and number from recent prior.    Nonspecific bilateral patchy ground glass opacities, interlobular septal thickening, and bilateral pleural effusions. Differential includes pulmonary edema versus lymphangitic disease.    CXR:    INTERPRETATION:  Similar-appearing diffuse bilateral nodular opacities, consistent with known pulmonary metastases.    Follow up official report.    EKG: Sinus rhythm at 69 bpm. QT/QTc 432/462

## 2021-07-13 NOTE — CONSULT NOTE ADULT - ASSESSMENT
71 yo F w/ hx metastatic pancreatic adenocarcinoma dx Jun 2020, s/p 9 cycles of chemotherapy (FOLFIRINOX, ended with FOLFOX due to irinotecan intolerance; switched to gemcitabine/abraxane with onpro support; switched to gemcitabine / erlotinib on 2/3/21, last cycle on 6/17/21, due for chemo on 7/1/21) who was sent to the ER from PMD with HTN, fatigue, chest pressure, SOB.     Of note, pt was recently admitted at [facility] 2 weeks ago with new worsened anemia, concern for GI bleed. S/p EGD, with gastritis, no source of bleeding was found.  Her H&H was 7.8 on 7/9/21, transfuse 1 unit PRBC on 7/10/21, Hgb today 8.3  CT chest with worsening pulm mets    -Pulm consult to attempt to optimize breathing status  -GI consult if hgb drops further  -Monitor CBC and Transfuse as needed  -Monitor Bili periodically  -Supportive care, pain control, Nutrition, PT, DVT ppx  -Outpatient oncology f/u    Will follow. Please do not hesitate to call back with questions.     Lilia Goodwin MD  Medical Oncology Attending  C: [phone]       69 yo F w/ hx metastatic pancreatic adenocarcinoma dx Jun 2020, s/p 9 cycles of chemotherapy (FOLFIRINOX, ended with FOLFOX due to irinotecan intolerance; switched to gemcitabine/abraxane with onpro support; switched to gemcitabine / erlotinib on 2/3/21, last cycle on 6/17/21, due for chemo on 7/1/21) who was sent to the ER from PMD with HTN, fatigue, chest pressure, SOB.     Of note, pt was recently admitted at [facility] 2 weeks ago with new worsened anemia, concern for GI bleed. S/p EGD, with gastritis, no source of bleeding was found.  Her H&H was 7.8 on 7/9/21, transfuse 1 unit PRBC on 7/10/21, Hgb today 8.3  CT chest with worsening pulm mets    -Pulm consult to attempt to optimize breathing status  -GI consult if hgb drops further  -Monitor CBC and Transfuse as needed  -Monitor Bili periodically  -Patient is an appropriate candidate for hospice services, she had progression of diseaes on her current line of therapy and her treatment options are limited. Consider pal care consult, GOC and hospice referral.   -Chemo on hold.  -Supportive care, pain control, Nutrition, PT, DVT ppx  -Outpatient oncology f/u    Will follow. Please do not hesitate to call back with questions.     Lilia Goodwin MD  Medical Oncology Attending  C: [phone]

## 2021-07-13 NOTE — PHARMACOTHERAPY INTERVENTION NOTE - COMMENTS
Medication history is complete and was verified with patient, [facility], and [facility] at [facility]. Medication list updated in Outpatient Medication Record (OMR). Please call spectra [phone] if you have any questions.

## 2021-07-14 NOTE — PROGRESS NOTE ADULT - ASSESSMENT
71 year-old F with PMH metastatic pancreatic cancer who presented to the hospital with cough, fatigue, and shortness of breath in the setting of worsening pulmonary metastasis. Pulmonology consulted for evaluation..    Fatigue, Shortness of Breath  - Likely in the setting of progressive metastatic disease  - Agree with gentle diuresis as patient with bilateral effusions and GGOs, potentially some component of pulmonary edema, elevated pro-BNP  - TTE per primary team, follow up results  - If dyspnea is not significantly improved, can consider steroids for lymphangitic spread, although would hold off for now  - Will attempt to wean O2 later, when asked patient did not wish to have her nasal cannula taken off as she felt it improved her symptoms  - Overall poor prognosis given progression of disease on treatment.    Patrick Durbin, PGY-6  Pulmonary and Critical Care Medicine  [phone]

## 2021-07-14 NOTE — PROGRESS NOTE ADULT - SUBJECTIVE AND OBJECTIVE BOX
CHIEF COMPLAINT: Fatigue    Interval Events: Overall feels better than yesterday. Still with cough. She endorses urinating more with the diuresis.    REVIEW OF SYSTEMS:  Constitutional: No fevers or chills.  Eyes: No itching or discharge from the eyes  ENT: No ear pain. No ear discharge. No nasal congestion.   CV: No chest pain. No palpitations. No lightheadedness or dizziness.   Resp: No dyspnea at rest. +Cough.  GI: No nausea. No vomiting. No diarrhea.  MSK: No joint pain or pain in any extremities  Integumentary: No skin lesions. No pedal edema.  Neurological: No gross motor weakness. No sensory changes.  [x] All other systems negative  [ ] Unable to assess ROS because ________    OBJECTIVE:  ICU Vital Signs Last 24 Hrs  T(C): 36.7 (2021 04:20), Max: 36.7 (2021 00:20)  T(F): 98 (2021 04:20), Max: 98.1 (2021 00:20)  HR: 93 (2021 04:20) (64 - 93)  BP: 130/60 (2021 04:20) (130/60 - 165/70)  BP(mean): --  ABP: --  ABP(mean): --  RR: 18 (2021 04:20) (17 - 18)  SpO2: 100% (2021 04:20) (100% - 100%)        CAPILLARY BLOOD GLUCOSE          PHYSICAL EXAM:  General: Awake, alert, oriented X 3.   HEENT: Atraumatic, normocephalic.   Lymph Nodes: No palpable lymphadenopathy  Neck: No JVD. No carotid bruit.   Respiratory: Normal chest expansion. Clear anteriorly.  Cardiovascular: S1 S2 normal. No murmurs, rubs or gallops.   Abdomen: Soft, non-tender, non-distended. No organomegaly.  Extremities: Warm to touch. Peripheral pulse palpable. No pedal edema.   Skin: No rashes or skin lesions  Neurological: Motor and sensory examination equal and normal in all four extremities.  Psychiatry: Appropriate mood and affect.    HOSPITAL MEDICATIONS:  MEDICATIONS  (STANDING):  aspirin enteric coated 81 milliGRAM(s) Oral daily  atorvastatin 40 milliGRAM(s) Oral at bedtime  furosemide   Injectable 40 milliGRAM(s) IV Push daily  heparin   Injectable 5000 Unit(s) SubCutaneous every 12 hours  lactobacillus acidophilus 1 Tablet(s) Oral two times a day  pancrelipase  (CREON 12,000 Lipase Units) 1 Capsule(s) Oral with breakfast  pantoprazole    Tablet 40 milliGRAM(s) Oral before breakfast  sodium chloride 0.9% lock flush 3 milliLiter(s) IV Push every 8 hours    MEDICATIONS  (PRN):      LABS:                        8.0    4.26  )-----------( x        ( 2021 08:41 )             25.3     07-13    136  |  109<H>  |  23  ----------------------------<  80  4.0   |  16<L>  |  1.04    Ca    8.2<L>      2021 07:03  Phos  4.2     07-  Mg     2.00     07-13    TPro  7.5  /  Alb  3.3  /  TBili  1.4<H>  /  DBili  x   /  AST  44<H>  /  ALT  16  /  AlkPhos  124<H>  0712    PT/INR - ( 2021 21:45 )   PT: 12.6 sec;   INR: 1.10 ratio         PTT - ( 2021 21:45 )  PTT:34.2 sec  Urinalysis Basic - ( 2021 02:44 )    Color: Yellow / Appearance: Clear / S.010 / pH: x  Gluc: x / Ketone: Negative  / Bili: Negative / Urobili: <2 mg/dL   Blood: x / Protein: 300 mg/dL / Nitrite: Negative   Leuk Esterase: Negative / RBC: 60 /HPF / WBC 17 /HPF   Sq Epi: x / Non Sq Epi: 2 /HPF / Bacteria: Occasional        Venous Blood Gas:   @ 21:45  7.32/40/26/19/37.9  VBG Lactate: 1.0      MICROBIOLOGY:     RADIOLOGY:  [ ] Reviewed and interpreted by me    Point of Care Ultrasound Findings:    PFT:    EKG:

## 2021-07-14 NOTE — PROGRESS NOTE ADULT - ATTENDING COMMENTS
71F with PMH of CAD s/p stent 2020, apncreatic adenocarcinoma on chemo, HLD, H pylori s/p tx here w/ weakness/chest pressure. Initial labs notable for BNP 6.8k. Trop relatively flat 24->33. UA shows microscopic hematuria. CT chest showed no e/o PE. There was b/l pulmonary mets, that seems to have progressed in number and sized. Bilateral GGO w/ interlobular septal thickening and bilateral pleural effusion appreciated as well. Admit for evaluation of suspected new onset CHF. Pulm/onc on board.    Pt seen at bedside. No o/n events. Still SOB. No f/c, CP. Labs stable.     -CP/SOB likely multifactorial. Lower suspicion of ACS w/ flat trops and no ischemic EKG changes. BNP elevation and lung exam is concerning for hypervolemia 2' new onset CHF - etiology unclear. Progressive malignancy is also likely contributing.   -Improved w/ lasix. Appears euvolemic at this time.   -Trial PO lasix 40 daily.   -F/u ECHO  -Pulm/onc recs appreciated.       Rest of plan as discussed above w/ HS6 71F with PMH of CAD s/p stent 2020, apncreatic adenocarcinoma on chemo, HLD, H pylori s/p tx here w/ weakness/chest pressure. Initial labs notable for BNP 6.8k. Trop relatively flat 24->33. UA shows microscopic hematuria. CT chest showed no e/o PE. There was b/l pulmonary mets, that seems to have progressed in number and sized. Bilateral GGO w/ interlobular septal thickening and bilateral pleural effusion appreciated as well. Admit for evaluation of suspected new onset CHF. Pulm/onc on board.    Pt seen at bedside. No o/n events. Still SOB. No f/c, CP. Labs stable.     -CP/SOB likely multifactorial. Lower suspicion of ACS w/ flat trops and no ischemic EKG changes. BNP elevation and lung exam is concerning for hypervolemia 2' new onset CHF - etiology unclear. Progressive malignancy is also likely contributing.   -Improved w/ lasix. Appears euvolemic at this time.   -Sats look good - low O2 requirement - would continue to wean off NC  -Trial PO lasix 40 daily.   -F/u ECHO  -Pulm/onc recs appreciated.       Rest of plan as discussed above w/ HS6

## 2021-07-14 NOTE — PROGRESS NOTE ADULT - PROBLEM SELECTOR PLAN 1
Admit to tele, continue monitoring,  Troponin 24, repeat ordered --> 33  EKG Sinus rhythm, no ischemic changes.  ProBNP elevated at 6873.  f/u echo  CT showing Extensive bilateral pulmonary metastatic disease are significantly increased in size and number from recent prior, bilateral pleural effusions. NO PE  - continue lasix 20  - monitor I&Os, daily weights. Admit to tele, continue monitoring,  Troponin 24, repeat ordered --> 33  EKG Sinus rhythm, no ischemic changes.  ProBNP elevated at 6873.  f/u echo  CT showing Extensive bilateral pulmonary metastatic disease are significantly increased in size and number from recent prior, bilateral pleural effusions. NO PE  - continue lasix 40  - monitor I&Os, daily weights.

## 2021-07-14 NOTE — PROGRESS NOTE ADULT - SUBJECTIVE AND OBJECTIVE BOX
Corey Ritchie PGY1  Pager: [phone]    Patient is a 71y old  Female who presents with a chief complaint of Fatigue, chest pressure (2021 15:07)      SUBJECTIVE / OVERNIGHT EVENTS: No overnight events Patient states that her breathing is about the same as yesterday but feels like she doesn't have to work as hard to breath. She denies chest pain or chest tightness. He denies abdominal pain. She is urinating okay and denies having swelling.      Review of Systems: All other systems negative except as noted above    MEDICATIONS  (STANDING):  atorvastatin 40 milliGRAM(s) Oral at bedtime  furosemide   Injectable 40 milliGRAM(s) IV Push daily  heparin   Injectable 5000 Unit(s) SubCutaneous every 12 hours  lactobacillus acidophilus 1 Tablet(s) Oral two times a day  pancrelipase  (CREON 12,000 Lipase Units) 1 Capsule(s) Oral with breakfast  pantoprazole    Tablet 40 milliGRAM(s) Oral before breakfast  sodium chloride 0.9% lock flush 3 milliLiter(s) IV Push every 8 hours    MEDICATIONS  (PRN):      CAPILLARY BLOOD GLUCOSE        I&O's Summary      Vital Signs Last 24 Hrs  T(C): 36.7 (2021 04:20), Max: 36.7 (2021 00:20)  T(F): 98 (2021 04:20), Max: 98.1 (2021 00:20)  HR: 93 (2021 04:20) (64 - 93)  BP: 130/60 (2021 04:20) (130/60 - 165/70)  BP(mean): --  RR: 18 (2021 04:20) (17 - 18)  SpO2: 100% (2021 04:20) (100% - 100%)    PHYSICAL EXAM:  GENERAL: no distress  PSYCH: A&O x3  HEAD: Atraumatic, Normocephalic  NECK: Supple, No JVD  CHEST/LUNG: Inspiratory crackles in all lung fields, moreso in the lower lung fields B/L, normal work of breathing, no wheezes  HEART: normal S1, S2. regular rate and rhythm, no murmurs or rubs  ABDOMEN: nontender to palpation, no rebound tenderness/guarding  EXTREMITIES: no edema on bilateral LE  NEUROLOGY: no focal neurologic deficit  SKIN: No rashes or lesions    LABS:                        8.3    4.74  )-----------( 54       ( 2021 07:03 )             26.2      07-13    136  |  109<H>  |  23  ----------------------------<  80  4.0   |  16<L>  |  1.04    Ca    8.2<L>      2021 07:03  Phos  4.2     07  Mg     2.00     -    TPro  7.5  /  Alb  3.3  /  TBili  1.4<H>  /  DBili  x   /  AST  44<H>  /  ALT  16  /  AlkPhos  124<H>  07-12    PT/INR - ( 2021 21:45 )   PT: 12.6 sec;   INR: 1.10 ratio         PTT - ( 2021 21:45 )  PTT:34.2 sec  CARDIAC MARKERS ( 2021 07:03 )  x     / x     / 48 U/L / x     / 1.7 ng/mL      Urinalysis Basic - ( 2021 02:44 )    Color: Yellow / Appearance: Clear / S.010 / pH: x  Gluc: x / Ketone: Negative  / Bili: Negative / Urobili: <2 mg/dL   Blood: x / Protein: 300 mg/dL / Nitrite: Negative   Leuk Esterase: Negative / RBC: 60 /HPF / WBC 17 /HPF   Sq Epi: x / Non Sq Epi: 2 /HPF / Bacteria: Occasional        RADIOLOGY & ADDITIONAL TESTS:    Imaging Personally Reviewed:    Consultant(s) Notes Reviewed:      Care Discussed with Consultants/Other Providers:

## 2021-07-14 NOTE — PROGRESS NOTE ADULT - PROBLEM SELECTOR PLAN 4
CT showing: Extensive bilateral pulmonary metastatic disease are significantly increased in size and number from recent prior.  Oncology consult emailed.  Consult pulm.

## 2021-07-14 NOTE — PROGRESS NOTE ADULT - ATTENDING COMMENTS
71 F with CAD, pancreatic adenoca on chemo here with acute hypoxemic respiratory failure of unclear etiology.    CT chest shows pulmonary nodules, likely progression of disease, along with bilateral effusions.  Likely has component of pulmonary edema given findings as well as elevated pro bnp    #metastatic pancreatic ca  #acute hypoxemic respiratory failure   #acute pulmonary edema  - would c/w po diuretics  - hypoxemia improving today, hold off on steroids for possible lymphangitic spread

## 2021-07-15 NOTE — PROGRESS NOTE ADULT - ASSESSMENT
71 yo F w/ hx metastatic pancreatic adenocarcinoma dx Jun 2020, s/p 9 cycles of chemotherapy (FOLFIRINOX, ended with FOLFOX due to irinotecan intolerance; switched to gemcitabine/abraxane with onpro support; switched to gemcitabine / erlotinib on 2/3/21, last cycle on 6/17/21, due for chemo on 7/1/21) who was sent to the ER from PMD with HTN, fatigue, chest pressure, SOB.     Of note, pt was recently admitted at [facility] 2 weeks ago with new worsened anemia, concern for GI bleed. S/p EGD, with gastritis, no source of bleeding was found.  Her H&H was 7.8 on 7/9/21, transfuse 1 unit PRBC on 7/10/21, Hgb today 8.3  CT chest with worsening pulm mets    -Pulm input appreciated  -GI consult for hgb drop  -Monitor CBC and Transfuse as needed  -Monitor Bili periodically  -Patient is an appropriate candidate for hospice services, she had progression of diseaes on her current line of therapy and her treatment options are limited. Consider pal care consult, GOC and hospice referral.   -Chemo on hold.  -Supportive care, pain control, Nutrition, PT, DVT ppx  -Outpatient oncology f/u    Will follow. Please do not hesitate to call back with questions.     Lilia Goodwin MD  Medical Oncology Attending  C: [phone]       69 yo F w/ hx metastatic pancreatic adenocarcinoma dx Jun 2020, s/p 9 cycles of chemotherapy (FOLFIRINOX, ended with FOLFOX due to irinotecan intolerance; switched to gemcitabine/abraxane with onpro support; switched to gemcitabine / erlotinib on 2/3/21, last cycle on 6/17/21, due for chemo on 7/1/21) who was sent to the ER from PMD with HTN, fatigue, chest pressure, SOB.     Of note, pt was recently admitted at [facility] 2 weeks ago with new worsened anemia, concern for GI bleed. S/p EGD, with gastritis, no source of bleeding was found.  Her H&H was 7.8 on 7/9/21, transfuse 1 unit PRBC on 7/10/21 with some response, Hgb today down to 7.9 again.    CT chest with worsening pulm mets  Discussed with patient progression of disease and that her treatment options are very limited at this point. Best supportive care is recommended. Further systemic treatment will have not have a meaningful effect.    -Pulm input appreciated  -Pal care consult. Hospice referral  -Monitor CBC and transfuse as needed. No component of Iron deficiency or hemolysis.   -Consider GI consult for hgb drop  -Monitor CBC and Transfuse as needed  -Monitor Bili periodically  -Patient is an appropriate candidate for hospice services, she had progression of diseaes on her current line of therapy and her treatment options are limited. Consider pal care consult, GOC and hospice referral.   -Chemo on hold.  -Supportive care, pain control, Nutrition, PT, DVT ppx  -Outpatient oncology f/u    Will follow. Please do not hesitate to call back with questions.     Lilia Goodwin MD  Medical Oncology Attending  C: [phone]

## 2021-07-15 NOTE — PHYSICAL THERAPY INITIAL EVALUATION ADULT - SIT-TO-STAND BALANCE
[General Appearance - Alert] : alert [Sclera] : the sclera and conjunctiva were normal [] : no respiratory distress [Respiration, Rhythm And Depth] : normal respiratory rhythm and effort [Edema] : there was no peripheral edema [Exaggerated Use Of Accessory Muscles For Inspiration] : no accessory muscle use [Abnormal Walk] : normal gait [Nail Clubbing] : no clubbing  or cyanosis of the fingernails [Oriented To Time, Place, And Person] : oriented to person, place, and time [Musculoskeletal - Swelling] : no joint swelling seen [Impaired Insight] : insight and judgment were intact [Affect] : the affect was normal good minus [Mood] : the mood was normal [FreeTextEntry1] : thin woman

## 2021-07-15 NOTE — PROGRESS NOTE ADULT - SUBJECTIVE AND OBJECTIVE BOX
Wen Borjas PGY1    Patient is a 71y old  Female who presents with a chief complaint of Fatigue, chest pressure (14 Jul 2021 08:52)      SUBJECTIVE / OVERNIGHT EVENTS:    MEDICATIONS  (STANDING):  aspirin enteric coated 81 milliGRAM(s) Oral daily  atorvastatin 40 milliGRAM(s) Oral at bedtime  furosemide    Tablet 40 milliGRAM(s) Oral daily  heparin   Injectable 5000 Unit(s) SubCutaneous every 12 hours  lactobacillus acidophilus 1 Tablet(s) Oral two times a day  ondansetron Injectable 4 milliGRAM(s) IV Push once  pancrelipase  (CREON 12,000 Lipase Units) 1 Capsule(s) Oral with breakfast  pantoprazole    Tablet 40 milliGRAM(s) Oral before breakfast  sodium chloride 0.9% lock flush 3 milliLiter(s) IV Push every 8 hours    MEDICATIONS  (PRN):      CAPILLARY BLOOD GLUCOSE        I&O's Summary    14 Jul 2021 07:01  -  15 Jul 2021 07:00  --------------------------------------------------------  IN: 370 mL / OUT: 200 mL / NET: 170 mL        Vital Signs Last 24 Hrs  T(C): 36.8 (15 Jul 2021 06:26), Max: 37.3 (15 Jul 2021 01:17)  T(F): 98.2 (15 Jul 2021 06:26), Max: 99.2 (15 Jul 2021 01:17)  HR: 73 (15 Jul 2021 06:26) (66 - 89)  BP: 152/69 (15 Jul 2021 06:26) (138/62 - 161/83)  BP(mean): --  RR: 18 (15 Jul 2021 06:26) (16 - 18)  SpO2: 98% (15 Jul 2021 06:26) (98% - 100%)    PHYSICAL EXAM:  GENERAL: no distress  PSYCH: A&O x3  HEAD: Atraumatic, Normocephalic  NECK: Supple, No JVD  CHEST/LUNG: clear to auscultation bilaterally  HEART: regular rate and rhythm, no murmurs  ABDOMEN: nontender to palpation, no rebound tenderness/guarding  EXTREMITIES: no edema on bilateral LE  NEUROLOGY: no focal neurologic deficit  SKIN: No rashes or lesions    LABS:                        7.9    5.32  )-----------( 42       ( 15 Jul 2021 07:03 )             24.7      07-14    139  |  106  |  26<H>  ----------------------------<  85  3.5   |  19<L>  |  1.12    Ca    7.9<L>      14 Jul 2021 08:41    TPro  6.6  /  Alb  2.8<L>  /  TBili  1.2  /  DBili  x   /  AST  37<H>  /  ALT  13  /  AlkPhos  110  07-14              RADIOLOGY & ADDITIONAL TESTS:    Imaging Personally Reviewed:    Consultant(s) Notes Reviewed:      Care Discussed with Consultants/Other Providers:   Wen Borjas PGY2    Patient is a 71y old  Female who presents with a chief complaint of Fatigue, chest pressure (14 Jul 2021 08:52)      SUBJECTIVE / OVERNIGHT EVENTS:    MEDICATIONS  (STANDING):  aspirin enteric coated 81 milliGRAM(s) Oral daily  atorvastatin 40 milliGRAM(s) Oral at bedtime  furosemide    Tablet 40 milliGRAM(s) Oral daily  heparin   Injectable 5000 Unit(s) SubCutaneous every 12 hours  lactobacillus acidophilus 1 Tablet(s) Oral two times a day  ondansetron Injectable 4 milliGRAM(s) IV Push once  pancrelipase  (CREON 12,000 Lipase Units) 1 Capsule(s) Oral with breakfast  pantoprazole    Tablet 40 milliGRAM(s) Oral before breakfast  sodium chloride 0.9% lock flush 3 milliLiter(s) IV Push every 8 hours    MEDICATIONS  (PRN):      CAPILLARY BLOOD GLUCOSE        I&O's Summary    14 Jul 2021 07:01  -  15 Jul 2021 07:00  --------------------------------------------------------  IN: 370 mL / OUT: 200 mL / NET: 170 mL        Vital Signs Last 24 Hrs  T(C): 36.8 (15 Jul 2021 06:26), Max: 37.3 (15 Jul 2021 01:17)  T(F): 98.2 (15 Jul 2021 06:26), Max: 99.2 (15 Jul 2021 01:17)  HR: 73 (15 Jul 2021 06:26) (66 - 89)  BP: 152/69 (15 Jul 2021 06:26) (138/62 - 161/83)  BP(mean): --  RR: 18 (15 Jul 2021 06:26) (16 - 18)  SpO2: 98% (15 Jul 2021 06:26) (98% - 100%)    PHYSICAL EXAM:  GENERAL: no distress  PSYCH: A&O x3  HEAD: Atraumatic, Normocephalic  NECK: Supple, No JVD  CHEST/LUNG: clear to auscultation bilaterally  HEART: regular rate and rhythm, no murmurs  ABDOMEN: nontender to palpation, no rebound tenderness/guarding  EXTREMITIES: no edema on bilateral LE  NEUROLOGY: no focal neurologic deficit  SKIN: No rashes or lesions    LABS:                        7.9    5.32  )-----------( 42       ( 15 Jul 2021 07:03 )             24.7      07-14    139  |  106  |  26<H>  ----------------------------<  85  3.5   |  19<L>  |  1.12    Ca    7.9<L>      14 Jul 2021 08:41    TPro  6.6  /  Alb  2.8<L>  /  TBili  1.2  /  DBili  x   /  AST  37<H>  /  ALT  13  /  AlkPhos  110  07-14              RADIOLOGY & ADDITIONAL TESTS:    Imaging Personally Reviewed:    Consultant(s) Notes Reviewed:      Care Discussed with Consultants/Other Providers:

## 2021-07-15 NOTE — PROGRESS NOTE ADULT - PROBLEM SELECTOR PLAN 1
Admit to tele, continue monitoring,  Troponin 24, repeat ordered --> 33  EKG Sinus rhythm, no ischemic changes.  ProBNP elevated at 6873.  f/u echo  CT showing Extensive bilateral pulmonary metastatic disease are significantly increased in size and number from recent prior, bilateral pleural effusions. NO PE  - continue lasix 40  - monitor I&Os, daily weights.

## 2021-07-15 NOTE — PROGRESS NOTE ADULT - SUBJECTIVE AND OBJECTIVE BOX
INTERVAL HPI/OVERNIGHT EVENTS:  Patient seen at bedside.      VITAL SIGNS:  T(F): 97.7 (07-15-21 @ 10:19)  HR: 65 (07-15-21 @ 10:19)  BP: 152/62 (07-15-21 @ 10:19)  RR: 17 (07-15-21 @ 10:19)  SpO2: 99% (07-15-21 @ 10:19)  Wt(kg): --    PHYSICAL EXAM:    Constitutional: NAD, resting in bed comfortably  Eyes: EOMI, sclera non-icteric  Neck: supple, no LAP  Respiratory: CTA b/l, good air entry b/l, no wheezing, rhonchi or crackels  Cardiovascular: RRR, normal S1S2, no M/R/G  Gastrointestinal: soft, NTND  Extremities: no edema  Neurological: AAOx3, non focal  Skin: Normal temperature    MEDICATIONS  (STANDING):  aspirin enteric coated 81 milliGRAM(s) Oral daily  atorvastatin 40 milliGRAM(s) Oral at bedtime  furosemide    Tablet 20 milliGRAM(s) Oral daily  heparin   Injectable 5000 Unit(s) SubCutaneous every 12 hours  lactobacillus acidophilus 1 Tablet(s) Oral two times a day  ondansetron Injectable 4 milliGRAM(s) IV Push once  pancrelipase  (CREON 12,000 Lipase Units) 1 Capsule(s) Oral with breakfast  pantoprazole    Tablet 40 milliGRAM(s) Oral before breakfast  sodium chloride 0.9% lock flush 3 milliLiter(s) IV Push every 8 hours    MEDICATIONS  (PRN):      Allergies    No Known Allergies    Intolerances        LABS:                        7.9    5.32  )-----------( 42       ( 15 Jul 2021 07:03 )             24.7     07-15    139  |  108<H>  |  31<H>  ----------------------------<  89  3.6   |  18<L>  |  1.25    Ca    8.0<L>      15 Jul 2021 07:03  Phos  4.3     07-15  Mg     2.00     07-15    TPro  6.7  /  Alb  2.6<L>  /  TBili  1.2  /  DBili  x   /  AST  38<H>  /  ALT  13  /  AlkPhos  108  07-15          RADIOLOGY & ADDITIONAL TESTS:  Studies reviewed.   INTERVAL HPI/OVERNIGHT EVENTS:  Patient seen at bedside.  She felt dyspneic off nasal O2 so went back on 2lit.    VITAL SIGNS:  T(F): 97.7 (07-15-21 @ 10:19)  HR: 65 (07-15-21 @ 10:19)  BP: 152/62 (07-15-21 @ 10:19)  RR: 17 (07-15-21 @ 10:19)  SpO2: 99% (07-15-21 @ 10:19)  Wt(kg): --    PHYSICAL EXAM:    Constitutional: NAD, resting in bed comfortably  Eyes: EOMI, sclera non-icteric  Neck: supple, no LAP  Respiratory: CTA b/l, good air entry b/l, no wheezing, rhonchi or crackels  Cardiovascular: RRR, normal S1S2, no M/R/G  Gastrointestinal: soft, NTND  Extremities: no edema  Neurological: AAOx3, non focal  Skin: Normal temperature    MEDICATIONS  (STANDING):  aspirin enteric coated 81 milliGRAM(s) Oral daily  atorvastatin 40 milliGRAM(s) Oral at bedtime  furosemide    Tablet 20 milliGRAM(s) Oral daily  heparin   Injectable 5000 Unit(s) SubCutaneous every 12 hours  lactobacillus acidophilus 1 Tablet(s) Oral two times a day  ondansetron Injectable 4 milliGRAM(s) IV Push once  pancrelipase  (CREON 12,000 Lipase Units) 1 Capsule(s) Oral with breakfast  pantoprazole    Tablet 40 milliGRAM(s) Oral before breakfast  sodium chloride 0.9% lock flush 3 milliLiter(s) IV Push every 8 hours    MEDICATIONS  (PRN):      Allergies    No Known Allergies    Intolerances        LABS:                        7.9    5.32  )-----------( 42       ( 15 Jul 2021 07:03 )             24.7     07-15    139  |  108<H>  |  31<H>  ----------------------------<  89  3.6   |  18<L>  |  1.25    Ca    8.0<L>      15 Jul 2021 07:03  Phos  4.3     07-15  Mg     2.00     07-15    TPro  6.7  /  Alb  2.6<L>  /  TBili  1.2  /  DBili  x   /  AST  38<H>  /  ALT  13  /  AlkPhos  108  07-15          RADIOLOGY & ADDITIONAL TESTS:  Studies reviewed.

## 2021-07-15 NOTE — PROGRESS NOTE ADULT - ATTENDING COMMENTS
71F with PMH of CAD s/p stent 2020, apncreatic adenocarcinoma on chemo, HLD, H pylori s/p tx here w/ weakness/chest pressure. Initial labs notable for BNP 6.8k. Trop relatively flat 24->33. UA shows microscopic hematuria. CT chest showed no e/o PE. There was b/l pulmonary mets, that seems to have progressed in number and sized. Bilateral GGO w/ interlobular septal thickening and bilateral pleural effusion appreciated as well. Admit for evaluation of suspected new onset CHF. ECHO showed normal LVEF, some valvular disease, trace pericardial effusion. Findings not suggestive of CHF as culprit of her sx. The progression of her malignancy, susan w/ lung involvement, is the likely culprit. Pulm/onc on board.    Pt seen at bedside. No o/n events. SOB w/ exertion. Feels better w/ O2. No f/c, CP. O2 sats have been excellent wihle off O2.  Labs show BUN/Cr trending up.    -Lower suspicion for CHF at this point. Her respiratory sx likely driven by her malignancy.   -Will reduce dose of lasix (continuing due to presence of effusions).   -Cont ot wean off O2.  -Pulm/onc recs appreciated. Will c/s pall care for further input.   -If stable, anticipate DC in next 24h to home.       Rest of plan as discussed above w/ HS6

## 2021-07-15 NOTE — PHYSICAL THERAPY INITIAL EVALUATION ADULT - GENERAL OBSERVATIONS, REHAB EVAL
Consult received, chart reviewed. Patient received seated at edge of bed, no apparent distress, +tele.

## 2021-07-15 NOTE — PHYSICAL THERAPY INITIAL EVALUATION ADULT - GAIT PATTERN USED, PT EVAL
Infant to receive nutrition via safest tolerated route with advancement to PO intake as medically feasible swing-through gait Infant to receive nutrition via safest tolerated route. Starter TPN adjusted per medical team.

## 2021-07-16 NOTE — PROGRESS NOTE ADULT - ATTENDING COMMENTS
71F with PMH of CAD s/p stent 2020, apncreatic adenocarcinoma on chemo, HLD, H pylori s/p tx here w/ weakness/chest pressure. Initial labs notable for BNP 6.8k. Trop relatively flat 24->33. UA shows microscopic hematuria. CT chest showed no e/o PE. There was b/l pulmonary mets, that seems to have progressed in number and sized. Bilateral GGO w/ interlobular septal thickening and bilateral pleural effusion appreciated as well. Admit for evaluation of suspected new onset CHF. ECHO showed normal LVEF, some valvular disease, trace pericardial effusion. Findings not suggestive of CHF as culprit of her sx. The progression of her malignancy, susan w/ lung involvement, is the likely culprit. Pulm/onc on board.    Pt seen at bedside. No o/n events. SOB w/ exertion. Feels better w/ O2. No f/c, CP. O2 sats have been excellent while off O2.  Labs show BUN/Cr trending up.    -Lower suspicion for CHF at this point. Her respiratory sx likely driven by her malignancy.   -Will reduce dose of lasix (continuing due to presence of effusions).   -Cont ot wean off O2.  -Pulm/onc recs appreciated. Will c/s pall care for further input.   -If stable, anticipate DC in next 24h to home.       Rest of plan as discussed above w/ HS6 71F with PMH of CAD s/p stent 2020, apncreatic adenocarcinoma on chemo, HLD, H pylori s/p tx here w/ weakness/chest pressure. Initial labs notable for BNP 6.8k. Trop relatively flat 24->33. UA shows microscopic hematuria. CT chest showed no e/o PE. There was b/l pulmonary mets, that seems to have progressed in number and sized. Bilateral GGO w/ interlobular septal thickening and bilateral pleural effusion appreciated as well. Admit for evaluation of suspected new onset CHF. ECHO showed normal LVEF, some valvular disease, trace pericardial effusion. Findings not suggestive of CHF as culprit of her sx. The progression of her malignancy, susan w/ lung involvement, is the likely culprit. Pulm/onc on board.    Pt seen at bedside. No o/n events. SOB w/ exertion. Feels better w/ O2. No f/c, CP. O2 sats have been excellent while off O2.  No labs today.    -Lower suspicion for CHF at this point. Her respiratory sx likely driven by her malignancy.  Satting well on RA - kept on supplemental O2 for comfort - pt does not qualify for home O2.  -Will Rx lasix 20 po daily PRN (for sx management).  -Pall care recs pending - however would not hold DC - this can be followed as OP. From discussion w/ son (Ronnie) and patient - they were disappointed to hear that there is progression of disease. Spoke w/ NP Antonieta who will contact son to further discuss. Dispo planning to home. Await callback from oncology for final plan.    Rest of plan as discussed above w/ HS6

## 2021-07-16 NOTE — HOSPICE CARE NOTE - CONVESATION DETAILS
HCN RN assessed pt at bedside. Pt did not wish to speak about her illness or care needs. She deferred to her son. Tel call placed to son, Ronnie Hernandez to give information about post hospital care/ possibility of hospice care. Pt's son stated he wants to hear from pt's oncology team so that he has a clear understanding of pt's illness and progression of disease. He stated he feels he/family do not have "accurate information"  and he further reported he does not know if pt will be receiving further disease modifying treatments or not.  He stated pt has home health aide service "24/7" and also has support from her sons and extended family. Hospice care was not discussed - for reason as noted. Primary Med Team MD and RN Case Manager informed. 
Pt and son spoke with [facility] oncology team. Pt and son are now in agreement with a home hospice plan of care. Pt signed for consents for hospice.

## 2021-07-16 NOTE — PROGRESS NOTE ADULT - PROBLEM SELECTOR PLAN 2
Patient with ProBNP of 6873.  Possibly new CHF vs SOB secondary to malignancy (more likely at this point)  Echo ordered.  Strict I&Os, daily weights.  Crackles heard on exam  Patient placed on tele. Stable BP.  Continue to monitor off antihypertensives for now.    DASH/TLC diet ordered.

## 2021-07-16 NOTE — PROGRESS NOTE ADULT - PROBLEM SELECTOR PLAN 5
Continue monitoring.   Oncology consult emailed. Progression of disease.  Appreciate onc recs. Hospice consulted, family needs to talk to outside oncologist first. Further follow up as OP.

## 2021-07-16 NOTE — PROGRESS NOTE ADULT - SUBJECTIVE AND OBJECTIVE BOX
CHIEF COMPLAINT: Fatigue    Interval Events: Patient reports that she has had some epistaxis overnight.    REVIEW OF SYSTEMS:  Constitutional: No fevers or chills. +Fatigue  Eyes: No itching or discharge from the eyes  ENT: No ear pain. No ear discharge. No nasal congestion. +Nose bleed  CV: No chest pain. No palpitations.  Resp: No dyspnea at rest. +Cough  GI: No nausea. No vomiting. No diarrhea.  MSK: No joint pain or pain in any extremities  Integumentary: No skin lesions. No pedal edema.  Neurological: No gross motor weakness. No sensory changes.  [x] All other systems negative  [ ] Unable to assess ROS because ________    OBJECTIVE:  ICU Vital Signs Last 24 Hrs  T(C): 36.7 (16 Jul 2021 05:43), Max: 37 (15 Jul 2021 17:00)  T(F): 98 (16 Jul 2021 05:43), Max: 98.6 (15 Jul 2021 17:00)  HR: 75 (16 Jul 2021 05:43) (67 - 75)  BP: 155/78 (16 Jul 2021 05:43) (147/68 - 156/81)  BP(mean): --  ABP: --  ABP(mean): --  RR: 16 (16 Jul 2021 05:43) (16 - 18)  SpO2: 100% (16 Jul 2021 05:43) (97% - 100%)    CAPILLARY BLOOD GLUCOSE    PHYSICAL EXAM:  General: Awake, alert, oriented X 3.   HEENT: Atraumatic, normocephalic.   Lymph Nodes: No palpable lymphadenopathy  Neck: No JVD. No carotid bruit.   Respiratory: Normal chest expansion. CTAB  Cardiovascular: S1 S2 normal. No murmurs, rubs or gallops.   Abdomen: Soft, non-tender, non-distended. No organomegaly.  Extremities: Warm to touch. Peripheral pulse palpable. No pedal edema.   Skin: No rashes or skin lesions  Neurological: Motor and sensory examination equal and normal in all four extremities.  Psychiatry: Appropriate mood and affect.    HOSPITAL MEDICATIONS:  MEDICATIONS  (STANDING):  aspirin enteric coated 81 milliGRAM(s) Oral daily  atorvastatin 40 milliGRAM(s) Oral at bedtime  furosemide    Tablet 20 milliGRAM(s) Oral daily  heparin   Injectable 5000 Unit(s) SubCutaneous every 12 hours  lactobacillus acidophilus 1 Tablet(s) Oral two times a day  ondansetron Injectable 4 milliGRAM(s) IV Push once  pancrelipase  (CREON 12,000 Lipase Units) 1 Capsule(s) Oral with breakfast  pantoprazole    Tablet 40 milliGRAM(s) Oral before breakfast  sodium chloride 0.9% lock flush 3 milliLiter(s) IV Push every 8 hours    MEDICATIONS  (PRN):      LABS:                        7.9    5.32  )-----------( 42       ( 15 Jul 2021 07:03 )             24.7     07-15    139  |  108<H>  |  31<H>  ----------------------------<  89  3.6   |  18<L>  |  1.25    Ca    8.0<L>      15 Jul 2021 07:03  Phos  4.3     07-15  Mg     2.00     07-15    TPro  6.7  /  Alb  2.6<L>  /  TBili  1.2  /  DBili  x   /  AST  38<H>  /  ALT  13  /  AlkPhos  108  07-15              MICROBIOLOGY:     RADIOLOGY:  [ ] Reviewed and interpreted by me    Point of Care Ultrasound Findings:    PFT:    EKG:

## 2021-07-16 NOTE — PROGRESS NOTE ADULT - PROBLEM SELECTOR PLAN 4
Extensive bilateral pulmonary metastatic disease are significantly increased in size and number from recent prior.  Oncology consult emailed.  Consult pulm. Extensive bilateral pulmonary metastatic disease are significantly increased in size and number from recent prior.  Onc and pulm recs noted. Progression of disease noted on imaging from admission.  Appreciate onc recs. Hospice consulted, family needs to talk to outside oncologist first.

## 2021-07-16 NOTE — PROGRESS NOTE ADULT - PROBLEM SELECTOR PLAN 8
Heparin subcutaneous 5000 units q12h.
Heparin subcutaneous 5000 units q12h
Heparin subcutaneous 5000 units q12h.
Heparin subcutaneous 5000 units q12h.

## 2021-07-16 NOTE — DISCHARGE NOTE PROVIDER - CARE PROVIDER_API CALL
Mike Escobar)  [facility]; Internal Medicine  [phone] [place] [address]  Phone: [phone]  Fax: [phone]  Follow Up Time: 2 weeks

## 2021-07-16 NOTE — PROGRESS NOTE ADULT - PROVIDER SPECIALTY LIST ADULT
Pulmonology
Pulmonology
Heme/Onc
Internal Medicine

## 2021-07-16 NOTE — PROGRESS NOTE ADULT - REASON FOR ADMISSION
Fatigue, chest pressure
Fatigue, chest pressure, shortness of breath
Fatigue, chest pressure

## 2021-07-16 NOTE — DISCHARGE NOTE PROVIDER - HOSPITAL COURSE
72 y/o F with PMH of CAD s/p stent in 2020, pancreatic adenocarcinoma diagnosed in April 2020 on Chemo twice a month, last treatment end of June, Hyperlipidemia, H. pylori treated in 2020, recent admission for Low hemoglobin in July, plavix discontinued, presents to the ED complaining of fatigue, shortness of breath and chest pressure concerning for new onset heart failure vs progression of malignancy (metastatic lung lesions). Patient initially presented with a pBNP of 6873, neg trops of 24 to 33, inspiratory crackles, and was started on lasix due to concern of new onset CHF. ECHO showed grade 1 diastolic dysfunction and preserved EF, therefore the bilateral pleural effusions found on CT were more concerning for malignancy. Pulm and  Heme onc were consulted during this hospital stay. Per oncology team, patient is progressing despite chemotherapy and erlotinib was held. Palliative care and hospice have been consulted for evaluation and GOC are being discussed. 70 y/o F with PMH of CAD s/p stent in 2020, pancreatic adenocarcinoma diagnosed in April 2020 on Chemo twice a month, last treatment end of June, Hyperlipidemia, H. pylori treated in 2020, recent admission for Low hemoglobin in July, plavix discontinued, presents to the ED complaining of fatigue, shortness of breath and chest pressure concerning for new onset heart failure vs progression of malignancy (metastatic lung lesions). Patient initially presented with a pBNP of 6873, neg trops of 24 to 33, inspiratory crackles, and was started on lasix due to concern of new onset CHF. ECHO showed grade 1 diastolic dysfunction and preserved EF, therefore the bilateral pleural effusions found on CT were more concerning for malignancy. Pulm and  Heme onc were consulted during this hospital stay. Per oncology team, patient is progressing despite chemotherapy and erlotinib was held. Palliative care and hospice have been consulted for evaluation and GOC are being discussed. Patient will need to follow up with oncology after discharge. 72 y/o F with PMH of CAD s/p stent in 2020, pancreatic adenocarcinoma diagnosed in April 2020 on Chemo twice a month, last treatment end of June, Hyperlipidemia, H. pylori treated in 2020, recent admission for Low hemoglobin in July, plavix discontinued, presents to the ED complaining of fatigue, shortness of breath and chest pressure concerning for new onset heart failure vs progression of malignancy (metastatic lung lesions). Patient initially presented with a pBNP of 6873, neg trops of 24 to 33, inspiratory crackles, and was started on lasix due to concern of new onset CHF. ECHO showed grade 1 diastolic dysfunction and preserved EF, therefore the bilateral pleural effusions found on CT were more concerning for malignancy. Pulm and  Heme onc were consulted during this hospital stay. Per oncology team, patient is progressing despite chemotherapy and erlotinib was held. Palliative care and hospice have been consulted for evaluation and GOC are being discussed.    Pending hospice decision on home with hospice 70 y/o F with PMH of CAD s/p stent in 2020, pancreatic adenocarcinoma diagnosed in April 2020 on Chemo twice a month, last treatment end of June, Hyperlipidemia, H. pylori treated in 2020, recent admission for Low hemoglobin in July, plavix discontinued, presents to the ED complaining of fatigue, shortness of breath and chest pressure concerning for new onset heart failure vs progression of malignancy (metastatic lung lesions). Patient initially presented with a pBNP of 6873, neg trops of 24 to 33, inspiratory crackles, and was started on lasix due to concern of new onset CHF. ECHO showed grade 1 diastolic dysfunction and preserved EF, therefore the bilateral pleural effusions found on CT were more concerning for malignancy. Pulm and  Heme onc were consulted during this hospital stay. Per oncology team, patient is progressing despite chemotherapy and erlotinib was held. Palliative care and hospice have been consulted for evaluation and GOC are being discussed. Patient has been accepted for home hospice. Will go home with oxygen.

## 2021-07-16 NOTE — PROGRESS NOTE ADULT - PROBLEM SELECTOR PLAN 3
Patient with elevated blood pressure of 175. Patient asymptomatic.  Continue to monitor off antihypertensives for now.    DASH/TLC diet ordered. Extensive bilateral pulmonary metastatic disease are significantly increased in size and number from recent prior.  Onc and pulm recs noted.  Pall care consulted. Referral made for home hospice -- pt/family to speak w/ outside onc first before proceeding.

## 2021-07-16 NOTE — DISCHARGE NOTE NURSING/CASE MANAGEMENT/SOCIAL WORK - PATIENT PORTAL LINK FT
You can access the FollowMyHealth Patient Portal offered by [facility] by registering at the following website: [email or web address]. By joining [employer]’s FollowMyHealth portal, you will also be able to view your health information using other applications (apps) compatible with our system.

## 2021-07-16 NOTE — PROVIDER CONTACT NOTE (OTHER) - SITUATION
patients IV  on 7/15...patient refused me to try and give her a new one last night as well as this morning. she states that she told the doctor that "no one is getting anymore blood from me"
Patient hypertensive

## 2021-07-16 NOTE — PROGRESS NOTE ADULT - SUBJECTIVE AND OBJECTIVE BOX
PawelbjJo Stapletonraj PGY1  Pager: [phone]    Patient is a 71y old  Female who presents with a chief complaint of Fatigue, chest pressure (15 Jul 2021 11:47)      SUBJECTIVE / OVERNIGHT EVENTS: No overnight events. Patient states that she is "not too bad, but not too good". She is currently on 2L of oxygen and says that she can't do without it. She still has a little chest tightness on the left and can't sleep on her left side. She denies abdominal pain today. She feels a little weak, has nausea. At times, she does cough up streaks of blood but states that it has been going on for some time.       Review of Systems: All other systems negative except as noted above    MEDICATIONS  (STANDING):  aspirin enteric coated 81 milliGRAM(s) Oral daily  atorvastatin 40 milliGRAM(s) Oral at bedtime  furosemide    Tablet 20 milliGRAM(s) Oral daily  heparin   Injectable 5000 Unit(s) SubCutaneous every 12 hours  lactobacillus acidophilus 1 Tablet(s) Oral two times a day  ondansetron Injectable 4 milliGRAM(s) IV Push once  pancrelipase  (CREON 12,000 Lipase Units) 1 Capsule(s) Oral with breakfast  pantoprazole    Tablet 40 milliGRAM(s) Oral before breakfast  sodium chloride 0.9% lock flush 3 milliLiter(s) IV Push every 8 hours    MEDICATIONS  (PRN):      CAPILLARY BLOOD GLUCOSE        I&O's Summary      Vital Signs Last 24 Hrs  T(C): 36.7 (16 Jul 2021 05:43), Max: 37 (15 Jul 2021 17:00)  T(F): 98 (16 Jul 2021 05:43), Max: 98.6 (15 Jul 2021 17:00)  HR: 75 (16 Jul 2021 05:43) (65 - 75)  BP: 155/78 (16 Jul 2021 05:43) (147/68 - 156/81)  BP(mean): --  RR: 16 (16 Jul 2021 05:43) (16 - 18)  SpO2: 100% (16 Jul 2021 05:43) (97% - 100%)    PHYSICAL EXAM:  GENERAL: no distress  PSYCH: A&O x3  HEAD: Atraumatic, Normocephalic  NECK: Supple, No JVD  CHEST/LUNG: inspiratory crackles in all lung fields, normal work of breathing, no wheezes  HEART: normal S1, S2. regular rate and rhythm, no murmurs or rubs  ABDOMEN: nontender to palpation, no rebound tenderness/guarding  EXTREMITIES: no edema on bilateral LE  NEUROLOGY: no focal neurologic deficit  SKIN: No rashes or lesions    LABS:                        7.9    5.32  )-----------( 42       ( 15 Jul 2021 07:03 )             24.7      07-15    139  |  108<H>  |  31<H>  ----------------------------<  89  3.6   |  18<L>  |  1.25    Ca    8.0<L>      15 Jul 2021 07:03  Phos  4.3     07-15  Mg     2.00     07-15    TPro  6.7  /  Alb  2.6<L>  /  TBili  1.2  /  DBili  x   /  AST  38<H>  /  ALT  13  /  AlkPhos  108  07-15              RADIOLOGY & ADDITIONAL TESTS:    Imaging Personally Reviewed:    Consultant(s) Notes Reviewed:      Care Discussed with Consultants/Other Providers:

## 2021-07-16 NOTE — PROGRESS NOTE ADULT - ASSESSMENT
71 year-old F with PMH metastatic pancreatic cancer who presented to the hospital with cough, fatigue, and shortness of breath in the setting of worsening pulmonary metastasis. Pulmonology consulted for evaluation..    Fatigue, Shortness of Breath  - Likely in the setting of progressive metastatic disease  - If dyspnea is not significantly improved, can consider steroids for lymphangitic spread, although would hold off for now, lungs remain clear on examination  - Overall poor prognosis given progression of disease on treatment.  - F/u heme/onc, palliative, hospice    Patrick Durbin, PGY-6  Pulmonary and Critical Care Medicine  [id number]

## 2021-07-16 NOTE — PROGRESS NOTE ADULT - PROBLEM SELECTOR PLAN 1
Admit to tele, continue monitoring,  Troponin 24, repeat ordered --> 33  EKG Sinus rhythm, no ischemic changes.  ProBNP elevated at 6873.  f/u echo  CT showing Extensive bilateral pulmonary metastatic disease are significantly increased in size and number from recent prior, bilateral pleural effusions. NO PE  - continue lasix 20 oral  - monitor I&Os, daily weights. Patient with ProBNP of 6873.  Possibly new CHF vs SOB secondary to malignancy (more likely the latter at this point)  ECHO should grade I diastolic dysfunction. Normal LVEF, no other WMA.  Imaging on admission should progressive metastatic disease @ the lungs. There was also bilateral pleural effusion (more likely malignant at this time). Trialed lasix which seemed to have provided only some relief.  Will dc w/ lasix 20mg PO PRN (for SOB - if use is required - pt should call MD to further eval).  Does not qualify for O2 - maintaining good sats on RA.

## 2021-07-16 NOTE — PROGRESS NOTE ADULT - PROBLEM SELECTOR PLAN 7
Lipid level in AM.  Continue atorvastatin.  Chol 105, trig 108, HDL 44, LDL 39. Continue atorvastatin.  Chol 105, trig 108, HDL 44, LDL 39. Heparin subcutaneous 5000 units q12h.

## 2021-07-16 NOTE — DISCHARGE NOTE PROVIDER - NSFOLLOWUPCLINICS_GEN_ALL_ED_FT
[facility]  Geriatrics  [address]  [place] [address]  Phone: [phone]  Fax:

## 2021-07-16 NOTE — DISCHARGE NOTE NURSING/CASE MANAGEMENT/SOCIAL WORK - NSSCTYPOFSERV_GEN_ALL_CORE
[facility] accepted home 02 to be ordered and delivered to home this evening- family aware to  pt once home 02 in home-

## 2021-07-16 NOTE — DISCHARGE NOTE PROVIDER - NSDCFUSCHEDAPPT_GEN_ALL_CORE_FT
CORONA, GRISELDA ; 07/19/2021 ; [facility] Derm [address] [facility]  CORONA, GRISELDA ; 10/08/2021 ; [facility] Cardio [address] [facility]

## 2021-07-16 NOTE — PROGRESS NOTE ADULT - PROBLEM SELECTOR PLAN 6
Patient with urine protein of 300.  Spot Cr/protein ratio ordered. Further follow up as OP. Continue atorvastatin.  Chol 105, trig 108, HDL 44, LDL 39.

## 2021-07-16 NOTE — PROGRESS NOTE ADULT - ASSESSMENT
70 y/o F with PMH of CAD s/p stent in 2020, pancreatic adenocarcinoma diagnosed in April 2020 on Chemo twice a month, last treatment end of June, Hyperlipidemia, H. pylori treated in 2020, recent admission for Low hemoglobin in July, plavix discontinued, presents to the ED complaining of fatigue, shortness of breath and chest pressure concerning for new onset heart failure.

## 2021-07-16 NOTE — HOSPICE CARE NOTE - HOSPICE APPROVAL ADDITIONAL DETAILS
[facility] RN will present pt's information to Hospice MD for approval - if - pt/family understand pt's terminal illness and are in agreement with forgoing disease modifying treatments.   At present, there is no agreement for hospice care. Case presentation has not been done. Please contact [facility] at [phone] when/if pt/family express agreement and understanding as noted. 
[facility] start of care date is today 7/16/2021.    Please call [facility] 24/7 tel # with any questions or concerns.

## 2021-07-16 NOTE — DISCHARGE NOTE PROVIDER - NSDCCPCAREPLAN_GEN_ALL_CORE_FT
PRINCIPAL DISCHARGE DIAGNOSIS  Diagnosis: Malignant neoplasm metastatic to lung, unspecified laterality  Assessment and Plan of Treatment: On admission to the hospital, you were foudn to have progression of your      SECONDARY DISCHARGE DIAGNOSES  Diagnosis: Dyspnea, unspecified type  Assessment and Plan of Treatment: Dyspnea, unspecified type    Diagnosis: Pancreatic adenocarcinoma  Assessment and Plan of Treatment: Pancreatic adenocarcinoma    Diagnosis: Hypertension  Assessment and Plan of Treatment: Hypertension    Diagnosis: Heart failure, unspecified HF chronicity, unspecified heart failure type  Assessment and Plan of Treatment: Heart failure, unspecified HF chronicity, unspecified heart failure type    Diagnosis: Chest pressure  Assessment and Plan of Treatment: Chest pressure    Diagnosis: Hyperlipidemia  Assessment and Plan of Treatment: Hyperlipidemia    Diagnosis: Fatigue  Assessment and Plan of Treatment:     Diagnosis: Malignant neoplasm metastatic to lung, unspecified laterality  Assessment and Plan of Treatment: Malignant neoplasm metastatic to lung, unspecified laterality     PRINCIPAL DISCHARGE DIAGNOSIS  Diagnosis: Malignant neoplasm metastatic to lung, unspecified laterality  Assessment and Plan of Treatment: On admission to the hospital, you were found to have two lung nodules concerning for metastatic disease from your pancreatic adenocarcinoma. Please follow up with your oncologist within 1-2 weeks of discharge.      SECONDARY DISCHARGE DIAGNOSES  Diagnosis: Dyspnea, unspecified type  Assessment and Plan of Treatment: You had shortness of breath upon admission to the hospital. This could have been because of heart failure or your metastatic lungs lesions and we have determined that it was more likely to be your cancer causing inflammation and fluid to build up in your lungs. Please follow up with your oncologist within 1-2 weeks of discharge.    Diagnosis: Pancreatic adenocarcinoma  Assessment and Plan of Treatment: You have a history of pancreatic adenocarcinoma. Please follow up with your oncologist.    Diagnosis: Hypertension  Assessment and Plan of Treatment: You have high blood pressure which could be a result of the fluid build up in your lungs. Please follow up with your outpatient doctor after dischrage.    Diagnosis: Heart failure, unspecified HF chronicity, unspecified heart failure type  Assessment and Plan of Treatment: We were concerned about heart failure but your heart function appears have good function after echocardiogram was completed.    Diagnosis: Hyperlipidemia  Assessment and Plan of Treatment: You have a history of hyperlipidemia and was continued on a statin. Please follow up with your primary care physician upon discharge in 1-2 weeks.    Diagnosis: Fatigue  Assessment and Plan of Treatment: Fatigue could be a symptom of having an ongoing illness. Please follow up with you primary doctor within 1-2 weeks of discharge.

## 2021-07-16 NOTE — PROGRESS NOTE ADULT - ATTENDING COMMENTS
71 F with CAD, pancreatic adenoca on chemo here with acute hypoxemic respiratory failure of unclear etiology.    CT chest shows pulmonary nodules, likely progression of disease, along with bilateral effusions. Initially with degree of volume overload.    #metastatic pancreatic ca  #acute hypoxemic respiratory failure   #acute pulmonary edema  - would c/w po diuretics  - hypoxemia improving today, hold off on steroids for possible lymphangitic spread    Patient now being considered for discharge home with home hospice.

## 2021-07-16 NOTE — DISCHARGE NOTE PROVIDER - NSDCMRMEDTOKEN_GEN_ALL_CORE_FT
Aspir 81 oral delayed release tablet: 1 tab(s) orally once a day  atorvastatin 40 mg oral tablet: 1 tab(s) orally once a day (at bedtime)  Beano: 1000 unit(s) orally 3 times a day  Brilinta (ticagrelor) 90 mg oral tablet: 1 tab(s) orally 2 times a day  carvedilol 3.125 mg oral tablet: 1 tab(s) orally 2 times a day  clopidogrel 75 mg oral tablet: 1 tab(s) orally once a day  erlotinib 100 mg oral tablet: 1 tab(s) orally every other day  lactobacillus acidophilus oral capsule: 1 tab(s) orally 2 times a day  metoprolol succinate 25 mg oral tablet, extended release: 1 tab(s) orally once a day  omeprazole 20 mg oral delayed release capsule: 1 cap(s) orally once a day  pancrelipase 12,000 units-38,000 units-60,000 units oral delayed release capsule: 1 cap(s) orally once a day (before a meal)    **Per pharmacy, filled on 1/20/21 for 30 day supply with directions to take 2 capsules orally with meals and 1 capsule with snacks   pravastatin 40 mg oral tablet: 1 tab(s) orally once a day   Aspir 81 oral delayed release tablet: 1 tab(s) orally once a day  atorvastatin 40 mg oral tablet: 1 tab(s) orally once a day (at bedtime)  Beano: 1000 unit(s) orally 3 times a day  clopidogrel 75 mg oral tablet: 1 tab(s) orally once a day  Lasix 20 mg oral tablet: 1 tab(s) orally once a day  metoprolol succinate 25 mg oral tablet, extended release: 1 tab(s) orally once a day  omeprazole 20 mg oral delayed release capsule: 1 cap(s) orally once a day  pancrelipase 12,000 units-38,000 units-60,000 units oral delayed release capsule: 2 cap(s) orally once a day (before a meal)

## 2021-07-16 NOTE — HOSPICE CARE NOTE - DME DETAILS
Oxygen concentrator was ordered from [facility] for delivery this evening. Pt's son was advised of this. Pt can be discharged once o2 delivery has been confirmed.

## 2021-07-22 PROBLEM — R42 EPISODE OF DIZZINESS: Status: ACTIVE | Noted: 2020-01-01

## 2021-07-22 PROBLEM — C25.9 ADENOCARCINOMA OF PANCREAS: Status: ACTIVE | Noted: 2020-06-04

## 2021-07-22 PROBLEM — D64.9 ANEMIA: Status: ACTIVE | Noted: 2021-01-01

## 2021-07-23 NOTE — H&P ADULT - HISTORY OF PRESENT ILLNESS
71 year old fmale PMH CAD, HLD, stage 4 pancreatic cancer w/ lung metastasis on home hospice since 7/16/2021 BIBEMS for respiratory distress. Pt with dyspnea which the family can no longer manage at home. Pt with home O2 but SOB was not improving despite use.

## 2021-07-23 NOTE — ED ADULT TRIAGE NOTE - CHIEF COMPLAINT QUOTE
shortness of breath since yesterday denies fever, NTG SL x 1 given by EMS prior to ED, with history of lung cancer

## 2021-07-23 NOTE — ED PROVIDER NOTE - OBJECTIVE STATEMENT
71 year old fmale PMH CAD, HLD, stage 4 pancreatic cancer with mets to lungs coming In with SOB. states she is on home hospice and has oxygen at home but it wasn't improving the SOB so called EMS to come to the ED.

## 2021-07-23 NOTE — H&P ADULT - ATTENDING COMMENTS
patient seen and examined in ED. 71 y F with hx pancreatic CA dx'd June 2020, foll at [facility], s/p chemo x 9 cycles, last received June 2021, with progression of lung mets on recent adm to [facility], recommended for hospice by onc, saeid'd with [facility] home hospice 7/16. Patient and son report developed inc SOB since 1-2 d, no relief w O2, they called [facility], advised to give morphine, however since she did not have "pain", they did not give it and instead called EMS when symptoms persisted. Currently patient reports some relief w morphine given in ED but still feels SOB and feels more comfortable w NRB. Pt alert, mild dyspnea, on NRB, NAD. Started on morphine po ATC w breakthrough as needed, ativan prn as appears to have anxiety component as well. Wean O2 as tolerated. DNR/DNI on file. For dc home w hospice once stabilized, discussed w patient and son who are in agreement w plan. Has 24h HHA. Sons are involved in her care, Ronnie is POA.

## 2021-07-23 NOTE — H&P ADULT - NSHPPHYSICALEXAM_GEN_ALL_CORE
General - NAD, sitting up in bed on NRB  Eyes - PERRLA, EOM intact  ENT - Nonicteric sclerae, PERRLA, EOMI. Oropharynx clear. Moist mucous membranes. Conjunctivae appear well perfused.   Neck - No noticeable or palpable swelling, redness or rash around throat or on face  Lymph Nodes - No lymphadenopathy  Cardiovascular - RRR no m/r/g, no JVD, no carotid bruits  Lungs - (+) R chemoport, (+) coarse b/l  Skin - No rashes, skin warm and dry, no erythematous areas  Abdomen - Normal bowel sounds, abdomen soft and nontender  Extremities - No edema, cyanosis or clubbing  Neurological – Alert and oriented, CN 2-12 grossly intact.

## 2021-07-23 NOTE — PATIENT PROFILE ADULT - FALL HARM RISK
none/other none/bones(Osteoporosis,prev fx,steroid use,metastatic bone ca)/coagulation(Bleeding disorder R/T clinical cond/anti-coags)/other

## 2021-07-23 NOTE — ED PROVIDER NOTE - PSH
H/O total hysterectomy    History of cardiac cath  1 stent. 4/2020. [facility]  Port-A-Cath in place  4/2020 ~ right chest

## 2021-07-23 NOTE — PATIENT PROFILE ADULT - VISION (WITH CORRECTIVE LENSES IF THE PATIENT USUALLY WEARS THEM):
with glasses/Normal vision: sees adequately in most situations; can see medication labels, newsprint with glasses/Partially impaired: cannot see medication labels or newsprint, but can see obstacles in path, and the surrounding layout; can count fingers at arm's length

## 2021-07-23 NOTE — H&P ADULT - PROBLEM SELECTOR PLAN 1
[facility] since 7/16 bibems for respiratory distresss admitted to home hospice  regular diet  NRB for comfort  comfort measures only, no further blood draws  robinul, morphine under [facility] since 7/16 bibems for respiratory distress admitted to home hospice  regular diet  NRB for comfort  comfort measures only, no further blood draws  robinul, morphine

## 2021-07-23 NOTE — ED PROVIDER NOTE - PROGRESS NOTE DETAILS
labs with worsening of her chronic anemia. cxr with mets- appears unchanged. pt on home hospice- consulted dr ladd- advised morphine IV 2mg to assess for any improvement. pt still with SOB. on NRB- when switched to 4L NC states she cant breathe but feels improved on NRB. will admit to hospice service.

## 2021-07-23 NOTE — H&P ADULT - NSICDXPASTSURGICALHX_GEN_ALL_CORE_FT
PAST SURGICAL HISTORY:  H/O total hysterectomy     History of cardiac cath 1 stent. 4/2020. [facility]    Port-A-Cath in place 4/2020 ~ right chest

## 2021-07-23 NOTE — PATIENT PROFILE ADULT - NSTOBACCONEVERSMOKERY/N_GEN_A
Nebulizer Treatment:  Pre treatment vitals: BP: 118/80 (01/09/17 0853)  Temp: 99.7 °F (37.6 °C) (01/09/17 0853)  Pulse: 120 (01/09/17 0853)  SpO2: 94 % (01/09/17 0853)   Administered Hand held nebulizer treatment with Albuterol    Medication Supply: Stock Medication used      Treatment vitals and times recorded in vitals section  Patient tolerated the procedure well   No

## 2021-07-23 NOTE — H&P ADULT - ASSESSMENT
71 year old fmale PMH CAD, HLD, stage 4 pancreatic cancer w/ lung metastasis on home hospice since 7/16/2021 BIBEMS for respiratory distress admitted to inpatient hospice

## 2021-07-24 NOTE — CHART NOTE - NSCHARTNOTEFT_GEN_A_CORE
reviewed chart, vitals and meds, spoke with nurse about patient  as per nurse, SOB is improving with morphine, pt does have difficulty with regular diet and pills  will d/c creon, change diet to mechanical soft.   spoke with son - Ronnie on the phone - will start fentanyl 12 mcg patch    states they have private pay HHA for patient  and also would like hospital bed, "upgrade on the oxygen machine" - was told by a hospice RN that it's a very old model. Would prefer the NRB mask at home.   explained about symptom management with low doses of ativan and morphine.     ok with discharge on Monday after hospital bed delivery.

## 2021-07-25 NOTE — PROGRESS NOTE ADULT - SUBJECTIVE AND OBJECTIVE BOX
CORONA, GRISELDA                    71y  Female    Allergies    No Known Allergies    Intolerances        Symptoms:  Pain (1-10):  Dyspnea:  Nausea/Vomiting:  Secretions:   Agitation:  Symptom Requiring Inpatient Hospice Admission:    Overnight events/interim history:    HPI:  71 year old fmale PMH CAD, HLD, stage 4 pancreatic cancer w/ lung metastasis on home hospice since 7/16/2021 BIBEMS for respiratory distress. Pt with dyspnea which the family can no longer manage at home. Pt with home O2 but SOB was not improving despite use.  (23 Jul 2021 07:03)            MEDICATIONS  (STANDING):  fentaNYL   Patch  12 MICROgram(s)/Hr 1 Patch Transdermal every 72 hours  furosemide    Tablet 20 milliGRAM(s) Oral daily  metoprolol succinate ER 25 milliGRAM(s) Oral daily  morphine Concentrate 5 milliGRAM(s) Oral every 6 hours  senna 2 Tablet(s) Oral at bedtime    MEDICATIONS  (PRN):  glycopyrrolate Injectable 0.4 milliGRAM(s) IV Push every 6 hours PRN secretions  LORazepam     Tablet 1 milliGRAM(s) Oral every 4 hours PRN Anxiety  morphine   Solution 5 milliGRAM(s) Oral every 2 hours PRN dyspnea or severe pain                             Vital Signs Last 24 Hrs  T(C): 37.1 (25 Jul 2021 05:31), Max: 37.1 (25 Jul 2021 05:31)  T(F): 98.7 (25 Jul 2021 05:31), Max: 98.7 (25 Jul 2021 05:31)  HR: 91 (25 Jul 2021 05:31) (75 - 91)  BP: 163/67 (25 Jul 2021 05:31) (149/64 - 176/81)  BP(mean): --  RR: 18 (25 Jul 2021 05:31) (18 - 18)  SpO2: 94% (25 Jul 2021 05:31) (94% - 97%)    General: alert  oriented x ____    [lethargic distressed cachexia  nonverbal  unarousable verbal]  Karnofsky Performance Score/Palliative Performance Status Version2:     %    HEENT: normal  dry mouth  ET tube/trach oral lesions:  Lungs: comfortable tachypnea/labored breathing  excessive secretions  CV: RRR, S1S2, tachycardia  GI: soft non distended non tender  incontinent               PEG/NG/OG tube  constipation  last BM:   : incontinent  oliguria/anuria  willams  Musculoskeletal: weakness  edema   ambulatory with assistance bedbound/wheelchair bound  Skin: normal turgor, pressure ulcer stage:   Neuro: no deficits, cognitive impairment dsyphagia/dysarthria paresis  Oral intake ability: unable/only mouth care [minimal moderate full capability]    Code Status: DNR/DNI CORONA, GRISELDA                    71y  Female    Allergies    No Known Allergies    Intolerances        Symptoms: unable to assess ROS due to poor mental status  Symptom Requiring Inpatient Hospice Admission: SOB and agitation and anxiety    Overnight events/interim history: still restless with tachypnea and distress - needed IVP of morphine and ativan, unable to eat or swallow well     HPI:  71 year old fmale PMH CAD, HLD, stage 4 pancreatic cancer w/ lung metastasis on home hospice since 7/16/2021 BIBEMS for respiratory distress. Pt with dyspnea which the family can no longer manage at home. Pt with home O2 but SOB was not improving despite use.  (23 Jul 2021 07:03)          MEDICATIONS  (STANDING):  fentaNYL   Patch  12 MICROgram(s)/Hr 1 Patch Transdermal every 72 hours  furosemide    Tablet 20 milliGRAM(s) Oral daily  metoprolol succinate ER 25 milliGRAM(s) Oral daily  morphine Concentrate 5 milliGRAM(s) Oral every 6 hours  senna 2 Tablet(s) Oral at bedtime    MEDICATIONS  (PRN):  glycopyrrolate Injectable 0.4 milliGRAM(s) IV Push every 6 hours PRN secretions  LORazepam     Tablet 1 milliGRAM(s) Oral every 4 hours PRN Anxiety  morphine   Solution 5 milliGRAM(s) Oral every 2 hours PRN dyspnea or severe pain                    Vital Signs Last 24 Hrs  T(C): 37.1 (25 Jul 2021 05:31), Max: 37.1 (25 Jul 2021 05:31)  T(F): 98.7 (25 Jul 2021 05:31), Max: 98.7 (25 Jul 2021 05:31)  HR: 91 (25 Jul 2021 05:31) (75 - 91)  BP: 163/67 (25 Jul 2021 05:31) (149/64 - 176/81)  BP(mean): --  RR: 18 (25 Jul 2021 05:31) (18 - 18)  SpO2: 94% (25 Jul 2021 05:31) (94% - 97%)    General:   [lethargic distressed not very verbal]  Karnofsky Performance Score/Palliative Performance Status Version2: 20 %    HEENT: dry mouth    Lungs: mild to moderate tachypnea/labored breathing, very mild excessive secretions  CV: RRR, S1S2  GI: soft distended tender  incontinent  : incontinent    Musculoskeletal: weakness x4, mostly bedbound/wheelchair bound  Skin: normal turgor  Neuro: moderate cognitive impairment dsyphagia  Oral intake ability: unable/only mouth care     Code Status: DNR/DNI CORONA, GRISELDA                    71y  Female    Allergies    No Known Allergies    Intolerances        Symptoms: unable to assess ROS due to poor mental status  Symptom Requiring Inpatient Hospice Admission: SOB and agitation and anxiety    Overnight events/interim history: still restless with tachypnea and distress - needed IVP of morphine and ativan, unable to eat or swallow well     HPI:  71 year old fmale PMH CAD, HLD, stage 4 pancreatic cancer w/ lung metastasis on home hospice since 7/16/2021 BIBEMS for respiratory distress. Pt with dyspnea which the family can no longer manage at home. Pt with home O2 but SOB was not improving despite use.  (23 Jul 2021 07:03)          MEDICATIONS  (STANDING):  fentaNYL   Patch  12 MICROgram(s)/Hr 1 Patch Transdermal every 72 hours  furosemide    Tablet 20 milliGRAM(s) Oral daily  metoprolol succinate ER 25 milliGRAM(s) Oral daily  morphine Concentrate 5 milliGRAM(s) Oral every 6 hours  senna 2 Tablet(s) Oral at bedtime    MEDICATIONS  (PRN):  glycopyrrolate Injectable 0.4 milliGRAM(s) IV Push every 6 hours PRN secretions  LORazepam     Tablet 1 milliGRAM(s) Oral every 4 hours PRN Anxiety  morphine   Solution 5 milliGRAM(s) Oral every 2 hours PRN dyspnea or severe pain                    Vital Signs Last 24 Hrs  T(C): 37.1 (25 Jul 2021 05:31), Max: 37.1 (25 Jul 2021 05:31)  T(F): 98.7 (25 Jul 2021 05:31), Max: 98.7 (25 Jul 2021 05:31)  HR: 91 (25 Jul 2021 05:31) (75 - 91)  BP: 163/67 (25 Jul 2021 05:31) (149/64 - 176/81)  BP(mean): --  RR: 18 (25 Jul 2021 05:31) (18 - 18)  SpO2: 94% (25 Jul 2021 05:31) (94% - 97%)    General:   [lethargic distressed not very verbal]  Karnofsky Performance Score/Palliative Performance Status Version2: 20 %    HEENT: dry mouth    Lungs: mild to moderate tachypnea/labored breathing, very mild excessive secretions  CV: RRR, S1S2  GI: soft distended non tender non distended  : incontinent    Musculoskeletal: weakness x4, mostly bedbound/wheelchair bound  Skin: normal turgor  Neuro: moderate cognitive impairment dsyphagia  Oral intake ability: unable/only mouth care     Code Status: DNR/DNI

## 2021-07-25 NOTE — PROGRESS NOTE ADULT - PROBLEM SELECTOR PLAN 4
Eligible for continued inpatient hospice care admission due to ongoing active symptom management with IV medications  Bowel regimen with Dulcolax suppository PRN  for constipation  Robinul IVP PRN for Oral secretions management   Tylenol suppository PRN for fever  oral hygiene  Comfort care  Palliative/Hospice education and counseling to son- spoke about option to c/w inpatient hospice monitoring vs going home on Monday - patient's frequent symptom reassessment is a concern.   Son stated he would need a hospital bed for patient and an "updated oxygen tank"

## 2021-07-25 NOTE — PROGRESS NOTE ADULT - PROBLEM SELECTOR PLAN 2
c/w ativan PRN  needs frequent redirection  could be terminal delirium in setting of worsening SOB/anxiety

## 2021-07-26 NOTE — PROGRESS NOTE ADULT - PROBLEM SELECTOR PLAN 3
endstage with extensive lung mets - prognosis likely days to weeks  ECOG is 4, KPS 10%    patient retaining urine - over 1000cc with straight cath - will insert willams for comfort/retention

## 2021-07-26 NOTE — PROGRESS NOTE ADULT - PROBLEM SELECTOR PLAN 1
due to likely worsening lung mets and severe anemia - supportive care only  dc fentanyl 12 mcg and c/w morphine prn (other son wants to see if patient will be less lethargic)  c/w nasal cannula 3-4 Liters

## 2021-07-26 NOTE — PROGRESS NOTE ADULT - PROBLEM SELECTOR PLAN 4
Eligible for continued inpatient hospice care admission due to ongoing active symptom management with IV medications  Bowel regimen with Dulcolax suppository PRN  for constipation  Robinul IVP PRN for Oral secretions management   Tylenol suppository PRN for fever  oral hygiene  Comfort care  Palliative/Hospice education and counseling to son- spoke to both sons about remaining in patient hospice as patient's symptoms are worsening

## 2021-07-26 NOTE — PROGRESS NOTE ADULT - SUBJECTIVE AND OBJECTIVE BOX
CORONA, GRISELDA                    71y  Female    Allergies    No Known Allergies    Intolerances        Symptoms:  Pain (1-10):  Dyspnea:  Nausea/Vomiting:  Secretions:   Agitation:  Symptom Requiring Inpatient Hospice Admission:    Overnight events/interim history:    HPI:  71 year old fmale PMH CAD, HLD, stage 4 pancreatic cancer w/ lung metastasis on home hospice since 7/16/2021 BIBEMS for respiratory distress. Pt with dyspnea which the family can no longer manage at home. Pt with home O2 but SOB was not improving despite use.  (23 Jul 2021 07:03)            MEDICATIONS  (STANDING):  fentaNYL   Patch  12 MICROgram(s)/Hr 1 Patch Transdermal every 72 hours  furosemide    Tablet 20 milliGRAM(s) Oral daily  metoprolol succinate ER 25 milliGRAM(s) Oral daily  morphine Concentrate 5 milliGRAM(s) Oral every 6 hours  senna 2 Tablet(s) Oral at bedtime    MEDICATIONS  (PRN):  acetaminophen    Suspension .. 650 milliGRAM(s) Oral every 6 hours PRN Temp greater or equal to 38C (100.4F)  glycopyrrolate Injectable 0.4 milliGRAM(s) IV Push every 6 hours PRN secretions  LORazepam   Injectable 1 milliGRAM(s) IV Push every 2 hours PRN Agitation  morphine  - Injectable 2 milliGRAM(s) IV Push every 2 hours PRN respiratory distress, labored breathing                             Vital Signs Last 24 Hrs  T(C): 38.3 (26 Jul 2021 09:11), Max: 39 (26 Jul 2021 05:59)  T(F): 100.9 (26 Jul 2021 09:11), Max: 102.2 (26 Jul 2021 05:59)  HR: 96 (26 Jul 2021 05:41) (88 - 96)  BP: 187/85 (26 Jul 2021 05:41) (182/94 - 187/85)  BP(mean): --  RR: 19 (26 Jul 2021 05:41) (19 - 19)  SpO2: 98% (26 Jul 2021 05:41) (95% - 98%)    General: alert  oriented x ____    [lethargic distressed cachexia  nonverbal  unarousable verbal]  Karnofsky Performance Score/Palliative Performance Status Version2:     %    HEENT: normal  dry mouth  ET tube/trach oral lesions:  Lungs: comfortable tachypnea/labored breathing  excessive secretions  CV: RRR, S1S2, tachycardia  GI: soft non distended non tender  incontinent               PEG/NG/OG tube  constipation  last BM:   : incontinent  oliguria/anuria  willams  Musculoskeletal: weakness  edema   ambulatory with assistance bedbound/wheelchair bound  Skin: normal turgor, pressure ulcer stage:   Neuro: no deficits, cognitive impairment dsyphagia/dysarthria paresis  Oral intake ability: unable/only mouth care [minimal moderate full capability]    Code Status: DNR/DNI CORONA, GRISELDA                    71y  Female    Allergies    No Known Allergies    Intolerances        Symptoms:    Symptom Requiring Inpatient Hospice Admission: SOB, agitation, restlessness    Overnight events/interim history: more lethargic and still restless, required ativan and morphine PRNs overnight, not eating breakfast    HPI:  71 year old fmale PMH CAD, HLD, stage 4 pancreatic cancer w/ lung metastasis on home hospice since 7/16/2021 BIBEMS for respiratory distress. Pt with dyspnea which the family can no longer manage at home. Pt with home O2 but SOB was not improving despite use.  (23 Jul 2021 07:03)            MEDICATIONS  (STANDING):  fentaNYL   Patch  12 MICROgram(s)/Hr 1 Patch Transdermal every 72 hours  furosemide    Tablet 20 milliGRAM(s) Oral daily  metoprolol succinate ER 25 milliGRAM(s) Oral daily  morphine Concentrate 5 milliGRAM(s) Oral every 6 hours  senna 2 Tablet(s) Oral at bedtime    MEDICATIONS  (PRN):  acetaminophen    Suspension .. 650 milliGRAM(s) Oral every 6 hours PRN Temp greater or equal to 38C (100.4F)  glycopyrrolate Injectable 0.4 milliGRAM(s) IV Push every 6 hours PRN secretions  LORazepam   Injectable 1 milliGRAM(s) IV Push every 2 hours PRN Agitation  morphine  - Injectable 2 milliGRAM(s) IV Push every 2 hours PRN respiratory distress, labored breathing                             Vital Signs Last 24 Hrs  T(C): 38.3 (26 Jul 2021 09:11), Max: 39 (26 Jul 2021 05:59)  T(F): 100.9 (26 Jul 2021 09:11), Max: 102.2 (26 Jul 2021 05:59)  HR: 96 (26 Jul 2021 05:41) (88 - 96)  BP: 187/85 (26 Jul 2021 05:41) (182/94 - 187/85)  BP(mean): --  RR: 19 (26 Jul 2021 05:41) (19 - 19)  SpO2: 98% (26 Jul 2021 05:41) (95% - 98%)    General:   [lethargic distressed not very verbal]  Karnofsky Performance Score/Palliative Performance Status Version2: 20 %    HEENT: dry mouth    Lungs: mild to moderate tachypnea/labored breathing, very mild excessive secretions  CV: RRR, S1S2  GI: soft distended tender  incontinent  : incontinent    Musculoskeletal: weakness x4, mostly bedbound/wheelchair bound  Skin: normal turgor  Neuro: moderate cognitive impairment dsyphagia  Oral intake ability: unable/only mouth care   Code Status: DNR/DNI

## 2021-07-27 NOTE — PROGRESS NOTE ADULT - SUBJECTIVE AND OBJECTIVE BOX
CORONA, GRISELDA                    71y  Female    Allergies    No Known Allergies    Intolerances        Symptoms:  Pain (1-10):  Dyspnea:  Nausea/Vomiting:  Secretions:   Agitation:  Symptom Requiring Inpatient Hospice Admission:    Overnight events/interim history:    HPI:  71 year old fmale PMH CAD, HLD, stage 4 pancreatic cancer w/ lung metastasis on home hospice since 7/16/2021 BIBEMS for respiratory distress. Pt with dyspnea which the family can no longer manage at home. Pt with home O2 but SOB was not improving despite use.  (23 Jul 2021 07:03)            MEDICATIONS  (STANDING):  furosemide    Tablet 20 milliGRAM(s) Oral daily    MEDICATIONS  (PRN):  acetaminophen  Suppository .. 650 milliGRAM(s) Rectal every 6 hours PRN Temp greater or equal to 38C (100.4F)  bisacodyl Suppository 10 milliGRAM(s) Rectal daily PRN Constipation  glycopyrrolate Injectable 0.4 milliGRAM(s) IV Push every 6 hours PRN secretions  LORazepam   Injectable 1 milliGRAM(s) IV Push every 2 hours PRN Agitation  morphine  - Injectable 2 milliGRAM(s) IV Push every 2 hours PRN respiratory distress, labored breathing                             Vital Signs Last 24 Hrs  T(C): 37.2 (27 Jul 2021 05:23), Max: 38.1 (26 Jul 2021 21:27)  T(F): 99 (27 Jul 2021 05:23), Max: 100.5 (26 Jul 2021 21:27)  HR: 118 (27 Jul 2021 05:23) (93 - 118)  BP: 189/90 (27 Jul 2021 05:23) (172/72 - 189/90)  BP(mean): --  RR: 18 (27 Jul 2021 05:23) (18 - 18)  SpO2: 100% (27 Jul 2021 05:23) (98% - 100%)    General: alert  oriented x ____    [lethargic distressed cachexia  nonverbal  unarousable verbal]  Karnofsky Performance Score/Palliative Performance Status Version2:     %    HEENT: normal  dry mouth  ET tube/trach oral lesions:  Lungs: comfortable tachypnea/labored breathing  excessive secretions  CV: RRR, S1S2, tachycardia  GI: soft non distended non tender  incontinent               PEG/NG/OG tube  constipation  last BM:   : incontinent  oliguria/anuria  willams  Musculoskeletal: weakness  edema   ambulatory with assistance bedbound/wheelchair bound  Skin: normal turgor, pressure ulcer stage:   Neuro: no deficits, cognitive impairment dsyphagia/dysarthria paresis  Oral intake ability: unable/only mouth care [minimal moderate full capability]    Code Status: DNR/DNI CORONA, GRISELDA                    71y  Female    Allergies    No Known Allergies    Intolerances        Symptoms:  Symptom Requiring Inpatient Hospice Admission: SOB, agitation, restlessness    Overnight events/interim history : barely arousable now and still restless, required ativan and morphine PRNs overnight, nothing by mouth    HPI:  71 year old fmale PMH CAD, HLD, stage 4 pancreatic cancer w/ lung metastasis on home hospice since 7/16/2021 BIBEMS for respiratory distress. Pt with dyspnea which the family can no longer manage at home. Pt with home O2 but SOB was not improving despite use.  (23 Jul 2021 07:03)            MEDICATIONS  (STANDING):  furosemide    Tablet 20 milliGRAM(s) Oral daily    MEDICATIONS  (PRN):  acetaminophen  Suppository .. 650 milliGRAM(s) Rectal every 6 hours PRN Temp greater or equal to 38C (100.4F)  bisacodyl Suppository 10 milliGRAM(s) Rectal daily PRN Constipation  glycopyrrolate Injectable 0.4 milliGRAM(s) IV Push every 6 hours PRN secretions  LORazepam   Injectable 1 milliGRAM(s) IV Push every 2 hours PRN Agitation  morphine  - Injectable 2 milliGRAM(s) IV Push every 2 hours PRN respiratory distress, labored breathing                        Vital Signs Last 24 Hrs  T(C): 37.2 (27 Jul 2021 05:23), Max: 38.1 (26 Jul 2021 21:27)  T(F): 99 (27 Jul 2021 05:23), Max: 100.5 (26 Jul 2021 21:27)  HR: 118 (27 Jul 2021 05:23) (93 - 118)  BP: 189/90 (27 Jul 2021 05:23) (172/72 - 189/90)  BP(mean): --  RR: 18 (27 Jul 2021 05:23) (18 - 18)  SpO2: 100% (27 Jul 2021 05:23) (98% - 100%)    General:   [lethargic distressed not very verbal]  Karnofsky Performance Score/Palliative Performance Status Version2: 20 %    HEENT: dry mouth    Lungs: mild to moderate tachypnea/labored breathing, very mild excessive secretions  CV: RRR, S1S2  GI: soft distended tender  incontinent  : incontinent    Musculoskeletal: weakness x4, mostly bedbound/wheelchair bound  Skin: normal turgor  Neuro: moderate cognitive impairment dsyphagia  Oral intake ability: unable/only mouth care   Code Status: DNR/DNI CORONA, GRISELDA                    71y  Female    Allergies    No Known Allergies    Intolerances        Symptoms:  Symptom Requiring Inpatient Hospice Admission: SOB, agitation, restlessness    Overnight events/interim history : barely arousable now and still restless, required ativan and morphine PRNs overnight, nothing by mouth    HPI:  71 year old fmale PMH CAD, HLD, stage 4 pancreatic cancer w/ lung metastasis on home hospice since 7/16/2021 BIBEMS for respiratory distress. Pt with dyspnea which the family can no longer manage at home. Pt with home O2 but SOB was not improving despite use.  (23 Jul 2021 07:03)            MEDICATIONS  (STANDING):  furosemide    Tablet 20 milliGRAM(s) Oral daily    MEDICATIONS  (PRN):  acetaminophen  Suppository .. 650 milliGRAM(s) Rectal every 6 hours PRN Temp greater or equal to 38C (100.4F)  bisacodyl Suppository 10 milliGRAM(s) Rectal daily PRN Constipation  glycopyrrolate Injectable 0.4 milliGRAM(s) IV Push every 6 hours PRN secretions  LORazepam   Injectable 1 milliGRAM(s) IV Push every 2 hours PRN Agitation  morphine  - Injectable 2 milliGRAM(s) IV Push every 2 hours PRN respiratory distress, labored breathing                        Vital Signs Last 24 Hrs  T(C): 37.2 (27 Jul 2021 05:23), Max: 38.1 (26 Jul 2021 21:27)  T(F): 99 (27 Jul 2021 05:23), Max: 100.5 (26 Jul 2021 21:27)  HR: 118 (27 Jul 2021 05:23) (93 - 118)  BP: 189/90 (27 Jul 2021 05:23) (172/72 - 189/90)  BP(mean): --  RR: 18 (27 Jul 2021 05:23) (18 - 18)  SpO2: 100% (27 Jul 2021 05:23) (98% - 100%)    General:   [lethargic distressed not verbal]  Karnofsky Performance Score/Palliative Performance Status Version2: 20 %    HEENT: dry mouth    Lungs: mild to moderate tachypnea/labored breathing, mild excessive secretions  CV: RRR, S1S2  GI: soft non distended non tender  incontinent  : incontinent    Musculoskeletal: weakness x4, mostly bedbound  Skin: poor turgor  Neuro: severe cognitive impairment dsyphagia  Oral intake ability: unable/only mouth care   Code Status: DNR/DNI

## 2021-07-27 NOTE — PROGRESS NOTE ADULT - PROBLEM SELECTOR PLAN 4
Eligible for continued inpatient hospice care admission due to ongoing active symptom management with IV medications  Bowel regimen with Dulcolax suppository PRN  for constipation  Robinul IVP PRN for Oral secretions management   Tylenol suppository PRN for fever  oral hygiene  Comfort care  Palliative/Hospice education and counseling to son- spoke to both sons about remaining in patient hospice as patient's symptoms are worsening Eligible for continued inpatient hospice care admission due to ongoing active symptom management with IV medications  Bowel regimen with Dulcolax suppository PRN  for constipation  Robinul IVP PRN for Oral secretions management   Tylenol suppository PRN for fever  oral hygiene  Comfort care  Palliative/Hospice education and counseling to sons and sister at bedside

## 2021-07-27 NOTE — PROGRESS NOTE ADULT - PROBLEM SELECTOR PLAN 1
due to likely worsening lung mets and severe anemia - supportive care only  dc fentanyl 12 mcg and c/w morphine prn (other son wants to see if patient will be less lethargic)  c/w nasal cannula 3-4 Liters due to worsening lung mets and severe anemia - supportive care only  c/w morphine prn   c/w nasal cannula 3-4 Liters

## 2021-07-27 NOTE — PROGRESS NOTE ADULT - PROBLEM SELECTOR PLAN 3
endstage with extensive lung mets - prognosis likely days to weeks  ECOG is 4, KPS 10%    patient retaining urine - over 1000cc with straight cath - will insert willams for comfort/retention endstage with extensive lung mets - prognosis likely days  ECOG is 4, KPS 10%  c/w willams for comfort/retention

## 2021-07-28 NOTE — PROGRESS NOTE ADULT - PROBLEM SELECTOR PLAN 4
Eligible for continued inpatient hospice care admission due to ongoing active symptom management with IV medications  Bowel regimen with Dulcolax suppository PRN  for constipation  Robinul IVP PRN for Oral secretions management   Tylenol suppository PRN for fever  oral hygiene  Comfort care  Palliative/Hospice education and counseling to son- spoke to both sons about remaining in patient hospice as patient's symptoms are worsening Eligible for continued inpatient hospice care admission due to ongoing active symptom management with IV medications  Bowel regimen with Dulcolax suppository PRN  for constipation  Robinul IVP PRN for Oral secretions management   Tylenol suppository PRN for fever  oral hygiene  Comfort care  Palliative/Hospice education and counseling to sons and sister at bedside.

## 2021-07-28 NOTE — PROGRESS NOTE ADULT - PROBLEM SELECTOR PLAN 1
due to likely worsening lung mets and severe anemia - supportive care only  dc fentanyl 12 mcg and c/w morphine prn (other son wants to see if patient will be less lethargic)  c/w nasal cannula 3-4 Liters due to worsening lung mets and severe anemia - supportive care only  c/w morphine prn - may need to increase dose or start infusion  c/w nasal cannula 3-4 Liters

## 2021-07-28 NOTE — PROGRESS NOTE ADULT - SUBJECTIVE AND OBJECTIVE BOX
CORONA, GRISELDA                    71y  Female    Allergies    No Known Allergies    Intolerances        Symptoms:  Pain (1-10):  Dyspnea:  Nausea/Vomiting:  Secretions:   Agitation:  Symptom Requiring Inpatient Hospice Admission:    Overnight events/interim history:    HPI:  71 year old fmale PMH CAD, HLD, stage 4 pancreatic cancer w/ lung metastasis on home hospice since 7/16/2021 BIBEMS for respiratory distress. Pt with dyspnea which the family can no longer manage at home. Pt with home O2 but SOB was not improving despite use.  (23 Jul 2021 07:03)            MEDICATIONS  (STANDING):  furosemide    Tablet 20 milliGRAM(s) Oral daily    MEDICATIONS  (PRN):  acetaminophen  Suppository .. 650 milliGRAM(s) Rectal every 6 hours PRN Temp greater or equal to 38C (100.4F)  bisacodyl Suppository 10 milliGRAM(s) Rectal daily PRN Constipation  glycopyrrolate Injectable 0.4 milliGRAM(s) IV Push every 6 hours PRN secretions  LORazepam   Injectable 1 milliGRAM(s) IV Push every 2 hours PRN Agitation  morphine  - Injectable 2 milliGRAM(s) IV Push every 2 hours PRN respiratory distress, labored breathing                             Vital Signs Last 24 Hrs  T(C): 37.1 (28 Jul 2021 14:15), Max: 38 (27 Jul 2021 17:04)  T(F): 98.8 (28 Jul 2021 14:15), Max: 100.4 (27 Jul 2021 17:04)  HR: 127 (28 Jul 2021 14:15) (94 - 127)  BP: 172/99 (28 Jul 2021 14:15) (155/74 - 176/92)  BP(mean): --  RR: 20 (28 Jul 2021 14:15) (18 - 20)  SpO2: 98% (28 Jul 2021 14:15) (95% - 98%)    General: alert  oriented x ____    [lethargic distressed cachexia  nonverbal  unarousable verbal]  Karnofsky Performance Score/Palliative Performance Status Version2:     %    HEENT: normal  dry mouth  ET tube/trach oral lesions:  Lungs: comfortable tachypnea/labored breathing  excessive secretions  CV: RRR, S1S2, tachycardia  GI: soft non distended non tender  incontinent               PEG/NG/OG tube  constipation  last BM:   : incontinent  oliguria/anuria  willams  Musculoskeletal: weakness  edema   ambulatory with assistance bedbound/wheelchair bound  Skin: normal turgor, pressure ulcer stage:   Neuro: no deficits, cognitive impairment dsyphagia/dysarthria paresis  Oral intake ability: unable/only mouth care [minimal moderate full capability]    Code Status: DNR/DNI CORONA, GRISELDA                    71y  Female    Allergies    No Known Allergies    Intolerances    Symptoms:  Symptom Requiring Inpatient Hospice Admission: SOB, agitation, restlessness    Overnight events/interim history: not arousable now and still occ restless, required ativan, robinul and morphine PRNs overnight, nothing by mouth    HPI:  71 [facility] old fmale [facility] CAD, HLD, stage 4 pancreatic cancer w/ lung metastasis on home hospice since 7/16/2021 BIBEMS for respiratory distress. Pt with dyspnea which the family can no longer manage at home. Pt with home O2 but SOB was not improving despite use.  (23 Jul 2021 07:03)            MEDICATIONS  (STANDING):  furosemide    Tablet 20 milliGRAM(s) Oral daily    MEDICATIONS  (PRN):  acetaminophen  Suppository .. 650 milliGRAM(s) Rectal every 6 hours PRN Temp greater or equal to 38C (100.4F)  bisacodyl Suppository 10 milliGRAM(s) Rectal daily PRN Constipation  glycopyrrolate Injectable 0.4 milliGRAM(s) IV Push every 6 hours PRN secretions  LORazepam   Injectable 1 milliGRAM(s) IV Push every 2 hours PRN Agitation  morphine  - Injectable 2 milliGRAM(s) IV Push every 2 hours PRN respiratory distress, labored breathing    Vital Signs Last 24 Hrs  T(C): 37.1 (28 Jul 2021 14:15), Max: 38 (27 Jul 2021 17:04)  T(F): 98.8 (28 Jul 2021 14:15), Max: 100.4 (27 Jul 2021 17:04)  HR: 127 (28 Jul 2021 14:15) (94 - 127)  BP: 172/99 (28 Jul 2021 14:15) (155/74 - 176/92)  BP(mean): --  RR: 20 (28 Jul 2021 14:15) (18 - 20)  SpO2: 98% (28 Jul 2021 14:15) (95% - 98%)    General:   unarousable, distressed not verbal]  Karnofsky Performance Score/Palliative Performance Status Version2: 20 %    HEENT: dry mouth    Lungs: mild to moderate tachypnea/labored breathing, increased excessive secretions  CV: RRR, S1S2  GI: soft non distended non tender  incontinent  : incontinent    Musculoskeletal: weakness x4, mostly bedbound  Skin: poor turgor  Neuro: severe cognitive impairment dsyphagia  Oral intake ability: unable/only mouth care   Code Status: DNR/DNI

## 2021-07-28 NOTE — PROGRESS NOTE ADULT - PROBLEM SELECTOR PLAN 2
c/w ativan PRN  needs frequent redirection  could be terminal delirium in setting of worsening SOB/anxiety c/w ativan PRN  needs frequent redirection  likely terminal delirium in setting of worsening SOB/anxiety.

## 2021-07-28 NOTE — PROGRESS NOTE ADULT - PROBLEM SELECTOR PLAN 3
endstage with extensive lung mets - prognosis likely days to weeks  ECOG is 4, KPS 10%    patient retaining urine - over 1000cc with straight cath - will insert willams for comfort/retention endstage with extensive lung mets - prognosis likely days to weeks  ECOG is 4, KPS 10%  c/w willams for comfort/retention

## 2021-07-29 NOTE — PROGRESS NOTE ADULT - PROBLEM SELECTOR PLAN 1
due to likely worsening lung mets and severe anemia - supportive care only  dc fentanyl 12 mcg and c/w morphine prn (other son wants to see if patient will be less lethargic)  c/w nasal cannula 3-4 Liters due to worsening lung mets and severe anemia - supportive care only  will increase morphine 2mg prn   c/w nasal cannula 3-4 Liters.

## 2021-07-29 NOTE — DISCHARGE NOTE FOR THE EXPIRED PATIENT - HOSPITAL COURSE
71 year old female with PMH CAD, stage 4 Pancreatic adenocarcinoma with lung metastasis,  GERD, HLD, H. Pylori infection, and s/p total hysterectomy, cardiac catheterization and port-A-catheter. Patient was on home hospice since 7/16/2021 for respiratory distress. Pt. had dyspnea with home oxygen but SOB was not improving despite use. The family could no longer manage patient dyspnea at home. After patient continued to decline clinically her family opted for comfort measures only. Agreed inpatient hospice for management of dyspnea. Prognosis was poor, and patient was admitted for inpatient hospice on 7/23/21. She was medicated with robinul, ativan, dulcolax, tylenol suppository for temp., scopolamine patch and morphine for respiratory distress and labored breathing.     Called by nurse for patient beeing unresponsive with no pulse and no spontaneous  breathing, and examined at bedside. Patient was unresponsive for both verbal and tactile stimuli, had no audible sounds, no spontaneous respiration. Pupills were fixed and dilated. No resuscitative measures were initiated because patient was on DNR / DNI and MEWS suspension order for comfort care. Patient was pronounced death at 21:10pm. Family was notified. No autopy. Dr. Ramirez - made aware. [facility]ospice care network - called.

## 2021-07-29 NOTE — PROGRESS NOTE ADULT - SUBJECTIVE AND OBJECTIVE BOX
CORONA, GRISELDA                    71y  Female    Allergies    No Known Allergies    Intolerances        Symptoms:  Pain (1-10):  Dyspnea:  Nausea/Vomiting:  Secretions:   Agitation:  Symptom Requiring Inpatient Hospice Admission:    Overnight events/interim history:    HPI:  71 year old fmale PMH CAD, HLD, stage 4 pancreatic cancer w/ lung metastasis on home hospice since 7/16/2021 BIBEMS for respiratory distress. Pt with dyspnea which the family can no longer manage at home. Pt with home O2 but SOB was not improving despite use.  (23 Jul 2021 07:03)            MEDICATIONS  (STANDING):  furosemide    Tablet 20 milliGRAM(s) Oral daily    MEDICATIONS  (PRN):  acetaminophen  Suppository .. 650 milliGRAM(s) Rectal every 6 hours PRN Temp greater or equal to 38C (100.4F)  bisacodyl Suppository 10 milliGRAM(s) Rectal daily PRN Constipation  glycopyrrolate Injectable 0.4 milliGRAM(s) IV Push every 6 hours PRN secretions  LORazepam   Injectable 1 milliGRAM(s) IV Push every 2 hours PRN Agitation  morphine  - Injectable 2 milliGRAM(s) IV Push every 2 hours PRN respiratory distress, labored breathing                             Vital Signs Last 24 Hrs  T(C): 37.7 (29 Jul 2021 08:21), Max: 39.4 (29 Jul 2021 01:30)  T(F): 99.9 (29 Jul 2021 08:21), Max: 103 (29 Jul 2021 01:30)  HR: 108 (29 Jul 2021 05:25) (102 - 127)  BP: 158/73 (29 Jul 2021 05:25) (158/73 - 172/99)  BP(mean): --  RR: 21 (28 Jul 2021 19:10) (20 - 21)  SpO2: 96% (28 Jul 2021 19:10) (96% - 98%)    General: alert  oriented x ____    [lethargic distressed cachexia  nonverbal  unarousable verbal]  Karnofsky Performance Score/Palliative Performance Status Version2:     %    HEENT: normal  dry mouth  ET tube/trach oral lesions:  Lungs: comfortable tachypnea/labored breathing  excessive secretions  CV: RRR, S1S2, tachycardia  GI: soft non distended non tender  incontinent               PEG/NG/OG tube  constipation  last BM:   : incontinent  oliguria/anuria  willams  Musculoskeletal: weakness  edema   ambulatory with assistance bedbound/wheelchair bound  Skin: normal turgor, pressure ulcer stage:   Neuro: no deficits, cognitive impairment dsyphagia/dysarthria paresis  Oral intake ability: unable/only mouth care [minimal moderate full capability]    Code Status: DNR/DNI CORONA, GRISELDA                    71y  Female    Allergies    No Known Allergies    Intolerances    Symptoms:    Symptom Requiring Inpatient Hospice Admission: SOB, agitation, restlessness    Overnight events/interim history: overnight fevers, more tachypnea, labored breathing, not arousable, required ativan, robinul and morphine PRNs overnight    HPI:  71 year old female PMH CAD, HLD, stage 4 pancreatic cancer w/ lung metastasis on ion, hospice since 7/16/2021 BIBEMS for respiratory distress. Pt with dyspnea which the family can no longer manage at home. Pt with home O2 but SOB was not improving despite use.  (23 Jul 2021 07:03)            MEDICATIONS  (STANDING):  furosemide    Tablet 20 milliGRAM(s) Oral daily    MEDICATIONS  (PRN):  acetaminophen  Suppository .. 650 milliGRAM(s) Rectal every 6 hours PRN Temp greater or equal to 38C (100.4F)  bisacodyl Suppository 10 milliGRAM(s) Rectal daily PRN Constipation  glycopyrrolate Injectable 0.4 milliGRAM(s) IV Push every 6 hours PRN secretions  LORazepam   Injectable 1 milliGRAM(s) IV Push every 2 hours PRN Agitation  morphine  - Injectable 2 milliGRAM(s) IV Push every 2 hours PRN respiratory distress, labored breathing                   Vital Signs Last 24 Hrs  T(C): 37.7 (29 Jul 2021 08:21), Max: 39.4 (29 Jul 2021 01:30)  T(F): 99.9 (29 Jul 2021 08:21), Max: 103 (29 Jul 2021 01:30)  HR: 108 (29 Jul 2021 05:25) (102 - 127)  BP: 158/73 (29 Jul 2021 05:25) (158/73 - 172/99)  BP(mean): --  RR: 21 (28 Jul 2021 19:10) (20 - 21)  SpO2: 96% (28 Jul 2021 19:10) (96% - 98%)    General: unarousable, distressed not verbal  Karnofsky Performance Score/Palliative Performance Status Version2: 20 %    HEENT: dry mouth    Lungs: moderate tachypnea/labored breathing, increased excessive secretions  CV: RRR, S1S2  GI: soft non distended non tender  incontinent  : incontinent    Musculoskeletal: weakness x4, mostly bedbound  Skin: poor turgor  Neuro: severe cognitive impairment dsyphagia  Oral intake ability: unable/only mouth care   Code Status: DNR/DNI

## 2021-07-29 NOTE — PROGRESS NOTE ADULT - PROBLEM SELECTOR PROBLEM 3
Pancreatic adenocarcinoma

## 2021-07-29 NOTE — PROGRESS NOTE ADULT - PROBLEM SELECTOR PROBLEM 4
Hospice care patient

## 2021-07-29 NOTE — PROGRESS NOTE ADULT - PROBLEM SELECTOR PLAN 4
Eligible for continued inpatient hospice care admission due to ongoing active symptom management with IV medications  Bowel regimen with Dulcolax suppository PRN  for constipation  Robinul IVP PRN for Oral secretions management   Tylenol suppository PRN for fever  oral hygiene  Comfort care  Palliative/Hospice education and counseling to son- spoke to both sons about remaining in patient hospice as patient's symptoms are worsening Eligible for continued inpatient hospice care admission due to ongoing active symptom management with IV medications  Bowel regimen with Dulcolax suppository PRN  for constipation  Robinul IVP PRN for Oral secretions management - start scopolamine patch  Tylenol suppository PRN for fever  oral hygiene  Comfort care  Palliative/Hospice education and counseling to sons and sister at bedside

## 2021-07-29 NOTE — DISCHARGE NOTE FOR THE EXPIRED PATIENT - NS PATIENT DEATH CRITERIA
including absent breath sounds, pulselessness and/or asystole/Patient is dead based on Cardiopulmonary criteria including absent breath sounds, pulselessness and/or asystole

## 2021-07-29 NOTE — PROGRESS NOTE ADULT - PROBLEM SELECTOR PLAN 3
endstage with extensive lung mets - prognosis likely days to weeks  ECOG is 4, KPS 10%    patient retaining urine - over 1000cc with straight cath - will insert willams for comfort/retention endstage with extensive lung mets - prognosis likely hours to days  ECOG is 4, KPS 10%  c/w willams for comfort/retention

## 2021-07-30 ENCOUNTER — APPOINTMENT (OUTPATIENT)
Dept: INTERNAL MEDICINE | Facility: CLINIC | Age: 71
End: 2021-07-30

## 2021-07-30 ENCOUNTER — NON-APPOINTMENT (OUTPATIENT)
Age: 71
End: 2021-07-30

## 2021-08-12 ENCOUNTER — APPOINTMENT (OUTPATIENT)
Dept: INFUSION THERAPY | Facility: HOSPITAL | Age: 71
End: 2021-08-12

## 2021-08-13 NOTE — RESULTS/DATA
[FreeTextEntry1] : CT scans reviewed\par Distant LNodes\par Lung nodules\par c/w stage IV pan ca\par \par Repeat CT 8/9/20 showed stable disease\par 
none

## 2021-08-17 ENCOUNTER — RX RENEWAL (OUTPATIENT)
Age: 71
End: 2021-08-17

## 2021-08-17 RX ORDER — CARVEDILOL 3.12 MG/1
3.12 TABLET, FILM COATED ORAL
Qty: 180 | Refills: 0 | Status: ACTIVE | COMMUNITY
Start: 2021-01-01 | End: 1900-01-01

## 2021-08-19 NOTE — DISCHARGE NOTE PROVIDER - NSDCCONDITION_GEN_ALL_CORE
Stable 33 year old male PMH HTN coming in with hematuria, dysuria, and urinary urgency. pt states started to have dysuria today and hematuria tonight. feels like he has to pee but nothing comes out. never had this before. denies penile discharge, testicular pain, abd pains, N/V/D/C, flank pains, and all other complaints.

## 2021-08-26 ENCOUNTER — APPOINTMENT (OUTPATIENT)
Dept: INFUSION THERAPY | Facility: HOSPITAL | Age: 71
End: 2021-08-26

## 2021-09-09 ENCOUNTER — APPOINTMENT (OUTPATIENT)
Dept: INFUSION THERAPY | Facility: HOSPITAL | Age: 71
End: 2021-09-09

## 2021-09-23 ENCOUNTER — APPOINTMENT (OUTPATIENT)
Dept: INFUSION THERAPY | Facility: HOSPITAL | Age: 71
End: 2021-09-23

## 2021-10-06 PROBLEM — I10 ESSENTIAL HYPERTENSION: Status: ACTIVE | Noted: 2021-01-01

## 2021-10-08 ENCOUNTER — APPOINTMENT (OUTPATIENT)
Dept: CARDIOLOGY | Facility: CLINIC | Age: 71
End: 2021-10-08

## 2021-11-10 NOTE — ED ADULT NURSE NOTE - TEMPLATE LIST FOR HEAD TO TOE ASSESSMENT
Patient comes to clinic for follow up anticoagulation visit.  Last INR on 11/3 was 1.8.  Dose maintained due to previous missed doses while traveling.   Today's INR is 2.2 and is within goal range.    Current warfarin total weekly dose of 25 mg verified.  Informed the INR result is within therapeutic range and instructed to maintain current dose per protocol. Discussed dose and return date of 11/29 for next INR. See Anticoagulation flowsheet.    Dr. Escalona is in the office today supervising the treatment.    Instructed to contact the clinic with any unusual bleeding or bruising, any changes in medications, diet, health status, lifestyle, or any other changes, questions or concerns. Verbalized understanding of all discussed.      General

## 2022-01-17 NOTE — ED ADULT NURSE NOTE - NSFALLRSKUNASSIST_ED_ALL_ED
[FreeTextEntry6] : Hx Neutropenia, Eczema\par cough, congestion, rhinorrhea x 3 days.\par Ear tugging since last night.\par No fever, nausea, vomiting, diarrhea, rash, sick contact, or recent travel.\par Goes to . \par No tylenol or motrin given. 
no

## 2022-04-06 NOTE — ED ADULT NURSE NOTE - ED STAT RN HANDOFF DETAILS
[FreeTextEntry1] : Throat cx if pos Amoxil 500 mg po   bid x 10 days\par Rapid Covid testing negative\par Tylenol prn, fluids\par 
Patient awaiting for bed availability transferred to [facility], endorsed to Rosalie RN, safety and comfort maintained.

## 2022-08-15 NOTE — ED ADULT NURSE NOTE - NSFALLRSKOUTCOME_ED_ALL_ED
Patient and family educated on discharge information, patient educated on medication changes, and symptoms requiring emergent medical attention, patient teach back education, agrees to follow up as ordered, denies further needs or questions. Patient transferred to awaiting car by PCA via wheelchair. Universal Safety Interventions

## 2022-08-30 NOTE — ED PROVIDER NOTE - CHIEF COMPLAINT
The patient is a 68y Female complaining of difficulty breathing.
Quality 130: Documentation Of Current Medications In The Medical Record: Current Medications Documented
Detail Level: Detailed
Quality 110: Preventive Care And Screening: Influenza Immunization: Influenza Immunization Administered during Influenza season
Quality 131: Pain Assessment And Follow-Up: Pain assessment using a standardized tool is documented as negative, no follow-up plan required
Quality 226: Preventive Care And Screening: Tobacco Use: Screening And Cessation Intervention: Patient screened for tobacco use, is a smoker AND did not received Cessation Counseling for Medical Reasons

## 2022-09-12 NOTE — ED PROVIDER NOTE - NS ED MD DISPO SPECIAL CONSIDERATION1
HR=60 bpm, GBHR=682/69 mmhg, WgU8=264.0 %, Resp=16 B/min, EtCO2=39 mmHg, Apnea=0 Seconds, Pain=0, Balaji=9, Cruz=2 None

## 2023-01-09 NOTE — ASSESSMENT
Problem: Aspiration - Risk of  Goal: *Absence of aspiration  Outcome: Progressing Towards Goal     Problem: Falls - Risk of  Goal: *Absence of Falls  Description: Document Yoselin Fall Risk and appropriate interventions in the flowsheet. Outcome: Progressing Towards Goal  Note: Fall Risk Interventions:            Medication Interventions: Evaluate medications/consider consulting pharmacy    Elimination Interventions: Call light in reach    History of Falls Interventions: Room close to nurse's station         Problem: Pressure Injury - Risk of  Goal: *Prevention of pressure injury  Description: Document Edward Scale and appropriate interventions in the flowsheet.   Outcome: Progressing Towards Goal  Note: Pressure Injury Interventions:  Sensory Interventions: Assess changes in LOC, Keep linens dry and wrinkle-free, Minimize linen layers    Moisture Interventions: Absorbent underpads, Minimize layers    Activity Interventions: Assess need for specialty bed, PT/OT evaluation    Mobility Interventions: Assess need for specialty bed, PT/OT evaluation    Nutrition Interventions: Document food/fluid/supplement intake, Discuss nutritional consult with provider, Offer support with meals,snacks and hydration    Friction and Shear Interventions: Apply protective barrier, creams and emollients, Minimize layers [FreeTextEntry1] : 70 year old F with H/O metastatic disease diagnosed in June 2020 and is s/p 9 cycles of chemotherapy which was initiated with FOLFIRINOX and ended with FOLFOX with discontinuation of irinotecan due to severe side effects requiring hospitalization. Now FOLFOX is discontinued due to the development of septic shock and transaminitis. The current plan is to initiate Oxford/Abraxane+onpro. \par \par 1-Therapeutic: \par -----------------\par Pancreatic cancer with mets to lungs.\par Started patient on FOLFORINOX regimen.She received alternate cycles of FOLFOX with FOLFIRI- since she developed AE  .She was treated with 4 cycles of FOLFOX- She was hospitalized after her last chemo for sepsis-\par Switched her chemo to Oxford/ Abraxane- every 2 weeks.\par Sustained MI on 11/24 - Had a stent placed RCA.\par Resumed chemo with dose reduced Oxford/ Abraxane- 700/ 70 - Completed 6 cycles \par Switched chemo to gem- Tarceva since she developed Grade 2 Neuropathy - \par She had nasal bleeding and needed to be evaluated in the ER - Stopped taking ASA with epistaxis.\par Chemo on hold since 2/17- with nasal bleed and acute anemia \par Resume chemo with Oxford/ Tarceva today - \par \par \par \par 2-Diagnostic: \par ---------------\par Labs:Monthly CEA, , \par Ca 19.9- 142- 77---->75->47->42->41->53->45->42\par CEA: 1.9--->2-->1.7-->1.9->1.4->1.1->1.6\par Restaging scan  ordered \par \par 3- Pharmacogenetics testing:\par ----------------------------------\par -Foundation Liquid- LIBAN , TMB 4 Mut/Mb \par \par 4- Procedures:\par -----------------\par - S/P  Port-A-Cath placement on 06/15/20\par - PORT CARE\par - EMLA CREAM PRESCRIBED\par \par 5-Supportive: \par -----------------\par Epistaxis : Seen and evaluated by ENT - Had a nasal rocket that has been removed- Pt stopped taking ASA \par Anemia sec to blood loss- Will transfuse 1 unit PRBC this week \par MI - S/P Stent- On Plavix  Follows with Cardiology Dr Duff-\par Was advised to hold Metoprolol since she developed dizziness- \par Advised to use a Holter monitor x 7 days \par Pancreatic insufficiency: CREON - [address] units (2 pills with each snack and 4 pills with each meal) for pancreatic replacement\par DVT (PE): High risk, close monitoring\par Antiemetic: Zofran PRN and Compazine PRN\par Emla cream: for port numbing before use\par Prevention of mouth sores: Biotene mouthwash\par Skin reactions: Udderly smooth cream and Vitamin B 6 100 mg BID for prevention of HFS\par Diarrhea: The usual dose of Imodium is 4 milligrams (mg) (2 capsules) after the first loose bowel movement, and 2 mg (1 capsule) after each loose bowel movement after the first dose has been taken. No more than 16 mg (8 capsules) should be taken in any twenty-four-hour period. \par \par \par 6- RTC:  \par --------\par F/u MOLST form \par F/u in 2 weeks after restaging scans\par \par

## 2023-02-09 NOTE — PATIENT PROFILE ADULT - FALL HARM RISK TYPE OF ASSESSMENT
02/08/23 2000   C-SSRS (Frequent Screen)   2. Have you actually had any thoughts of killing yourself? No   6. Have you done anything, started to do anything, or prepared to do anything to end your life? No     Patient stated, \"My day was good, how was yours?\" Patient rates mood as 6/10. Denies current SI/SH/HI/AVH at this time, feels safe coming to staff if feeling unsafe. PRN benadryl given at 2134, observed to be asleep at 2200.   admission

## 2023-03-03 NOTE — ED PROVIDER NOTE - RATE
67 Dupixent Pregnancy And Lactation Text: This medication likely crosses the placenta but the risk for the fetus is uncertain. This medication is excreted in breast milk.

## 2023-05-01 NOTE — PHYSICAL THERAPY INITIAL EVALUATION ADULT - PRECAUTIONS/LIMITATIONS, REHAB EVAL
fall precautions Rinvoq Pregnancy And Lactation Text: Based on animal studies, Rinvoq may cause embryo-fetal harm when administered to pregnant women.  The medication should not be used in pregnancy.  Breastfeeding is not recommended during treatment and for 6 days after the last dose.

## 2023-05-10 NOTE — ED PROVIDER NOTE - NS ED MD DISPO ADMITTING SERVICE
Abdomen , soft, nontender, nondistended , no guarding or rigidity , no masses palpable , normal bowel sounds , Liver and Spleen , no hepatomegaly present , no hepatosplenomegaly , liver nontender , spleen not palpable
MED

## 2023-05-15 NOTE — ED PROVIDER NOTE - NS ED ATTENDING STATEMENT MOD
Attending with High Dose Vitamin A Pregnancy And Lactation Text: High dose vitamin A therapy is contraindicated during pregnancy and breast feeding.

## 2023-06-21 NOTE — H&P ADULT - PROBLEM SELECTOR PLAN 4
Detail Level: Simple Detail Level: Generalized Detail Level: Zone Detail Level: Detailed F/U Lipid panel.  Continue Statin.

## 2023-10-23 NOTE — ED ADULT NURSE NOTE - PRO INTERPRETER NEED 2
English Skin Substitute Text: The defect edges were debeveled with a #15 scalpel blade. Given the location of the defect, shape of the defect and the proximity to free margins a skin substitute graft was deemed most appropriate.  The graft material was trimmed to fit the size of the defect. The graft was then placed in the primary defect and oriented appropriately.

## 2024-02-21 NOTE — H&P ADULT - HISTORY OF PRESENT ILLNESS
NUTRITION INTERVENTION AND PLAN (written by Shama Mcintosh RD):     Can stop Enfamil   Keep a daily diet log to help count protein grams   New Recipe: 6 scoops MMA/PA Anamix Early Years powder + 6 ounces water, 3 times daily (total of 18 scoops daily)  Limit to 13-14 grams protein from all foods   Avoid fasting, no longer than 8 hours during times of wellness and 3-4 hours if ill or not eating much   Add 1 mL Poly-vi-sol with iron daily   Take metabolic formula 3 times daily   Have labs done today   Follow up in [facility] in 6 months or when recommended by Genetics Team by calling [phone] to schedule the visit    Metabolic Genetics Physician Recommendations:  1) Monitoring labs: CASTRO, plasma carnitine profile, ammonia, CBC w diff/plt, CMP  2) Pediatric Cardiology follow-up annually for long term follow-up for PA associated cardiac risks (with yearly ECHO and EKG w exam); since her cardiologist has left  (Dr Hinkle), I placed a new referral for the patient in case she needs it to establish with a new  Pediatric Cardiologist.  3) L-carnitine dose was increased based on her current weight to 4.3 mL PO BID (80 mg/kg/d total).  4) Rx for Nystatin liquid 5 mL QID x 2 weeks was also ordered and sent electronically to the pharmacy.  5) Renal ultrasound is still recommended  6) Dietary recommendations as noted above in more detail. Shama Mcintosh RD, is working on a revised formula recipe to provide more total protein (a combination of safe incomplete protein from the MMA/PA specialized formula and then a measured amount of whole protein to provide essential amino acids in amount to allow normal growth). Current recipe is as follows: MMA/PA Anamix 6 scoops in 6 oz of water 3 times per day (90 g of formula daily). She also is taking 13-14 g of whole protein from food each day. Shama also suggested adding Poly-Vi-Sol with iron at 1 mL PO qday to meet vitamin and iron needs.  7) Follow-up in 6 months at the   [facility].  8) We can be reached at [phone] (Emergency Answering Service) if Maggie is showing any concerning symptoms such as inabliity to keep her food and/or fluids down, vomiting, lethargy. decreased activity, signs of illness, etc. Other questions that are less emergent can be directed to Tierra Christine RN during regular business hours at [phone].    This is a 67 y/o female, walks alone, lives with sons, with PMH of HLD and SLE(?) presents with SOB that has been worsening over the past 2 months. She reports that she has had SOB for about 10-15 years but in the past 2 months it has gotten significantly worse. She is unable to walk more than 2 blocks or up the stairs without getting SOB. She sleeps with 3 pillows, but it still sometimes wakes her during the night and she has to get up and sleep in a chair. She says that she also feels like at times when she gets this SOB, her heart starts to beat too slowly. She says this SOB is associated with some generalized fatigue and malaise. She says that     She denies fevers, chills, chest pain or tightness, dizziness, numbness, tingling, weakness, abd pain, NVDC. She denies ever smoking cigarettes or drinking alcohol. This is a 67 y/o female, walks alone, lives with sons, with PMH of HLD and SLE(?) presents with SOB that has been worsening over the past 2 months. She reports that she has had SOB for about 10-15 years but in the past 2 months it has gotten significantly worse. She is unable to walk more than 2 blocks or up the stairs without getting SOB. She sleeps with 3 pillows, but it still sometimes wakes her during the night and she has to get up and sleep in a chair. She says that she also feels like at times when she gets this SOB, her heart starts to beat too slowly. She says this SOB is associated with some generalized fatigue and malaise. She reports that when she was young, even in her teens, she would frequently pass out, and she saw doctors for this and they told her all her tests were normal. She says she hasn't passed out since she stopped menstruating. She says she believes she got an echo at [facility] several months ago and as far as she knows it was normal. She denies LOC, fevers, chills, chest pain or tightness, dizziness, numbness, tingling, weakness, abd pain, NVDC. She denies ever smoking cigarettes or drinking alcohol. She did not get the flu shot or her pneumococcus vaccine because she says she has never received these. This is a 67 y/o female, walks alone, lives with sons, with PMH of HLD, GERD, and SLE presents with SOB that has been worsening over the past 2 months. She reports that she has had SOB for about 10-15 years but in the past 2 months it has gotten significantly worse. She is unable to walk more than 2 blocks or up the stairs without getting SOB. She sleeps with 3 pillows, but it still sometimes wakes her during the night and she has to get up and sleep in a chair. She says that she also feels like at times when she gets this SOB, her heart starts to beat too slowly. She says this SOB is associated with some generalized fatigue and malaise. She reports that when she was young, even in her teens, she would frequently pass out, and she saw doctors for this and they told her all her tests were normal. She says she hasn't passed out since she stopped menstruating. She says she believes she got an echo at [facility] several months ago and as far as she knows it was normal. She denies LOC, fevers, chills, chest pain or tightness, dizziness, numbness, tingling, weakness, abd pain, NVDC. She denies ever smoking cigarettes or drinking alcohol. She did not get the flu shot or her pneumococcus vaccine because she says she has never received these.

## 2024-05-09 NOTE — PATIENT PROFILE ADULT - NSTOBACCONEVERSMOKERY/N_GEN_A
-- DO NOT REPLY / DO NOT REPLY ALL --  -- Message is from Engagement Center Operations (ECO) --    General Patient Message: Per patient wants Dottie nurse to call to make appointment since he does not have good reception at the moment did offer to make appointment      Caller Information         Type Contact Phone/Fax    05/09/2024 12:44 PM CDT Phone (Outgoing) Dallas Fisher (Self) [phone] (M)          Alternative phone number: [phone]    Can a detailed message be left? Yes    Message Turnaround:                No

## 2024-07-25 NOTE — H&P ADULT - MUSCULOSKELETAL
[facility] NEPHROLOGY- PROGRESS NOTE    78y Female with history of dementia, ischemic bowel s/p resection with end ostomy presents with AMS. Nephrology consulted for elevated Scr.    REVIEW OF SYSTEMS:  Gen: no fevers  Cards: no chest pain  Resp: no dyspnea  GI: no nausea or vomiting or diarrhea, + decreased PO intake  Vascular: no LE edema    [facility] (Hives)      Hospital Medications: Medications reviewed      VITALS:  T(F): 97.3 (07-24-24 @ 04:34), Max: 97.8 (07-23-24 @ 12:28)  HR: 88 (07-24-24 @ 04:34)  BP: 108/72 (07-24-24 @ 04:34)  RR: 18 (07-24-24 @ 04:34)  SpO2: 98% (07-24-24 @ 04:34)  Wt(kg): --    07-23 @ 07:01  -  07-24 @ 07:00  --------------------------------------------------------  IN: 120 mL / OUT: 450 mL / NET: -330 mL        PHYSICAL EXAM:    Gen: NAD, calm  Cards: RRR, +S1/S2, no M/G/R  Resp: CTA B/L  GI: soft, NT/ND, NABS, + ostomy with liquidy output  : + amaya  Vascular: no LE edema B/L        LABS:  07-25    135  |  102  |  35<H>  ----------------------------<  106<H>  3.5   |  19<L>  |  1.51<H>    Ca    9.2      25 Jul 2024 06:35      Creatinine Trend: 1.51 <--, 1.64 <--, 1.59 <--, 1.87 <--, 2.08 <--                        13.8   9.02  )-----------( 238      ( 25 Jul 2024 06:34 )             41.0     Urine Studies:  Urinalysis Basic - ( 25 Jul 2024 06:35 )    Color:  / Appearance:  / SG:  / pH:   Gluc: 106 mg/dL / Ketone:   / Bili:  / Urobili:    Blood:  / Protein:  / Nitrite:    Leuk Esterase:  / RBC:  / WBC    Sq Epi:  / Non Sq Epi:  / Bacteria:          negative No joint pain, swelling or deformity; no limitation of movement

## 2024-10-03 NOTE — DISCHARGE NOTE NURSING/CASE MANAGEMENT/SOCIAL WORK - NSTRANSFERBELONGINGSDISPO_GEN_A_NUR
[facility]  ICU Note       Admit Date: 9/28/2024   LOS: 5 days     Chief Complaint/Reason for Admission:  76 y.o. male with hx of HTN, GERD, and AUD who presented to [facility] for acute alcohol intoxication and fall. History was obtained primarily though chart review as patient is unable to provide hx and was unable to speak to collateral. Initial presentation on 09/28, in which friends reported that the patient had been drinking more frequently for several days and had several falls, leading to friends that patient live with to contact EMS. Patient was admitted for alcohol intoxication and acute agitation, and had been initiated on benzo taper which was discontinued on 10/2 secondary to increased sedation. On 10/3 at 0156, patient was bradycardic on telemetry, and on exam patient was sweaty and unable to arouse with a sternal rub. CPR was initiated at 0159 as patient did not have pulse, and ROSC obtained at 0210 after 3 doses of epi, 1 dose of bicarb, and 1 of calcium. Patient was intubated and started on pressors for low MAPs after ROSC. Patient was then transferred to ICU for closer monitoring and intensive management.     Interval History:       Infusions:     NORepinephrine bitartrate-D5W  0-3 mcg/kg/min Intravenous Continuous 5.2 mL/hr at 10/03/24 1427 0.14 mcg/kg/min at 10/03/24 1427    propofoL  0-50 mcg/kg/min Intravenous Continuous 14.3 mL/hr at 10/03/24 1433 30 mcg/kg/min at 10/03/24 1433    vasopressin  0.04 Units/min Intravenous Continuous 12 mL/hr at 10/03/24 1427 0.04 Units/min at 10/03/24 1427       PMHx  Past Medical History:   Diagnosis Date    Dementia     Essential (primary) hypertension     ETOH abuse     GERD (gastroesophageal reflux disease)     Hearing impaired          Allergies  Review of patient's allergies indicates:  No Known Allergies    ROS- As per HPI, otherwise negative  Review of Systems   Unable to perform ROS: Critical illness       Objective    Vitals  Range  Temp:  [59.9 °F (15.5 °C)-98.4 °F (36.9 °C)]   Pulse:  []   Resp:  [18-50]   BP: ()/()   SpO2:  [92 %-100 %]   Arterial Line BP: ()/(49-81)     Physical Exam  Physical Exam:  GEN: intubated / sedated  HEENT: MMM, no scleral icterus, EOMI  NECK: Supple, midline trachea  CV: RRR, no MRG, pulses equal and symmetric 2+ at radial  PULM: Intubated, synchronous with vent  ABDOMEN: Soft, non-tender, non-distended, no rebound or guarding  SKIN: Warm, dry, intact, no rashes  MSK: No deformity, no clubbing, cyanosis, or lower extremity edema  NEURO: not awake, pupils are minimally responsive to light with pupilometer, does not withdraw to pain or react to external stimuli  LINES: Intact, no extravasation or induration, no erythema to PIV or support devices        Assessment/Plan:      Neuro/Psych  #Anoxic brain injury  - Pupilometer does show reactivity, no signs of myoclonus on exam, does not respond to pain or external stimuli while off sedation  - Currently on TTM  - CTH w/o contrast did not show evidence of acute bleed, can consider repeat neuro imaging to further evaluate for anoxic changes    #Alcohol withdrawal  #Sedation:  - Currently on propofol 20mcg/kg   - Patient previously on tx for alcohol withdrawal, propofol has benefit of GILBERT activity, will continue to monitor for s/s of withdrawal  - On folic acid and thiamine in setting of AUD    -- Patient lives with friends, who were here to provide history. Closest family has disabilities and lives out of state, patient did provide number of friend he lives with but was unable to reach.     CV  #Cardiac arrest  #Bradycardia  #Hypotension  - Likely secindary to QTC prolonging medications -> bradycardia -> cardiac arrest (cardiogenic shock) -> anoxic brain injury (distributive shock)  - Telemetry prior to arrest shows progressive heart rate decline from 86bpm to 30 bpm, with ten minutes of bradycardia below 40bpm prior to initiation of CPR  -  TTE did not have findings of systolic or diastolic changes, but did comment on some dilation of right ventricle  - BNP not elevated at 22  - Troponins at 0.029, will continue to trend  - Patient hypotensive following arrest requiring vasopressin, levo, and dopamine, on hydrocortisone and fludrocortisone for stress dose steroids.  - Dopamine now paused     #HTN  - Hx of HTN, was on lisinopril and metoprolol in admission prior to arrest, held in setting of hypotension    Pulm  #Respiratory Failure  Vent Mode: A/CMV-PC  Oxygen Concentration (%):  [] 50  Resp Rate Total:  [23 br/min-52 br/min] 29 br/min  Vt Set:  [350 mL] 350 mL  PEEP/CPAP:  [0 cmH20-9.1 cmH20] 9.1 cmH20  Mean Airway Pressure:  [7.2 cmH20-15.7 cmH20] 10.8 cmH20    #Respiratory failure  - Patient is intubated post arrest:  - ACPC at delta of 10, PEEP of 8, RR of 22, FiO2 of 50%, tidal volumes ~350-400  -- Lung Protective Ventilation strategy with 6 cc/kg IBW, goal Pplat <45nhF1B, goal SpO2 88-95%  -- Daily SAT/SBT    ABG:   Recent Labs   Lab 10/02/24  1554 10/03/24  0248 10/03/24  1154   PH 7.470* 7.314* 7.382   PCO2 23.3* 38.2 35.9   PO2 77.1* 146* 102*   HCO3 17.0* 19.4* 21.3*   POCSATURATED 97.4 99.0 98.6     FEN/GI  #Transaminitis   - ALT/AST elevated on admission with acute increase following arrest to 138/295, downtrended to 131/201  - increase likely in setting of ischemic hepatopathy, will continue to monitor      Fluids: Net even / net negative strategy  Electrolytes: K>4, Mg>2  Nutrition: OG tube feeds per nutrition, glucerna 1.5    Renal  #Hypokalemia  #AGMA  - Has been hypokalemic throughout admission, will continue to replete  - Patient had anion gap metabolic acidosis on presentation with improvement during admission, may be ketosis  - Lactic acid not elevated follow arrest  I/O  I/O last 3 completed shifts:  In: 1043.7 [I.V.:61.4; IV Piggyback:982.4]  Out: -     Intake/Output Summary (Last 24 hours) at 10/3/2024 7056  Last data filed  at 10/3/2024 1427  Gross per 24 hour   Intake 1991.17 ml   Output 536 ml   Net 1455.17 ml       Heme  H/H 13.2; stable    Endo  - Glucose increased to 315 post arrest, likely in the setting of acute stress response  - SSI ordered with goal of 140-180  -- Hypoglycemic precautions  HgbA1C:   Lab Results   Component Value Date    HGBA1C 5.9 (H) 07/31/2023   , Last Gluc:   Recent Labs   Lab 10/01/24  2019 10/02/24  0517 10/02/24  1132 10/02/24  2034 10/03/24  0208 10/03/24  1121   POCTGLUCOSE 118* 106 145* 117* 240* 243*     TSH:   Lab Results   Component Value Date    TSH 0.954 09/28/2024       ID  No acute concerns at this time  UA  Urinalysis  Recent Labs   Lab 10/03/24  1240   COLORU Yellow   SPECGRAV 1.020   PHUR 6.0   PROTEINUA 1+*   BACTERIA Rare   NITRITE Negative   LEUKOCYTESUR Negative   UROBILINOGEN Negative   HYALINECASTS 0     Recent Labs   Lab 10/03/24  1240   COLORU Yellow   SPECGRAV 1.020   PHUR 6.0   PROTEINUA 1+*   BACTERIA Rare     Micro  Microbiology Results (last 7 days)       Procedure Component Value Units Date/Time    Blood culture [[id number]]     Order Status: No result Specimen: Blood     Blood culture [[id number]]     Order Status: No result Specimen: Blood     Clostridium difficile EIA [[id number]] Collected: 10/03/24 1023    Order Status: Sent Specimen: Stool Updated: 10/03/24 1057             Rheum/MSK:  -- PT/OT for early mobility when able  -- Abrasions noted in several locations on extremities, none appear edematous or erythematous, with dried blood overlying wounds       ICU Checklist  Feeding: Feeding per OG tube with Glucerna 1.5   Analgesia: N/A  Sedation: Propofol  Thromboprophylaxis: Enoxaparin  Head up position: 30deg  Ulcer prophylaxis: Pepcid  Glycemic control: 140-180  Spontaneous Awakening/Breathing Trial: Daily  Bowel care: N/A  Indwelling catheter removal: Art line right, triple lumen CVC in r IJV, ET tube, external urinary catheter, 3 PIVs (2 in right, 1 on left)    De-escalation of antibiotics: None      Dispo: Removal of ET tube      10/3/2024 Jocelyne Alexander M.D., -II  [facility]     Pt seen and examined with Pulmonary/Critical Care team and this note was reviewed and validated with the following additional comments: Review of telestrips shows that prior to arrest, pt became bradycardic. ROSC obtained after several minutes but did not return to prior mental status. TTM started. Vasopressor requiring.  POCUS echo shows slightly reduced LV systolic function and slightly enlarged RV.  No VTE on LE US. Lungs clear. With sedation off, pt remains unresponsive to pain.  Pupils pinpoint but reactive.  Corneals weak, oculocephalics sluggish. Dyssynchronous with ventilator.  Concern for anoxic encephalopathy. Propofol restarted.    Critical Care time was spent validating the history and physical exam, reviewing the lab and imaging results, and discussing the care of the patient with the bedside nurse and the patient and/or surrogates. This critical care time did not overlap with that of any other provider or involve time for any procedures.  This patient has a high probability of sudden clinically significant deterioration which requires the highest level of physician preparedness to intervene urgently. I managed/supervised life or organ supporting interventions that required frequent physician assessments. I devoted my full attention in the ICU to the direct care of this patient for this period of time. Organ systems which are failing and require intensive, critical care support are: neurologic, cardiovascular, renal, metabolic  Critical Care time: 65 minutes    Eliecer Madera MD  Phone [phone]    with patient

## 2024-10-28 NOTE — CONSULT NOTE ADULT - SUBJECTIVE AND OBJECTIVE BOX
Patient is a 71y old  Female who presents with a chief complaint of Low blood level x 1 day (01 Jul 2021 07:43)      HPI:  71F, ambulate with a cane, history of CAD s/p stent 2020, Pancreatic adenocarcinoma dx 4/ 2020, on chemo twice a month, last treatment was 6/15/21, Hyperlipidemia, H. Pylori, treated 2020, The pt was scheduled to have her 7/1 chemo treatment @ [facility], but had blood work done showing a hemoglobin= 7.0. The pt received 1U PRBC and sent to the ED for evaluation with GI Dr Lechuga.  The pt admits to occasional BRBPR and melena during BM, last episode was 1 week ago, says this has been on going since her dx and treatment for H. Pylori 4/2020. Also endorses dry cough, nausea s/p chemo treatments, currently nausea free, 2 episodes of diarrhea, starting 6/29 and last episode 6/30, @ 12pm and generalized abdominal discomfort considered minimal. Pt endorsed L sided chest discomfort to the ED earlier, but currently denies chest pain / discomfort. Denies HA, dizziness, falls, dysphagia, blurry vision, SOB, palpitations, nausea, vomit, constipation, abdominal pain, dysuria, generalized body aches, chills.  Home medications reconciled with the patient.     In the Ed: Dr Servin: spoke to GI who will see patient 7/1 for potential further w/u. Discussed pt w/ heme onc, they will follow.  Occult blood: Negative.  H & H = 10/ 31.4  EKG NSR @67b/ min, LAD, QT/ QTC= 422/ 445.   CXR : Diffuse bilateral pulmonary metastases. No superimposed focal consolidation.  TROP= 12.     Vitals: T Max; 98.3 oral, HR= 73b/ min, BP = 150/ 78, R= 17b/ min, SPO2= 99% ra.  (01 Jul 2021 01:25)       Oncologic History:      ROS: as above     PAST MEDICAL & SURGICAL HISTORY:  Hyperlipidemia    GERD (gastroesophageal reflux disease)    Pancreatic adenocarcinoma    H. pylori infection    CAD (coronary artery disease)    H/O total hysterectomy    History of cardiac cath  1 stent. 4/2020. medi    Port-A-Cath in place  4/2020 ~ right chest        SOCIAL HISTORY:    FAMILY HISTORY:  Family history of diabetes mellitus    FH: CAD (coronary artery disease) (Father)        MEDICATIONS  (STANDING):  atorvastatin 40 milliGRAM(s) Oral at bedtime  clopidogrel Tablet 75 milliGRAM(s) Oral daily  enoxaparin Injectable 40 milliGRAM(s) SubCutaneous daily  erlotinib 100 milliGRAM(s) Oral <User Schedule>  lactobacillus acidophilus 1 Tablet(s) Oral three times a day  pancrelipase  (CREON 12,000 Lipase Units) 1 Capsule(s) Oral with breakfast  pantoprazole    Tablet 40 milliGRAM(s) Oral before breakfast  sodium chloride 0.9% lock flush 3 milliLiter(s) IV Push every 8 hours    MEDICATIONS  (PRN):  acetaminophen   Tablet .. 650 milliGRAM(s) Oral every 6 hours PRN Temp greater or equal to 38C (100.4F), Mild Pain (1 - 3), Moderate Pain (4 - 6)  aluminum hydroxide/magnesium hydroxide/simethicone Suspension 30 milliLiter(s) Oral every 4 hours PRN Dyspepsia  ondansetron Injectable 4 milliGRAM(s) IV Push every 6 hours PRN Nausea and/or Vomiting  polyethylene glycol 3350 17 Gram(s) Oral daily PRN Constipation      Allergies    No Known Allergies    Intolerances        Vital Signs Last 24 Hrs  T(C): 36.8 (01 Jul 2021 04:45), Max: 36.8 (30 Jun 2021 13:20)  T(F): 98.2 (01 Jul 2021 04:45), Max: 98.3 (30 Jun 2021 23:18)  HR: 69 (01 Jul 2021 04:45) (63 - 81)  BP: 137/76 (01 Jul 2021 04:45) (136/72 - 170/80)  BP(mean): --  RR: 17 (01 Jul 2021 04:45) (16 - 17)  SpO2: 96% (01 Jul 2021 04:45) (96% - 99%)    PHYSICAL EXAM  General: adult in NAD  HEENT: clear oropharynx, anicteric sclera, pink conjunctiva  Neck: supple  CV: normal S1/S2 with no murmur rubs or gallops  Lungs: positive air movement b/l ant lungs, clear to auscultation, no wheezes, no rales  Abdomen: soft non-tender non-distended, no hepatosplenomegaly  Ext: no clubbing cyanosis or edema  Skin: no rashes and no petechiae  Neuro: alert and oriented X 3, none focal    LABS:                          8.9    4.00  )-----------( 59       ( 01 Jul 2021 07:30 )             28.2         Mean Cell Volume : 90.7 fL  Mean Cell Hemoglobin : 28.6 pg  Mean Cell Hemoglobin Concentration : 31.6 gm/dL  Auto Neutrophil # : x  Auto Lymphocyte # : x  Auto Monocyte # : x  Auto Eosinophil # : x  Auto Basophil # : x  Auto Neutrophil % : x  Auto Lymphocyte % : x  Auto Monocyte % : x  Auto Eosinophil % : x  Auto Basophil % : x      Serial CBC's  07-01 @ 07:30  Hct-28.2 / Hgb-8.9 / Plat-59 / RBC-3.11 / WBC-4.00  Serial CBC's  06-30 @ 19:04  Hct-31.4 / Hgb-10.0 / Plat-60 / RBC-3.46 / WBC-4.57  Serial CBC's  06-29 @ 12:33  Hct-21.8 / Hgb-7.0 / Plat-49 / RBC-2.42 / WBC-4.25      07-01    136  |  106  |  18  ----------------------------<  72  3.5   |  16<L>  |  0.75    Ca    8.6      01 Jul 2021 07:30  Phos  4.4     07-01  Mg     2.20     07-01    TPro  7.8  /  Alb  3.3  /  TBili  1.6<H>  /  DBili  x   /  AST  79<H>  /  ALT  24  /  AlkPhos  111  06-30      PT/INR - ( 30 Jun 2021 19:04 )   PT: 13.0 sec;   INR: 1.15 ratio         PTT - ( 30 Jun 2021 19:04 )  PTT:33.0 sec    Iron - Total Binding Capacity.: 343 ug/dL (06-30 @ 19:08)  Ferritin, Serum: 523 ng/mL (06-30 @ 19:08)  Reticulocyte Percent: 3.8 % (06-30 @ 19:04)              RADIOLOGY & ADDITIONAL STUDIES:    
  HPI: 71F Hx metastatic pancreatic adenocarcinoma (on chemo, last 6/17), CAD s/p STEMI (11/2020) s/p PCI (now on plavix only), H pylori s/p tx, HLD who was found to have anemia with Hb 7.0 prior to chemo tx -- given 1U transfusion and sent here for evaluation of anemia.     Pt reports having black stools x 1 year (usually first hard BM in am), as well as episodes of BRBPR intermittently with stools. Says her bowel habits usually consistent of hard black-shamar BM in am, and then after her first meal in the day has a watery yellow-brown stool. 20lb weight loss. Does endorse taking PeptoBismol. Denies NSAID use, currently only on plavix for AC. Denies abd pain, n/v/f/c.     EGD (6/1/20) with erythematous mucosa, path (+) H pylori s/p tx.   Reports colonoscopy within 1 year with 2 polyps s/p removal.       Allergies:  No Known Allergies      Home Medications:    Hospital Medications:  acetaminophen   Tablet .. 650 milliGRAM(s) Oral every 6 hours PRN  aluminum hydroxide/magnesium hydroxide/simethicone Suspension 30 milliLiter(s) Oral every 4 hours PRN  atorvastatin 40 milliGRAM(s) Oral at bedtime  erlotinib 100 milliGRAM(s) Oral <User Schedule>  lactobacillus acidophilus 1 Tablet(s) Oral three times a day  ondansetron Injectable 4 milliGRAM(s) IV Push every 6 hours PRN  pancrelipase  (CREON 12,000 Lipase Units) 1 Capsule(s) Oral with breakfast  pantoprazole    Tablet 40 milliGRAM(s) Oral before breakfast  polyethylene glycol 3350 17 Gram(s) Oral daily PRN  sodium chloride 0.9% lock flush 3 milliLiter(s) IV Push every 8 hours      PMHX/PSHX:  No pertinent past medical history    Arthritis    Hyperlipidemia    Systemic lupus erythematosus    GERD (gastroesophageal reflux disease)    Pancreatic adenocarcinoma    H. pylori infection    CAD (coronary artery disease)    No significant past surgical history    H/O total hysterectomy    History of cardiac cath    Port-A-Cath in place        Family history:  Family history of diabetes mellitus    FH: CAD (coronary artery disease) (Father)        Denies family history of colon cancer/polyps, stomach cancer/polyps, pancreatic cancer/masses, liver cancer/disease, ovarian cancer and endometrial cancer.    Social History:     Tob: Denies  EtOH: Denies  Illicit Drugs: Denies    ROS:     General:  No wt loss, fevers, chills, night sweats, fatigue  Eyes:  Good vision, no reported pain  ENT:  No sore throat, pain, runny nose, dysphagia  CV:  No pain, palpitations, hypo/hypertension  Pulm:  No dyspnea, cough, tachypnea, wheezing  GI: see above  :  No pain, bleeding, incontinence, nocturia  Muscle:  No pain, weakness  Neuro:  No weakness, tingling, memory problems  Psych:  No fatigue, insomnia, mood problems, depression  Endocrine:  No polyuria, polydipsia, cold/heat intolerance  Heme:  No petechiae, ecchymosis, easy bruisability  Skin:  No rash, tattoos, scars, edema    PHYSICAL EXAM:     GENERAL:  No acute distress, thin elderly female   HEENT:  Normocephalic/atraumatic, no scleral icterus  CHEST: no accessory muscle use  HEART:  Regular rate and rhythm, no murmurs/rubs/gallops  ABDOMEN:  Soft, non-tender, non-distended, normoactive bowel sounds,  no masses  RECTAL: no stool in vault, +hemorrhoids   EXTREMITIES: No cyanosis, clubbing, or edema  SKIN:  No rash/erythema/ecchymoses/petechiae/wounds/abscess/warm/dry  NEURO:  Alert and oriented x 3, no asterixis    Vital Signs:  Vital Signs Last 24 Hrs  T(C): 36.8 (01 Jul 2021 04:45), Max: 36.8 (30 Jun 2021 13:20)  T(F): 98.2 (01 Jul 2021 04:45), Max: 98.3 (30 Jun 2021 23:18)  HR: 69 (01 Jul 2021 04:45) (63 - 81)  BP: 137/76 (01 Jul 2021 04:45) (136/72 - 170/80)  BP(mean): --  RR: 17 (01 Jul 2021 04:45) (16 - 17)  SpO2: 96% (01 Jul 2021 04:45) (96% - 99%)  Daily Height in cm: 162.6 (01 Jul 2021 04:45)    Daily     LABS:                        8.9    4.00  )-----------( 59       ( 01 Jul 2021 07:30 )             28.2     Mean Cell Volume: 90.7 fL (07-01-21 @ 07:30)    07-01    136  |  106  |  18  ----------------------------<  72  3.5   |  16<L>  |  0.75    Ca    8.6      01 Jul 2021 07:30  Phos  4.4     07-01  Mg     2.20     07-01    TPro  7.8  /  Alb  3.3  /  TBili  1.6<H>  /  DBili  x   /  AST  79<H>  /  ALT  24  /  AlkPhos  111  06-30    LIVER FUNCTIONS - ( 30 Jun 2021 19:04 )  Alb: 3.3 g/dL / Pro: 7.8 g/dL / ALK PHOS: 111 U/L / ALT: 24 U/L / AST: 79 U/L / GGT: x           PT/INR - ( 30 Jun 2021 19:04 )   PT: 13.0 sec;   INR: 1.15 ratio         PTT - ( 30 Jun 2021 19:04 )  PTT:33.0 sec                            8.9    4.00  )-----------( 59       ( 01 Jul 2021 07:30 )             28.2                         10.0   4.57  )-----------( 60       ( 30 Jun 2021 19:04 )             31.4                         7.0    4.25  )-----------( 49       ( 29 Jun 2021 12:33 )             21.8       Imaging:      `< from: Upper EUS (06.01.20 @ 14:01) >  Findings:       EGD: :       The examined esophagus was normal.       Diffuse mildly erythematous mucosa without bleeding was found in the entire examined stomach.        Biopsies were taken with a cold forceps for histology and Helicobacter pylori testing.        Estimated blood loss was minimal.       The exam of the stomach was otherwise normal.       The examined duodenum was normal.       EUS:       The linear echoendoscope was used for the EUS.       An ill-defined, heterogeneous, hypoechoic lesion was seen in the head of pancreas. The lesion        abutted but did not invade the SMV. The main pancreatic duct was dilated and the parenchyma        was heterogeneous within the pancreatic body/tail.       Fine needle biopsy was performed. Color Doppler imaging was utilized prior to needle puncture        to confirm a lack of significant vascular structures within the needle path. Three passes        were made with the 22 gauge SharkCore biopsy needle using a transduodenal approach. Estimated        blood loss was minimal.                                                                                                        Impression:          EGD:                       - Erythematous mucosa in the stomach. Biopsied.                       EUS:                       - A ill-defined mass in the head of pancreas as described above.                        Transduodenal fine needle biopsy performed.  Recommendation:      - Discharge patient to home (with escort).                       - Await path results.    < end of copied text >  < from: Upper EUS (06.01.20 @ 14:01) >  Findings:       EGD: :       The examined esophagus was normal.       Diffuse mildly erythematous mucosa without bleeding was found in the entire examined stomach.        Biopsies were taken with a cold forceps for histology and Helicobacter pylori testing.        Estimated blood loss was minimal.       The exam of the stomach was otherwise normal.       The examined duodenum was normal.       EUS:       The linear echoendoscope was used for the EUS.       An ill-defined, heterogeneous, hypoechoic lesion was seen in the head of pancreas. The lesion        abutted but did not invade the SMV. The main pancreatic duct was dilated and the parenchyma        was heterogeneous within the pancreatic body/tail.       Fine needle biopsy was performed. Color Doppler imaging was utilized prior to needle puncture        to confirm a lack of significant vascular structures within the needle path. Three passes        were made with the 22 gauge SharkCore biopsy needle using a transduodenal approach. Estimated        blood loss was minimal.                                                                                                        Impression:          EGD:                       - Erythematous mucosa in the stomach. Biopsied.                       EUS:                       - A ill-defined mass in the head of pancreas as described above.                        Transduodenal fine needle biopsy performed.  Recommendation:      - Discharge patient to home (with escort).                       - Await path results.    < end of copied text >          
friend
